# Patient Record
Sex: MALE | Race: WHITE | NOT HISPANIC OR LATINO | Employment: OTHER | URBAN - METROPOLITAN AREA
[De-identification: names, ages, dates, MRNs, and addresses within clinical notes are randomized per-mention and may not be internally consistent; named-entity substitution may affect disease eponyms.]

---

## 2019-04-05 ENCOUNTER — APPOINTMENT (OUTPATIENT)
Dept: RADIOLOGY | Facility: MEDICAL CENTER | Age: 54
DRG: 516 | End: 2019-04-05
Attending: EMERGENCY MEDICINE
Payer: MEDICAID

## 2019-04-05 ENCOUNTER — HOSPITAL ENCOUNTER (INPATIENT)
Facility: MEDICAL CENTER | Age: 54
LOS: 44 days | DRG: 516 | End: 2019-05-20
Attending: EMERGENCY MEDICINE | Admitting: INTERNAL MEDICINE
Payer: MEDICAID

## 2019-04-05 DIAGNOSIS — E87.6 HYPOKALEMIA: ICD-10-CM

## 2019-04-05 DIAGNOSIS — R53.1 WEAKNESS: ICD-10-CM

## 2019-04-05 DIAGNOSIS — S32.011D CLOSED STABLE BURST FRACTURE OF FIRST LUMBAR VERTEBRA WITH ROUTINE HEALING: ICD-10-CM

## 2019-04-05 DIAGNOSIS — R42 DIZZINESS: ICD-10-CM

## 2019-04-05 DIAGNOSIS — M25.562 ACUTE PAIN OF LEFT KNEE: ICD-10-CM

## 2019-04-05 LAB
ALBUMIN SERPL BCP-MCNC: 3.4 G/DL (ref 3.2–4.9)
ALBUMIN/GLOB SERPL: 1.1 G/DL
ALP SERPL-CCNC: 124 U/L (ref 30–99)
ALT SERPL-CCNC: 11 U/L (ref 2–50)
ANION GAP SERPL CALC-SCNC: 13 MMOL/L (ref 0–11.9)
AST SERPL-CCNC: 35 U/L (ref 12–45)
BASOPHILS # BLD AUTO: 0.3 % (ref 0–1.8)
BASOPHILS # BLD: 0.01 K/UL (ref 0–0.12)
BILIRUB SERPL-MCNC: 1.1 MG/DL (ref 0.1–1.5)
BNP SERPL-MCNC: 29 PG/ML (ref 0–100)
BUN SERPL-MCNC: 5 MG/DL (ref 8–22)
CALCIUM SERPL-MCNC: 8.2 MG/DL (ref 8.5–10.5)
CHLORIDE SERPL-SCNC: 106 MMOL/L (ref 96–112)
CO2 SERPL-SCNC: 24 MMOL/L (ref 20–33)
CREAT SERPL-MCNC: 0.82 MG/DL (ref 0.5–1.4)
EOSINOPHIL # BLD AUTO: 0.05 K/UL (ref 0–0.51)
EOSINOPHIL NFR BLD: 1.7 % (ref 0–6.9)
ERYTHROCYTE [DISTWIDTH] IN BLOOD BY AUTOMATED COUNT: 55.6 FL (ref 35.9–50)
GLOBULIN SER CALC-MCNC: 3.1 G/DL (ref 1.9–3.5)
GLUCOSE SERPL-MCNC: 82 MG/DL (ref 65–99)
HCT VFR BLD AUTO: 36.9 % (ref 42–52)
HGB BLD-MCNC: 12.5 G/DL (ref 14–18)
IMM GRANULOCYTES # BLD AUTO: 0.01 K/UL (ref 0–0.11)
IMM GRANULOCYTES NFR BLD AUTO: 0.3 % (ref 0–0.9)
LACTATE BLD-SCNC: 2.3 MMOL/L (ref 0.5–2)
LYMPHOCYTES # BLD AUTO: 1.09 K/UL (ref 1–4.8)
LYMPHOCYTES NFR BLD: 36.7 % (ref 22–41)
MAGNESIUM SERPL-MCNC: 1.8 MG/DL (ref 1.5–2.5)
MCH RBC QN AUTO: 28.9 PG (ref 27–33)
MCHC RBC AUTO-ENTMCNC: 33.9 G/DL (ref 33.7–35.3)
MCV RBC AUTO: 85.4 FL (ref 81.4–97.8)
MONOCYTES # BLD AUTO: 0.41 K/UL (ref 0–0.85)
MONOCYTES NFR BLD AUTO: 13.8 % (ref 0–13.4)
NEUTROPHILS # BLD AUTO: 1.4 K/UL (ref 1.82–7.42)
NEUTROPHILS NFR BLD: 47.2 % (ref 44–72)
NRBC # BLD AUTO: 0 K/UL
NRBC BLD-RTO: 0 /100 WBC
PLATELET # BLD AUTO: 91 K/UL (ref 164–446)
PMV BLD AUTO: 11.9 FL (ref 9–12.9)
POTASSIUM SERPL-SCNC: 2.6 MMOL/L (ref 3.6–5.5)
PROT SERPL-MCNC: 6.5 G/DL (ref 6–8.2)
RBC # BLD AUTO: 4.32 M/UL (ref 4.7–6.1)
SODIUM SERPL-SCNC: 143 MMOL/L (ref 135–145)
TROPONIN I SERPL-MCNC: <0.01 NG/ML (ref 0–0.04)
WBC # BLD AUTO: 3 K/UL (ref 4.8–10.8)

## 2019-04-05 PROCEDURE — 700117 HCHG RX CONTRAST REV CODE 255: Performed by: EMERGENCY MEDICINE

## 2019-04-05 PROCEDURE — 96365 THER/PROPH/DIAG IV INF INIT: CPT | Mod: XU

## 2019-04-05 PROCEDURE — 83735 ASSAY OF MAGNESIUM: CPT

## 2019-04-05 PROCEDURE — 87040 BLOOD CULTURE FOR BACTERIA: CPT | Mod: 91

## 2019-04-05 PROCEDURE — 80053 COMPREHEN METABOLIC PANEL: CPT

## 2019-04-05 PROCEDURE — 84484 ASSAY OF TROPONIN QUANT: CPT

## 2019-04-05 PROCEDURE — 99285 EMERGENCY DEPT VISIT HI MDM: CPT

## 2019-04-05 PROCEDURE — 96376 TX/PRO/DX INJ SAME DRUG ADON: CPT | Mod: XU

## 2019-04-05 PROCEDURE — 74177 CT ABD & PELVIS W/CONTRAST: CPT

## 2019-04-05 PROCEDURE — 700111 HCHG RX REV CODE 636 W/ 250 OVERRIDE (IP): Performed by: EMERGENCY MEDICINE

## 2019-04-05 PROCEDURE — 72072 X-RAY EXAM THORAC SPINE 3VWS: CPT

## 2019-04-05 PROCEDURE — 99220 PR INITIAL OBSERVATION CARE,LEVL III: CPT | Performed by: INTERNAL MEDICINE

## 2019-04-05 PROCEDURE — 71275 CT ANGIOGRAPHY CHEST: CPT

## 2019-04-05 PROCEDURE — 96367 TX/PROPH/DG ADDL SEQ IV INF: CPT

## 2019-04-05 PROCEDURE — 96375 TX/PRO/DX INJ NEW DRUG ADDON: CPT | Mod: XU

## 2019-04-05 PROCEDURE — 83605 ASSAY OF LACTIC ACID: CPT

## 2019-04-05 PROCEDURE — G0378 HOSPITAL OBSERVATION PER HR: HCPCS

## 2019-04-05 PROCEDURE — 71045 X-RAY EXAM CHEST 1 VIEW: CPT

## 2019-04-05 PROCEDURE — 85025 COMPLETE CBC W/AUTO DIFF WBC: CPT

## 2019-04-05 PROCEDURE — 700105 HCHG RX REV CODE 258: Performed by: EMERGENCY MEDICINE

## 2019-04-05 PROCEDURE — 70450 CT HEAD/BRAIN W/O DYE: CPT

## 2019-04-05 PROCEDURE — 83880 ASSAY OF NATRIURETIC PEPTIDE: CPT

## 2019-04-05 PROCEDURE — 72100 X-RAY EXAM L-S SPINE 2/3 VWS: CPT

## 2019-04-05 RX ORDER — METHYLPREDNISOLONE SODIUM SUCCINATE 125 MG/2ML
125 INJECTION, POWDER, LYOPHILIZED, FOR SOLUTION INTRAMUSCULAR; INTRAVENOUS ONCE
Status: COMPLETED | OUTPATIENT
Start: 2019-04-05 | End: 2019-04-05

## 2019-04-05 RX ORDER — TRAZODONE HYDROCHLORIDE 50 MG/1
50 TABLET ORAL NIGHTLY
Status: ON HOLD | COMMUNITY
End: 2019-05-19

## 2019-04-05 RX ORDER — ONDANSETRON 4 MG/1
4 TABLET, ORALLY DISINTEGRATING ORAL EVERY 4 HOURS PRN
Status: DISCONTINUED | OUTPATIENT
Start: 2019-04-05 | End: 2019-05-20 | Stop reason: HOSPADM

## 2019-04-05 RX ORDER — BISACODYL 10 MG
10 SUPPOSITORY, RECTAL RECTAL
Status: DISCONTINUED | OUTPATIENT
Start: 2019-04-05 | End: 2019-04-06

## 2019-04-05 RX ORDER — POLYETHYLENE GLYCOL 3350 17 G/17G
1 POWDER, FOR SOLUTION ORAL
Status: DISCONTINUED | OUTPATIENT
Start: 2019-04-05 | End: 2019-04-06

## 2019-04-05 RX ORDER — PANTOPRAZOLE SODIUM 40 MG/1
40 TABLET, DELAYED RELEASE ORAL 2 TIMES DAILY
COMMUNITY
End: 2021-11-09

## 2019-04-05 RX ORDER — ONDANSETRON 2 MG/ML
4 INJECTION INTRAMUSCULAR; INTRAVENOUS ONCE
Status: COMPLETED | OUTPATIENT
Start: 2019-04-05 | End: 2019-04-05

## 2019-04-05 RX ORDER — SODIUM CHLORIDE 9 MG/ML
1000 INJECTION, SOLUTION INTRAVENOUS ONCE
Status: COMPLETED | OUTPATIENT
Start: 2019-04-05 | End: 2019-04-05

## 2019-04-05 RX ORDER — AMITRIPTYLINE HYDROCHLORIDE 75 MG/1
75 TABLET ORAL NIGHTLY
Status: ON HOLD | COMMUNITY
End: 2019-05-19

## 2019-04-05 RX ORDER — POTASSIUM CHLORIDE 7.45 MG/ML
10 INJECTION INTRAVENOUS ONCE
Status: COMPLETED | OUTPATIENT
Start: 2019-04-05 | End: 2019-04-05

## 2019-04-05 RX ORDER — BUSPIRONE HYDROCHLORIDE 10 MG/1
15 TABLET ORAL 2 TIMES DAILY
Status: DISCONTINUED | OUTPATIENT
Start: 2019-04-05 | End: 2019-05-20 | Stop reason: HOSPADM

## 2019-04-05 RX ORDER — ACETAMINOPHEN 325 MG/1
650 TABLET ORAL EVERY 6 HOURS PRN
Status: DISCONTINUED | OUTPATIENT
Start: 2019-04-05 | End: 2019-04-11

## 2019-04-05 RX ORDER — ONDANSETRON 2 MG/ML
4 INJECTION INTRAMUSCULAR; INTRAVENOUS EVERY 4 HOURS PRN
Status: DISCONTINUED | OUTPATIENT
Start: 2019-04-05 | End: 2019-05-20 | Stop reason: HOSPADM

## 2019-04-05 RX ORDER — PANTOPRAZOLE SODIUM 40 MG/1
40 TABLET, DELAYED RELEASE ORAL 2 TIMES DAILY
Status: DISCONTINUED | OUTPATIENT
Start: 2019-04-05 | End: 2019-04-05

## 2019-04-05 RX ORDER — ZOLPIDEM TARTRATE 10 MG/1
10 TABLET ORAL
Status: ON HOLD | COMMUNITY
End: 2019-08-14

## 2019-04-05 RX ORDER — HYDROXYZINE 50 MG/1
50 TABLET, FILM COATED ORAL 2 TIMES DAILY
Status: ON HOLD | COMMUNITY
End: 2019-05-19

## 2019-04-05 RX ORDER — DIPHENHYDRAMINE HYDROCHLORIDE 50 MG/ML
50 INJECTION INTRAMUSCULAR; INTRAVENOUS ONCE
Status: COMPLETED | OUTPATIENT
Start: 2019-04-05 | End: 2019-04-05

## 2019-04-05 RX ORDER — MAGNESIUM SULFATE HEPTAHYDRATE 40 MG/ML
2 INJECTION, SOLUTION INTRAVENOUS ONCE
Status: COMPLETED | OUTPATIENT
Start: 2019-04-05 | End: 2019-04-06

## 2019-04-05 RX ORDER — AMOXICILLIN 250 MG
2 CAPSULE ORAL 2 TIMES DAILY
Status: DISCONTINUED | OUTPATIENT
Start: 2019-04-05 | End: 2019-04-06

## 2019-04-05 RX ORDER — MORPHINE SULFATE 4 MG/ML
4 INJECTION, SOLUTION INTRAMUSCULAR; INTRAVENOUS
Status: DISCONTINUED | OUTPATIENT
Start: 2019-04-05 | End: 2019-04-06

## 2019-04-05 RX ORDER — SODIUM CHLORIDE AND POTASSIUM CHLORIDE 300; 900 MG/100ML; MG/100ML
INJECTION, SOLUTION INTRAVENOUS CONTINUOUS
Status: DISCONTINUED | OUTPATIENT
Start: 2019-04-05 | End: 2019-04-06

## 2019-04-05 RX ORDER — BUSPIRONE HYDROCHLORIDE 15 MG/1
15 TABLET ORAL 2 TIMES DAILY
Status: ON HOLD | COMMUNITY
End: 2019-08-14

## 2019-04-05 RX ORDER — OMEPRAZOLE 20 MG/1
20 CAPSULE, DELAYED RELEASE ORAL EVERY 12 HOURS
Status: DISCONTINUED | OUTPATIENT
Start: 2019-04-05 | End: 2019-05-20 | Stop reason: HOSPADM

## 2019-04-05 RX ORDER — POTASSIUM CHLORIDE 20 MEQ/1
40 TABLET, EXTENDED RELEASE ORAL ONCE
Status: COMPLETED | OUTPATIENT
Start: 2019-04-05 | End: 2019-04-06

## 2019-04-05 RX ORDER — AMITRIPTYLINE HYDROCHLORIDE 75 MG/1
75 TABLET ORAL NIGHTLY
Status: DISCONTINUED | OUTPATIENT
Start: 2019-04-05 | End: 2019-05-03

## 2019-04-05 RX ADMIN — IOHEXOL 100 ML: 350 INJECTION, SOLUTION INTRAVENOUS at 21:25

## 2019-04-05 RX ADMIN — MORPHINE SULFATE 4 MG: 4 INJECTION INTRAVENOUS at 19:54

## 2019-04-05 RX ADMIN — DIPHENHYDRAMINE HYDROCHLORIDE 50 MG: 50 INJECTION INTRAMUSCULAR; INTRAVENOUS at 19:59

## 2019-04-05 RX ADMIN — ONDANSETRON 4 MG: 2 INJECTION INTRAMUSCULAR; INTRAVENOUS at 16:59

## 2019-04-05 RX ADMIN — SODIUM CHLORIDE 1000 ML: 9 INJECTION, SOLUTION INTRAVENOUS at 16:58

## 2019-04-05 RX ADMIN — DIPHENHYDRAMINE HYDROCHLORIDE 50 MG: 50 INJECTION INTRAMUSCULAR; INTRAVENOUS at 21:03

## 2019-04-05 RX ADMIN — CEFTRIAXONE SODIUM 1 G: 1 INJECTION, POWDER, FOR SOLUTION INTRAMUSCULAR; INTRAVENOUS at 18:34

## 2019-04-05 RX ADMIN — METHYLPREDNISOLONE SODIUM SUCCINATE 125 MG: 125 INJECTION, POWDER, FOR SOLUTION INTRAMUSCULAR; INTRAVENOUS at 19:59

## 2019-04-05 RX ADMIN — POTASSIUM CHLORIDE 10 MEQ: 7.46 INJECTION, SOLUTION INTRAVENOUS at 19:54

## 2019-04-05 ASSESSMENT — ENCOUNTER SYMPTOMS
NECK PAIN: 0
DIARRHEA: 0
LOSS OF CONSCIOUSNESS: 0
MYALGIAS: 0
NAUSEA: 1
SPUTUM PRODUCTION: 0
PALPITATIONS: 0
BLOOD IN STOOL: 0
VOMITING: 1
WEAKNESS: 1
COUGH: 0
FEVER: 0
SHORTNESS OF BREATH: 1
SORE THROAT: 0
BLURRED VISION: 0
ABDOMINAL PAIN: 1
SEIZURES: 0
BRUISES/BLEEDS EASILY: 0
FLANK PAIN: 0
HEADACHES: 0
CHILLS: 0
WHEEZING: 0
DIAPHORESIS: 0
FOCAL WEAKNESS: 0
DIZZINESS: 1
BACK PAIN: 0

## 2019-04-05 ASSESSMENT — PAIN DESCRIPTION - DESCRIPTORS: DESCRIPTORS: THROBBING

## 2019-04-05 ASSESSMENT — LIFESTYLE VARIABLES: DO YOU DRINK ALCOHOL: NO

## 2019-04-05 NOTE — ED TRIAGE NOTES
Pt arrived to room via EMS. EMS reports that the pt is a senior care dump. EMS reports back pain and heart rate in the 180s. Pt reports pain 10/10, cancer, and asthma. Pt reports blurry vision, back pain, weakness, and SOB. EMS gave nothing en route. Pt placed in gown. Pt attached to monitors. Primary assessment done.     [facility] is at the bedside.   Pt has a port on left upper chest.

## 2019-04-06 ENCOUNTER — APPOINTMENT (OUTPATIENT)
Dept: CARDIOLOGY | Facility: MEDICAL CENTER | Age: 54
DRG: 516 | End: 2019-04-06
Attending: INTERNAL MEDICINE
Payer: MEDICAID

## 2019-04-06 PROBLEM — R00.0 TACHYCARDIA: Status: ACTIVE | Noted: 2019-04-06

## 2019-04-06 PROBLEM — R42 DIZZINESS: Status: ACTIVE | Noted: 2019-04-06

## 2019-04-06 PROBLEM — E87.20 LACTIC ACIDOSIS: Status: ACTIVE | Noted: 2019-04-06

## 2019-04-06 PROBLEM — E87.6 HYPOKALEMIA: Status: ACTIVE | Noted: 2019-04-06

## 2019-04-06 PROBLEM — R40.0 SOMNOLENCE: Status: ACTIVE | Noted: 2019-04-06

## 2019-04-06 PROBLEM — D61.818 PANCYTOPENIA (HCC): Status: ACTIVE | Noted: 2019-04-06

## 2019-04-06 PROBLEM — E83.51 HYPOCALCEMIA: Status: ACTIVE | Noted: 2019-04-06

## 2019-04-06 PROBLEM — R65.10 SIRS (SYSTEMIC INFLAMMATORY RESPONSE SYNDROME) (HCC): Status: ACTIVE | Noted: 2019-04-06

## 2019-04-06 LAB
ANION GAP SERPL CALC-SCNC: 8 MMOL/L (ref 0–11.9)
ANISOCYTOSIS BLD QL SMEAR: ABNORMAL
APPEARANCE UR: CLEAR
BASOPHILS # BLD AUTO: 0 % (ref 0–1.8)
BASOPHILS # BLD: 0 K/UL (ref 0–0.12)
BILIRUB UR QL STRIP.AUTO: NEGATIVE
BUN SERPL-MCNC: 4 MG/DL (ref 8–22)
CALCIUM SERPL-MCNC: 7.8 MG/DL (ref 8.5–10.5)
CHLORIDE SERPL-SCNC: 106 MMOL/L (ref 96–112)
CO2 SERPL-SCNC: 23 MMOL/L (ref 20–33)
COLOR UR: YELLOW
CREAT SERPL-MCNC: 0.8 MG/DL (ref 0.5–1.4)
CRP SERPL HS-MCNC: 0.82 MG/DL (ref 0–0.75)
EKG IMPRESSION: NORMAL
EOSINOPHIL # BLD AUTO: 0 K/UL (ref 0–0.51)
EOSINOPHIL NFR BLD: 0 % (ref 0–6.9)
ERYTHROCYTE [DISTWIDTH] IN BLOOD BY AUTOMATED COUNT: 57.1 FL (ref 35.9–50)
FERRITIN SERPL-MCNC: 91.8 NG/ML (ref 22–322)
FOLATE SERPL-MCNC: 5.1 NG/ML
GLUCOSE SERPL-MCNC: 169 MG/DL (ref 65–99)
GLUCOSE UR STRIP.AUTO-MCNC: NEGATIVE MG/DL
HCT VFR BLD AUTO: 35.7 % (ref 42–52)
HGB BLD-MCNC: 11.8 G/DL (ref 14–18)
IRON SATN MFR SERPL: 28 % (ref 15–55)
IRON SERPL-MCNC: 62 UG/DL (ref 50–180)
KETONES UR STRIP.AUTO-MCNC: 15 MG/DL
LACTATE BLD-SCNC: 0.9 MMOL/L (ref 0.5–2)
LEUKOCYTE ESTERASE UR QL STRIP.AUTO: NEGATIVE
LV EJECT FRACT  99904: 60
LV EJECT FRACT MOD 4C 99902: 63.51
LYMPHOCYTES # BLD AUTO: 0.45 K/UL (ref 1–4.8)
LYMPHOCYTES NFR BLD: 50 % (ref 22–41)
MAGNESIUM SERPL-MCNC: 2.5 MG/DL (ref 1.5–2.5)
MANUAL DIFF BLD: NORMAL
MCH RBC QN AUTO: 28.7 PG (ref 27–33)
MCHC RBC AUTO-ENTMCNC: 33.1 G/DL (ref 33.7–35.3)
MCV RBC AUTO: 86.9 FL (ref 81.4–97.8)
MICRO URNS: ABNORMAL
MICROCYTES BLD QL SMEAR: ABNORMAL
MONOCYTES # BLD AUTO: 0.04 K/UL (ref 0–0.85)
MONOCYTES NFR BLD AUTO: 4.3 % (ref 0–13.4)
MORPHOLOGY BLD-IMP: NORMAL
NEUTROPHILS # BLD AUTO: 0.41 K/UL (ref 1.82–7.42)
NEUTROPHILS NFR BLD: 43.5 % (ref 44–72)
NEUTS BAND NFR BLD MANUAL: 2.2 % (ref 0–10)
NITRITE UR QL STRIP.AUTO: NEGATIVE
NRBC # BLD AUTO: 0 K/UL
NRBC BLD-RTO: 0 /100 WBC
OVALOCYTES BLD QL SMEAR: NORMAL
PH UR STRIP.AUTO: 6 [PH]
PLATELET # BLD AUTO: 89 K/UL (ref 164–446)
PLATELET BLD QL SMEAR: NORMAL
PMV BLD AUTO: 10.6 FL (ref 9–12.9)
POIKILOCYTOSIS BLD QL SMEAR: NORMAL
POTASSIUM SERPL-SCNC: 3.5 MMOL/L (ref 3.6–5.5)
PROCALCITONIN SERPL-MCNC: 0.08 NG/ML
PROCALCITONIN SERPL-MCNC: 0.09 NG/ML
PROT UR QL STRIP: NEGATIVE MG/DL
RBC # BLD AUTO: 4.11 M/UL (ref 4.7–6.1)
RBC BLD AUTO: PRESENT
RBC UR QL AUTO: NEGATIVE
SMUDGE CELLS BLD QL SMEAR: NORMAL
SODIUM SERPL-SCNC: 137 MMOL/L (ref 135–145)
SP GR UR STRIP.AUTO: >=1.045
TIBC SERPL-MCNC: 221 UG/DL (ref 250–450)
TSH SERPL DL<=0.005 MIU/L-ACNC: 0.55 UIU/ML (ref 0.38–5.33)
UROBILINOGEN UR STRIP.AUTO-MCNC: 1 MG/DL
VIT B12 SERPL-MCNC: 490 PG/ML (ref 211–911)
WBC # BLD AUTO: 0.9 K/UL (ref 4.8–10.8)

## 2019-04-06 PROCEDURE — 93005 ELECTROCARDIOGRAM TRACING: CPT | Performed by: INTERNAL MEDICINE

## 2019-04-06 PROCEDURE — 93010 ELECTROCARDIOGRAM REPORT: CPT | Performed by: INTERNAL MEDICINE

## 2019-04-06 PROCEDURE — 700102 HCHG RX REV CODE 250 W/ 637 OVERRIDE(OP): Performed by: INTERNAL MEDICINE

## 2019-04-06 PROCEDURE — 93306 TTE W/DOPPLER COMPLETE: CPT | Mod: 26 | Performed by: INTERNAL MEDICINE

## 2019-04-06 PROCEDURE — 82728 ASSAY OF FERRITIN: CPT

## 2019-04-06 PROCEDURE — 94760 N-INVAS EAR/PLS OXIMETRY 1: CPT

## 2019-04-06 PROCEDURE — 83605 ASSAY OF LACTIC ACID: CPT

## 2019-04-06 PROCEDURE — 81003 URINALYSIS AUTO W/O SCOPE: CPT

## 2019-04-06 PROCEDURE — 99233 SBSQ HOSP IP/OBS HIGH 50: CPT | Performed by: INTERNAL MEDICINE

## 2019-04-06 PROCEDURE — 96375 TX/PRO/DX INJ NEW DRUG ADDON: CPT

## 2019-04-06 PROCEDURE — A9270 NON-COVERED ITEM OR SERVICE: HCPCS | Performed by: INTERNAL MEDICINE

## 2019-04-06 PROCEDURE — 82607 VITAMIN B-12: CPT

## 2019-04-06 PROCEDURE — 700111 HCHG RX REV CODE 636 W/ 250 OVERRIDE (IP): Performed by: INTERNAL MEDICINE

## 2019-04-06 PROCEDURE — 83550 IRON BINDING TEST: CPT

## 2019-04-06 PROCEDURE — 96366 THER/PROPH/DIAG IV INF ADDON: CPT

## 2019-04-06 PROCEDURE — 85007 BL SMEAR W/DIFF WBC COUNT: CPT

## 2019-04-06 PROCEDURE — 96367 TX/PROPH/DG ADDL SEQ IV INF: CPT

## 2019-04-06 PROCEDURE — 700101 HCHG RX REV CODE 250: Performed by: INTERNAL MEDICINE

## 2019-04-06 PROCEDURE — 93306 TTE W/DOPPLER COMPLETE: CPT

## 2019-04-06 PROCEDURE — 84145 PROCALCITONIN (PCT): CPT

## 2019-04-06 PROCEDURE — 86140 C-REACTIVE PROTEIN: CPT

## 2019-04-06 PROCEDURE — 700105 HCHG RX REV CODE 258: Performed by: INTERNAL MEDICINE

## 2019-04-06 PROCEDURE — 83540 ASSAY OF IRON: CPT

## 2019-04-06 PROCEDURE — 80048 BASIC METABOLIC PNL TOTAL CA: CPT

## 2019-04-06 PROCEDURE — 84443 ASSAY THYROID STIM HORMONE: CPT

## 2019-04-06 PROCEDURE — 83735 ASSAY OF MAGNESIUM: CPT

## 2019-04-06 PROCEDURE — 770020 HCHG ROOM/CARE - TELE (206)

## 2019-04-06 PROCEDURE — 85027 COMPLETE CBC AUTOMATED: CPT

## 2019-04-06 PROCEDURE — 36415 COLL VENOUS BLD VENIPUNCTURE: CPT

## 2019-04-06 PROCEDURE — 82746 ASSAY OF FOLIC ACID SERUM: CPT

## 2019-04-06 RX ORDER — ALBUTEROL SULFATE 90 UG/1
2 AEROSOL, METERED RESPIRATORY (INHALATION) EVERY 4 HOURS PRN
Status: DISCONTINUED | OUTPATIENT
Start: 2019-04-06 | End: 2019-05-02

## 2019-04-06 RX ORDER — DIPHENHYDRAMINE HCL 25 MG
25 TABLET ORAL EVERY 6 HOURS PRN
Status: DISCONTINUED | OUTPATIENT
Start: 2019-04-06 | End: 2019-04-15

## 2019-04-06 RX ORDER — BENZOCAINE/MENTHOL 6 MG-10 MG
LOZENGE MUCOUS MEMBRANE 2 TIMES DAILY
Status: DISCONTINUED | OUTPATIENT
Start: 2019-04-06 | End: 2019-05-02

## 2019-04-06 RX ORDER — TRAZODONE HYDROCHLORIDE 50 MG/1
50 TABLET ORAL NIGHTLY
Status: DISCONTINUED | OUTPATIENT
Start: 2019-04-06 | End: 2019-04-09

## 2019-04-06 RX ORDER — ZOLPIDEM TARTRATE 5 MG/1
10 TABLET ORAL
Status: DISCONTINUED | OUTPATIENT
Start: 2019-04-06 | End: 2019-04-09

## 2019-04-06 RX ORDER — HYDROXYZINE 50 MG/1
50 TABLET, FILM COATED ORAL 2 TIMES DAILY
Status: DISCONTINUED | OUTPATIENT
Start: 2019-04-06 | End: 2019-04-22

## 2019-04-06 RX ORDER — MORPHINE SULFATE 4 MG/ML
4 INJECTION, SOLUTION INTRAMUSCULAR; INTRAVENOUS EVERY 4 HOURS PRN
Status: DISCONTINUED | OUTPATIENT
Start: 2019-04-06 | End: 2019-04-07

## 2019-04-06 RX ADMIN — MORPHINE SULFATE 4 MG: 4 INJECTION INTRAVENOUS at 10:37

## 2019-04-06 RX ADMIN — AMITRIPTYLINE HYDROCHLORIDE 75 MG: 50 TABLET, FILM COATED ORAL at 01:23

## 2019-04-06 RX ADMIN — OMEPRAZOLE 20 MG: 20 CAPSULE, DELAYED RELEASE ORAL at 00:29

## 2019-04-06 RX ADMIN — DIPHENHYDRAMINE HCL 25 MG: 25 TABLET ORAL at 21:12

## 2019-04-06 RX ADMIN — CEFEPIME 2 G: 2 INJECTION, POWDER, FOR SOLUTION INTRAVENOUS at 13:39

## 2019-04-06 RX ADMIN — MAGNESIUM SULFATE 2 G: 2 INJECTION INTRAVENOUS at 01:25

## 2019-04-06 RX ADMIN — OMEPRAZOLE 20 MG: 20 CAPSULE, DELAYED RELEASE ORAL at 17:36

## 2019-04-06 RX ADMIN — MORPHINE SULFATE 4 MG: 4 INJECTION INTRAVENOUS at 15:32

## 2019-04-06 RX ADMIN — POTASSIUM CHLORIDE AND SODIUM CHLORIDE: 900; 300 INJECTION, SOLUTION INTRAVENOUS at 01:04

## 2019-04-06 RX ADMIN — METOPROLOL TARTRATE 12.5 MG: 25 TABLET, FILM COATED ORAL at 10:31

## 2019-04-06 RX ADMIN — ZOLPIDEM TARTRATE 10 MG: 5 TABLET ORAL at 21:14

## 2019-04-06 RX ADMIN — TRAZODONE HYDROCHLORIDE 50 MG: 50 TABLET ORAL at 21:13

## 2019-04-06 RX ADMIN — MORPHINE SULFATE 4 MG: 4 INJECTION INTRAVENOUS at 01:39

## 2019-04-06 RX ADMIN — HYDROCORTISONE: 1 CREAM TOPICAL at 17:40

## 2019-04-06 RX ADMIN — CALCIUM GLUCONATE 1 G: 98 INJECTION, SOLUTION INTRAVENOUS at 06:37

## 2019-04-06 RX ADMIN — MORPHINE SULFATE 4 MG: 4 INJECTION INTRAVENOUS at 06:37

## 2019-04-06 RX ADMIN — POTASSIUM CHLORIDE 40 MEQ: 1500 TABLET, EXTENDED RELEASE ORAL at 00:28

## 2019-04-06 RX ADMIN — AMITRIPTYLINE HYDROCHLORIDE 75 MG: 50 TABLET, FILM COATED ORAL at 21:15

## 2019-04-06 RX ADMIN — BUSPIRONE HYDROCHLORIDE 15 MG: 30 TABLET ORAL at 17:38

## 2019-04-06 RX ADMIN — HYDROXYZINE HYDROCHLORIDE 50 MG: 50 TABLET ORAL at 21:13

## 2019-04-06 RX ADMIN — ALTEPLASE 2 MG: 2.2 INJECTION, POWDER, LYOPHILIZED, FOR SOLUTION INTRAVENOUS at 13:39

## 2019-04-06 RX ADMIN — CEFEPIME 2 G: 2 INJECTION, POWDER, FOR SOLUTION INTRAVENOUS at 17:36

## 2019-04-06 RX ADMIN — BUSPIRONE HYDROCHLORIDE 15 MG: 30 TABLET ORAL at 01:24

## 2019-04-06 RX ADMIN — MORPHINE SULFATE 4 MG: 4 INJECTION INTRAVENOUS at 21:16

## 2019-04-06 RX ADMIN — OMEPRAZOLE 20 MG: 20 CAPSULE, DELAYED RELEASE ORAL at 10:31

## 2019-04-06 RX ADMIN — DIPHENHYDRAMINE HCL 25 MG: 25 TABLET ORAL at 15:32

## 2019-04-06 RX ADMIN — ALTEPLASE 2 MG: 2.2 INJECTION, POWDER, LYOPHILIZED, FOR SOLUTION INTRAVENOUS at 17:32

## 2019-04-06 RX ADMIN — POTASSIUM CHLORIDE: 2 INJECTION, SOLUTION, CONCENTRATE INTRAVENOUS at 12:31

## 2019-04-06 ASSESSMENT — COPD QUESTIONNAIRES
HAVE YOU SMOKED AT LEAST 100 CIGARETTES IN YOUR ENTIRE LIFE: NO/DON'T KNOW
DO YOU EVER COUGH UP ANY MUCUS OR PHLEGM?: NO/ONLY WITH OCCASIONAL COLDS OR INFECTIONS
COPD SCREENING SCORE: 1
DURING THE PAST 4 WEEKS HOW MUCH DID YOU FEEL SHORT OF BREATH: NONE/LITTLE OF THE TIME

## 2019-04-06 ASSESSMENT — ENCOUNTER SYMPTOMS
TINGLING: 0
HEADACHES: 0
WEAKNESS: 1
VOMITING: 0
ABDOMINAL PAIN: 0
BRUISES/BLEEDS EASILY: 1
DIZZINESS: 0
FALLS: 0
SHORTNESS OF BREATH: 0
NAUSEA: 0
CONSTIPATION: 0
DIARRHEA: 0
BACK PAIN: 1
MEMORY LOSS: 0
BLOOD IN STOOL: 0
COUGH: 0
NERVOUS/ANXIOUS: 0
SPUTUM PRODUCTION: 0
BLURRED VISION: 0
DOUBLE VISION: 0
PALPITATIONS: 0

## 2019-04-06 ASSESSMENT — PATIENT HEALTH QUESTIONNAIRE - PHQ9
SUM OF ALL RESPONSES TO PHQ9 QUESTIONS 1 AND 2: 0
2. FEELING DOWN, DEPRESSED, IRRITABLE, OR HOPELESS: NOT AT ALL
1. LITTLE INTEREST OR PLEASURE IN DOING THINGS: NOT AT ALL

## 2019-04-06 ASSESSMENT — LIFESTYLE VARIABLES: EVER_SMOKED: NEVER

## 2019-04-06 NOTE — PROGRESS NOTES
NP found home medications with pt belongings, given to this RN. Sent to pharmacy with pharmacy tech per protocol. Tag in chart.

## 2019-04-06 NOTE — ASSESSMENT & PLAN NOTE
-Improving with IV fluid and p.o. Replacement  -Also replacing mag  -Follow labs  -Telemetry monitoring

## 2019-04-06 NOTE — ED NOTES
Assumed care of pt at this time. Pt resting in bed, requesting medication for pain. Will ask ERP. ABCs intact.

## 2019-04-06 NOTE — ED PROVIDER NOTES
"ED Provider Note    CHIEF COMPLAINT  Chief Complaint   Patient presents with   • Low Back Pain   • Dizziness   Headache, chest pain    HPI  Benito Persaud is a 53 y.o. male who presents complaining of headache, chest pain, low back pain with a complicated recent past medical history.  Patient was recently in the hospital and then in [facility] in [place].  He was then extradited to [facility].  When they brought him to the [facility] they found him to have a heart rate of 180.  Patient is complaining of headache chest pain and abdominal pain.  States he was recently in the hospital with sepsis.  He denies recent fever or chills.  He has had mild cough and dyspnea.    REVIEW OF SYSTEMS  See HPI for further details.  No vomiting or diarrhea.  Positive weakness.  Positive headache.  Positive chest pain.  Positive abdominal pain.  All other systems are negative.    PAST MEDICAL HISTORY  Past Medical History:   Diagnosis Date   • Asthma    • Cancer (HCC) 11/01/2016       FAMILY HISTORY  No family history on file.    SOCIAL HISTORY  Social History     Social History   • Marital status: N/A     Spouse name: N/A   • Number of children: N/A   • Years of education: N/A     Social History Main Topics   • Smoking status: Former Smoker   • Smokeless tobacco: Never Used   • Alcohol use Not on file   • Drug use: Unknown   • Sexual activity: Not on file     Other Topics Concern   • Not on file     Social History Narrative   • No narrative on file       SURGICAL HISTORY  No past surgical history on file.    CURRENT MEDICATIONS  Home Medications    **Home medications have not yet been reviewed for this encounter**         ALLERGIES  Allergies   Allergen Reactions   • Iodine Anaphylaxis   • Keflex Vomiting   • Reglan [Metoclopramide] Nausea   • Shellfish Allergy Anaphylaxis   • Toradol Hives       PHYSICAL EXAM  VITAL SIGNS: Pulse 100   Resp 18   Ht (P) 1.803 m (5' 11\")   Wt (P) 104.8 kg (231 lb)   SpO2 100%   BMI (P) 32.22 kg/m² "   Constitutional: Middle-aged male.  Ill-appearing  HENT: Normocephalic, Atraumatic, Bilateral external ears normal, Oropharynx dry  Eyes: SEDRICK, EOMI, Conjunctiva normal, No discharge.   Neck: Normal range of motion, No tenderness, Supple, No stridor. No nuchal rigidity  Lymphatic: No lymphadenopathy noted.   Cardiovascular: Regular rate and rhythm, tachycardic  Thorax & Lungs: Clear without wheezing  Abdomen: Bowel sounds normal, Soft, No tenderness, No masses, obese  Skin: Warm, Dry, No erythema, No rash.   Back: Tenderness along the thoracic lumbar junction  Extremities: Trace pitting edema.  Normal upper and lower extremities  Neurologic: Alert & oriented x 3, diffuse weak motor function, Normal sensory function, No focal deficits noted.     EKG  Results for orders placed or performed during the hospital encounter of 04/05/19   CBC WITH DIFFERENTIAL   Result Value Ref Range    WBC 3.0 (L) 4.8 - 10.8 K/uL    RBC 4.32 (L) 4.70 - 6.10 M/uL    Hemoglobin 12.5 (L) 14.0 - 18.0 g/dL    Hematocrit 36.9 (L) 42.0 - 52.0 %    MCV 85.4 81.4 - 97.8 fL    MCH 28.9 27.0 - 33.0 pg    MCHC 33.9 33.7 - 35.3 g/dL    RDW 55.6 (H) 35.9 - 50.0 fL    Platelet Count 91 (L) 164 - 446 K/uL    MPV 11.9 9.0 - 12.9 fL    Neutrophils-Polys 47.20 44.00 - 72.00 %    Lymphocytes 36.70 22.00 - 41.00 %    Monocytes 13.80 (H) 0.00 - 13.40 %    Eosinophils 1.70 0.00 - 6.90 %    Basophils 0.30 0.00 - 1.80 %    Immature Granulocytes 0.30 0.00 - 0.90 %    Nucleated RBC 0.00 /100 WBC    Neutrophils (Absolute) 1.40 (L) 1.82 - 7.42 K/uL    Lymphs (Absolute) 1.09 1.00 - 4.80 K/uL    Monos (Absolute) 0.41 0.00 - 0.85 K/uL    Eos (Absolute) 0.05 0.00 - 0.51 K/uL    Baso (Absolute) 0.01 0.00 - 0.12 K/uL    Immature Granulocytes (abs) 0.01 0.00 - 0.11 K/uL    NRBC (Absolute) 0.00 K/uL   COMP METABOLIC PANEL   Result Value Ref Range    Sodium 143 135 - 145 mmol/L    Potassium 2.6 (LL) 3.6 - 5.5 mmol/L    Chloride 106 96 - 112 mmol/L    Co2 24 20 - 33 mmol/L     Anion Gap 13.0 (H) 0.0 - 11.9    Glucose 82 65 - 99 mg/dL    Bun 5 (L) 8 - 22 mg/dL    Creatinine 0.82 0.50 - 1.40 mg/dL    Calcium 8.2 (L) 8.5 - 10.5 mg/dL    AST(SGOT) 35 12 - 45 U/L    ALT(SGPT) 11 2 - 50 U/L    Alkaline Phosphatase 124 (H) 30 - 99 U/L    Total Bilirubin 1.1 0.1 - 1.5 mg/dL    Albumin 3.4 3.2 - 4.9 g/dL    Total Protein 6.5 6.0 - 8.2 g/dL    Globulin 3.1 1.9 - 3.5 g/dL    A-G Ratio 1.1 g/dL   TROPONIN   Result Value Ref Range    Troponin I <0.01 0.00 - 0.04 ng/mL   BTYPE NATRIURETIC PEPTIDE   Result Value Ref Range    B Natriuretic Peptide 29 0 - 100 pg/mL   LACTIC ACID   Result Value Ref Range    Lactic Acid 2.3 (H) 0.5 - 2.0 mmol/L   ESTIMATED GFR   Result Value Ref Range    GFR If African American >60 >60 mL/min/1.73 m 2    GFR If Non African American >60 >60 mL/min/1.73 m 2   MAGNESIUM   Result Value Ref Range    Magnesium 1.8 1.5 - 2.5 mg/dL         RADIOLOGY/PROCEDURES  CT-ABDOMEN-PELVIS WITH   Final Result         1.  No acute abnormality.   2.  Hepatomegaly and diffuse hepatic steatosis   3.  Atherosclerosis and atherosclerotic coronary artery disease.   4.  Pulmonary nodules measuring up to 6.5 mm, see nodule follow-up recommendations below.      Low Risk: CT at 3-6 months, then consider CT at 18-24 months      High Risk: CT at 3-6 months, then at 18-24 months      Comments: Use most suspicious nodule as guide to management. Follow-up intervals may vary according to size and risk.      Low Risk - Minimal or absent history of smoking and of other known risk factors.      High Risk - History of smoking or of other known risk factors.      Note: These recommendations do not apply to lung cancer screening, patients with immunosuppression, or patients with known primary cancer.      Fleischner Society 2017 Guidelines for Management of Incidentally Detected Pulmonary Nodules in Adults         CT-CTA CHEST PULMONARY ARTERY W/ RECONS   Final Result         1.  No large central pulmonary embolus  is appreciated, evaluation of the subsegmental branches is essentially nondiagnostic due to motion artifacts. Additional imaging would be required for definitive exclusion of small distal pulmonary emboli.      DX-LUMBAR SPINE-2 OR 3 VIEWS   Final Result      Limited examination. Mild anterior wedging L2 vertebral body that appears to be old.   If symptoms are persistent, would recommend CT for further evaluation.      DX-THORACIC SPINE-WITH SWIMMERS VIEW   Final Result      Limited examination. The upper thoracic spine is not well-demonstrated.   Mild anterior wedging of some the lower thoracic vertebral bodies and appears old.   No definite acute compression. If symptoms are persistent, CT would be recommended for further evaluation.      DX-CHEST-PORTABLE (1 VIEW)   Final Result      Central catheter projects over the superior right atrium.      Prominent calcified granuloma in the left midlung.      Atherosclerotic plaque.      CT-HEAD W/O   Final Result      No acute intracranial abnormality is identified.            COURSE & MEDICAL DECISION MAKING  Pertinent Labs & Imaging studies reviewed. (See chart for details)  Patient is currently too weak to stand or walk.  I suspect this is secondary to his potassium.  Patient was given IV potassium as well as IV fluids.  His lactic acid was elevated at 2.3 he was given Rocephin.  CT scan was obtained of the chest and abdomen.  There are no obvious spinal fractures.  There is no intracranial injury from his fall.      FINAL IMPRESSION  1.   1. Hypokalemia      2.   3.      Please note that this dictation was created using voice recognition software. I have worked with consultants from the vendor as well as technical experts from [facility] to optimize the interface. I have made every reasonable attempt to correct obvious errors, but I expect that there are errors of grammar and possibly content that I did not discover before finalizing the note.      Electronically  signed by: Sonny Ward, 4/5/2019 7:21 PM

## 2019-04-06 NOTE — PROGRESS NOTES
Medicated pt for rash. Pt asking for IV diphenhydramine. Pt has no issues swallowing as he is on a regular diet.

## 2019-04-06 NOTE — ED NOTES
Med rec updated and complete per pt at bedside  Allergies have been verified and updated  No oral ABX within the last 30 days

## 2019-04-06 NOTE — PROGRESS NOTES
Assumed pt care at 0700. Received report from Willie CARCAMO. A&O x4. Pt c/o 9/10 back pain, not time for prn pain medication at this time, pt updated, will medicate once medication due, pt verbalizes understanding. Respirations even and unlabored on RA. On tele monitor, SR noted at this time.   Updated on POC, communication board updated. Bed locked and in lowest position. Call light and belongings within reach. Non-skid socks in place. Needs met, will continue to monitor.

## 2019-04-06 NOTE — ASSESSMENT & PLAN NOTE
-Multifactorial - leukemia and tachycardia, and component of dehydration- Will look for signs of sepsis-no signs of infection discontinue cefepime  -Lactate has now normalized  -Continue IV fluid hydration  -Monitor labs  -He is hemodynamically stable

## 2019-04-06 NOTE — CARE PLAN
"Problem: Pain Management  Goal: Pain level will decrease to patient's comfort goal  Outcome: PROGRESSING AS EXPECTED  Pt c/o chronic lower back pain. Pt has been treated with his pain medication. Will continue to monitor.     Problem: Mobility  Goal: Risk for activity intolerance will decrease  Outcome: PROGRESSING AS EXPECTED  Pt states he is unable to ambulate at baseline. Pt was slide himself over from gurney to bed. Pt states he uses wheelchair and walker at home. Of note, pt stated he could \"barely\" move his legs. However was seen freely moving legs up and down while in bed. PT/OT consulted.        "

## 2019-04-06 NOTE — ED NOTES
Benadryl and Solu-Medrol administered per orders at this time for pre medication for IV contrast.

## 2019-04-06 NOTE — PROGRESS NOTES
Pt c/o rash on back and b/l UE. A little on right calf. Pt would like steriod cream and benadryl. MD notified. New orders received

## 2019-04-06 NOTE — PROGRESS NOTES
Pt to T7 with Francine RN on zoll monitor. Bedside report given. All belongings with pt. Chart and medications given to RN. Pt medicated for 9/10 back pain per MAR prior to leaving. Pt reports not receiving home medications from noc RN, charted as refused, pt denies, given at this time. Pt refused ordered metoprolol, education provided on importance of medication, pt agreeable after education.

## 2019-04-06 NOTE — PROGRESS NOTES
Abebe from lab contacted this RN regarding blood sample sent to lab. Per Abebe, specimen is clotted. Tele RN updated.

## 2019-04-06 NOTE — PROGRESS NOTES
I have personally seen and examined / evaluated the patient, discussed the patient's evaluation & management plan with CHUCK Mayes and I have reviewed the note below.     I agree with the findings, history, examination and assessment / plan as listed below with changes/addendum as listed below by me in my addendum / attestation separetely.     Patient with complicated history.  Underlying history of testicular cancer status post orchiectomy in 2016, chemotherapy 2018.  History of myelodysplastic syndrome, reports previous bone marrow biopsy done.  Treated in [place], [place].  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  Previous history of GI bleeding, Bates's esophagus, previous GI workup, including upper and lower endoscopies.  History of infectious complications including port infections.  Psychiatric issues.  Tachycardia.  Presented to the hospital because of increased heart rate.    Is noted to have critical hypokalemia on presentation, SIRS given leukopenia which is profound and worsening and tachycardia which remains persistent, no identifiable source of infection but this can be potentially bacteremia, underlying central venous access/Port in place.    Admit the patient to the telemetry unit.  Repeat CBC.  Monitor his white count.  Discussed with Dr. Mccoy from oncology, obtain previous hematology/oncology records.  Continue monitoring at this time, no Granix at this time.    Escalated to IV cefepime, continue close clinical monitoring, check ESR, CRP, pro calcitonin await blood cultures.  If any de-escalation, probably initiate IV vancomycin.    Continue IV fluid hydration, containing K.  Monitor K/electrolytes.    Echo done results pending.     Admit to telemetry, anticipate hospitalization greater than 2 midnights requiring the use of intravenous antibiotics and very close clinical monitoring requiring ongoing IV fluid hydration, close monitoring of his electrolytes.  The care that he needs cannot be  provided in the outpatient setting/observation setting  at this time.    Delonte aSmaniego M.D.  04/06/19  10:09 AM

## 2019-04-06 NOTE — ASSESSMENT & PLAN NOTE
Most likely due to myelodysplastic syndrome and possibly chronic liver disease  His last chemotherapy was in November 2018  No acute symptoms.  Hematology/oncology was consulted and recommended outpatient follow-up.  Stable and improving.

## 2019-04-06 NOTE — ED NOTES
CARLOS ALBERTO Tapia at bedside for transport. Pt transferred to T216 with two nurses on monitor and oxygen in stable condition.

## 2019-04-06 NOTE — PROGRESS NOTES
Hospital Medicine Daily Progress Note    Date of Service  4/6/2019    Chief Complaint  53 y.o. male admitted 4/5/2019 with fast heart rate.  Attempted to get hematology/oncology records, however patient reports he received treatment for his testicular cancer in [place] and received his chemotherapy for his myelodysplastic syndrome at various hospitals throughout the [place].  He reports he has a record of all of his information on a disc at home and will reach out to 1 of his friends to go pick it up for him and bring it here.    Hospital Course   This is a 53-year-old male with PMH enterocolitis, GIB, testicular cancer status post left orchiectomy 2016, myelodysplastic syndrome, chemotherapy Nov. 2018, PE with IVC filter previously on Eliquis until September 2018 when it was stopped due to thrombocytopenia and Bates's esophagus here for generalized weakness.  Patient was in long term in [place] and was extradited to [place] when he was found to be tachycardic with heart rates in the 180s.  He was recently hospitalized in [facility] and diagnosed with myelodysplastic syndrome and has right-sided Port and left chest double lumen in place.  Potassium on admission was profoundly low at 2.6 and was replaced.  CT head, CTA chest and CT abdomen pelvis were all unremarkable.  Twelve-lead EKG showed sinus tachycardia rate 105 with no acute ST elevations or depressions.  Troponin was negative.      Interval Problem Update  -HR 's. BP stable.  Hypokalemia, hypokalemia and hypomagnesemia.  Replacing.  Transfer to telemetry.  -WBC 0.9 - repeat labs in process. Hematology consulted, Dr. Mccoy to see. Protective isolation.    Consultants/Specialty  Hematology    Code Status  FULL    Disposition  TBD    Review of Systems  Review of Systems   Constitutional: Positive for malaise/fatigue.   HENT: Negative for ear pain, hearing loss and tinnitus.    Eyes: Negative for blurred vision and double vision.   Respiratory: Negative for  cough, sputum production and shortness of breath.    Cardiovascular: Negative for chest pain, palpitations and leg swelling.   Gastrointestinal: Negative for abdominal pain, blood in stool, constipation, diarrhea, nausea and vomiting.   Genitourinary: Negative for dysuria and urgency.   Musculoskeletal: Positive for back pain. Negative for falls. Neck pain: chronic.   Skin: Negative for itching and rash.   Neurological: Positive for weakness. Negative for dizziness, tingling and headaches.   Endo/Heme/Allergies: Bruises/bleeds easily.   Psychiatric/Behavioral: Negative for memory loss. The patient is not nervous/anxious.    All other systems reviewed and are negative.       Physical Exam  Temp:  [36 °C (96.8 °F)-36.7 °C (98 °F)] 36.7 °C (98 °F)  Pulse:  [] 96  Resp:  [15-24] 18  BP: (134-149)/() 149/103  SpO2:  [92 %-100 %] 97 %    Physical Exam   Constitutional: He is oriented to person, place, and time. Vital signs are normal. He appears well-developed and well-nourished. He is cooperative.  Non-toxic appearance. He has a sickly appearance.   Pale  Sitting up in bed in no acute distress   HENT:   Head: Normocephalic and atraumatic.   Right Ear: Hearing normal.   Left Ear: Hearing normal.   Nose: Nose normal.   Mouth/Throat: Oropharynx is clear and moist and mucous membranes are normal.   Eyes: Conjunctivae and EOM are normal. No scleral icterus.   Neck: Phonation normal. No JVD present.   Cardiovascular: Intact distal pulses.  Tachycardia present.  Exam reveals distant heart sounds.    Right chest port -not currently accessed at this time.  Site without erythema or drainage  Left chest dual lumen port with 1 lumen unable to flush or draw blood.  Other lumen able to draw blood and flushed briskly.  Site without erythema or drainage    No edema   Pulmonary/Chest: Effort normal and breath sounds normal. He has no wheezes. He has no rhonchi.   Abdominal: Soft. Bowel sounds are normal. He exhibits  distension. There is no tenderness. There is no rigidity and no guarding.   Musculoskeletal:   Generalized weakness   Neurological: He is alert and oriented to person, place, and time.   SARGENT 5/5   Skin: No rash noted. There is pallor.   Psychiatric: He has a normal mood and affect. Judgment normal. Cognition and memory are normal.       Fluids    Intake/Output Summary (Last 24 hours) at 04/06/19 1105  Last data filed at 04/06/19 0700   Gross per 24 hour   Intake             1150 ml   Output              225 ml   Net              925 ml       Laboratory  Recent Labs      04/05/19   1654 04/06/19 0527   WBC  3.0*  0.9*   RBC  4.32*  4.11*   HEMOGLOBIN  12.5*  11.8*   HEMATOCRIT  36.9*  35.7*   MCV  85.4  86.9   MCH  28.9  28.7   MCHC  33.9  33.1*   RDW  55.6*  57.1*   PLATELETCT  91*  89*   MPV  11.9  10.6     Recent Labs      04/05/19 1654 04/06/19 0527   SODIUM  143  137   POTASSIUM  2.6*  3.5*   CHLORIDE  106  106   CO2  24  23   GLUCOSE  82  169*   BUN  5*  4*   CREATININE  0.82  0.80   CALCIUM  8.2*  7.8*         Recent Labs      04/05/19   1654   BNPBTYPENAT  29           Imaging  EC-ECHOCARDIOGRAM COMPLETE W/O CONT         CT-ABDOMEN-PELVIS WITH   Final Result         1.  No acute abnormality.   2.  Hepatomegaly and diffuse hepatic steatosis   3.  Atherosclerosis and atherosclerotic coronary artery disease.   4.  Pulmonary nodules measuring up to 6.5 mm, see nodule follow-up recommendations below.      Low Risk: CT at 3-6 months, then consider CT at 18-24 months      High Risk: CT at 3-6 months, then at 18-24 months      Comments: Use most suspicious nodule as guide to management. Follow-up intervals may vary according to size and risk.      Low Risk - Minimal or absent history of smoking and of other known risk factors.      High Risk - History of smoking or of other known risk factors.      Note: These recommendations do not apply to lung cancer screening, patients with immunosuppression, or patients  with known primary cancer.      Fleischner Society 2017 Guidelines for Management of Incidentally Detected Pulmonary Nodules in Adults         CT-CTA CHEST PULMONARY ARTERY W/ RECONS   Final Result         1.  No large central pulmonary embolus is appreciated, evaluation of the subsegmental branches is essentially nondiagnostic due to motion artifacts. Additional imaging would be required for definitive exclusion of small distal pulmonary emboli.      DX-LUMBAR SPINE-2 OR 3 VIEWS   Final Result      Limited examination. Mild anterior wedging L2 vertebral body that appears to be old.   If symptoms are persistent, would recommend CT for further evaluation.      DX-THORACIC SPINE-WITH SWIMMERS VIEW   Final Result      Limited examination. The upper thoracic spine is not well-demonstrated.   Mild anterior wedging of some the lower thoracic vertebral bodies and appears old.   No definite acute compression. If symptoms are persistent, CT would be recommended for further evaluation.      DX-CHEST-PORTABLE (1 VIEW)   Final Result      Central catheter projects over the superior right atrium.      Prominent calcified granuloma in the left midlung.      Atherosclerotic plaque.      CT-HEAD W/O   Final Result      No acute intracranial abnormality is identified.           Assessment/Plan  * Tachycardia   Assessment & Plan    -Heart rates 90-100s this morning  -Continue metoprolol -he initially refused his morning dose, but after education and counseling he was agreeable to take it  -I have increased the dose to 25 mg p.o. twice daily  -Continue telemetry monitoring     Hypokalemia- (present on admission)   Assessment & Plan    -Improving with IV fluid and p.o. Replacement  -Also replacing mag  -Follow labs  -Telemetry monitoring       Pancytopenia (HCC)- (present on admission)   Assessment & Plan    -likely due to myelodysplastic syndrome  -Last chemotherapy November 2018  -No acute signs symptoms of bleeding  -Iron studies,  folate and B12 all WNL  -Hgb stable  -Follow labs  -Hematology consulted -Dr. Mccoy to see       SIRS (systemic inflammatory response syndrome) (HCC)- (present on admission)   Assessment & Plan    -Multifactorial - leukemia and tachycardia, and component of dehydration  -Lactate has now normalized  -Continue IV fluid hydration  -Monitor labs  -He is hemodynamically stable       Hypocalcemia- (present on admission)   Assessment & Plan    -This has been replaced  -A.m. ionized calcium     Somnolence- (present on admission)   Assessment & Plan    -Currently resolved     Dizziness- (present on admission)   Assessment & Plan    -He has been bedrest  -No events noted on telemetry  -Negative orthostatics  -Echo pending            VTE prophylaxis: Pharmacological contraindicated -SCD    LOULOU Hawthorne.

## 2019-04-06 NOTE — ED NOTES
MD aware of pt request for pain medications, pending orders at this time.  Will continue to monitor.

## 2019-04-06 NOTE — ASSESSMENT & PLAN NOTE
Resolved  It was most likely from pain  metprolol 25 mg p.o. twice daily (refuses on and off)  Stable Repeated EKG today shows stable sinus tachycardia  Continue monitoring

## 2019-04-06 NOTE — PROGRESS NOTES
Sandee from Lab called with critical result of WBC count of 0.9 at 0850. Critical lab result read back to Sandee.   Dr. Samaniego notified of critical lab result at 0855.  Critical lab result read back by Dr. Samaniego. Redraw ordered by MD.

## 2019-04-06 NOTE — H&P
Hospital Medicine History & Physical Note    Date of Service  4/5/2019    Primary Care Physician  No primary care provider on file.    Consultants  None    Code Status  Full code    Chief Complaint  Generalized weakness    History of Presenting Illness  53 y.o. male with a past medical history of depression, hypertension, GERD who presented 4/5/2019 with nausea and vomiting for the past 2 days.  History is limited as the patient is a poor historian.  Apparently the patient was in [facility] in [place] and was extradited to [place] where he was found to be tachycardic with a rate in the 180s and hypertensive.  He was recently hospitalized in [place] and diagnosed with cancer and has a right chest central line in place.  The patient reports generalized weakness, fatigue and malaise.  He palpitations and dizziness but denies any syncopal episodes.  He denies any chest pain, shortness of breath, fever or cough.  He reports chronic back pain.  He has a history of PE in 2017 and has an IVC filter in place.  He is not on anticoagulation due to thrombocytopenia.  The patient states he also had a stroke in 2018.  At this time he denies any chest pain, fevers, chills or headache.  I have requested medical records from [facility].    Review of Systems  Review of Systems   Constitutional: Positive for malaise/fatigue. Negative for chills, diaphoresis and fever.   HENT: Negative for hearing loss and sore throat.    Eyes: Negative for blurred vision.   Respiratory: Positive for shortness of breath. Negative for cough, sputum production and wheezing.    Cardiovascular: Negative for chest pain, palpitations and leg swelling.   Gastrointestinal: Positive for abdominal pain, nausea and vomiting. Negative for blood in stool and diarrhea.   Genitourinary: Negative for dysuria, flank pain and urgency.   Musculoskeletal: Negative for back pain, joint pain, myalgias and neck pain.   Skin: Negative for rash.   Neurological: Positive  for dizziness and weakness. Negative for focal weakness, seizures, loss of consciousness and headaches.   Endo/Heme/Allergies: Does not bruise/bleed easily.   Psychiatric/Behavioral: Negative for suicidal ideas.   All other systems reviewed and are negative.      Past Medical History   has a past medical history of Asthma and Cancer (HCC) (11/01/2016).    Surgical History  EGD    Family History  No pertinent family history    Social History   reports that he has quit smoking. He has never used smokeless tobacco.    Allergies  Allergies   Allergen Reactions   • Iodine Anaphylaxis   • Keflex Diarrhea and Vomiting     Vomiting & Diarrhea     • Reglan [Metoclopramide] Nausea     Asthma     • Shellfish Allergy Anaphylaxis   • Toradol Hives       Medications  Prior to Admission Medications   Prescriptions Last Dose Informant Patient Reported? Taking?   amitriptyline (ELAVIL) 75 MG Tab 4/4/2019 at 1900 Rx Bottle (For Med Information) Yes Yes   Sig: Take 75 mg by mouth every evening.   busPIRone (BUSPAR) 15 MG tablet 4/4/2019 at 1900 Rx Bottle (For Med Information) Yes Yes   Sig: Take 15 mg by mouth 2 times a day.   hydrOXYzine HCl (ATARAX) 50 MG Tab 4/4/2019 at 1900 Rx Bottle (For Med Information) Yes Yes   Sig: Take 50 mg by mouth 2 Times a Day.   metoprolol (LOPRESSOR) 25 MG Tab 4/4/2019 at 1900 Rx Bottle (For Med Information) Yes Yes   Sig: Take 12.5 mg by mouth 2 times a day.   pantoprazole (PROTONIX) 40 MG Tablet Delayed Response 4/4/2019 at 1900 Rx Bottle (For Med Information) Yes Yes   Sig: Take 40 mg by mouth 2 Times a Day.   traZODone (DESYREL) 50 MG Tab 4/4/2019 at 1900 Rx Bottle (For Med Information) Yes Yes   Sig: Take 50 mg by mouth every evening.   zolpidem (AMBIEN) 10 MG Tab 4/4/2019 at 1900 Rx Bottle (For Med Information) Yes Yes   Sig: Take 10 mg by mouth every bedtime.      Facility-Administered Medications: None       Physical Exam  Pulse:  [] 98  Resp:  [18-24] 18  SpO2:  [92 %-100 %] 99  %    Physical Exam   Constitutional: He is oriented to person, place, and time. He appears well-developed and well-nourished. No distress.   HENT:   Head: Normocephalic and atraumatic.   Dry oral mucous membranes   Eyes: Pupils are equal, round, and reactive to light. Conjunctivae are normal. No scleral icterus.   Neck: Normal range of motion. Neck supple.   Cardiovascular: Regular rhythm and normal heart sounds.    tachycardia   Pulmonary/Chest: Effort normal and breath sounds normal. No respiratory distress. He has no wheezes. He has no rales.   Abdominal: Soft. Bowel sounds are normal. He exhibits no distension. There is no tenderness. There is no rebound.   Musculoskeletal: He exhibits edema (Trace pedal edema) and tenderness (Lumbar spinal tenderness).   Lymphadenopathy:     He has no cervical adenopathy.   Neurological: He is oriented to person, place, and time. No cranial nerve deficit. Coordination normal.   Somnolent  Strength 4/5 in all extremities   Skin: Skin is warm. No rash noted.   Psychiatric: He has a normal mood and affect. His speech is normal. He is slowed.   Nursing note and vitals reviewed.      Laboratory:  Recent Labs      04/05/19   1654   WBC  3.0*   RBC  4.32*   HEMOGLOBIN  12.5*   HEMATOCRIT  36.9*   MCV  85.4   MCH  28.9   MCHC  33.9   RDW  55.6*   PLATELETCT  91*   MPV  11.9     Recent Labs      04/05/19   1654   SODIUM  143   POTASSIUM  2.6*   CHLORIDE  106   CO2  24   GLUCOSE  82   BUN  5*   CREATININE  0.82   CALCIUM  8.2*     Recent Labs      04/05/19   1654   ALTSGPT  11   ASTSGOT  35   ALKPHOSPHAT  124*   TBILIRUBIN  1.1   GLUCOSE  82         Recent Labs      04/05/19   1654   BNPBTYPENAT  29         Recent Labs      04/05/19   1654   TROPONINI  <0.01       Urinalysis:    No results found     Imaging:  CT-ABDOMEN-PELVIS WITH   Final Result         1.  No acute abnormality.   2.  Hepatomegaly and diffuse hepatic steatosis   3.  Atherosclerosis and atherosclerotic coronary artery  disease.   4.  Pulmonary nodules measuring up to 6.5 mm, see nodule follow-up recommendations below.      Low Risk: CT at 3-6 months, then consider CT at 18-24 months      High Risk: CT at 3-6 months, then at 18-24 months      Comments: Use most suspicious nodule as guide to management. Follow-up intervals may vary according to size and risk.      Low Risk - Minimal or absent history of smoking and of other known risk factors.      High Risk - History of smoking or of other known risk factors.      Note: These recommendations do not apply to lung cancer screening, patients with immunosuppression, or patients with known primary cancer.      Fleischner Society 2017 Guidelines for Management of Incidentally Detected Pulmonary Nodules in Adults         CT-CTA CHEST PULMONARY ARTERY W/ RECONS   Final Result         1.  No large central pulmonary embolus is appreciated, evaluation of the subsegmental branches is essentially nondiagnostic due to motion artifacts. Additional imaging would be required for definitive exclusion of small distal pulmonary emboli.      DX-LUMBAR SPINE-2 OR 3 VIEWS   Final Result      Limited examination. Mild anterior wedging L2 vertebral body that appears to be old.   If symptoms are persistent, would recommend CT for further evaluation.      DX-THORACIC SPINE-WITH SWIMMERS VIEW   Final Result      Limited examination. The upper thoracic spine is not well-demonstrated.   Mild anterior wedging of some the lower thoracic vertebral bodies and appears old.   No definite acute compression. If symptoms are persistent, CT would be recommended for further evaluation.      DX-CHEST-PORTABLE (1 VIEW)   Final Result      Central catheter projects over the superior right atrium.      Prominent calcified granuloma in the left midlung.      Atherosclerotic plaque.      CT-HEAD W/O   Final Result      No acute intracranial abnormality is identified.      EC-ECHOCARDIOGRAM COMPLETE W/O CONT    (Results Pending)          Assessment/Plan:  I anticipate this patient is appropriate for observation status at this time.    Hypokalemia- (present on admission)   Assessment & Plan    Profoundly low at 2.6  Replace with IV KCl and K Dur  Replete magnesium with IV mag  Monitor BMP  Continuous cardiac monitoring     Pancytopenia (HCC)- (present on admission)   Assessment & Plan    Likely related to underlying cancer  Patient denies any bleeding or melena  Check iron studies, folate, B12 and TSH  Monitor CBC, transfuse for hemoglobin less than 7  Incomplete database, requested medical records from recent hospitalization in [place]     Lactic acidosis- (present on admission)   Assessment & Plan    Likely secondary to volume depletion and dehydration  IV fluid hydration with normal saline  Trend lactate     Hypocalcemia- (present on admission)   Assessment & Plan    Replace with IV calcium gluconate     Somnolence- (present on admission)   Assessment & Plan    Likely secondary to polypharmacy  Hold Ambien, hydroxyzine and trazodone     Dizziness- (present on admission)   Assessment & Plan    Rule out cardiogenic causes  Continuous cardiac monitoring on telemetry  Check 2D echo  Check orthostatics           VTE prophylaxis: SCD

## 2019-04-06 NOTE — ASSESSMENT & PLAN NOTE
-This has been replaced  Normal  PTH elevated likely secondary to vitamin D deficiency  Will replace vitamin D

## 2019-04-07 ENCOUNTER — APPOINTMENT (OUTPATIENT)
Dept: RADIOLOGY | Facility: MEDICAL CENTER | Age: 54
DRG: 516 | End: 2019-04-07
Attending: HOSPITALIST
Payer: MEDICAID

## 2019-04-07 PROBLEM — M25.562 LEFT KNEE PAIN: Status: ACTIVE | Noted: 2019-04-07

## 2019-04-07 PROBLEM — E83.39 HYPOPHOSPHATEMIA: Status: ACTIVE | Noted: 2019-04-07

## 2019-04-07 PROBLEM — M54.50 ACUTE MIDLINE LOW BACK PAIN WITHOUT SCIATICA: Status: ACTIVE | Noted: 2019-04-07

## 2019-04-07 LAB
ALBUMIN SERPL BCP-MCNC: 2.9 G/DL (ref 3.2–4.9)
ALBUMIN/GLOB SERPL: 0.9 G/DL
ALP SERPL-CCNC: 121 U/L (ref 30–99)
ALT SERPL-CCNC: 11 U/L (ref 2–50)
ANION GAP SERPL CALC-SCNC: 10 MMOL/L (ref 0–11.9)
APTT PPP: 20.6 SEC (ref 24.7–36)
AST SERPL-CCNC: 25 U/L (ref 12–45)
BILIRUB SERPL-MCNC: 0.6 MG/DL (ref 0.1–1.5)
BUN SERPL-MCNC: 9 MG/DL (ref 8–22)
CA-I SERPL-SCNC: 1.1 MMOL/L (ref 1.1–1.3)
CALCIUM SERPL-MCNC: 7.6 MG/DL (ref 8.5–10.5)
CHLORIDE SERPL-SCNC: 112 MMOL/L (ref 96–112)
CO2 SERPL-SCNC: 23 MMOL/L (ref 20–33)
CREAT SERPL-MCNC: 0.89 MG/DL (ref 0.5–1.4)
ERYTHROCYTE [SEDIMENTATION RATE] IN BLOOD BY WESTERGREN METHOD: 11 MM/HOUR (ref 0–20)
GLOBULIN SER CALC-MCNC: 3.2 G/DL (ref 1.9–3.5)
GLUCOSE SERPL-MCNC: 162 MG/DL (ref 65–99)
INR PPP: 1.17 (ref 0.87–1.13)
MAGNESIUM SERPL-MCNC: 2.2 MG/DL (ref 1.5–2.5)
PHOSPHATE SERPL-MCNC: 1.6 MG/DL (ref 2.5–4.5)
POTASSIUM SERPL-SCNC: 3.9 MMOL/L (ref 3.6–5.5)
PROT SERPL-MCNC: 6.1 G/DL (ref 6–8.2)
PROTHROMBIN TIME: 15 SEC (ref 12–14.6)
SODIUM SERPL-SCNC: 145 MMOL/L (ref 135–145)

## 2019-04-07 PROCEDURE — 36415 COLL VENOUS BLD VENIPUNCTURE: CPT

## 2019-04-07 PROCEDURE — 99233 SBSQ HOSP IP/OBS HIGH 50: CPT | Performed by: HOSPITALIST

## 2019-04-07 PROCEDURE — A9270 NON-COVERED ITEM OR SERVICE: HCPCS | Performed by: INTERNAL MEDICINE

## 2019-04-07 PROCEDURE — 700102 HCHG RX REV CODE 250 W/ 637 OVERRIDE(OP): Performed by: INTERNAL MEDICINE

## 2019-04-07 PROCEDURE — 700105 HCHG RX REV CODE 258: Performed by: INTERNAL MEDICINE

## 2019-04-07 PROCEDURE — 770020 HCHG ROOM/CARE - TELE (206)

## 2019-04-07 PROCEDURE — 85730 THROMBOPLASTIN TIME PARTIAL: CPT

## 2019-04-07 PROCEDURE — 302172 SOFT BED BELT: Performed by: INTERNAL MEDICINE

## 2019-04-07 PROCEDURE — 73560 X-RAY EXAM OF KNEE 1 OR 2: CPT | Mod: LT

## 2019-04-07 PROCEDURE — 80053 COMPREHEN METABOLIC PANEL: CPT

## 2019-04-07 PROCEDURE — 700111 HCHG RX REV CODE 636 W/ 250 OVERRIDE (IP): Performed by: INTERNAL MEDICINE

## 2019-04-07 PROCEDURE — 700101 HCHG RX REV CODE 250: Performed by: HOSPITALIST

## 2019-04-07 PROCEDURE — 83735 ASSAY OF MAGNESIUM: CPT

## 2019-04-07 PROCEDURE — 85610 PROTHROMBIN TIME: CPT

## 2019-04-07 PROCEDURE — 700105 HCHG RX REV CODE 258: Performed by: HOSPITALIST

## 2019-04-07 PROCEDURE — 82330 ASSAY OF CALCIUM: CPT

## 2019-04-07 PROCEDURE — 84100 ASSAY OF PHOSPHORUS: CPT

## 2019-04-07 PROCEDURE — 700111 HCHG RX REV CODE 636 W/ 250 OVERRIDE (IP): Performed by: HOSPITALIST

## 2019-04-07 RX ORDER — MORPHINE SULFATE 4 MG/ML
4 INJECTION, SOLUTION INTRAMUSCULAR; INTRAVENOUS
Status: DISCONTINUED | OUTPATIENT
Start: 2019-04-07 | End: 2019-04-10

## 2019-04-07 RX ORDER — LORAZEPAM 2 MG/ML
1 INJECTION INTRAMUSCULAR EVERY 6 HOURS PRN
Status: DISCONTINUED | OUTPATIENT
Start: 2019-04-07 | End: 2019-04-10

## 2019-04-07 RX ORDER — DIPHENHYDRAMINE HCL 25 MG
25 TABLET ORAL ONCE
Status: COMPLETED | OUTPATIENT
Start: 2019-04-07 | End: 2019-04-07

## 2019-04-07 RX ORDER — HEPARIN SODIUM 5000 [USP'U]/ML
5000 INJECTION, SOLUTION INTRAVENOUS; SUBCUTANEOUS EVERY 8 HOURS
Status: DISCONTINUED | OUTPATIENT
Start: 2019-04-07 | End: 2019-04-14

## 2019-04-07 RX ORDER — LORAZEPAM 0.5 MG/1
0.5 TABLET ORAL ONCE
Status: COMPLETED | OUTPATIENT
Start: 2019-04-07 | End: 2019-04-07

## 2019-04-07 RX ORDER — BENZOCAINE/MENTHOL 6 MG-10 MG
LOZENGE MUCOUS MEMBRANE ONCE
Status: COMPLETED | OUTPATIENT
Start: 2019-04-07 | End: 2019-04-07

## 2019-04-07 RX ADMIN — ONDANSETRON 4 MG: 2 SOLUTION INTRAMUSCULAR; INTRAVENOUS at 08:58

## 2019-04-07 RX ADMIN — MORPHINE SULFATE 4 MG: 4 INJECTION INTRAVENOUS at 12:11

## 2019-04-07 RX ADMIN — LORAZEPAM 0.5 MG: 0.5 TABLET ORAL at 02:51

## 2019-04-07 RX ADMIN — MORPHINE SULFATE 4 MG: 4 INJECTION INTRAVENOUS at 19:56

## 2019-04-07 RX ADMIN — DIPHENHYDRAMINE HCL 25 MG: 25 TABLET ORAL at 18:01

## 2019-04-07 RX ADMIN — TRAZODONE HYDROCHLORIDE 50 MG: 50 TABLET ORAL at 21:00

## 2019-04-07 RX ADMIN — SODIUM PHOSPHATE, MONOBASIC, MONOHYDRATE AND SODIUM PHOSPHATE, DIBASIC, ANHYDROUS 30 MMOL: 276; 142 INJECTION, SOLUTION INTRAVENOUS at 14:03

## 2019-04-07 RX ADMIN — HEPARIN SODIUM 5000 UNITS: 5000 INJECTION, SOLUTION INTRAVENOUS; SUBCUTANEOUS at 22:57

## 2019-04-07 RX ADMIN — CEFEPIME 2 G: 2 INJECTION, POWDER, FOR SOLUTION INTRAVENOUS at 05:24

## 2019-04-07 RX ADMIN — HYDROCORTISONE: 1 CREAM TOPICAL at 17:09

## 2019-04-07 RX ADMIN — ONDANSETRON 4 MG: 2 SOLUTION INTRAMUSCULAR; INTRAVENOUS at 12:12

## 2019-04-07 RX ADMIN — BUSPIRONE HYDROCHLORIDE 15 MG: 30 TABLET ORAL at 17:08

## 2019-04-07 RX ADMIN — MORPHINE SULFATE 4 MG: 4 INJECTION INTRAVENOUS at 15:23

## 2019-04-07 RX ADMIN — POTASSIUM CHLORIDE: 2 INJECTION, SOLUTION, CONCENTRATE INTRAVENOUS at 10:00

## 2019-04-07 RX ADMIN — MORPHINE SULFATE 4 MG: 4 INJECTION INTRAVENOUS at 22:57

## 2019-04-07 RX ADMIN — OMEPRAZOLE 20 MG: 20 CAPSULE, DELAYED RELEASE ORAL at 17:08

## 2019-04-07 RX ADMIN — AMITRIPTYLINE HYDROCHLORIDE 75 MG: 50 TABLET, FILM COATED ORAL at 19:56

## 2019-04-07 RX ADMIN — HYDROXYZINE HYDROCHLORIDE 50 MG: 50 TABLET ORAL at 17:08

## 2019-04-07 RX ADMIN — CEFEPIME 2 G: 2 INJECTION, POWDER, FOR SOLUTION INTRAVENOUS at 17:08

## 2019-04-07 RX ADMIN — ZOLPIDEM TARTRATE 10 MG: 5 TABLET ORAL at 19:58

## 2019-04-07 RX ADMIN — DIPHENHYDRAMINE HCL 25 MG: 25 TABLET ORAL at 02:51

## 2019-04-07 RX ADMIN — MORPHINE SULFATE 4 MG: 4 INJECTION INTRAVENOUS at 08:58

## 2019-04-07 RX ADMIN — OMEPRAZOLE 20 MG: 20 CAPSULE, DELAYED RELEASE ORAL at 05:22

## 2019-04-07 RX ADMIN — HYDROXYZINE HYDROCHLORIDE 50 MG: 50 TABLET ORAL at 05:23

## 2019-04-07 RX ADMIN — HYDROCORTISONE: 1 CREAM TOPICAL at 02:51

## 2019-04-07 RX ADMIN — BUSPIRONE HYDROCHLORIDE 15 MG: 30 TABLET ORAL at 05:23

## 2019-04-07 ASSESSMENT — ENCOUNTER SYMPTOMS
BACK PAIN: 1
SENSORY CHANGE: 0
VOMITING: 0
FLANK PAIN: 0
STRIDOR: 0
SHORTNESS OF BREATH: 0
SPUTUM PRODUCTION: 0
TINGLING: 1
BLURRED VISION: 0
POLYDIPSIA: 0
PHOTOPHOBIA: 0
WHEEZING: 0
ORTHOPNEA: 0
HEMOPTYSIS: 0
CLAUDICATION: 0
ABDOMINAL PAIN: 0
WEAKNESS: 1
DIARRHEA: 0
HEARTBURN: 0
FALLS: 1
DIAPHORESIS: 0
PALPITATIONS: 0
NECK PAIN: 1
TREMORS: 0
DIZZINESS: 1
FOCAL WEAKNESS: 1
FEVER: 0
DOUBLE VISION: 1
COUGH: 0
BLOOD IN STOOL: 0
PND: 0
EYE PAIN: 0
HEADACHES: 1
CHILLS: 0
SORE THROAT: 0
BRUISES/BLEEDS EASILY: 0
SINUS PAIN: 0
NAUSEA: 0
MYALGIAS: 0
CONSTIPATION: 0

## 2019-04-07 ASSESSMENT — COGNITIVE AND FUNCTIONAL STATUS - GENERAL
MOBILITY SCORE: 13
TOILETING: A LITTLE
HELP NEEDED FOR BATHING: A LOT
SUGGESTED CMS G CODE MODIFIER MOBILITY: CL
DRESSING REGULAR UPPER BODY CLOTHING: A LITTLE
MOVING TO AND FROM BED TO CHAIR: A LOT
MOVING FROM LYING ON BACK TO SITTING ON SIDE OF FLAT BED: A LOT
TURNING FROM BACK TO SIDE WHILE IN FLAT BAD: A LITTLE
DAILY ACTIVITIY SCORE: 18
STANDING UP FROM CHAIR USING ARMS: A LOT
SUGGESTED CMS G CODE MODIFIER DAILY ACTIVITY: CK
DRESSING REGULAR LOWER BODY CLOTHING: A LITTLE
CLIMB 3 TO 5 STEPS WITH RAILING: A LOT
WALKING IN HOSPITAL ROOM: A LOT
PERSONAL GROOMING: A LITTLE

## 2019-04-07 NOTE — PROGRESS NOTES
Lab called to state that the blood collected for the CBC and west. Sed rate was coagulated and new collection was needed. Patient refusing labs at this time. Will attempt to collect again later.

## 2019-04-07 NOTE — CARE PLAN
Problem: Infection  Goal: Will remain free from infection  Outcome: PROGRESSING AS EXPECTED  No new s/s of infection. Hand hygiene and neutropenic precautions implemented throughout shift.     Problem: Skin Integrity  Goal: Risk for impaired skin integrity will decrease  Outcome: PROGRESSING AS EXPECTED  Pt repositions self in bed. Pressure points remain clear of friction. No new s/s of skin breakdown.

## 2019-04-07 NOTE — CARE PLAN
Problem: Safety  Goal: Will remain free from injury  Outcome: PROGRESSING AS EXPECTED  Patient's risk for injury and falls assessed. Appropriate safety precautions in place. Patient educated to utilize call light for needs. Patient verbalizes understanding.    Problem: Communication  Goal: The ability to communicate needs accurately and effectively will improve  Outcome: PROGRESSING AS EXPECTED  Patient educated to utilize call light. Patient and family oriented to hospital room. Patient encouraged to ask questions about plan of care. Patient effectively uses call light and is involved in POC.

## 2019-04-08 ENCOUNTER — APPOINTMENT (OUTPATIENT)
Dept: RADIOLOGY | Facility: MEDICAL CENTER | Age: 54
DRG: 516 | End: 2019-04-08
Attending: HOSPITALIST
Payer: MEDICAID

## 2019-04-08 PROBLEM — S32.011D CLOSED STABLE BURST FRACTURE OF FIRST LUMBAR VERTEBRA WITH ROUTINE HEALING: Status: ACTIVE | Noted: 2019-04-07

## 2019-04-08 LAB
25(OH)D3 SERPL-MCNC: 18 NG/ML (ref 30–100)
ALBUMIN SERPL BCP-MCNC: 2.9 G/DL (ref 3.2–4.9)
ALBUMIN/GLOB SERPL: 1.1 G/DL
ALP SERPL-CCNC: 111 U/L (ref 30–99)
ALT SERPL-CCNC: 11 U/L (ref 2–50)
ANION GAP SERPL CALC-SCNC: 6 MMOL/L (ref 0–11.9)
AST SERPL-CCNC: 35 U/L (ref 12–45)
BASOPHILS # BLD AUTO: 0 % (ref 0–1.8)
BASOPHILS # BLD: 0 K/UL (ref 0–0.12)
BILIRUB SERPL-MCNC: 0.5 MG/DL (ref 0.1–1.5)
BUN SERPL-MCNC: 8 MG/DL (ref 8–22)
CA-I SERPL-SCNC: 1.1 MMOL/L (ref 1.1–1.3)
CALCIUM SERPL-MCNC: 7.5 MG/DL (ref 8.5–10.5)
CHLORIDE SERPL-SCNC: 111 MMOL/L (ref 96–112)
CO2 SERPL-SCNC: 26 MMOL/L (ref 20–33)
CREAT SERPL-MCNC: 0.82 MG/DL (ref 0.5–1.4)
EOSINOPHIL # BLD AUTO: 0 K/UL (ref 0–0.51)
EOSINOPHIL NFR BLD: 0 % (ref 0–6.9)
ERYTHROCYTE [DISTWIDTH] IN BLOOD BY AUTOMATED COUNT: 61.8 FL (ref 35.9–50)
ERYTHROCYTE [SEDIMENTATION RATE] IN BLOOD BY WESTERGREN METHOD: 2 MM/HOUR (ref 0–20)
GLOBULIN SER CALC-MCNC: 2.7 G/DL (ref 1.9–3.5)
GLUCOSE SERPL-MCNC: 120 MG/DL (ref 65–99)
HCT VFR BLD AUTO: 29.4 % (ref 42–52)
HGB BLD-MCNC: 9.5 G/DL (ref 14–18)
IMM GRANULOCYTES # BLD AUTO: 0.02 K/UL (ref 0–0.11)
IMM GRANULOCYTES NFR BLD AUTO: 0.9 % (ref 0–0.9)
LYMPHOCYTES # BLD AUTO: 0.68 K/UL (ref 1–4.8)
LYMPHOCYTES NFR BLD: 31.5 % (ref 22–41)
MAGNESIUM SERPL-MCNC: 1.8 MG/DL (ref 1.5–2.5)
MCH RBC QN AUTO: 29.1 PG (ref 27–33)
MCHC RBC AUTO-ENTMCNC: 32.3 G/DL (ref 33.7–35.3)
MCV RBC AUTO: 89.9 FL (ref 81.4–97.8)
MONOCYTES # BLD AUTO: 0.22 K/UL (ref 0–0.85)
MONOCYTES NFR BLD AUTO: 10.2 % (ref 0–13.4)
NEUTROPHILS # BLD AUTO: 1.24 K/UL (ref 1.82–7.42)
NEUTROPHILS NFR BLD: 57.4 % (ref 44–72)
NRBC # BLD AUTO: 0 K/UL
NRBC BLD-RTO: 0 /100 WBC
PHOSPHATE SERPL-MCNC: 2 MG/DL (ref 2.5–4.5)
PLATELET # BLD AUTO: 73 K/UL (ref 164–446)
PMV BLD AUTO: 10.4 FL (ref 9–12.9)
POTASSIUM SERPL-SCNC: 4.1 MMOL/L (ref 3.6–5.5)
PROT SERPL-MCNC: 5.6 G/DL (ref 6–8.2)
PTH-INTACT SERPL-MCNC: 168.2 PG/ML (ref 14–72)
RBC # BLD AUTO: 3.27 M/UL (ref 4.7–6.1)
SODIUM SERPL-SCNC: 143 MMOL/L (ref 135–145)
WBC # BLD AUTO: 2.2 K/UL (ref 4.8–10.8)

## 2019-04-08 PROCEDURE — 770020 HCHG ROOM/CARE - TELE (206)

## 2019-04-08 PROCEDURE — 700117 HCHG RX CONTRAST REV CODE 255: Performed by: HOSPITALIST

## 2019-04-08 PROCEDURE — 83970 ASSAY OF PARATHORMONE: CPT

## 2019-04-08 PROCEDURE — 85025 COMPLETE CBC W/AUTO DIFF WBC: CPT

## 2019-04-08 PROCEDURE — 700111 HCHG RX REV CODE 636 W/ 250 OVERRIDE (IP): Performed by: HOSPITALIST

## 2019-04-08 PROCEDURE — 84100 ASSAY OF PHOSPHORUS: CPT

## 2019-04-08 PROCEDURE — L0637 LSO SC R ANT/POS PNL PRE CST: HCPCS

## 2019-04-08 PROCEDURE — 72158 MRI LUMBAR SPINE W/O & W/DYE: CPT

## 2019-04-08 PROCEDURE — A9585 GADOBUTROL INJECTION: HCPCS | Performed by: HOSPITALIST

## 2019-04-08 PROCEDURE — 700105 HCHG RX REV CODE 258: Performed by: INTERNAL MEDICINE

## 2019-04-08 PROCEDURE — 80053 COMPREHEN METABOLIC PANEL: CPT

## 2019-04-08 PROCEDURE — 700111 HCHG RX REV CODE 636 W/ 250 OVERRIDE (IP): Performed by: INTERNAL MEDICINE

## 2019-04-08 PROCEDURE — A9270 NON-COVERED ITEM OR SERVICE: HCPCS | Performed by: INTERNAL MEDICINE

## 2019-04-08 PROCEDURE — 85652 RBC SED RATE AUTOMATED: CPT

## 2019-04-08 PROCEDURE — A9270 NON-COVERED ITEM OR SERVICE: HCPCS | Performed by: HOSPITALIST

## 2019-04-08 PROCEDURE — 83735 ASSAY OF MAGNESIUM: CPT

## 2019-04-08 PROCEDURE — 700102 HCHG RX REV CODE 250 W/ 637 OVERRIDE(OP): Performed by: INTERNAL MEDICINE

## 2019-04-08 PROCEDURE — 700101 HCHG RX REV CODE 250: Performed by: HOSPITALIST

## 2019-04-08 PROCEDURE — 700102 HCHG RX REV CODE 250 W/ 637 OVERRIDE(OP): Performed by: HOSPITALIST

## 2019-04-08 PROCEDURE — 99233 SBSQ HOSP IP/OBS HIGH 50: CPT | Performed by: HOSPITALIST

## 2019-04-08 PROCEDURE — 82306 VITAMIN D 25 HYDROXY: CPT

## 2019-04-08 PROCEDURE — 82330 ASSAY OF CALCIUM: CPT

## 2019-04-08 PROCEDURE — 700105 HCHG RX REV CODE 258: Performed by: HOSPITALIST

## 2019-04-08 PROCEDURE — 70551 MRI BRAIN STEM W/O DYE: CPT

## 2019-04-08 RX ORDER — GADOBUTROL 604.72 MG/ML
10 INJECTION INTRAVENOUS ONCE
Status: COMPLETED | OUTPATIENT
Start: 2019-04-08 | End: 2019-04-08

## 2019-04-08 RX ORDER — ERGOCALCIFEROL 1.25 MG/1
50000 CAPSULE ORAL
Status: DISCONTINUED | OUTPATIENT
Start: 2019-04-08 | End: 2019-05-05

## 2019-04-08 RX ADMIN — BUSPIRONE HYDROCHLORIDE 15 MG: 30 TABLET ORAL at 06:09

## 2019-04-08 RX ADMIN — METOPROLOL TARTRATE 25 MG: 25 TABLET, FILM COATED ORAL at 06:08

## 2019-04-08 RX ADMIN — OMEPRAZOLE 20 MG: 20 CAPSULE, DELAYED RELEASE ORAL at 06:08

## 2019-04-08 RX ADMIN — HEPARIN SODIUM 5000 UNITS: 5000 INJECTION, SOLUTION INTRAVENOUS; SUBCUTANEOUS at 20:29

## 2019-04-08 RX ADMIN — HYDROXYZINE HYDROCHLORIDE 50 MG: 50 TABLET ORAL at 17:10

## 2019-04-08 RX ADMIN — GADOBUTROL 10 ML: 604.72 INJECTION INTRAVENOUS at 10:50

## 2019-04-08 RX ADMIN — ACETAMINOPHEN 650 MG: 325 TABLET, FILM COATED ORAL at 15:45

## 2019-04-08 RX ADMIN — HYDROCORTISONE: 1 CREAM TOPICAL at 06:09

## 2019-04-08 RX ADMIN — HYDROCORTISONE: 1 CREAM TOPICAL at 17:10

## 2019-04-08 RX ADMIN — MORPHINE SULFATE 4 MG: 4 INJECTION INTRAVENOUS at 14:02

## 2019-04-08 RX ADMIN — HEPARIN SODIUM 5000 UNITS: 5000 INJECTION, SOLUTION INTRAVENOUS; SUBCUTANEOUS at 06:10

## 2019-04-08 RX ADMIN — MORPHINE SULFATE 4 MG: 4 INJECTION INTRAVENOUS at 20:29

## 2019-04-08 RX ADMIN — DIPHENHYDRAMINE HCL 25 MG: 25 TABLET ORAL at 08:50

## 2019-04-08 RX ADMIN — ZOLPIDEM TARTRATE 10 MG: 5 TABLET ORAL at 20:28

## 2019-04-08 RX ADMIN — CEFEPIME 2 G: 2 INJECTION, POWDER, FOR SOLUTION INTRAVENOUS at 17:12

## 2019-04-08 RX ADMIN — OMEPRAZOLE 20 MG: 20 CAPSULE, DELAYED RELEASE ORAL at 17:10

## 2019-04-08 RX ADMIN — HEPARIN SODIUM 5000 UNITS: 5000 INJECTION, SOLUTION INTRAVENOUS; SUBCUTANEOUS at 14:02

## 2019-04-08 RX ADMIN — MORPHINE SULFATE 4 MG: 4 INJECTION INTRAVENOUS at 07:07

## 2019-04-08 RX ADMIN — SODIUM PHOSPHATE, MONOBASIC, MONOHYDRATE AND SODIUM PHOSPHATE, DIBASIC, ANHYDROUS 30 MMOL: 276; 142 INJECTION, SOLUTION INTRAVENOUS at 14:07

## 2019-04-08 RX ADMIN — MORPHINE SULFATE 4 MG: 4 INJECTION INTRAVENOUS at 17:09

## 2019-04-08 RX ADMIN — HYDROXYZINE HYDROCHLORIDE 50 MG: 50 TABLET ORAL at 06:08

## 2019-04-08 RX ADMIN — LORAZEPAM 1 MG: 2 INJECTION INTRAMUSCULAR; INTRAVENOUS at 08:50

## 2019-04-08 RX ADMIN — TRAZODONE HYDROCHLORIDE 50 MG: 50 TABLET ORAL at 20:29

## 2019-04-08 RX ADMIN — AMITRIPTYLINE HYDROCHLORIDE 75 MG: 50 TABLET, FILM COATED ORAL at 20:28

## 2019-04-08 RX ADMIN — MORPHINE SULFATE 4 MG: 4 INJECTION INTRAVENOUS at 03:59

## 2019-04-08 RX ADMIN — BUSPIRONE HYDROCHLORIDE 15 MG: 30 TABLET ORAL at 17:12

## 2019-04-08 RX ADMIN — ONDANSETRON 4 MG: 2 SOLUTION INTRAMUSCULAR; INTRAVENOUS at 03:59

## 2019-04-08 RX ADMIN — CEFEPIME 2 G: 2 INJECTION, POWDER, FOR SOLUTION INTRAVENOUS at 06:10

## 2019-04-08 RX ADMIN — ONDANSETRON 4 MG: 2 SOLUTION INTRAMUSCULAR; INTRAVENOUS at 19:18

## 2019-04-08 ASSESSMENT — ENCOUNTER SYMPTOMS
COUGH: 0
STRIDOR: 0
NECK PAIN: 1
TINGLING: 1
MYALGIAS: 0
TREMORS: 0
HEARTBURN: 0
CHILLS: 0
HEMOPTYSIS: 0
HEADACHES: 1
FALLS: 1
BLOOD IN STOOL: 0
DIAPHORESIS: 0
CLAUDICATION: 0
WEAKNESS: 1
ABDOMINAL PAIN: 0
CONSTIPATION: 0
SENSORY CHANGE: 0
EYE PAIN: 0
FLANK PAIN: 0
DIARRHEA: 0
SORE THROAT: 0
FOCAL WEAKNESS: 1
NAUSEA: 0
SPUTUM PRODUCTION: 0
ORTHOPNEA: 0
VOMITING: 0
PHOTOPHOBIA: 0
SINUS PAIN: 0
WHEEZING: 0
PALPITATIONS: 0
DOUBLE VISION: 1
POLYDIPSIA: 0
FEVER: 0
BACK PAIN: 1
PND: 0
BLURRED VISION: 0
SHORTNESS OF BREATH: 0
BRUISES/BLEEDS EASILY: 0
DIZZINESS: 1

## 2019-04-08 NOTE — CARE PLAN
Problem: Infection  Goal: Will remain free from infection    Intervention: Implement standard precautions and perform hand washing before and after patient contact  Completing hand hygiene before and after patient contact.      Problem: Psychosocial Needs:  Goal: Level of anxiety will decrease    Intervention: Identify and develop with patient strategies to cope with anxiety triggers  Patient becomes anxious with pain and cold. Increased temperature in room and provided pain medication.

## 2019-04-08 NOTE — PROGRESS NOTES
Deroyal off the shelf LSO back support brace has been delivered to pt's bedside to wear as needed.

## 2019-04-08 NOTE — ASSESSMENT & PLAN NOTE
X ray showed arthritis with no acute bony abnormalities or fractures.  Continue to provide him pain management.  Stable.

## 2019-04-08 NOTE — PROGRESS NOTES
Received bedside report, assumed care. Pt currently sitting up in bed, reports 10/10 pain, previous RN gave a dose of Morphine per orders. Pt AOx4, with some confused rambling. On RA no signs of SOB or distress.  Placed fall band on patient. Pt continuing to refuse bed alarm. Educated on importance. Bed locked and in lowest position with call light in reach. Will continue to monitor during hourly rounding.

## 2019-04-08 NOTE — ASSESSMENT & PLAN NOTE
Lumbar MRI found L1 subacute fracture with no protrusion  Neurosurgery was consulted and they recommended brace placement and outpatient follow-up.  status post kyphoplasty on 5/10.   Continue pain control with po morphine. Needs to ambulate.   Repeated x-ray lumbar spine did not show any acute complications.  Stable.

## 2019-04-08 NOTE — PROGRESS NOTES
Assumed care of patient. Pt is A+O x4. No chest pain or SOB. No active bleeding noted. Informed of safety and call system. rhythm is ST.

## 2019-04-08 NOTE — PROGRESS NOTES
Hospital Medicine Daily Progress Note    Date of Service  4/7/2019    Chief Complaint  53 y.o. male admitted 4/5/2019 with PMH enterocolitis, GIB, testicular cancer status post left orchiectomy 2016, myelodysplastic syndrome, chemotherapy Nov. 2018, PE with IVC filter previously on Eliquis until September 2018 when it was stopped due to thrombocytopenia and Bates's esophagus here for generalized weakness.  Patient was in half-way in [place] and was extradited to [place] when he was found to be tachycardic with heart rates in the 180s.  He was recently hospitalized in [facility] and diagnosed with myelodysplastic syndrome and has right-sided Port and left chest double lumen in place    Hospital Course     Potassium on admission was profoundly low at 2.6 and was replaced.  CT head, CTA chest and CT abdomen pelvis were all unremarkable.  Twelve-lead EKG showed sinus tachycardia rate 105 with no acute ST elevations or depressions.  Troponin was negative.        Interval Problem Update  4/7: Patient was seen and examined by me today.  It was found that he had lower extremity weakness with lower back pain and tenderness.  I ordered MRI of the back to rule out epidural abscess versus metastatic disease.  He was found to have low phosphate levels which may be contributing to her this weakness.  This was replaced.  He also complained of the left knee pain and x-ray showed arthritis.    Consultants/Specialty  Heme    Code Status  Full    Disposition  [facility]    Review of Systems  Review of Systems   Constitutional: Positive for malaise/fatigue. Negative for chills, diaphoresis and fever.   HENT: Negative for congestion, ear discharge, ear pain, hearing loss, nosebleeds, sinus pain, sore throat and tinnitus.    Eyes: Positive for double vision. Negative for blurred vision, photophobia and pain.   Respiratory: Negative for cough, hemoptysis, sputum production, shortness of breath, wheezing and stridor.    Cardiovascular: Negative for  chest pain, palpitations, orthopnea, claudication, leg swelling and PND.   Gastrointestinal: Negative for abdominal pain, blood in stool, constipation, diarrhea, heartburn, melena, nausea and vomiting.   Genitourinary: Negative for dysuria, flank pain, frequency, hematuria and urgency.   Musculoskeletal: Positive for back pain, falls, joint pain and neck pain. Negative for myalgias.   Skin: Negative for itching and rash.   Neurological: Positive for dizziness, tingling, focal weakness, weakness and headaches. Negative for tremors and sensory change.   Endo/Heme/Allergies: Negative for environmental allergies and polydipsia. Does not bruise/bleed easily.        Physical Exam  Temp:  [35.9 °C (96.6 °F)-36.5 °C (97.7 °F)] 36.2 °C (97.2 °F)  Pulse:  [] 123  Resp:  [18-20] 20  BP: (125-154)/() 141/107  SpO2:  [93 %-100 %] 93 %    Physical Exam   Constitutional: He is oriented to person, place, and time. He appears well-developed and well-nourished.   HENT:   Head: Normocephalic and atraumatic.   Mouth/Throat: No oropharyngeal exudate.   Eyes: Conjunctivae are normal. No scleral icterus.   Neck: Normal range of motion. Neck supple. No JVD present. No tracheal deviation present. No thyromegaly present.   Cardiovascular: Regular rhythm and intact distal pulses.  Exam reveals no gallop and no friction rub.    No murmur heard.  tachycardic   Pulmonary/Chest: Effort normal and breath sounds normal. No stridor. No respiratory distress. He has no wheezes. He has no rales. He exhibits no tenderness.   Abdominal: Soft. Bowel sounds are normal. He exhibits no distension and no mass. There is no tenderness. There is no rebound and no guarding.   Musculoskeletal: Normal range of motion. He exhibits no edema.   4/5 strength LE   Lymphadenopathy:     He has no cervical adenopathy.   Neurological: He is alert and oriented to person, place, and time. He has normal reflexes.   Straight leg test +   Skin:   Port in place with  no erythema or drainage   Nursing note and vitals reviewed.      Fluids    Intake/Output Summary (Last 24 hours) at 04/07/19 1857  Last data filed at 04/07/19 1700   Gross per 24 hour   Intake          2916.61 ml   Output              250 ml   Net          2666.61 ml       Laboratory  Recent Labs      04/05/19 1654 04/06/19 0527   WBC  3.0*  0.9*   RBC  4.32*  4.11*   HEMOGLOBIN  12.5*  11.8*   HEMATOCRIT  36.9*  35.7*   MCV  85.4  86.9   MCH  28.9  28.7   MCHC  33.9  33.1*   RDW  55.6*  57.1*   PLATELETCT  91*  89*   MPV  11.9  10.6     Recent Labs      04/05/19 1654 04/06/19 0527 04/07/19   0257   SODIUM  143  137  145   POTASSIUM  2.6*  3.5*  3.9   CHLORIDE  106  106  112   CO2  24  23  23   GLUCOSE  82  169*  162*   BUN  5*  4*  9   CREATININE  0.82  0.80  0.89   CALCIUM  8.2*  7.8*  7.6*     Recent Labs      04/07/19 0257   APTT  20.6*   INR  1.17*     Recent Labs      04/05/19   1654   BNPBTYPENAT  29           Imaging  DX-KNEE 2- LEFT   Final Result      1.  No fracture or dislocation of LEFT knee.   2.  Minimal degenerative changes.   3.  Diffuse vascular calcifications.      EC-ECHOCARDIOGRAM COMPLETE W/O CONT   Final Result      CT-ABDOMEN-PELVIS WITH   Final Result         1.  No acute abnormality.   2.  Hepatomegaly and diffuse hepatic steatosis   3.  Atherosclerosis and atherosclerotic coronary artery disease.   4.  Pulmonary nodules measuring up to 6.5 mm, see nodule follow-up recommendations below.      Low Risk: CT at 3-6 months, then consider CT at 18-24 months      High Risk: CT at 3-6 months, then at 18-24 months      Comments: Use most suspicious nodule as guide to management. Follow-up intervals may vary according to size and risk.      Low Risk - Minimal or absent history of smoking and of other known risk factors.      High Risk - History of smoking or of other known risk factors.      Note: These recommendations do not apply to lung cancer screening, patients with  immunosuppression, or patients with known primary cancer.      Fleischner Society 2017 Guidelines for Management of Incidentally Detected Pulmonary Nodules in Adults         CT-CTA CHEST PULMONARY ARTERY W/ RECONS   Final Result         1.  No large central pulmonary embolus is appreciated, evaluation of the subsegmental branches is essentially nondiagnostic due to motion artifacts. Additional imaging would be required for definitive exclusion of small distal pulmonary emboli.      DX-LUMBAR SPINE-2 OR 3 VIEWS   Final Result      Limited examination. Mild anterior wedging L2 vertebral body that appears to be old.   If symptoms are persistent, would recommend CT for further evaluation.      DX-THORACIC SPINE-WITH SWIMMERS VIEW   Final Result      Limited examination. The upper thoracic spine is not well-demonstrated.   Mild anterior wedging of some the lower thoracic vertebral bodies and appears old.   No definite acute compression. If symptoms are persistent, CT would be recommended for further evaluation.      DX-CHEST-PORTABLE (1 VIEW)   Final Result      Central catheter projects over the superior right atrium.      Prominent calcified granuloma in the left midlung.      Atherosclerotic plaque.      CT-HEAD W/O   Final Result      No acute intracranial abnormality is identified.      MR-LUMBAR SPINE-WITH & W/O    (Results Pending)        Assessment/Plan  * Tachycardia   Assessment & Plan    - today  Ddx: PE ruled out, tamponde ruled out,   Likely from electrolyte abnormalities and pain  -I have increased the dose to 25 mg p.o. twice daily  -Continue telemetry monitoring     Acute midline low back pain without sciatica   Assessment & Plan    Associated with lower extremity weakness  Red flags: hx of cancer, weight loss, immunocompromised  We will order MRI of the back to rule out epidural abscess versus metastatic disease     Hypokalemia- (present on admission)   Assessment & Plan    -Improving with IV fluid  and p.o. Replacement  -Also replacing mag  -Follow labs  -Telemetry monitoring       Pancytopenia (HCC)- (present on admission)   Assessment & Plan    -likely due to myelodysplastic syndrome  -Last chemotherapy November 2018  -No acute signs symptoms of bleeding  -Iron studies, folate and B12 all WNL  -Hgb stable  -Follow labs  -Hematology consulted -Dr. Mccoy to see       Hypophosphatemia   Assessment & Plan    Severe 1.6  Will replace likely causing weakness     Left knee pain   Assessment & Plan    X ray found arthritis- cont pain mangement     SIRS (systemic inflammatory response syndrome) (HCC)- (present on admission)   Assessment & Plan    -Multifactorial - leukemia and tachycardia, and component of dehydration- Will look for signs of sepsis- rule out epidural abscess  -Lactate has now normalized  -Continue IV fluid hydration  -Monitor labs  -He is hemodynamically stable       Hypocalcemia- (present on admission)   Assessment & Plan    -This has been replaced  Normal  Check pth and vit d     Somnolence- (present on admission)   Assessment & Plan    -Currently resolved     Dizziness- (present on admission)   Assessment & Plan    -He has been bedrest  -No events noted on telemetry  -Negative orthostatics  -Echo found a normal EF and no cause of dizziness            VTE prophylaxis: heparin

## 2019-04-08 NOTE — PROGRESS NOTES
· 2 RN skin check complete with CARLOS ALBERTO Mattson.  · Devices in place N/A.  · Skin assessed under devices N/A.  · Confirmed pressure ulcers found on N/A.  · New potential pressure ulcers noted on N/A.   · The following interventions in place patient turns self from side to side, pillows in use for support and repositioning, moisturizer present.    Ears pink, intact, blanching.  Elbows are pink, intact, blanching.   Active port on left side, patient refusing dressing change.  Right port reported to be infected, inaccessible.  Trunk is otherwise intact.  Abdomen is pink, intact, blanching.  Rash present on back.  Sacrum is intact, blanching.  Bilateral heels are intact, blanching, dry, and flaky.

## 2019-04-08 NOTE — PROGRESS NOTES
Staff from Lab called with critical result of WBC 2.2 at 0435. Critical lab result read back to Lab staff.   Dr. Thomas notified of critical lab result at 0445.  Critical lab result read back by Dr. Thomas.

## 2019-04-08 NOTE — CONSULTS
Neurosurgery Consult Note    Patient: Benito Persaud    MRN: [id number]    Date of Consultation: 4/8/2019    Reason for Consultation: L1 compression fracture    Referring Physician: Dr. Waylon Guerrier    History of Present Illness:  Benito Persaud is a 53 y.o. male who presents complaining of headache, chest pain, low back pain with a complicated recent past medical history.  Patient was recently in the hospital and then in penitentiary in [place].  He was then extradited to [place].  When they brought him to the [facility] alf they found him to have a heart rate of 180.  Patient is complaining of headache, chest pain, and abdominal pain.  States he was recently in the hospital with sepsis.  He denies recent fever or chills.  He has had mild cough and dyspnea.  Neurosurgery was consulted today when MRI of the lumbar spine showed a compression fracture involving L1.    Review of Systems:  Constitutional: Denies fevers, chills, night sweats; positive weight loss  Eyes: Denies eye pain; positive diplopia  Ears/Nose/Throat/Mouth: Denies nasal congestion or sore throat   Cardiovascular: Denies chest pain; positive palpitations   Respiratory: Denies shortness of breath, or cough  Gastrointestinal/Hepatic: Denies nausea, vomiting, diarrhea, or constipation; positive abdominal pain  Genitourinary: Denies dysuria, frequency, or incontinence  Musculoskeletal/Rheum: Denies joint pain or swelling; positive back pain  Skin: Denies rash  Neurological: Denies confusion, memory loss, focal weakness, or parasthesias; positive headache  Psychiatric: Denies mood disorder   Endocrine: Denies hx of diabetes or thyroid dysfunction  Heme/Oncology/Lymph Nodes: Denies enlarged lymph nodes, bruising, or known bleeding disorder  Allergic/Immunologic: Denies hx of autoimmune disorder    Medications:  Current Facility-Administered Medications   Medication Dose Route Frequency Provider Last Rate Last Dose   • sodium phosphate 30 mmol in D5W 500 mL ivpb  30 mmol  Intravenous Once Waylon Guerrier M.D. 83.3 mL/hr at 04/08/19 1407 30 mmol at 04/08/19 1407   • morphine (pf) 4 mg/ml injection 4 mg  4 mg Intravenous Q3HRS PRN Waylon Guerrier M.D.   4 mg at 04/08/19 1402   • LORazepam (ATIVAN) injection 1 mg  1 mg Intravenous Q6HRS PRN Waylon Guerrier M.D.   1 mg at 04/08/19 0850   • metoprolol (LOPRESSOR) tablet 25 mg  25 mg Oral BID Waylon Guerrier M.D.   25 mg at 04/08/19 0608   • heparin injection 5,000 Units  5,000 Units Subcutaneous Q8HRS Waylon Guerrier M.D.   5,000 Units at 04/08/19 1402   • albuterol inhaler 2 Puff  2 Puff Inhalation Q4HRS PRN Harris Guerrier M.D.       • hydrOXYzine HCl (ATARAX) tablet 50 mg  50 mg Oral BID Delonte Samaniego M.D.   50 mg at 04/08/19 0608   • traZODone (DESYREL) tablet 50 mg  50 mg Oral Nightly Delonte Samaniego M.D.   50 mg at 04/07/19 2100   • zolpidem (AMBIEN) tablet 10 mg  10 mg Oral QHS Delonte Samaniego M.D.   10 mg at 04/07/19 1958   • potassium chloride 20 mEq in LR 1,000 mL infusion   Intravenous Continuous Delonte Samaniego M.D. 50 mL/hr at 04/07/19 1000     • cefepime (MAXIPIME) 2 g in  mL IVPB  2 g Intravenous Q12HRS Delonte Samaniego M.D.   Stopped at 04/08/19 0640   • diphenhydrAMINE (BENADRYL) tablet/capsule 25 mg  25 mg Oral Q6HRS PRN Delonte Samaniego M.D.   25 mg at 04/08/19 0850   • hydrocortisone 1 % cream   Topical BID Delonte Samaniego M.D.       • Respiratory Care per Protocol   Nebulization Continuous RT Harris Guerrier M.D.       • acetaminophen (TYLENOL) tablet 650 mg  650 mg Oral Q6HRS PRN Harris Guerrier M.D.   650 mg at 04/08/19 1545   • ondansetron (ZOFRAN) syringe/vial injection 4 mg  4 mg Intravenous Q4HRS PRN Harris Guerrier M.D.   4 mg at 04/08/19 0359   • ondansetron (ZOFRAN ODT) dispertab 4 mg  4 mg Oral Q4HRS PRN Harris Guerrier M.D.       • amitriptyline (ELAVIL) tablet 75 mg  75 mg Oral Nightly Harris Guerrier M.D.   75 mg at 04/07/19 1956   • busPIRone (BUSPAR) tablet 15 mg  15 mg Oral BID Harris Guerrier M.D.   15 mg at 04/08/19 0609   • omeprazole  (PRILOSEC) capsule 20 mg  20 mg Oral Q12HRS Harris Guerrier M.D.   20 mg at 04/08/19 0608       Allergies:  Allergies   Allergen Reactions   • Iodine Anaphylaxis   • Keflex Diarrhea and Vomiting     Vomiting & Diarrhea  Tolerates ceftriaxone   • Reglan [Metoclopramide] Nausea     Asthma     • Rice Anaphylaxis   • Shellfish Allergy Anaphylaxis   • Toradol Hives       Past Medical History:  Past Medical History:   Diagnosis Date   • Asthma    • Cancer (HCC) 11/01/2016       Past Surgical History:  No past surgical history on file.    Family History:  No family history on file.    Social History:  Social History     Social History   • Marital status: Single     Spouse name: N/A   • Number of children: N/A   • Years of education: N/A     Occupational History   • Not on file.     Social History Main Topics   • Smoking status: Former Smoker   • Smokeless tobacco: Never Used   • Alcohol use Not on file   • Drug use: Unknown   • Sexual activity: Not on file     Other Topics Concern   • Not on file     Social History Narrative   • No narrative on file       Physical Examination:  Alert and oriented  Appears unwell; obese  Moves all 4 extremities adequately; at least 4/5 strength without focal weakness  Sensation is intact in all 4 extremities  Straight leg raising is negative bilaterally    Labs:  Recent Labs      04/05/19   1654  04/06/19   0527  04/08/19   0300   WBC  3.0*  0.9*  2.2*   RBC  4.32*  4.11*  3.27*   HEMOGLOBIN  12.5*  11.8*  9.5*   HEMATOCRIT  36.9*  35.7*  29.4*   MCV  85.4  86.9  89.9   MCH  28.9  28.7  29.1   MCHC  33.9  33.1*  32.3*   RDW  55.6*  57.1*  61.8*   PLATELETCT  91*  89*  73*   MPV  11.9  10.6  10.4     Recent Labs      04/06/19   0527  04/07/19   0257  04/08/19   0830   SODIUM  137  145  143   POTASSIUM  3.5*  3.9  4.1   CHLORIDE  106  112  111   CO2  23  23  26   GLUCOSE  169*  162*  120*   BUN  4*  9  8   CREATININE  0.80  0.89  0.82   CALCIUM  7.8*  7.6*  7.5*     Recent Labs      04/07/19    0257   APTT  20.6*   INR  1.17*           Imaging:  MRI of the lumbar spine shows a subacute superior endplate fracture involving the vertebral body of L1.  There is no retropulsion into the spinal canal.  MRI of the brain is unremarkable.    Assessment and Plan:  Benito Persaud is a 53-year-old male with a complicated history.  He has a subacute fracture involving the superior endplate of L1.  There is no retropulsion into the spinal canal.  This is not a biomechanically unstable fracture, however I would recommend a lumbosacral orthosis to help provide external stabilization and help manage the pain.  He does not need to wear the brace in bed, but only needs to wear when he is up walking around.      Will Jay M.D.

## 2019-04-08 NOTE — PROGRESS NOTES
"Attempted to placed LSO on patient, patient refused. Stating, \"I want to wait till I am laying in bed\". Educated on importance of brace. Will attempt again.   "

## 2019-04-09 LAB
25(OH)D3 SERPL-MCNC: 18 NG/ML (ref 30–100)
ALBUMIN SERPL BCP-MCNC: 2.4 G/DL (ref 3.2–4.9)
ALBUMIN/GLOB SERPL: 1 G/DL
ALP SERPL-CCNC: 76 U/L (ref 30–99)
ALT SERPL-CCNC: 13 U/L (ref 2–50)
ANION GAP SERPL CALC-SCNC: 6 MMOL/L (ref 0–11.9)
AST SERPL-CCNC: 31 U/L (ref 12–45)
BASOPHILS # BLD AUTO: 0.4 % (ref 0–1.8)
BASOPHILS # BLD: 0.01 K/UL (ref 0–0.12)
BILIRUB SERPL-MCNC: 0.5 MG/DL (ref 0.1–1.5)
BUN SERPL-MCNC: 8 MG/DL (ref 8–22)
CALCIUM SERPL-MCNC: 7.3 MG/DL (ref 8.5–10.5)
CHLORIDE SERPL-SCNC: 109 MMOL/L (ref 96–112)
CO2 SERPL-SCNC: 28 MMOL/L (ref 20–33)
CREAT SERPL-MCNC: 0.79 MG/DL (ref 0.5–1.4)
EOSINOPHIL # BLD AUTO: 0.04 K/UL (ref 0–0.51)
EOSINOPHIL NFR BLD: 1.7 % (ref 0–6.9)
ERYTHROCYTE [DISTWIDTH] IN BLOOD BY AUTOMATED COUNT: 58.5 FL (ref 35.9–50)
GLOBULIN SER CALC-MCNC: 2.3 G/DL (ref 1.9–3.5)
GLUCOSE SERPL-MCNC: 81 MG/DL (ref 65–99)
HCT VFR BLD AUTO: 26.9 % (ref 42–52)
HGB BLD-MCNC: 8.7 G/DL (ref 14–18)
IMM GRANULOCYTES # BLD AUTO: 0.02 K/UL (ref 0–0.11)
IMM GRANULOCYTES NFR BLD AUTO: 0.8 % (ref 0–0.9)
LYMPHOCYTES # BLD AUTO: 0.86 K/UL (ref 1–4.8)
LYMPHOCYTES NFR BLD: 36 % (ref 22–41)
MAGNESIUM SERPL-MCNC: 1.6 MG/DL (ref 1.5–2.5)
MCH RBC QN AUTO: 29.1 PG (ref 27–33)
MCHC RBC AUTO-ENTMCNC: 32.3 G/DL (ref 33.7–35.3)
MCV RBC AUTO: 90 FL (ref 81.4–97.8)
MONOCYTES # BLD AUTO: 0.22 K/UL (ref 0–0.85)
MONOCYTES NFR BLD AUTO: 9.2 % (ref 0–13.4)
NEUTROPHILS # BLD AUTO: 1.24 K/UL (ref 1.82–7.42)
NEUTROPHILS NFR BLD: 51.9 % (ref 44–72)
NRBC # BLD AUTO: 0 K/UL
NRBC BLD-RTO: 0 /100 WBC
PHOSPHATE SERPL-MCNC: 3.1 MG/DL (ref 2.5–4.5)
PLATELET # BLD AUTO: 74 K/UL (ref 164–446)
PMV BLD AUTO: 11.1 FL (ref 9–12.9)
POTASSIUM SERPL-SCNC: 3.9 MMOL/L (ref 3.6–5.5)
PROT SERPL-MCNC: 4.7 G/DL (ref 6–8.2)
RBC # BLD AUTO: 2.99 M/UL (ref 4.7–6.1)
SODIUM SERPL-SCNC: 143 MMOL/L (ref 135–145)
WBC # BLD AUTO: 2.4 K/UL (ref 4.8–10.8)

## 2019-04-09 PROCEDURE — 85025 COMPLETE CBC W/AUTO DIFF WBC: CPT

## 2019-04-09 PROCEDURE — 700111 HCHG RX REV CODE 636 W/ 250 OVERRIDE (IP): Performed by: INTERNAL MEDICINE

## 2019-04-09 PROCEDURE — 700102 HCHG RX REV CODE 250 W/ 637 OVERRIDE(OP): Performed by: INTERNAL MEDICINE

## 2019-04-09 PROCEDURE — 97163 PT EVAL HIGH COMPLEX 45 MIN: CPT

## 2019-04-09 PROCEDURE — A9270 NON-COVERED ITEM OR SERVICE: HCPCS | Performed by: INTERNAL MEDICINE

## 2019-04-09 PROCEDURE — 770020 HCHG ROOM/CARE - TELE (206)

## 2019-04-09 PROCEDURE — 700111 HCHG RX REV CODE 636 W/ 250 OVERRIDE (IP): Performed by: HOSPITALIST

## 2019-04-09 PROCEDURE — A9270 NON-COVERED ITEM OR SERVICE: HCPCS | Performed by: HOSPITALIST

## 2019-04-09 PROCEDURE — 700105 HCHG RX REV CODE 258: Performed by: INTERNAL MEDICINE

## 2019-04-09 PROCEDURE — 80053 COMPREHEN METABOLIC PANEL: CPT

## 2019-04-09 PROCEDURE — 82306 VITAMIN D 25 HYDROXY: CPT

## 2019-04-09 PROCEDURE — 700102 HCHG RX REV CODE 250 W/ 637 OVERRIDE(OP): Performed by: HOSPITALIST

## 2019-04-09 PROCEDURE — 99232 SBSQ HOSP IP/OBS MODERATE 35: CPT | Performed by: INTERNAL MEDICINE

## 2019-04-09 PROCEDURE — 84100 ASSAY OF PHOSPHORUS: CPT

## 2019-04-09 PROCEDURE — 83735 ASSAY OF MAGNESIUM: CPT

## 2019-04-09 RX ORDER — TRAZODONE HYDROCHLORIDE 50 MG/1
50 TABLET ORAL NIGHTLY PRN
Status: DISCONTINUED | OUTPATIENT
Start: 2019-04-09 | End: 2019-05-02

## 2019-04-09 RX ADMIN — OMEPRAZOLE 20 MG: 20 CAPSULE, DELAYED RELEASE ORAL at 06:30

## 2019-04-09 RX ADMIN — HYDROCORTISONE: 1 CREAM TOPICAL at 06:32

## 2019-04-09 RX ADMIN — HYDROXYZINE HYDROCHLORIDE 50 MG: 50 TABLET ORAL at 06:30

## 2019-04-09 RX ADMIN — OMEPRAZOLE 20 MG: 20 CAPSULE, DELAYED RELEASE ORAL at 17:28

## 2019-04-09 RX ADMIN — POTASSIUM CHLORIDE: 2 INJECTION, SOLUTION, CONCENTRATE INTRAVENOUS at 20:51

## 2019-04-09 RX ADMIN — ERGOCALCIFEROL 50000 UNITS: 1.25 CAPSULE ORAL at 00:33

## 2019-04-09 RX ADMIN — HYDROCORTISONE: 1 CREAM TOPICAL at 17:28

## 2019-04-09 RX ADMIN — MORPHINE SULFATE 4 MG: 4 INJECTION INTRAVENOUS at 00:33

## 2019-04-09 RX ADMIN — MORPHINE SULFATE 4 MG: 4 INJECTION INTRAVENOUS at 03:36

## 2019-04-09 RX ADMIN — HEPARIN SODIUM 5000 UNITS: 5000 INJECTION, SOLUTION INTRAVENOUS; SUBCUTANEOUS at 06:32

## 2019-04-09 RX ADMIN — MORPHINE SULFATE 4 MG: 4 INJECTION INTRAVENOUS at 23:41

## 2019-04-09 RX ADMIN — DIPHENHYDRAMINE HCL 25 MG: 25 TABLET ORAL at 17:28

## 2019-04-09 RX ADMIN — ONDANSETRON 4 MG: 4 TABLET, ORALLY DISINTEGRATING ORAL at 10:59

## 2019-04-09 RX ADMIN — HEPARIN SODIUM 5000 UNITS: 5000 INJECTION, SOLUTION INTRAVENOUS; SUBCUTANEOUS at 20:49

## 2019-04-09 RX ADMIN — ONDANSETRON 4 MG: 4 TABLET, ORALLY DISINTEGRATING ORAL at 17:28

## 2019-04-09 RX ADMIN — MORPHINE SULFATE 4 MG: 4 INJECTION INTRAVENOUS at 06:32

## 2019-04-09 RX ADMIN — MORPHINE SULFATE 4 MG: 4 INJECTION INTRAVENOUS at 17:24

## 2019-04-09 RX ADMIN — METOPROLOL TARTRATE 25 MG: 25 TABLET, FILM COATED ORAL at 06:30

## 2019-04-09 RX ADMIN — MORPHINE SULFATE 4 MG: 4 INJECTION INTRAVENOUS at 20:49

## 2019-04-09 RX ADMIN — HEPARIN SODIUM 5000 UNITS: 5000 INJECTION, SOLUTION INTRAVENOUS; SUBCUTANEOUS at 14:19

## 2019-04-09 RX ADMIN — HYDROXYZINE HYDROCHLORIDE 50 MG: 50 TABLET ORAL at 17:30

## 2019-04-09 RX ADMIN — TRAZODONE HYDROCHLORIDE 50 MG: 50 TABLET ORAL at 20:49

## 2019-04-09 RX ADMIN — DIPHENHYDRAMINE HCL 25 MG: 25 TABLET ORAL at 23:41

## 2019-04-09 RX ADMIN — BUSPIRONE HYDROCHLORIDE 15 MG: 30 TABLET ORAL at 17:26

## 2019-04-09 RX ADMIN — AMITRIPTYLINE HYDROCHLORIDE 75 MG: 50 TABLET, FILM COATED ORAL at 20:49

## 2019-04-09 RX ADMIN — MORPHINE SULFATE 4 MG: 4 INJECTION INTRAVENOUS at 14:19

## 2019-04-09 RX ADMIN — MORPHINE SULFATE 4 MG: 4 INJECTION INTRAVENOUS at 10:56

## 2019-04-09 RX ADMIN — BUSPIRONE HYDROCHLORIDE 15 MG: 30 TABLET ORAL at 06:30

## 2019-04-09 ASSESSMENT — COGNITIVE AND FUNCTIONAL STATUS - GENERAL
TURNING FROM BACK TO SIDE WHILE IN FLAT BAD: A LOT
MOVING TO AND FROM BED TO CHAIR: UNABLE
WALKING IN HOSPITAL ROOM: TOTAL
MOVING FROM LYING ON BACK TO SITTING ON SIDE OF FLAT BED: UNABLE
CLIMB 3 TO 5 STEPS WITH RAILING: TOTAL
STANDING UP FROM CHAIR USING ARMS: A LOT
MOBILITY SCORE: 8
SUGGESTED CMS G CODE MODIFIER MOBILITY: CM

## 2019-04-09 ASSESSMENT — ENCOUNTER SYMPTOMS
HEADACHES: 1
NAUSEA: 1

## 2019-04-09 ASSESSMENT — GAIT ASSESSMENTS: GAIT LEVEL OF ASSIST: UNABLE TO PARTICIPATE

## 2019-04-09 NOTE — PROGRESS NOTES
Hospital Medicine Daily Progress Note    Date of Service  4/9/2019    Chief Complaint  53 y.o. male admitted 4/5/2019 with N/V.    Hospital Course    PMH PE with IVC filter, thrombocytopenia, h/o GIB, h/o cancer who presented from senior living with N/V, malaise and found with tachycardia and HTN, hypokalemia, pancytopenia.        Interval Problem Update  Patient is very sleepy, unable to give me any useful history.  He says he cannot remember anywhere he has been in [place].  He says his friend was unable to get his records because the door was locked.  I discussed with nursing and there has been some issues with confusion.  Speech therapy for cognitive evaluation ordered  Unsure if patient is on a legal hold for [facility] [place], discussed with social work  Tachycardia is improving    Consultants/Specialty  None    Code Status  Full    Disposition  Came from [facility] [place]    Review of Systems  Review of Systems   Unable to perform ROS: Mental acuity   Constitutional: Positive for malaise/fatigue.   Gastrointestinal: Positive for nausea.   Neurological: Positive for headaches.        Physical Exam  Temp:  [36.2 °C (97.1 °F)-37.1 °C (98.7 °F)] 37.1 °C (98.7 °F)  Pulse:  [91-99] 94  Resp:  [16-17] 17  BP: (112-144)/() 134/103  SpO2:  [92 %-97 %] 95 %    Physical Exam   Constitutional:   Appears fatigued   HENT:   Head: Normocephalic.   Dry mucous membranes   Eyes: Pupils are equal, round, and reactive to light. Conjunctivae are normal. Right eye exhibits no discharge. Left eye exhibits no discharge.   Neck: Neck supple.   Cardiovascular: Normal rate and regular rhythm.    Pulmonary/Chest: Effort normal. He has no wheezes. He has no rales.   Abdominal: Soft. There is no tenderness. There is no rebound and no guarding.   Musculoskeletal: He exhibits no edema.   Neurological:   Has difficulty staying awake  Oriented to self and place  Not cooperative with commands  Moving all extremities   Skin: Skin is warm and  dry.   Nursing note and vitals reviewed.      Fluids    Intake/Output Summary (Last 24 hours) at 04/09/19 1824  Last data filed at 04/09/19 1300   Gross per 24 hour   Intake                0 ml   Output             1100 ml   Net            -1100 ml       Laboratory  Recent Labs      04/08/19   0300  04/09/19   1236   WBC  2.2*  2.4*   RBC  3.27*  2.99*   HEMOGLOBIN  9.5*  8.7*   HEMATOCRIT  29.4*  26.9*   MCV  89.9  90.0   MCH  29.1  29.1   MCHC  32.3*  32.3*   RDW  61.8*  58.5*   PLATELETCT  73*  74*   MPV  10.4  11.1     Recent Labs      04/07/19   0257  04/08/19   0830  04/09/19   0953   SODIUM  145  143  143   POTASSIUM  3.9  4.1  3.9   CHLORIDE  112  111  109   CO2  23  26  28   GLUCOSE  162*  120*  81   BUN  9  8  8   CREATININE  0.89  0.82  0.79   CALCIUM  7.6*  7.5*  7.3*     Recent Labs      04/07/19   0257   APTT  20.6*   INR  1.17*               Imaging  MR-LUMBAR SPINE-WITH & W/O   Final Result      1.  There is subacute fracture along the superior endplate of L1 vertebral body. There is no posterior retropulsion.   2.  There is no focal bone lesion.   3.  There is no evidence of infection.   4.  Mild degenerative disease as described above.      MR-BRAIN-W/O   Final Result      1.  Mild cerebral atrophy.   2.  Mild chronic microvascular ischemic disease.   3.  No acute abnormality.      DX-KNEE 2- LEFT   Final Result      1.  No fracture or dislocation of LEFT knee.   2.  Minimal degenerative changes.   3.  Diffuse vascular calcifications.      EC-ECHOCARDIOGRAM COMPLETE W/O CONT   Final Result      CT-ABDOMEN-PELVIS WITH   Final Result         1.  No acute abnormality.   2.  Hepatomegaly and diffuse hepatic steatosis   3.  Atherosclerosis and atherosclerotic coronary artery disease.   4.  Pulmonary nodules measuring up to 6.5 mm, see nodule follow-up recommendations below.      Low Risk: CT at 3-6 months, then consider CT at 18-24 months      High Risk: CT at 3-6 months, then at 18-24 months       Comments: Use most suspicious nodule as guide to management. Follow-up intervals may vary according to size and risk.      Low Risk - Minimal or absent history of smoking and of other known risk factors.      High Risk - History of smoking or of other known risk factors.      Note: These recommendations do not apply to lung cancer screening, patients with immunosuppression, or patients with known primary cancer.      Fleischner Society 2017 Guidelines for Management of Incidentally Detected Pulmonary Nodules in Adults         CT-CTA CHEST PULMONARY ARTERY W/ RECONS   Final Result         1.  No large central pulmonary embolus is appreciated, evaluation of the subsegmental branches is essentially nondiagnostic due to motion artifacts. Additional imaging would be required for definitive exclusion of small distal pulmonary emboli.      DX-LUMBAR SPINE-2 OR 3 VIEWS   Final Result      Limited examination. Mild anterior wedging L2 vertebral body that appears to be old.   If symptoms are persistent, would recommend CT for further evaluation.      DX-THORACIC SPINE-WITH SWIMMERS VIEW   Final Result      Limited examination. The upper thoracic spine is not well-demonstrated.   Mild anterior wedging of some the lower thoracic vertebral bodies and appears old.   No definite acute compression. If symptoms are persistent, CT would be recommended for further evaluation.      DX-CHEST-PORTABLE (1 VIEW)   Final Result      Central catheter projects over the superior right atrium.      Prominent calcified granuloma in the left midlung.      Atherosclerotic plaque.      CT-HEAD W/O   Final Result      No acute intracranial abnormality is identified.           Assessment/Plan  * Tachycardia   Assessment & Plan    - today  Ddx: PE ruled out, tamponde ruled out, tsh wnl  Likely from electrolyte abnormalities and pain, possibly syndrome of inappropriate tachycardia  -I have increased metprolol 25 mg p.o. twice daily  -Continue  telemetry monitoring     Closed stable burst fracture of first lumbar vertebra with routine healing   Assessment & Plan    Lumbar MRI found L1 subacute fracture with no protrusion  Spoke with neurosurgery who suggested just placing a brace and follow-up in the clinic with them     Hypokalemia- (present on admission)   Assessment & Plan    -Improving with IV fluid and p.o. Replacement  -Also replacing mag  -Follow labs  -Telemetry monitoring       Pancytopenia (HCC)- (present on admission)   Assessment & Plan    -likely due to myelodysplastic syndrome  -Last chemotherapy November 2018  -No acute signs symptoms of bleeding  -Iron studies, folate and B12 all WNL  -Hgb stable  -Follow labs  -Hematology consulted -Dr. Mccoy: given his imaging is negative for metastatic disease and his pancytopenia has been improving.  He can follow-up outpatient       Hypophosphatemia   Assessment & Plan    Replaced today     Left knee pain   Assessment & Plan    X ray found arthritis- cont pain mangement     SIRS (systemic inflammatory response syndrome) (HCC)- (present on admission)   Assessment & Plan    -Multifactorial - leukemia and tachycardia, and component of dehydration- Will look for signs of sepsis-no signs of infection discontinue cefepime  -Lactate has now normalized  -Continue IV fluid hydration  -Monitor labs  -He is hemodynamically stable       Hypocalcemia- (present on admission)   Assessment & Plan    -This has been replaced  Normal  PTH elevated likely secondary to vitamin D deficiency  Will replace vitamin D     Somnolence- (present on admission)   Assessment & Plan    Waxing and waning  Stopped Ambien and trazodone  We will check ammonia level     Dizziness- (present on admission)   Assessment & Plan    -He has been bedrest  -No events noted on telemetry  -Negative orthostatics  -Echo found a normal EF and no cause of dizziness            VTE prophylaxis: heparin

## 2019-04-09 NOTE — CARE PLAN
Problem: Infection  Goal: Will remain free from infection    Intervention: Assess for removal of potential routes of infection, such as IV, central line, intra-arterial or urinary catheters  Patient is refusing CHG bath and dressing change at this time. Patient has been offered at 1900, 2000, and 2200. Patient states he does want to perform these tasks at this time. Patient voices understanding of risk for infection and that these tasks help to decrease risk. Will continue to offer interventions and educate.      Problem: Psychosocial Needs:  Goal: Level of anxiety will decrease    Intervention: Identify and develop with patient strategies to cope with anxiety triggers  Patient states that he is afraid to stand at this time. Will continue to provide emotional support and offer assistance to help decrease anxiety.

## 2019-04-09 NOTE — CARE PLAN
Problem: Safety  Goal: Will remain free from falls  Outcome: PROGRESSING SLOWER THAN EXPECTED  Pt has been free of falls during this hospital stay. Refuses bed alarm, but calls appropriately. PT/OT consult has been ordered.    Problem: Venous Thromboembolism (VTW)/Deep Vein Thrombosis (DVT) Prevention:  Goal: Patient will participate in Venous Thrombosis (VTE)/Deep Vein Thrombosis (DVT)Prevention Measures  Outcome: PROGRESSING AS EXPECTED   04/07/19 2000 04/08/19 0800 04/08/19 7272   Mechanical/VTE Prophylaxis   Mechanical Prophylaxis  --  SCDs, Sequential Compression Device --    YOSI Hose (Graduated Compression Stockings) --  --  Off   OTHER   Risk Assessment Score 2 --  --    VTE RISK Moderate --  --    Pharmacologic Prophylaxis Used --  --  Unfractionated Heparin

## 2019-04-09 NOTE — PROGRESS NOTES
Received bedside report from RN , pt care assumed,  Pt AAOx4, with some intermittent confusion/rambling. no c/o pain at this time. On RA. No signs of acute distress noted at this time. POC discussed with pt and verbalizes no questions. Pt refusing LSO brace. Pt denies any additional needs at this time. Bed locked and lowest position, pt educated on fall risk and verbalized understanding, call light within reach. Will continue to monitor during hourly rounding.

## 2019-04-09 NOTE — PROGRESS NOTES
Assumed care of patient. Pt is A+O x4. No chest pain or SOB. No active bleeding noted. Informed of safety and call system. rhythm is SR/ST. Reports nausea, administered zofran.

## 2019-04-09 NOTE — PROGRESS NOTES
Pt came back to MRI earlier this shift and Chart wasn't returned to nurse . Checked with MRI and chart not down there. Notified Transport Supervisor to track down Transporter to see if Transporter has chart, was told he would follow up. Awaiting call back.

## 2019-04-09 NOTE — PROGRESS NOTES
Hospital Medicine Daily Progress Note    Date of Service  4/8/2019    Chief Complaint  53 y.o. male admitted 4/5/2019 with PMH enterocolitis, GIB, testicular cancer status post left orchiectomy 2016, myelodysplastic syndrome, chemotherapy Nov. 2018, PE with IVC filter previously on Eliquis until September 2018 when it was stopped due to thrombocytopenia and Bates's esophagus here for generalized weakness.  Patient was in intermediate in [place] and was extradited to [place] when he was found to be tachycardic with heart rates in the 180s.  He was recently hospitalized in [facility] and diagnosed with myelodysplastic syndrome and has right-sided Port and left chest double lumen in place    Hospital Course     Potassium on admission was profoundly low at 2.6 and was replaced.  CT head, CTA chest and CT abdomen pelvis were all unremarkable.  Twelve-lead EKG showed sinus tachycardia rate 105 with no acute ST elevations or depressions.  Troponin was negative.        Interval Problem Update  4/7: Patient was seen and examined by me today.  It was found that he had lower extremity weakness with lower back pain and tenderness.  I ordered MRI of the back to rule out epidural abscess versus metastatic disease.  He was found to have low phosphate levels which may be contributing to her this weakness.  This was replaced.  He also complained of the left knee pain and x-ray showed arthritis.  4/8: Patient states that his lower extremity weakness is somewhat improving after phosphor placement.  MRI of the back found a L1 compression fracture and neurosurgery was consulted who determined that he should does have a brace placed for pain.  MRI of the brain did not reveal any cause of dizziness or symptoms of stroke.  PT evaluation pending.    Consultants/Specialty  Heme    Code Status  Full    Disposition  senior care  PT evaluation pending  Review of Systems  Review of Systems   Constitutional: Positive for malaise/fatigue. Negative for chills,  diaphoresis and fever.   HENT: Negative for congestion, ear discharge, ear pain, hearing loss, nosebleeds, sinus pain, sore throat and tinnitus.    Eyes: Positive for double vision. Negative for blurred vision, photophobia and pain.   Respiratory: Negative for cough, hemoptysis, sputum production, shortness of breath, wheezing and stridor.    Cardiovascular: Negative for chest pain, palpitations, orthopnea, claudication, leg swelling and PND.   Gastrointestinal: Negative for abdominal pain, blood in stool, constipation, diarrhea, heartburn, melena, nausea and vomiting.   Genitourinary: Negative for dysuria, flank pain, frequency, hematuria and urgency.   Musculoskeletal: Positive for back pain, falls, joint pain and neck pain. Negative for myalgias.   Skin: Negative for itching and rash.   Neurological: Positive for dizziness, tingling, focal weakness, weakness and headaches. Negative for tremors and sensory change.   Endo/Heme/Allergies: Negative for environmental allergies and polydipsia. Does not bruise/bleed easily.        Physical Exam  Temp:  [35.9 °C (96.7 °F)-36.8 °C (98.2 °F)] 36.4 °C (97.6 °F)  Pulse:  [] 110  Resp:  [16-18] 18  BP: (115-147)/() 138/104  SpO2:  [93 %-99 %] 98 %    Physical Exam   Constitutional: He is oriented to person, place, and time. He appears well-developed and well-nourished.   HENT:   Head: Normocephalic and atraumatic.   Mouth/Throat: No oropharyngeal exudate.   Eyes: Conjunctivae are normal. No scleral icterus.   Neck: Normal range of motion. Neck supple. No JVD present. No tracheal deviation present. No thyromegaly present.   Cardiovascular: Regular rhythm and intact distal pulses.  Exam reveals no gallop and no friction rub.    No murmur heard.  tachycardic   Pulmonary/Chest: Effort normal and breath sounds normal. No stridor. No respiratory distress. He has no wheezes. He has no rales. He exhibits no tenderness.   Abdominal: Soft. Bowel sounds are normal. He  exhibits no distension and no mass. There is no tenderness. There is no rebound and no guarding.   Musculoskeletal: Normal range of motion. He exhibits no edema.   4/5 strength LE   Lymphadenopathy:     He has no cervical adenopathy.   Neurological: He is alert and oriented to person, place, and time. He has normal reflexes.   Straight leg test +   Skin:   Port in place with no erythema or drainage   Nursing note and vitals reviewed.      Fluids    Intake/Output Summary (Last 24 hours) at 04/08/19 1721  Last data filed at 04/08/19 0755   Gross per 24 hour   Intake                0 ml   Output              400 ml   Net             -400 ml       Laboratory  Recent Labs      04/06/19   0527  04/08/19   0300   WBC  0.9*  2.2*   RBC  4.11*  3.27*   HEMOGLOBIN  11.8*  9.5*   HEMATOCRIT  35.7*  29.4*   MCV  86.9  89.9   MCH  28.7  29.1   MCHC  33.1*  32.3*   RDW  57.1*  61.8*   PLATELETCT  89*  73*   MPV  10.6  10.4     Recent Labs      04/06/19   0527  04/07/19   0257  04/08/19   0830   SODIUM  137  145  143   POTASSIUM  3.5*  3.9  4.1   CHLORIDE  106  112  111   CO2  23  23  26   GLUCOSE  169*  162*  120*   BUN  4*  9  8   CREATININE  0.80  0.89  0.82   CALCIUM  7.8*  7.6*  7.5*     Recent Labs      04/07/19   0257   APTT  20.6*   INR  1.17*               Imaging  MR-LUMBAR SPINE-WITH & W/O   Final Result      1.  There is subacute fracture along the superior endplate of L1 vertebral body. There is no posterior retropulsion.   2.  There is no focal bone lesion.   3.  There is no evidence of infection.   4.  Mild degenerative disease as described above.      MR-BRAIN-W/O   Final Result      1.  Mild cerebral atrophy.   2.  Mild chronic microvascular ischemic disease.   3.  No acute abnormality.      DX-KNEE 2- LEFT   Final Result      1.  No fracture or dislocation of LEFT knee.   2.  Minimal degenerative changes.   3.  Diffuse vascular calcifications.      EC-ECHOCARDIOGRAM COMPLETE W/O CONT   Final Result       CT-ABDOMEN-PELVIS WITH   Final Result         1.  No acute abnormality.   2.  Hepatomegaly and diffuse hepatic steatosis   3.  Atherosclerosis and atherosclerotic coronary artery disease.   4.  Pulmonary nodules measuring up to 6.5 mm, see nodule follow-up recommendations below.      Low Risk: CT at 3-6 months, then consider CT at 18-24 months      High Risk: CT at 3-6 months, then at 18-24 months      Comments: Use most suspicious nodule as guide to management. Follow-up intervals may vary according to size and risk.      Low Risk - Minimal or absent history of smoking and of other known risk factors.      High Risk - History of smoking or of other known risk factors.      Note: These recommendations do not apply to lung cancer screening, patients with immunosuppression, or patients with known primary cancer.      Fleischner Society 2017 Guidelines for Management of Incidentally Detected Pulmonary Nodules in Adults         CT-CTA CHEST PULMONARY ARTERY W/ RECONS   Final Result         1.  No large central pulmonary embolus is appreciated, evaluation of the subsegmental branches is essentially nondiagnostic due to motion artifacts. Additional imaging would be required for definitive exclusion of small distal pulmonary emboli.      DX-LUMBAR SPINE-2 OR 3 VIEWS   Final Result      Limited examination. Mild anterior wedging L2 vertebral body that appears to be old.   If symptoms are persistent, would recommend CT for further evaluation.      DX-THORACIC SPINE-WITH SWIMMERS VIEW   Final Result      Limited examination. The upper thoracic spine is not well-demonstrated.   Mild anterior wedging of some the lower thoracic vertebral bodies and appears old.   No definite acute compression. If symptoms are persistent, CT would be recommended for further evaluation.      DX-CHEST-PORTABLE (1 VIEW)   Final Result      Central catheter projects over the superior right atrium.      Prominent calcified granuloma in the left  midlung.      Atherosclerotic plaque.      CT-HEAD W/O   Final Result      No acute intracranial abnormality is identified.           Assessment/Plan  * Tachycardia   Assessment & Plan    - today  Ddx: PE ruled out, tamponde ruled out, tsh wnl  Likely from electrolyte abnormalities and pain, possibly syndrome of inappropriate tachycardia  -I have increased metprolol 25 mg p.o. twice daily  -Continue telemetry monitoring     Closed stable burst fracture of first lumbar vertebra with routine healing   Assessment & Plan    Lumbar MRI found L1 subacute fracture with no protrusion  Spoke with neurosurgery who suggested just placing a brace for 3 weeks and follow-up in the clinic with them     Hypokalemia- (present on admission)   Assessment & Plan    -Improving with IV fluid and p.o. Replacement  -Also replacing mag  -Follow labs  -Telemetry monitoring       Pancytopenia (HCC)- (present on admission)   Assessment & Plan    -likely due to myelodysplastic syndrome  -Last chemotherapy November 2018  -No acute signs symptoms of bleeding  -Iron studies, folate and B12 all WNL  -Hgb stable  -Follow labs  -Hematology consulted -Dr. Mccoy to see       Hypophosphatemia   Assessment & Plan    Replaced today     Left knee pain   Assessment & Plan    X ray found arthritis- cont pain mangement     SIRS (systemic inflammatory response syndrome) (HCC)- (present on admission)   Assessment & Plan    -Multifactorial - leukemia and tachycardia, and component of dehydration- Will look for signs of sepsis-no signs of infection discontinue cefepime  -Lactate has now normalized  -Continue IV fluid hydration  -Monitor labs  -He is hemodynamically stable       Hypocalcemia- (present on admission)   Assessment & Plan    -This has been replaced  Normal  PTH elevated likely secondary to vitamin D deficiency  Will replace vitamin D     Somnolence- (present on admission)   Assessment & Plan    -Currently resolved     Dizziness- (present on  admission)   Assessment & Plan    -He has been bedrest  -No events noted on telemetry  -Negative orthostatics  -Echo found a normal EF and no cause of dizziness            VTE prophylaxis: heparin

## 2019-04-09 NOTE — DISCHARGE PLANNING
Anticipated Discharge Disposition: detention    Action: CM reviewed chart. Pt was in intermediate in [place], and then was arrested here in [place], and police need to be called at d/c.   CM called [place] [employer] non emergency line([phone]) and spoke with Ainsley. She states that they have probable cause for an older warrant and they would like to take pt in to custody at d/c. Pt's case number is [id number]. Ainsley does not have further information.  Per PT, pt was a max assist and unable to ambulate. Per Dr. Mathias, she is concerned about pt being able to make his own medical decisions.   Pt may need placement at discharge. He does not have any contacts listed. CM will meet with pt to try to complete full assessment.     Barriers to Discharge:     Plan: Meet with pt to determine NOK, complete assessment.

## 2019-04-09 NOTE — DIETARY
"Nutrition services: Day 3 of admit.  Benito Persaud is a 53 y.o. male with admitting DX of Hypokalemia  Dehydration  Nausea and vomiting  Hx of recent cancer diagnosis (type unspecified), depression, hypertension, GERD, pt is poor historian.     Consult received for weight loss, PTA.  -pt is very lethargic today  -he denies weight loss and poor appetite, PTA  -RD noted uneaten lunch tray at bedside today.  Meals recorded yesterday total % PO intake, per ADL's      Assessment:  Height: 180.3 cm (5' 11\")  Weight: 105.2 kg (231 lb 14.8 oz)  Body mass index is 32.35 kg/m².   Diet/Intake: Regular    Evaluation:   1. Labs: adjusted calcium 8.5  2. Meds: Prilosec, vitamin D (ergocalciferol)  3. Fluids: potassium chloride in LR @ 50 ml/hr + PO fluids  4. Last BM 4/8  5. No pressure ulcers noted per chart review.  6. Skin: patient appears cachectic likely r/t hypermetabolic disease state, as evidenced by thin, pale skin, temporal muscle wasting.     Malnutrition Risk: no risk at this time.    Recommendations/Plan:   1. Encourage intake of meals and snacks.  2. Document intake of all meals and snacks  as % taken in ADL's to provide interdisciplinary communication across all shifts.   3. Monitor weight.  4. Nutrition rep will continue to see patient for ongoing meal and snack preferences.           "

## 2019-04-09 NOTE — THERAPY
"Physical Therapy Evaluation completed.   Bed Mobility:  Supine to Sit: Moderate Assist  Transfers: Sit to Stand: Unable to Participate  Gait: Level Of Assist: Unable to Participate      Plan of Care: Will benefit from Physical Therapy 3 times per week  Discharge Recommendations: Equipment: Will Continue to Assess for Equipment Needs. Post-acute therapy: see below.    See \"Rehab Therapy-Acute\" Patient Summary Report for complete documentation.    Patient is a 52 YO male that was admitted following complaints of chest pain, LBP, HA, and tachycardia. Patient with PMHx significant for enterocolitis, GIB, testicular CA, MDS, PE with IVC filter. Patient presented to PT with pain, apparent impaired cognition with poor insight and safety awareness, impaired balance, functional weakness, and decreased activity tolerance. Patient performed bed mobility with mod A with log roll and bed features and required max A for donning LSO EOB. Patient was perseverative on LSO being the wrong size and painful despite no outward signs of discomfort or complaints for >10 min once LSO donned. Attempted sit<>stand x4 with FWW and max A but patient demonstrated fear and self limiting behavior, was unable to perform forward weight shift and hip and knee extension for stand. Provided patient education regarding use of LSO, spinal precautions, and pain management techniques but patient with no demonstration of learning. Currently recommend post acute placement. Will continue to follow.  "

## 2019-04-10 ENCOUNTER — APPOINTMENT (OUTPATIENT)
Dept: RADIOLOGY | Facility: MEDICAL CENTER | Age: 54
DRG: 516 | End: 2019-04-10
Attending: INTERNAL MEDICINE
Payer: MEDICAID

## 2019-04-10 PROBLEM — E83.51 HYPOCALCEMIA: Status: RESOLVED | Noted: 2019-04-06 | Resolved: 2019-04-10

## 2019-04-10 PROBLEM — E83.39 HYPOPHOSPHATEMIA: Status: RESOLVED | Noted: 2019-04-07 | Resolved: 2019-04-10

## 2019-04-10 PROBLEM — R65.10 SIRS (SYSTEMIC INFLAMMATORY RESPONSE SYNDROME) (HCC): Status: RESOLVED | Noted: 2019-04-06 | Resolved: 2019-04-10

## 2019-04-10 PROBLEM — E87.6 HYPOKALEMIA: Status: RESOLVED | Noted: 2019-04-06 | Resolved: 2019-04-10

## 2019-04-10 LAB
ALBUMIN SERPL BCP-MCNC: 2.6 G/DL (ref 3.2–4.9)
ALBUMIN/GLOB SERPL: 1 G/DL
ALP SERPL-CCNC: 92 U/L (ref 30–99)
ALT SERPL-CCNC: 17 U/L (ref 2–50)
AMMONIA PLAS-SCNC: 39 UMOL/L (ref 11–45)
ANION GAP SERPL CALC-SCNC: 4 MMOL/L (ref 0–11.9)
AST SERPL-CCNC: 37 U/L (ref 12–45)
BACTERIA BLD CULT: NORMAL
BACTERIA BLD CULT: NORMAL
BASOPHILS # BLD AUTO: 0.9 % (ref 0–1.8)
BASOPHILS # BLD: 0.02 K/UL (ref 0–0.12)
BILIRUB SERPL-MCNC: 0.4 MG/DL (ref 0.1–1.5)
BUN SERPL-MCNC: 7 MG/DL (ref 8–22)
CALCIUM SERPL-MCNC: 8 MG/DL (ref 8.5–10.5)
CHLORIDE SERPL-SCNC: 108 MMOL/L (ref 96–112)
CO2 SERPL-SCNC: 31 MMOL/L (ref 20–33)
CREAT SERPL-MCNC: 0.76 MG/DL (ref 0.5–1.4)
EOSINOPHIL # BLD AUTO: 0.05 K/UL (ref 0–0.51)
EOSINOPHIL NFR BLD: 2.3 % (ref 0–6.9)
ERYTHROCYTE [DISTWIDTH] IN BLOOD BY AUTOMATED COUNT: 58.6 FL (ref 35.9–50)
GLOBULIN SER CALC-MCNC: 2.6 G/DL (ref 1.9–3.5)
GLUCOSE SERPL-MCNC: 84 MG/DL (ref 65–99)
HCT VFR BLD AUTO: 27.6 % (ref 42–52)
HGB BLD-MCNC: 9.1 G/DL (ref 14–18)
IMM GRANULOCYTES # BLD AUTO: 0.01 K/UL (ref 0–0.11)
IMM GRANULOCYTES NFR BLD AUTO: 0.5 % (ref 0–0.9)
LYMPHOCYTES # BLD AUTO: 1.01 K/UL (ref 1–4.8)
LYMPHOCYTES NFR BLD: 45.7 % (ref 22–41)
MCH RBC QN AUTO: 29.7 PG (ref 27–33)
MCHC RBC AUTO-ENTMCNC: 33 G/DL (ref 33.7–35.3)
MCV RBC AUTO: 90.2 FL (ref 81.4–97.8)
MONOCYTES # BLD AUTO: 0.18 K/UL (ref 0–0.85)
MONOCYTES NFR BLD AUTO: 8.1 % (ref 0–13.4)
NEUTROPHILS # BLD AUTO: 0.94 K/UL (ref 1.82–7.42)
NEUTROPHILS NFR BLD: 42.5 % (ref 44–72)
NRBC # BLD AUTO: 0 K/UL
NRBC BLD-RTO: 0 /100 WBC
PLATELET # BLD AUTO: 82 K/UL (ref 164–446)
PMV BLD AUTO: 9.9 FL (ref 9–12.9)
POTASSIUM SERPL-SCNC: 4.3 MMOL/L (ref 3.6–5.5)
PROT SERPL-MCNC: 5.2 G/DL (ref 6–8.2)
RBC # BLD AUTO: 3.06 M/UL (ref 4.7–6.1)
SIGNIFICANT IND 70042: NORMAL
SIGNIFICANT IND 70042: NORMAL
SITE SITE: NORMAL
SITE SITE: NORMAL
SODIUM SERPL-SCNC: 143 MMOL/L (ref 135–145)
SOURCE SOURCE: NORMAL
SOURCE SOURCE: NORMAL
WBC # BLD AUTO: 2.2 K/UL (ref 4.8–10.8)

## 2019-04-10 PROCEDURE — 85025 COMPLETE CBC W/AUTO DIFF WBC: CPT

## 2019-04-10 PROCEDURE — 700102 HCHG RX REV CODE 250 W/ 637 OVERRIDE(OP): Performed by: INTERNAL MEDICINE

## 2019-04-10 PROCEDURE — 700111 HCHG RX REV CODE 636 W/ 250 OVERRIDE (IP): Performed by: HOSPITALIST

## 2019-04-10 PROCEDURE — 74018 RADEX ABDOMEN 1 VIEW: CPT

## 2019-04-10 PROCEDURE — 700105 HCHG RX REV CODE 258

## 2019-04-10 PROCEDURE — 770020 HCHG ROOM/CARE - TELE (206)

## 2019-04-10 PROCEDURE — A9270 NON-COVERED ITEM OR SERVICE: HCPCS | Performed by: INTERNAL MEDICINE

## 2019-04-10 PROCEDURE — 99232 SBSQ HOSP IP/OBS MODERATE 35: CPT | Performed by: INTERNAL MEDICINE

## 2019-04-10 PROCEDURE — 700102 HCHG RX REV CODE 250 W/ 637 OVERRIDE(OP): Performed by: HOSPITALIST

## 2019-04-10 PROCEDURE — 700101 HCHG RX REV CODE 250: Performed by: INTERNAL MEDICINE

## 2019-04-10 PROCEDURE — 92523 SPEECH SOUND LANG COMPREHEN: CPT

## 2019-04-10 PROCEDURE — 80053 COMPREHEN METABOLIC PANEL: CPT

## 2019-04-10 PROCEDURE — 700111 HCHG RX REV CODE 636 W/ 250 OVERRIDE (IP): Performed by: INTERNAL MEDICINE

## 2019-04-10 PROCEDURE — 82140 ASSAY OF AMMONIA: CPT

## 2019-04-10 PROCEDURE — A9270 NON-COVERED ITEM OR SERVICE: HCPCS | Performed by: HOSPITALIST

## 2019-04-10 RX ORDER — TRAMADOL HYDROCHLORIDE 50 MG/1
50-100 TABLET ORAL EVERY 6 HOURS PRN
Status: DISCONTINUED | OUTPATIENT
Start: 2019-04-10 | End: 2019-04-10

## 2019-04-10 RX ORDER — OXYCODONE HYDROCHLORIDE 5 MG/1
5 TABLET ORAL EVERY 6 HOURS PRN
Status: DISCONTINUED | OUTPATIENT
Start: 2019-04-10 | End: 2019-04-11

## 2019-04-10 RX ORDER — LIDOCAINE 50 MG/G
1 PATCH TOPICAL EVERY 24 HOURS
Status: DISCONTINUED | OUTPATIENT
Start: 2019-04-10 | End: 2019-05-02

## 2019-04-10 RX ORDER — SODIUM CHLORIDE 9 MG/ML
INJECTION, SOLUTION INTRAVENOUS
Status: COMPLETED
Start: 2019-04-10 | End: 2019-04-10

## 2019-04-10 RX ORDER — TRAMADOL HYDROCHLORIDE 50 MG/1
50-100 TABLET ORAL EVERY 4 HOURS PRN
Status: DISCONTINUED | OUTPATIENT
Start: 2019-04-10 | End: 2019-04-11

## 2019-04-10 RX ADMIN — HYDROXYZINE HYDROCHLORIDE 50 MG: 50 TABLET ORAL at 05:46

## 2019-04-10 RX ADMIN — TRAMADOL HYDROCHLORIDE 100 MG: 50 TABLET, FILM COATED ORAL at 22:02

## 2019-04-10 RX ADMIN — LIDOCAINE 1 PATCH: 50 PATCH TOPICAL at 12:13

## 2019-04-10 RX ADMIN — TRAMADOL HYDROCHLORIDE 100 MG: 50 TABLET, FILM COATED ORAL at 13:46

## 2019-04-10 RX ADMIN — HEPARIN SODIUM 5000 UNITS: 5000 INJECTION, SOLUTION INTRAVENOUS; SUBCUTANEOUS at 20:34

## 2019-04-10 RX ADMIN — ONDANSETRON 4 MG: 2 SOLUTION INTRAMUSCULAR; INTRAVENOUS at 12:19

## 2019-04-10 RX ADMIN — HYDROCORTISONE: 1 CREAM TOPICAL at 18:00

## 2019-04-10 RX ADMIN — OMEPRAZOLE 20 MG: 20 CAPSULE, DELAYED RELEASE ORAL at 05:45

## 2019-04-10 RX ADMIN — BUSPIRONE HYDROCHLORIDE 15 MG: 30 TABLET ORAL at 05:46

## 2019-04-10 RX ADMIN — HEPARIN SODIUM 5000 UNITS: 5000 INJECTION, SOLUTION INTRAVENOUS; SUBCUTANEOUS at 05:47

## 2019-04-10 RX ADMIN — OMEPRAZOLE 20 MG: 20 CAPSULE, DELAYED RELEASE ORAL at 18:07

## 2019-04-10 RX ADMIN — BUSPIRONE HYDROCHLORIDE 15 MG: 30 TABLET ORAL at 18:06

## 2019-04-10 RX ADMIN — ONDANSETRON 4 MG: 2 SOLUTION INTRAMUSCULAR; INTRAVENOUS at 16:49

## 2019-04-10 RX ADMIN — METOPROLOL TARTRATE 25 MG: 25 TABLET, FILM COATED ORAL at 05:46

## 2019-04-10 RX ADMIN — MORPHINE SULFATE 4 MG: 4 INJECTION INTRAVENOUS at 02:48

## 2019-04-10 RX ADMIN — TRAMADOL HYDROCHLORIDE 100 MG: 50 TABLET, FILM COATED ORAL at 17:30

## 2019-04-10 RX ADMIN — ACETAMINOPHEN 650 MG: 325 TABLET, FILM COATED ORAL at 18:07

## 2019-04-10 RX ADMIN — TRAMADOL HYDROCHLORIDE 100 MG: 50 TABLET, FILM COATED ORAL at 08:23

## 2019-04-10 RX ADMIN — METOPROLOL TARTRATE 25 MG: 25 TABLET, FILM COATED ORAL at 18:06

## 2019-04-10 RX ADMIN — AMITRIPTYLINE HYDROCHLORIDE 75 MG: 50 TABLET, FILM COATED ORAL at 20:34

## 2019-04-10 RX ADMIN — MORPHINE SULFATE 4 MG: 4 INJECTION INTRAVENOUS at 05:47

## 2019-04-10 RX ADMIN — ACETAMINOPHEN 650 MG: 325 TABLET, FILM COATED ORAL at 12:12

## 2019-04-10 RX ADMIN — OXYCODONE HYDROCHLORIDE 5 MG: 5 TABLET ORAL at 12:12

## 2019-04-10 RX ADMIN — OXYCODONE HYDROCHLORIDE 5 MG: 5 TABLET ORAL at 18:07

## 2019-04-10 RX ADMIN — SODIUM CHLORIDE: 9 INJECTION, SOLUTION INTRAVENOUS at 20:30

## 2019-04-10 RX ADMIN — HYDROXYZINE HYDROCHLORIDE 50 MG: 50 TABLET ORAL at 18:07

## 2019-04-10 RX ADMIN — HEPARIN SODIUM 5000 UNITS: 5000 INJECTION, SOLUTION INTRAVENOUS; SUBCUTANEOUS at 13:46

## 2019-04-10 RX ADMIN — HYDROCORTISONE: 1 CREAM TOPICAL at 05:48

## 2019-04-10 ASSESSMENT — ENCOUNTER SYMPTOMS
FEVER: 0
SHORTNESS OF BREATH: 0
NAUSEA: 0
ABDOMINAL PAIN: 0
BACK PAIN: 1
VOMITING: 0
HEADACHES: 0

## 2019-04-10 NOTE — THERAPY
"Speech Language Therapy Evaluation completed to address cognition  Functional Status:  Per nurs, pt improved in cognition and communication today when compared to yesterday. Possible confabulations and confusion on admit. Pt completed the NCSE and parts of nonstandard cognitive linguistic eval accurately with mild deficits in short term recall. Pt able to recall 2/4 words, following 10 minutes, without cues. He recalled 1/2 of the remaining words with choice of 3 cues. Pt also required verbal cues to re-read 3 sentence level information for oral reading and reading compreh. Pt accurate choosing picture, field of 3, following orally reading, and re-reading x2 following cues, 3 sent level information. SLP unable to assess writing r/t pt with discomfort when HOB higher than 20 degrees. Pt with flat affect and mild irritability. Pt stating \"I am sorry I missed a few. I don't feel good.\" No active tx recommended at this time. Monitor only. Addendum-per PT notes on 4/9 pt demonstrating decreased insight and follow-through during physical activity. Pt's medical status and comfort levels may cause variability in his attention to task and initiation for self care-please collaborate with SLP if this does not improve.  Recommendations:  See above  Plan of Care: see above.  Post-Acute Therapy: not recommended at this time. Martín    See \"Rehab Therapy-Acute\" Patient Summary Report for complete documentation.   "

## 2019-04-10 NOTE — PROGRESS NOTES
Assumed care of patient. Pt is A+O x4. No chest pain or SOB. No active bleeding noted. Informed of safety and call system. rhythm is SR.

## 2019-04-10 NOTE — CARE PLAN
Problem: Safety  Goal: Will remain free from injury  Outcome: PROGRESSING AS EXPECTED  Pt has been free of falls during this hospital stay. Calls appropriately before ambulating.     Problem: Discharge Barriers/Planning  Goal: Patient's continuum of care needs will be met  Outcome: PROGRESSING AS EXPECTED  Pt being seen by SW for discharge planning.

## 2019-04-10 NOTE — CARE PLAN
Problem: Pain Management  Goal: Pain level will decrease to patient's comfort goal    Intervention: Follow pain managment plan developed in collaboration with patient and Interdisciplinary Team  Patient reports pain is controlled with morphine. Will continue to administer as ordered by MAR.      Problem: Bowel/Gastric:  Goal: Will not experience complications related to bowel motility    Intervention: Implement interventions to promote bowel evacuation if inadequate bowel movements in past 48 hours  Patient is active and reports need to have bowel movement but refuses to use sheets, bedpan, or bedside commode. Reports he will hold bowel movement. Will continue to educate and provide options to evacuate bowels.

## 2019-04-10 NOTE — PROGRESS NOTES
Assumed care of pt. Bedside report completed with night shift RN. Pt alert and oriented. Pt denies pain for this RN. Pt resting comfortably in bed. Call light within reach. Bed low and locked. Offered needs.

## 2019-04-10 NOTE — PROGRESS NOTES
[facility] Medicine Daily Progress Note    Date of Service  4/10/2019    Chief Complaint  53 y.o. male admitted 4/5/2019 with N/V.    Hospital Course    PMH PE with IVC filter, thrombocytopenia, h/o GIB, h/o cancer who presented from shelter with N/V, malaise and found with tachycardia and HTN, hypokalemia, pancytopenia.        Interval Problem Update  Patient is more alert and oriented today  Tachycardia is resolved  Complains of poorly controlled pain. Says he can't move his legs but seen to be able to move his legs as discussed with nursing. Multimodal pain control with tylenol, lidocaine patch, narcotics PRN    Consultants/Specialty  NSG    Code Status  Full    Disposition  To return to long term on discharge  PT is recommending SNF, reeval after pain control    Review of Systems  Review of Systems   Unable to perform ROS: Mental acuity   Constitutional: Negative for fever.   HENT: Negative for congestion.    Respiratory: Negative for shortness of breath.    Cardiovascular: Negative for chest pain.   Gastrointestinal: Negative for abdominal pain, nausea and vomiting.   Musculoskeletal: Positive for back pain.   Neurological: Negative for headaches.        Physical Exam  Temp:  [36.2 °C (97.1 °F)-37.1 °C (98.7 °F)] 36.8 °C (98.2 °F)  Pulse:  [] 88  Resp:  [18] 18  BP: (133-147)/() 138/97  SpO2:  [94 %-97 %] 97 %    Physical Exam   Constitutional: He is oriented to person, place, and time. He appears well-developed and well-nourished.   HENT:   Head: Normocephalic.   Mouth/Throat: Oropharynx is clear and moist.   Dry mucous membranes   Eyes: Pupils are equal, round, and reactive to light. Conjunctivae are normal. Right eye exhibits no discharge. Left eye exhibits no discharge.   Neck: Neck supple.   Cardiovascular: Normal rate and regular rhythm.    Pulmonary/Chest: Effort normal. He has no wheezes. He has no rales.   Abdominal: Soft. There is no tenderness. There is no rebound and no guarding.    Musculoskeletal: He exhibits no edema.   Neurological: He is alert and oriented to person, place, and time.   Skin: Skin is warm and dry. There is pallor.   Nursing note and vitals reviewed.      Fluids    Intake/Output Summary (Last 24 hours) at 04/10/19 1625  Last data filed at 04/10/19 1220   Gross per 24 hour   Intake             2650 ml   Output             1700 ml   Net              950 ml       Laboratory  Recent Labs      04/08/19   0300  04/09/19   1236  04/10/19   0250   WBC  2.2*  2.4*  2.2*   RBC  3.27*  2.99*  3.06*   HEMOGLOBIN  9.5*  8.7*  9.1*   HEMATOCRIT  29.4*  26.9*  27.6*   MCV  89.9  90.0  90.2   MCH  29.1  29.1  29.7   MCHC  32.3*  32.3*  33.0*   RDW  61.8*  58.5*  58.6*   PLATELETCT  73*  74*  82*   MPV  10.4  11.1  9.9     Recent Labs      04/08/19   0830  04/09/19   0953  04/10/19   0250   SODIUM  143  143  143   POTASSIUM  4.1  3.9  4.3   CHLORIDE  111  109  108   CO2  26  28  31   GLUCOSE  120*  81  84   BUN  8  8  7*   CREATININE  0.82  0.79  0.76   CALCIUM  7.5*  7.3*  8.0*                   Imaging  [id number] VIEW   Final Result      1.  There is a nonobstructive nonspecific bowel gas pattern.  There is no evidence of free intraperitoneal air.      MR-LUMBAR SPINE-WITH & W/O   Final Result      1.  There is subacute fracture along the superior endplate of L1 vertebral body. There is no posterior retropulsion.   2.  There is no focal bone lesion.   3.  There is no evidence of infection.   4.  Mild degenerative disease as described above.      MR-BRAIN-W/O   Final Result      1.  Mild cerebral atrophy.   2.  Mild chronic microvascular ischemic disease.   3.  No acute abnormality.      DX-KNEE 2- LEFT   Final Result      1.  No fracture or dislocation of LEFT knee.   2.  Minimal degenerative changes.   3.  Diffuse vascular calcifications.      EC-ECHOCARDIOGRAM COMPLETE W/O CONT   Final Result      CT-ABDOMEN-PELVIS WITH   Final Result         1.  No acute abnormality.   2.   Hepatomegaly and diffuse hepatic steatosis   3.  Atherosclerosis and atherosclerotic coronary artery disease.   4.  Pulmonary nodules measuring up to 6.5 mm, see nodule follow-up recommendations below.      Low Risk: CT at 3-6 months, then consider CT at 18-24 months      High Risk: CT at 3-6 months, then at 18-24 months      Comments: Use most suspicious nodule as guide to management. Follow-up intervals may vary according to size and risk.      Low Risk - Minimal or absent history of smoking and of other known risk factors.      High Risk - History of smoking or of other known risk factors.      Note: These recommendations do not apply to lung cancer screening, patients with immunosuppression, or patients with known primary cancer.      Fleischner Society 2017 Guidelines for Management of Incidentally Detected Pulmonary Nodules in Adults         CT-CTA CHEST PULMONARY ARTERY W/ RECONS   Final Result         1.  No large central pulmonary embolus is appreciated, evaluation of the subsegmental branches is essentially nondiagnostic due to motion artifacts. Additional imaging would be required for definitive exclusion of small distal pulmonary emboli.      DX-LUMBAR SPINE-2 OR 3 VIEWS   Final Result      Limited examination. Mild anterior wedging L2 vertebral body that appears to be old.   If symptoms are persistent, would recommend CT for further evaluation.      DX-THORACIC SPINE-WITH SWIMMERS VIEW   Final Result      Limited examination. The upper thoracic spine is not well-demonstrated.   Mild anterior wedging of some the lower thoracic vertebral bodies and appears old.   No definite acute compression. If symptoms are persistent, CT would be recommended for further evaluation.      DX-CHEST-PORTABLE (1 VIEW)   Final Result      Central catheter projects over the superior right atrium.      Prominent calcified granuloma in the left midlung.      Atherosclerotic plaque.      CT-HEAD W/O   Final Result      No acute  intracranial abnormality is identified.           Assessment/Plan  * Tachycardia   Assessment & Plan    - today  Ddx: PE ruled out, tamponde ruled out, tsh wnl  Likely from electrolyte abnormalities and pain, possibly syndrome of inappropriate tachycardia  -I have increased metprolol 25 mg p.o. twice daily  Tachycardia resolved     Closed stable burst fracture of first lumbar vertebra with routine healing   Assessment & Plan    Lumbar MRI found L1 subacute fracture with no protrusion  Spoke with neurosurgery who suggested just placing a brace and follow-up in the clinic with them  PTOT     Pancytopenia (HCC)- (present on admission)   Assessment & Plan    -likely due to myelodysplastic syndrome  -Last chemotherapy November 2018  -No acute signs symptoms of bleeding  -Iron studies, folate and B12 all WNL  -Hgb stable  -Follow labs  -Hematology consulted -Dr. Mccoy   I spoke with Dr. Walton, giving his imaging is negative for metastatic disease and his pancytopenia has been improving.  He can follow-up outpatient       Left knee pain   Assessment & Plan    X ray found arthritis- cont pain mangement     Somnolence- (present on admission)   Assessment & Plan    Waxing and waning  Stopped Ambien and trazodone  Improved     Dizziness- (present on admission)   Assessment & Plan    -He has been bedrest  -No events noted on telemetry  -Negative orthostatics  -Echo found a normal EF and no cause of dizziness  Has no further dizziness            VTE prophylaxis: heparin

## 2019-04-10 NOTE — PROGRESS NOTES
Neurosurgery Progress Note    Subjective:  No acute events  Minimal ambulation- poor tolerance    Exam:  A&O, speech fluent & appropriate, appears uncomfortable  SARGENT, sensation grossly intact  Brace at bedside in bag      BP  Min: 112/78  Max: 147/109  Pulse  Av.8  Min: 89  Max: 102  Resp  Av.5  Min: 16  Max: 18  Temp  Av.6 °C (97.9 °F)  Min: 36.2 °C (97.1 °F)  Max: 37.1 °C (98.7 °F)  SpO2  Av.3 %  Min: 92 %  Max: 95 %    No Data Recorded    Recent Labs      19   0300  19   1236  04/10/19   0250   WBC  2.2*  2.4*  2.2*   RBC  3.27*  2.99*  3.06*   HEMOGLOBIN  9.5*  8.7*  9.1*   HEMATOCRIT  29.4*  26.9*  27.6*   MCV  89.9  90.0  90.2   MCH  29.1  29.1  29.7   MCHC  32.3*  32.3*  33.0*   RDW  61.8*  58.5*  58.6*   PLATELETCT  73*  74*  82*   MPV  10.4  11.1  9.9     Recent Labs      19   0830  19   0953  04/10/19   0250   SODIUM  143  143  143   POTASSIUM  4.1  3.9  4.3   CHLORIDE  111  109  108   CO2  26  28  31   GLUCOSE  120*  81  84   BUN  8  8  7*   CREATININE  0.82  0.79  0.76   CALCIUM  7.5*  7.3*  8.0*               Intake/Output       19 - 04/10/19 0659 04/10/19 0700 - 19 0659       Total  Total       Intake    I.V.  --  265 2650  --  -- --    Volume (mL) (potassium chloride 20 mEq in LR 1,000 mL infusion) -- 265 2650 -- -- --    Total Intake -- 265 2650 -- -- --       Output    Urine  700  800 1500  --  -- --    Number of Times Voided -- 1 x 1 x -- -- --    Urine Void (mL)  -- -- --    Total Output  -- -- --       Net I/O     -700 1850 1150 -- -- --            Intake/Output Summary (Last 24 hours) at 04/10/19 0904  Last data filed at 19   Gross per 24 hour   Intake             2650 ml   Output             1500 ml   Net             1150 ml            • tramadol   mg Q6HRS PRN   • traZODone  50 mg HS PRN   • vitamin D (Ergocalciferol)  50,000 Units Q7 DAYS   • LORazepam   1 mg Q6HRS PRN   • metoprolol  25 mg BID   • heparin  5,000 Units Q8HRS   • albuterol  2 Puff Q4HRS PRN   • hydrOXYzine HCl  50 mg BID   • lactated ringers with KCL infusion   Continuous   • diphenhydrAMINE  25 mg Q6HRS PRN   • hydrocortisone   BID   • Respiratory Care per Protocol   Continuous RT   • acetaminophen  650 mg Q6HRS PRN   • ondansetron  4 mg Q4HRS PRN   • ondansetron  4 mg Q4HRS PRN   • amitriptyline  75 mg Nightly   • busPIRone  15 mg BID   • omeprazole  20 mg Q12HRS       Assessment and Plan:  Hospital day #5  POD #NA  Prophylactic anticoagulation: yes         Start date/time: tbd  Neuro stable but poor mobilization due to pain  Recommend continuing pain control & mobilization in brace as tolerated  Nonsurgical, we will be available as needed for questions or concerns

## 2019-04-10 NOTE — PROGRESS NOTES
Staff from Lab called with critical result of WBC 2.2 at 0350. Critical lab result read back to Lab staff.   Dr. Michel notified of critical lab result at 0420.  Critical lab result read back by Dr. Michel.

## 2019-04-10 NOTE — DISCHARGE PLANNING
"CM met with pt at bedside. Pt states that he went to [place] from [place] in late February, early March, to \"clear up a case from 1989.\" Pt states that he was involved with a few people \"who feel they didn't get their fair share or something,\" and there was a warrant out for his arrest in both [place] and [place], so he wanted to come \"get it taken care of.\"   Pt states that he was in [facility] for 16 days and then came to [place] to take care of the warrant here and that is when he was hospitalized. Pt states that he doesn't know yet if he has to go to [facility] here but wants to take care of it he does.   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 Pt states that he does not have any family or friends. CM discussed the importance of having someone to make medical decisions if he is not able to but pt again states that he does not have anyone. Pt was IPTA, with no DME, and no needs. Pt denies that he had any insurance in [place].  Pt states that if he didn't have to go to [facility] once he is discharged he planned on taking a flight home and has the money to do this right now.  Undetermined at this time what pt's d/c plan will be. He is pending Medicaid, cannot ambulate, and placement will be difficult. Pt also has a warrant out for his arrest and police would like to be called once pt is ready for d/c.  Care Transition Team Assessment    Information Source  Orientation : Oriented x 4  Information Given By: Patient  Informant's Name: Benito  Who is responsible for making decisions for patient? : Patient    Readmission Evaluation  Is this a readmission?: No    Elopement Risk  Legal Hold: No  Ambulatory or Self Mobile in Wheelchair: Yes  Disoriented: No  Psychiatric Symptoms: None  History of Wandering: No  Elopement this Admit: No  Vocalizing Wanting to Leave: No  Displays Behaviors, Body Language Wanting to Leave: No-Not at Risk " for Elopement  Elopement Risk: Not at Risk for Elopement    Interdisciplinary Discharge Planning  Primary Care Physician: Pt states his recently retired  Lives with - Patient's Self Care Capacity: Alone and Able to Care For Self  Patient or legal guardian wants to designate a caregiver (see row info): No  Support Systems: None  Housing / Facility: [address]  Do You Take your Prescribed Medications Regularly: Yes  Able to Return to Previous ADL's: Future Time w/Therapy  Mobility Issues: Yes  Prior Services: None  Patient Expects to be Discharged to:: Home  Assistance Needed: Unknown at this Time  Durable Medical Equipment: Not Applicable    Discharge Preparedness  What is your plan after discharge?: Uncertain - pending medical team collaboration  Prior Functional Level: Ambulatory, Drives Self, Independent with Activities of Daily Living, Independent with Medication Management  Difficulity with ADLs: None  Difficulity with IADLs: None    Functional Assesment  Prior Functional Level: Ambulatory, Drives Self, Independent with Activities of Daily Living, Independent with Medication Management    Finances  Financial Barriers to Discharge: Yes  Average Monthly Income: 0 $  Source of Income: None  Prescription Coverage: No  Prescription Coverage Comments: Pending [place] Medicaid    Vision / Hearing Impairment  Vision Impairment : No  Hearing Impairment : No    Values / Beliefs / Concerns  Values / Beliefs Concerns : No    Advance Directive  Advance Directive?: None  Advance Directive offered?: AD Booklet refused    Domestic Abuse  Have you ever been the victim of abuse or violence?: No  Physical Abuse or Sexual Abuse: No  Verbal Abuse or Emotional Abuse: No    Psychological Assessment  Non-compliant with Treatment: No  Newly Diagnosed Illness: Yes    Discharge Risks or Barriers  Discharge risks or barriers?: Uninsured / underinsured, Medicaid pending  Patient risk factors: Medicaid pending    Anticipated Discharge  Information  Anticipated discharge disposition: Other (comment) (skilled nursing)

## 2019-04-11 LAB
ANION GAP SERPL CALC-SCNC: 6 MMOL/L (ref 0–11.9)
BASOPHILS # BLD AUTO: 0.5 % (ref 0–1.8)
BASOPHILS # BLD: 0.01 K/UL (ref 0–0.12)
BUN SERPL-MCNC: 8 MG/DL (ref 8–22)
CALCIUM SERPL-MCNC: 7.9 MG/DL (ref 8.5–10.5)
CHLORIDE SERPL-SCNC: 108 MMOL/L (ref 96–112)
CO2 SERPL-SCNC: 28 MMOL/L (ref 20–33)
CREAT SERPL-MCNC: 0.68 MG/DL (ref 0.5–1.4)
EOSINOPHIL # BLD AUTO: 0.09 K/UL (ref 0–0.51)
EOSINOPHIL NFR BLD: 4.4 % (ref 0–6.9)
ERYTHROCYTE [DISTWIDTH] IN BLOOD BY AUTOMATED COUNT: 57.3 FL (ref 35.9–50)
GLUCOSE SERPL-MCNC: 102 MG/DL (ref 65–99)
HCT VFR BLD AUTO: 28.6 % (ref 42–52)
HGB BLD-MCNC: 9.2 G/DL (ref 14–18)
IMM GRANULOCYTES # BLD AUTO: 0.02 K/UL (ref 0–0.11)
IMM GRANULOCYTES NFR BLD AUTO: 1 % (ref 0–0.9)
LYMPHOCYTES # BLD AUTO: 0.83 K/UL (ref 1–4.8)
LYMPHOCYTES NFR BLD: 40.3 % (ref 22–41)
MAGNESIUM SERPL-MCNC: 1.7 MG/DL (ref 1.5–2.5)
MCH RBC QN AUTO: 28.9 PG (ref 27–33)
MCHC RBC AUTO-ENTMCNC: 32.2 G/DL (ref 33.7–35.3)
MCV RBC AUTO: 89.9 FL (ref 81.4–97.8)
MONOCYTES # BLD AUTO: 0.22 K/UL (ref 0–0.85)
MONOCYTES NFR BLD AUTO: 10.7 % (ref 0–13.4)
NEUTROPHILS # BLD AUTO: 0.89 K/UL (ref 1.82–7.42)
NEUTROPHILS NFR BLD: 43.1 % (ref 44–72)
NRBC # BLD AUTO: 0 K/UL
NRBC BLD-RTO: 0 /100 WBC
PHOSPHATE SERPL-MCNC: 3.4 MG/DL (ref 2.5–4.5)
PLATELET # BLD AUTO: 68 K/UL (ref 164–446)
PMV BLD AUTO: 9.4 FL (ref 9–12.9)
POTASSIUM SERPL-SCNC: 4 MMOL/L (ref 3.6–5.5)
RBC # BLD AUTO: 3.18 M/UL (ref 4.7–6.1)
SODIUM SERPL-SCNC: 142 MMOL/L (ref 135–145)
WBC # BLD AUTO: 2.1 K/UL (ref 4.8–10.8)

## 2019-04-11 PROCEDURE — 99232 SBSQ HOSP IP/OBS MODERATE 35: CPT | Performed by: INTERNAL MEDICINE

## 2019-04-11 PROCEDURE — 700102 HCHG RX REV CODE 250 W/ 637 OVERRIDE(OP): Performed by: HOSPITALIST

## 2019-04-11 PROCEDURE — 83735 ASSAY OF MAGNESIUM: CPT

## 2019-04-11 PROCEDURE — 700101 HCHG RX REV CODE 250: Performed by: INTERNAL MEDICINE

## 2019-04-11 PROCEDURE — 97530 THERAPEUTIC ACTIVITIES: CPT

## 2019-04-11 PROCEDURE — 80048 BASIC METABOLIC PNL TOTAL CA: CPT

## 2019-04-11 PROCEDURE — A9270 NON-COVERED ITEM OR SERVICE: HCPCS | Performed by: INTERNAL MEDICINE

## 2019-04-11 PROCEDURE — 700102 HCHG RX REV CODE 250 W/ 637 OVERRIDE(OP): Performed by: INTERNAL MEDICINE

## 2019-04-11 PROCEDURE — 84100 ASSAY OF PHOSPHORUS: CPT

## 2019-04-11 PROCEDURE — 85025 COMPLETE CBC W/AUTO DIFF WBC: CPT

## 2019-04-11 PROCEDURE — 770001 HCHG ROOM/CARE - MED/SURG/GYN PRIV*

## 2019-04-11 PROCEDURE — A9270 NON-COVERED ITEM OR SERVICE: HCPCS | Performed by: HOSPITALIST

## 2019-04-11 PROCEDURE — 700111 HCHG RX REV CODE 636 W/ 250 OVERRIDE (IP): Performed by: HOSPITALIST

## 2019-04-11 RX ORDER — OXYCODONE HYDROCHLORIDE AND ACETAMINOPHEN 5; 325 MG/1; MG/1
1-2 TABLET ORAL EVERY 4 HOURS PRN
Status: DISCONTINUED | OUTPATIENT
Start: 2019-04-11 | End: 2019-04-12

## 2019-04-11 RX ORDER — GABAPENTIN 100 MG/1
100 CAPSULE ORAL EVERY EVENING
Status: DISCONTINUED | OUTPATIENT
Start: 2019-04-11 | End: 2019-04-12

## 2019-04-11 RX ORDER — FUROSEMIDE 10 MG/ML
20 INJECTION INTRAMUSCULAR; INTRAVENOUS ONCE
Status: DISCONTINUED | OUTPATIENT
Start: 2019-04-11 | End: 2019-04-11

## 2019-04-11 RX ADMIN — ACETAMINOPHEN 650 MG: 325 TABLET, FILM COATED ORAL at 06:27

## 2019-04-11 RX ADMIN — TRAZODONE HYDROCHLORIDE 50 MG: 50 TABLET ORAL at 00:07

## 2019-04-11 RX ADMIN — TRAMADOL HYDROCHLORIDE 100 MG: 50 TABLET, FILM COATED ORAL at 03:29

## 2019-04-11 RX ADMIN — HEPARIN SODIUM 5000 UNITS: 5000 INJECTION, SOLUTION INTRAVENOUS; SUBCUTANEOUS at 14:54

## 2019-04-11 RX ADMIN — HEPARIN SODIUM 5000 UNITS: 5000 INJECTION, SOLUTION INTRAVENOUS; SUBCUTANEOUS at 06:28

## 2019-04-11 RX ADMIN — TRAZODONE HYDROCHLORIDE 50 MG: 50 TABLET ORAL at 21:22

## 2019-04-11 RX ADMIN — HYDROCORTISONE: 1 CREAM TOPICAL at 17:34

## 2019-04-11 RX ADMIN — AMITRIPTYLINE HYDROCHLORIDE 75 MG: 50 TABLET, FILM COATED ORAL at 21:14

## 2019-04-11 RX ADMIN — OXYCODONE HYDROCHLORIDE 5 MG: 5 TABLET ORAL at 00:07

## 2019-04-11 RX ADMIN — LIDOCAINE 1 PATCH: 50 PATCH TOPICAL at 10:52

## 2019-04-11 RX ADMIN — OMEPRAZOLE 20 MG: 20 CAPSULE, DELAYED RELEASE ORAL at 17:34

## 2019-04-11 RX ADMIN — HEPARIN SODIUM 5000 UNITS: 5000 INJECTION, SOLUTION INTRAVENOUS; SUBCUTANEOUS at 21:16

## 2019-04-11 RX ADMIN — ACETAMINOPHEN 650 MG: 325 TABLET, FILM COATED ORAL at 00:07

## 2019-04-11 RX ADMIN — GABAPENTIN 100 MG: 100 CAPSULE ORAL at 17:33

## 2019-04-11 RX ADMIN — OMEPRAZOLE 20 MG: 20 CAPSULE, DELAYED RELEASE ORAL at 06:27

## 2019-04-11 RX ADMIN — OXYCODONE AND ACETAMINOPHEN 2 TABLET: 5; 325 TABLET ORAL at 10:53

## 2019-04-11 RX ADMIN — BUSPIRONE HYDROCHLORIDE 15 MG: 30 TABLET ORAL at 06:27

## 2019-04-11 RX ADMIN — BUSPIRONE HYDROCHLORIDE 15 MG: 30 TABLET ORAL at 17:33

## 2019-04-11 RX ADMIN — TRAMADOL HYDROCHLORIDE 100 MG: 50 TABLET, FILM COATED ORAL at 08:49

## 2019-04-11 RX ADMIN — HYDROXYZINE HYDROCHLORIDE 50 MG: 50 TABLET ORAL at 17:34

## 2019-04-11 RX ADMIN — METOPROLOL TARTRATE 25 MG: 25 TABLET, FILM COATED ORAL at 17:33

## 2019-04-11 RX ADMIN — OXYCODONE AND ACETAMINOPHEN 2 TABLET: 5; 325 TABLET ORAL at 14:54

## 2019-04-11 RX ADMIN — HYDROXYZINE HYDROCHLORIDE 50 MG: 50 TABLET ORAL at 06:28

## 2019-04-11 RX ADMIN — OXYCODONE AND ACETAMINOPHEN 2 TABLET: 5; 325 TABLET ORAL at 20:07

## 2019-04-11 RX ADMIN — OXYCODONE HYDROCHLORIDE 5 MG: 5 TABLET ORAL at 06:27

## 2019-04-11 RX ADMIN — METOPROLOL TARTRATE 25 MG: 25 TABLET, FILM COATED ORAL at 06:27

## 2019-04-11 ASSESSMENT — COGNITIVE AND FUNCTIONAL STATUS - GENERAL
STANDING UP FROM CHAIR USING ARMS: TOTAL
CLIMB 3 TO 5 STEPS WITH RAILING: TOTAL
TURNING FROM BACK TO SIDE WHILE IN FLAT BAD: UNABLE
MOBILITY SCORE: 6
MOVING FROM LYING ON BACK TO SITTING ON SIDE OF FLAT BED: UNABLE
WALKING IN HOSPITAL ROOM: TOTAL
SUGGESTED CMS G CODE MODIFIER MOBILITY: CN
MOVING TO AND FROM BED TO CHAIR: UNABLE

## 2019-04-11 ASSESSMENT — ENCOUNTER SYMPTOMS
HEADACHES: 0
BACK PAIN: 1
SHORTNESS OF BREATH: 0
NAUSEA: 0
VOMITING: 0
WEAKNESS: 1
ABDOMINAL PAIN: 0
FEVER: 0

## 2019-04-11 ASSESSMENT — GAIT ASSESSMENTS: GAIT LEVEL OF ASSIST: UNABLE TO PARTICIPATE

## 2019-04-11 NOTE — PROGRESS NOTES
Benito from Lab called with critical result of WBC 2.1 at 0411. Critical lab result read back to Benito.   Per nursing communication, not need to notify provider if WBC if greater than 2.

## 2019-04-11 NOTE — CARE PLAN
Problem: Nutritional:  Goal: Achieve adequate nutritional intake  Patient will consume 50-75% of meals   Outcome: MET Date Met: 04/11/19  Patient is receiving a regular diet and is consuming > 50-75% of most meals.   High protein milkshakes provided TID.     RD is available PRN.

## 2019-04-11 NOTE — PROGRESS NOTES
Hospital Medicine Daily Progress Note    Date of Service  4/11/2019    Chief Complaint  53 y.o. male admitted 4/5/2019 with N/V.    Hospital Course    PMH PE with IVC filter, thrombocytopenia, h/o GIB, h/o cancer who presented from intermediate with N/V, malaise and found with tachycardia and HTN, hypokalemia, pancytopenia. CTA was neg for PE, metoprolol was increased and given hydration with resolution of tachycardia and hypokalemia. He complained of back pain and MRI showed L1 fracture, neurosurgery recommended back brace. He has difficult to control pain. For his pancytopenia, Dr. Mccoy will f/u outpatient.         Interval Problem Update  HR remains controlled on telemetry, discontinue  He remains unmotivated to mobilize. Says he's in too much pain. I changed oxycodone to percocet, added gabapentin, continue lidocaine patch. No NSAIDs given thrombocytopenia.  Nurse hospitalist Pastora called PT to obtain exercise bands  Ordered for OT      Consultants/Specialty  NSG    Code Status  Full    Disposition  To return to residential on discharge, SW to call when ready  PT however is recommending SNF but [place], reeval after pain control  OT ordered    Review of Systems  Review of Systems   Unable to perform ROS: Mental acuity   Constitutional: Positive for malaise/fatigue. Negative for fever.   HENT: Negative for congestion.    Respiratory: Negative for shortness of breath.    Cardiovascular: Negative for chest pain.   Gastrointestinal: Negative for abdominal pain, nausea and vomiting.   Musculoskeletal: Positive for back pain.   Neurological: Positive for weakness. Negative for headaches.        Physical Exam  Temp:  [36.2 °C (97.1 °F)-36.6 °C (97.8 °F)] 36.6 °C (97.8 °F)  Pulse:  [75-85] 77  Resp:  [14-18] 18  BP: (138-150)/() 150/101  SpO2:  [92 %-98 %] 93 %    Physical Exam   Constitutional: He is oriented to person, place, and time. He appears well-developed and well-nourished.   HENT:   Head:  Normocephalic.   Mouth/Throat: Oropharynx is clear and moist.   Eyes: Pupils are equal, round, and reactive to light. Conjunctivae are normal. Right eye exhibits no discharge. Left eye exhibits no discharge.   Neck: Neck supple.   Cardiovascular: Normal rate and regular rhythm.    Pulmonary/Chest: Effort normal. He has no wheezes. He has no rales.   Abdominal: Soft. There is no tenderness. There is no rebound and no guarding.   Musculoskeletal: He exhibits no edema.   Neurological: He is alert and oriented to person, place, and time.   Equal strength in LE   Skin: Skin is warm and dry. There is pallor.   Nursing note and vitals reviewed.      Fluids    Intake/Output Summary (Last 24 hours) at 04/11/19 1412  Last data filed at 04/11/19 0400   Gross per 24 hour   Intake                0 ml   Output             1600 ml   Net            -1600 ml       Laboratory  Recent Labs      04/09/19   1236  04/10/19   0250  04/11/19   0338   WBC  2.4*  2.2*  2.1*   RBC  2.99*  3.06*  3.18*   HEMOGLOBIN  8.7*  9.1*  9.2*   HEMATOCRIT  26.9*  27.6*  28.6*   MCV  90.0  90.2  89.9   MCH  29.1  29.7  28.9   MCHC  32.3*  33.0*  32.2*   RDW  58.5*  58.6*  57.3*   PLATELETCT  74*  82*  68*   MPV  11.1  9.9  9.4     Recent Labs      04/09/19   0953  04/10/19   0250  04/11/19   0338   SODIUM  143  143  142   POTASSIUM  3.9  4.3  4.0   CHLORIDE  109  108  108   CO2  28  31  28   GLUCOSE  81  84  102*   BUN  8  7*  8   CREATININE  0.79  0.76  0.68   CALCIUM  7.3*  8.0*  7.9*                   Imaging  [id number] VIEW   Final Result      1.  There is a nonobstructive nonspecific bowel gas pattern.  There is no evidence of free intraperitoneal air.      MR-LUMBAR SPINE-WITH & W/O   Final Result      1.  There is subacute fracture along the superior endplate of L1 vertebral body. There is no posterior retropulsion.   2.  There is no focal bone lesion.   3.  There is no evidence of infection.   4.  Mild degenerative disease as described above.       MR-BRAIN-W/O   Final Result      1.  Mild cerebral atrophy.   2.  Mild chronic microvascular ischemic disease.   3.  No acute abnormality.      DX-KNEE 2- LEFT   Final Result      1.  No fracture or dislocation of LEFT knee.   2.  Minimal degenerative changes.   3.  Diffuse vascular calcifications.      EC-ECHOCARDIOGRAM COMPLETE W/O CONT   Final Result      CT-ABDOMEN-PELVIS WITH   Final Result         1.  No acute abnormality.   2.  Hepatomegaly and diffuse hepatic steatosis   3.  Atherosclerosis and atherosclerotic coronary artery disease.   4.  Pulmonary nodules measuring up to 6.5 mm, see nodule follow-up recommendations below.      Low Risk: CT at 3-6 months, then consider CT at 18-24 months      High Risk: CT at 3-6 months, then at 18-24 months      Comments: Use most suspicious nodule as guide to management. Follow-up intervals may vary according to size and risk.      Low Risk - Minimal or absent history of smoking and of other known risk factors.      High Risk - History of smoking or of other known risk factors.      Note: These recommendations do not apply to lung cancer screening, patients with immunosuppression, or patients with known primary cancer.      Fleischner Society 2017 Guidelines for Management of Incidentally Detected Pulmonary Nodules in Adults         CT-CTA CHEST PULMONARY ARTERY W/ RECONS   Final Result         1.  No large central pulmonary embolus is appreciated, evaluation of the subsegmental branches is essentially nondiagnostic due to motion artifacts. Additional imaging would be required for definitive exclusion of small distal pulmonary emboli.      DX-LUMBAR SPINE-2 OR 3 VIEWS   Final Result      Limited examination. Mild anterior wedging L2 vertebral body that appears to be old.   If symptoms are persistent, would recommend CT for further evaluation.      DX-THORACIC SPINE-WITH SWIMMERS VIEW   Final Result      Limited examination. The upper thoracic spine is not  well-demonstrated.   Mild anterior wedging of some the lower thoracic vertebral bodies and appears old.   No definite acute compression. If symptoms are persistent, CT would be recommended for further evaluation.      DX-CHEST-PORTABLE (1 VIEW)   Final Result      Central catheter projects over the superior right atrium.      Prominent calcified granuloma in the left midlung.      Atherosclerotic plaque.      CT-HEAD W/O   Final Result      No acute intracranial abnormality is identified.           Assessment/Plan  * Closed stable burst fracture of first lumbar vertebra with routine healing   Assessment & Plan    Lumbar MRI found L1 subacute fracture with no protrusion  Spoke with neurosurgery who suggested just placing a brace and follow-up in the clinic with them  PTOT  Multimodal pain control with lidocaine patch, gabapentin, percocet. No NSAIDs given thrombocytopenia.     Pancytopenia (HCC)- (present on admission)   Assessment & Plan    -likely due to myelodysplastic syndrome  -Last chemotherapy November 2018  -No acute signs symptoms of bleeding  -Iron studies, folate and B12 all WNL  -Hgb stable  -Follow labs  -Hematology consulted -Dr. Mccoy: given his imaging is negative for metastatic disease and his pancytopenia has been improving.  He can follow-up outpatient       Left knee pain   Assessment & Plan    X ray found arthritis- cont pain mangement     Tachycardia   Assessment & Plan    - today  Ddx: PE ruled out, tamponde ruled out, tsh wnl  Likely from electrolyte abnormalities and pain, possibly syndrome of inappropriate tachycardia  increased metprolol 25 mg p.o. twice daily  Tachycardia resolved for several days, stopped telemetry     Somnolence- (present on admission)   Assessment & Plan    Waxing and waning  Stopped Ambien and trazodone  Improved     Dizziness- (present on admission)   Assessment & Plan    -He has been bedrest  -No events noted on telemetry  -Negative orthostatics  -Echo found a  normal EF and no cause of dizziness  Has no further dizziness            VTE prophylaxis: heparin

## 2019-04-11 NOTE — CARE PLAN
Problem: Safety  Goal: Will remain free from injury    Intervention: Provide assistance with mobility   04/10/19 2000   OTHER   Assistance / Tolerance Standby Assist;Assistance of Two or More;General Weakness  (two or more to stand)         Problem: Bowel/Gastric:  Goal: Normal bowel function is maintained or improved    Intervention: Educate patient and significant other/support system about diet, fluid intake, medications and activity to promote bowel function  Patient states that he needs to have bowel movement. Refuses to attempt bowel movement in bedpan, bedside commode, and is unable to stand without dizziness. Will continue to offer options for bowel movement and maintain patient safety.

## 2019-04-11 NOTE — THERAPY
"Physical Therapy Treatment completed.   Bed Mobility:  Supine to Sit: Moderate Assist  Transfers: Sit to Stand: Unable to Participate; max A for scooting at EOB; given current strength and pain would be total A to come to stand if pt able to tolerate; see below  Gait: Level Of Assist: Unable to Participate   Plan of Care: Will benefit from Physical Therapy 3 times per week  Discharge Recommendations: Equipment: Will Continue to Assess for Equipment Needs. See below    Pt was able to demonstrate bed mobility with mod A and consistent cueing for neutral spine/mechanics to assist wtih acute healing/pain management. He does attempt to stand though was unable 2' to current pain as well as LE weakness. Unclear if weakness 2' to neuro changes v pain v deconditioning v all aforementioned. He is cooperative this visit with attempt at activity though pain does appear to be largest limiting factor at this time. He appears more receptive to neutral spine mehcanics this visit though will need to verify carrryover. Will continue to visit and pt would require continued skilled PT/placement once medically cleared from hospital givne current functional status.     See \"Rehab Therapy-Acute\" Patient Summary Report for complete documentation.       " 13.38

## 2019-04-11 NOTE — PROGRESS NOTES
Assumed care of patient. Pt is A+O x4. No chest pain or SOB. No active bleeding noted. Informed of safety and call system. rhythm is SR. Patient is experiencing pain unresolved by current medication regimen. Spoke with Dr. Yancey, no new orders received.

## 2019-04-11 NOTE — DISCHARGE PLANNING
"NOTE that pt has warrant for arrest and unsure if [employer] or [place] to be notified.  SW will research which.  Following previous note that \"police want to be notified\" when pt ready for DC.  "

## 2019-04-12 LAB
ALBUMIN SERPL BCP-MCNC: 2.7 G/DL (ref 3.2–4.9)
ALBUMIN/GLOB SERPL: 1.1 G/DL
ALP SERPL-CCNC: 83 U/L (ref 30–99)
ALT SERPL-CCNC: 20 U/L (ref 2–50)
ANION GAP SERPL CALC-SCNC: 7 MMOL/L (ref 0–11.9)
AST SERPL-CCNC: 40 U/L (ref 12–45)
BASOPHILS # BLD AUTO: 0.8 % (ref 0–1.8)
BASOPHILS # BLD: 0.02 K/UL (ref 0–0.12)
BILIRUB SERPL-MCNC: 0.5 MG/DL (ref 0.1–1.5)
BUN SERPL-MCNC: 8 MG/DL (ref 8–22)
CALCIUM SERPL-MCNC: 7.9 MG/DL (ref 8.5–10.5)
CHLORIDE SERPL-SCNC: 107 MMOL/L (ref 96–112)
CO2 SERPL-SCNC: 24 MMOL/L (ref 20–33)
CREAT SERPL-MCNC: 0.67 MG/DL (ref 0.5–1.4)
EOSINOPHIL # BLD AUTO: 0.11 K/UL (ref 0–0.51)
EOSINOPHIL NFR BLD: 4.2 % (ref 0–6.9)
ERYTHROCYTE [DISTWIDTH] IN BLOOD BY AUTOMATED COUNT: 56.8 FL (ref 35.9–50)
GLOBULIN SER CALC-MCNC: 2.4 G/DL (ref 1.9–3.5)
GLUCOSE SERPL-MCNC: 84 MG/DL (ref 65–99)
HCT VFR BLD AUTO: 31.5 % (ref 42–52)
HGB BLD-MCNC: 10.3 G/DL (ref 14–18)
IMM GRANULOCYTES # BLD AUTO: 0.04 K/UL (ref 0–0.11)
IMM GRANULOCYTES NFR BLD AUTO: 1.5 % (ref 0–0.9)
LYMPHOCYTES # BLD AUTO: 0.9 K/UL (ref 1–4.8)
LYMPHOCYTES NFR BLD: 34.1 % (ref 22–41)
MCH RBC QN AUTO: 29.2 PG (ref 27–33)
MCHC RBC AUTO-ENTMCNC: 32.7 G/DL (ref 33.7–35.3)
MCV RBC AUTO: 89.2 FL (ref 81.4–97.8)
MONOCYTES # BLD AUTO: 0.23 K/UL (ref 0–0.85)
MONOCYTES NFR BLD AUTO: 8.7 % (ref 0–13.4)
NEUTROPHILS # BLD AUTO: 1.34 K/UL (ref 1.82–7.42)
NEUTROPHILS NFR BLD: 50.7 % (ref 44–72)
NRBC # BLD AUTO: 0.02 K/UL
NRBC BLD-RTO: 0.8 /100 WBC
PLATELET # BLD AUTO: 79 K/UL (ref 164–446)
PMV BLD AUTO: 9.8 FL (ref 9–12.9)
POTASSIUM SERPL-SCNC: 3.6 MMOL/L (ref 3.6–5.5)
PROT SERPL-MCNC: 5.1 G/DL (ref 6–8.2)
RBC # BLD AUTO: 3.53 M/UL (ref 4.7–6.1)
SODIUM SERPL-SCNC: 138 MMOL/L (ref 135–145)
WBC # BLD AUTO: 2.6 K/UL (ref 4.8–10.8)

## 2019-04-12 PROCEDURE — A9270 NON-COVERED ITEM OR SERVICE: HCPCS | Performed by: INTERNAL MEDICINE

## 2019-04-12 PROCEDURE — 700111 HCHG RX REV CODE 636 W/ 250 OVERRIDE (IP): Performed by: INTERNAL MEDICINE

## 2019-04-12 PROCEDURE — 99232 SBSQ HOSP IP/OBS MODERATE 35: CPT | Performed by: INTERNAL MEDICINE

## 2019-04-12 PROCEDURE — 770004 HCHG ROOM/CARE - ONCOLOGY PRIVATE *

## 2019-04-12 PROCEDURE — 700111 HCHG RX REV CODE 636 W/ 250 OVERRIDE (IP): Performed by: HOSPITALIST

## 2019-04-12 PROCEDURE — 80053 COMPREHEN METABOLIC PANEL: CPT

## 2019-04-12 PROCEDURE — 700102 HCHG RX REV CODE 250 W/ 637 OVERRIDE(OP): Performed by: INTERNAL MEDICINE

## 2019-04-12 PROCEDURE — 700102 HCHG RX REV CODE 250 W/ 637 OVERRIDE(OP): Performed by: HOSPITALIST

## 2019-04-12 PROCEDURE — 97166 OT EVAL MOD COMPLEX 45 MIN: CPT

## 2019-04-12 PROCEDURE — 85025 COMPLETE CBC W/AUTO DIFF WBC: CPT

## 2019-04-12 PROCEDURE — A9270 NON-COVERED ITEM OR SERVICE: HCPCS | Performed by: HOSPITALIST

## 2019-04-12 RX ORDER — GABAPENTIN 100 MG/1
100 CAPSULE ORAL 3 TIMES DAILY
Status: DISCONTINUED | OUTPATIENT
Start: 2019-04-12 | End: 2019-04-13

## 2019-04-12 RX ORDER — METHOCARBAMOL 500 MG/1
500 TABLET, FILM COATED ORAL 3 TIMES DAILY PRN
Status: DISCONTINUED | OUTPATIENT
Start: 2019-04-12 | End: 2019-04-13

## 2019-04-12 RX ORDER — METHOCARBAMOL 500 MG/1
500 TABLET, FILM COATED ORAL 3 TIMES DAILY
Status: DISCONTINUED | OUTPATIENT
Start: 2019-04-12 | End: 2019-04-12

## 2019-04-12 RX ORDER — OXYCODONE HYDROCHLORIDE AND ACETAMINOPHEN 5; 325 MG/1; MG/1
1-2 TABLET ORAL EVERY 4 HOURS PRN
Status: DISCONTINUED | OUTPATIENT
Start: 2019-04-12 | End: 2019-04-14

## 2019-04-12 RX ADMIN — HEPARIN SODIUM 5000 UNITS: 5000 INJECTION, SOLUTION INTRAVENOUS; SUBCUTANEOUS at 05:16

## 2019-04-12 RX ADMIN — BUSPIRONE HYDROCHLORIDE 15 MG: 30 TABLET ORAL at 17:22

## 2019-04-12 RX ADMIN — METOPROLOL TARTRATE 25 MG: 25 TABLET, FILM COATED ORAL at 05:14

## 2019-04-12 RX ADMIN — OMEPRAZOLE 20 MG: 20 CAPSULE, DELAYED RELEASE ORAL at 17:22

## 2019-04-12 RX ADMIN — HYDROCORTISONE: 1 CREAM TOPICAL at 17:22

## 2019-04-12 RX ADMIN — METHOCARBAMOL TABLETS 500 MG: 500 TABLET, COATED ORAL at 11:31

## 2019-04-12 RX ADMIN — HEPARIN SODIUM 5000 UNITS: 5000 INJECTION, SOLUTION INTRAVENOUS; SUBCUTANEOUS at 13:36

## 2019-04-12 RX ADMIN — METHOCARBAMOL TABLETS 500 MG: 500 TABLET, COATED ORAL at 17:55

## 2019-04-12 RX ADMIN — OXYCODONE AND ACETAMINOPHEN 2 TABLET: 5; 325 TABLET ORAL at 01:24

## 2019-04-12 RX ADMIN — OXYCODONE HYDROCHLORIDE AND ACETAMINOPHEN 2 TABLET: 5; 325 TABLET ORAL at 13:36

## 2019-04-12 RX ADMIN — HEPARIN SODIUM 5000 UNITS: 5000 INJECTION, SOLUTION INTRAVENOUS; SUBCUTANEOUS at 21:19

## 2019-04-12 RX ADMIN — BUSPIRONE HYDROCHLORIDE 15 MG: 30 TABLET ORAL at 05:14

## 2019-04-12 RX ADMIN — HYDROCORTISONE: 1 CREAM TOPICAL at 05:19

## 2019-04-12 RX ADMIN — HYDROXYZINE HYDROCHLORIDE 50 MG: 50 TABLET ORAL at 17:22

## 2019-04-12 RX ADMIN — OMEPRAZOLE 20 MG: 20 CAPSULE, DELAYED RELEASE ORAL at 05:14

## 2019-04-12 RX ADMIN — GABAPENTIN 100 MG: 100 CAPSULE ORAL at 11:31

## 2019-04-12 RX ADMIN — TRAZODONE HYDROCHLORIDE 50 MG: 50 TABLET ORAL at 21:19

## 2019-04-12 RX ADMIN — OXYCODONE AND ACETAMINOPHEN 2 TABLET: 5; 325 TABLET ORAL at 08:40

## 2019-04-12 RX ADMIN — AMITRIPTYLINE HYDROCHLORIDE 75 MG: 50 TABLET, FILM COATED ORAL at 21:18

## 2019-04-12 RX ADMIN — METOPROLOL TARTRATE 25 MG: 25 TABLET, FILM COATED ORAL at 17:22

## 2019-04-12 RX ADMIN — HYDROXYZINE HYDROCHLORIDE 50 MG: 50 TABLET ORAL at 05:14

## 2019-04-12 RX ADMIN — GABAPENTIN 100 MG: 100 CAPSULE ORAL at 17:22

## 2019-04-12 RX ADMIN — OXYCODONE HYDROCHLORIDE AND ACETAMINOPHEN 2 TABLET: 5; 325 TABLET ORAL at 17:55

## 2019-04-12 RX ADMIN — ONDANSETRON 4 MG: 4 TABLET, ORALLY DISINTEGRATING ORAL at 11:31

## 2019-04-12 ASSESSMENT — ENCOUNTER SYMPTOMS
VOMITING: 0
BACK PAIN: 1
SHORTNESS OF BREATH: 0
HEADACHES: 0
ABDOMINAL PAIN: 0
NAUSEA: 0
WEAKNESS: 1
TINGLING: 1

## 2019-04-12 ASSESSMENT — COGNITIVE AND FUNCTIONAL STATUS - GENERAL
HELP NEEDED FOR BATHING: A LITTLE
TOILETING: A LITTLE
DRESSING REGULAR LOWER BODY CLOTHING: A LOT
DAILY ACTIVITIY SCORE: 19
DRESSING REGULAR UPPER BODY CLOTHING: A LITTLE
SUGGESTED CMS G CODE MODIFIER DAILY ACTIVITY: CK

## 2019-04-12 ASSESSMENT — ACTIVITIES OF DAILY LIVING (ADL): TOILETING: INDEPENDENT

## 2019-04-12 NOTE — THERAPY
"Occupational Therapy Evaluation completed.   Functional Status:    Pleasant 54 y/o male admitted from longterm with low back pain and dizziness. His PMHx is significant for multiple DVTs, thrombocytopenia, and cancer. Now with HTN and tachycardia. Pt completed bed mobility using log roll with supv and increased time/effort. Once at EOB, required Max A for LB cares. He completed seated grooming with setup assist. Only able to tolerate ~10 mins of sitting at EOB. Pt reports that he is unable to stand. Currently limited by pain, nausea, and dizziness, though put forth good effort. Will continue working with pt in this setting, anticipate that mobility will improve as pain is better controlled. However at this time, would recommend post-acute placement prior to D/C home.      Plan of Care: Will benefit from Occupational Therapy 3 times per week  Discharge Recommendations:  Equipment: Will Continue to Assess for Equipment Needs. Post-acute therapy Recommend inpatient transitional care services for continued occupational therapy services.       See \"Rehab Therapy-Acute\" Patient Summary Report for complete documentation.    "

## 2019-04-12 NOTE — PROGRESS NOTES
Hospital Medicine Daily Progress Note    Date of Service  4/12/2019    Chief Complaint  53 y.o. male admitted 4/5/2019 with N/V.    Hospital Course    PMH PE with IVC filter, thrombocytopenia, h/o GIB, h/o cancer who presented from [facility] with N/V, malaise and found with tachycardia and HTN, hypokalemia, pancytopenia. CTA was neg for PE, metoprolol was increased and given hydration with resolution of tachycardia and hypokalemia. He complained of back pain and MRI showed L1 fracture, neurosurgery recommended back brace. He has difficult to control pain. For his pancytopenia, Dr. Mccoy will f/u outpatient.         Interval Problem Update  He complains to me that he continues to have back pain and weakness and was not able to sleep at all last night.  I spoke with nursing and patient slept 8 hours last night and has been tolerating less narcotic.  I will increase gabapentin and add a muscle relaxant.  I encouraged him to continue with mobility training  He denies urinary or stool incontinence.  His back pain is unchanged    Consultants/Specialty  NSG    Code Status  Full    Disposition  To return to senior living on discharge, SW to call when ready  PT however is recommending SNF [place], reeval after pain control  OT ordered    Review of Systems  Review of Systems   Unable to perform ROS: Mental acuity   Constitutional: Positive for malaise/fatigue.   HENT: Negative for congestion.    Respiratory: Negative for shortness of breath.    Cardiovascular: Negative for chest pain.   Gastrointestinal: Negative for abdominal pain, nausea and vomiting.   Musculoskeletal: Positive for back pain.   Neurological: Positive for tingling and weakness. Negative for headaches.        Physical Exam  Temp:  [36.1 °C (97 °F)-36.7 °C (98 °F)] 36.6 °C (97.9 °F)  Pulse:  [74-76] 76  Resp:  [18-20] 18  BP: (146-165)/() 146/66  SpO2:  [90 %-97 %] 90 %    Physical Exam   Constitutional: He is oriented to person, place,  and time. He appears well-developed and well-nourished.   HENT:   Head: Normocephalic.   Mouth/Throat: Oropharynx is clear and moist.   Eyes: Pupils are equal, round, and reactive to light. Conjunctivae are normal. Right eye exhibits no discharge. Left eye exhibits no discharge.   Neck: Neck supple.   Cardiovascular: Normal rate and regular rhythm.    Pulmonary/Chest: Effort normal. He has no wheezes. He has no rales.   Abdominal: Soft. There is no tenderness. There is no rebound and no guarding.   Musculoskeletal: He exhibits no edema.   Neurological: He is alert and oriented to person, place, and time.   Equal strength in LE, poor strength effort on exam, complains of numbness throughout both his legs with no specific area   Skin: Skin is warm and dry. There is pallor.   Nursing note and vitals reviewed.      Fluids    Intake/Output Summary (Last 24 hours) at 04/12/19 1251  Last data filed at 04/11/19 2000   Gross per 24 hour   Intake              120 ml   Output              200 ml   Net              -80 ml       Laboratory  Recent Labs      04/10/19   0250  04/11/19   0338  04/12/19   0404   WBC  2.2*  2.1*  2.6*   RBC  3.06*  3.18*  3.53*   HEMOGLOBIN  9.1*  9.2*  10.3*   HEMATOCRIT  27.6*  28.6*  31.5*   MCV  90.2  89.9  89.2   MCH  29.7  28.9  29.2   MCHC  33.0*  32.2*  32.7*   RDW  58.6*  57.3*  56.8*   PLATELETCT  82*  68*  79*   MPV  9.9  9.4  9.8     Recent Labs      04/10/19   0250  04/11/19   0338  04/12/19   0320   SODIUM  143  142  138   POTASSIUM  4.3  4.0  3.6   CHLORIDE  108  108  107   CO2  31  28  24   GLUCOSE  84  102*  84   BUN  7*  8  8   CREATININE  0.76  0.68  0.67   CALCIUM  8.0*  7.9*  7.9*                   Imaging  [id number] VIEW   Final Result      1.  There is a nonobstructive nonspecific bowel gas pattern.  There is no evidence of free intraperitoneal air.      MR-LUMBAR SPINE-WITH & W/O   Final Result      1.  There is subacute fracture along the superior endplate of L1 vertebral  body. There is no posterior retropulsion.   2.  There is no focal bone lesion.   3.  There is no evidence of infection.   4.  Mild degenerative disease as described above.      MR-BRAIN-W/O   Final Result      1.  Mild cerebral atrophy.   2.  Mild chronic microvascular ischemic disease.   3.  No acute abnormality.      DX-KNEE 2- LEFT   Final Result      1.  No fracture or dislocation of LEFT knee.   2.  Minimal degenerative changes.   3.  Diffuse vascular calcifications.      EC-ECHOCARDIOGRAM COMPLETE W/O CONT   Final Result      CT-ABDOMEN-PELVIS WITH   Final Result         1.  No acute abnormality.   2.  Hepatomegaly and diffuse hepatic steatosis   3.  Atherosclerosis and atherosclerotic coronary artery disease.   4.  Pulmonary nodules measuring up to 6.5 mm, see nodule follow-up recommendations below.      Low Risk: CT at 3-6 months, then consider CT at 18-24 months      High Risk: CT at 3-6 months, then at 18-24 months      Comments: Use most suspicious nodule as guide to management. Follow-up intervals may vary according to size and risk.      Low Risk - Minimal or absent history of smoking and of other known risk factors.      High Risk - History of smoking or of other known risk factors.      Note: These recommendations do not apply to lung cancer screening, patients with immunosuppression, or patients with known primary cancer.      Fleischner Society 2017 Guidelines for Management of Incidentally Detected Pulmonary Nodules in Adults         CT-CTA CHEST PULMONARY ARTERY W/ RECONS   Final Result         1.  No large central pulmonary embolus is appreciated, evaluation of the subsegmental branches is essentially nondiagnostic due to motion artifacts. Additional imaging would be required for definitive exclusion of small distal pulmonary emboli.      DX-LUMBAR SPINE-2 OR 3 VIEWS   Final Result      Limited examination. Mild anterior wedging L2 vertebral body that appears to be old.   If symptoms are  persistent, would recommend CT for further evaluation.      DX-THORACIC SPINE-WITH SWIMMERS VIEW   Final Result      Limited examination. The upper thoracic spine is not well-demonstrated.   Mild anterior wedging of some the lower thoracic vertebral bodies and appears old.   No definite acute compression. If symptoms are persistent, CT would be recommended for further evaluation.      DX-CHEST-PORTABLE (1 VIEW)   Final Result      Central catheter projects over the superior right atrium.      Prominent calcified granuloma in the left midlung.      Atherosclerotic plaque.      CT-HEAD W/O   Final Result      No acute intracranial abnormality is identified.           Assessment/Plan  * Closed stable burst fracture of first lumbar vertebra with routine healing   Assessment & Plan    Lumbar MRI found L1 subacute fracture with no protrusion  Spoke with neurosurgery who suggested just placing a brace and follow-up in the clinic with them  PTOT  Multimodal pain control with lidocaine patch, gabapentin, percocet, robaxin. No NSAIDs given thrombocytopenia.     Pancytopenia (HCC)- (present on admission)   Assessment & Plan    -likely due to myelodysplastic syndrome  -Last chemotherapy November 2018  -No acute signs symptoms of bleeding  -Iron studies, folate and B12 all WNL  -Hgb stable  -Follow labs  -Hematology consulted -Dr. Mccoy: given his imaging is negative for metastatic disease and his pancytopenia has been improving.  He can follow-up outpatient       Left knee pain   Assessment & Plan    X ray found arthritis- cont pain mangement     Tachycardia   Assessment & Plan    - today  Ddx: PE ruled out, tamponde ruled out, tsh wnl  Likely from electrolyte abnormalities and pain, possibly syndrome of inappropriate tachycardia  increased metprolol 25 mg p.o. twice daily  Tachycardia resolved for several days, stopped telemetry     Somnolence- (present on admission)   Assessment & Plan    Waxing and waning  Stopped  Ambien and trazodone  Improved     Dizziness- (present on admission)   Assessment & Plan    -He has been bedrest  -No events noted on telemetry  -Negative orthostatics  -Echo found a normal EF and no cause of dizziness  Has no further dizziness            VTE prophylaxis: heparin

## 2019-04-12 NOTE — CARE PLAN
Problem: Infection  Goal: Will remain free from infection  Outcome: PROGRESSING AS EXPECTED  Standard precautions in place. Hand hygiene completed before entering room and exiting room.     Problem: Bowel/Gastric:  Goal: Normal bowel function is maintained or improved  Outcome: PROGRESSING SLOWER THAN EXPECTED   04/11/19 2148   OTHER   Last BM 04/08/19       Pt is refusing stool softeners. Pt has been given education. Will try and reevaluate in the morning

## 2019-04-12 NOTE — PROGRESS NOTES
0708: Report received from Ctaa CARCAMO.  Patient resting in bed. Fall precautions in place, safety maintained, bed alarm on and call light within reach.  Vitals stable, will continue to monitor.

## 2019-04-12 NOTE — PROGRESS NOTES
Bedside report received from day shift RN, assumed pt care. Pt assessment complete. Pt alert and oriented, pt is now medical. Reviewed plan of care with pt.  Chart and labs reviewed.  Bed in lowest position, call light within reach. Hourly rounding in place. Educated about calling for assistance. Placed bed alarm on pt

## 2019-04-13 LAB
ALBUMIN SERPL BCP-MCNC: 2.9 G/DL (ref 3.2–4.9)
ALBUMIN/GLOB SERPL: 1.2 G/DL
ALP SERPL-CCNC: 92 U/L (ref 30–99)
ALT SERPL-CCNC: 18 U/L (ref 2–50)
ANION GAP SERPL CALC-SCNC: 8 MMOL/L (ref 0–11.9)
AST SERPL-CCNC: 31 U/L (ref 12–45)
BILIRUB SERPL-MCNC: 0.5 MG/DL (ref 0.1–1.5)
BUN SERPL-MCNC: 10 MG/DL (ref 8–22)
CALCIUM SERPL-MCNC: 8 MG/DL (ref 8.5–10.5)
CHLORIDE SERPL-SCNC: 106 MMOL/L (ref 96–112)
CO2 SERPL-SCNC: 24 MMOL/L (ref 20–33)
CREAT SERPL-MCNC: 0.79 MG/DL (ref 0.5–1.4)
GLOBULIN SER CALC-MCNC: 2.4 G/DL (ref 1.9–3.5)
GLUCOSE SERPL-MCNC: 114 MG/DL (ref 65–99)
POTASSIUM SERPL-SCNC: 3.6 MMOL/L (ref 3.6–5.5)
PROT SERPL-MCNC: 5.3 G/DL (ref 6–8.2)
SODIUM SERPL-SCNC: 138 MMOL/L (ref 135–145)

## 2019-04-13 PROCEDURE — 80053 COMPREHEN METABOLIC PANEL: CPT

## 2019-04-13 PROCEDURE — 770004 HCHG ROOM/CARE - ONCOLOGY PRIVATE *

## 2019-04-13 PROCEDURE — 700102 HCHG RX REV CODE 250 W/ 637 OVERRIDE(OP): Performed by: INTERNAL MEDICINE

## 2019-04-13 PROCEDURE — A9270 NON-COVERED ITEM OR SERVICE: HCPCS | Performed by: HOSPITALIST

## 2019-04-13 PROCEDURE — 700111 HCHG RX REV CODE 636 W/ 250 OVERRIDE (IP): Performed by: INTERNAL MEDICINE

## 2019-04-13 PROCEDURE — A9270 NON-COVERED ITEM OR SERVICE: HCPCS | Performed by: INTERNAL MEDICINE

## 2019-04-13 PROCEDURE — 99233 SBSQ HOSP IP/OBS HIGH 50: CPT | Performed by: INTERNAL MEDICINE

## 2019-04-13 PROCEDURE — 700102 HCHG RX REV CODE 250 W/ 637 OVERRIDE(OP): Performed by: HOSPITALIST

## 2019-04-13 PROCEDURE — 700111 HCHG RX REV CODE 636 W/ 250 OVERRIDE (IP): Performed by: HOSPITALIST

## 2019-04-13 RX ORDER — GABAPENTIN 300 MG/1
300 CAPSULE ORAL 3 TIMES DAILY
Status: DISCONTINUED | OUTPATIENT
Start: 2019-04-13 | End: 2019-04-16

## 2019-04-13 RX ORDER — OXYCODONE AND ACETAMINOPHEN 10; 325 MG/1; MG/1
1-2 TABLET ORAL EVERY 4 HOURS PRN
Status: DISCONTINUED | OUTPATIENT
Start: 2019-04-13 | End: 2019-04-15

## 2019-04-13 RX ORDER — METHOCARBAMOL 750 MG/1
750 TABLET, FILM COATED ORAL 3 TIMES DAILY
Status: DISCONTINUED | OUTPATIENT
Start: 2019-04-13 | End: 2019-04-22

## 2019-04-13 RX ORDER — MORPHINE SULFATE 4 MG/ML
4 INJECTION, SOLUTION INTRAMUSCULAR; INTRAVENOUS EVERY 4 HOURS PRN
Status: COMPLETED | OUTPATIENT
Start: 2019-04-13 | End: 2019-04-14

## 2019-04-13 RX ADMIN — GABAPENTIN 300 MG: 300 CAPSULE ORAL at 12:20

## 2019-04-13 RX ADMIN — HYDROXYZINE HYDROCHLORIDE 50 MG: 50 TABLET ORAL at 17:23

## 2019-04-13 RX ADMIN — HYDROXYZINE HYDROCHLORIDE 50 MG: 50 TABLET ORAL at 06:04

## 2019-04-13 RX ADMIN — OXYCODONE HYDROCHLORIDE AND ACETAMINOPHEN 2 TABLET: 5; 325 TABLET ORAL at 06:03

## 2019-04-13 RX ADMIN — OXYCODONE AND ACETAMINOPHEN 2 TABLET: 10; 325 TABLET ORAL at 12:20

## 2019-04-13 RX ADMIN — METOPROLOL TARTRATE 25 MG: 25 TABLET, FILM COATED ORAL at 17:24

## 2019-04-13 RX ADMIN — ONDANSETRON 4 MG: 4 TABLET, ORALLY DISINTEGRATING ORAL at 01:50

## 2019-04-13 RX ADMIN — METHOCARBAMOL TABLETS 750 MG: 750 TABLET, COATED ORAL at 00:00

## 2019-04-13 RX ADMIN — OXYCODONE AND ACETAMINOPHEN 2 TABLET: 10; 325 TABLET ORAL at 17:28

## 2019-04-13 RX ADMIN — BUSPIRONE HYDROCHLORIDE 15 MG: 30 TABLET ORAL at 17:23

## 2019-04-13 RX ADMIN — HYDROCORTISONE: 1 CREAM TOPICAL at 17:25

## 2019-04-13 RX ADMIN — GABAPENTIN 100 MG: 100 CAPSULE ORAL at 06:03

## 2019-04-13 RX ADMIN — TRAZODONE HYDROCHLORIDE 50 MG: 50 TABLET ORAL at 21:00

## 2019-04-13 RX ADMIN — HEPARIN SODIUM 5000 UNITS: 5000 INJECTION, SOLUTION INTRAVENOUS; SUBCUTANEOUS at 21:00

## 2019-04-13 RX ADMIN — OMEPRAZOLE 20 MG: 20 CAPSULE, DELAYED RELEASE ORAL at 17:24

## 2019-04-13 RX ADMIN — MORPHINE SULFATE 4 MG: 4 INJECTION INTRAVENOUS at 21:00

## 2019-04-13 RX ADMIN — OXYCODONE HYDROCHLORIDE AND ACETAMINOPHEN 2 TABLET: 5; 325 TABLET ORAL at 01:50

## 2019-04-13 RX ADMIN — HEPARIN SODIUM 5000 UNITS: 5000 INJECTION, SOLUTION INTRAVENOUS; SUBCUTANEOUS at 06:04

## 2019-04-13 RX ADMIN — AMITRIPTYLINE HYDROCHLORIDE 75 MG: 50 TABLET, FILM COATED ORAL at 20:59

## 2019-04-13 RX ADMIN — METHOCARBAMOL TABLETS 750 MG: 750 TABLET, COATED ORAL at 17:23

## 2019-04-13 RX ADMIN — OMEPRAZOLE 20 MG: 20 CAPSULE, DELAYED RELEASE ORAL at 06:03

## 2019-04-13 RX ADMIN — GABAPENTIN 300 MG: 300 CAPSULE ORAL at 17:24

## 2019-04-13 RX ADMIN — METOPROLOL TARTRATE 25 MG: 25 TABLET, FILM COATED ORAL at 06:04

## 2019-04-13 RX ADMIN — BUSPIRONE HYDROCHLORIDE 15 MG: 30 TABLET ORAL at 06:03

## 2019-04-13 ASSESSMENT — ENCOUNTER SYMPTOMS
FEVER: 0
CLAUDICATION: 0
HEADACHES: 0
DIZZINESS: 0
SENSORY CHANGE: 0
COUGH: 0
HEARTBURN: 0
DIARRHEA: 0
SORE THROAT: 0
ABDOMINAL PAIN: 0
DEPRESSION: 0
VOMITING: 0
SHORTNESS OF BREATH: 0
WEAKNESS: 0
CONSTIPATION: 0
PHOTOPHOBIA: 0
BACK PAIN: 1
NERVOUS/ANXIOUS: 0
INSOMNIA: 0
CHILLS: 0
SPEECH CHANGE: 0
MYALGIAS: 1
BLURRED VISION: 0

## 2019-04-13 NOTE — PROGRESS NOTES
Assumed care of pt around 2100. A&Ox4. Agitated, irritable. VSS on RA. Pain to back; prn percocet. Mild nausea; declined meds. L CVC patent; blood return noted. 2 assist. Fall precautions in place. Bed alarm on, bed low and locked, call light within reach, hourly rounding. No new needs at this time. Will continue to monitor throughout shift.

## 2019-04-13 NOTE — PROGRESS NOTES
Transport arrived to take pt to [facility].    All belongings with pt. Pt is alert and oriented,no signs of distress. Pt med sent in chart.

## 2019-04-13 NOTE — PROGRESS NOTES
2 RN skin check performed with CARLOS ALBERTO Knutson    No devices in place.  No new pressure ulcers noted.  Bilateral elbows intact and blanchable  Bilateral heels calloused, red, blanchable.  Sacrum intact, red, blanchable.

## 2019-04-13 NOTE — PROGRESS NOTES
Hospital Medicine Daily Progress Note    Date of Service  4/13/2019    Chief Complaint  53 y.o. male admitted 4/5/2019 with back pain, found to have L1 burst fracture.    Hospital Course    Patient admitted to telemetry with L1 burst fracture, conservative bracing recommended by neurosurgeon.  Patient with difficult to control pain and trouble with therapies.      Interval Problem Update  Patient moved from telemetry to medical floor, states pain uncontrolled and requesting IV pain medication, 1 dose IV morphine ordered as needed for severe pain but I've adjusted his percocet from 5mg to 10 mg and increased robaxin and made it scheduled and increased his gabapentin for better pain control.    Consultants/Specialty  RAFAEL - Pierre - s/o    Code Status  full    Disposition  SNF vs prison/[facility]    Review of Systems  Review of Systems   Constitutional: Negative for chills and fever.   HENT: Negative for congestion and sore throat.    Eyes: Negative for blurred vision and photophobia.   Respiratory: Negative for cough and shortness of breath.    Cardiovascular: Negative for chest pain, claudication and leg swelling.   Gastrointestinal: Negative for abdominal pain, constipation, diarrhea, heartburn and vomiting.   Genitourinary: Negative for dysuria and hematuria.   Musculoskeletal: Positive for back pain and myalgias. Negative for joint pain.   Skin: Negative for itching and rash.   Neurological: Negative for dizziness, sensory change, speech change, weakness and headaches.   Psychiatric/Behavioral: Negative for depression. The patient is not nervous/anxious and does not have insomnia.         Physical Exam  Temp:  [36.2 °C (97.1 °F)-37.2 °C (99 °F)] 36.5 °C (97.7 °F)  Pulse:  [74-85] 76  Resp:  [16-18] 18  BP: (122-129)/(76-91) 122/84  SpO2:  [94 %-96 %] 94 %    Physical Exam   Constitutional: He is oriented to person, place, and time. He appears well-developed and well-nourished. No distress.   HENT:   Head: Normocephalic  and atraumatic.   Eyes: Conjunctivae are normal. No scleral icterus.   Neck: Neck supple. No JVD present.   Cardiovascular: Normal rate, regular rhythm and normal heart sounds.  Exam reveals no gallop and no friction rub.    No murmur heard.  Pulmonary/Chest: Effort normal and breath sounds normal. No respiratory distress. He has no wheezes. He exhibits no tenderness.   Abdominal: Soft. Bowel sounds are normal. He exhibits no distension and no mass. There is no tenderness.   Musculoskeletal: He exhibits no edema or tenderness.   Lymphadenopathy:     He has no cervical adenopathy.   Neurological: He is alert and oriented to person, place, and time. No cranial nerve deficit.   Skin: Skin is warm and dry. He is not diaphoretic. No erythema. No pallor.   Psychiatric: He has a normal mood and affect. His behavior is normal.   Nursing note and vitals reviewed.      Fluids    Intake/Output Summary (Last 24 hours) at 04/13/19 1258  Last data filed at 04/12/19 1918   Gross per 24 hour   Intake              120 ml   Output                0 ml   Net              120 ml       Laboratory  Recent Labs      04/11/19   0338  04/12/19   0404   WBC  2.1*  2.6*   RBC  3.18*  3.53*   HEMOGLOBIN  9.2*  10.3*   HEMATOCRIT  28.6*  31.5*   MCV  89.9  89.2   MCH  28.9  29.2   MCHC  32.2*  32.7*   RDW  57.3*  56.8*   PLATELETCT  68*  79*   MPV  9.4  9.8     Recent Labs      04/11/19   0338  04/12/19   0320  04/13/19   0013   SODIUM  142  138  138   POTASSIUM  4.0  3.6  3.6   CHLORIDE  108  107  106   CO2  28  24  24   GLUCOSE  102*  84  114*   BUN  8  8  10   CREATININE  0.68  0.67  0.79   CALCIUM  7.9*  7.9*  8.0*                   Imaging  [id number] VIEW   Final Result      1.  There is a nonobstructive nonspecific bowel gas pattern.  There is no evidence of free intraperitoneal air.      MR-LUMBAR SPINE-WITH & W/O   Final Result      1.  There is subacute fracture along the superior endplate of L1 vertebral body. There is no posterior  retropulsion.   2.  There is no focal bone lesion.   3.  There is no evidence of infection.   4.  Mild degenerative disease as described above.      MR-BRAIN-W/O   Final Result      1.  Mild cerebral atrophy.   2.  Mild chronic microvascular ischemic disease.   3.  No acute abnormality.      DX-KNEE 2- LEFT   Final Result      1.  No fracture or dislocation of LEFT knee.   2.  Minimal degenerative changes.   3.  Diffuse vascular calcifications.      EC-ECHOCARDIOGRAM COMPLETE W/O CONT   Final Result      CT-ABDOMEN-PELVIS WITH   Final Result         1.  No acute abnormality.   2.  Hepatomegaly and diffuse hepatic steatosis   3.  Atherosclerosis and atherosclerotic coronary artery disease.   4.  Pulmonary nodules measuring up to 6.5 mm, see nodule follow-up recommendations below.      Low Risk: CT at 3-6 months, then consider CT at 18-24 months      High Risk: CT at 3-6 months, then at 18-24 months      Comments: Use most suspicious nodule as guide to management. Follow-up intervals may vary according to size and risk.      Low Risk - Minimal or absent history of smoking and of other known risk factors.      High Risk - History of smoking or of other known risk factors.      Note: These recommendations do not apply to lung cancer screening, patients with immunosuppression, or patients with known primary cancer.      Fleischner Society 2017 Guidelines for Management of Incidentally Detected Pulmonary Nodules in Adults         CT-CTA CHEST PULMONARY ARTERY W/ RECONS   Final Result         1.  No large central pulmonary embolus is appreciated, evaluation of the subsegmental branches is essentially nondiagnostic due to motion artifacts. Additional imaging would be required for definitive exclusion of small distal pulmonary emboli.      DX-LUMBAR SPINE-2 OR 3 VIEWS   Final Result      Limited examination. Mild anterior wedging L2 vertebral body that appears to be old.   If symptoms are persistent, would recommend CT for  further evaluation.      DX-THORACIC SPINE-WITH SWIMMERS VIEW   Final Result      Limited examination. The upper thoracic spine is not well-demonstrated.   Mild anterior wedging of some the lower thoracic vertebral bodies and appears old.   No definite acute compression. If symptoms are persistent, CT would be recommended for further evaluation.      DX-CHEST-PORTABLE (1 VIEW)   Final Result      Central catheter projects over the superior right atrium.      Prominent calcified granuloma in the left midlung.      Atherosclerotic plaque.      CT-HEAD W/O   Final Result      No acute intracranial abnormality is identified.           Assessment/Plan  * Closed stable burst fracture of first lumbar vertebra with routine healing   Assessment & Plan    Lumbar MRI found L1 subacute fracture with no protrusion  Spoke with neurosurgery who suggested just placing a brace and follow-up in the clinic with them  PTOT  Multimodal pain control with lidocaine patch, gabapentin, percocet, robaxin. No NSAIDs given thrombocytopenia.     Pancytopenia (HCC)- (present on admission)   Assessment & Plan    -likely due to myelodysplastic syndrome  -Last chemotherapy November 2018  -No acute signs symptoms of bleeding  -Iron studies, folate and B12 all WNL  -Hgb stable  -Follow labs  -Hematology consulted -Dr. Mccoy: given his imaging is negative for metastatic disease and his pancytopenia has been improving.  He can follow-up outpatient       Left knee pain   Assessment & Plan    X ray found arthritis- cont pain mangement     Tachycardia   Assessment & Plan    Likely from electrolyte abnormalities and pain, possibly syndrome of inappropriate tachycardia  increased metprolol 25 mg p.o. twice daily  Tachycardia resolved for several days, stopped telemetry     Somnolence- (present on admission)   Assessment & Plan    Waxing and waning  Stopped Ambien and trazodone  Improved     Dizziness- (present on admission)   Assessment & Plan    -He has  been bedrest  -No events noted on telemetry  -Negative orthostatics  -Echo found a normal EF and no cause of dizziness  Has no further dizziness            VTE prophylaxis: heparin

## 2019-04-13 NOTE — PROGRESS NOTES
Bedside report received from day shift RN, assumed pt care. Pt assessment complete. Pt alert and oriented, no signs of distress. Reviewed plan of care with pt.  Chart and labs reviewed.  Bed in lowest position, call light within reach. Hourly rounding in place. Educated about calling for assistance.

## 2019-04-14 ENCOUNTER — APPOINTMENT (OUTPATIENT)
Dept: RADIOLOGY | Facility: MEDICAL CENTER | Age: 54
DRG: 516 | End: 2019-04-14
Attending: INTERNAL MEDICINE
Payer: MEDICAID

## 2019-04-14 PROCEDURE — 700102 HCHG RX REV CODE 250 W/ 637 OVERRIDE(OP): Performed by: INTERNAL MEDICINE

## 2019-04-14 PROCEDURE — A9270 NON-COVERED ITEM OR SERVICE: HCPCS | Performed by: INTERNAL MEDICINE

## 2019-04-14 PROCEDURE — 700111 HCHG RX REV CODE 636 W/ 250 OVERRIDE (IP): Performed by: HOSPITALIST

## 2019-04-14 PROCEDURE — A9270 NON-COVERED ITEM OR SERVICE: HCPCS | Performed by: HOSPITALIST

## 2019-04-14 PROCEDURE — 700111 HCHG RX REV CODE 636 W/ 250 OVERRIDE (IP): Performed by: INTERNAL MEDICINE

## 2019-04-14 PROCEDURE — 99232 SBSQ HOSP IP/OBS MODERATE 35: CPT | Performed by: INTERNAL MEDICINE

## 2019-04-14 PROCEDURE — 71045 X-RAY EXAM CHEST 1 VIEW: CPT

## 2019-04-14 PROCEDURE — 700102 HCHG RX REV CODE 250 W/ 637 OVERRIDE(OP): Performed by: HOSPITALIST

## 2019-04-14 PROCEDURE — 770004 HCHG ROOM/CARE - ONCOLOGY PRIVATE *

## 2019-04-14 RX ORDER — OXYCODONE HCL 20 MG/1
20 TABLET, FILM COATED, EXTENDED RELEASE ORAL EVERY 12 HOURS
Status: DISCONTINUED | OUTPATIENT
Start: 2019-04-14 | End: 2019-04-15

## 2019-04-14 RX ADMIN — OMEPRAZOLE 20 MG: 20 CAPSULE, DELAYED RELEASE ORAL at 05:16

## 2019-04-14 RX ADMIN — METHOCARBAMOL TABLETS 750 MG: 750 TABLET, COATED ORAL at 05:16

## 2019-04-14 RX ADMIN — METOPROLOL TARTRATE 25 MG: 25 TABLET, FILM COATED ORAL at 18:33

## 2019-04-14 RX ADMIN — GABAPENTIN 300 MG: 300 CAPSULE ORAL at 11:35

## 2019-04-14 RX ADMIN — OMEPRAZOLE 20 MG: 20 CAPSULE, DELAYED RELEASE ORAL at 18:33

## 2019-04-14 RX ADMIN — OXYCODONE AND ACETAMINOPHEN 2 TABLET: 10; 325 TABLET ORAL at 23:05

## 2019-04-14 RX ADMIN — BUSPIRONE HYDROCHLORIDE 15 MG: 30 TABLET ORAL at 18:33

## 2019-04-14 RX ADMIN — HYDROXYZINE HYDROCHLORIDE 50 MG: 50 TABLET ORAL at 05:17

## 2019-04-14 RX ADMIN — OXYCODONE HYDROCHLORIDE 20 MG: 20 TABLET, FILM COATED, EXTENDED RELEASE ORAL at 11:35

## 2019-04-14 RX ADMIN — HYDROCORTISONE: 1 CREAM TOPICAL at 05:20

## 2019-04-14 RX ADMIN — HEPARIN SODIUM 5000 UNITS: 5000 INJECTION, SOLUTION INTRAVENOUS; SUBCUTANEOUS at 05:20

## 2019-04-14 RX ADMIN — METHOCARBAMOL TABLETS 750 MG: 750 TABLET, COATED ORAL at 11:38

## 2019-04-14 RX ADMIN — DIPHENHYDRAMINE HCL 25 MG: 25 TABLET ORAL at 05:15

## 2019-04-14 RX ADMIN — OXYCODONE AND ACETAMINOPHEN 2 TABLET: 10; 325 TABLET ORAL at 09:09

## 2019-04-14 RX ADMIN — OXYCODONE HYDROCHLORIDE 20 MG: 20 TABLET, FILM COATED, EXTENDED RELEASE ORAL at 18:29

## 2019-04-14 RX ADMIN — GABAPENTIN 300 MG: 300 CAPSULE ORAL at 05:17

## 2019-04-14 RX ADMIN — TRAZODONE HYDROCHLORIDE 50 MG: 50 TABLET ORAL at 23:07

## 2019-04-14 RX ADMIN — OXYCODONE AND ACETAMINOPHEN 2 TABLET: 10; 325 TABLET ORAL at 05:16

## 2019-04-14 RX ADMIN — METOPROLOL TARTRATE 25 MG: 25 TABLET, FILM COATED ORAL at 05:17

## 2019-04-14 RX ADMIN — HYDROXYZINE HYDROCHLORIDE 50 MG: 50 TABLET ORAL at 18:29

## 2019-04-14 RX ADMIN — MORPHINE SULFATE 4 MG: 4 INJECTION INTRAVENOUS at 12:39

## 2019-04-14 RX ADMIN — HYDROCORTISONE: 1 CREAM TOPICAL at 20:28

## 2019-04-14 RX ADMIN — OXYCODONE AND ACETAMINOPHEN 2 TABLET: 10; 325 TABLET ORAL at 01:05

## 2019-04-14 RX ADMIN — GABAPENTIN 300 MG: 300 CAPSULE ORAL at 18:33

## 2019-04-14 RX ADMIN — METHOCARBAMOL TABLETS 750 MG: 750 TABLET, COATED ORAL at 18:34

## 2019-04-14 RX ADMIN — OXYCODONE AND ACETAMINOPHEN 2 TABLET: 10; 325 TABLET ORAL at 18:29

## 2019-04-14 RX ADMIN — AMITRIPTYLINE HYDROCHLORIDE 75 MG: 50 TABLET, FILM COATED ORAL at 20:26

## 2019-04-14 RX ADMIN — BUSPIRONE HYDROCHLORIDE 15 MG: 30 TABLET ORAL at 05:16

## 2019-04-14 ASSESSMENT — ENCOUNTER SYMPTOMS
HEARTBURN: 0
VOMITING: 0
SHORTNESS OF BREATH: 0
SENSORY CHANGE: 0
HEADACHES: 0
NERVOUS/ANXIOUS: 0
INSOMNIA: 0
DIARRHEA: 0
FEVER: 0
SPEECH CHANGE: 0
MYALGIAS: 1
ABDOMINAL PAIN: 0
SORE THROAT: 0
BLURRED VISION: 0
CHILLS: 0
CLAUDICATION: 0
COUGH: 0
CONSTIPATION: 0
DIZZINESS: 0
DEPRESSION: 0
WEAKNESS: 0
PHOTOPHOBIA: 0
BACK PAIN: 1

## 2019-04-14 NOTE — DISCHARGE PLANNING
Aware of PMR referral from Dr. Cook. Admitted from [facility] 4/5/2019 for generalized weakness. L1 compression fracture. Hx myelodysplastic syndrome. RPG to consult per protocol to contribute to plan of care.  Would appreciate updated PT/OT notes to assist with Physiatry recommendations. Thank you for the referral.     Please note - Chart notes indicate pending [place] Medicaid. There is currently no insurance provider for post acute care.

## 2019-04-14 NOTE — CARE PLAN
Problem: Safety  Goal: Will remain free from falls    Intervention: Assess risk factors for falls  Bed alarm on for patient safety      Problem: Pain Management  Goal: Pain level will decrease to patient's comfort goal    Intervention: Follow pain managment plan developed in collaboration with patient and Interdisciplinary Team  Pain medication given for pain control. Will continue to monitor

## 2019-04-14 NOTE — PROGRESS NOTES
A&Ox4. Irritable at times. VSS on RA. Pain to back; prn percocet; one time dose morphine given. Denies nausea and SOA.. L CVC patent; blood return noted. 2 assist. Fall precautions in place. Bed alarm on, bed low and locked, call light within reach, hourly rounding. No new needs at this time. Will continue to monitor throughout shift.

## 2019-04-14 NOTE — CARE PLAN
Problem: Pain Management  Goal: Pain level will decrease to patient's comfort goal  Outcome: PROGRESSING AS EXPECTED  Pain to back. One time dose morphine given. Repositioned. Prn percocet.     Problem: Venous Thromboembolism (VTW)/Deep Vein Thrombosis (DVT) Prevention:  Goal: Patient will participate in Venous Thrombosis (VTE)/Deep Vein Thrombosis (DVT)Prevention Measures  Outcome: PROGRESSING AS EXPECTED  Pt on heparin q8. Refuses SCDS. Unable to ambulate r/t pain and weakness

## 2019-04-14 NOTE — PROGRESS NOTES
Hospital Medicine Daily Progress Note    Date of Service  4/14/2019    Chief Complaint  53 y.o. male admitted 4/5/2019 with back pain, found to have L1 burst fracture.    Hospital Course    Patient admitted to telemetry with L1 burst fracture, conservative bracing recommended by neurosurgeon.  Patient with difficult to control pain and trouble with therapies.      Interval Problem Update  4/13 Patient moved from telemetry to medical floor, states pain uncontrolled and requesting IV pain medication, 1 dose IV morphine ordered as needed for severe pain but I've adjusted his percocet from 5mg to 10 mg and increased robaxin and made it scheduled and increased his gabapentin for better pain control.  4/14 Patient states he had rest following morphine dose overnight but pain uncontrolled still with previous changes, will add oxycontin 20 bid as long acting pain medication to help control pain and allow patient to participate in therapies.  Rehab consult placed.    Consultants/Specialty  RAFAEL Jay - s/o    Code Status  full    Disposition  SNF vs rehab    Review of Systems  Review of Systems   Constitutional: Negative for chills and fever.   HENT: Negative for congestion and sore throat.    Eyes: Negative for blurred vision and photophobia.   Respiratory: Negative for cough and shortness of breath.    Cardiovascular: Negative for chest pain, claudication and leg swelling.   Gastrointestinal: Negative for abdominal pain, constipation, diarrhea, heartburn and vomiting.   Genitourinary: Negative for dysuria and hematuria.   Musculoskeletal: Positive for back pain and myalgias. Negative for joint pain.   Skin: Negative for itching and rash.   Neurological: Negative for dizziness, sensory change, speech change, weakness and headaches.   Psychiatric/Behavioral: Negative for depression. The patient is not nervous/anxious and does not have insomnia.         Physical Exam  Temp:  [36.2 °C (97.1 °F)-36.7 °C (98 °F)] 36.2 °C (97.1  °F)  Pulse:  [82-89] 82  Resp:  [16-17] 16  BP: (109-144)/(58-98) 118/86  SpO2:  [92 %-98 %] 98 %    Physical Exam   Constitutional: He is oriented to person, place, and time. He appears well-developed and well-nourished. No distress.   HENT:   Head: Normocephalic and atraumatic.   Eyes: Conjunctivae are normal. No scleral icterus.   Neck: Neck supple. No JVD present.   Cardiovascular: Normal rate, regular rhythm and normal heart sounds.  Exam reveals no gallop and no friction rub.    No murmur heard.  Pulmonary/Chest: Effort normal and breath sounds normal. No respiratory distress. He has no wheezes. He exhibits no tenderness.   Abdominal: Soft. Bowel sounds are normal. He exhibits no distension and no mass. There is no tenderness.   Musculoskeletal: He exhibits no edema or tenderness.   Lymphadenopathy:     He has no cervical adenopathy.   Neurological: He is alert and oriented to person, place, and time. No cranial nerve deficit.   Skin: Skin is warm and dry. He is not diaphoretic. No erythema. No pallor.   Psychiatric: He has a normal mood and affect. His behavior is normal.   Nursing note and vitals reviewed.      Fluids  No intake or output data in the 24 hours ending 04/14/19 1051    Laboratory  Recent Labs      04/12/19   0404   WBC  2.6*   RBC  3.53*   HEMOGLOBIN  10.3*   HEMATOCRIT  31.5*   MCV  89.2   MCH  29.2   MCHC  32.7*   RDW  56.8*   PLATELETCT  79*   MPV  9.8     Recent Labs      04/12/19   0320  04/13/19   0013   SODIUM  138  138   POTASSIUM  3.6  3.6   CHLORIDE  107  106   CO2  24  24   GLUCOSE  84  114*   BUN  8  10   CREATININE  0.67  0.79   CALCIUM  7.9*  8.0*                   Imaging  [id number] VIEW   Final Result      1.  There is a nonobstructive nonspecific bowel gas pattern.  There is no evidence of free intraperitoneal air.      MR-LUMBAR SPINE-WITH & W/O   Final Result      1.  There is subacute fracture along the superior endplate of L1 vertebral body. There is no posterior  retropulsion.   2.  There is no focal bone lesion.   3.  There is no evidence of infection.   4.  Mild degenerative disease as described above.      MR-BRAIN-W/O   Final Result      1.  Mild cerebral atrophy.   2.  Mild chronic microvascular ischemic disease.   3.  No acute abnormality.      DX-KNEE 2- LEFT   Final Result      1.  No fracture or dislocation of LEFT knee.   2.  Minimal degenerative changes.   3.  Diffuse vascular calcifications.      EC-ECHOCARDIOGRAM COMPLETE W/O CONT   Final Result      CT-ABDOMEN-PELVIS WITH   Final Result         1.  No acute abnormality.   2.  Hepatomegaly and diffuse hepatic steatosis   3.  Atherosclerosis and atherosclerotic coronary artery disease.   4.  Pulmonary nodules measuring up to 6.5 mm, see nodule follow-up recommendations below.      Low Risk: CT at 3-6 months, then consider CT at 18-24 months      High Risk: CT at 3-6 months, then at 18-24 months      Comments: Use most suspicious nodule as guide to management. Follow-up intervals may vary according to size and risk.      Low Risk - Minimal or absent history of smoking and of other known risk factors.      High Risk - History of smoking or of other known risk factors.      Note: These recommendations do not apply to lung cancer screening, patients with immunosuppression, or patients with known primary cancer.      Fleischner Society 2017 Guidelines for Management of Incidentally Detected Pulmonary Nodules in Adults         CT-CTA CHEST PULMONARY ARTERY W/ RECONS   Final Result         1.  No large central pulmonary embolus is appreciated, evaluation of the subsegmental branches is essentially nondiagnostic due to motion artifacts. Additional imaging would be required for definitive exclusion of small distal pulmonary emboli.      DX-LUMBAR SPINE-2 OR 3 VIEWS   Final Result      Limited examination. Mild anterior wedging L2 vertebral body that appears to be old.   If symptoms are persistent, would recommend CT for  further evaluation.      DX-THORACIC SPINE-WITH SWIMMERS VIEW   Final Result      Limited examination. The upper thoracic spine is not well-demonstrated.   Mild anterior wedging of some the lower thoracic vertebral bodies and appears old.   No definite acute compression. If symptoms are persistent, CT would be recommended for further evaluation.      DX-CHEST-PORTABLE (1 VIEW)   Final Result      Central catheter projects over the superior right atrium.      Prominent calcified granuloma in the left midlung.      Atherosclerotic plaque.      CT-HEAD W/O   Final Result      No acute intracranial abnormality is identified.      DX-CHEST-PORTABLE (1 VIEW)    (Results Pending)        Assessment/Plan  * Closed stable burst fracture of first lumbar vertebra with routine healing   Assessment & Plan    Lumbar MRI found L1 subacute fracture with no protrusion  Spoke with neurosurgery who suggested just placing a brace and follow-up in the clinic with them  PTOT  Multimodal pain control with lidocaine patch, gabapentin, percocet, robaxin. No NSAIDs given thrombocytopenia.  Added oxycontin for pain control  Rehab consult pending     Pancytopenia (HCC)- (present on admission)   Assessment & Plan    -likely due to myelodysplastic syndrome  -Last chemotherapy November 2018  -No acute signs symptoms of bleeding  -Iron studies, folate and B12 all WNL  -Hgb stable  -Follow labs  -Hematology consulted -Dr. Mccoy: given his imaging is negative for metastatic disease and his pancytopenia has been improving.  He can follow-up outpatient       Left knee pain   Assessment & Plan    X ray found arthritis- cont pain mangement     Tachycardia   Assessment & Plan    Resolved  Likely from pain  metprolol 25 mg p.o. twice daily  Tachycardia resolved for several days, stopped telemetry     Somnolence- (present on admission)   Assessment & Plan    Waxing and waning  Stopped Ambien and trazodone  Improved     Dizziness- (present on admission)    Assessment & Plan    -He has been bedrest  -No events noted on telemetry  -Negative orthostatics  -Echo found a normal EF and no cause of dizziness  Has no further dizziness            VTE prophylaxis: heparin

## 2019-04-15 ENCOUNTER — HOSPITAL ENCOUNTER (INPATIENT)
Facility: REHABILITATION | Age: 54
End: 2019-04-15
Attending: PHYSICAL MEDICINE & REHABILITATION | Admitting: PHYSICAL MEDICINE & REHABILITATION
Payer: MEDICAID

## 2019-04-15 PROCEDURE — 700102 HCHG RX REV CODE 250 W/ 637 OVERRIDE(OP): Performed by: INTERNAL MEDICINE

## 2019-04-15 PROCEDURE — 700111 HCHG RX REV CODE 636 W/ 250 OVERRIDE (IP): Performed by: INTERNAL MEDICINE

## 2019-04-15 PROCEDURE — 700102 HCHG RX REV CODE 250 W/ 637 OVERRIDE(OP): Performed by: HOSPITALIST

## 2019-04-15 PROCEDURE — A9270 NON-COVERED ITEM OR SERVICE: HCPCS | Performed by: INTERNAL MEDICINE

## 2019-04-15 PROCEDURE — 770004 HCHG ROOM/CARE - ONCOLOGY PRIVATE *

## 2019-04-15 PROCEDURE — A9270 NON-COVERED ITEM OR SERVICE: HCPCS | Performed by: HOSPITALIST

## 2019-04-15 PROCEDURE — 97535 SELF CARE MNGMENT TRAINING: CPT

## 2019-04-15 PROCEDURE — 99232 SBSQ HOSP IP/OBS MODERATE 35: CPT | Performed by: INTERNAL MEDICINE

## 2019-04-15 PROCEDURE — 97530 THERAPEUTIC ACTIVITIES: CPT

## 2019-04-15 RX ORDER — MORPHINE SULFATE 4 MG/ML
2-4 INJECTION, SOLUTION INTRAMUSCULAR; INTRAVENOUS EVERY 4 HOURS PRN
Status: DISCONTINUED | OUTPATIENT
Start: 2019-04-15 | End: 2019-04-16

## 2019-04-15 RX ORDER — DIPHENHYDRAMINE HCL 25 MG
50 TABLET ORAL EVERY 6 HOURS PRN
Status: DISCONTINUED | OUTPATIENT
Start: 2019-04-15 | End: 2019-05-02

## 2019-04-15 RX ORDER — MORPHINE SULFATE 15 MG/1
15-30 TABLET ORAL EVERY 4 HOURS PRN
Status: DISCONTINUED | OUTPATIENT
Start: 2019-04-15 | End: 2019-04-30

## 2019-04-15 RX ORDER — MORPHINE SULFATE 30 MG/1
30 TABLET, FILM COATED, EXTENDED RELEASE ORAL EVERY 12 HOURS
Status: DISCONTINUED | OUTPATIENT
Start: 2019-04-15 | End: 2019-04-29

## 2019-04-15 RX ADMIN — OXYCODONE AND ACETAMINOPHEN 2 TABLET: 10; 325 TABLET ORAL at 05:01

## 2019-04-15 RX ADMIN — DIPHENHYDRAMINE HCL 25 MG: 25 TABLET ORAL at 11:57

## 2019-04-15 RX ADMIN — GABAPENTIN 300 MG: 300 CAPSULE ORAL at 05:01

## 2019-04-15 RX ADMIN — GABAPENTIN 300 MG: 300 CAPSULE ORAL at 18:34

## 2019-04-15 RX ADMIN — BUSPIRONE HYDROCHLORIDE 15 MG: 30 TABLET ORAL at 05:00

## 2019-04-15 RX ADMIN — MORPHINE SULFATE 30 MG: 15 TABLET ORAL at 20:34

## 2019-04-15 RX ADMIN — GABAPENTIN 300 MG: 300 CAPSULE ORAL at 10:48

## 2019-04-15 RX ADMIN — MORPHINE SULFATE 30 MG: 30 TABLET, FILM COATED, EXTENDED RELEASE ORAL at 18:23

## 2019-04-15 RX ADMIN — HYDROCORTISONE: 1 CREAM TOPICAL at 05:04

## 2019-04-15 RX ADMIN — AMITRIPTYLINE HYDROCHLORIDE 75 MG: 50 TABLET, FILM COATED ORAL at 20:34

## 2019-04-15 RX ADMIN — OXYCODONE HYDROCHLORIDE 20 MG: 20 TABLET, FILM COATED, EXTENDED RELEASE ORAL at 04:59

## 2019-04-15 RX ADMIN — ERGOCALCIFEROL 50000 UNITS: 1.25 CAPSULE ORAL at 20:34

## 2019-04-15 RX ADMIN — METHOCARBAMOL TABLETS 750 MG: 750 TABLET, COATED ORAL at 20:39

## 2019-04-15 RX ADMIN — ONDANSETRON 4 MG: 4 TABLET, ORALLY DISINTEGRATING ORAL at 04:59

## 2019-04-15 RX ADMIN — DIPHENHYDRAMINE HCL 50 MG: 25 TABLET ORAL at 23:17

## 2019-04-15 RX ADMIN — MORPHINE SULFATE 4 MG: 4 INJECTION INTRAVENOUS at 23:17

## 2019-04-15 RX ADMIN — METOPROLOL TARTRATE 25 MG: 25 TABLET, FILM COATED ORAL at 05:01

## 2019-04-15 RX ADMIN — HYDROCORTISONE: 1 CREAM TOPICAL at 21:00

## 2019-04-15 RX ADMIN — TRAZODONE HYDROCHLORIDE 50 MG: 50 TABLET ORAL at 23:17

## 2019-04-15 RX ADMIN — METHOCARBAMOL TABLETS 750 MG: 750 TABLET, COATED ORAL at 05:02

## 2019-04-15 RX ADMIN — OMEPRAZOLE 20 MG: 20 CAPSULE, DELAYED RELEASE ORAL at 05:01

## 2019-04-15 RX ADMIN — METHOCARBAMOL TABLETS 750 MG: 750 TABLET, COATED ORAL at 11:58

## 2019-04-15 RX ADMIN — OXYCODONE AND ACETAMINOPHEN 2 TABLET: 10; 325 TABLET ORAL at 10:48

## 2019-04-15 RX ADMIN — OMEPRAZOLE 20 MG: 20 CAPSULE, DELAYED RELEASE ORAL at 18:22

## 2019-04-15 RX ADMIN — HYDROXYZINE HYDROCHLORIDE 50 MG: 50 TABLET ORAL at 05:00

## 2019-04-15 RX ADMIN — HYDROXYZINE HYDROCHLORIDE 50 MG: 50 TABLET ORAL at 18:22

## 2019-04-15 RX ADMIN — MORPHINE SULFATE 15 MG: 15 TABLET ORAL at 14:15

## 2019-04-15 RX ADMIN — BUSPIRONE HYDROCHLORIDE 15 MG: 30 TABLET ORAL at 18:24

## 2019-04-15 ASSESSMENT — ENCOUNTER SYMPTOMS
VOMITING: 0
DIARRHEA: 0
PHOTOPHOBIA: 0
WEAKNESS: 0
COUGH: 0
NERVOUS/ANXIOUS: 0
CHILLS: 0
SPEECH CHANGE: 0
SORE THROAT: 0
HEADACHES: 0
CLAUDICATION: 0
DEPRESSION: 0
SENSORY CHANGE: 0
FEVER: 0
BLURRED VISION: 0
DIZZINESS: 0
CONSTIPATION: 0
ABDOMINAL PAIN: 0
SHORTNESS OF BREATH: 0
MYALGIAS: 1
HEARTBURN: 0
INSOMNIA: 0
BACK PAIN: 1

## 2019-04-15 ASSESSMENT — COGNITIVE AND FUNCTIONAL STATUS - GENERAL
HELP NEEDED FOR BATHING: A LOT
SUGGESTED CMS G CODE MODIFIER MOBILITY: CL
TOILETING: A LOT
SUGGESTED CMS G CODE MODIFIER DAILY ACTIVITY: CK
TURNING FROM BACK TO SIDE WHILE IN FLAT BAD: A LITTLE
MOVING FROM LYING ON BACK TO SITTING ON SIDE OF FLAT BED: A LITTLE
PERSONAL GROOMING: A LITTLE
STANDING UP FROM CHAIR USING ARMS: A LITTLE
CLIMB 3 TO 5 STEPS WITH RAILING: TOTAL
DRESSING REGULAR LOWER BODY CLOTHING: A LOT
MOVING TO AND FROM BED TO CHAIR: A LOT
DAILY ACTIVITIY SCORE: 15
WALKING IN HOSPITAL ROOM: A LOT
DRESSING REGULAR UPPER BODY CLOTHING: A LOT
MOBILITY SCORE: 14

## 2019-04-15 ASSESSMENT — GAIT ASSESSMENTS
ASSISTIVE DEVICE: FRONT WHEEL WALKER
DISTANCE (FEET): 3
GAIT LEVEL OF ASSIST: MODERATE ASSIST
DEVIATION: ANTALGIC;STEP TO;DECREASED BASE OF SUPPORT;DECREASED HEEL STRIKE;DECREASED TOE OFF

## 2019-04-15 NOTE — CARE PLAN
Problem: Pain Management  Goal: Pain level will decrease to patient's comfort goal  Outcome: PROGRESSING AS EXPECTED  Patient medicated for pain per orders. Patient sleeping comfortably.

## 2019-04-15 NOTE — PROGRESS NOTES
Extenders for TLSO brace were delivered to the PT.  If any further assistance needed, please call extension [phone] or place order for Ortho Technician assistance as a communication order in [employer]clip.

## 2019-04-15 NOTE — CONSULTS
Medical chart review completed.     Patient is a 53 y.o. year-old male with a past medical history significant for Asthma, Depression, GERD, hx of PE, unknown cancer and HTN admitted to [facility] on 4/5/2019  2:58 PM with nausea and vomiting. Patient found to be hypokalemic most likely from fluid loss as well as with pancytopenia.  Patient on work-up also found to have subacute L1 burst fracture without cord compromise. NSG was consulted for burst fracture and recommended TLSO when out of bed for comfort. Per report patient has difficult to control pain, currently on Morphine ER with PRN Morphine IR.    Per report patient previously living in [place], was recently in skilled nursing in [place] and now has warrant for his arrest in [place].  Per report he was admitted from [facility].  Per CM, [place] [employer] would like to be notified at time of discharge.  Patient was last evaluated by PT on 4/15/19 and was modA for gait.  Patient last evaluated by OT on 4/15/19 and was mod-maxA for ADLs.        PMH:  Past Medical History:   Diagnosis Date   • Asthma    • Cancer (HCC) 11/01/2016       PSH:  No past surgical history on file.    FAMILY HISTORY:  No family history on file.    MEDICATIONS:  Current Facility-Administered Medications   Medication Dose   • morphine ER (MS CONTIN) tablet 30 mg  30 mg   • morphine (MS IR) tablet 15-30 mg  15-30 mg   • morphine (pf) 4 mg/ml injection 2-4 mg  2-4 mg   • diphenhydrAMINE (BENADRYL) tablet/capsule 50 mg  50 mg   • methocarbamol (ROBAXIN) tablet 750 mg  750 mg   • gabapentin (NEURONTIN) capsule 300 mg  300 mg   • heparin pf injection 300-500 Units  300-500 Units   • lidocaine (LIDODERM) 5 % 1 Patch  1 Patch   • traZODone (DESYREL) tablet 50 mg  50 mg   • vitamin D (Ergocalciferol) (DRISDOL) capsule 50,000 Units  50,000 Units   • metoprolol (LOPRESSOR) tablet 25 mg  25 mg   • albuterol inhaler 2 Puff  2 Puff   • hydrOXYzine HCl (ATARAX) tablet 50 mg  50 mg   • hydrocortisone 1 % cream     •  Respiratory Care per Protocol     • ondansetron (ZOFRAN) syringe/vial injection 4 mg  4 mg   • ondansetron (ZOFRAN ODT) dispertab 4 mg  4 mg   • amitriptyline (ELAVIL) tablet 75 mg  75 mg   • busPIRone (BUSPAR) tablet 15 mg  15 mg   • omeprazole (PRILOSEC) capsule 20 mg  20 mg       ALLERGIES:  Iodine; Keflex; Reglan [metoclopramide]; Rice; Shellfish allergy; and Toradol    PSYCHOSOCIAL HISTORY:  Living Site:  Unknown  Living With:  self  Caregiver's availability:  Not Applicable  Number of stairs:  Unknown  Substance use history:  Unknown      The patient presents functional deficits in mobility and self-care, and Minimal  de-conditioning. Pre-morbidly, this patient lived in a Unknown level home with Unknown steps to enter,alone and able to care for self  The patient was evaluated by acute care Physical Therapy and Occupational Therapy; currently requiring moderate assistance for mobility and minimal assistance for ADLs. The patient's current diet is Regular with Thin liquids.     Patient admitted for hypokalemia, tachycardia and found to have L1 burst fracture without cord compromise. Patient currently limited by pain.  Currently on Gabapentin 300 mg TID, Lidocaine patch, and Morphine ER/IR.  Patient currently does not meet criteria for inpatient rehabilitation as without acute rehab diagnosis and has no dispo plan.  Would recommend for pain control to increase Gabapentin to 600 mg TID (max daily dose of 3600 mg).  At this time patient would benefit from skilled nursing level of rehabilitation.     Thank you for allowing us to participate in this patient's care. Please call with any questions regarding this recommendation.    Batsheva Lao M.D.

## 2019-04-15 NOTE — PROGRESS NOTES
Neurosurgery Progress Note    Subjective:  No acute events  Per CNA- minimal ambulation, gets up to EOB but intermittently refuses brace due to back pain    Exam:  A&O, sleeping but arousable, speech fluent & appropriate but quiet  SARGENT, sensation grossly intact  Brace at bedside in bag      BP  Min: 99/63  Max: 133/84  Pulse  Av.2  Min: 80  Max: 93  Resp  Av.6  Min: 16  Max: 18  Temp  Av.4 °C (97.5 °F)  Min: 36.2 °C (97.1 °F)  Max: 36.7 °C (98 °F)  SpO2  Av.3 %  Min: 97 %  Max: 100 %    No Data Recorded        Recent Labs      19   0013   SODIUM  138   POTASSIUM  3.6   CHLORIDE  106   CO2  24   GLUCOSE  114*   BUN  10   CREATININE  0.79   CALCIUM  8.0*               Intake/Output       19 0700 - 04/15/19 0659 04/15/19 0700 - 19 0659      [id number] Total 3794-9199 3359-2220 Total       Intake    Total Intake -- -- -- -- -- --       Output    Urine  --  450 450  --  -- --    Urine Void (mL) -- 450 450 -- -- --    Total Output -- 450 450 -- -- --       Net I/O     -- -450 -450 -- -- --            Intake/Output Summary (Last 24 hours) at 04/15/19 0749  Last data filed at 04/15/19 0441   Gross per 24 hour   Intake                0 ml   Output              450 ml   Net             -450 ml            • oxyCODONE CR  20 mg Q12HRS   • methocarbamol  750 mg TID   • gabapentin  300 mg TID   • oxyCODONE-acetaminophen  1-2 Tab Q4HRS PRN   • heparin pf  300-500 Units PRN   • lidocaine  1 Patch Q24HR   • traZODone  50 mg HS PRN   • vitamin D (Ergocalciferol)  50,000 Units Q7 DAYS   • metoprolol  25 mg BID   • albuterol  2 Puff Q4HRS PRN   • hydrOXYzine HCl  50 mg BID   • diphenhydrAMINE  25 mg Q6HRS PRN   • hydrocortisone   BID   • Respiratory Care per Protocol   Continuous RT   • ondansetron  4 mg Q4HRS PRN   • ondansetron  4 mg Q4HRS PRN   • amitriptyline  75 mg Nightly   • busPIRone  15 mg BID   • omeprazole  20 mg Q12HRS       Assessment and Plan:  Hospital day #6  POD  #NA  Prophylactic anticoagulation: yes           Neuro stable but poor mobilization due to pain  Recommend continuing pain control & mobilization in brace as tolerated  PT/OT  Nonsurgical, we will be available as needed for questions or concerns

## 2019-04-15 NOTE — THERAPY
"Occupational Therapy Treatment completed with focus on ADLs, ADL transfers, patient education, cognition and upper extremity function.  Functional Status:  Min A supine>sit EOB, mod A LB dressing, max A UB dressing to don TLSO, brace not fitting well, provided w/extenders for better fit pt continues to report discomfort in brace. Min A w/sit>stand reports dizziness and fear of falling VSS. Completed sit>stand x2 then txf to chair remained up in chair w/alarm on and call light w/in reach Rn aware set up w/groomig seated   Plan of Care: Will benefit from Occupational Therapy 3 times per week  Discharge Recommendations:  Equipment Will Continue to Assess for Equipment Needs. Post-acute therapy Recommend inpatient transitional care services for continued occupational therapy services.       See \"Rehab Therapy-Acute\" Patient Summary Report for complete documentation.   "

## 2019-04-15 NOTE — PROGRESS NOTES
Hospital Medicine Daily Progress Note    Date of Service  4/15/2019    Chief Complaint  53 y.o. male admitted 4/5/2019 with back pain, found to have L1 burst fracture.    Hospital Course    Patient admitted to telemetry with L1 burst fracture, conservative bracing recommended by neurosurgeon.  Patient with difficult to control pain and trouble with therapies.      Interval Problem Update  4/13 Patient moved from telemetry to medical floor, states pain uncontrolled and requesting IV pain medication, 1 dose IV morphine ordered as needed for severe pain but I've adjusted his percocet from 5mg to 10 mg and increased robaxin and made it scheduled and increased his gabapentin for better pain control.  4/14 Patient states he had rest following morphine dose overnight but pain uncontrolled still with previous changes, will add oxycontin 20 bid as long acting pain medication to help control pain and allow patient to participate in therapies.  Rehab consult placed.  4/15 Patient up to side of bed in TLSO after working with therapy today, complains of uncontrolled pain with the changes made.  I doubt he is having response to oxycodone/oxycontin so will discontinue this and rotate opiates to morphine - MS contin and MSIR.  Physiatry consult pending for recommendations to help get patient out of the hospital.    Consultants/Specialty  RAFAEL - Pierre - s/o    Code Status  full    Disposition  SNF vs rehab    Review of Systems  Review of Systems   Constitutional: Negative for chills and fever.   HENT: Negative for congestion and sore throat.    Eyes: Negative for blurred vision and photophobia.   Respiratory: Negative for cough and shortness of breath.    Cardiovascular: Negative for chest pain, claudication and leg swelling.   Gastrointestinal: Negative for abdominal pain, constipation, diarrhea, heartburn and vomiting.   Genitourinary: Negative for dysuria and hematuria.   Musculoskeletal: Positive for back pain and myalgias.  Negative for joint pain.   Skin: Negative for itching and rash.   Neurological: Negative for dizziness, sensory change, speech change, weakness and headaches.   Psychiatric/Behavioral: Negative for depression. The patient is not nervous/anxious and does not have insomnia.         Physical Exam  Temp:  [36.2 °C (97.2 °F)-36.7 °C (98 °F)] 36.6 °C (97.8 °F)  Pulse:  [84-98] 98  Resp:  [15-18] 16  BP: ()/(63-93) 140/81  SpO2:  [92 %-100 %] 97 %    Physical Exam   Constitutional: He is oriented to person, place, and time. He appears well-developed and well-nourished. No distress.   HENT:   Head: Normocephalic and atraumatic.   Eyes: Conjunctivae are normal. No scleral icterus.   Neck: Neck supple. No JVD present.   Cardiovascular: Normal rate, regular rhythm and normal heart sounds.  Exam reveals no gallop and no friction rub.    No murmur heard.  Pulmonary/Chest: Effort normal and breath sounds normal. No respiratory distress. He has no wheezes. He exhibits no tenderness.   Abdominal: Soft. Bowel sounds are normal. He exhibits no distension and no mass. There is no tenderness.   Musculoskeletal: He exhibits no edema or tenderness.   Lymphadenopathy:     He has no cervical adenopathy.   Neurological: He is alert and oriented to person, place, and time. No cranial nerve deficit.   Skin: Skin is warm and dry. He is not diaphoretic. No erythema. No pallor.   Psychiatric: He has a normal mood and affect. His behavior is normal.   Nursing note and vitals reviewed.      Fluids    Intake/Output Summary (Last 24 hours) at 04/15/19 1446  Last data filed at 04/15/19 0441   Gross per 24 hour   Intake                0 ml   Output              450 ml   Net             -450 ml       Laboratory      Recent Labs      04/13/19   0013   SODIUM  138   POTASSIUM  3.6   CHLORIDE  106   CO2  24   GLUCOSE  114*   BUN  10   CREATININE  0.79   CALCIUM  8.0*                   Imaging  DX-CHEST-PORTABLE (1 VIEW)   Final Result         No  acute cardiac or pulmonary abnormality is identified.      [id number] VIEW   Final Result      1.  There is a nonobstructive nonspecific bowel gas pattern.  There is no evidence of free intraperitoneal air.      MR-LUMBAR SPINE-WITH & W/O   Final Result      1.  There is subacute fracture along the superior endplate of L1 vertebral body. There is no posterior retropulsion.   2.  There is no focal bone lesion.   3.  There is no evidence of infection.   4.  Mild degenerative disease as described above.      MR-BRAIN-W/O   Final Result      1.  Mild cerebral atrophy.   2.  Mild chronic microvascular ischemic disease.   3.  No acute abnormality.      DX-KNEE 2- LEFT   Final Result      1.  No fracture or dislocation of LEFT knee.   2.  Minimal degenerative changes.   3.  Diffuse vascular calcifications.      EC-ECHOCARDIOGRAM COMPLETE W/O CONT   Final Result      CT-ABDOMEN-PELVIS WITH   Final Result         1.  No acute abnormality.   2.  Hepatomegaly and diffuse hepatic steatosis   3.  Atherosclerosis and atherosclerotic coronary artery disease.   4.  Pulmonary nodules measuring up to 6.5 mm, see nodule follow-up recommendations below.      Low Risk: CT at 3-6 months, then consider CT at 18-24 months      High Risk: CT at 3-6 months, then at 18-24 months      Comments: Use most suspicious nodule as guide to management. Follow-up intervals may vary according to size and risk.      Low Risk - Minimal or absent history of smoking and of other known risk factors.      High Risk - History of smoking or of other known risk factors.      Note: These recommendations do not apply to lung cancer screening, patients with immunosuppression, or patients with known primary cancer.      Fleischner Society 2017 Guidelines for Management of Incidentally Detected Pulmonary Nodules in Adults         CT-CTA CHEST PULMONARY ARTERY W/ RECONS   Final Result         1.  No large central pulmonary embolus is appreciated, evaluation of the  subsegmental branches is essentially nondiagnostic due to motion artifacts. Additional imaging would be required for definitive exclusion of small distal pulmonary emboli.      DX-LUMBAR SPINE-2 OR 3 VIEWS   Final Result      Limited examination. Mild anterior wedging L2 vertebral body that appears to be old.   If symptoms are persistent, would recommend CT for further evaluation.      DX-THORACIC SPINE-WITH SWIMMERS VIEW   Final Result      Limited examination. The upper thoracic spine is not well-demonstrated.   Mild anterior wedging of some the lower thoracic vertebral bodies and appears old.   No definite acute compression. If symptoms are persistent, CT would be recommended for further evaluation.      DX-CHEST-PORTABLE (1 VIEW)   Final Result      Central catheter projects over the superior right atrium.      Prominent calcified granuloma in the left midlung.      Atherosclerotic plaque.      CT-HEAD W/O   Final Result      No acute intracranial abnormality is identified.           Assessment/Plan  * Closed stable burst fracture of first lumbar vertebra with routine healing   Assessment & Plan    Lumbar MRI found L1 subacute fracture with no protrusion  Spoke with neurosurgery who suggested just placing a brace and follow-up in the clinic with them  PTOT  Multimodal pain control with lidocaine patch, gabapentin, MS IR, robaxin. No NSAIDs given thrombocytopenia.  Poor control of pain with oxycodone, dc.  MS Contin/MSIR for pain medication.  Rehab consult pending     Pancytopenia (HCC)- (present on admission)   Assessment & Plan    -likely due to myelodysplastic syndrome  -Last chemotherapy November 2018  -No acute signs symptoms of bleeding  -Iron studies, folate and B12 all WNL  -Hgb stable  -Follow labs  -Hematology consulted -Dr. Mccoy: given his imaging is negative for metastatic disease and his pancytopenia has been improving.  He can follow-up outpatient       Left knee pain   Assessment & Plan    X ray  found arthritis- cont pain mangement     Tachycardia   Assessment & Plan    Resolved  Likely from pain  metprolol 25 mg p.o. twice daily  Tachycardia resolved for several days, stopped telemetry     Somnolence- (present on admission)   Assessment & Plan    Waxing and waning  Stopped Ambien and trazodone  Improved     Dizziness- (present on admission)   Assessment & Plan    -He has been bedrest  -No events noted on telemetry  -Negative orthostatics  -Echo found a normal EF and no cause of dizziness  Has no further dizziness            VTE prophylaxis: heparin

## 2019-04-15 NOTE — DISCHARGE PLANNING
Physiatry Dr. Lao recommending skilled nursing for post acute care. Please see Dr. Lao' full consult note for specifics.

## 2019-04-15 NOTE — THERAPY
"Physical Therapy Treatment completed.   Bed Mobility:  Supine to Sit: Minimal Assist  Transfers: Sit to Stand: Minimal Assist  Gait: Level Of Assist: Moderate Assist with Front-Wheel Walker       Plan of Care: Will benefit from Physical Therapy 3 times per week  Discharge Recommendations: Equipment: Will Continue to Assess for Equipment Needs. Recommend inpatient transitional care services for continued physical therapy services.      See \"Rehab Therapy-Acute\" Patient Summary Report for complete documentation.     Pt is demonstrating with improved functional mobility from prior session. Pt was able to demonstrate improved activity tolerance, strength, and balance. Pt was able to participate in standing, and take a few steps forward and back with Min A to Mod A with FWW use. Pt was also limited due to fear of falling. Pt was able to transfer to chair with Min A with stand piviot and able to present appropriate foot clearence for transfers at this time. Pt is unable to tolerate long distance ambulation primarily due to pain. Pt agreeable to sit up in chair, RN aware of position and poor tolerance to sitting. Pt assited in adjusting TLSO throughout session and does not tolerate very well. Pt will continue to benefit from skilled PT while in house, with recommendation for post acute rehab prior to d/c home given current objective findings.   "

## 2019-04-16 LAB
BASOPHILS # BLD AUTO: 0.5 % (ref 0–1.8)
BASOPHILS # BLD: 0.02 K/UL (ref 0–0.12)
EOSINOPHIL # BLD AUTO: 0.16 K/UL (ref 0–0.51)
EOSINOPHIL NFR BLD: 4 % (ref 0–6.9)
ERYTHROCYTE [DISTWIDTH] IN BLOOD BY AUTOMATED COUNT: 57.9 FL (ref 35.9–50)
HCT VFR BLD AUTO: 31.4 % (ref 42–52)
HGB BLD-MCNC: 10.1 G/DL (ref 14–18)
IMM GRANULOCYTES # BLD AUTO: 0.02 K/UL (ref 0–0.11)
IMM GRANULOCYTES NFR BLD AUTO: 0.5 % (ref 0–0.9)
LYMPHOCYTES # BLD AUTO: 1.21 K/UL (ref 1–4.8)
LYMPHOCYTES NFR BLD: 30.3 % (ref 22–41)
MCH RBC QN AUTO: 29.5 PG (ref 27–33)
MCHC RBC AUTO-ENTMCNC: 32.2 G/DL (ref 33.7–35.3)
MCV RBC AUTO: 91.8 FL (ref 81.4–97.8)
MONOCYTES # BLD AUTO: 0.48 K/UL (ref 0–0.85)
MONOCYTES NFR BLD AUTO: 12 % (ref 0–13.4)
NEUTROPHILS # BLD AUTO: 2.1 K/UL (ref 1.82–7.42)
NEUTROPHILS NFR BLD: 52.7 % (ref 44–72)
NRBC # BLD AUTO: 0 K/UL
NRBC BLD-RTO: 0 /100 WBC
PLATELET # BLD AUTO: 78 K/UL (ref 164–446)
PMV BLD AUTO: 10.5 FL (ref 9–12.9)
RBC # BLD AUTO: 3.42 M/UL (ref 4.7–6.1)
WBC # BLD AUTO: 4 K/UL (ref 4.8–10.8)

## 2019-04-16 PROCEDURE — A9270 NON-COVERED ITEM OR SERVICE: HCPCS | Performed by: INTERNAL MEDICINE

## 2019-04-16 PROCEDURE — 770004 HCHG ROOM/CARE - ONCOLOGY PRIVATE *

## 2019-04-16 PROCEDURE — 97535 SELF CARE MNGMENT TRAINING: CPT

## 2019-04-16 PROCEDURE — 700102 HCHG RX REV CODE 250 W/ 637 OVERRIDE(OP): Performed by: INTERNAL MEDICINE

## 2019-04-16 PROCEDURE — 85025 COMPLETE CBC W/AUTO DIFF WBC: CPT

## 2019-04-16 PROCEDURE — 700101 HCHG RX REV CODE 250: Performed by: INTERNAL MEDICINE

## 2019-04-16 PROCEDURE — 700111 HCHG RX REV CODE 636 W/ 250 OVERRIDE (IP): Performed by: INTERNAL MEDICINE

## 2019-04-16 PROCEDURE — 700102 HCHG RX REV CODE 250 W/ 637 OVERRIDE(OP): Performed by: HOSPITALIST

## 2019-04-16 PROCEDURE — 99232 SBSQ HOSP IP/OBS MODERATE 35: CPT | Performed by: INTERNAL MEDICINE

## 2019-04-16 PROCEDURE — 97530 THERAPEUTIC ACTIVITIES: CPT

## 2019-04-16 PROCEDURE — A9270 NON-COVERED ITEM OR SERVICE: HCPCS | Performed by: HOSPITALIST

## 2019-04-16 RX ORDER — GABAPENTIN 400 MG/1
400 CAPSULE ORAL 3 TIMES DAILY
Status: DISCONTINUED | OUTPATIENT
Start: 2019-04-16 | End: 2019-04-21

## 2019-04-16 RX ORDER — MORPHINE SULFATE 4 MG/ML
2 INJECTION, SOLUTION INTRAMUSCULAR; INTRAVENOUS EVERY 4 HOURS PRN
Status: DISCONTINUED | OUTPATIENT
Start: 2019-04-16 | End: 2019-04-16

## 2019-04-16 RX ADMIN — GABAPENTIN 300 MG: 300 CAPSULE ORAL at 12:41

## 2019-04-16 RX ADMIN — HYDROXYZINE HYDROCHLORIDE 50 MG: 50 TABLET ORAL at 20:42

## 2019-04-16 RX ADMIN — HYDROCORTISONE: 1 CREAM TOPICAL at 06:00

## 2019-04-16 RX ADMIN — HYDROXYZINE HYDROCHLORIDE 50 MG: 50 TABLET ORAL at 04:18

## 2019-04-16 RX ADMIN — ONDANSETRON 4 MG: 2 SOLUTION INTRAMUSCULAR; INTRAVENOUS at 08:34

## 2019-04-16 RX ADMIN — GABAPENTIN 300 MG: 300 CAPSULE ORAL at 04:09

## 2019-04-16 RX ADMIN — OMEPRAZOLE 20 MG: 20 CAPSULE, DELAYED RELEASE ORAL at 04:09

## 2019-04-16 RX ADMIN — MORPHINE SULFATE 4 MG: 4 INJECTION INTRAVENOUS at 04:08

## 2019-04-16 RX ADMIN — METHOCARBAMOL TABLETS 750 MG: 750 TABLET, COATED ORAL at 04:18

## 2019-04-16 RX ADMIN — OMEPRAZOLE 20 MG: 20 CAPSULE, DELAYED RELEASE ORAL at 17:02

## 2019-04-16 RX ADMIN — ONDANSETRON 4 MG: 2 SOLUTION INTRAMUSCULAR; INTRAVENOUS at 17:06

## 2019-04-16 RX ADMIN — GABAPENTIN 400 MG: 400 CAPSULE ORAL at 17:01

## 2019-04-16 RX ADMIN — MORPHINE SULFATE 30 MG: 30 TABLET, FILM COATED, EXTENDED RELEASE ORAL at 17:01

## 2019-04-16 RX ADMIN — MORPHINE SULFATE 30 MG: 30 TABLET, FILM COATED, EXTENDED RELEASE ORAL at 04:09

## 2019-04-16 RX ADMIN — AMITRIPTYLINE HYDROCHLORIDE 75 MG: 50 TABLET, FILM COATED ORAL at 20:42

## 2019-04-16 RX ADMIN — BUSPIRONE HYDROCHLORIDE 15 MG: 30 TABLET ORAL at 04:08

## 2019-04-16 RX ADMIN — DIPHENHYDRAMINE HCL 50 MG: 25 TABLET ORAL at 20:42

## 2019-04-16 RX ADMIN — METHOCARBAMOL TABLETS 750 MG: 750 TABLET, COATED ORAL at 12:41

## 2019-04-16 RX ADMIN — HYDROCORTISONE: 1 CREAM TOPICAL at 17:16

## 2019-04-16 RX ADMIN — MORPHINE SULFATE 30 MG: 15 TABLET ORAL at 08:34

## 2019-04-16 RX ADMIN — MORPHINE SULFATE 30 MG: 15 TABLET ORAL at 19:47

## 2019-04-16 RX ADMIN — METOPROLOL TARTRATE 25 MG: 25 TABLET, FILM COATED ORAL at 17:06

## 2019-04-16 RX ADMIN — BUSPIRONE HYDROCHLORIDE 15 MG: 30 TABLET ORAL at 17:02

## 2019-04-16 ASSESSMENT — COGNITIVE AND FUNCTIONAL STATUS - GENERAL
DAILY ACTIVITIY SCORE: 15
TOILETING: A LOT
DRESSING REGULAR UPPER BODY CLOTHING: A LOT
DRESSING REGULAR LOWER BODY CLOTHING: A LOT
PERSONAL GROOMING: A LITTLE
SUGGESTED CMS G CODE MODIFIER DAILY ACTIVITY: CK
HELP NEEDED FOR BATHING: A LOT

## 2019-04-16 ASSESSMENT — ENCOUNTER SYMPTOMS
HEARTBURN: 0
DOUBLE VISION: 0
FEVER: 0
FOCAL WEAKNESS: 1
DIZZINESS: 0
HEADACHES: 0
WEAKNESS: 0
TINGLING: 0
ABDOMINAL PAIN: 0
NECK PAIN: 0
TREMORS: 0
PALPITATIONS: 0
BACK PAIN: 1
NAUSEA: 0
SPEECH CHANGE: 0
COUGH: 0
SENSORY CHANGE: 0
VOMITING: 0
CHILLS: 0
BLURRED VISION: 0
DEPRESSION: 0
MYALGIAS: 1
SHORTNESS OF BREATH: 0
PHOTOPHOBIA: 0
ORTHOPNEA: 0
WEIGHT LOSS: 0

## 2019-04-16 ASSESSMENT — LIFESTYLE VARIABLES: SUBSTANCE_ABUSE: 0

## 2019-04-16 NOTE — PROGRESS NOTES
Bedside report received. Pt in bed resting comfortably with no s/sx of pain or discomfort.  Pt A&O X 4. Up with two person assist. Protective precautions in place. CVC in place to left chest with blood return. Patient turns self from side to side. Morphine IR in use PRN for pain. Pt calls appropriately for medication or assistance as needed. Call light within reach, all appropriate fall precautions in place and personal belongings within reach; hourly rounding in place. No needs at this time. See flowsheets for further assessment details.

## 2019-04-16 NOTE — THERAPY
"Occupational Therapy Treatment completed with focus on ADLs, ADL transfers, patient education and cognition.  Functional Status:  Pt was seen for Occupational Therapy treatment today, see Therapy Kardex for details.Treatment included education in breath control with activity and at rest, self pacing techs and energy conservation for pain management. Educated pt in safety awareness techs as well. Psychosocial intervention addressed. Pt easily agitated and frustrated with OT suggestions of maintaining NSP with activity. Pt demo Min A for log roll with supine to sit getting angry with attempting  to lower HOB slightly to maintain appropriate log rolling for back protection. Pt used bed rail to assist. Pt demonstrated Min A for UB dressing for changing gown and Mod A for donning TLSO seated EOB.Mod A for LB dressing using AE with extended time to process sequencing and MP. Pt demo confusion at times needing re-direction back to tasks. Pt has an odd affect.Pt frustrated with using sock aid stating, \"It doesn't work the way you showed me\"; \"You're an idiot\". Informed/educated pt in a slight change in sequence, the sock went on smoothly.   Pt stood with Min A using FWW and demo Min/Mod A for BSC transfer. Pt did not want to stay seated and stood without warning demo impulsivity and poor safety awareness. Mod A for TLSO doffing seated EOB. Poor problem solving ability. Pt required Min A for BTB and Max A for bed mobility with fatigue.  Pt was left up in bed with CNA in room , call light in reach, bed alarm on  and nursing is aware. RN updated on OT treatment finding and recommendations.  Continue Occupational Therapy services as per plan.    Plan of Care: Will benefit from Occupational Therapy 3 times per week  Discharge Recommendations:  Equipment Will Continue to Assess for Equipment Needs. Post-acute therapy Discharge to a transitional care facility for continued skilled therapy services.    See \"Rehab Therapy-Acute\" " Patient Summary Report for complete documentation.

## 2019-04-16 NOTE — PROGRESS NOTES
Patient is alert and oriented. Patient rated his pain at a 10 consistently. The pain is in his back and his head. Pain medication given. Will continue to monitor pain control. Irritable vs pleasant.  Bed alarm in place, hourly rounding in practice

## 2019-04-16 NOTE — DISCHARGE PLANNING
PT declined for Acute Rehab. SNF placement unlikely due to criminal history and the fact that their is a warrant for his arrest currently. Contacted [facility] non emergency line ([phone]) to provide update and inquire if PT is still under a warrant. Spoke to dispatcher she stated that he is still under a warrant and they still wish to be contacted at discharge.

## 2019-04-16 NOTE — PROGRESS NOTES
During pain reassessment's patient reports pain is 10 out of 10. Patient is falling asleep during conversation.

## 2019-04-16 NOTE — CARE PLAN
Problem: Pain Management  Goal: Pain level will decrease to patient's comfort goal  Outcome: PROGRESSING AS EXPECTED  Patient appropriately verbalizes 0 to 10 pain scale. Calls for medication as needed. IR morphine in use PRN.     Problem: Communication  Goal: The ability to communicate needs accurately and effectively will improve  Outcome: PROGRESSING AS EXPECTED  Pt appropriately communicates needs POC discussed.

## 2019-04-16 NOTE — PROGRESS NOTES
"Assumed pt care  Pt is aaox4-irritable at times.  Pt sedated this afternoon and more awake at change of shift.  Pt reports 8-10 pain in back - scheduled pain meds and prn.  Dr. Cook aware of pain and made changes to pain mediations.   Called pt/ot to work with pt today - they were able to get him up to chair for 30 min.  Pt able to take two steps with nursing when being put back into bed - pt's legs wanting to give out.  Very high fall risk - bed and chair alarms placed and on.  Waffle bed and chair placed.  TLSO on and adjusted today.  Weight taken and adj'ed off tlso weight.  Good po intake - no bm since 4/8 - pt refusing prns.  Bottom red/blanching.  cvc patent/blood return noted - refused iv fluid/tko despite education.  Pt able to turn self in bed.  Sits up at edge of bed and using urinal.  Voids without difficulty.  Pt requesting to have diet changed to place no allergy to rice despite he is anaphylactic and does now have a rash on body from rice \"being thrown on him in [facility]\" per pt.  Per dr. Cook - no change in diet - pt upset and angry.  States that we just want to kill him and let him be in pain.  Emotional support given.  Call light within reach - will continue to round.  "

## 2019-04-16 NOTE — PROGRESS NOTES
[facility] Medicine Daily Progress Note    Date of Service  4/16/2019    Chief Complaint  53 y.o. male admitted 4/5/2019 with back pain, found to have L1 burst fracture.    Hospital Course   53-year-old male with past medical history of hypertension, myelodysplastic syndrome, depression and GERD presents to the hospital on April 5, 2019 with complaint of nausea and vomiting for past 2 days.  He found to have subacute fracture but no protrusion on MRI and neurosurgery was consulted and they recommended brace and outpatient follow-up in clinic.  Patient admitted to telemetry with L1 burst fracture, conservative bracing recommended by neurosurgeon.  Patient with difficult to control pain and trouble with therapies.  Physiatry was consulted and he is not a candidate for inpatient rehab as per physiatry evaluation.  For his pancytopenia and myelodysplastic syndrome Case discussed with oncology and recommended outpatient follow-up.      Interval Problem Update  I evaluated and examined this patient at bedside.  He reported that he continued to have severe back pain.  I discussed with him regarding pain management at length and after discussion I increase his dose of gabapentin.  Physiatry evaluated him and recommended that he is not a candidate for inpatient rehab.  Discussed with  regarding his discharge planning and I ordered LTAC referral.  Due to his leukopenia I ordered CBC and his white blood cell count has improved and his hemoglobin remained stable.  He has stable ongoing thrombocytopenia.    Consultants/Specialty  Neurosurgery  Physiatry    Code Status  full    Disposition  SNF vs rehab    Review of Systems  Review of Systems   Constitutional: Negative for chills, fever and weight loss.   HENT: Negative for congestion, hearing loss and tinnitus.    Eyes: Negative for blurred vision, double vision and photophobia.   Respiratory: Negative for cough and shortness of breath.    Cardiovascular: Negative for  chest pain, palpitations and orthopnea.   Gastrointestinal: Negative for abdominal pain, heartburn, nausea and vomiting.   Genitourinary: Negative for dysuria, frequency, hematuria and urgency.   Musculoskeletal: Positive for back pain and myalgias. Negative for joint pain and neck pain.   Skin: Negative for rash.   Neurological: Positive for focal weakness (Bilateral lower extremities due to pain). Negative for dizziness, tingling, tremors, sensory change, speech change, weakness and headaches.   Psychiatric/Behavioral: Negative for depression and substance abuse.   All other systems reviewed and are negative.       Physical Exam  Temp:  [36.2 °C (97.1 °F)-36.6 °C (97.8 °F)] 36.6 °C (97.8 °F)  Pulse:  [83-98] 92  Resp:  [15-18] 16  BP: (104-140)/(65-95) 121/77  SpO2:  [92 %-100 %] 96 %    Physical Exam   Constitutional: He is oriented to person, place, and time. No distress.   HENT:   Head: Normocephalic and atraumatic.   Eyes: Pupils are equal, round, and reactive to light. Right eye exhibits no discharge. Left eye exhibits no discharge.   Neck: Normal range of motion. Neck supple.   Cardiovascular: Normal rate and regular rhythm.  Exam reveals no friction rub.    No murmur heard.  Pulmonary/Chest: Effort normal and breath sounds normal. No respiratory distress. He has no wheezes.   Abdominal: Soft. Bowel sounds are normal. He exhibits no distension. There is no tenderness.   Musculoskeletal: He exhibits tenderness (Lower back). He exhibits no edema or deformity.   Neurological: He is alert and oriented to person, place, and time. No cranial nerve deficit.   Bilateral lower extremities weakness   Skin: Skin is warm and dry. He is not diaphoretic. No erythema.   Psychiatric: He has a normal mood and affect. His behavior is normal.       Fluids    Intake/Output Summary (Last 24 hours) at 04/16/19 0810  Last data filed at 04/15/19 1900   Gross per 24 hour   Intake              800 ml   Output                0 ml    Net              800 ml       Laboratory                        Imaging  DX-CHEST-PORTABLE (1 VIEW)   Final Result         No acute cardiac or pulmonary abnormality is identified.      TR-UTJPTRF-2 VIEW   Final Result      1.  There is a nonobstructive nonspecific bowel gas pattern.  There is no evidence of free intraperitoneal air.      MR-LUMBAR SPINE-WITH & W/O   Final Result      1.  There is subacute fracture along the superior endplate of L1 vertebral body. There is no posterior retropulsion.   2.  There is no focal bone lesion.   3.  There is no evidence of infection.   4.  Mild degenerative disease as described above.      MR-BRAIN-W/O   Final Result      1.  Mild cerebral atrophy.   2.  Mild chronic microvascular ischemic disease.   3.  No acute abnormality.      DX-KNEE 2- LEFT   Final Result      1.  No fracture or dislocation of LEFT knee.   2.  Minimal degenerative changes.   3.  Diffuse vascular calcifications.      EC-ECHOCARDIOGRAM COMPLETE W/O CONT   Final Result      CT-ABDOMEN-PELVIS WITH   Final Result         1.  No acute abnormality.   2.  Hepatomegaly and diffuse hepatic steatosis   3.  Atherosclerosis and atherosclerotic coronary artery disease.   4.  Pulmonary nodules measuring up to 6.5 mm, see nodule follow-up recommendations below.      Low Risk: CT at 3-6 months, then consider CT at 18-24 months      High Risk: CT at 3-6 months, then at 18-24 months      Comments: Use most suspicious nodule as guide to management. Follow-up intervals may vary according to size and risk.      Low Risk - Minimal or absent history of smoking and of other known risk factors.      High Risk - History of smoking or of other known risk factors.      Note: These recommendations do not apply to lung cancer screening, patients with immunosuppression, or patients with known primary cancer.      Fleischner Society 2017 Guidelines for Management of Incidentally Detected Pulmonary Nodules in Adults         CT-CTA  CHEST PULMONARY ARTERY W/ RECONS   Final Result         1.  No large central pulmonary embolus is appreciated, evaluation of the subsegmental branches is essentially nondiagnostic due to motion artifacts. Additional imaging would be required for definitive exclusion of small distal pulmonary emboli.      DX-LUMBAR SPINE-2 OR 3 VIEWS   Final Result      Limited examination. Mild anterior wedging L2 vertebral body that appears to be old.   If symptoms are persistent, would recommend CT for further evaluation.      DX-THORACIC SPINE-WITH SWIMMERS VIEW   Final Result      Limited examination. The upper thoracic spine is not well-demonstrated.   Mild anterior wedging of some the lower thoracic vertebral bodies and appears old.   No definite acute compression. If symptoms are persistent, CT would be recommended for further evaluation.      DX-CHEST-PORTABLE (1 VIEW)   Final Result      Central catheter projects over the superior right atrium.      Prominent calcified granuloma in the left midlung.      Atherosclerotic plaque.      CT-HEAD W/O   Final Result      No acute intracranial abnormality is identified.           Assessment/Plan  * Closed stable burst fracture of first lumbar vertebra with routine healing   Assessment & Plan    Lumbar MRI found L1 subacute fracture with no protrusion  Neurosurgery was consulted and they recommended brace placement and outpatient follow-up.  I increased his dose of gabapentin.  Continue to provide him pain control and monitor him for adverse effect of pain medications.  Physiatry evaluated him and recommended that is not a candidate for inpatient rehab.       Pancytopenia (HCC)- (present on admission)   Assessment & Plan    Most likely due to myelodysplastic syndrome  His last chemotherapy was in November 2018  No acute symptoms.  Hematology/oncology was consulted and recommended outpatient follow-up.  I ordered CBC and his white blood cell count has improved.         Left knee pain    Assessment & Plan    X ray found arthritis  Continue to provide him pain management.     Tachycardia   Assessment & Plan    Resolved  It was most likely from pain  metprolol 25 mg p.o. twice daily       Somnolence- (present on admission)   Assessment & Plan    Waxing and waning  Stopped Ambien and trazodone  He has been improving     Dizziness- (present on admission)   Assessment & Plan    Vitals are stable  Echo did not show any acute normalities  PT/OT  Negative orthostatics vitals            I discussed plan of care during multidisciplinary rounds.    VTE prophylaxis: heparin

## 2019-04-17 PROCEDURE — A9270 NON-COVERED ITEM OR SERVICE: HCPCS | Performed by: HOSPITALIST

## 2019-04-17 PROCEDURE — 700102 HCHG RX REV CODE 250 W/ 637 OVERRIDE(OP): Performed by: INTERNAL MEDICINE

## 2019-04-17 PROCEDURE — A9270 NON-COVERED ITEM OR SERVICE: HCPCS | Performed by: INTERNAL MEDICINE

## 2019-04-17 PROCEDURE — 700111 HCHG RX REV CODE 636 W/ 250 OVERRIDE (IP): Performed by: INTERNAL MEDICINE

## 2019-04-17 PROCEDURE — 99232 SBSQ HOSP IP/OBS MODERATE 35: CPT | Performed by: INTERNAL MEDICINE

## 2019-04-17 PROCEDURE — 700102 HCHG RX REV CODE 250 W/ 637 OVERRIDE(OP): Performed by: HOSPITALIST

## 2019-04-17 PROCEDURE — 770004 HCHG ROOM/CARE - ONCOLOGY PRIVATE *

## 2019-04-17 RX ADMIN — GABAPENTIN 400 MG: 400 CAPSULE ORAL at 05:18

## 2019-04-17 RX ADMIN — ONDANSETRON 4 MG: 4 TABLET, ORALLY DISINTEGRATING ORAL at 03:44

## 2019-04-17 RX ADMIN — HYDROXYZINE HYDROCHLORIDE 50 MG: 50 TABLET ORAL at 17:41

## 2019-04-17 RX ADMIN — MORPHINE SULFATE 30 MG: 30 TABLET, FILM COATED, EXTENDED RELEASE ORAL at 17:41

## 2019-04-17 RX ADMIN — ONDANSETRON 4 MG: 4 TABLET, ORALLY DISINTEGRATING ORAL at 08:00

## 2019-04-17 RX ADMIN — MORPHINE SULFATE 30 MG: 30 TABLET, FILM COATED, EXTENDED RELEASE ORAL at 05:19

## 2019-04-17 RX ADMIN — GABAPENTIN 400 MG: 400 CAPSULE ORAL at 11:40

## 2019-04-17 RX ADMIN — METHOCARBAMOL TABLETS 750 MG: 750 TABLET, COATED ORAL at 05:17

## 2019-04-17 RX ADMIN — BUSPIRONE HYDROCHLORIDE 15 MG: 30 TABLET ORAL at 17:41

## 2019-04-17 RX ADMIN — METHOCARBAMOL TABLETS 750 MG: 750 TABLET, COATED ORAL at 17:41

## 2019-04-17 RX ADMIN — METOPROLOL TARTRATE 25 MG: 25 TABLET, FILM COATED ORAL at 05:19

## 2019-04-17 RX ADMIN — HYDROCORTISONE: 1 CREAM TOPICAL at 17:44

## 2019-04-17 RX ADMIN — OMEPRAZOLE 20 MG: 20 CAPSULE, DELAYED RELEASE ORAL at 17:41

## 2019-04-17 RX ADMIN — MORPHINE SULFATE 15 MG: 15 TABLET ORAL at 17:41

## 2019-04-17 RX ADMIN — MORPHINE SULFATE 30 MG: 15 TABLET ORAL at 03:16

## 2019-04-17 RX ADMIN — METOPROLOL TARTRATE 25 MG: 25 TABLET, FILM COATED ORAL at 17:41

## 2019-04-17 RX ADMIN — HYDROCORTISONE: 1 CREAM TOPICAL at 05:21

## 2019-04-17 RX ADMIN — MORPHINE SULFATE 30 MG: 15 TABLET ORAL at 21:38

## 2019-04-17 RX ADMIN — GABAPENTIN 400 MG: 400 CAPSULE ORAL at 17:41

## 2019-04-17 RX ADMIN — BUSPIRONE HYDROCHLORIDE 15 MG: 30 TABLET ORAL at 05:17

## 2019-04-17 RX ADMIN — MORPHINE SULFATE 30 MG: 15 TABLET ORAL at 08:00

## 2019-04-17 RX ADMIN — HYDROXYZINE HYDROCHLORIDE 50 MG: 50 TABLET ORAL at 05:19

## 2019-04-17 RX ADMIN — AMITRIPTYLINE HYDROCHLORIDE 75 MG: 50 TABLET, FILM COATED ORAL at 21:38

## 2019-04-17 RX ADMIN — METHOCARBAMOL TABLETS 750 MG: 750 TABLET, COATED ORAL at 11:41

## 2019-04-17 ASSESSMENT — ENCOUNTER SYMPTOMS
PHOTOPHOBIA: 0
NAUSEA: 0
BACK PAIN: 1
ORTHOPNEA: 0
VOMITING: 0
DIZZINESS: 0
HEARTBURN: 0
ABDOMINAL PAIN: 0
HEADACHES: 0
SPEECH CHANGE: 0
WEAKNESS: 0
COUGH: 0
FOCAL WEAKNESS: 1
NECK PAIN: 0
SHORTNESS OF BREATH: 0
PALPITATIONS: 0
WEIGHT LOSS: 0
BLURRED VISION: 0
SENSORY CHANGE: 0
CHILLS: 0
FEVER: 0
DOUBLE VISION: 0
MYALGIAS: 1
TREMORS: 0
TINGLING: 0
DEPRESSION: 0

## 2019-04-17 ASSESSMENT — LIFESTYLE VARIABLES: SUBSTANCE_ABUSE: 0

## 2019-04-17 NOTE — CARE PLAN
Problem: Safety  Goal: Will remain free from injury  Outcome: PROGRESSING AS EXPECTED  Pt. Instructed on fall plan rational and instructed to use call light appropriately when getting OOB or in need of assistance.     Problem: Knowledge Deficit  Goal: Knowledge of disease process/condition, treatment plan, diagnostic tests, and medications will improve  Outcome: PROGRESSING AS EXPECTED  Pt. Informed of plan for the day and updated on any changes.

## 2019-04-17 NOTE — CARE PLAN
Problem: Safety  Goal: Will remain free from injury  Outcome: PROGRESSING AS EXPECTED  Instructed patient on the use of call light. Instructed patient to call RN to help when ambulating.      Problem: Knowledge Deficit  Goal: Knowledge of disease process/condition, treatment plan, diagnostic tests, and medications will improve  Outcome: PROGRESSING AS EXPECTED  Updated patient on the current plan for care. Patient has no questions at this time. Will continue to monitor.

## 2019-04-17 NOTE — DISCHARGE PLANNING
Received Choice form at [id number]  Agency[facility]Facility [facility]  Referral sent per Choice form @ 0810

## 2019-04-17 NOTE — PROGRESS NOTES
Hospital Medicine Daily Progress Note    Date of Service  4/17/2019    Chief Complaint  53 y.o. male admitted 4/5/2019 with back pain, found to have L1 burst fracture.    Hospital Course   53-year-old male with past medical history of hypertension, myelodysplastic syndrome, depression and GERD presents to the hospital on April 5, 2019 with complaint of nausea and vomiting for past 2 days.  He found to have subacute fracture but no protrusion on MRI and neurosurgery was consulted and they recommended brace and outpatient follow-up in clinic.  Patient admitted to telemetry with L1 burst fracture, conservative bracing recommended by neurosurgeon.  Patient with difficult to control pain and trouble with therapies.  Physiatry was consulted and he is not a candidate for inpatient rehab as per physiatry evaluation.  For his pancytopenia and myelodysplastic syndrome Case discussed with oncology and recommended outpatient follow-up.      Interval Problem Update    I evaluated this patient at bedside this morning.  I discussed with pain management with him.  I discontinued IV pain medication and encouraged him to use oral pain medication for pain control.  I discussed with  regarding disposition and I ordered a skilled nursing facility referral.      Consultants/Specialty  Neurosurgery  Physiatry    Code Status  full    Disposition  SNF vs rehab    Review of Systems  Review of Systems   Constitutional: Negative for chills, fever and weight loss.   HENT: Negative for congestion, hearing loss and tinnitus.    Eyes: Negative for blurred vision, double vision and photophobia.   Respiratory: Negative for cough and shortness of breath.    Cardiovascular: Negative for chest pain, palpitations and orthopnea.   Gastrointestinal: Negative for abdominal pain, heartburn, nausea and vomiting.   Genitourinary: Negative for dysuria, frequency, hematuria and urgency.   Musculoskeletal: Positive for back pain and myalgias.  Negative for joint pain and neck pain.   Skin: Negative for rash.   Neurological: Positive for focal weakness (Bilateral lower extremities due to pain). Negative for dizziness, tingling, tremors, sensory change, speech change, weakness and headaches.   Psychiatric/Behavioral: Negative for depression and substance abuse.   All other systems reviewed and are negative.       Physical Exam  Temp:  [36 °C (96.8 °F)-36.7 °C (98.1 °F)] 36.3 °C (97.4 °F)  Pulse:  [] 107  Resp:  [17-19] 18  BP: (104-118)/(63-85) 108/71  SpO2:  [93 %-100 %] 93 %    Physical Exam   Constitutional: He is oriented to person, place, and time.   He is laying in bed without any acute distress.   HENT:   Head: Normocephalic and atraumatic.   Eyes: Pupils are equal, round, and reactive to light. Right eye exhibits no discharge. Left eye exhibits no discharge.   Neck: Normal range of motion. Neck supple.   Cardiovascular: Normal rate and regular rhythm.  Exam reveals no friction rub.    No murmur heard.  Pulmonary/Chest: Effort normal and breath sounds normal. No respiratory distress.   Abdominal: Soft. Bowel sounds are normal. He exhibits no distension.   Musculoskeletal: He exhibits tenderness (Lower back). He exhibits no edema or deformity.   Neurological: He is alert and oriented to person, place, and time. No cranial nerve deficit.   Bilateral lower extremities weakness   Skin: Skin is warm and dry. He is not diaphoretic. No erythema.   Psychiatric: He has a normal mood and affect. His behavior is normal.   No significant change in physical exam findings today on April 17, 2019 from the exam finding from yesterday.    Fluids    Intake/Output Summary (Last 24 hours) at 04/17/19 0732  Last data filed at 04/17/19 0313   Gross per 24 hour   Intake                0 ml   Output             1200 ml   Net            -1200 ml       Laboratory  Recent Labs      04/16/19   1300   WBC  4.0*   RBC  3.42*   HEMOGLOBIN  10.1*   HEMATOCRIT  31.4*   MCV   91.8   MCH  29.5   MCHC  32.2*   RDW  57.9*   PLATELETCT  78*   MPV  10.5                       Imaging  DX-CHEST-PORTABLE (1 VIEW)   Final Result         No acute cardiac or pulmonary abnormality is identified.      TC-QODHDCD-3 VIEW   Final Result      1.  There is a nonobstructive nonspecific bowel gas pattern.  There is no evidence of free intraperitoneal air.      MR-LUMBAR SPINE-WITH & W/O   Final Result      1.  There is subacute fracture along the superior endplate of L1 vertebral body. There is no posterior retropulsion.   2.  There is no focal bone lesion.   3.  There is no evidence of infection.   4.  Mild degenerative disease as described above.      MR-BRAIN-W/O   Final Result      1.  Mild cerebral atrophy.   2.  Mild chronic microvascular ischemic disease.   3.  No acute abnormality.      DX-KNEE 2- LEFT   Final Result      1.  No fracture or dislocation of LEFT knee.   2.  Minimal degenerative changes.   3.  Diffuse vascular calcifications.      EC-ECHOCARDIOGRAM COMPLETE W/O CONT   Final Result      CT-ABDOMEN-PELVIS WITH   Final Result         1.  No acute abnormality.   2.  Hepatomegaly and diffuse hepatic steatosis   3.  Atherosclerosis and atherosclerotic coronary artery disease.   4.  Pulmonary nodules measuring up to 6.5 mm, see nodule follow-up recommendations below.      Low Risk: CT at 3-6 months, then consider CT at 18-24 months      High Risk: CT at 3-6 months, then at 18-24 months      Comments: Use most suspicious nodule as guide to management. Follow-up intervals may vary according to size and risk.      Low Risk - Minimal or absent history of smoking and of other known risk factors.      High Risk - History of smoking or of other known risk factors.      Note: These recommendations do not apply to lung cancer screening, patients with immunosuppression, or patients with known primary cancer.      Fleischner Society 2017 Guidelines for Management of Incidentally Detected Pulmonary  Nodules in Adults         CT-CTA CHEST PULMONARY ARTERY W/ RECONS   Final Result         1.  No large central pulmonary embolus is appreciated, evaluation of the subsegmental branches is essentially nondiagnostic due to motion artifacts. Additional imaging would be required for definitive exclusion of small distal pulmonary emboli.      DX-LUMBAR SPINE-2 OR 3 VIEWS   Final Result      Limited examination. Mild anterior wedging L2 vertebral body that appears to be old.   If symptoms are persistent, would recommend CT for further evaluation.      DX-THORACIC SPINE-WITH SWIMMERS VIEW   Final Result      Limited examination. The upper thoracic spine is not well-demonstrated.   Mild anterior wedging of some the lower thoracic vertebral bodies and appears old.   No definite acute compression. If symptoms are persistent, CT would be recommended for further evaluation.      DX-CHEST-PORTABLE (1 VIEW)   Final Result      Central catheter projects over the superior right atrium.      Prominent calcified granuloma in the left midlung.      Atherosclerotic plaque.      CT-HEAD W/O   Final Result      No acute intracranial abnormality is identified.           Assessment/Plan  * Closed stable burst fracture of first lumbar vertebra with routine healing   Assessment & Plan    Lumbar MRI found L1 subacute fracture with no protrusion  Neurosurgery was consulted and they recommended brace placement and outpatient follow-up.  I increased his dose of gabapentin.  I discontinued IV morphine.  I encouraged him to use oral pain medication for pain control.  Physiatry evaluated him and recommended that is not a candidate for inpatient rehab.  Difficult discharge.       Pancytopenia (HCC)- (present on admission)   Assessment & Plan    Most likely due to myelodysplastic syndrome  His last chemotherapy was in November 2018  No acute symptoms.  Hematology/oncology was consulted and recommended outpatient follow-up.  I ordered CBC and his white  blood cell count has improved.         Left knee pain   Assessment & Plan    X ray found arthritis  Continue to provide him pain management.     Tachycardia   Assessment & Plan    Resolved  It was most likely from pain  metprolol 25 mg p.o. twice daily       Somnolence- (present on admission)   Assessment & Plan    Waxing and waning  Stopped Ambien and trazodone  Ordered BMP and ammonia for tomorrow     Dizziness- (present on admission)   Assessment & Plan    Vitals are stable  Echo did not show any acute normalities  PT/OT  Negative orthostatics vitals            I discussed plan of care during multidisciplinary rounds.    VTE prophylaxis: heparin

## 2019-04-18 LAB
AMMONIA PLAS-SCNC: 35 UMOL/L (ref 11–45)
ANION GAP SERPL CALC-SCNC: 8 MMOL/L (ref 0–11.9)
BUN SERPL-MCNC: 15 MG/DL (ref 8–22)
CALCIUM SERPL-MCNC: 9 MG/DL (ref 8.5–10.5)
CHLORIDE SERPL-SCNC: 104 MMOL/L (ref 96–112)
CO2 SERPL-SCNC: 28 MMOL/L (ref 20–33)
CREAT SERPL-MCNC: 1.08 MG/DL (ref 0.5–1.4)
GLUCOSE SERPL-MCNC: 117 MG/DL (ref 65–99)
POTASSIUM SERPL-SCNC: 4.9 MMOL/L (ref 3.6–5.5)
SODIUM SERPL-SCNC: 140 MMOL/L (ref 135–145)

## 2019-04-18 PROCEDURE — A9270 NON-COVERED ITEM OR SERVICE: HCPCS | Performed by: INTERNAL MEDICINE

## 2019-04-18 PROCEDURE — 80048 BASIC METABOLIC PNL TOTAL CA: CPT

## 2019-04-18 PROCEDURE — 82140 ASSAY OF AMMONIA: CPT

## 2019-04-18 PROCEDURE — 770004 HCHG ROOM/CARE - ONCOLOGY PRIVATE *

## 2019-04-18 PROCEDURE — 700102 HCHG RX REV CODE 250 W/ 637 OVERRIDE(OP): Performed by: INTERNAL MEDICINE

## 2019-04-18 PROCEDURE — 700111 HCHG RX REV CODE 636 W/ 250 OVERRIDE (IP): Performed by: INTERNAL MEDICINE

## 2019-04-18 PROCEDURE — A9270 NON-COVERED ITEM OR SERVICE: HCPCS | Performed by: HOSPITALIST

## 2019-04-18 PROCEDURE — 97530 THERAPEUTIC ACTIVITIES: CPT

## 2019-04-18 PROCEDURE — 99232 SBSQ HOSP IP/OBS MODERATE 35: CPT | Performed by: INTERNAL MEDICINE

## 2019-04-18 PROCEDURE — 700102 HCHG RX REV CODE 250 W/ 637 OVERRIDE(OP): Performed by: HOSPITALIST

## 2019-04-18 RX ORDER — HYDROXYZINE 50 MG/1
50 TABLET, FILM COATED ORAL ONCE
Status: ACTIVE | OUTPATIENT
Start: 2019-04-18 | End: 2019-04-19

## 2019-04-18 RX ORDER — HEPARIN SODIUM 5000 [USP'U]/ML
5000 INJECTION, SOLUTION INTRAVENOUS; SUBCUTANEOUS EVERY 8 HOURS
Status: DISCONTINUED | OUTPATIENT
Start: 2019-04-18 | End: 2019-05-20 | Stop reason: HOSPADM

## 2019-04-18 RX ORDER — GABAPENTIN 400 MG/1
400 CAPSULE ORAL ONCE
Status: COMPLETED | OUTPATIENT
Start: 2019-04-18 | End: 2019-04-18

## 2019-04-18 RX ORDER — BUSPIRONE HYDROCHLORIDE 10 MG/1
15 TABLET ORAL ONCE
Status: COMPLETED | OUTPATIENT
Start: 2019-04-18 | End: 2019-04-18

## 2019-04-18 RX ORDER — OMEPRAZOLE 20 MG/1
20 CAPSULE, DELAYED RELEASE ORAL ONCE
Status: COMPLETED | OUTPATIENT
Start: 2019-04-18 | End: 2019-04-18

## 2019-04-18 RX ORDER — MORPHINE SULFATE 30 MG/1
30 TABLET, FILM COATED, EXTENDED RELEASE ORAL ONCE
Status: COMPLETED | OUTPATIENT
Start: 2019-04-18 | End: 2019-04-18

## 2019-04-18 RX ADMIN — METOPROLOL TARTRATE 25 MG: 25 TABLET, FILM COATED ORAL at 17:26

## 2019-04-18 RX ADMIN — HYDROCORTISONE 1 EACH: 1 CREAM TOPICAL at 06:06

## 2019-04-18 RX ADMIN — ONDANSETRON 4 MG: 4 TABLET, ORALLY DISINTEGRATING ORAL at 23:59

## 2019-04-18 RX ADMIN — OMEPRAZOLE 20 MG: 20 CAPSULE, DELAYED RELEASE ORAL at 09:49

## 2019-04-18 RX ADMIN — BUSPIRONE HYDROCHLORIDE 15 MG: 30 TABLET ORAL at 17:26

## 2019-04-18 RX ADMIN — BUSPIRONE HYDROCHLORIDE 15 MG: 10 TABLET ORAL at 09:47

## 2019-04-18 RX ADMIN — MORPHINE SULFATE 30 MG: 30 TABLET, FILM COATED, EXTENDED RELEASE ORAL at 17:26

## 2019-04-18 RX ADMIN — AMITRIPTYLINE HYDROCHLORIDE 75 MG: 50 TABLET, FILM COATED ORAL at 22:33

## 2019-04-18 RX ADMIN — MORPHINE SULFATE 15 MG: 15 TABLET ORAL at 23:59

## 2019-04-18 RX ADMIN — METHOCARBAMOL TABLETS 750 MG: 750 TABLET, COATED ORAL at 17:26

## 2019-04-18 RX ADMIN — MORPHINE SULFATE 30 MG: 30 TABLET, FILM COATED, EXTENDED RELEASE ORAL at 09:48

## 2019-04-18 RX ADMIN — HYDROCORTISONE: 1 CREAM TOPICAL at 17:25

## 2019-04-18 RX ADMIN — HEPARIN SODIUM 5000 UNITS: 5000 INJECTION, SOLUTION INTRAVENOUS; SUBCUTANEOUS at 22:33

## 2019-04-18 RX ADMIN — METOPROLOL TARTRATE 25 MG: 25 TABLET ORAL at 09:48

## 2019-04-18 RX ADMIN — HYDROXYZINE HYDROCHLORIDE 50 MG: 50 TABLET ORAL at 17:26

## 2019-04-18 RX ADMIN — GABAPENTIN 400 MG: 400 CAPSULE ORAL at 09:48

## 2019-04-18 RX ADMIN — GABAPENTIN 400 MG: 400 CAPSULE ORAL at 17:26

## 2019-04-18 RX ADMIN — ONDANSETRON 4 MG: 2 SOLUTION INTRAMUSCULAR; INTRAVENOUS at 03:13

## 2019-04-18 RX ADMIN — HEPARIN SODIUM 5000 UNITS: 5000 INJECTION, SOLUTION INTRAVENOUS; SUBCUTANEOUS at 17:26

## 2019-04-18 RX ADMIN — MORPHINE SULFATE 30 MG: 15 TABLET ORAL at 03:13

## 2019-04-18 RX ADMIN — OMEPRAZOLE 20 MG: 20 CAPSULE, DELAYED RELEASE ORAL at 17:26

## 2019-04-18 ASSESSMENT — LIFESTYLE VARIABLES: SUBSTANCE_ABUSE: 0

## 2019-04-18 ASSESSMENT — ENCOUNTER SYMPTOMS
FEVER: 0
PHOTOPHOBIA: 0
PALPITATIONS: 0
HEADACHES: 0
WEIGHT LOSS: 0
DIZZINESS: 0
NECK PAIN: 0
TINGLING: 0
SENSORY CHANGE: 0
DOUBLE VISION: 0
MYALGIAS: 1
SPEECH CHANGE: 0
VOMITING: 0
DEPRESSION: 0
NAUSEA: 0
BACK PAIN: 1
WEAKNESS: 0
CHILLS: 0
SHORTNESS OF BREATH: 0
BLURRED VISION: 0
ORTHOPNEA: 0
ABDOMINAL PAIN: 0
TREMORS: 0
HEARTBURN: 0
COUGH: 0
FOCAL WEAKNESS: 1

## 2019-04-18 ASSESSMENT — GAIT ASSESSMENTS
DEVIATION: ANTALGIC;STEP TO;DECREASED BASE OF SUPPORT;DECREASED HEEL STRIKE;DECREASED TOE OFF
ASSISTIVE DEVICE: FRONT WHEEL WALKER
GAIT LEVEL OF ASSIST: MODERATE ASSIST
DISTANCE (FEET): 3

## 2019-04-18 ASSESSMENT — COGNITIVE AND FUNCTIONAL STATUS - GENERAL
MOVING FROM LYING ON BACK TO SITTING ON SIDE OF FLAT BED: A LITTLE
TURNING FROM BACK TO SIDE WHILE IN FLAT BAD: A LITTLE
MOBILITY SCORE: 14
CLIMB 3 TO 5 STEPS WITH RAILING: TOTAL
WALKING IN HOSPITAL ROOM: A LOT
STANDING UP FROM CHAIR USING ARMS: A LOT
MOVING TO AND FROM BED TO CHAIR: A LITTLE
SUGGESTED CMS G CODE MODIFIER MOBILITY: CL

## 2019-04-18 NOTE — PROGRESS NOTES
Pt. A&Ox4, PERRLA, VSS. Pt. Lethargic, sleeping most of the day. Pain medications held until pt. More alert in afternoon. Pt. Pain controlled with PRN medications. Voiding in urinal, tolerating diet well. Will continue to monitor.

## 2019-04-18 NOTE — THERAPY
"Physical Therapy Treatment completed.   Bed Mobility:  Supine to Sit: Minimal Assist  Transfers: Sit to Stand: Minimal Assist  Gait: Level Of Assist: Moderate Assist with Front-Wheel Walker       Plan of Care: Will benefit from Physical Therapy 3 times per week  Discharge Recommendations: Equipment: Will Continue to Assess for Equipment Needs. Recommend inpatient transitional care services for continued physical therapy services.      See \"Rehab Therapy-Acute\" Patient Summary Report for complete documentation.     Pt is progressing slower than expected this session, as the pt demonstrates with dec cogntion, following, and poor participation with therapy. Pt was able to demonstrate Min A for sit<>stand, Mod A for steps forward, Mod A for transfer to chair w/FWW. Pt demonstrated with multiple LOB occurances during standing activites and required Min A for correction. Pt continued to report he is going to fall and was unable to follow PT commands to sit down once he was close enought to his bed. Pt was able to sit and take a rest break, and was agreeable to attempt transfer to chair with 2 person assist. However, pt refused to wear TLSO and took it off for transfer. Pt edcuated on current precautions, however, pt continued to refuse despite education. Pt will continue to benefit from skilled PT while in house, with recommendation for post acute therapy prior to d/c home given current objective findings.   "

## 2019-04-18 NOTE — CARE PLAN
Problem: Pain Management  Goal: Pain level will decrease to patient's comfort goal  Outcome: PROGRESSING AS EXPECTED  Rest, reposition, distraction, and medicated per MAR    Problem: Communication  Goal: The ability to communicate needs accurately and effectively will improve  Outcome: PROGRESSING AS EXPECTED  Pt involved in POC. Addressed questions/concerns

## 2019-04-18 NOTE — DISCHARGE PLANNING
Agency[facility]Facility Name[facility]  Outcome: no answer. Left message to call [facility] back with update on referral.     Agency/Facility Name: [facility]  Spoke To: Adrián  Outcome: Pt declined. Criminal Record.     Cate HOUSTON notified.

## 2019-04-18 NOTE — PROGRESS NOTES
Pt alert and oriented x 4. Up with two person assist and generalized weakness and pain. Complaints of pain of an 8/10 in his back and R leg. Medicated as need with morphine IR and MSContin with minimal stated relief. Pt also with complaints of nausea while eating six pieces of sausage. L chest CVC CDI and flushing well with positive blood return. Fluids infusing at TKO. Pt sitting up at edge of the bed. Resting comfortably at this time. Call light within reach. Hourly rounding in place.

## 2019-04-18 NOTE — PROGRESS NOTES
Hospital Medicine Daily Progress Note    Date of Service  4/18/2019    Chief Complaint  53 y.o. male admitted 4/5/2019 with back pain, found to have L1 burst fracture.    Hospital Course   53-year-old male with past medical history of hypertension, myelodysplastic syndrome, depression and GERD presents to the hospital on April 5, 2019 with complaint of nausea and vomiting for past 2 days.  He found to have subacute fracture but no protrusion on MRI and neurosurgery was consulted and they recommended brace and outpatient follow-up in clinic.  Patient admitted to telemetry with L1 burst fracture, conservative bracing recommended by neurosurgeon.  Patient with difficult to control pain and trouble with therapies.  Physiatry was consulted and he is not a candidate for inpatient rehab as per physiatry evaluation.  For his pancytopenia and myelodysplastic syndrome Case discussed with oncology and recommended outpatient follow-up.      Interval Problem Update    I evaluated this patient at the bedside.  Continues to have back pain.  He has slightly increased motor strength in bilateral lower extremities.  I ordered BMP and ammonia and came back within normal limits without any acute changes.  Currently pending discharge      Consultants/Specialty  Neurosurgery  Physiatry    Code Status  full    Disposition  SNF vs rehab    Review of Systems  Review of Systems   Constitutional: Negative for chills, fever and weight loss.   HENT: Negative for congestion, hearing loss and tinnitus.    Eyes: Negative for blurred vision, double vision and photophobia.   Respiratory: Negative for cough and shortness of breath.    Cardiovascular: Negative for chest pain, palpitations and orthopnea.   Gastrointestinal: Negative for abdominal pain, heartburn, nausea and vomiting.   Genitourinary: Negative for dysuria, frequency, hematuria and urgency.   Musculoskeletal: Positive for back pain and myalgias. Negative for joint pain and neck pain.    Skin: Negative for rash.   Neurological: Positive for focal weakness (Bilateral lower extremities due to pain (slightly improved)). Negative for dizziness, tingling, tremors, sensory change, speech change, weakness and headaches.   Psychiatric/Behavioral: Negative for depression and substance abuse.   All other systems reviewed and are negative.       Physical Exam  Temp:  [36.2 °C (97.2 °F)-36.7 °C (98.1 °F)] 36.3 °C (97.3 °F)  Pulse:  [] 87  Resp:  [18-20] 20  BP: (101-119)/(59-76) 109/59  SpO2:  [91 %-97 %] 97 %    Physical Exam   Constitutional: He is oriented to person, place, and time.   He is sitting at the edge of the bed.   HENT:   Head: Normocephalic and atraumatic.   Eyes: Pupils are equal, round, and reactive to light.   Neck: Normal range of motion. Neck supple.   Cardiovascular: Normal rate and regular rhythm.  Exam reveals no friction rub.    No murmur heard.  Pulmonary/Chest: Effort normal and breath sounds normal. No respiratory distress.   Abdominal: Soft. Bowel sounds are normal. He exhibits no distension.   Musculoskeletal: He exhibits tenderness (Lower back). He exhibits no edema or deformity.   Neurological: He is alert and oriented to person, place, and time. No cranial nerve deficit.   Bilateral lower extremities weakness (improved)   Skin: Skin is warm and dry. He is not diaphoretic.   Psychiatric: He has a normal mood and affect. His behavior is normal.     Fluids  No intake or output data in the 24 hours ending 04/18/19 0759    Laboratory  Recent Labs      04/16/19   1300   WBC  4.0*   RBC  3.42*   HEMOGLOBIN  10.1*   HEMATOCRIT  31.4*   MCV  91.8   MCH  29.5   MCHC  32.2*   RDW  57.9*   PLATELETCT  78*   MPV  10.5     Recent Labs      04/18/19   0315   SODIUM  140   POTASSIUM  4.9   CHLORIDE  104   CO2  28   GLUCOSE  117*   BUN  15   CREATININE  1.08   CALCIUM  9.0                   Imaging  DX-CHEST-PORTABLE (1 VIEW)   Final Result         No acute cardiac or pulmonary  abnormality is identified.      VQ-BICPTNA-2 VIEW   Final Result      1.  There is a nonobstructive nonspecific bowel gas pattern.  There is no evidence of free intraperitoneal air.      MR-LUMBAR SPINE-WITH & W/O   Final Result      1.  There is subacute fracture along the superior endplate of L1 vertebral body. There is no posterior retropulsion.   2.  There is no focal bone lesion.   3.  There is no evidence of infection.   4.  Mild degenerative disease as described above.      MR-BRAIN-W/O   Final Result      1.  Mild cerebral atrophy.   2.  Mild chronic microvascular ischemic disease.   3.  No acute abnormality.      DX-KNEE 2- LEFT   Final Result      1.  No fracture or dislocation of LEFT knee.   2.  Minimal degenerative changes.   3.  Diffuse vascular calcifications.      EC-ECHOCARDIOGRAM COMPLETE W/O CONT   Final Result      CT-ABDOMEN-PELVIS WITH   Final Result         1.  No acute abnormality.   2.  Hepatomegaly and diffuse hepatic steatosis   3.  Atherosclerosis and atherosclerotic coronary artery disease.   4.  Pulmonary nodules measuring up to 6.5 mm, see nodule follow-up recommendations below.      Low Risk: CT at 3-6 months, then consider CT at 18-24 months      High Risk: CT at 3-6 months, then at 18-24 months      Comments: Use most suspicious nodule as guide to management. Follow-up intervals may vary according to size and risk.      Low Risk - Minimal or absent history of smoking and of other known risk factors.      High Risk - History of smoking or of other known risk factors.      Note: These recommendations do not apply to lung cancer screening, patients with immunosuppression, or patients with known primary cancer.      Fleischner Society 2017 Guidelines for Management of Incidentally Detected Pulmonary Nodules in Adults         CT-CTA CHEST PULMONARY ARTERY W/ RECONS   Final Result         1.  No large central pulmonary embolus is appreciated, evaluation of the subsegmental branches is  essentially nondiagnostic due to motion artifacts. Additional imaging would be required for definitive exclusion of small distal pulmonary emboli.      DX-LUMBAR SPINE-2 OR 3 VIEWS   Final Result      Limited examination. Mild anterior wedging L2 vertebral body that appears to be old.   If symptoms are persistent, would recommend CT for further evaluation.      DX-THORACIC SPINE-WITH SWIMMERS VIEW   Final Result      Limited examination. The upper thoracic spine is not well-demonstrated.   Mild anterior wedging of some the lower thoracic vertebral bodies and appears old.   No definite acute compression. If symptoms are persistent, CT would be recommended for further evaluation.      DX-CHEST-PORTABLE (1 VIEW)   Final Result      Central catheter projects over the superior right atrium.      Prominent calcified granuloma in the left midlung.      Atherosclerotic plaque.      CT-HEAD W/O   Final Result      No acute intracranial abnormality is identified.           Assessment/Plan  * Closed stable burst fracture of first lumbar vertebra with routine healing   Assessment & Plan    Lumbar MRI found L1 subacute fracture with no protrusion  Neurosurgery was consulted and they recommended brace placement and outpatient follow-up.  I increased his dose of gabapentin.  I discontinued IV morphine.  I encouraged him to use oral pain medication for pain control.  Physiatry evaluated him and recommended that is not a candidate for inpatient rehab.  Difficult discharge.  I discussed with .       Pancytopenia (HCC)- (present on admission)   Assessment & Plan    Most likely due to myelodysplastic syndrome  His last chemotherapy was in November 2018  No acute symptoms.  Hematology/oncology was consulted and recommended outpatient follow-up.  I ordered CBC and his white blood cell count has improved.         Left knee pain   Assessment & Plan    X ray found arthritis  Continue to provide him pain management.      Tachycardia   Assessment & Plan    Resolved  It was most likely from pain  metprolol 25 mg p.o. twice daily       Somnolence- (present on admission)   Assessment & Plan    Waxing and waning  Stopped Ambien and trazodone  Ordered BMP and ammonia and it did not show any acute normalities.     Dizziness- (present on admission)   Assessment & Plan    Vitals are stable  Echo did not show any acute normalities  PT/OT  Negative orthostatics vitals            I discussed plan of care during multidisciplinary rounds.    VTE prophylaxis: heparin

## 2019-04-18 NOTE — PROGRESS NOTES
Received shift report from day shift RN. Pt is AO4, irritable, easily agitated, forgetful, and with fragmented thoughts. Denies N/V. Reports pain in his back, rest, repositioned, distraction, and medicated per MAR. Discussed POC. Assessed and administered evening medications. Bed alarm on, bed in lowest position, call light and personal belongings within reach, educated to call if in need of assistance, non skid socks, all needs met at this time.

## 2019-04-19 PROCEDURE — 700102 HCHG RX REV CODE 250 W/ 637 OVERRIDE(OP): Performed by: INTERNAL MEDICINE

## 2019-04-19 PROCEDURE — A9270 NON-COVERED ITEM OR SERVICE: HCPCS | Performed by: INTERNAL MEDICINE

## 2019-04-19 PROCEDURE — 700111 HCHG RX REV CODE 636 W/ 250 OVERRIDE (IP): Performed by: INTERNAL MEDICINE

## 2019-04-19 PROCEDURE — A9270 NON-COVERED ITEM OR SERVICE: HCPCS | Performed by: HOSPITALIST

## 2019-04-19 PROCEDURE — 770004 HCHG ROOM/CARE - ONCOLOGY PRIVATE *

## 2019-04-19 PROCEDURE — 99231 SBSQ HOSP IP/OBS SF/LOW 25: CPT | Performed by: INTERNAL MEDICINE

## 2019-04-19 PROCEDURE — 700102 HCHG RX REV CODE 250 W/ 637 OVERRIDE(OP): Performed by: HOSPITALIST

## 2019-04-19 RX ADMIN — AMITRIPTYLINE HYDROCHLORIDE 75 MG: 50 TABLET, FILM COATED ORAL at 20:43

## 2019-04-19 RX ADMIN — GABAPENTIN 400 MG: 400 CAPSULE ORAL at 05:43

## 2019-04-19 RX ADMIN — HYDROCORTISONE: 1 CREAM TOPICAL at 20:42

## 2019-04-19 RX ADMIN — OMEPRAZOLE 20 MG: 20 CAPSULE, DELAYED RELEASE ORAL at 17:30

## 2019-04-19 RX ADMIN — MORPHINE SULFATE 30 MG: 30 TABLET, FILM COATED, EXTENDED RELEASE ORAL at 17:30

## 2019-04-19 RX ADMIN — HEPARIN SODIUM 5000 UNITS: 5000 INJECTION, SOLUTION INTRAVENOUS; SUBCUTANEOUS at 05:44

## 2019-04-19 RX ADMIN — HYDROCORTISONE: 1 CREAM TOPICAL at 05:44

## 2019-04-19 RX ADMIN — HEPARIN SODIUM 5000 UNITS: 5000 INJECTION, SOLUTION INTRAVENOUS; SUBCUTANEOUS at 20:42

## 2019-04-19 RX ADMIN — METHOCARBAMOL TABLETS 750 MG: 750 TABLET, COATED ORAL at 17:30

## 2019-04-19 RX ADMIN — MORPHINE SULFATE 30 MG: 30 TABLET, FILM COATED, EXTENDED RELEASE ORAL at 05:44

## 2019-04-19 RX ADMIN — BUSPIRONE HYDROCHLORIDE 15 MG: 30 TABLET ORAL at 17:30

## 2019-04-19 RX ADMIN — HYDROXYZINE HYDROCHLORIDE 50 MG: 50 TABLET ORAL at 17:31

## 2019-04-19 RX ADMIN — BUSPIRONE HYDROCHLORIDE 15 MG: 30 TABLET ORAL at 05:41

## 2019-04-19 RX ADMIN — METOPROLOL TARTRATE 25 MG: 25 TABLET, FILM COATED ORAL at 05:43

## 2019-04-19 RX ADMIN — METOPROLOL TARTRATE 25 MG: 25 TABLET, FILM COATED ORAL at 17:30

## 2019-04-19 RX ADMIN — METHOCARBAMOL TABLETS 750 MG: 750 TABLET, COATED ORAL at 05:41

## 2019-04-19 RX ADMIN — HYDROXYZINE HYDROCHLORIDE 50 MG: 50 TABLET ORAL at 05:44

## 2019-04-19 RX ADMIN — OMEPRAZOLE 20 MG: 20 CAPSULE, DELAYED RELEASE ORAL at 05:42

## 2019-04-19 RX ADMIN — GABAPENTIN 400 MG: 400 CAPSULE ORAL at 17:31

## 2019-04-19 ASSESSMENT — ENCOUNTER SYMPTOMS
BACK PAIN: 1
DEPRESSION: 0
DIZZINESS: 0
FOCAL WEAKNESS: 1
SPEECH CHANGE: 0
DOUBLE VISION: 0
FEVER: 0
TREMORS: 0
ORTHOPNEA: 0
CHILLS: 0
HEARTBURN: 0
MYALGIAS: 1
NECK PAIN: 0
HEADACHES: 0
COUGH: 0
ABDOMINAL PAIN: 0
TINGLING: 0
BLURRED VISION: 0
VOMITING: 0
PHOTOPHOBIA: 0
NAUSEA: 1
WEAKNESS: 0
PALPITATIONS: 0
SENSORY CHANGE: 0
WEIGHT LOSS: 0
SHORTNESS OF BREATH: 0

## 2019-04-19 ASSESSMENT — LIFESTYLE VARIABLES: SUBSTANCE_ABUSE: 0

## 2019-04-19 NOTE — CARE PLAN
Problem: Safety  Goal: Will remain free from falls  Outcome: PROGRESSING AS EXPECTED  Call light within reach, bed alarm on, bed locked and in lowest position, non-skid socks in place, hourly rounding.    Problem: Pain Management  Goal: Pain level will decrease to patient's comfort goal  Outcome: PROGRESSING AS EXPECTED  PRN pain medication available to patient, educated on 0-10 pain rating scale.

## 2019-04-19 NOTE — DISCHARGE PLANNING
Agency/Facility Name: [facility]  Spoke To: Hansel  Outcome: Pt declined. Non-contacted Insurance Provider.    Agency/Facility Name: [facility]   Spoke To: Tayler  Outcome: PT declined. Criminal Record.    Cate HOUSTON notified.

## 2019-04-19 NOTE — PROGRESS NOTES
Received report & assumed care of patient at 1900. Assessment completed. Patient is A&Ox4, resting comfortably in bed. L CVC patent, positive blood return, running NS at TKO. Patient denies any pain, n/v at this time. POC discussed & questions answered. Bed locked and in lowest position, bed alarm is on, call light within reach, non-skid socks in place, hourly rounding. Patient reports no further needs at this time.

## 2019-04-19 NOTE — PROGRESS NOTES
Hospital Medicine Daily Progress Note    Date of Service  4/19/2019    Chief Complaint  53 y.o. male admitted 4/5/2019 with back pain, found to have L1 burst fracture.    Hospital Course   53-year-old male with past medical history of hypertension, myelodysplastic syndrome, depression and GERD presents to the hospital on April 5, 2019 with complaint of nausea and vomiting for past 2 days.  He found to have subacute fracture but no protrusion on MRI and neurosurgery was consulted and they recommended brace and outpatient follow-up in clinic.  Patient admitted to telemetry with L1 burst fracture, conservative bracing recommended by neurosurgeon.  Patient with difficult to control pain and trouble with therapies.  Physiatry was consulted and he is not a candidate for inpatient rehab as per physiatry evaluation.  For his pancytopenia and myelodysplastic syndrome Case discussed with oncology and recommended outpatient follow-up.      Interval Problem Update    I evaluated this patient at the bedside.  He reported that he has been having nausea.  Physical therapy evaluated him and recommended inpatient transitional care services  He continued to have back pain.    I discussed with  regarding his discharge planning.      Consultants/Specialty  Neurosurgery  Physiatry    Code Status  full    Disposition  SNF vs rehab    Review of Systems  Review of Systems   Constitutional: Negative for chills, fever and weight loss.   HENT: Negative for congestion, hearing loss and tinnitus.    Eyes: Negative for blurred vision, double vision and photophobia.   Respiratory: Negative for cough and shortness of breath.    Cardiovascular: Negative for chest pain, palpitations and orthopnea.   Gastrointestinal: Positive for nausea. Negative for abdominal pain, heartburn and vomiting.   Genitourinary: Negative for dysuria, frequency, hematuria and urgency.   Musculoskeletal: Positive for back pain and myalgias. Negative for joint  pain and neck pain.   Skin: Negative for rash.   Neurological: Positive for focal weakness (Bilateral lower extremities due to pain (slightly improved)). Negative for dizziness, tingling, tremors, sensory change, speech change, weakness and headaches.   Psychiatric/Behavioral: Negative for depression and substance abuse.   All other systems reviewed and are negative.       Physical Exam  Temp:  [36.5 °C (97.7 °F)-36.7 °C (98.1 °F)] 36.5 °C (97.7 °F)  Pulse:  [69-95] 95  Resp:  [18] 18  BP: (112-122)/(74-86) 112/74  SpO2:  [92 %-97 %] 92 %    Physical Exam   Constitutional: He is oriented to person, place, and time.   He is sitting at the edge of the bed.   HENT:   Head: Normocephalic and atraumatic.   Eyes: Pupils are equal, round, and reactive to light.   Neck: Normal range of motion. Neck supple.   Cardiovascular: Normal rate and regular rhythm.  Exam reveals no friction rub.    No murmur heard.  Pulmonary/Chest: Effort normal and breath sounds normal. No respiratory distress.   Abdominal: Soft. Bowel sounds are normal. He exhibits no distension.   Musculoskeletal: He exhibits tenderness (Lower back). He exhibits no edema or deformity.   Neurological: He is alert and oriented to person, place, and time. No cranial nerve deficit.   Bilateral lower extremities weakness (improved)   Skin: Skin is warm and dry. He is not diaphoretic.   Psychiatric: He has a normal mood and affect. His behavior is normal.   Physical exam remain stable without any acute changes today on April 19, 2019.    Fluids    Intake/Output Summary (Last 24 hours) at 04/19/19 0736  Last data filed at 04/19/19 0500   Gross per 24 hour   Intake             2669 ml   Output              600 ml   Net             2069 ml       Laboratory  Recent Labs      04/16/19   1300   WBC  4.0*   RBC  3.42*   HEMOGLOBIN  10.1*   HEMATOCRIT  31.4*   MCV  91.8   MCH  29.5   MCHC  32.2*   RDW  57.9*   PLATELETCT  78*   MPV  10.5     Recent Labs      04/18/19   9261    SODIUM  140   POTASSIUM  4.9   CHLORIDE  104   CO2  28   GLUCOSE  117*   BUN  15   CREATININE  1.08   CALCIUM  9.0                   Imaging  DX-CHEST-PORTABLE (1 VIEW)   Final Result         No acute cardiac or pulmonary abnormality is identified.      RK-INDGFET-0 VIEW   Final Result      1.  There is a nonobstructive nonspecific bowel gas pattern.  There is no evidence of free intraperitoneal air.      MR-LUMBAR SPINE-WITH & W/O   Final Result      1.  There is subacute fracture along the superior endplate of L1 vertebral body. There is no posterior retropulsion.   2.  There is no focal bone lesion.   3.  There is no evidence of infection.   4.  Mild degenerative disease as described above.      MR-BRAIN-W/O   Final Result      1.  Mild cerebral atrophy.   2.  Mild chronic microvascular ischemic disease.   3.  No acute abnormality.      DX-KNEE 2- LEFT   Final Result      1.  No fracture or dislocation of LEFT knee.   2.  Minimal degenerative changes.   3.  Diffuse vascular calcifications.      EC-ECHOCARDIOGRAM COMPLETE W/O CONT   Final Result      CT-ABDOMEN-PELVIS WITH   Final Result         1.  No acute abnormality.   2.  Hepatomegaly and diffuse hepatic steatosis   3.  Atherosclerosis and atherosclerotic coronary artery disease.   4.  Pulmonary nodules measuring up to 6.5 mm, see nodule follow-up recommendations below.      Low Risk: CT at 3-6 months, then consider CT at 18-24 months      High Risk: CT at 3-6 months, then at 18-24 months      Comments: Use most suspicious nodule as guide to management. Follow-up intervals may vary according to size and risk.      Low Risk - Minimal or absent history of smoking and of other known risk factors.      High Risk - History of smoking or of other known risk factors.      Note: These recommendations do not apply to lung cancer screening, patients with immunosuppression, or patients with known primary cancer.      Fleischner Society 2017 Guidelines for Management  of Incidentally Detected Pulmonary Nodules in Adults         CT-CTA CHEST PULMONARY ARTERY W/ RECONS   Final Result         1.  No large central pulmonary embolus is appreciated, evaluation of the subsegmental branches is essentially nondiagnostic due to motion artifacts. Additional imaging would be required for definitive exclusion of small distal pulmonary emboli.      DX-LUMBAR SPINE-2 OR 3 VIEWS   Final Result      Limited examination. Mild anterior wedging L2 vertebral body that appears to be old.   If symptoms are persistent, would recommend CT for further evaluation.      DX-THORACIC SPINE-WITH SWIMMERS VIEW   Final Result      Limited examination. The upper thoracic spine is not well-demonstrated.   Mild anterior wedging of some the lower thoracic vertebral bodies and appears old.   No definite acute compression. If symptoms are persistent, CT would be recommended for further evaluation.      DX-CHEST-PORTABLE (1 VIEW)   Final Result      Central catheter projects over the superior right atrium.      Prominent calcified granuloma in the left midlung.      Atherosclerotic plaque.      CT-HEAD W/O   Final Result      No acute intracranial abnormality is identified.           Assessment/Plan  * Closed stable burst fracture of first lumbar vertebra with routine healing   Assessment & Plan    Lumbar MRI found L1 subacute fracture with no protrusion  Neurosurgery was consulted and they recommended brace placement and outpatient follow-up.  I increased his dose of gabapentin.  I discontinued IV morphine.  I encouraged him to use oral pain medication for pain control.  Physiatry evaluated him and recommended that is not a candidate for inpatient rehab.  Physical therapy evaluated and recommended transitional care facilities.  I discussed regarding his discharge planning with .       Pancytopenia (HCC)- (present on admission)   Assessment & Plan    Most likely due to myelodysplastic syndrome  His last  chemotherapy was in November 2018  No acute symptoms.  Hematology/oncology was consulted and recommended outpatient follow-up.  I ordered CBC and his white blood cell count has improved.         Left knee pain   Assessment & Plan    X ray found arthritis  Continue to provide him pain management.     Tachycardia   Assessment & Plan    Resolved  It was most likely from pain  metprolol 25 mg p.o. twice daily       Somnolence- (present on admission)   Assessment & Plan    Waxing and waning  Stopped Ambien and trazodone  Ordered BMP and ammonia and it did not show any acute normalities.     Dizziness- (present on admission)   Assessment & Plan    Vitals are stable  Echo did not show any acute normalities  PT/OT  Negative orthostatics vitals            I discussed plan of care during multidisciplinary rounds.    VTE prophylaxis: heparin

## 2019-04-19 NOTE — DISCHARGE PLANNING
"This RNCM met with PT at bedside along with Tayler from [facility] group. Discussed with patient discharge plan and that the [employer] police department will be picking him up on discharge. He stated he was not aware of this. He stated his sister was coming tonight at 7pm from [facility]. Inquired as to the nature of his warrents he stated \"they are all related to theft\" from 1989. He stated \"If I have to go back to [facility] that is okay I will do whatever is needed\".  Due to current situation with active warrant and needing to return to [facility] SNFs are declining accepting patient.  Will discuss discharge plan with hospitalist at rounds.   "

## 2019-04-20 LAB
ERYTHROCYTE [DISTWIDTH] IN BLOOD BY AUTOMATED COUNT: 55 FL (ref 35.9–50)
HCT VFR BLD AUTO: 29.8 % (ref 42–52)
HGB BLD-MCNC: 9.3 G/DL (ref 14–18)
MCH RBC QN AUTO: 28.7 PG (ref 27–33)
MCHC RBC AUTO-ENTMCNC: 31.2 G/DL (ref 33.7–35.3)
MCV RBC AUTO: 92 FL (ref 81.4–97.8)
PLATELET # BLD AUTO: 59 K/UL (ref 164–446)
PMV BLD AUTO: 9.5 FL (ref 9–12.9)
RBC # BLD AUTO: 3.24 M/UL (ref 4.7–6.1)
WBC # BLD AUTO: 5 K/UL (ref 4.8–10.8)

## 2019-04-20 PROCEDURE — A9270 NON-COVERED ITEM OR SERVICE: HCPCS | Performed by: INTERNAL MEDICINE

## 2019-04-20 PROCEDURE — 700102 HCHG RX REV CODE 250 W/ 637 OVERRIDE(OP): Performed by: INTERNAL MEDICINE

## 2019-04-20 PROCEDURE — 85027 COMPLETE CBC AUTOMATED: CPT

## 2019-04-20 PROCEDURE — 700111 HCHG RX REV CODE 636 W/ 250 OVERRIDE (IP): Performed by: INTERNAL MEDICINE

## 2019-04-20 PROCEDURE — 700102 HCHG RX REV CODE 250 W/ 637 OVERRIDE(OP): Performed by: HOSPITALIST

## 2019-04-20 PROCEDURE — 770004 HCHG ROOM/CARE - ONCOLOGY PRIVATE *

## 2019-04-20 PROCEDURE — 700111 HCHG RX REV CODE 636 W/ 250 OVERRIDE (IP): Performed by: FAMILY MEDICINE

## 2019-04-20 PROCEDURE — 99232 SBSQ HOSP IP/OBS MODERATE 35: CPT | Performed by: INTERNAL MEDICINE

## 2019-04-20 PROCEDURE — A9270 NON-COVERED ITEM OR SERVICE: HCPCS | Performed by: HOSPITALIST

## 2019-04-20 RX ADMIN — HYDROXYZINE HYDROCHLORIDE 50 MG: 50 TABLET ORAL at 05:08

## 2019-04-20 RX ADMIN — METOPROLOL TARTRATE 25 MG: 25 TABLET, FILM COATED ORAL at 05:08

## 2019-04-20 RX ADMIN — BUSPIRONE HYDROCHLORIDE 15 MG: 30 TABLET ORAL at 05:08

## 2019-04-20 RX ADMIN — BUSPIRONE HYDROCHLORIDE 15 MG: 30 TABLET ORAL at 18:33

## 2019-04-20 RX ADMIN — TRAZODONE HYDROCHLORIDE 50 MG: 50 TABLET ORAL at 20:03

## 2019-04-20 RX ADMIN — GABAPENTIN 400 MG: 400 CAPSULE ORAL at 18:39

## 2019-04-20 RX ADMIN — HYDROXYZINE HYDROCHLORIDE 50 MG: 50 TABLET ORAL at 18:39

## 2019-04-20 RX ADMIN — MORPHINE SULFATE 30 MG: 30 TABLET, FILM COATED, EXTENDED RELEASE ORAL at 05:08

## 2019-04-20 RX ADMIN — MORPHINE SULFATE 15 MG: 15 TABLET ORAL at 20:03

## 2019-04-20 RX ADMIN — HEPARIN SODIUM 5000 UNITS: 5000 INJECTION, SOLUTION INTRAVENOUS; SUBCUTANEOUS at 15:24

## 2019-04-20 RX ADMIN — MORPHINE SULFATE 15 MG: 15 TABLET ORAL at 11:13

## 2019-04-20 RX ADMIN — MORPHINE SULFATE 30 MG: 30 TABLET, FILM COATED, EXTENDED RELEASE ORAL at 18:34

## 2019-04-20 RX ADMIN — HYDROCORTISONE: 1 CREAM TOPICAL at 18:39

## 2019-04-20 RX ADMIN — OMEPRAZOLE 20 MG: 20 CAPSULE, DELAYED RELEASE ORAL at 05:08

## 2019-04-20 RX ADMIN — METHOCARBAMOL TABLETS 750 MG: 750 TABLET, COATED ORAL at 11:15

## 2019-04-20 RX ADMIN — GABAPENTIN 400 MG: 400 CAPSULE ORAL at 05:08

## 2019-04-20 RX ADMIN — METHOCARBAMOL TABLETS 750 MG: 750 TABLET, COATED ORAL at 05:14

## 2019-04-20 RX ADMIN — ALTEPLASE 2 MG: 2.2 INJECTION, POWDER, LYOPHILIZED, FOR SOLUTION INTRAVENOUS at 23:36

## 2019-04-20 RX ADMIN — HEPARIN SODIUM 5000 UNITS: 5000 INJECTION, SOLUTION INTRAVENOUS; SUBCUTANEOUS at 20:03

## 2019-04-20 RX ADMIN — HEPARIN SODIUM 5000 UNITS: 5000 INJECTION, SOLUTION INTRAVENOUS; SUBCUTANEOUS at 05:08

## 2019-04-20 RX ADMIN — ALTEPLASE 2 MG: 2.2 INJECTION, POWDER, LYOPHILIZED, FOR SOLUTION INTRAVENOUS at 19:44

## 2019-04-20 RX ADMIN — GABAPENTIN 400 MG: 400 CAPSULE ORAL at 11:13

## 2019-04-20 RX ADMIN — AMITRIPTYLINE HYDROCHLORIDE 75 MG: 50 TABLET, FILM COATED ORAL at 20:03

## 2019-04-20 RX ADMIN — OMEPRAZOLE 20 MG: 20 CAPSULE, DELAYED RELEASE ORAL at 18:34

## 2019-04-20 RX ADMIN — METOPROLOL TARTRATE 25 MG: 25 TABLET, FILM COATED ORAL at 18:34

## 2019-04-20 ASSESSMENT — ENCOUNTER SYMPTOMS
COUGH: 0
BLURRED VISION: 0
NAUSEA: 0
WEAKNESS: 0
DEPRESSION: 0
SENSORY CHANGE: 0
SPEECH CHANGE: 0
HEADACHES: 0
SHORTNESS OF BREATH: 0
ORTHOPNEA: 0
MYALGIAS: 1
DOUBLE VISION: 0
CHILLS: 0
NECK PAIN: 0
FEVER: 0
FOCAL WEAKNESS: 1
PALPITATIONS: 0
DIZZINESS: 0
PHOTOPHOBIA: 0
HEARTBURN: 0
WEIGHT LOSS: 0
VOMITING: 0
TINGLING: 0
ABDOMINAL PAIN: 0
BACK PAIN: 1
TREMORS: 0

## 2019-04-20 ASSESSMENT — LIFESTYLE VARIABLES: SUBSTANCE_ABUSE: 0

## 2019-04-20 NOTE — PROGRESS NOTES
[facility] Medicine Daily Progress Note    Date of Service  4/20/2019    Chief Complaint  53 y.o. male admitted 4/5/2019 with back pain, found to have L1 burst fracture.    Hospital Course   53-year-old male with past medical history of hypertension, myelodysplastic syndrome, depression and GERD presents to the hospital on April 5, 2019 with complaint of nausea and vomiting for past 2 days.  He found to have subacute fracture but no protrusion on MRI and neurosurgery was consulted and they recommended brace and outpatient follow-up in clinic.  Patient admitted to telemetry with L1 burst fracture, conservative bracing recommended by neurosurgeon.  Patient with difficult to control pain and trouble with therapies.  Physiatry was consulted and he is not a candidate for inpatient rehab as per physiatry evaluation.  For his pancytopenia and myelodysplastic syndrome Case discussed with oncology and recommended outpatient follow-up.      Interval Problem Update    I evaluated this patient at the bedside.  He continued report bilateral lower extremity weakness.  I requested him to stand up and he stood up with my help.  RN reported that he has been having episodes of confusion and to evaluate further I ordered neurocognitive evaluation.  Currently a side effect of discharge as per my understanding patient needs to go back to senior care along with that his lower extremity weakness and recommendation for physical therapy that he needs to go to skilled nursing facility it is very difficult discharge and challenging.  I discussed with case management at length also I discussed today with charge RN regarding his plan of care.      Consultants/Specialty  Neurosurgery  Physiatry    Code Status  full    Disposition  SNF vs rehab    Review of Systems  Review of Systems   Constitutional: Negative for chills, fever and weight loss.   HENT: Negative for congestion, hearing loss and tinnitus.    Eyes: Negative for blurred vision, double  vision and photophobia.   Respiratory: Negative for cough and shortness of breath.    Cardiovascular: Negative for chest pain, palpitations and orthopnea.   Gastrointestinal: Negative for abdominal pain, heartburn, nausea and vomiting.   Genitourinary: Negative for dysuria, frequency, hematuria and urgency.   Musculoskeletal: Positive for back pain and myalgias. Negative for joint pain and neck pain.   Skin: Negative for rash.   Neurological: Positive for focal weakness (Bilateral lower extremities due to pain (slightly improved)). Negative for dizziness, tingling, tremors, sensory change, speech change, weakness and headaches.   Psychiatric/Behavioral: Negative for depression and substance abuse.   All other systems reviewed and are negative.       Physical Exam  Temp:  [36.8 °C (98.2 °F)-37.3 °C (99.2 °F)] 36.9 °C (98.4 °F)  Pulse:  [] 85  Resp:  [18] 18  BP: (100-122)/(63-87) 122/74  SpO2:  [90 %-99 %] 99 %    Physical Exam   Constitutional: He is oriented to person, place, and time.   He is sitting at the edge of the bed.  He stands up while I am holding his hand and support him.   HENT:   Head: Normocephalic and atraumatic.   Eyes: Pupils are equal, round, and reactive to light.   Neck: Normal range of motion. Neck supple.   Cardiovascular: Normal rate and regular rhythm.  Exam reveals no friction rub.    No murmur heard.  Pulmonary/Chest: Effort normal and breath sounds normal. No respiratory distress.   Abdominal: Soft. Bowel sounds are normal. He exhibits no distension.   Musculoskeletal: He exhibits tenderness (Lower back). He exhibits no edema or deformity.   Neurological: He is alert and oriented to person, place, and time. No cranial nerve deficit.   Bilateral lower extremities weakness (improved)  Unable to walk and stand by himself.   Skin: Skin is warm and dry. He is not diaphoretic.   Psychiatric: He has a normal mood and affect. His behavior is normal.       Fluids    Intake/Output Summary  (Last 24 hours) at 04/20/19 1226  Last data filed at 04/20/19 0500   Gross per 24 hour   Intake                0 ml   Output             1300 ml   Net            -1300 ml       Laboratory  Recent Labs      04/20/19   0238   WBC  5.0   RBC  3.24*   HEMOGLOBIN  9.3*   HEMATOCRIT  29.8*   MCV  92.0   MCH  28.7   MCHC  31.2*   RDW  55.0*   PLATELETCT  59*   MPV  9.5     Recent Labs      04/18/19   0315   SODIUM  140   POTASSIUM  4.9   CHLORIDE  104   CO2  28   GLUCOSE  117*   BUN  15   CREATININE  1.08   CALCIUM  9.0                   Imaging  DX-CHEST-PORTABLE (1 VIEW)   Final Result         No acute cardiac or pulmonary abnormality is identified.      [id number] VIEW   Final Result      1.  There is a nonobstructive nonspecific bowel gas pattern.  There is no evidence of free intraperitoneal air.      MR-LUMBAR SPINE-WITH & W/O   Final Result      1.  There is subacute fracture along the superior endplate of L1 vertebral body. There is no posterior retropulsion.   2.  There is no focal bone lesion.   3.  There is no evidence of infection.   4.  Mild degenerative disease as described above.      MR-BRAIN-W/O   Final Result      1.  Mild cerebral atrophy.   2.  Mild chronic microvascular ischemic disease.   3.  No acute abnormality.      DX-KNEE 2- LEFT   Final Result      1.  No fracture or dislocation of LEFT knee.   2.  Minimal degenerative changes.   3.  Diffuse vascular calcifications.      EC-ECHOCARDIOGRAM COMPLETE W/O CONT   Final Result      CT-ABDOMEN-PELVIS WITH   Final Result         1.  No acute abnormality.   2.  Hepatomegaly and diffuse hepatic steatosis   3.  Atherosclerosis and atherosclerotic coronary artery disease.   4.  Pulmonary nodules measuring up to 6.5 mm, see nodule follow-up recommendations below.      Low Risk: CT at 3-6 months, then consider CT at 18-24 months      High Risk: CT at 3-6 months, then at 18-24 months      Comments: Use most suspicious nodule as guide to management. Follow-up  intervals may vary according to size and risk.      Low Risk - Minimal or absent history of smoking and of other known risk factors.      High Risk - History of smoking or of other known risk factors.      Note: These recommendations do not apply to lung cancer screening, patients with immunosuppression, or patients with known primary cancer.      Fleischner Society 2017 Guidelines for Management of Incidentally Detected Pulmonary Nodules in Adults         CT-CTA CHEST PULMONARY ARTERY W/ RECONS   Final Result         1.  No large central pulmonary embolus is appreciated, evaluation of the subsegmental branches is essentially nondiagnostic due to motion artifacts. Additional imaging would be required for definitive exclusion of small distal pulmonary emboli.      DX-LUMBAR SPINE-2 OR 3 VIEWS   Final Result      Limited examination. Mild anterior wedging L2 vertebral body that appears to be old.   If symptoms are persistent, would recommend CT for further evaluation.      DX-THORACIC SPINE-WITH SWIMMERS VIEW   Final Result      Limited examination. The upper thoracic spine is not well-demonstrated.   Mild anterior wedging of some the lower thoracic vertebral bodies and appears old.   No definite acute compression. If symptoms are persistent, CT would be recommended for further evaluation.      DX-CHEST-PORTABLE (1 VIEW)   Final Result      Central catheter projects over the superior right atrium.      Prominent calcified granuloma in the left midlung.      Atherosclerotic plaque.      CT-HEAD W/O   Final Result      No acute intracranial abnormality is identified.           Assessment/Plan  * Closed stable burst fracture of first lumbar vertebra with routine healing   Assessment & Plan    Lumbar MRI found L1 subacute fracture with no protrusion  Neurosurgery was consulted and they recommended brace placement and outpatient follow-up.  I increased his dose of gabapentin.  I discontinued IV morphine.  I encouraged him  to use oral pain medication for pain control.  Physiatry evaluated him and recommended that is not a candidate for inpatient rehab.  Physical therapy evaluated and recommended transitional care facilities.  Currently is very difficult discharge as per physical therapy recommendation patient needs to go to skilled nursing facility but most of the skilled nursing facility has been declining him as he needs to go back to long term as per my understanding from .  Order neurocognitive evaluation.  Discussed with RN and charge RN regarding his discharge planning.       Pancytopenia (HCC)- (present on admission)   Assessment & Plan    Most likely due to myelodysplastic syndrome  His last chemotherapy was in November 2018  No acute symptoms.  Hematology/oncology was consulted and recommended outpatient follow-up.  I ordered CBC and his white blood cell count has improved.         Left knee pain   Assessment & Plan    X ray found arthritis  Continue to provide him pain management.     Tachycardia   Assessment & Plan    Resolved  It was most likely from pain  metprolol 25 mg p.o. twice daily       Somnolence- (present on admission)   Assessment & Plan    Waxing and waning  Stopped Ambien and trazodone  Ordered BMP and ammonia and it did not show any acute normalities.  RN also reported that patient has been having off and on episodes of confusion and to evaluate it further I ordered neurocognitive evaluation.     Dizziness- (present on admission)   Assessment & Plan    Vitals are stable  Echo did not show any acute normalities  PT/OT  Negative orthostatics vitals            I discussed plan of care with RN at the bedside and also with charge RN regarding his discharge planning.    VTE prophylaxis: heparin

## 2019-04-20 NOTE — PROGRESS NOTES
"Received report from [facility], assumed care of pt 0700.  His orientation is hard to gauge, he seems to know he's in the hospital, but then said he \"wanted to get dressed to go to the other casino\" Dressing to his central line not intact, he initially refused dressing change, then asked why I wasn't doing it right then.   Pt up to bathroom with 2 assist and FWW, he is very unsteady and states he feels dizzy, tremors noted to hands. He was irritable about getting up to bathroom, didn't want anybody with him. Explained his fall risk and we need to keep him safe.   Treaded socks on. Waffle mattress on bed. His skin is intact, small circular rash noted to upper back, blanches. Pt c/o pain to back, left leg and left arm.   Central line patent with + blood return from both lumens. Will attempt dressing change later in the day.   "

## 2019-04-21 LAB
ALBUMIN SERPL BCP-MCNC: 3 G/DL (ref 3.2–4.9)
ALBUMIN/GLOB SERPL: 1.2 G/DL
ALP SERPL-CCNC: 135 U/L (ref 30–99)
ALT SERPL-CCNC: 19 U/L (ref 2–50)
ANION GAP SERPL CALC-SCNC: 6 MMOL/L (ref 0–11.9)
AST SERPL-CCNC: 28 U/L (ref 12–45)
BILIRUB SERPL-MCNC: 0.7 MG/DL (ref 0.1–1.5)
BUN SERPL-MCNC: 9 MG/DL (ref 8–22)
CALCIUM SERPL-MCNC: 8.6 MG/DL (ref 8.5–10.5)
CHLORIDE SERPL-SCNC: 104 MMOL/L (ref 96–112)
CO2 SERPL-SCNC: 27 MMOL/L (ref 20–33)
CREAT SERPL-MCNC: 0.8 MG/DL (ref 0.5–1.4)
GLOBULIN SER CALC-MCNC: 2.5 G/DL (ref 1.9–3.5)
GLUCOSE SERPL-MCNC: 93 MG/DL (ref 65–99)
POTASSIUM SERPL-SCNC: 3.7 MMOL/L (ref 3.6–5.5)
PROT SERPL-MCNC: 5.5 G/DL (ref 6–8.2)
SODIUM SERPL-SCNC: 137 MMOL/L (ref 135–145)

## 2019-04-21 PROCEDURE — A9270 NON-COVERED ITEM OR SERVICE: HCPCS | Performed by: INTERNAL MEDICINE

## 2019-04-21 PROCEDURE — 700102 HCHG RX REV CODE 250 W/ 637 OVERRIDE(OP): Performed by: INTERNAL MEDICINE

## 2019-04-21 PROCEDURE — A9270 NON-COVERED ITEM OR SERVICE: HCPCS | Performed by: HOSPITALIST

## 2019-04-21 PROCEDURE — 770004 HCHG ROOM/CARE - ONCOLOGY PRIVATE *

## 2019-04-21 PROCEDURE — 700102 HCHG RX REV CODE 250 W/ 637 OVERRIDE(OP): Performed by: HOSPITALIST

## 2019-04-21 PROCEDURE — 700111 HCHG RX REV CODE 636 W/ 250 OVERRIDE (IP): Performed by: INTERNAL MEDICINE

## 2019-04-21 PROCEDURE — 99232 SBSQ HOSP IP/OBS MODERATE 35: CPT | Performed by: INTERNAL MEDICINE

## 2019-04-21 PROCEDURE — 80053 COMPREHEN METABOLIC PANEL: CPT

## 2019-04-21 PROCEDURE — 700101 HCHG RX REV CODE 250: Performed by: INTERNAL MEDICINE

## 2019-04-21 RX ORDER — GABAPENTIN 100 MG/1
200 CAPSULE ORAL 3 TIMES DAILY
Status: DISCONTINUED | OUTPATIENT
Start: 2019-04-21 | End: 2019-04-23

## 2019-04-21 RX ADMIN — GABAPENTIN 200 MG: 100 CAPSULE ORAL at 17:33

## 2019-04-21 RX ADMIN — OMEPRAZOLE 20 MG: 20 CAPSULE, DELAYED RELEASE ORAL at 04:30

## 2019-04-21 RX ADMIN — AMITRIPTYLINE HYDROCHLORIDE 75 MG: 50 TABLET, FILM COATED ORAL at 21:06

## 2019-04-21 RX ADMIN — OMEPRAZOLE 20 MG: 20 CAPSULE, DELAYED RELEASE ORAL at 17:33

## 2019-04-21 RX ADMIN — GABAPENTIN 400 MG: 400 CAPSULE ORAL at 04:29

## 2019-04-21 RX ADMIN — METOPROLOL TARTRATE 25 MG: 25 TABLET, FILM COATED ORAL at 17:33

## 2019-04-21 RX ADMIN — MORPHINE SULFATE 30 MG: 30 TABLET, FILM COATED, EXTENDED RELEASE ORAL at 17:33

## 2019-04-21 RX ADMIN — MORPHINE SULFATE 15 MG: 15 TABLET ORAL at 17:34

## 2019-04-21 RX ADMIN — HEPARIN SODIUM 5000 UNITS: 5000 INJECTION, SOLUTION INTRAVENOUS; SUBCUTANEOUS at 13:57

## 2019-04-21 RX ADMIN — BUSPIRONE HYDROCHLORIDE 15 MG: 30 TABLET ORAL at 17:33

## 2019-04-21 RX ADMIN — HEPARIN SODIUM 5000 UNITS: 5000 INJECTION, SOLUTION INTRAVENOUS; SUBCUTANEOUS at 04:30

## 2019-04-21 RX ADMIN — METHOCARBAMOL TABLETS 750 MG: 750 TABLET, COATED ORAL at 17:34

## 2019-04-21 RX ADMIN — MORPHINE SULFATE 15 MG: 15 TABLET ORAL at 10:30

## 2019-04-21 RX ADMIN — HYDROCORTISONE: 1 CREAM TOPICAL at 04:31

## 2019-04-21 RX ADMIN — BUSPIRONE HYDROCHLORIDE 15 MG: 30 TABLET ORAL at 04:30

## 2019-04-21 RX ADMIN — HEPARIN SODIUM 5000 UNITS: 5000 INJECTION, SOLUTION INTRAVENOUS; SUBCUTANEOUS at 21:06

## 2019-04-21 RX ADMIN — MORPHINE SULFATE 15 MG: 15 TABLET ORAL at 21:05

## 2019-04-21 RX ADMIN — METOPROLOL TARTRATE 25 MG: 25 TABLET, FILM COATED ORAL at 04:30

## 2019-04-21 RX ADMIN — HYDROXYZINE HYDROCHLORIDE 50 MG: 50 TABLET ORAL at 04:29

## 2019-04-21 RX ADMIN — GABAPENTIN 200 MG: 100 CAPSULE ORAL at 12:03

## 2019-04-21 RX ADMIN — MORPHINE SULFATE 30 MG: 30 TABLET, FILM COATED, EXTENDED RELEASE ORAL at 04:29

## 2019-04-21 RX ADMIN — HYDROCORTISONE: 1 CREAM TOPICAL at 17:34

## 2019-04-21 RX ADMIN — HYDROXYZINE HYDROCHLORIDE 50 MG: 50 TABLET ORAL at 17:33

## 2019-04-21 RX ADMIN — METHOCARBAMOL TABLETS 750 MG: 750 TABLET, COATED ORAL at 12:03

## 2019-04-21 RX ADMIN — MORPHINE SULFATE 30 MG: 15 TABLET ORAL at 00:54

## 2019-04-21 RX ADMIN — METHOCARBAMOL TABLETS 750 MG: 750 TABLET, COATED ORAL at 04:31

## 2019-04-21 RX ADMIN — TRAZODONE HYDROCHLORIDE 50 MG: 50 TABLET ORAL at 21:06

## 2019-04-21 ASSESSMENT — ENCOUNTER SYMPTOMS
FEVER: 0
ORTHOPNEA: 0
BACK PAIN: 1
TINGLING: 0
FOCAL WEAKNESS: 1
WEAKNESS: 0
TREMORS: 0
SPEECH CHANGE: 0
DEPRESSION: 0
COUGH: 0
BLURRED VISION: 0
WEIGHT LOSS: 0
PHOTOPHOBIA: 0
VOMITING: 0
HEARTBURN: 0
DIZZINESS: 0
NAUSEA: 1
HEADACHES: 0
MYALGIAS: 1
CHILLS: 0
NECK PAIN: 0
ABDOMINAL PAIN: 0
DOUBLE VISION: 0
PALPITATIONS: 0
SENSORY CHANGE: 0
SHORTNESS OF BREATH: 0

## 2019-04-21 ASSESSMENT — LIFESTYLE VARIABLES: SUBSTANCE_ABUSE: 0

## 2019-04-21 NOTE — CARE PLAN
Problem: Safety  Goal: Will remain free from falls    Intervention: Implement fall precautions  Fall precautions in place including bed alarm. Pt is unsteady on feet and is often found sitting at edge of bed without calling. Education given regarding falls. Pt remained safe & free from injury this shift.       Problem: Infection  Goal: Will remain free from infection  Outcome: PROGRESSING AS EXPECTED  Pt afebrile. Central line dressing changed with sterile field. No s/s of infection.     Problem: Discharge Barriers/Planning  Goal: Patient's continuum of care needs will be met    Intervention: Assess potential discharge barriers on admission and throughout hospital stay  Pt with confusion and mobility concerns. Discussed concerns about safe discharge with physician. Physician has placed a cognitive evaluation. All needs met throughout shift.

## 2019-04-21 NOTE — PROGRESS NOTES
Pt's orientation is difficult to assess. He knows that he is in the hospital still but will say things like it is his birthday today and some sentences he says do not make any logical sense. Pt was saying that he saw ants crawling all over the floor and linen hamper. Pt used urinal when voiding. Bed alarm is on, bed is locked and in lowest position, non-slip socks are on. Central line now has + blood return in both lumens after 2 doses of alteplase. Will continue to monitor.

## 2019-04-21 NOTE — PROGRESS NOTES
"Pt A&Ox1, oriented to self. VS: /77   Pulse 75   Temp 36.2 °C (97.1 °F) (Temporal)   Resp 17   Ht 1.803 m (5' 11\")   Wt 90.8 kg (200 lb 2.8 oz)   SpO2 99%   BMI 27.92 kg/m² . Pt falling asleep during conversation and unable to assess for pain, n/v, numbness, tingling. Central catheter patent with positive blood return. Pt needs met at this time, call light within reach, hourly rounding in effect, and will continue to monitor.       "

## 2019-04-21 NOTE — PROGRESS NOTES
Hospital Medicine Daily Progress Note    Date of Service  4/21/2019    Chief Complaint  53 y.o. male admitted 4/5/2019 with back pain, found to have L1 burst fracture.    Hospital Course   53-year-old male with past medical history of hypertension, myelodysplastic syndrome, depression and GERD presents to the hospital on April 5, 2019 with complaint of nausea and vomiting for past 2 days.  He found to have subacute fracture but no protrusion on MRI and neurosurgery was consulted and they recommended brace and outpatient follow-up in clinic.  Patient admitted to telemetry with L1 burst fracture, conservative bracing recommended by neurosurgeon.  Patient with difficult to control pain and trouble with therapies.  Physiatry was consulted and he is not a candidate for inpatient rehab as per physiatry evaluation.  For his pancytopenia and myelodysplastic syndrome Case discussed with oncology and recommended outpatient follow-up.      Interval Problem Update    I evaluated and examined him at the bedside.  He knows that he is in the hospital and I am physician evaluating him but he does not have insight and he has been having off-and-on confusion.   He has been having off and on confusion.  I requested neurocognitive evaluation.  He had MRI brain without contrast on April 8, 2019 which is within normal limits.  TSH, ammonia and vitamin B12 are within normal limits.  To evaluate for electrolyte imbalance I ordered complete metabolic panel.    Consultants/Specialty  Neurosurgery  Physiatry    Code Status  full    Disposition  SNF vs rehab    Review of Systems  Review of Systems   Constitutional: Negative for chills, fever and weight loss.   HENT: Negative for congestion, hearing loss and tinnitus.    Eyes: Negative for blurred vision, double vision and photophobia.   Respiratory: Negative for cough and shortness of breath.    Cardiovascular: Negative for chest pain, palpitations and orthopnea.   Gastrointestinal: Positive  for nausea. Negative for abdominal pain, heartburn and vomiting.   Genitourinary: Negative for dysuria, frequency, hematuria and urgency.   Musculoskeletal: Positive for back pain and myalgias. Negative for joint pain and neck pain.   Skin: Negative for rash.   Neurological: Positive for focal weakness (Bilateral lower extremities due to pain (slightly improved)). Negative for dizziness, tingling, tremors, sensory change, speech change, weakness and headaches.   Psychiatric/Behavioral: Negative for depression and substance abuse.   All other systems reviewed and are negative.       Physical Exam  Temp:  [36.2 °C (97.2 °F)-36.9 °C (98.5 °F)] 36.2 °C (97.2 °F)  Pulse:  [85-99] 99  Resp:  [18-20] 20  BP: (110-127)/(73-89) 127/89  SpO2:  [90 %-99 %] 93 %    Physical Exam   Constitutional: He is oriented to person, place, and time.   Is laying in bed without any acute distress.   HENT:   Head: Normocephalic and atraumatic.   Eyes: Pupils are equal, round, and reactive to light.   Neck: Normal range of motion. Neck supple.   Cardiovascular: Normal rate and regular rhythm.  Exam reveals no friction rub.    No murmur heard.  Pulmonary/Chest: Effort normal and breath sounds normal. No respiratory distress.   Abdominal: Soft. Bowel sounds are normal. He exhibits no distension.   Musculoskeletal: He exhibits tenderness (Lower back). He exhibits no edema or deformity.   Neurological: He is alert and oriented to person, place, and time. No cranial nerve deficit.   Bilateral lower extremities weakness (improved)  Unable to walk and stand by himself.   Skin: Skin is warm and dry. He is not diaphoretic.   Psychiatric: He has a normal mood and affect. His behavior is normal.       Fluids    Intake/Output Summary (Last 24 hours) at 04/21/19 0908  Last data filed at 04/20/19 2024   Gross per 24 hour   Intake                0 ml   Output             1450 ml   Net            -1450 ml       Laboratory  Recent Labs      04/20/19   3567    WBC  5.0   RBC  3.24*   HEMOGLOBIN  9.3*   HEMATOCRIT  29.8*   MCV  92.0   MCH  28.7   MCHC  31.2*   RDW  55.0*   PLATELETCT  59*   MPV  9.5                       Imaging  DX-CHEST-PORTABLE (1 VIEW)   Final Result         No acute cardiac or pulmonary abnormality is identified.      [id number] VIEW   Final Result      1.  There is a nonobstructive nonspecific bowel gas pattern.  There is no evidence of free intraperitoneal air.      MR-LUMBAR SPINE-WITH & W/O   Final Result      1.  There is subacute fracture along the superior endplate of L1 vertebral body. There is no posterior retropulsion.   2.  There is no focal bone lesion.   3.  There is no evidence of infection.   4.  Mild degenerative disease as described above.      MR-BRAIN-W/O   Final Result      1.  Mild cerebral atrophy.   2.  Mild chronic microvascular ischemic disease.   3.  No acute abnormality.      DX-KNEE 2- LEFT   Final Result      1.  No fracture or dislocation of LEFT knee.   2.  Minimal degenerative changes.   3.  Diffuse vascular calcifications.      EC-ECHOCARDIOGRAM COMPLETE W/O CONT   Final Result      CT-ABDOMEN-PELVIS WITH   Final Result         1.  No acute abnormality.   2.  Hepatomegaly and diffuse hepatic steatosis   3.  Atherosclerosis and atherosclerotic coronary artery disease.   4.  Pulmonary nodules measuring up to 6.5 mm, see nodule follow-up recommendations below.      Low Risk: CT at 3-6 months, then consider CT at 18-24 months      High Risk: CT at 3-6 months, then at 18-24 months      Comments: Use most suspicious nodule as guide to management. Follow-up intervals may vary according to size and risk.      Low Risk - Minimal or absent history of smoking and of other known risk factors.      High Risk - History of smoking or of other known risk factors.      Note: These recommendations do not apply to lung cancer screening, patients with immunosuppression, or patients with known primary cancer.      Fleischner Society  2017 Guidelines for Management of Incidentally Detected Pulmonary Nodules in Adults         CT-CTA CHEST PULMONARY ARTERY W/ RECONS   Final Result         1.  No large central pulmonary embolus is appreciated, evaluation of the subsegmental branches is essentially nondiagnostic due to motion artifacts. Additional imaging would be required for definitive exclusion of small distal pulmonary emboli.      DX-LUMBAR SPINE-2 OR 3 VIEWS   Final Result      Limited examination. Mild anterior wedging L2 vertebral body that appears to be old.   If symptoms are persistent, would recommend CT for further evaluation.      DX-THORACIC SPINE-WITH SWIMMERS VIEW   Final Result      Limited examination. The upper thoracic spine is not well-demonstrated.   Mild anterior wedging of some the lower thoracic vertebral bodies and appears old.   No definite acute compression. If symptoms are persistent, CT would be recommended for further evaluation.      DX-CHEST-PORTABLE (1 VIEW)   Final Result      Central catheter projects over the superior right atrium.      Prominent calcified granuloma in the left midlung.      Atherosclerotic plaque.      CT-HEAD W/O   Final Result      No acute intracranial abnormality is identified.           Assessment/Plan  * Closed stable burst fracture of first lumbar vertebra with routine healing   Assessment & Plan    Lumbar MRI found L1 subacute fracture with no protrusion  Neurosurgery was consulted and they recommended brace placement and outpatient follow-up.  I increased his dose of gabapentin.  I discontinued IV morphine.  I encouraged him to use oral pain medication for pain control.  Physiatry evaluated him and recommended that is not a candidate for inpatient rehab.  Physical therapy evaluated and recommended transitional care facilities.  Currently is very difficult discharge as per physical therapy recommendation patient needs to go to skilled nursing facility but most of the skilled nursing  facility has been declining him as [facility] needs [facility] per my understanding from .  Pending neurocognitive evaluation.   Discussed plan of care with RN at the bedside.       Pancytopenia (HCC)- (present on admission)   Assessment & Plan    Most likely due to myelodysplastic syndrome  His last chemotherapy was in November 2018  No acute symptoms.  Hematology/oncology was consulted and recommended outpatient follow-up.  I ordered CBC and his white blood cell count has improved.         Left knee pain   Assessment & Plan    X ray found arthritis  Continue to provide him pain management.     Tachycardia   Assessment & Plan    Resolved  It was most likely from pain  metprolol 25 mg p.o. twice daily       Somnolence- (present on admission)   Assessment & Plan    Waxing and waning  Stopped Ambien and trazodone  Ordered BMP and ammonia and it did not show any acute normalities.  His MRI brain without contrast did not show any acute abnormalities on April 8, 2019  He has been having off-and-on confusion and I ordered CMP to evaluate for any electrolyte abnormalities.,  TSH and vitamin B12 are within normal limits.     Dizziness- (present on admission)   Assessment & Plan    Vitals are stable  Echo did not show any acute normalities  PT/OT  Negative orthostatics vitals            Discussed plan of care with RN at the bedside.    VTE prophylaxis: heparin

## 2019-04-22 LAB
APPEARANCE UR: CLEAR
BACTERIA #/AREA URNS HPF: NEGATIVE /HPF
BILIRUB UR QL STRIP.AUTO: NEGATIVE
COLOR UR: YELLOW
EPI CELLS #/AREA URNS HPF: NEGATIVE /HPF
GLUCOSE UR STRIP.AUTO-MCNC: NEGATIVE MG/DL
HYALINE CASTS #/AREA URNS LPF: ABNORMAL /LPF
KETONES UR STRIP.AUTO-MCNC: NEGATIVE MG/DL
LEUKOCYTE ESTERASE UR QL STRIP.AUTO: ABNORMAL
MICRO URNS: ABNORMAL
NITRITE UR QL STRIP.AUTO: NEGATIVE
PH UR STRIP.AUTO: 6 [PH]
PROT UR QL STRIP: NEGATIVE MG/DL
RBC # URNS HPF: ABNORMAL /HPF
RBC UR QL AUTO: NEGATIVE
SP GR UR STRIP.AUTO: 1.02
UROBILINOGEN UR STRIP.AUTO-MCNC: 1 MG/DL
WBC #/AREA URNS HPF: ABNORMAL /HPF

## 2019-04-22 PROCEDURE — 97116 GAIT TRAINING THERAPY: CPT

## 2019-04-22 PROCEDURE — 81001 URINALYSIS AUTO W/SCOPE: CPT

## 2019-04-22 PROCEDURE — 700102 HCHG RX REV CODE 250 W/ 637 OVERRIDE(OP): Performed by: HOSPITALIST

## 2019-04-22 PROCEDURE — 700102 HCHG RX REV CODE 250 W/ 637 OVERRIDE(OP): Performed by: INTERNAL MEDICINE

## 2019-04-22 PROCEDURE — A9270 NON-COVERED ITEM OR SERVICE: HCPCS | Performed by: HOSPITALIST

## 2019-04-22 PROCEDURE — 97530 THERAPEUTIC ACTIVITIES: CPT

## 2019-04-22 PROCEDURE — A9270 NON-COVERED ITEM OR SERVICE: HCPCS | Performed by: INTERNAL MEDICINE

## 2019-04-22 PROCEDURE — 770004 HCHG ROOM/CARE - ONCOLOGY PRIVATE *

## 2019-04-22 PROCEDURE — 700111 HCHG RX REV CODE 636 W/ 250 OVERRIDE (IP): Performed by: INTERNAL MEDICINE

## 2019-04-22 PROCEDURE — 99232 SBSQ HOSP IP/OBS MODERATE 35: CPT | Performed by: INTERNAL MEDICINE

## 2019-04-22 PROCEDURE — 92507 TX SP LANG VOICE COMM INDIV: CPT

## 2019-04-22 PROCEDURE — 97535 SELF CARE MNGMENT TRAINING: CPT

## 2019-04-22 RX ORDER — HYDROXYZINE 50 MG/1
25 TABLET, FILM COATED ORAL 2 TIMES DAILY
Status: DISCONTINUED | OUTPATIENT
Start: 2019-04-22 | End: 2019-04-25

## 2019-04-22 RX ORDER — TRAMADOL HYDROCHLORIDE 50 MG/1
50 TABLET ORAL EVERY 12 HOURS
Status: DISCONTINUED | OUTPATIENT
Start: 2019-04-22 | End: 2019-05-03

## 2019-04-22 RX ORDER — METHOCARBAMOL 750 MG/1
750 TABLET, FILM COATED ORAL TWICE DAILY
Status: DISCONTINUED | OUTPATIENT
Start: 2019-04-22 | End: 2019-04-25

## 2019-04-22 RX ADMIN — HYDROCORTISONE: 1 CREAM TOPICAL at 05:49

## 2019-04-22 RX ADMIN — OMEPRAZOLE 20 MG: 20 CAPSULE, DELAYED RELEASE ORAL at 05:50

## 2019-04-22 RX ADMIN — METHOCARBAMOL TABLETS 750 MG: 750 TABLET, COATED ORAL at 18:25

## 2019-04-22 RX ADMIN — BUSPIRONE HYDROCHLORIDE 15 MG: 30 TABLET ORAL at 05:49

## 2019-04-22 RX ADMIN — HYDROXYZINE HYDROCHLORIDE 50 MG: 50 TABLET ORAL at 06:00

## 2019-04-22 RX ADMIN — AMITRIPTYLINE HYDROCHLORIDE 75 MG: 50 TABLET, FILM COATED ORAL at 21:33

## 2019-04-22 RX ADMIN — METOPROLOL TARTRATE 25 MG: 25 TABLET, FILM COATED ORAL at 05:50

## 2019-04-22 RX ADMIN — MORPHINE SULFATE 30 MG: 15 TABLET ORAL at 00:59

## 2019-04-22 RX ADMIN — MORPHINE SULFATE 15 MG: 15 TABLET ORAL at 12:15

## 2019-04-22 RX ADMIN — OMEPRAZOLE 20 MG: 20 CAPSULE, DELAYED RELEASE ORAL at 18:24

## 2019-04-22 RX ADMIN — ERGOCALCIFEROL 50000 UNITS: 1.25 CAPSULE ORAL at 18:24

## 2019-04-22 RX ADMIN — HEPARIN SODIUM 5000 UNITS: 5000 INJECTION, SOLUTION INTRAVENOUS; SUBCUTANEOUS at 14:04

## 2019-04-22 RX ADMIN — GABAPENTIN 200 MG: 100 CAPSULE ORAL at 05:50

## 2019-04-22 RX ADMIN — HEPARIN SODIUM 5000 UNITS: 5000 INJECTION, SOLUTION INTRAVENOUS; SUBCUTANEOUS at 05:49

## 2019-04-22 RX ADMIN — MORPHINE SULFATE 15 MG: 15 TABLET ORAL at 21:33

## 2019-04-22 RX ADMIN — TRAMADOL HYDROCHLORIDE 50 MG: 50 TABLET, FILM COATED ORAL at 18:24

## 2019-04-22 RX ADMIN — BUSPIRONE HYDROCHLORIDE 15 MG: 30 TABLET ORAL at 18:24

## 2019-04-22 RX ADMIN — MORPHINE SULFATE 30 MG: 30 TABLET, FILM COATED, EXTENDED RELEASE ORAL at 05:50

## 2019-04-22 RX ADMIN — HYDROCORTISONE: 1 CREAM TOPICAL at 21:34

## 2019-04-22 RX ADMIN — HEPARIN SODIUM 5000 UNITS: 5000 INJECTION, SOLUTION INTRAVENOUS; SUBCUTANEOUS at 21:34

## 2019-04-22 RX ADMIN — GABAPENTIN 200 MG: 100 CAPSULE ORAL at 18:24

## 2019-04-22 RX ADMIN — METOPROLOL TARTRATE 25 MG: 25 TABLET, FILM COATED ORAL at 18:24

## 2019-04-22 RX ADMIN — GABAPENTIN 200 MG: 100 CAPSULE ORAL at 12:15

## 2019-04-22 RX ADMIN — METHOCARBAMOL TABLETS 750 MG: 750 TABLET, COATED ORAL at 07:45

## 2019-04-22 RX ADMIN — MORPHINE SULFATE 15 MG: 15 TABLET ORAL at 07:45

## 2019-04-22 RX ADMIN — HYDROXYZINE HYDROCHLORIDE 25 MG: 50 TABLET, FILM COATED ORAL at 18:25

## 2019-04-22 RX ADMIN — MORPHINE SULFATE 30 MG: 30 TABLET, FILM COATED, EXTENDED RELEASE ORAL at 18:24

## 2019-04-22 ASSESSMENT — COGNITIVE AND FUNCTIONAL STATUS - GENERAL
DAILY ACTIVITIY SCORE: 15
PERSONAL GROOMING: A LITTLE
MOVING FROM LYING ON BACK TO SITTING ON SIDE OF FLAT BED: A LITTLE
WALKING IN HOSPITAL ROOM: A LITTLE
TURNING FROM BACK TO SIDE WHILE IN FLAT BAD: A LITTLE
SUGGESTED CMS G CODE MODIFIER DAILY ACTIVITY: CK
HELP NEEDED FOR BATHING: A LOT
TOILETING: A LOT
MOBILITY SCORE: 16
CLIMB 3 TO 5 STEPS WITH RAILING: TOTAL
DRESSING REGULAR UPPER BODY CLOTHING: A LOT
STANDING UP FROM CHAIR USING ARMS: A LITTLE
MOVING TO AND FROM BED TO CHAIR: A LITTLE
SUGGESTED CMS G CODE MODIFIER MOBILITY: CK
DRESSING REGULAR LOWER BODY CLOTHING: A LOT

## 2019-04-22 ASSESSMENT — ENCOUNTER SYMPTOMS
SPEECH CHANGE: 0
SENSORY CHANGE: 0
DOUBLE VISION: 0
DIZZINESS: 0
TINGLING: 0
HEARTBURN: 0
PALPITATIONS: 0
TREMORS: 0
VOMITING: 0
WEAKNESS: 0
FEVER: 0
WEIGHT LOSS: 0
BACK PAIN: 0
ORTHOPNEA: 0
SHORTNESS OF BREATH: 0
COUGH: 0
MYALGIAS: 0
PHOTOPHOBIA: 0
NAUSEA: 1
CHILLS: 0
HEADACHES: 0
FOCAL WEAKNESS: 1
BLURRED VISION: 0
NECK PAIN: 0
ABDOMINAL PAIN: 0
DEPRESSION: 0

## 2019-04-22 ASSESSMENT — LIFESTYLE VARIABLES: SUBSTANCE_ABUSE: 0

## 2019-04-22 ASSESSMENT — GAIT ASSESSMENTS
DEVIATION: STEP TO;DECREASED BASE OF SUPPORT;DECREASED HEEL STRIKE;DECREASED TOE OFF
ASSISTIVE DEVICE: FRONT WHEEL WALKER
GAIT LEVEL OF ASSIST: MINIMAL ASSIST
DISTANCE (FEET): 10

## 2019-04-22 NOTE — THERAPY
"Speech Language Therapy cognitive-linguistic treatment completed.     Functional Status:  The patient was seen for a repeat swallow evaluation this date, as RN reports pt has had AMS and hallucinations.  The Cognistat was administered in full, as well as portions of other standardized cognitive assessments.  The Cognistat evaluates the following cognitive domains: orientation, attention, language (comprehension, repetition and naming), visual construction, memory, calculations and reasoning (similarities and judgement).  Pt presented with severe deficits with orientation, attention, repetition of language, visual construction, memory and reasoning (both similarities and judgement).  Moderate deficits were noted with language comprehension and calculations.  Pt was within normal limits for naming.  In other testing, pt was noted to have moderate deficits regarding insight into his deficits and with medication management and functional problem solving.  Throughout the evaluation, pt had a difficult time maintaining attention to task, was very easily distracted and had periods of confabulatory, tangential speech.  This is a decline in functioning as compared to cognitive evaluation completed on 4/10.  He is not safe for independent living at this time and may benefit from post acute SLP services to address cognitive deficits.  SLP is following in the acute care setting.  Thank you for the consult.    Recommendations: 1) 24 hour supervision upon discharge, 2) Pt may benefit from post acute SLP services to address cognitive deficits, 3) SLP is following in the acute care setting.      Plan of Care: Will benefit from Speech Therapy 3 times per week    Post-Acute Therapy: Recommend inpatient transitional care services for continued speech therapy services.      See \"Rehab Therapy-Acute\" Patient Summary Report for complete documentation.     "

## 2019-04-22 NOTE — CARE PLAN
Problem: Safety  Goal: Will remain free from injury  Hourly rounding in effect, pt instructed to call for assistance, bed locked and in lowest position. Bed alarm on. Non-skid socks in use to prevent falling and slipping.       Problem: Knowledge Deficit  Goal: Knowledge of disease process/condition, treatment plan, diagnostic tests, and medications will improve  Pt updated and educated on nursing interventions, medications and POC

## 2019-04-22 NOTE — PROGRESS NOTES
"Pt A&Ox1, only oriented to self and a little more irritable today compared to yesterday. VS: /68   Pulse 90   Temp 36.4 °C (97.5 °F) (Temporal)   Resp 16   Ht 1.803 m (5' 11\")   Wt 90.8 kg (200 lb 2.8 oz)   SpO2 93%   BMI 27.92 kg/m² . Pt denies  n/v, SOB and chest pain. Pt states pain 10/10, prn pain medication administered per MAR. Pt stating that the medication does not work. Will reassess in about an hour. Pt stating numbness and tingling on the left side. Central catheter patent with positive blood return. Pt needs met at this time, call light within reach, hourly rounding in effect, and will continue to monitor.       "

## 2019-04-22 NOTE — THERAPY
"Occupational Therapy Treatment completed with focus on ADLs, ADL transfers, patient education and cognition.  Functional Status: Pt seen today for OT treatment. He was cooperative however easily irritable re: therapy and needed encouragement to figure out what his goals are and to participate. He was found seated EOB. He needed maxA to don/doff TLSO but he was agreeable to wearing it with encouragement and education. He needed Joe sit<>stand with FWW, participated in marching in place in prep for ADL txfs declining to participate in any further activity past EOB. MaxA LB dressing. Pt left EOB to finish his soda. He is limited by weakness, fatigue, impaired balance, impaired cognition, and pain which impacts independence in self care.  Plan of Care: Will benefit from Occupational Therapy 3 times per week  Discharge Recommendations:  Equipment Will Continue to Assess for Equipment Needs. Recommend inpatient transitional care services for continued occupational therapy services.       See \"Rehab Therapy-Acute\" Patient Summary Report for complete documentation.   "

## 2019-04-22 NOTE — PROGRESS NOTES
[facility] Hospital Medicine Daily Progress Note    Date of Service  4/22/2019    Chief Complaint  53 y.o. male admitted 4/5/2019 with back pain, found to have L1 burst fracture.    Hospital Course   53-year-old male with past medical history of hypertension, myelodysplastic syndrome, depression and GERD presents to the hospital on April 5, 2019 with complaint of nausea and vomiting for past 2 days.  He found to have subacute fracture but no protrusion on MRI and neurosurgery was consulted and they recommended brace and outpatient follow-up in clinic.  Patient admitted to telemetry with L1 burst fracture, conservative bracing recommended by neurosurgeon.  Patient with difficult to control pain and trouble with therapies.  Physiatry was consulted and he is not a candidate for inpatient rehab as per physiatry evaluation.  For his pancytopenia and myelodysplastic syndrome Case discussed with oncology and recommended outpatient follow-up.      Interval Problem Update    I evaluated and examined him at the bedside.  He is alert and able to follow commands and he knows that I am a doctor but he has been having ongoing confusion.  He continued to have off-and-on confusion and he gets some hallucination as well.  Neurocognitive evaluation pending.  I discussed with  regarding his discharge planning.   He is difficult discharge as per my understanding he has to go back to custodial along with that he has bilateral lower extremity weakness also he has off and on confusion.  I ordered CMP and he did not show any significant abnormalities except slightly elevated alkaline phosphatase.  I decreased his dose of gabapentin as that could be the potential cause of his off-and-on confusion.  Difficult situation at this moment as physical therapy evaluated him today and again recommended transfer to transitional care facility.  Most of the skilled nursing facility has been dissected him as he has to go back to the custodial.  I discussed  plan of care with  during multidisciplinary rounds.  He is complaining of mild right flank pain and I ordered UA.  I further decrease his dose of methocarbamol.  Also, I decreased his dose of hydroxyzine from 50 mg 25 mg twice daily.  Due to ongoing confusion and increased somnolence.    Consultants/Specialty  Neurosurgery  Physiatry    Code Status  full    Disposition  SNF vs rehab    Review of Systems  Review of Systems   Constitutional: Negative for chills, fever and weight loss.   HENT: Negative for congestion, hearing loss and tinnitus.    Eyes: Negative for blurred vision, double vision and photophobia.   Respiratory: Negative for cough and shortness of breath.    Cardiovascular: Negative for chest pain, palpitations and orthopnea.   Gastrointestinal: Positive for nausea. Negative for abdominal pain, heartburn and vomiting.   Genitourinary: Negative for dysuria, frequency, hematuria and urgency.   Musculoskeletal: Negative for back pain, joint pain, myalgias and neck pain.   Skin: Negative for rash.   Neurological: Positive for focal weakness (Bilateral lower extremities due to pain (slightly improved)). Negative for dizziness, tingling, tremors, sensory change, speech change, weakness and headaches.   Psychiatric/Behavioral: Negative for depression and substance abuse.   All other systems reviewed and are negative.       Physical Exam  Temp:  [36.2 °C (97.1 °F)-37.1 °C (98.7 °F)] 36.4 °C (97.5 °F)  Pulse:  [75-90] 90  Resp:  [16-18] 16  BP: (107-124)/(68-87) 110/68  SpO2:  [91 %-99 %] 93 %    Physical Exam   Constitutional: He is oriented to person, place, and time.   He is sitting at the edge of the bed without any acute distress.   HENT:   Head: Normocephalic and atraumatic.   Eyes: Pupils are equal, round, and reactive to light.   Neck: Normal range of motion. Neck supple.   Cardiovascular: Normal rate and regular rhythm.  Exam reveals no friction rub.    No murmur heard.  Pulmonary/Chest:  Effort normal and breath sounds normal. No respiratory distress.   Abdominal: Soft. Bowel sounds are normal. He exhibits no distension.   Mild left flank tenderness   Musculoskeletal: He exhibits tenderness (Lower back). He exhibits no edema or deformity.   Neurological: He is alert and oriented to person, place, and time. No cranial nerve deficit.   Bilateral lower extremities weakness (improved)  Unable to walk and stand by himself.   Skin: Skin is warm and dry. He is not diaphoretic.   Psychiatric: He has a normal mood and affect. His behavior is normal.       Fluids    Intake/Output Summary (Last 24 hours) at 04/22/19 0756  Last data filed at 04/21/19 2338   Gross per 24 hour   Intake             1059 ml   Output              725 ml   Net              334 ml       Laboratory  Recent Labs      04/20/19   0238   WBC  5.0   RBC  3.24*   HEMOGLOBIN  9.3*   HEMATOCRIT  29.8*   MCV  92.0   MCH  28.7   MCHC  31.2*   RDW  55.0*   PLATELETCT  59*   MPV  9.5     Recent Labs      04/21/19   0835   SODIUM  137   POTASSIUM  3.7   CHLORIDE  104   CO2  27   GLUCOSE  93   BUN  9   CREATININE  0.80   CALCIUM  8.6                   Imaging  DX-CHEST-PORTABLE (1 VIEW)   Final Result         No acute cardiac or pulmonary abnormality is identified.      [id number] VIEW   Final Result      1.  There is a nonobstructive nonspecific bowel gas pattern.  There is no evidence of free intraperitoneal air.      MR-LUMBAR SPINE-WITH & W/O   Final Result      1.  There is subacute fracture along the superior endplate of L1 vertebral body. There is no posterior retropulsion.   2.  There is no focal bone lesion.   3.  There is no evidence of infection.   4.  Mild degenerative disease as described above.      MR-BRAIN-W/O   Final Result      1.  Mild cerebral atrophy.   2.  Mild chronic microvascular ischemic disease.   3.  No acute abnormality.      DX-KNEE 2- LEFT   Final Result      1.  No fracture or dislocation of LEFT knee.   2.  Minimal  degenerative changes.   3.  Diffuse vascular calcifications.      EC-ECHOCARDIOGRAM COMPLETE W/O CONT   Final Result      CT-ABDOMEN-PELVIS WITH   Final Result         1.  No acute abnormality.   2.  Hepatomegaly and diffuse hepatic steatosis   3.  Atherosclerosis and atherosclerotic coronary artery disease.   4.  Pulmonary nodules measuring up to 6.5 mm, see nodule follow-up recommendations below.      Low Risk: CT at 3-6 months, then consider CT at 18-24 months      High Risk: CT at 3-6 months, then at 18-24 months      Comments: Use most suspicious nodule as guide to management. Follow-up intervals may vary according to size and risk.      Low Risk - Minimal or absent history of smoking and of other known risk factors.      High Risk - History of smoking or of other known risk factors.      Note: These recommendations do not apply to lung cancer screening, patients with immunosuppression, or patients with known primary cancer.      Fleischner Society 2017 Guidelines for Management of Incidentally Detected Pulmonary Nodules in Adults         CT-CTA CHEST PULMONARY ARTERY W/ RECONS   Final Result         1.  No large central pulmonary embolus is appreciated, evaluation of the subsegmental branches is essentially nondiagnostic due to motion artifacts. Additional imaging would be required for definitive exclusion of small distal pulmonary emboli.      DX-LUMBAR SPINE-2 OR 3 VIEWS   Final Result      Limited examination. Mild anterior wedging L2 vertebral body that appears to be old.   If symptoms are persistent, would recommend CT for further evaluation.      DX-THORACIC SPINE-WITH SWIMMERS VIEW   Final Result      Limited examination. The upper thoracic spine is not well-demonstrated.   Mild anterior wedging of some the lower thoracic vertebral bodies and appears old.   No definite acute compression. If symptoms are persistent, CT would be recommended for further evaluation.      DX-CHEST-PORTABLE (1 VIEW)   Final  Result      Central catheter projects over the superior right atrium.      Prominent calcified granuloma in the left midlung.      Atherosclerotic plaque.      CT-HEAD W/O   Final Result      No acute intracranial abnormality is identified.           Assessment/Plan  * Closed stable burst fracture of first lumbar vertebra with routine healing   Assessment & Plan    Lumbar MRI found L1 subacute fracture with no protrusion  Neurosurgery was consulted and they recommended brace placement and outpatient follow-up.  I increased his dose of gabapentin.  I discontinued IV morphine.  I encouraged him to use oral pain medication for pain control.  Physiatry evaluated him and recommended that is not a candidate for inpatient rehab.  Physical therapy evaluated and recommended transitional care facilities.  Currently is very difficult discharge as per physical therapy recommendation patient needs to go to skilled nursing facility but most of the skilled nursing facility has been declining him as he needs to go back to [facility] as per my understanding from .  Pending neurocognitive evaluation.   Difficult discharge due to current social situation and off and on confused       Pancytopenia (HCC)- (present on admission)   Assessment & Plan    Most likely due to myelodysplastic syndrome  His last chemotherapy was in November 2018  No acute symptoms.  Hematology/oncology was consulted and recommended outpatient follow-up.  I ordered CBC and his white blood cell count has improved.         Left knee pain   Assessment & Plan    X ray found arthritis  Continue to provide him pain management.     Tachycardia   Assessment & Plan    Resolved  It was most likely from pain  metprolol 25 mg p.o. twice daily       Somnolence- (present on admission)   Assessment & Plan    Waxing and waning  Stopped Ambien and trazodone  Ordered BMP and ammonia and it did not show any acute normalities.  His MRI brain without contrast did not show  any acute abnormalities on April 8, 2019  He has been having off-and-on confusion and I ordered CMP to evaluate for any electrolyte abnormalities.,  TSH and vitamin B12 are within normal limits.  I decreased the dose of gabapentin at that could be the reason of his increased somnolence and off-and-on confusion.  I have decreased his dose of hydroxyzine, methocarbamol and recommended RN not to use narcotic pain medications due to ongoing confusion.  I started him on tramadol as an possible alternative for pain.  I encouraged RN to use Tylenol.     Dizziness- (present on admission)   Assessment & Plan    Vitals are stable  Echo did not show any acute normalities  PT/OT  Negative orthostatics vitals           I discussed plan of care during multidisciplinary rounds.  I discussed with  regarding his discharge planning.    VTE prophylaxis: heparin

## 2019-04-22 NOTE — PROGRESS NOTES
"Patient seems alert to self and at times, location.  He also seems to hallucinate; asking me about the black birds and why one was stuffed in a box, further telling me that someone took one of the birds out of the room.  \"This is the third one.\"  He then goes off on a tangent and it is very hard to follow his train of thought; it is nonsense at times.  He then asks me to \"pinky jamarcus,\" because he can \"trust me,\" and then asks me to search for a piece of paper, spells \"halloween,\" incorrectly, then talks about a house that you can't see on the front of the paper.    Bed alarm remains on with frequent checks.  Charge notified of continued confusion and hallucinations.   "

## 2019-04-22 NOTE — CARE PLAN
Problem: Safety  Goal: Will remain free from injury  Outcome: PROGRESSING AS EXPECTED  Reinforced use of call light and to call for assistance.  Call light and belongings within reach.  Bed alarm on.    Problem: Communication  Goal: The ability to communicate needs accurately and effectively will improve  Outcome: PROGRESSING AS EXPECTED  Discussed plan of care with patient, and answered all questions at this time.

## 2019-04-22 NOTE — THERAPY
"Physical Therapy Treatment completed.   Bed Mobility:  Supine to Sit: Minimal Assist  Transfers: Sit to Stand: Minimal Assist  Gait: Level Of Assist: Minimal Assist with Front-Wheel Walker       Plan of Care: Will benefit from Physical Therapy 3 times per week  Discharge Recommendations: Equipment: Will Continue to Assess for Equipment Needs. Recommend inpatient transitional care services for continued physical therapy services.      See \"Rehab Therapy-Acute\" Patient Summary Report for complete documentation.     Pt is progressing gradually with functional mobility and was able to demonstrate with increased activity tolerance this session. Pt continues to refuse wearing TLSO during mobilization, however, pt was agreeable to wear once sitting up in chair. Pt was confused throughout session and very tangential. Pt requires constant cues and redirection during session. Pt preservates on TLSO and reports he wants to walk, however, when attempting to ambulation pt tends to sit back down reporting the IV needs to be adjusted. With edcuation and redirection pt was able to ambulate to the sink about 8 to 10 ft, about 3x with multiple rest breaks. Pt able to transfer to chair with Min A and cues for keeping FWW close to body. Once sitting up in chair, pt was able to follow instructions for perfroming seated ther-ex, however, pt does not appear to have good carryover and will require reminder with supine/seated ther-ex to improve LE strength/endurance. Will continue to follow and recommend post acute therapy prior to d/c home given current objective findings.   "

## 2019-04-23 PROCEDURE — 700111 HCHG RX REV CODE 636 W/ 250 OVERRIDE (IP): Performed by: INTERNAL MEDICINE

## 2019-04-23 PROCEDURE — A9270 NON-COVERED ITEM OR SERVICE: HCPCS | Performed by: HOSPITALIST

## 2019-04-23 PROCEDURE — 700102 HCHG RX REV CODE 250 W/ 637 OVERRIDE(OP): Performed by: HOSPITALIST

## 2019-04-23 PROCEDURE — 700102 HCHG RX REV CODE 250 W/ 637 OVERRIDE(OP): Performed by: INTERNAL MEDICINE

## 2019-04-23 PROCEDURE — 700101 HCHG RX REV CODE 250: Performed by: INTERNAL MEDICINE

## 2019-04-23 PROCEDURE — 99232 SBSQ HOSP IP/OBS MODERATE 35: CPT | Performed by: INTERNAL MEDICINE

## 2019-04-23 PROCEDURE — A9270 NON-COVERED ITEM OR SERVICE: HCPCS | Performed by: INTERNAL MEDICINE

## 2019-04-23 PROCEDURE — 770004 HCHG ROOM/CARE - ONCOLOGY PRIVATE *

## 2019-04-23 RX ORDER — AMOXICILLIN 250 MG
2 CAPSULE ORAL 2 TIMES DAILY
Status: DISCONTINUED | OUTPATIENT
Start: 2019-04-23 | End: 2019-05-05

## 2019-04-23 RX ORDER — POLYETHYLENE GLYCOL 3350 17 G/17G
1 POWDER, FOR SOLUTION ORAL
Status: DISCONTINUED | OUTPATIENT
Start: 2019-04-23 | End: 2019-05-05

## 2019-04-23 RX ORDER — BISACODYL 10 MG
10 SUPPOSITORY, RECTAL RECTAL
Status: DISCONTINUED | OUTPATIENT
Start: 2019-04-23 | End: 2019-05-05

## 2019-04-23 RX ADMIN — SENNOSIDES,DOCUSATE SODIUM 2 TABLET: 8.6; 5 TABLET, FILM COATED ORAL at 17:45

## 2019-04-23 RX ADMIN — ONDANSETRON 4 MG: 4 TABLET, ORALLY DISINTEGRATING ORAL at 06:08

## 2019-04-23 RX ADMIN — MORPHINE SULFATE 15 MG: 15 TABLET ORAL at 09:01

## 2019-04-23 RX ADMIN — METOPROLOL TARTRATE 25 MG: 25 TABLET, FILM COATED ORAL at 17:45

## 2019-04-23 RX ADMIN — TRAZODONE HYDROCHLORIDE 50 MG: 50 TABLET ORAL at 00:09

## 2019-04-23 RX ADMIN — HYDROCORTISONE: 1 CREAM TOPICAL at 06:14

## 2019-04-23 RX ADMIN — BUSPIRONE HYDROCHLORIDE 15 MG: 30 TABLET ORAL at 17:44

## 2019-04-23 RX ADMIN — GABAPENTIN 200 MG: 100 CAPSULE ORAL at 12:28

## 2019-04-23 RX ADMIN — HEPARIN SODIUM 5000 UNITS: 5000 INJECTION, SOLUTION INTRAVENOUS; SUBCUTANEOUS at 14:05

## 2019-04-23 RX ADMIN — METOPROLOL TARTRATE 25 MG: 25 TABLET, FILM COATED ORAL at 06:11

## 2019-04-23 RX ADMIN — HEPARIN SODIUM 5000 UNITS: 5000 INJECTION, SOLUTION INTRAVENOUS; SUBCUTANEOUS at 21:59

## 2019-04-23 RX ADMIN — MORPHINE SULFATE 15 MG: 15 TABLET ORAL at 14:05

## 2019-04-23 RX ADMIN — BUSPIRONE HYDROCHLORIDE 15 MG: 30 TABLET ORAL at 06:09

## 2019-04-23 RX ADMIN — TRAMADOL HYDROCHLORIDE 50 MG: 50 TABLET, FILM COATED ORAL at 06:11

## 2019-04-23 RX ADMIN — AMITRIPTYLINE HYDROCHLORIDE 75 MG: 50 TABLET, FILM COATED ORAL at 21:58

## 2019-04-23 RX ADMIN — TRAMADOL HYDROCHLORIDE 50 MG: 50 TABLET, FILM COATED ORAL at 17:45

## 2019-04-23 RX ADMIN — METHOCARBAMOL TABLETS 750 MG: 750 TABLET, COATED ORAL at 06:15

## 2019-04-23 RX ADMIN — OMEPRAZOLE 20 MG: 20 CAPSULE, DELAYED RELEASE ORAL at 06:11

## 2019-04-23 RX ADMIN — METHOCARBAMOL TABLETS 750 MG: 750 TABLET, COATED ORAL at 17:45

## 2019-04-23 RX ADMIN — HYDROXYZINE HYDROCHLORIDE 25 MG: 50 TABLET, FILM COATED ORAL at 17:45

## 2019-04-23 RX ADMIN — GABAPENTIN 200 MG: 100 CAPSULE ORAL at 06:10

## 2019-04-23 RX ADMIN — HEPARIN SODIUM 5000 UNITS: 5000 INJECTION, SOLUTION INTRAVENOUS; SUBCUTANEOUS at 06:13

## 2019-04-23 RX ADMIN — MORPHINE SULFATE 30 MG: 30 TABLET, FILM COATED, EXTENDED RELEASE ORAL at 06:11

## 2019-04-23 RX ADMIN — MORPHINE SULFATE 30 MG: 30 TABLET, FILM COATED, EXTENDED RELEASE ORAL at 17:45

## 2019-04-23 RX ADMIN — HYDROXYZINE HYDROCHLORIDE 25 MG: 50 TABLET, FILM COATED ORAL at 06:12

## 2019-04-23 RX ADMIN — OMEPRAZOLE 20 MG: 20 CAPSULE, DELAYED RELEASE ORAL at 17:46

## 2019-04-23 RX ADMIN — LIDOCAINE 1 PATCH: 50 PATCH TOPICAL at 12:29

## 2019-04-23 ASSESSMENT — ENCOUNTER SYMPTOMS
VOMITING: 0
INSOMNIA: 0
DIZZINESS: 1
FEVER: 0
BACK PAIN: 1
WEAKNESS: 0
NERVOUS/ANXIOUS: 0
BLURRED VISION: 0
HEADACHES: 0
SENSORY CHANGE: 0
CONSTIPATION: 0
DEPRESSION: 0
SORE THROAT: 0
CLAUDICATION: 0
COUGH: 0
MYALGIAS: 1
SPEECH CHANGE: 0
ABDOMINAL PAIN: 0
PHOTOPHOBIA: 0
DIARRHEA: 0
HEARTBURN: 0
CHILLS: 0
SHORTNESS OF BREATH: 0

## 2019-04-23 NOTE — CARE PLAN
Problem: Nutritional:  Goal: Achieve adequate nutritional intake  Patient will consume 50-75% of meals   Outcome: NOT MET

## 2019-04-23 NOTE — PROGRESS NOTES
Hospital Medicine Daily Progress Note    Date of Service  4/23/2019    Chief Complaint  53 y.o. male admitted 4/5/2019 with back pain, found to have L1 burst fracture.    Hospital Course    Patient admitted with L1 burst fracture, conservative bracing recommended by neurosurgeon.  Patient with difficult to control pain and trouble with therapies and difficult discharge due to social situation.      Interval Problem Update  4/13 Patient moved from telemetry to medical floor, states pain uncontrolled and requesting IV pain medication, 1 dose IV morphine ordered as needed for severe pain but I've adjusted his percocet from 5mg to 10 mg and increased robaxin and made it scheduled and increased his gabapentin for better pain control.  4/14 Patient states he had rest following morphine dose overnight but pain uncontrolled still with previous changes, will add oxycontin 20 bid as long acting pain medication to help control pain and allow patient to participate in therapies.  Rehab consult placed.  4/15 Patient up to side of bed in TLSO after working with therapy today, complains of uncontrolled pain with the changes made.  I doubt he is having response to oxycodone/oxycontin so will discontinue this and rotate opiates to morphine - MS contin and MSIR.  Physiatry consult pending for recommendations to help get patient out of the hospital.  4/23 Patient s/p cognitive evaluation and showed decline since previous evaluation.  His mentation seemed to change with the initiation of gabapentin that I started increasing on 4/13.  I will discontinue the gabapentin as it does not seem to be helping with his pain control but he is having cog deficits and dizziness - especially with dose increases.  Patient is agreeable to this.  He does not need IV morphine and pain can be controlled with PO only.    Consultants/Specialty  RAFAEL Jay - s/o    Code Status  full    Disposition  SNF vs home  Rehab - not a candidate    Review of  Systems  Review of Systems   Constitutional: Negative for chills and fever.   HENT: Negative for congestion and sore throat.    Eyes: Negative for blurred vision and photophobia.   Respiratory: Negative for cough and shortness of breath.    Cardiovascular: Negative for chest pain, claudication and leg swelling.   Gastrointestinal: Negative for abdominal pain, constipation, diarrhea, heartburn and vomiting.   Genitourinary: Negative for dysuria and hematuria.   Musculoskeletal: Positive for back pain and myalgias. Negative for joint pain.   Skin: Negative for itching and rash.   Neurological: Positive for dizziness. Negative for sensory change, speech change, weakness and headaches.   Psychiatric/Behavioral: Negative for depression. The patient is not nervous/anxious and does not have insomnia.         Physical Exam  Temp:  [36.4 °C (97.5 °F)-36.7 °C (98 °F)] 36.6 °C (97.8 °F)  Pulse:  [] 87  Resp:  [16-18] 18  BP: (100-139)/(65-79) 100/65  SpO2:  [91 %-95 %] 91 %    Physical Exam   Constitutional: He is oriented to person, place, and time. He appears well-developed and well-nourished. No distress.   HENT:   Head: Normocephalic and atraumatic.   Eyes: Conjunctivae are normal. No scleral icterus.   Neck: Neck supple. No JVD present.   Cardiovascular: Normal rate, regular rhythm and normal heart sounds.  Exam reveals no gallop and no friction rub.    No murmur heard.  Pulmonary/Chest: Effort normal and breath sounds normal. No respiratory distress. He has no wheezes. He exhibits no tenderness.   Abdominal: Soft. Bowel sounds are normal. He exhibits no distension and no mass. There is no tenderness.   Musculoskeletal: He exhibits no edema or tenderness.   Lymphadenopathy:     He has no cervical adenopathy.   Neurological: He is alert and oriented to person, place, and time. No cranial nerve deficit.   Skin: Skin is warm and dry. He is not diaphoretic. No erythema. No pallor.   Psychiatric: He has a normal mood and  affect. His behavior is normal.   Nursing note and vitals reviewed.      Fluids    Intake/Output Summary (Last 24 hours) at 04/23/19 1449  Last data filed at 04/23/19 0700   Gross per 24 hour   Intake              662 ml   Output                0 ml   Net              662 ml       Laboratory      Recent Labs      04/21/19   0835   SODIUM  137   POTASSIUM  3.7   CHLORIDE  104   CO2  27   GLUCOSE  93   BUN  9   CREATININE  0.80   CALCIUM  8.6                   Imaging  DX-CHEST-PORTABLE (1 VIEW)   Final Result         No acute cardiac or pulmonary abnormality is identified.      UT-UFZGGUT-0 VIEW   Final Result      1.  There is a nonobstructive nonspecific bowel gas pattern.  There is no evidence of free intraperitoneal air.      MR-LUMBAR SPINE-WITH & W/O   Final Result      1.  There is subacute fracture along the superior endplate of L1 vertebral body. There is no posterior retropulsion.   2.  There is no focal bone lesion.   3.  There is no evidence of infection.   4.  Mild degenerative disease as described above.      MR-BRAIN-W/O   Final Result      1.  Mild cerebral atrophy.   2.  Mild chronic microvascular ischemic disease.   3.  No acute abnormality.      DX-KNEE 2- LEFT   Final Result      1.  No fracture or dislocation of LEFT knee.   2.  Minimal degenerative changes.   3.  Diffuse vascular calcifications.      EC-ECHOCARDIOGRAM COMPLETE W/O CONT   Final Result      CT-ABDOMEN-PELVIS WITH   Final Result         1.  No acute abnormality.   2.  Hepatomegaly and diffuse hepatic steatosis   3.  Atherosclerosis and atherosclerotic coronary artery disease.   4.  Pulmonary nodules measuring up to 6.5 mm, see nodule follow-up recommendations below.      Low Risk: CT at 3-6 months, then consider CT at 18-24 months      High Risk: CT at 3-6 months, then at 18-24 months      Comments: Use most suspicious nodule as guide to management. Follow-up intervals may vary according to size and risk.      Low Risk - Minimal or  absent history of smoking and of other known risk factors.      High Risk - History of smoking or of other known risk factors.      Note: These recommendations do not apply to lung cancer screening, patients with immunosuppression, or patients with known primary cancer.      Fleischner Society 2017 Guidelines for Management of Incidentally Detected Pulmonary Nodules in Adults         CT-CTA CHEST PULMONARY ARTERY W/ RECONS   Final Result         1.  No large central pulmonary embolus is appreciated, evaluation of the subsegmental branches is essentially nondiagnostic due to motion artifacts. Additional imaging would be required for definitive exclusion of small distal pulmonary emboli.      DX-LUMBAR SPINE-2 OR 3 VIEWS   Final Result      Limited examination. Mild anterior wedging L2 vertebral body that appears to be old.   If symptoms are persistent, would recommend CT for further evaluation.      DX-THORACIC SPINE-WITH SWIMMERS VIEW   Final Result      Limited examination. The upper thoracic spine is not well-demonstrated.   Mild anterior wedging of some the lower thoracic vertebral bodies and appears old.   No definite acute compression. If symptoms are persistent, CT would be recommended for further evaluation.      DX-CHEST-PORTABLE (1 VIEW)   Final Result      Central catheter projects over the superior right atrium.      Prominent calcified granuloma in the left midlung.      Atherosclerotic plaque.      CT-HEAD W/O   Final Result      No acute intracranial abnormality is identified.           Assessment/Plan  * Closed stable burst fracture of first lumbar vertebra with routine healing   Assessment & Plan    Lumbar MRI found L1 subacute fracture with no protrusion  Neurosurgery was consulted and they recommended brace placement and outpatient follow-up.  I discontinued IV morphine.  I encouraged him to use oral pain medication for pain control.  Physiatry evaluated him and recommended that is not a candidate  for inpatient rehab.  Physical therapy evaluated and recommended transitional care facilities.  Currently is very difficult discharge as per physical therapy recommendation patient needs to go to skilled nursing facility but most of the skilled nursing facility has been declining him for criminal record  Cognitive difficulties more prevalent since initiation of gabapentin - dose reduced and I will discontinue it (likely the cause of his dizziness also)           Pancytopenia (HCC)- (present on admission)   Assessment & Plan    Most likely due to myelodysplastic syndrome  His last chemotherapy was in November 2018  No acute symptoms.  Hematology/oncology was consulted and recommended outpatient follow-up.  CBC on 4/20 showed significant improvement           Left knee pain   Assessment & Plan    X ray found arthritis  Continue to provide him pain management.     Tachycardia   Assessment & Plan    Resolved  It was most likely from pain  metprolol 25 mg p.o. twice daily       Somnolence- (present on admission)   Assessment & Plan    Waxing and waning  Stopped Ambien and trazodone  Ordered BMP and ammonia and it did not show any acute normalities.  His MRI brain without contrast did not show any acute abnormalities on April 8, 2019  He has been having off-and-on confusion and I ordered CMP to evaluate for any electrolyte abnormalities.,  TSH and vitamin B12 are within normal limits.  I decreased the dose of gabapentin at that could be the reason of his increased somnolence and off-and-on confusion.  I have decreased his dose of hydroxyzine, methocarbamol and recommended RN not to use narcotic pain medications due to ongoing confusion.  I started him on tramadol as an possible alternative for pain.  I encouraged RN to use Tylenol.     Dizziness- (present on admission)   Assessment & Plan    Likely s/e of gabapentin - discontinued  Vitals are stable  Echo did not show any acute normalities  PT/OT  Negative orthostatics  vitals              VTE prophylaxis: heparin

## 2019-04-23 NOTE — CARE PLAN
Problem: Safety  Goal: Will remain free from injury  Outcome: PROGRESSING AS EXPECTED  Call light within reach, non-skid socks in place, bed locked and in lowest position, bed alarm on, hourly rounding.    Problem: Pain Management  Goal: Pain level will decrease to patient's comfort goal  Outcome: PROGRESSING SLOWER THAN EXPECTED  Patient educated on 0-10 pain rating scale, states pain is 10/10, morphine 15mg given per MAR.

## 2019-04-23 NOTE — PROGRESS NOTES
Received report & assumed care of patient at 1900. Assessment completed. Patient is A&Ox2, disoriented to time and situation. L central line patent, positive blood return, infusing NS at TKO. Patient reports pain of 10/10, pain medication provided per MAR. POC discussed & questions answered. Bed locked and in lowest position, bed alarm is on, call light within reach, non-skid socks in place, hourly rounding. Patient reports no further needs at this time.

## 2019-04-23 NOTE — DIETARY
"Nutrition services: Day 17 of admit.  Benito Persaud is a 53 y.o. male with admitting DX of hypokalemia, dehydration, nausea and vomiting   Consult received for weekly rescreening. Pt has poor po per ADL documentation.     Visited pt at bedside. Pt reports that his appetite is terrible and patient expresses dislike towards food served. Pt also notes that he feels he has been getting the wrong items for meals and that he is not happy with the foods he is receiving. Pt unable to provide food items that are appealing to him. Pt is not interested in receiving Boost supplement at this time, but is amenable to Magic Cups BID.     Spoke with Nutrition Representative that regularly sees patient for meal preferences. Nutrition Rep reports that pt has a rice allergy, limiting the foods that he is able to order. Nutrition representative continues to work with patient daily to find foods that are appealing to him.     Assessment:  Height: 180.3 cm (5' 11\")  Weight: 96.1 kg (211 lb 13.8 oz)  Body mass index is 29.55 kg/m². Overweight   Diet/Intake: Regular/ <25% of last two meals consumed, no other documentation since 4/18 per ADLs     Evaluation:   1. SLP evaluation 4/22-\"decline in functioning as compared to cognitive evaluation completed 4/10\"  2. Meds: Omeprazole, morphine ER, metoprolol, methocarbamol, hydroxyzine HCl, Buspar, Elavil  3. Labs: Alkaline phosphatase 135, Albumin 3.0   4. Wt has remained stable   5. Last BM 4/22- per patient     Malnutrition Risk: if patient continues to consume <50% of estimated needs, he will become more at risk for malnutrition     Recommendations/Plan:  1. Recommend adding Magic Cup supplements BID to increase protein/calorie intake.   2. Encourage intake of meals.  3. Document intake of all meals  as % taken in ADL's to provide interdisciplinary communication across all shifts.   4. Monitor weight.  5. Nutrition rep will continue to see patient for ongoing meal and snack preferences.     RD " following

## 2019-04-23 NOTE — CARE PLAN
Problem: Safety  Goal: Will remain free from injury  Outcome: PROGRESSING AS EXPECTED  Fall precautions maintained. Call light, urinal, and bedside table within reach. Pt up to bathroom with one person assist, FWW, and TLSO brace on.    Problem: Pain Management  Goal: Pain level will decrease to patient's comfort goal  Outcome: PROGRESSING AS EXPECTED  Pt c/o back pain- Morphine 30 mg ER BID and Morphine 15-30 mg IR PRN Q4 given and pain moderately relieved.

## 2019-04-24 LAB
ANION GAP SERPL CALC-SCNC: 9 MMOL/L (ref 0–11.9)
BASOPHILS # BLD AUTO: 0.5 % (ref 0–1.8)
BASOPHILS # BLD: 0.02 K/UL (ref 0–0.12)
BUN SERPL-MCNC: 9 MG/DL (ref 8–22)
CALCIUM SERPL-MCNC: 9 MG/DL (ref 8.5–10.5)
CHLORIDE SERPL-SCNC: 105 MMOL/L (ref 96–112)
CO2 SERPL-SCNC: 25 MMOL/L (ref 20–33)
CREAT SERPL-MCNC: 1.14 MG/DL (ref 0.5–1.4)
EOSINOPHIL # BLD AUTO: 0.23 K/UL (ref 0–0.51)
EOSINOPHIL NFR BLD: 6.2 % (ref 0–6.9)
ERYTHROCYTE [DISTWIDTH] IN BLOOD BY AUTOMATED COUNT: 52.7 FL (ref 35.9–50)
GLUCOSE SERPL-MCNC: 78 MG/DL (ref 65–99)
HCT VFR BLD AUTO: 30.9 % (ref 42–52)
HGB BLD-MCNC: 9.9 G/DL (ref 14–18)
IMM GRANULOCYTES # BLD AUTO: 0.01 K/UL (ref 0–0.11)
IMM GRANULOCYTES NFR BLD AUTO: 0.3 % (ref 0–0.9)
LYMPHOCYTES # BLD AUTO: 1.19 K/UL (ref 1–4.8)
LYMPHOCYTES NFR BLD: 32.1 % (ref 22–41)
MCH RBC QN AUTO: 29.3 PG (ref 27–33)
MCHC RBC AUTO-ENTMCNC: 32 G/DL (ref 33.7–35.3)
MCV RBC AUTO: 91.4 FL (ref 81.4–97.8)
MONOCYTES # BLD AUTO: 0.4 K/UL (ref 0–0.85)
MONOCYTES NFR BLD AUTO: 10.8 % (ref 0–13.4)
NEUTROPHILS # BLD AUTO: 1.86 K/UL (ref 1.82–7.42)
NEUTROPHILS NFR BLD: 50.1 % (ref 44–72)
NRBC # BLD AUTO: 0 K/UL
NRBC BLD-RTO: 0 /100 WBC
PLATELET # BLD AUTO: 79 K/UL (ref 164–446)
PMV BLD AUTO: 9.5 FL (ref 9–12.9)
POTASSIUM SERPL-SCNC: 3.9 MMOL/L (ref 3.6–5.5)
RBC # BLD AUTO: 3.38 M/UL (ref 4.7–6.1)
SODIUM SERPL-SCNC: 139 MMOL/L (ref 135–145)
WBC # BLD AUTO: 3.7 K/UL (ref 4.8–10.8)

## 2019-04-24 PROCEDURE — A9270 NON-COVERED ITEM OR SERVICE: HCPCS | Performed by: INTERNAL MEDICINE

## 2019-04-24 PROCEDURE — 700102 HCHG RX REV CODE 250 W/ 637 OVERRIDE(OP): Performed by: INTERNAL MEDICINE

## 2019-04-24 PROCEDURE — A9270 NON-COVERED ITEM OR SERVICE: HCPCS | Performed by: HOSPITALIST

## 2019-04-24 PROCEDURE — 770004 HCHG ROOM/CARE - ONCOLOGY PRIVATE *

## 2019-04-24 PROCEDURE — 99232 SBSQ HOSP IP/OBS MODERATE 35: CPT | Performed by: INTERNAL MEDICINE

## 2019-04-24 PROCEDURE — 700102 HCHG RX REV CODE 250 W/ 637 OVERRIDE(OP): Performed by: HOSPITALIST

## 2019-04-24 PROCEDURE — 700111 HCHG RX REV CODE 636 W/ 250 OVERRIDE (IP): Performed by: INTERNAL MEDICINE

## 2019-04-24 PROCEDURE — 85025 COMPLETE CBC W/AUTO DIFF WBC: CPT

## 2019-04-24 PROCEDURE — 80048 BASIC METABOLIC PNL TOTAL CA: CPT

## 2019-04-24 RX ORDER — LORAZEPAM 1 MG/1
0.5 TABLET ORAL EVERY 4 HOURS PRN
Status: DISCONTINUED | OUTPATIENT
Start: 2019-04-24 | End: 2019-04-25

## 2019-04-24 RX ADMIN — MORPHINE SULFATE 30 MG: 30 TABLET, FILM COATED, EXTENDED RELEASE ORAL at 05:21

## 2019-04-24 RX ADMIN — AMITRIPTYLINE HYDROCHLORIDE 75 MG: 50 TABLET, FILM COATED ORAL at 20:12

## 2019-04-24 RX ADMIN — OMEPRAZOLE 20 MG: 20 CAPSULE, DELAYED RELEASE ORAL at 05:20

## 2019-04-24 RX ADMIN — HYDROXYZINE HYDROCHLORIDE 25 MG: 50 TABLET, FILM COATED ORAL at 05:20

## 2019-04-24 RX ADMIN — OMEPRAZOLE 20 MG: 20 CAPSULE, DELAYED RELEASE ORAL at 17:45

## 2019-04-24 RX ADMIN — METOPROLOL TARTRATE 25 MG: 25 TABLET, FILM COATED ORAL at 17:45

## 2019-04-24 RX ADMIN — LORAZEPAM 0.5 MG: 1 TABLET ORAL at 17:46

## 2019-04-24 RX ADMIN — DIPHENHYDRAMINE HCL 50 MG: 25 TABLET ORAL at 05:19

## 2019-04-24 RX ADMIN — TRAMADOL HYDROCHLORIDE 50 MG: 50 TABLET, FILM COATED ORAL at 17:44

## 2019-04-24 RX ADMIN — TRAMADOL HYDROCHLORIDE 50 MG: 50 TABLET, FILM COATED ORAL at 05:20

## 2019-04-24 RX ADMIN — BUSPIRONE HYDROCHLORIDE 15 MG: 30 TABLET ORAL at 17:43

## 2019-04-24 RX ADMIN — HYDROXYZINE HYDROCHLORIDE 25 MG: 50 TABLET, FILM COATED ORAL at 17:44

## 2019-04-24 RX ADMIN — MORPHINE SULFATE 15 MG: 15 TABLET ORAL at 14:39

## 2019-04-24 RX ADMIN — METHOCARBAMOL TABLETS 750 MG: 750 TABLET, COATED ORAL at 17:47

## 2019-04-24 RX ADMIN — HEPARIN SODIUM 5000 UNITS: 5000 INJECTION, SOLUTION INTRAVENOUS; SUBCUTANEOUS at 20:13

## 2019-04-24 RX ADMIN — METOPROLOL TARTRATE 25 MG: 25 TABLET, FILM COATED ORAL at 05:20

## 2019-04-24 RX ADMIN — MORPHINE SULFATE 15 MG: 15 TABLET ORAL at 08:44

## 2019-04-24 RX ADMIN — METHOCARBAMOL TABLETS 750 MG: 750 TABLET, COATED ORAL at 05:22

## 2019-04-24 RX ADMIN — DIPHENHYDRAMINE HCL 50 MG: 25 TABLET ORAL at 13:40

## 2019-04-24 RX ADMIN — MORPHINE SULFATE 15 MG: 15 TABLET ORAL at 00:09

## 2019-04-24 RX ADMIN — HEPARIN SODIUM 5000 UNITS: 5000 INJECTION, SOLUTION INTRAVENOUS; SUBCUTANEOUS at 05:21

## 2019-04-24 RX ADMIN — HEPARIN SODIUM 5000 UNITS: 5000 INJECTION, SOLUTION INTRAVENOUS; SUBCUTANEOUS at 14:41

## 2019-04-24 RX ADMIN — HYDROCORTISONE: 1 CREAM TOPICAL at 17:47

## 2019-04-24 RX ADMIN — SENNOSIDES,DOCUSATE SODIUM 2 TABLET: 8.6; 5 TABLET, FILM COATED ORAL at 05:20

## 2019-04-24 RX ADMIN — MORPHINE SULFATE 15 MG: 15 TABLET ORAL at 20:17

## 2019-04-24 RX ADMIN — MORPHINE SULFATE 30 MG: 30 TABLET, FILM COATED, EXTENDED RELEASE ORAL at 17:43

## 2019-04-24 RX ADMIN — TRAZODONE HYDROCHLORIDE 50 MG: 50 TABLET ORAL at 20:12

## 2019-04-24 RX ADMIN — HYDROCORTISONE: 1 CREAM TOPICAL at 05:21

## 2019-04-24 RX ADMIN — BUSPIRONE HYDROCHLORIDE 15 MG: 30 TABLET ORAL at 05:19

## 2019-04-24 ASSESSMENT — ENCOUNTER SYMPTOMS
SHORTNESS OF BREATH: 0
BACK PAIN: 1
FEVER: 0
WEAKNESS: 0
VOMITING: 0
NERVOUS/ANXIOUS: 0
CLAUDICATION: 0
SPEECH CHANGE: 0
CONSTIPATION: 0
ABDOMINAL PAIN: 0
DIARRHEA: 0
SORE THROAT: 0
DEPRESSION: 0
COUGH: 0
BLURRED VISION: 0
HEADACHES: 0
DIZZINESS: 1
PHOTOPHOBIA: 0
SENSORY CHANGE: 0
HEARTBURN: 0
INSOMNIA: 0
CHILLS: 0
MYALGIAS: 1

## 2019-04-24 NOTE — CARE PLAN
Problem: Safety  Goal: Will remain free from falls  Outcome: PROGRESSING AS EXPECTED  Call light within reach, non-skid socks in place, bed locked and in lowest position, bed alarm on, hourly rounding.    Problem: Pain Management  Goal: Pain level will decrease to patient's comfort goal  Outcome: PROGRESSING AS EXPECTED  PRN pain medication available to patient.

## 2019-04-24 NOTE — PROGRESS NOTES
Hospital Medicine Daily Progress Note    Date of Service  4/24/2019    Chief Complaint  53 y.o. male admitted 4/5/2019 with back pain, found to have L1 burst fracture.    Hospital Course    Patient admitted with L1 burst fracture, conservative bracing recommended by neurosurgeon.  Patient with difficult to control pain and trouble with therapies and difficult discharge due to social situation.      Interval Problem Update  4/13 Patient moved from telemetry to medical floor, states pain uncontrolled and requesting IV pain medication, 1 dose IV morphine ordered as needed for severe pain but I've adjusted his percocet from 5mg to 10 mg and increased robaxin and made it scheduled and increased his gabapentin for better pain control.  4/14 Patient states he had rest following morphine dose overnight but pain uncontrolled still with previous changes, will add oxycontin 20 bid as long acting pain medication to help control pain and allow patient to participate in therapies.  Rehab consult placed.  4/15 Patient up to side of bed in [facility] after working with therapy today, complains of uncontrolled pain with the changes made.  I doubt he is having response to oxycodone/oxycontin so will discontinue this and rotate opiates to morphine - MS contin and MSIR.  Physiatry consult pending for recommendations to help get patient out of the hospital.  4/23 Patient s/p cognitive evaluation and showed decline since previous evaluation.  His mentation seemed to change with the initiation of gabapentin that I started increasing on 4/13.  I will discontinue the gabapentin as it does not seem to be helping with his pain control but he is having cog deficits and dizziness - especially with dose increases.  Patient is agreeable to this.  He does not need IV morphine and pain can be controlled with PO only.  4/24 Patient seems more awake and alert today with dc of gabapentin.  He states the dizziness is still present.  Cognitive status seems to  have improved since yesterday also.  He needs continued improvement before he is safe to leave the hospital to an independent setting, no availability for placement given his criminal record and active warrants.    Consultants/Specialty  RAFAEL Jay - s/o    Code Status  full    Disposition  No placement available, needs improved cognitive function until safe to discharge to his own independence.    Review of Systems  Review of Systems   Constitutional: Negative for chills and fever.   HENT: Negative for congestion and sore throat.    Eyes: Negative for blurred vision and photophobia.   Respiratory: Negative for cough and shortness of breath.    Cardiovascular: Negative for chest pain, claudication and leg swelling.   Gastrointestinal: Negative for abdominal pain, constipation, diarrhea, heartburn and vomiting.   Genitourinary: Negative for dysuria and hematuria.   Musculoskeletal: Positive for back pain and myalgias. Negative for joint pain.   Skin: Negative for itching and rash.   Neurological: Positive for dizziness. Negative for sensory change, speech change, weakness and headaches.   Psychiatric/Behavioral: Negative for depression. The patient is not nervous/anxious and does not have insomnia.         Physical Exam  Temp:  [36.1 °C (97 °F)-36.6 °C (97.9 °F)] 36.3 °C (97.4 °F)  Pulse:  [80-97] 83  Resp:  [16-18] 18  BP: (103-112)/(61-76) 110/71  SpO2:  [90 %-96 %] 91 %    Physical Exam   Constitutional: He is oriented to person, place, and time. He appears well-developed and well-nourished. No distress.   HENT:   Head: Normocephalic and atraumatic.   Eyes: Conjunctivae are normal. No scleral icterus.   Neck: Neck supple. No JVD present.   Cardiovascular: Normal rate, regular rhythm and normal heart sounds.  Exam reveals no gallop and no friction rub.    No murmur heard.  Pulmonary/Chest: Effort normal and breath sounds normal. No respiratory distress. He has no wheezes. He exhibits no tenderness.   Abdominal:  Soft. Bowel sounds are normal. He exhibits no distension and no mass. There is no tenderness.   Musculoskeletal: He exhibits no edema or tenderness.   Lymphadenopathy:     He has no cervical adenopathy.   Neurological: He is alert and oriented to person, place, and time. No cranial nerve deficit.   Skin: Skin is warm and dry. He is not diaphoretic. No erythema. No pallor.   Psychiatric: He has a normal mood and affect. His behavior is normal.   Nursing note and vitals reviewed.      Fluids    Intake/Output Summary (Last 24 hours) at 04/24/19 1309  Last data filed at 04/24/19 1000   Gross per 24 hour   Intake              516 ml   Output              600 ml   Net              -84 ml       Laboratory  Recent Labs      04/24/19   0950   WBC  3.7*   RBC  3.38*   HEMOGLOBIN  9.9*   HEMATOCRIT  30.9*   MCV  91.4   MCH  29.3   MCHC  32.0*   RDW  52.7*   PLATELETCT  79*   MPV  9.5     Recent Labs      04/24/19   0950   SODIUM  139   POTASSIUM  3.9   CHLORIDE  105   CO2  25   GLUCOSE  78   BUN  9   CREATININE  1.14   CALCIUM  9.0                   Imaging  DX-CHEST-PORTABLE (1 VIEW)   Final Result         No acute cardiac or pulmonary abnormality is identified.      SY-VHCFLFG-6 VIEW   Final Result      1.  There is a nonobstructive nonspecific bowel gas pattern.  There is no evidence of free intraperitoneal air.      MR-LUMBAR SPINE-WITH & W/O   Final Result      1.  There is subacute fracture along the superior endplate of L1 vertebral body. There is no posterior retropulsion.   2.  There is no focal bone lesion.   3.  There is no evidence of infection.   4.  Mild degenerative disease as described above.      MR-BRAIN-W/O   Final Result      1.  Mild cerebral atrophy.   2.  Mild chronic microvascular ischemic disease.   3.  No acute abnormality.      DX-KNEE 2- LEFT   Final Result      1.  No fracture or dislocation of LEFT knee.   2.  Minimal degenerative changes.   3.  Diffuse vascular calcifications.       EC-ECHOCARDIOGRAM COMPLETE W/O CONT   Final Result      CT-ABDOMEN-PELVIS WITH   Final Result         1.  No acute abnormality.   2.  Hepatomegaly and diffuse hepatic steatosis   3.  Atherosclerosis and atherosclerotic coronary artery disease.   4.  Pulmonary nodules measuring up to 6.5 mm, see nodule follow-up recommendations below.      Low Risk: CT at 3-6 months, then consider CT at 18-24 months      High Risk: CT at 3-6 months, then at 18-24 months      Comments: Use most suspicious nodule as guide to management. Follow-up intervals may vary according to size and risk.      Low Risk - Minimal or absent history of smoking and of other known risk factors.      High Risk - History of smoking or of other known risk factors.      Note: These recommendations do not apply to lung cancer screening, patients with immunosuppression, or patients with known primary cancer.      Fleischner Society 2017 Guidelines for Management of Incidentally Detected Pulmonary Nodules in Adults         CT-CTA CHEST PULMONARY ARTERY W/ RECONS   Final Result         1.  No large central pulmonary embolus is appreciated, evaluation of the subsegmental branches is essentially nondiagnostic due to motion artifacts. Additional imaging would be required for definitive exclusion of small distal pulmonary emboli.      DX-LUMBAR SPINE-2 OR 3 VIEWS   Final Result      Limited examination. Mild anterior wedging L2 vertebral body that appears to be old.   If symptoms are persistent, would recommend CT for further evaluation.      DX-THORACIC SPINE-WITH SWIMMERS VIEW   Final Result      Limited examination. The upper thoracic spine is not well-demonstrated.   Mild anterior wedging of some the lower thoracic vertebral bodies and appears old.   No definite acute compression. If symptoms are persistent, CT would be recommended for further evaluation.      DX-CHEST-PORTABLE (1 VIEW)   Final Result      Central catheter projects over the superior right  atrium.      Prominent calcified granuloma in the left midlung.      Atherosclerotic plaque.      CT-HEAD W/O   Final Result      No acute intracranial abnormality is identified.           Assessment/Plan  * Closed stable burst fracture of first lumbar vertebra with routine healing   Assessment & Plan    Lumbar MRI found L1 subacute fracture with no protrusion  Neurosurgery was consulted and they recommended brace placement and outpatient follow-up.  I discontinued IV morphine.  I encouraged him to use oral pain medication for pain control.  Physiatry evaluated him and recommended that is not a candidate for inpatient rehab.  Physical therapy evaluated and recommended transitional care facilities.  Currently is very difficult discharge as per physical therapy recommendation patient needs to go to skilled nursing facility but most of the skilled nursing facility has been declining him for criminal record  Cognitive difficulties more prevalent since initiation of gabapentin - dose reduced and I will discontinue it (likely the cause of his dizziness also)           Pancytopenia (HCC)- (present on admission)   Assessment & Plan    Most likely due to myelodysplastic syndrome  His last chemotherapy was in November 2018  No acute symptoms.  Hematology/oncology was consulted and recommended outpatient follow-up.  CBC on 4/20 showed significant improvement           Left knee pain   Assessment & Plan    X ray found arthritis  Continue to provide him pain management.     Tachycardia   Assessment & Plan    Resolved  It was most likely from pain  metprolol 25 mg p.o. twice daily       Somnolence- (present on admission)   Assessment & Plan    Waxing and waning  Stopped Ambien and trazodone  Resolved, gabapentin likely contributing - discontinued       Dizziness- (present on admission)   Assessment & Plan    Likely s/e of gabapentin - discontinued  Vitals are stable  Echo did not show any acute normalities  PT/OT  Negative  orthostatics vitals              VTE prophylaxis: heparin

## 2019-04-24 NOTE — PROGRESS NOTES
Received report & assumed care of patient at 1900. Assessment completed. Patient is A&Ox2, disoriented to time and situation. L CVC patent, positive blood return, infusing NS at TKO. POC discussed & questions answered. Bed locked and in lowest position, bed alarm is on, call light within reach, non-skid socks in place, hourly rounding. Patient reports no further needs at this time.

## 2019-04-24 NOTE — PROGRESS NOTES
Report received from NOC RN and assumed care at 0715. Patient is A&O x2, disoriented to time and event. Poor historian.   Bed alarm in placed. Patient educated to call for assistance prior to sitting up at edge of bed.   Patient does not call for assistance.     Patient reports back pain, medicated with prn morphine per MAR.   Patient denies nausea.     PICC line flushes with + blood return.     POC discussed. All needs met at this time. Call light within reach. Bed locked, in lowest position. Hourly rounding in place.

## 2019-04-24 NOTE — CARE PLAN
Problem: Safety  Goal: Will remain free from falls  Bed locked in lowest position. Call light within reach.  Bed alarm on. belongings within reach    Hourly rounding in place.       Problem: Pain Management  Goal: Pain level will decrease to patient's comfort goal  Prn po morphine for pain.

## 2019-04-24 NOTE — PROGRESS NOTES
Dr. Cook notified that patient has only voided one time around 11am today. Patient does not feel like he needs to void now. Pt has minimal oral intake during shift and pt is receiving NS 15 ml/hr TKO for port. No new orders and Dr. Cook does not want bladder scan at this time. This Rn will encourage patient to hydrate with water.

## 2019-04-25 PROBLEM — R41.0 DELIRIUM: Status: ACTIVE | Noted: 2019-04-25

## 2019-04-25 PROCEDURE — 97530 THERAPEUTIC ACTIVITIES: CPT

## 2019-04-25 PROCEDURE — 770004 HCHG ROOM/CARE - ONCOLOGY PRIVATE *

## 2019-04-25 PROCEDURE — A9270 NON-COVERED ITEM OR SERVICE: HCPCS | Performed by: HOSPITALIST

## 2019-04-25 PROCEDURE — 99232 SBSQ HOSP IP/OBS MODERATE 35: CPT | Performed by: INTERNAL MEDICINE

## 2019-04-25 PROCEDURE — 99254 IP/OBS CNSLTJ NEW/EST MOD 60: CPT | Performed by: PSYCHIATRY & NEUROLOGY

## 2019-04-25 PROCEDURE — 700102 HCHG RX REV CODE 250 W/ 637 OVERRIDE(OP): Performed by: INTERNAL MEDICINE

## 2019-04-25 PROCEDURE — A9270 NON-COVERED ITEM OR SERVICE: HCPCS | Performed by: INTERNAL MEDICINE

## 2019-04-25 PROCEDURE — 97116 GAIT TRAINING THERAPY: CPT

## 2019-04-25 PROCEDURE — 700101 HCHG RX REV CODE 250: Performed by: INTERNAL MEDICINE

## 2019-04-25 PROCEDURE — 700102 HCHG RX REV CODE 250 W/ 637 OVERRIDE(OP): Performed by: HOSPITALIST

## 2019-04-25 PROCEDURE — 700111 HCHG RX REV CODE 636 W/ 250 OVERRIDE (IP): Performed by: INTERNAL MEDICINE

## 2019-04-25 RX ORDER — HALOPERIDOL 5 MG/ML
2 INJECTION INTRAMUSCULAR EVERY 4 HOURS PRN
Status: DISCONTINUED | OUTPATIENT
Start: 2019-04-25 | End: 2019-05-05

## 2019-04-25 RX ORDER — HYDROXYZINE PAMOATE 25 MG/1
25 CAPSULE ORAL EVERY 6 HOURS PRN
Status: DISCONTINUED | OUTPATIENT
Start: 2019-04-25 | End: 2019-04-25

## 2019-04-25 RX ORDER — HYDROXYZINE 50 MG/1
25 TABLET, FILM COATED ORAL EVERY 12 HOURS PRN
Status: DISCONTINUED | OUTPATIENT
Start: 2019-04-25 | End: 2019-04-25

## 2019-04-25 RX ORDER — METHOCARBAMOL 500 MG/1
500 TABLET, FILM COATED ORAL TWICE DAILY
Status: DISCONTINUED | OUTPATIENT
Start: 2019-04-25 | End: 2019-04-30

## 2019-04-25 RX ORDER — HYDROXYZINE 50 MG/1
25 TABLET, FILM COATED ORAL EVERY 8 HOURS PRN
Status: DISCONTINUED | OUTPATIENT
Start: 2019-04-25 | End: 2019-04-25

## 2019-04-25 RX ADMIN — OMEPRAZOLE 20 MG: 20 CAPSULE, DELAYED RELEASE ORAL at 17:37

## 2019-04-25 RX ADMIN — LIDOCAINE 1 PATCH: 50 PATCH TOPICAL at 11:41

## 2019-04-25 RX ADMIN — HYDROCORTISONE: 1 CREAM TOPICAL at 05:01

## 2019-04-25 RX ADMIN — BUSPIRONE HYDROCHLORIDE 15 MG: 30 TABLET ORAL at 04:56

## 2019-04-25 RX ADMIN — TRAZODONE HYDROCHLORIDE 50 MG: 50 TABLET ORAL at 21:27

## 2019-04-25 RX ADMIN — HYDROXYZINE HYDROCHLORIDE 25 MG: 50 TABLET, FILM COATED ORAL at 04:57

## 2019-04-25 RX ADMIN — LORAZEPAM 0.5 MG: 1 TABLET ORAL at 00:18

## 2019-04-25 RX ADMIN — TRAMADOL HYDROCHLORIDE 50 MG: 50 TABLET, FILM COATED ORAL at 17:36

## 2019-04-25 RX ADMIN — AMITRIPTYLINE HYDROCHLORIDE 75 MG: 50 TABLET, FILM COATED ORAL at 21:27

## 2019-04-25 RX ADMIN — METHOCARBAMOL TABLETS 750 MG: 750 TABLET, COATED ORAL at 04:57

## 2019-04-25 RX ADMIN — OMEPRAZOLE 20 MG: 20 CAPSULE, DELAYED RELEASE ORAL at 04:57

## 2019-04-25 RX ADMIN — MORPHINE SULFATE 15 MG: 15 TABLET ORAL at 09:25

## 2019-04-25 RX ADMIN — BUSPIRONE HYDROCHLORIDE 15 MG: 30 TABLET ORAL at 17:36

## 2019-04-25 RX ADMIN — METHOCARBAMOL TABLETS 500 MG: 500 TABLET, COATED ORAL at 18:02

## 2019-04-25 RX ADMIN — TRAMADOL HYDROCHLORIDE 50 MG: 50 TABLET, FILM COATED ORAL at 04:56

## 2019-04-25 RX ADMIN — MORPHINE SULFATE 30 MG: 30 TABLET, FILM COATED, EXTENDED RELEASE ORAL at 04:56

## 2019-04-25 RX ADMIN — HEPARIN SODIUM 5000 UNITS: 5000 INJECTION, SOLUTION INTRAVENOUS; SUBCUTANEOUS at 21:27

## 2019-04-25 RX ADMIN — HYDROCORTISONE: 1 CREAM TOPICAL at 17:39

## 2019-04-25 RX ADMIN — MORPHINE SULFATE 30 MG: 30 TABLET, FILM COATED, EXTENDED RELEASE ORAL at 17:36

## 2019-04-25 RX ADMIN — HEPARIN SODIUM 5000 UNITS: 5000 INJECTION, SOLUTION INTRAVENOUS; SUBCUTANEOUS at 14:51

## 2019-04-25 RX ADMIN — METOPROLOL TARTRATE 25 MG: 25 TABLET, FILM COATED ORAL at 17:37

## 2019-04-25 RX ADMIN — SENNOSIDES,DOCUSATE SODIUM 2 TABLET: 8.6; 5 TABLET, FILM COATED ORAL at 17:37

## 2019-04-25 RX ADMIN — METOPROLOL TARTRATE 25 MG: 25 TABLET, FILM COATED ORAL at 04:57

## 2019-04-25 RX ADMIN — MORPHINE SULFATE 15 MG: 15 TABLET ORAL at 00:18

## 2019-04-25 ASSESSMENT — COGNITIVE AND FUNCTIONAL STATUS - GENERAL
TOILETING: A LOT
WALKING IN HOSPITAL ROOM: A LITTLE
CLIMB 3 TO 5 STEPS WITH RAILING: A LOT
DAILY ACTIVITIY SCORE: 17
MOBILITY SCORE: 18
SUGGESTED CMS G CODE MODIFIER DAILY ACTIVITY: CK
SUGGESTED CMS G CODE MODIFIER MOBILITY: CK
DRESSING REGULAR LOWER BODY CLOTHING: A LOT
TURNING FROM BACK TO SIDE WHILE IN FLAT BAD: A LITTLE
HELP NEEDED FOR BATHING: A LOT
DRESSING REGULAR UPPER BODY CLOTHING: A LITTLE
MOVING FROM LYING ON BACK TO SITTING ON SIDE OF FLAT BED: A LITTLE
STANDING UP FROM CHAIR USING ARMS: A LITTLE

## 2019-04-25 ASSESSMENT — ENCOUNTER SYMPTOMS
SORE THROAT: 0
MYALGIAS: 1
DEPRESSION: 0
HEARTBURN: 0
DIZZINESS: 1
HEADACHES: 0
BLURRED VISION: 0
COUGH: 0
NERVOUS/ANXIOUS: 0
SPEECH CHANGE: 0
CHILLS: 0
CLAUDICATION: 0
FEVER: 0
BACK PAIN: 1
CONSTIPATION: 0
SHORTNESS OF BREATH: 0
INSOMNIA: 0
SENSORY CHANGE: 0
VOMITING: 0
PHOTOPHOBIA: 0
ABDOMINAL PAIN: 0
WEAKNESS: 0
DIARRHEA: 0

## 2019-04-25 ASSESSMENT — GAIT ASSESSMENTS
DEVIATION: DECREASED BASE OF SUPPORT;BRADYKINETIC;SHUFFLED GAIT;OTHER (COMMENT)
ASSISTIVE DEVICE: FRONT WHEEL WALKER
GAIT LEVEL OF ASSIST: MINIMAL ASSIST
DISTANCE (FEET): 20

## 2019-04-25 NOTE — ASSESSMENT & PLAN NOTE
Frequent confusing statements made by patient, unrelated to doses of pain medications  Psychiatry consultation appreciated  Discontinue Robaxin  Gabapentin discontinued  Resolved.

## 2019-04-25 NOTE — THERAPY
"Occupational Therapy Treatment completed with focus on ADLs and ADL transfers.  Functional Status:  Max/min A with ADLs and txfs  Plan of Care: Will benefit from Occupational Therapy 3 times per week  Discharge Recommendations:  Equipment Will Continue to Assess for Equipment Needs. Post-acute therapy Discharge to a transitional care facility for continued therapy services.    See \"Rehab Therapy-Acute\" Patient Summary Report for complete documentation.   "

## 2019-04-25 NOTE — CONSULTS
"PSYCHIATRIC CONSULTATION:  *Reason for admission:     *Reason for consult:  *Requesting Physician:          Legal status:  not applicable    *Chief Complaint: \"I came because my belly was hurting\"    This is a very poor historian. Difficult to obtain information due to significant disorganized thought process.     HPI: Pt reported he came to the hospital because of abdominal and back pain. Then stated that he had a fall prior coming to the hospital. Pt was not able to talk about why he needed paperwork for a hovercraft, and did not seem to be interested on talking about it. He denies any AVH, or paranoia feelings, but at times appear to be having very brief misperception as seeing this provider moving her foot when she was not, or seeing some objects on the chair, where there were none. Pt denies feeling depressed and stated he was feeling great. He stated he sleeps about 6-7 hours per night and could not give a clear answer about his appetite, however noted that he lost 116lbs since December. He was not able to tell what he had just had for lunch while his lunch trail was still in front of him. Pt reported having some fatigue, but denies any anhedonia. Unclear what activities patient enjoys. Unable to get a clear answer about maniac or anxiety symptoms, but stated he is feeling calm at this time and denied having any superpowers or any grandiose delusions.  Denied any racing thoughts.       Psychiatric Review of Systems:current symptoms as reported by pt.  Depression:  denies  Marie:denied having any superpowers or any grandiose delusions.  Denied any racing thoughts.   Anxiety/Panic Attacks denies  PTSD symptom: denies  Psychosis:denies       Medical Review of Systems: currently reports having sme RLQ abdomnial pain and back pain, with some mild N/V. Dniees any HA, CP/SOB, has some break out rash on his arms, and some mild tremors on his right hand.   Neurological: patient denied    TBIs:none per chart   SZs:none " "per chart   Strokes:none per chart   Other:  Other medical symptoms:   Thyroid:denied   Diabetes:denied   Cardiovascular disease:denied    Psychiatric Examination: observed phenomenon:  Vitals:  Vitals:    04/25/19 0829   BP: 107/86   Pulse: 80   Resp: 18   Temp: 36.3 °C (97.4 °F)   SpO2: 92%       Appearance: man, sitting on his bed with lunch tray in fron of him, fair hygiene and grooming  Calm, cooperative and polite  Good eye contact  Muscle Strength/Tone:not psychomotor retardation or agitation  Gait/Station:sitting up on his bed  Speech:normal volume, tone and rhythm   Thought Process: tangential  Associations:lose associations  Abnormal/Psychotic Thoughts (ex):denies any AVH, does not appear internally preoccupied neither to be responding to internal stimuli, no delusions or paranoia elicited  Insight/Judgement:poor/poor  Orientation:to person and place only  Memory:poor  Attention/Concentration:intact  Language:fluent in English  Fund of Knowledge:poor  Mood:\"great\"           Affect:    Full range, appropriate        SI/HI:   Denies any SI/HI  Neurological Testing:( ie clock, cube drawing, MMSE, MOCA,etc.) not done as pt has a cognitive testing by speech recently.      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     Drug/Alcohol/Tobacco Hx:   Drugs:denies   Alcohol:denies   Tobacco:denies    Medical Hx: labs, MARS, medications, etc were reviewed. Only those findings of potential interest to psychiatry are noted below:  Medical Conditions:     Past Medical History:   Diagnosis Date   • Asthma    • Cancer (HCC) 11/01/2016       Allergies: Iodine; Keflex; Reglan [metoclopramide]; Rice; Shellfish allergy; and Toradol  Medications (currently prescribed at [facility]):  Labs:  Recent Results (from the past 72 hour(s))   URINALYSIS    Collection " Time: 04/22/19 11:30 PM   Result Value Ref Range    Color Yellow     Character Clear     Specific Gravity 1.018 <1.035    Ph 6.0 5.0 - 8.0    Glucose Negative Negative mg/dL    Ketones Negative Negative mg/dL    Protein Negative Negative mg/dL    Bilirubin Negative Negative    Urobilinogen, Urine 1.0 Negative    Nitrite Negative Negative    Leukocyte Esterase Trace (A) Negative    Occult Blood Negative Negative    Micro Urine Req Microscopic    URINE MICROSCOPIC (W/UA)    Collection Time: 04/22/19 11:30 PM   Result Value Ref Range    WBC 0-2 (A) /hpf    RBC 0-2 (A) /hpf    Bacteria Negative None /hpf    Epithelial Cells Negative /hpf    Hyaline Cast 0-2 /lpf   CBC WITH DIFFERENTIAL    Collection Time: 04/24/19  9:50 AM   Result Value Ref Range    WBC 3.7 (L) 4.8 - 10.8 K/uL    RBC 3.38 (L) 4.70 - 6.10 M/uL    Hemoglobin 9.9 (L) 14.0 - 18.0 g/dL    Hematocrit 30.9 (L) 42.0 - 52.0 %    MCV 91.4 81.4 - 97.8 fL    MCH 29.3 27.0 - 33.0 pg    MCHC 32.0 (L) 33.7 - 35.3 g/dL    RDW 52.7 (H) 35.9 - 50.0 fL    Platelet Count 79 (L) 164 - 446 K/uL    MPV 9.5 9.0 - 12.9 fL    Neutrophils-Polys 50.10 44.00 - 72.00 %    Lymphocytes 32.10 22.00 - 41.00 %    Monocytes 10.80 0.00 - 13.40 %    Eosinophils 6.20 0.00 - 6.90 %    Basophils 0.50 0.00 - 1.80 %    Immature Granulocytes 0.30 0.00 - 0.90 %    Nucleated RBC 0.00 /100 WBC    Neutrophils (Absolute) 1.86 1.82 - 7.42 K/uL    Lymphs (Absolute) 1.19 1.00 - 4.80 K/uL    Monos (Absolute) 0.40 0.00 - 0.85 K/uL    Eos (Absolute) 0.23 0.00 - 0.51 K/uL    Baso (Absolute) 0.02 0.00 - 0.12 K/uL    Immature Granulocytes (abs) 0.01 0.00 - 0.11 K/uL    NRBC (Absolute) 0.00 K/uL   Basic Metabolic Panel    Collection Time: 04/24/19  9:50 AM   Result Value Ref Range    Sodium 139 135 - 145 mmol/L    Potassium 3.9 3.6 - 5.5 mmol/L    Chloride 105 96 - 112 mmol/L    Co2 25 20 - 33 mmol/L    Glucose 78 65 - 99 mg/dL    Bun 9 8 - 22 mg/dL    Creatinine 1.14 0.50 - 1.40 mg/dL    Calcium 9.0 8.5 -  10.5 mg/dL    Anion Gap 9.0 0.0 - 11.9   ESTIMATED GFR    Collection Time: 04/24/19  9:50 AM   Result Value Ref Range    GFR If African American >60 >60 mL/min/1.73 m 2    GFR If Non African American >60 >60 mL/min/1.73 m 2       ECG: QTc 482    Cranial Imaging: reviewed Brain MRI 1.  Mild cerebral atrophy.  2.  Mild chronic microvascular ischemic disease.  3.  No acute abnormality.  Head CT: No acute intracranial abnormality is identified.    EEG not done.      ASSESSMENT: Pt has a significant disorganized thought process, however no acute symptoms of psychosis, depression, afia or anxiety were observed neither reported. Due to decline in cognition, will recommend avoid using anticholinergics and benzos to avoid worsening of his mentation.       neurocognitive disorder      PLAN:  Legal status: discontinued  Recommend to avoid any anticholinergics or benzos  For agitation: consider using Haldol 2mg PO/IM Q4-6h Prn. Do not exceed 20mg daily. Please hold or discontinue if QTC>500  For anxiety, continue Buspar 15mg PO BID and consider to increase dose to TID if he continues to feel more anxious.   This provider had discussed with Dr Cook to consider Vistaril, but due to anticholinergic effects, medication was discontinued.  Signing off. Please re-consult if needed.   Thank you for the consult.

## 2019-04-25 NOTE — PROGRESS NOTES
Hospital Medicine Daily Progress Note    Date of Service  4/25/2019    Chief Complaint  53 y.o. male admitted 4/5/2019 with back pain, found to have L1 burst fracture.    Hospital Course    Patient admitted with L1 burst fracture, conservative bracing recommended by neurosurgeon.  Patient with difficult to control pain and trouble with therapies and difficult discharge due to social situation.      Interval Problem Update  4/13 Patient moved from telemetry to medical floor, states pain uncontrolled and requesting IV pain medication, 1 dose IV morphine ordered as needed for severe pain but I've adjusted his percocet from 5mg to 10 mg and increased robaxin and made it scheduled and increased his gabapentin for better pain control.  4/14 Patient states he had rest following morphine dose overnight but pain uncontrolled still with previous changes, will add oxycontin 20 bid as long acting pain medication to help control pain and allow patient to participate in therapies.  Rehab consult placed.  4/15 Patient up to side of bed in TLSO after working with therapy today, complains of uncontrolled pain with the changes made.  I doubt he is having response to oxycodone/oxycontin so will discontinue this and rotate opiates to morphine - MS contin and MSIR.  Physiatry consult pending for recommendations to help get patient out of the hospital.  4/23 Patient s/p cognitive evaluation and showed decline since previous evaluation.  His mentation seemed to change with the initiation of gabapentin that I started increasing on 4/13.  I will discontinue the gabapentin as it does not seem to be helping with his pain control but he is having cog deficits and dizziness - especially with dose increases.  Patient is agreeable to this.  He does not need IV morphine and pain can be controlled with PO only.  4/24 Patient seems more awake and alert today with dc of gabapentin.  He states the dizziness is still present.  Cognitive status seems to  have improved since yesterday also.  He needs continued improvement before he is safe to leave the hospital to an independent setting, no availability for placement given his criminal record and active warrants.  4/25 Patient awake but still making confusing statements, yesterday he wanted paperwork to fill out for a hovercraft authorization.  He is making these statements more frequently and I've requested psychiatry to weigh in on their thoughts of his confusion.      Consultants/Specialty  RAFAEL - Pierre - s/o    Code Status  full    Disposition  No placement available, needs improved cognitive function until safe to discharge to his own independence.    Review of Systems  Review of Systems   Constitutional: Negative for chills and fever.   HENT: Negative for congestion and sore throat.    Eyes: Negative for blurred vision and photophobia.   Respiratory: Negative for cough and shortness of breath.    Cardiovascular: Negative for chest pain, claudication and leg swelling.   Gastrointestinal: Negative for abdominal pain, constipation, diarrhea, heartburn and vomiting.   Genitourinary: Negative for dysuria and hematuria.   Musculoskeletal: Positive for back pain and myalgias. Negative for joint pain.   Skin: Negative for itching and rash.   Neurological: Positive for dizziness. Negative for sensory change, speech change, weakness and headaches.   Psychiatric/Behavioral: Negative for depression. The patient is not nervous/anxious and does not have insomnia.         Physical Exam  Temp:  [36.3 °C (97.4 °F)-36.7 °C (98 °F)] 36.3 °C (97.4 °F)  Pulse:  [80-84] 80  Resp:  [17-18] 18  BP: (106-124)/(77-86) 107/86  SpO2:  [92 %-96 %] 92 %    Physical Exam   Constitutional: He is oriented to person, place, and time. He appears well-developed and well-nourished. No distress.   HENT:   Head: Normocephalic and atraumatic.   Eyes: Conjunctivae are normal. No scleral icterus.   Neck: Neck supple. No JVD present.   Cardiovascular:  Normal rate, regular rhythm and normal heart sounds.  Exam reveals no gallop and no friction rub.    No murmur heard.  Pulmonary/Chest: Effort normal and breath sounds normal. No respiratory distress. He has no wheezes. He exhibits no tenderness.   Abdominal: Soft. Bowel sounds are normal. He exhibits no distension and no mass. There is no tenderness.   Musculoskeletal: He exhibits no edema or tenderness.   Lymphadenopathy:     He has no cervical adenopathy.   Neurological: He is alert and oriented to person, place, and time. No cranial nerve deficit.   Confusing statements frequently made     Skin: Skin is warm and dry. He is not diaphoretic. No erythema. No pallor.   Psychiatric: He has a normal mood and affect. His behavior is normal.   Nursing note and vitals reviewed.      Fluids  No intake or output data in the 24 hours ending 04/25/19 1132    Laboratory  Recent Labs      04/24/19   0950   WBC  3.7*   RBC  3.38*   HEMOGLOBIN  9.9*   HEMATOCRIT  30.9*   MCV  91.4   MCH  29.3   MCHC  32.0*   RDW  52.7*   PLATELETCT  79*   MPV  9.5     Recent Labs      04/24/19   0950   SODIUM  139   POTASSIUM  3.9   CHLORIDE  105   CO2  25   GLUCOSE  78   BUN  9   CREATININE  1.14   CALCIUM  9.0                   Imaging  DX-CHEST-PORTABLE (1 VIEW)   Final Result         No acute cardiac or pulmonary abnormality is identified.      [id number]-1 VIEW   Final Result      1.  There is a nonobstructive nonspecific bowel gas pattern.  There is no evidence of free intraperitoneal air.      MR-LUMBAR SPINE-WITH & W/O   Final Result      1.  There is subacute fracture along the superior endplate of L1 vertebral body. There is no posterior retropulsion.   2.  There is no focal bone lesion.   3.  There is no evidence of infection.   4.  Mild degenerative disease as described above.      MR-BRAIN-W/O   Final Result      1.  Mild cerebral atrophy.   2.  Mild chronic microvascular ischemic disease.   3.  No acute abnormality.      DX-KNEE 2-  LEFT   Final Result      1.  No fracture or dislocation of LEFT knee.   2.  Minimal degenerative changes.   3.  Diffuse vascular calcifications.      EC-ECHOCARDIOGRAM COMPLETE W/O CONT   Final Result      CT-ABDOMEN-PELVIS WITH   Final Result         1.  No acute abnormality.   2.  Hepatomegaly and diffuse hepatic steatosis   3.  Atherosclerosis and atherosclerotic coronary artery disease.   4.  Pulmonary nodules measuring up to 6.5 mm, see nodule follow-up recommendations below.      Low Risk: CT at 3-6 months, then consider CT at 18-24 months      High Risk: CT at 3-6 months, then at 18-24 months      Comments: Use most suspicious nodule as guide to management. Follow-up intervals may vary according to size and risk.      Low Risk - Minimal or absent history of smoking and of other known risk factors.      High Risk - History of smoking or of other known risk factors.      Note: These recommendations do not apply to lung cancer screening, patients with immunosuppression, or patients with known primary cancer.      Fleischner Society 2017 Guidelines for Management of Incidentally Detected Pulmonary Nodules in Adults         CT-CTA CHEST PULMONARY ARTERY W/ RECONS   Final Result         1.  No large central pulmonary embolus is appreciated, evaluation of the subsegmental branches is essentially nondiagnostic due to motion artifacts. Additional imaging would be required for definitive exclusion of small distal pulmonary emboli.      DX-LUMBAR SPINE-2 OR 3 VIEWS   Final Result      Limited examination. Mild anterior wedging L2 vertebral body that appears to be old.   If symptoms are persistent, would recommend CT for further evaluation.      DX-THORACIC SPINE-WITH SWIMMERS VIEW   Final Result      Limited examination. The upper thoracic spine is not well-demonstrated.   Mild anterior wedging of some the lower thoracic vertebral bodies and appears old.   No definite acute compression. If symptoms are persistent, CT  would be recommended for further evaluation.      DX-CHEST-PORTABLE (1 VIEW)   Final Result      Central catheter projects over the superior right atrium.      Prominent calcified granuloma in the left midlung.      Atherosclerotic plaque.      CT-HEAD W/O   Final Result      No acute intracranial abnormality is identified.           Assessment/Plan  * Closed stable burst fracture of first lumbar vertebra with routine healing   Assessment & Plan    Lumbar MRI found L1 subacute fracture with no protrusion  Neurosurgery was consulted and they recommended brace placement and outpatient follow-up.  I discontinued IV morphine.  I encouraged him to use oral pain medication for pain control.  Physiatry evaluated him and recommended that is not a candidate for inpatient rehab.  Physical therapy evaluated and recommended transitional care facilities.  Currently is very difficult discharge as per physical therapy recommendation patient needs to go to skilled nursing facility but most of the skilled nursing facility has been declining him for criminal record  Cognitive difficulties more prevalent since initiation of gabapentin - dose reduced and I will discontinue it (likely the cause of his dizziness also)           Pancytopenia (HCC)- (present on admission)   Assessment & Plan    Most likely due to myelodysplastic syndrome  His last chemotherapy was in November 2018  No acute symptoms.  Hematology/oncology was consulted and recommended outpatient follow-up.  CBC on 4/20 showed significant improvement           Delirium   Assessment & Plan    Frequent confusing statements made by patient, unrelated to doses of pain medications  Psychiatry consultation for recommendations.  Gabapentin discontinued         Left knee pain   Assessment & Plan    X ray found arthritis  Continue to provide him pain management.     Tachycardia   Assessment & Plan    Resolved  It was most likely from pain  metprolol 25 mg p.o. twice daily        Somnolence- (present on admission)   Assessment & Plan    Waxing and waning  Stopped Ambien and trazodone  Resolved, gabapentin likely contributing - discontinued       Dizziness- (present on admission)   Assessment & Plan    Likely s/e of gabapentin - discontinued  Vitals are stable  Echo did not show any acute normalities  PT/OT  Negative orthostatics vitals              VTE prophylaxis: heparin

## 2019-04-25 NOTE — PROGRESS NOTES
Assumed care of patient at 1900. Pt is A&Ox2, disoriented to time and event. Up x1-2 assist with fww, bed alarm on. Pt educated to call for assistance. L CVC with positive blood return, TKO. Pt reports 9/10 back pain, medicated per MAR. Denies nausea. No further needs at this time. Call light within reach, will continue to round hourly.

## 2019-04-25 NOTE — THERAPY
"Physical Therapy Treatment completed.   Bed Mobility:  Supine to Sit: Minimal Assist  Transfers: Sit to Stand: Minimal Assist  Gait: Level Of Assist: Minimal Assist with Front-Wheel Walker       Plan of Care: Will benefit from Physical Therapy 3 times per week  Discharge Recommendations: Equipment: Will Continue to Assess for Equipment Needs. Recommend inpatient transitional care services for continued physical therapy services.      See \"Rehab Therapy-Acute\" Patient Summary Report for complete documentation.     Pt is progressing gradually with therapy and was able to demonstrate improved activity tolerance this session with improved ambulation distance. Pt agreeable to wear TLSO during ambulation and was followed by w/c. Pt demonstrated with multiple LOB during ambulation and requires hip stabilization during ambulation and Min A for correction at times. Pt can be impulisve at times as he reports of dizziness and attempts to sit down very quickly and reports he is falling. Pt requires lots of cues and demonstration for safte sitting and cues to reach back for armrest. Pt edcuated to sit down slowly to prevent any back inurjies. Pt apperas to be waxing and wanning with confusion, however, was able to follow commands during session. Pt will continue to benefit from therapy with recommendation for post acute therapy prior to d/c home given current objective findings.   "

## 2019-04-25 NOTE — CARE PLAN
Problem: Safety  Goal: Will remain free from injury    Intervention: Provide assistance with mobility  Pt is A&Ox2, up x1-2 assist with fww. Bed alarm on. Pt educated to call for assistance.       Problem: Pain Management  Goal: Pain level will decrease to patient's comfort goal    Intervention: Follow pain managment plan developed in collaboration with patient and Interdisciplinary Team  Pt reports pain using 0-10 scale, medicated per MAR.

## 2019-04-26 PROCEDURE — 700111 HCHG RX REV CODE 636 W/ 250 OVERRIDE (IP): Performed by: INTERNAL MEDICINE

## 2019-04-26 PROCEDURE — A9270 NON-COVERED ITEM OR SERVICE: HCPCS | Performed by: INTERNAL MEDICINE

## 2019-04-26 PROCEDURE — A9270 NON-COVERED ITEM OR SERVICE: HCPCS | Performed by: HOSPITALIST

## 2019-04-26 PROCEDURE — 97530 THERAPEUTIC ACTIVITIES: CPT

## 2019-04-26 PROCEDURE — 700102 HCHG RX REV CODE 250 W/ 637 OVERRIDE(OP): Performed by: INTERNAL MEDICINE

## 2019-04-26 PROCEDURE — 99232 SBSQ HOSP IP/OBS MODERATE 35: CPT | Performed by: INTERNAL MEDICINE

## 2019-04-26 PROCEDURE — 700102 HCHG RX REV CODE 250 W/ 637 OVERRIDE(OP): Performed by: HOSPITALIST

## 2019-04-26 PROCEDURE — 770004 HCHG ROOM/CARE - ONCOLOGY PRIVATE *

## 2019-04-26 PROCEDURE — 97535 SELF CARE MNGMENT TRAINING: CPT

## 2019-04-26 RX ADMIN — BUSPIRONE HYDROCHLORIDE 15 MG: 30 TABLET ORAL at 05:18

## 2019-04-26 RX ADMIN — METHOCARBAMOL TABLETS 500 MG: 500 TABLET, COATED ORAL at 05:18

## 2019-04-26 RX ADMIN — MORPHINE SULFATE 30 MG: 30 TABLET, FILM COATED, EXTENDED RELEASE ORAL at 05:17

## 2019-04-26 RX ADMIN — ONDANSETRON 4 MG: 2 SOLUTION INTRAMUSCULAR; INTRAVENOUS at 05:26

## 2019-04-26 RX ADMIN — SENNOSIDES,DOCUSATE SODIUM 2 TABLET: 8.6; 5 TABLET, FILM COATED ORAL at 17:07

## 2019-04-26 RX ADMIN — MORPHINE SULFATE 30 MG: 30 TABLET, FILM COATED, EXTENDED RELEASE ORAL at 17:07

## 2019-04-26 RX ADMIN — METOPROLOL TARTRATE 25 MG: 25 TABLET, FILM COATED ORAL at 17:08

## 2019-04-26 RX ADMIN — SENNOSIDES,DOCUSATE SODIUM 2 TABLET: 8.6; 5 TABLET, FILM COATED ORAL at 05:18

## 2019-04-26 RX ADMIN — HEPARIN SODIUM 5000 UNITS: 5000 INJECTION, SOLUTION INTRAVENOUS; SUBCUTANEOUS at 13:52

## 2019-04-26 RX ADMIN — TRAMADOL HYDROCHLORIDE 50 MG: 50 TABLET, FILM COATED ORAL at 05:17

## 2019-04-26 RX ADMIN — OMEPRAZOLE 20 MG: 20 CAPSULE, DELAYED RELEASE ORAL at 05:18

## 2019-04-26 RX ADMIN — HEPARIN SODIUM 5000 UNITS: 5000 INJECTION, SOLUTION INTRAVENOUS; SUBCUTANEOUS at 05:18

## 2019-04-26 RX ADMIN — AMITRIPTYLINE HYDROCHLORIDE 75 MG: 50 TABLET, FILM COATED ORAL at 21:22

## 2019-04-26 RX ADMIN — HEPARIN SODIUM 5000 UNITS: 5000 INJECTION, SOLUTION INTRAVENOUS; SUBCUTANEOUS at 21:22

## 2019-04-26 RX ADMIN — OMEPRAZOLE 20 MG: 20 CAPSULE, DELAYED RELEASE ORAL at 17:07

## 2019-04-26 RX ADMIN — METHOCARBAMOL TABLETS 500 MG: 500 TABLET, COATED ORAL at 17:08

## 2019-04-26 RX ADMIN — BUSPIRONE HYDROCHLORIDE 15 MG: 30 TABLET ORAL at 17:08

## 2019-04-26 RX ADMIN — MORPHINE SULFATE 15 MG: 15 TABLET ORAL at 13:52

## 2019-04-26 RX ADMIN — TRAMADOL HYDROCHLORIDE 50 MG: 50 TABLET, FILM COATED ORAL at 17:08

## 2019-04-26 RX ADMIN — METOPROLOL TARTRATE 25 MG: 25 TABLET, FILM COATED ORAL at 05:18

## 2019-04-26 ASSESSMENT — COGNITIVE AND FUNCTIONAL STATUS - GENERAL
DRESSING REGULAR LOWER BODY CLOTHING: A LOT
HELP NEEDED FOR BATHING: A LOT
SUGGESTED CMS G CODE MODIFIER DAILY ACTIVITY: CK
DRESSING REGULAR UPPER BODY CLOTHING: A LITTLE
DAILY ACTIVITIY SCORE: 16
PERSONAL GROOMING: A LITTLE
TOILETING: A LOT

## 2019-04-26 ASSESSMENT — ENCOUNTER SYMPTOMS
PHOTOPHOBIA: 0
DIZZINESS: 1
DEPRESSION: 0
SPEECH CHANGE: 0
COUGH: 0
VOMITING: 0
SORE THROAT: 0
DIARRHEA: 0
NERVOUS/ANXIOUS: 0
BACK PAIN: 1
SENSORY CHANGE: 0
SHORTNESS OF BREATH: 0
CONSTIPATION: 0
HEARTBURN: 0
CHILLS: 0
INSOMNIA: 0
FEVER: 0
WEAKNESS: 0
MYALGIAS: 1
CLAUDICATION: 0
HEADACHES: 0
BLURRED VISION: 0
ABDOMINAL PAIN: 0

## 2019-04-26 NOTE — PROGRESS NOTES
Hospital Medicine Daily Progress Note    Date of Service  4/26/2019    Chief Complaint  53 y.o. male admitted 4/5/2019 with back pain, found to have L1 burst fracture.    Hospital Course    Patient admitted with L1 burst fracture, conservative bracing recommended by neurosurgeon.  Patient with difficult to control pain and trouble with therapies and difficult discharge due to social situation.      Interval Problem Update  4/13 Patient moved from telemetry to medical floor, states pain uncontrolled and requesting IV pain medication, 1 dose IV morphine ordered as needed for severe pain but I've adjusted his percocet from 5mg to 10 mg and increased robaxin and made it scheduled and increased his gabapentin for better pain control.  4/14 Patient states he had rest following morphine dose overnight but pain uncontrolled still with previous changes, will add oxycontin 20 bid as long acting pain medication to help control pain and allow patient to participate in therapies.  Rehab consult placed.  4/15 Patient up to side of bed in TLSO after working with therapy today, complains of uncontrolled pain with the changes made.  I doubt he is having response to oxycodone/oxycontin so will discontinue this and rotate opiates to morphine - MS contin and MSIR.  Physiatry consult pending for recommendations to help get patient out of the hospital.  4/23 Patient s/p cognitive evaluation and showed decline since previous evaluation.  His mentation seemed to change with the initiation of gabapentin that I started increasing on 4/13.  I will discontinue the gabapentin as it does not seem to be helping with his pain control but he is having cog deficits and dizziness - especially with dose increases.  Patient is agreeable to this.  He does not need IV morphine and pain can be controlled with PO only.  4/24 Patient seems more awake and alert today with dc of gabapentin.  He states the dizziness is still present.  Cognitive status seems to  have improved since yesterday also.  He needs continued improvement before he is safe to leave the hospital to an independent setting, no availability for placement given his criminal record and active warrants.  4/25 Patient awake but still making confusing statements, yesterday he wanted paperwork to fill out for a hovercraft authorization.  He is making these statements more frequently and I've requested psychiatry to weigh in on their thoughts of his confusion.    4/26 Patient without complaints today, back pain same.  Makes confusing statements but less often.      Consultants/Specialty  NSG - Pierre - s/o  Psycho - s/o  Code Status  full    Disposition  No placement available, needs improved cognitive function until safe to discharge to his own independence.    Review of Systems  Review of Systems   Constitutional: Negative for chills and fever.   HENT: Negative for congestion and sore throat.    Eyes: Negative for blurred vision and photophobia.   Respiratory: Negative for cough and shortness of breath.    Cardiovascular: Negative for chest pain, claudication and leg swelling.   Gastrointestinal: Negative for abdominal pain, constipation, diarrhea, heartburn and vomiting.   Genitourinary: Negative for dysuria and hematuria.   Musculoskeletal: Positive for back pain and myalgias. Negative for joint pain.   Skin: Negative for itching and rash.   Neurological: Positive for dizziness. Negative for sensory change, speech change, weakness and headaches.   Psychiatric/Behavioral: Negative for depression. The patient is not nervous/anxious and does not have insomnia.         Physical Exam  Temp:  [36.4 °C (97.5 °F)-37.3 °C (99.2 °F)] 37.3 °C (99.2 °F)  Pulse:  [80-91] 90  Resp:  [16-18] 18  BP: ()/(66-78) 96/66  SpO2:  [98 %-99 %] 98 %    Physical Exam   Constitutional: He is oriented to person, place, and time. He appears well-developed and well-nourished. No distress.   HENT:   Head: Normocephalic and  atraumatic.   Eyes: Conjunctivae are normal. No scleral icterus.   Neck: Neck supple. No JVD present.   Cardiovascular: Normal rate, regular rhythm and normal heart sounds.  Exam reveals no gallop and no friction rub.    No murmur heard.  Pulmonary/Chest: Effort normal and breath sounds normal. No respiratory distress. He has no wheezes. He exhibits no tenderness.   Abdominal: Soft. Bowel sounds are normal. He exhibits no distension and no mass. There is no tenderness.   Musculoskeletal: He exhibits no edema or tenderness.   Lymphadenopathy:     He has no cervical adenopathy.   Neurological: He is alert and oriented to person, place, and time. No cranial nerve deficit.   Confusing statements frequently made     Skin: Skin is warm and dry. He is not diaphoretic. No erythema. No pallor.   Psychiatric: He has a normal mood and affect. His behavior is normal.   Nursing note and vitals reviewed.      Fluids    Intake/Output Summary (Last 24 hours) at 04/26/19 1332  Last data filed at 04/25/19 2200   Gross per 24 hour   Intake                0 ml   Output              650 ml   Net             -650 ml       Laboratory  Recent Labs      04/24/19   0950   WBC  3.7*   RBC  3.38*   HEMOGLOBIN  9.9*   HEMATOCRIT  30.9*   MCV  91.4   MCH  29.3   MCHC  32.0*   RDW  52.7*   PLATELETCT  79*   MPV  9.5     Recent Labs      04/24/19   0950   SODIUM  139   POTASSIUM  3.9   CHLORIDE  105   CO2  25   GLUCOSE  78   BUN  9   CREATININE  1.14   CALCIUM  9.0                   Imaging  DX-CHEST-PORTABLE (1 VIEW)   Final Result         No acute cardiac or pulmonary abnormality is identified.      [id number] VIEW   Final Result      1.  There is a nonobstructive nonspecific bowel gas pattern.  There is no evidence of free intraperitoneal air.      MR-LUMBAR SPINE-WITH & W/O   Final Result      1.  There is subacute fracture along the superior endplate of L1 vertebral body. There is no posterior retropulsion.   2.  There is no focal bone  lesion.   3.  There is no evidence of infection.   4.  Mild degenerative disease as described above.      MR-BRAIN-W/O   Final Result      1.  Mild cerebral atrophy.   2.  Mild chronic microvascular ischemic disease.   3.  No acute abnormality.      DX-KNEE 2- LEFT   Final Result      1.  No fracture or dislocation of LEFT knee.   2.  Minimal degenerative changes.   3.  Diffuse vascular calcifications.      EC-ECHOCARDIOGRAM COMPLETE W/O CONT   Final Result      CT-ABDOMEN-PELVIS WITH   Final Result         1.  No acute abnormality.   2.  Hepatomegaly and diffuse hepatic steatosis   3.  Atherosclerosis and atherosclerotic coronary artery disease.   4.  Pulmonary nodules measuring up to 6.5 mm, see nodule follow-up recommendations below.      Low Risk: CT at 3-6 months, then consider CT at 18-24 months      High Risk: CT at 3-6 months, then at 18-24 months      Comments: Use most suspicious nodule as guide to management. Follow-up intervals may vary according to size and risk.      Low Risk - Minimal or absent history of smoking and of other known risk factors.      High Risk - History of smoking or of other known risk factors.      Note: These recommendations do not apply to lung cancer screening, patients with immunosuppression, or patients with known primary cancer.      Fleischner Society 2017 Guidelines for Management of Incidentally Detected Pulmonary Nodules in Adults         CT-CTA CHEST PULMONARY ARTERY W/ RECONS   Final Result         1.  No large central pulmonary embolus is appreciated, evaluation of the subsegmental branches is essentially nondiagnostic due to motion artifacts. Additional imaging would be required for definitive exclusion of small distal pulmonary emboli.      DX-LUMBAR SPINE-2 OR 3 VIEWS   Final Result      Limited examination. Mild anterior wedging L2 vertebral body that appears to be old.   If symptoms are persistent, would recommend CT for further evaluation.      DX-THORACIC  SPINE-WITH SWIMMERS VIEW   Final Result      Limited examination. The upper thoracic spine is not well-demonstrated.   Mild anterior wedging of some the lower thoracic vertebral bodies and appears old.   No definite acute compression. If symptoms are persistent, CT would be recommended for further evaluation.      DX-CHEST-PORTABLE (1 VIEW)   Final Result      Central catheter projects over the superior right atrium.      Prominent calcified granuloma in the left midlung.      Atherosclerotic plaque.      CT-HEAD W/O   Final Result      No acute intracranial abnormality is identified.           Assessment/Plan  * Closed stable burst fracture of first lumbar vertebra with routine healing   Assessment & Plan    Lumbar MRI found L1 subacute fracture with no protrusion  Neurosurgery was consulted and they recommended brace placement and outpatient follow-up.  I discontinued IV morphine.  I encouraged him to use oral pain medication for pain control.  Physiatry evaluated him and recommended that is not a candidate for inpatient rehab.  Physical therapy evaluated and recommended transitional care facilities.  Currently is very difficult discharge as per physical therapy recommendation patient needs to go to skilled nursing facility but most of the skilled nursing facility has been declining him for criminal record  Cognitive difficulties more prevalent since initiation of gabapentin - dose reduced and I will discontinue it (likely the cause of his dizziness also)           Pancytopenia (HCC)- (present on admission)   Assessment & Plan    Most likely due to myelodysplastic syndrome  His last chemotherapy was in November 2018  No acute symptoms.  Hematology/oncology was consulted and recommended outpatient follow-up.  CBC on 4/20 showed significant improvement           Delirium   Assessment & Plan    Frequent confusing statements made by patient, unrelated to doses of pain medications  Psychiatry consultation  appreciated  Robaxin dose decreased  Gabapentin discontinued         Left knee pain   Assessment & Plan    X ray found arthritis  Continue to provide him pain management.     Tachycardia   Assessment & Plan    Resolved  It was most likely from pain  metprolol 25 mg p.o. twice daily       Somnolence- (present on admission)   Assessment & Plan    Waxing and waning  Stopped Ambien and trazodone  Resolved, gabapentin likely contributing - discontinued       Dizziness- (present on admission)   Assessment & Plan    Likely s/e of gabapentin - discontinued  Vitals are stable  Echo did not show any acute normalities  PT/OT  Negative orthostatics vitals              VTE prophylaxis: heparin

## 2019-04-26 NOTE — CARE PLAN
Problem: Safety  Goal: Will remain free from injury  Hourly rounding in effect, pt instructed to call for assistance, bed locked and in lowest position. Bed alarm on. Non-skid socks in use and pt calls for assistance.       Problem: Knowledge Deficit  Goal: Knowledge of disease process/condition, treatment plan, diagnostic tests, and medications will improve  Outcome: PROGRESSING SLOWER THAN EXPECTED  Pt updated and educated on nursing interventions, medications and POC

## 2019-04-26 NOTE — CARE PLAN
Problem: Nutritional:  Goal: Achieve adequate nutritional intake  Patient will consume 50-75% of meals    Outcome: PROGRESSING SLOWER THAN EXPECTED  PO < 25% for many meals but pt did eat 50-75% of his breakfast two days ago.  Magic cups BID and HP milkshakes BID.  Visited with patient and encouraged PO intake as well as discussed supplements.  Patient with very low appetite.    RD will continue to monitor.

## 2019-04-26 NOTE — PROGRESS NOTES
"Pt lethargic this morning &Ox2-3, disoriented to time and situation. VS: BP (!) 96/66   Pulse 90   Temp 37.3 °C (99.2 °F) (Temporal)   Resp 18   Ht 1.803 m (5' 11\")   Wt 96.1 kg (211 lb 13.8 oz)   SpO2 98%   BMI 29.55 kg/m² . Pt denies pain, n/v, tingling, SOB and chest pain. Pt states legs are feeling numb. Chest PICC patent with positive blood return. Pt needs met at this time, call light within reach, hourly rounding in effect, and will continue to monitor.       "

## 2019-04-26 NOTE — PROGRESS NOTES
Assumed care of patient at 1900. Pt is A&Ox2, disoriented to time and event. Up x1-2 assist with fww. Bed alarm on. L CVC patent, TKO. No further needs at this time. Call light within reach, will continue to round hourly.

## 2019-04-27 PROBLEM — R44.3 HALLUCINATIONS: Status: ACTIVE | Noted: 2019-04-27

## 2019-04-27 LAB
ERYTHROCYTE [DISTWIDTH] IN BLOOD BY AUTOMATED COUNT: 49.7 FL (ref 35.9–50)
HCT VFR BLD AUTO: 30 % (ref 42–52)
HGB BLD-MCNC: 9.9 G/DL (ref 14–18)
MCH RBC QN AUTO: 29.3 PG (ref 27–33)
MCHC RBC AUTO-ENTMCNC: 33 G/DL (ref 33.7–35.3)
MCV RBC AUTO: 88.8 FL (ref 81.4–97.8)
PLATELET # BLD AUTO: 83 K/UL (ref 164–446)
PMV BLD AUTO: 9.5 FL (ref 9–12.9)
RBC # BLD AUTO: 3.38 M/UL (ref 4.7–6.1)
WBC # BLD AUTO: 3.7 K/UL (ref 4.8–10.8)

## 2019-04-27 PROCEDURE — A9270 NON-COVERED ITEM OR SERVICE: HCPCS | Performed by: INTERNAL MEDICINE

## 2019-04-27 PROCEDURE — 700102 HCHG RX REV CODE 250 W/ 637 OVERRIDE(OP): Performed by: INTERNAL MEDICINE

## 2019-04-27 PROCEDURE — 85027 COMPLETE CBC AUTOMATED: CPT

## 2019-04-27 PROCEDURE — 770004 HCHG ROOM/CARE - ONCOLOGY PRIVATE *

## 2019-04-27 PROCEDURE — A9270 NON-COVERED ITEM OR SERVICE: HCPCS | Performed by: HOSPITALIST

## 2019-04-27 PROCEDURE — 700111 HCHG RX REV CODE 636 W/ 250 OVERRIDE (IP): Performed by: INTERNAL MEDICINE

## 2019-04-27 PROCEDURE — 700102 HCHG RX REV CODE 250 W/ 637 OVERRIDE(OP): Performed by: HOSPITALIST

## 2019-04-27 PROCEDURE — 99232 SBSQ HOSP IP/OBS MODERATE 35: CPT | Performed by: INTERNAL MEDICINE

## 2019-04-27 RX ADMIN — TRAZODONE HYDROCHLORIDE 50 MG: 50 TABLET ORAL at 21:38

## 2019-04-27 RX ADMIN — METHOCARBAMOL TABLETS 500 MG: 500 TABLET, COATED ORAL at 06:04

## 2019-04-27 RX ADMIN — MORPHINE SULFATE 15 MG: 15 TABLET ORAL at 11:18

## 2019-04-27 RX ADMIN — AMITRIPTYLINE HYDROCHLORIDE 75 MG: 50 TABLET, FILM COATED ORAL at 21:38

## 2019-04-27 RX ADMIN — HEPARIN SODIUM 5000 UNITS: 5000 INJECTION, SOLUTION INTRAVENOUS; SUBCUTANEOUS at 06:04

## 2019-04-27 RX ADMIN — OMEPRAZOLE 20 MG: 20 CAPSULE, DELAYED RELEASE ORAL at 06:03

## 2019-04-27 RX ADMIN — BUSPIRONE HYDROCHLORIDE 15 MG: 30 TABLET ORAL at 06:04

## 2019-04-27 RX ADMIN — MORPHINE SULFATE 30 MG: 30 TABLET, FILM COATED, EXTENDED RELEASE ORAL at 17:52

## 2019-04-27 RX ADMIN — TRAMADOL HYDROCHLORIDE 50 MG: 50 TABLET, FILM COATED ORAL at 06:03

## 2019-04-27 RX ADMIN — METOPROLOL TARTRATE 25 MG: 25 TABLET, FILM COATED ORAL at 17:53

## 2019-04-27 RX ADMIN — MORPHINE SULFATE 30 MG: 30 TABLET, FILM COATED, EXTENDED RELEASE ORAL at 06:03

## 2019-04-27 RX ADMIN — METHOCARBAMOL TABLETS 500 MG: 500 TABLET, COATED ORAL at 17:56

## 2019-04-27 RX ADMIN — METOPROLOL TARTRATE 25 MG: 25 TABLET, FILM COATED ORAL at 06:03

## 2019-04-27 RX ADMIN — OMEPRAZOLE 20 MG: 20 CAPSULE, DELAYED RELEASE ORAL at 17:50

## 2019-04-27 RX ADMIN — SENNOSIDES,DOCUSATE SODIUM 2 TABLET: 8.6; 5 TABLET, FILM COATED ORAL at 06:03

## 2019-04-27 RX ADMIN — BUSPIRONE HYDROCHLORIDE 15 MG: 30 TABLET ORAL at 17:51

## 2019-04-27 RX ADMIN — HEPARIN SODIUM 5000 UNITS: 5000 INJECTION, SOLUTION INTRAVENOUS; SUBCUTANEOUS at 14:25

## 2019-04-27 RX ADMIN — TRAMADOL HYDROCHLORIDE 50 MG: 50 TABLET, FILM COATED ORAL at 17:50

## 2019-04-27 ASSESSMENT — ENCOUNTER SYMPTOMS
COUGH: 0
SORE THROAT: 0
VOMITING: 0
HEARTBURN: 0
HEADACHES: 0
WEAKNESS: 0
BLURRED VISION: 0
NERVOUS/ANXIOUS: 1
SHORTNESS OF BREATH: 0
PHOTOPHOBIA: 0
MYALGIAS: 1
DIARRHEA: 0
SENSORY CHANGE: 0
INSOMNIA: 0
SPEECH CHANGE: 0
DIZZINESS: 1
CONSTIPATION: 0
BACK PAIN: 1
CLAUDICATION: 0
DEPRESSION: 0
FEVER: 0
ABDOMINAL PAIN: 0
CHILLS: 0
HALLUCINATIONS: 1

## 2019-04-27 NOTE — PROGRESS NOTES
"Received report from [facility], assumed care of pt 0700.  Pt irritable. Pt c/o pain 10/10 back pain; medicated per MAR.  Left central line patent with + blood return, dressing CDI.  Pt reported BM yesterday.   Bed alarm on, per NOC pt has been calling appropriately for assistance. Has not called yet this shift for anything. Offered to toilet pt while in room, he declined.  Did didn't eat any breakfast citing just doesn't didn't want to eat. He is able to answer the questions, appears A&Ox4, but makes non-sensical statements at times and doesn't always seem \"with it\".   "

## 2019-04-27 NOTE — PROGRESS NOTES
Assumed care of patient at 1900. Pt is A&Ox2, disoriented to time and situation. Pt continues to make confusing statements. Up x1-2 assist with fww, bed alarm on. Denies nausea or pain, repositioned. L CVC with positive blood return, TKO. TLSO brace at bedside. No further needs at this time. Call light within reach, will continue to round hourly.

## 2019-04-27 NOTE — THERAPY
"Occupational Therapy Treatment completed with focus on ADLs, ADL transfers, patient education and cognition.  Functional Status:  Pt was seen for Occupational Therapy treatment today, see Therapy Kardex for details.Treatment included education in breath control with activity and at rest, self pacing techs and energy conservation for pain management. Educated pt in safety awareness techs as well. Psychosocial intervention addressed.  Pt needed increased encouragement to attempt self care tasks. Pt demo SBA for log roll , not always compliant with NSP . Pt demo Min A with extended time for donning TLSO seated EOB.   Pt demonstrated Min A for UB dressing change of gown,  Mod A for LB dressing seated EOB using AE.  Pt c/o pants not fitting well. Pt stood with Min A using FWW for clothing management up over hips but did not want to keep pants on pushing them down without remembering NSP. Pt easily frustrated when safety issues are pointed out.  Pt c/o nausea,  fatigued and stated, \"I'm going BTB now\".  Pt removed TLSO with SBA.  Pt did not use NSP or log roll to get back in bed. Pt then apologized or his behavior. Pt refused to attempt Ambulating ADL's with FWW or BSC transfers this OT session. Pt was left up in bed, call light in reach, bed alarm on, and nursing is aware. RN/CNA updated on OT treatment treatment findings and recommendations.   Continue Occupational Therapy services as per plan.    Plan of Care: Will benefit from Occupational Therapy 3 times per week  Discharge Recommendations:  Equipment Will Continue to Assess for Equipment Needs. Post-acute therapy Discharge to a transitional care facility for continued skilled therapy services.    See \"Rehab Therapy-Acute\" Patient Summary Report for complete documentation.   "

## 2019-04-27 NOTE — PROGRESS NOTES
"Pt more confused and agitated this morning. Pt stating there are butterflies in his bed and bats on the ceiling. Pt asked if this RN could see them, I stated \"no\" and patient stated \"you are freaking me out that you cant see them\". Tried to change the lines and claves on pt's central line, pt became agitated and stated \"you are constantly trying to find something wrong, why do you have to mess with me\". Left the room to let patient rest.   "

## 2019-04-27 NOTE — ASSESSMENT & PLAN NOTE
Patient seeing butterflies and bats in his room  Psychiatry did not recommend any antipsychotic medications at this point.  Resolved.

## 2019-04-27 NOTE — PROGRESS NOTES
Hospital Medicine Daily Progress Note    Date of Service  4/27/2019    Chief Complaint  53 y.o. male admitted 4/5/2019 with back pain, found to have L1 burst fracture.    Hospital Course    Patient admitted with L1 burst fracture, conservative bracing recommended by neurosurgeon.  Patient with difficult to control pain and trouble with therapies and difficult discharge due to social situation.      Interval Problem Update  4/13 Patient moved from telemetry to medical floor, states pain uncontrolled and requesting IV pain medication, 1 dose IV morphine ordered as needed for severe pain but I've adjusted his percocet from 5mg to 10 mg and increased robaxin and made it scheduled and increased his gabapentin for better pain control.  4/14 Patient states he had rest following morphine dose overnight but pain uncontrolled still with previous changes, will add oxycontin 20 bid as long acting pain medication to help control pain and allow patient to participate in therapies.  Rehab consult placed.  4/15 Patient up to side of bed in TLSO after working with therapy today, complains of uncontrolled pain with the changes made.  I doubt he is having response to oxycodone/oxycontin so will discontinue this and rotate opiates to morphine - MS contin and MSIR.  Physiatry consult pending for recommendations to help get patient out of the hospital.  4/23 Patient s/p cognitive evaluation and showed decline since previous evaluation.  His mentation seemed to change with the initiation of gabapentin that I started increasing on 4/13.  I will discontinue the gabapentin as it does not seem to be helping with his pain control but he is having cog deficits and dizziness - especially with dose increases.  Patient is agreeable to this.  He does not need IV morphine and pain can be controlled with PO only.  4/24 Patient seems more awake and alert today with dc of gabapentin.  He states the dizziness is still present.  Cognitive status seems to  have improved since yesterday also.  He needs continued improvement before he is safe to leave the hospital to an independent setting, no availability for placement given his criminal record and active warrants.  4/25 Patient awake but still making confusing statements, yesterday he wanted paperwork to fill out for a [employer] authorization.  He is making these statements more frequently and I've requested psychiatry to weigh in on their thoughts of his confusion.    4/26 Patient without complaints today, back pain same.  Makes confusing statements but less often.    4/27 Patient with residual pain, no increase in pain medication due to his hallucinations.  Will have psychiatry re-evaluate patient for medication recommendations, likely an antipsychotic medication to help lessen his hallucinations.    Consultants/Specialty  [facility] - [place] - s/o  Psych - s/o, re-consult    Code Status  full    Disposition  No placement available, needs improved cognitive function until safe to discharge to his own independence.    Review of Systems  Review of Systems   Constitutional: Negative for chills and fever.   HENT: Negative for congestion and sore throat.    Eyes: Negative for blurred vision and photophobia.   Respiratory: Negative for cough and shortness of breath.    Cardiovascular: Negative for chest pain, claudication and leg swelling.   Gastrointestinal: Negative for abdominal pain, constipation, diarrhea, heartburn and vomiting.   Genitourinary: Negative for dysuria and hematuria.   Musculoskeletal: Positive for back pain and myalgias. Negative for joint pain.   Skin: Negative for itching and rash.   Neurological: Positive for dizziness. Negative for sensory change, speech change, weakness and headaches.   Psychiatric/Behavioral: Positive for hallucinations. Negative for depression. The patient is nervous/anxious. The patient does not have insomnia.         Physical Exam  Temp:  [36.3 °C (97.4 °F)-37.2 °C (98.9 °F)] 36.3  °C (97.4 °F)  Pulse:  [82-86] 82  Resp:  [16-18] 16  BP: (103-121)/(70-77) 114/76  SpO2:  [94 %-98 %] 98 %    Physical Exam   Constitutional: He is oriented to person, place, and time. He appears well-developed and well-nourished. No distress.   HENT:   Head: Normocephalic and atraumatic.   Eyes: Conjunctivae are normal. No scleral icterus.   Neck: Neck supple. No JVD present.   Cardiovascular: Normal rate, regular rhythm and normal heart sounds.  Exam reveals no gallop and no friction rub.    No murmur heard.  Pulmonary/Chest: Effort normal and breath sounds normal. No respiratory distress. He has no wheezes. He exhibits no tenderness.   Abdominal: Soft. Bowel sounds are normal. He exhibits no distension and no mass. There is no tenderness.   Musculoskeletal: He exhibits no edema or tenderness.   Lymphadenopathy:     He has no cervical adenopathy.   Neurological: He is alert and oriented to person, place, and time. No cranial nerve deficit.   Confusing statements frequently made     Skin: Skin is warm and dry. He is not diaphoretic. No erythema. No pallor.   Psychiatric: He has a normal mood and affect. His behavior is normal.   Patient seeing bats and butterflies in room, slightly paranoid.     Nursing note and vitals reviewed.      Fluids    Intake/Output Summary (Last 24 hours) at 04/27/19 1306  Last data filed at 04/27/19 0200   Gross per 24 hour   Intake              882 ml   Output              500 ml   Net              382 ml       Laboratory  Recent Labs      04/27/19   0015   WBC  3.7*   RBC  3.38*   HEMOGLOBIN  9.9*   HEMATOCRIT  30.0*   MCV  88.8   MCH  29.3   MCHC  33.0*   RDW  49.7   PLATELETCT  83*   MPV  9.5                       Imaging  DX-CHEST-PORTABLE (1 VIEW)   Final Result         No acute cardiac or pulmonary abnormality is identified.      YS-NCAXUMI-1 VIEW   Final Result      1.  There is a nonobstructive nonspecific bowel gas pattern.  There is no evidence of free intraperitoneal air.       MR-LUMBAR SPINE-WITH & W/O   Final Result      1.  There is subacute fracture along the superior endplate of L1 vertebral body. There is no posterior retropulsion.   2.  There is no focal bone lesion.   3.  There is no evidence of infection.   4.  Mild degenerative disease as described above.      MR-BRAIN-W/O   Final Result      1.  Mild cerebral atrophy.   2.  Mild chronic microvascular ischemic disease.   3.  No acute abnormality.      DX-KNEE 2- LEFT   Final Result      1.  No fracture or dislocation of LEFT knee.   2.  Minimal degenerative changes.   3.  Diffuse vascular calcifications.      EC-ECHOCARDIOGRAM COMPLETE W/O CONT   Final Result      CT-ABDOMEN-PELVIS WITH   Final Result         1.  No acute abnormality.   2.  Hepatomegaly and diffuse hepatic steatosis   3.  Atherosclerosis and atherosclerotic coronary artery disease.   4.  Pulmonary nodules measuring up to 6.5 mm, see nodule follow-up recommendations below.      Low Risk: CT at 3-6 months, then consider CT at 18-24 months      High Risk: CT at 3-6 months, then at 18-24 months      Comments: Use most suspicious nodule as guide to management. Follow-up intervals may vary according to size and risk.      Low Risk - Minimal or absent history of smoking and of other known risk factors.      High Risk - History of smoking or of other known risk factors.      Note: These recommendations do not apply to lung cancer screening, patients with immunosuppression, or patients with known primary cancer.      Fleischner Society 2017 Guidelines for Management of Incidentally Detected Pulmonary Nodules in Adults         CT-CTA CHEST PULMONARY ARTERY W/ RECONS   Final Result         1.  No large central pulmonary embolus is appreciated, evaluation of the subsegmental branches is essentially nondiagnostic due to motion artifacts. Additional imaging would be required for definitive exclusion of small distal pulmonary emboli.      DX-LUMBAR SPINE-2 OR 3 VIEWS   Final  Result      Limited examination. Mild anterior wedging L2 vertebral body that appears to be old.   If symptoms are persistent, would recommend CT for further evaluation.      DX-THORACIC SPINE-WITH SWIMMERS VIEW   Final Result      Limited examination. The upper thoracic spine is not well-demonstrated.   Mild anterior wedging of some the lower thoracic vertebral bodies and appears old.   No definite acute compression. If symptoms are persistent, CT would be recommended for further evaluation.      DX-CHEST-PORTABLE (1 VIEW)   Final Result      Central catheter projects over the superior right atrium.      Prominent calcified granuloma in the left midlung.      Atherosclerotic plaque.      CT-HEAD W/O   Final Result      No acute intracranial abnormality is identified.           Assessment/Plan  * Closed stable burst fracture of first lumbar vertebra with routine healing   Assessment & Plan    Lumbar MRI found L1 subacute fracture with no protrusion  Neurosurgery was consulted and they recommended brace placement and outpatient follow-up.  I discontinued IV morphine.  I encouraged him to use oral pain medication for pain control.  Physiatry evaluated him and recommended that is not a candidate for inpatient rehab.  Physical therapy evaluated and recommended transitional care facilities.  Currently is very difficult discharge as per physical therapy recommendation patient needs to go to skilled nursing facility but most of the skilled nursing facility has been declining him for criminal record  Cognitive difficulties more prevalent since initiation of gabapentin - dose reduced and I will discontinue it (likely the cause of his dizziness also)           Pancytopenia (HCC)- (present on admission)   Assessment & Plan    Most likely due to myelodysplastic syndrome  His last chemotherapy was in November 2018  No acute symptoms.  Hematology/oncology was consulted and recommended outpatient follow-up.  CBC on 4/20 showed  significant improvement           Delirium   Assessment & Plan    Frequent confusing statements made by patient, unrelated to doses of pain medications  Psychiatry consultation appreciated  Robaxin dose decreased  Gabapentin discontinued         Left knee pain   Assessment & Plan    X ray found arthritis  Continue to provide him pain management.     Tachycardia   Assessment & Plan    Resolved  It was most likely from pain  metprolol 25 mg p.o. twice daily       Somnolence- (present on admission)   Assessment & Plan    Waxing and waning  Stopped Ambien and trazodone  Resolved, gabapentin likely contributing - discontinued       Dizziness- (present on admission)   Assessment & Plan    Likely s/e of gabapentin - discontinued  Vitals are stable  Echo did not show any acute normalities  PT/OT  Negative orthostatics vitals              VTE prophylaxis: heparin

## 2019-04-28 PROCEDURE — A9270 NON-COVERED ITEM OR SERVICE: HCPCS | Performed by: INTERNAL MEDICINE

## 2019-04-28 PROCEDURE — 99232 SBSQ HOSP IP/OBS MODERATE 35: CPT | Performed by: INTERNAL MEDICINE

## 2019-04-28 PROCEDURE — 700102 HCHG RX REV CODE 250 W/ 637 OVERRIDE(OP): Performed by: INTERNAL MEDICINE

## 2019-04-28 PROCEDURE — 700102 HCHG RX REV CODE 250 W/ 637 OVERRIDE(OP): Performed by: HOSPITALIST

## 2019-04-28 PROCEDURE — 700105 HCHG RX REV CODE 258: Performed by: INTERNAL MEDICINE

## 2019-04-28 PROCEDURE — 700111 HCHG RX REV CODE 636 W/ 250 OVERRIDE (IP): Performed by: INTERNAL MEDICINE

## 2019-04-28 PROCEDURE — A9270 NON-COVERED ITEM OR SERVICE: HCPCS | Performed by: HOSPITALIST

## 2019-04-28 PROCEDURE — 770004 HCHG ROOM/CARE - ONCOLOGY PRIVATE *

## 2019-04-28 RX ORDER — SODIUM CHLORIDE 9 MG/ML
INJECTION, SOLUTION INTRAVENOUS CONTINUOUS
Status: DISCONTINUED | OUTPATIENT
Start: 2019-04-28 | End: 2019-05-05

## 2019-04-28 RX ADMIN — HEPARIN SODIUM 5000 UNITS: 5000 INJECTION, SOLUTION INTRAVENOUS; SUBCUTANEOUS at 13:43

## 2019-04-28 RX ADMIN — HEPARIN SODIUM 5000 UNITS: 5000 INJECTION, SOLUTION INTRAVENOUS; SUBCUTANEOUS at 22:05

## 2019-04-28 RX ADMIN — BUSPIRONE HYDROCHLORIDE 15 MG: 30 TABLET ORAL at 17:38

## 2019-04-28 RX ADMIN — MORPHINE SULFATE 15 MG: 15 TABLET ORAL at 15:15

## 2019-04-28 RX ADMIN — SENNOSIDES,DOCUSATE SODIUM 2 TABLET: 8.6; 5 TABLET, FILM COATED ORAL at 17:38

## 2019-04-28 RX ADMIN — METOPROLOL TARTRATE 25 MG: 25 TABLET, FILM COATED ORAL at 06:08

## 2019-04-28 RX ADMIN — HYDROCORTISONE: 1 CREAM TOPICAL at 06:09

## 2019-04-28 RX ADMIN — OMEPRAZOLE 20 MG: 20 CAPSULE, DELAYED RELEASE ORAL at 06:08

## 2019-04-28 RX ADMIN — ONDANSETRON 4 MG: 2 SOLUTION INTRAMUSCULAR; INTRAVENOUS at 08:08

## 2019-04-28 RX ADMIN — HEPARIN SODIUM 5000 UNITS: 5000 INJECTION, SOLUTION INTRAVENOUS; SUBCUTANEOUS at 06:09

## 2019-04-28 RX ADMIN — MORPHINE SULFATE 15 MG: 15 TABLET ORAL at 22:05

## 2019-04-28 RX ADMIN — BUSPIRONE HYDROCHLORIDE 15 MG: 30 TABLET ORAL at 06:08

## 2019-04-28 RX ADMIN — TRAMADOL HYDROCHLORIDE 50 MG: 50 TABLET, FILM COATED ORAL at 06:09

## 2019-04-28 RX ADMIN — HYDROCORTISONE: 1 CREAM TOPICAL at 17:39

## 2019-04-28 RX ADMIN — MORPHINE SULFATE 30 MG: 30 TABLET, FILM COATED, EXTENDED RELEASE ORAL at 06:08

## 2019-04-28 RX ADMIN — METOPROLOL TARTRATE 25 MG: 25 TABLET, FILM COATED ORAL at 17:38

## 2019-04-28 RX ADMIN — AMITRIPTYLINE HYDROCHLORIDE 75 MG: 50 TABLET, FILM COATED ORAL at 22:05

## 2019-04-28 RX ADMIN — ONDANSETRON 4 MG: 2 SOLUTION INTRAMUSCULAR; INTRAVENOUS at 22:05

## 2019-04-28 RX ADMIN — OMEPRAZOLE 20 MG: 20 CAPSULE, DELAYED RELEASE ORAL at 17:38

## 2019-04-28 RX ADMIN — ONDANSETRON 4 MG: 2 SOLUTION INTRAMUSCULAR; INTRAVENOUS at 15:09

## 2019-04-28 RX ADMIN — SODIUM CHLORIDE: 9 INJECTION, SOLUTION INTRAVENOUS at 17:33

## 2019-04-28 RX ADMIN — METHOCARBAMOL TABLETS 500 MG: 500 TABLET, COATED ORAL at 06:08

## 2019-04-28 RX ADMIN — TRAZODONE HYDROCHLORIDE 50 MG: 50 TABLET ORAL at 22:07

## 2019-04-28 ASSESSMENT — ENCOUNTER SYMPTOMS
SENSORY CHANGE: 0
HEADACHES: 0
PHOTOPHOBIA: 0
HEARTBURN: 0
ABDOMINAL PAIN: 0
BACK PAIN: 1
CLAUDICATION: 0
SPEECH CHANGE: 0
SHORTNESS OF BREATH: 0
FEVER: 0
DIARRHEA: 0
SORE THROAT: 0
CONSTIPATION: 0
DEPRESSION: 0
WEAKNESS: 0
COUGH: 0
DIZZINESS: 0
NERVOUS/ANXIOUS: 1
HALLUCINATIONS: 1
VOMITING: 0
CHILLS: 0
INSOMNIA: 0
MYALGIAS: 1
BLURRED VISION: 0

## 2019-04-28 NOTE — PROGRESS NOTES
Assumed care of patient at 1915, received report from day RN. Communication board updated and reviewed POC of care with pt. Pt denies pain at this time. Bed alarm on and in good working order. Assessment complete. Fluids running TKO. No other needs at this time. Call light within reach.

## 2019-04-28 NOTE — CARE PLAN
Problem: Safety  Goal: Will remain free from injury  Outcome: PROGRESSING AS EXPECTED  Educated pt to call for help when getting up     Problem: Communication  Goal: The ability to communicate needs accurately and effectively will improve  Outcome: PROGRESSING SLOWER THAN EXPECTED  Pt needs a lot of reinforcement to perform ADL's and is preoccupied but A and Ox4 today

## 2019-04-28 NOTE — PROGRESS NOTES
Hospital Medicine Daily Progress Note    Date of Service  4/28/2019    Chief Complaint  53 y.o. male admitted 4/5/2019 with back pain, found to have L1 burst fracture.    Hospital Course    Patient admitted with L1 burst fracture, conservative bracing recommended by neurosurgeon.  Patient with difficult to control pain and trouble with therapies and difficult discharge due to social situation.      Interval Problem Update  4/13 Patient moved from telemetry to medical floor, states pain uncontrolled and requesting IV pain medication, 1 dose IV morphine ordered as needed for severe pain but I've adjusted his percocet from 5mg to 10 mg and increased robaxin and made it scheduled and increased his gabapentin for better pain control.  4/14 Patient states he had rest following morphine dose overnight but pain uncontrolled still with previous changes, will add oxycontin 20 bid as long acting pain medication to help control pain and allow patient to participate in therapies.  Rehab consult placed.  4/15 Patient up to side of bed in [facility] after working with therapy today, complains of uncontrolled pain with the changes made.  I doubt he is having response to oxycodone/oxycontin so will discontinue this and rotate opiates to morphine - MS contin and MSIR.  Physiatry consult pending for recommendations to help get patient out of the hospital.  4/23 Patient s/p cognitive evaluation and showed decline since previous evaluation.  His mentation seemed to change with the initiation of gabapentin that I started increasing on 4/13.  I will discontinue the gabapentin as it does not seem to be helping with his pain control but he is having cog deficits and dizziness - especially with dose increases.  Patient is agreeable to this.  He does not need IV morphine and pain can be controlled with PO only.  4/24 Patient seems more awake and alert today with dc of gabapentin.  He states the dizziness is still present.  Cognitive status seems to  have improved since yesterday also.  He needs continued improvement before he is safe to leave the hospital to an independent setting, no availability for placement given his criminal record and active warrants.  4/25 Patient awake but still making confusing statements, yesterday he wanted paperwork to fill out for a [employer] authorization.  He is making these statements more frequently and I've requested psychiatry to weigh in on their thoughts of his confusion.    4/26 Patient without complaints today, back pain same.  Makes confusing statements but less often.    4/27 Patient with residual pain, no increase in pain medication due to his hallucinations.  Will have psychiatry re-evaluate patient for medication recommendations, likely an antipsychotic medication to help lessen his hallucinations.  4/28 Patient continues to have disorganized thoughts and hallucinations.  I anticipate psychiatry to consult again on Monday.  No acute changes, therapies to resume on Monday.    Consultants/Specialty  [facility] - Pierre - s/o  Psych - s/o, re-consult    Code Status  full    Disposition  No placement available, needs improved cognitive function until safe to discharge to his own independence.    Review of Systems  Review of Systems   Constitutional: Negative for chills and fever.   HENT: Negative for congestion and sore throat.    Eyes: Negative for blurred vision and photophobia.   Respiratory: Negative for cough and shortness of breath.    Cardiovascular: Negative for chest pain, claudication and leg swelling.   Gastrointestinal: Negative for abdominal pain, constipation, diarrhea, heartburn and vomiting.   Genitourinary: Negative for dysuria and hematuria.   Musculoskeletal: Positive for back pain and myalgias. Negative for joint pain.   Skin: Negative for itching and rash.   Neurological: Negative for dizziness, sensory change, speech change, weakness and headaches.   Psychiatric/Behavioral: Positive for hallucinations.  Negative for depression. The patient is nervous/anxious. The patient does not have insomnia.         Physical Exam  Temp:  [36.5 °C (97.7 °F)-36.9 °C (98.5 °F)] 36.8 °C (98.3 °F)  Pulse:  [84-90] 90  Resp:  [17-19] 19  BP: (114-137)/(64-85) 129/64  SpO2:  [93 %-97 %] 94 %    Physical Exam   Constitutional: He is oriented to person, place, and time. He appears well-developed and well-nourished. No distress.   HENT:   Head: Normocephalic and atraumatic.   Eyes: Conjunctivae are normal. No scleral icterus.   Neck: Neck supple. No JVD present.   Cardiovascular: Normal rate, regular rhythm and normal heart sounds.  Exam reveals no gallop and no friction rub.    No murmur heard.  Pulmonary/Chest: Effort normal and breath sounds normal. No respiratory distress. He has no wheezes. He exhibits no tenderness.   Abdominal: Soft. Bowel sounds are normal. He exhibits no distension and no mass. There is no tenderness.   Musculoskeletal: He exhibits no edema or tenderness.   Lymphadenopathy:     He has no cervical adenopathy.   Neurological: He is alert and oriented to person, place, and time. No cranial nerve deficit.   Confusing statements frequently made     Skin: Skin is warm and dry. He is not diaphoretic. No erythema. No pallor.   Psychiatric: He has a normal mood and affect. His behavior is normal.   Patient seeing bats and butterflies in room, slightly paranoid.     Nursing note and vitals reviewed.      Fluids    Intake/Output Summary (Last 24 hours) at 04/28/19 0921  Last data filed at 04/27/19 1640   Gross per 24 hour   Intake                0 ml   Output              500 ml   Net             -500 ml       Laboratory  Recent Labs      04/27/19   0015   WBC  3.7*   RBC  3.38*   HEMOGLOBIN  9.9*   HEMATOCRIT  30.0*   MCV  88.8   MCH  29.3   MCHC  33.0*   RDW  49.7   PLATELETCT  83*   MPV  9.5                       Imaging  DX-CHEST-PORTABLE (1 VIEW)   Final Result         No acute cardiac or pulmonary abnormality is  identified.      EB-UPKTFFB-5 VIEW   Final Result      1.  There is a nonobstructive nonspecific bowel gas pattern.  There is no evidence of free intraperitoneal air.      MR-LUMBAR SPINE-WITH & W/O   Final Result      1.  There is subacute fracture along the superior endplate of L1 vertebral body. There is no posterior retropulsion.   2.  There is no focal bone lesion.   3.  There is no evidence of infection.   4.  Mild degenerative disease as described above.      MR-BRAIN-W/O   Final Result      1.  Mild cerebral atrophy.   2.  Mild chronic microvascular ischemic disease.   3.  No acute abnormality.      DX-KNEE 2- LEFT   Final Result      1.  No fracture or dislocation of LEFT knee.   2.  Minimal degenerative changes.   3.  Diffuse vascular calcifications.      EC-ECHOCARDIOGRAM COMPLETE W/O CONT   Final Result      CT-ABDOMEN-PELVIS WITH   Final Result         1.  No acute abnormality.   2.  Hepatomegaly and diffuse hepatic steatosis   3.  Atherosclerosis and atherosclerotic coronary artery disease.   4.  Pulmonary nodules measuring up to 6.5 mm, see nodule follow-up recommendations below.      Low Risk: CT at 3-6 months, then consider CT at 18-24 months      High Risk: CT at 3-6 months, then at 18-24 months      Comments: Use most suspicious nodule as guide to management. Follow-up intervals may vary according to size and risk.      Low Risk - Minimal or absent history of smoking and of other known risk factors.      High Risk - History of smoking or of other known risk factors.      Note: These recommendations do not apply to lung cancer screening, patients with immunosuppression, or patients with known primary cancer.      Fleischner Society 2017 Guidelines for Management of Incidentally Detected Pulmonary Nodules in Adults         CT-CTA CHEST PULMONARY ARTERY W/ RECONS   Final Result         1.  No large central pulmonary embolus is appreciated, evaluation of the subsegmental branches is essentially  nondiagnostic due to motion artifacts. Additional imaging would be required for definitive exclusion of small distal pulmonary emboli.      DX-LUMBAR SPINE-2 OR 3 VIEWS   Final Result      Limited examination. Mild anterior wedging L2 vertebral body that appears to be old.   If symptoms are persistent, would recommend CT for further evaluation.      DX-THORACIC SPINE-WITH SWIMMERS VIEW   Final Result      Limited examination. The upper thoracic spine is not well-demonstrated.   Mild anterior wedging of some the lower thoracic vertebral bodies and appears old.   No definite acute compression. If symptoms are persistent, CT would be recommended for further evaluation.      DX-CHEST-PORTABLE (1 VIEW)   Final Result      Central catheter projects over the superior right atrium.      Prominent calcified granuloma in the left midlung.      Atherosclerotic plaque.      CT-HEAD W/O   Final Result      No acute intracranial abnormality is identified.           Assessment/Plan  * Closed stable burst fracture of first lumbar vertebra with routine healing   Assessment & Plan    Lumbar MRI found L1 subacute fracture with no protrusion  Neurosurgery was consulted and they recommended brace placement and outpatient follow-up.  I discontinued IV morphine.  I encouraged him to use oral pain medication for pain control.  Physiatry evaluated him and recommended that is not a candidate for inpatient rehab.  Physical therapy evaluated and recommended transitional care facilities.  Currently is very difficult discharge as per physical therapy recommendation patient needs to go to skilled nursing facility but most of the skilled nursing facility has been declining him for criminal record  Cognitive difficulties more prevalent since initiation of gabapentin - dose reduced and I will discontinue it (likely the cause of his dizziness also)           Pancytopenia (HCC)- (present on admission)   Assessment & Plan    Most likely due to  myelodysplastic syndrome  His last chemotherapy was in November 2018  No acute symptoms.  Hematology/oncology was consulted and recommended outpatient follow-up.  CBC on 4/20 showed significant improvement           Hallucinations   Assessment & Plan    Patient seeing butterflies and bats in his room  Re-consult psych for possible initiation of anti-psychotic.       Delirium   Assessment & Plan    Frequent confusing statements made by patient, unrelated to doses of pain medications  Psychiatry consultation appreciated  Robaxin dose decreased  Gabapentin discontinued         Left knee pain   Assessment & Plan    X ray found arthritis  Continue to provide him pain management.     Tachycardia   Assessment & Plan    Resolved  It was most likely from pain  metprolol 25 mg p.o. twice daily       Somnolence- (present on admission)   Assessment & Plan    Waxing and waning  Stopped Ambien and trazodone  Resolved, gabapentin likely contributing - discontinued       Dizziness- (present on admission)   Assessment & Plan    Likely s/e of gabapentin - discontinued  Vitals are stable  Echo did not show any acute normalities  PT/OT  Negative orthostatics vitals              VTE prophylaxis: heparin

## 2019-04-28 NOTE — CARE PLAN
Problem: Safety  Goal: Will remain free from injury  Bed alarm on and in good working order. Non slip socks on. Bed in the lowest locked position. Pt does not call for help, educated pt multiple times to call prior to getting up at all. Frequent rounding on pt. Pt does not listen to direction when up with 2 Assist and a FWW    Problem: Discharge Barriers/Planning  Goal: Patient's continuum of care needs will be met  Pt is awaiting placement.

## 2019-04-28 NOTE — PSYCHIATRY
"PSYCHIATRIC FOLLOW UP:      Reason for Admission: back pain, found to have L1 burst fracture  Legal hold status:   No hold  Psychiatric Supervising Attending:  Dr. Diamond     Subjective / Interval update:    This is a 53-year-old male with back pain from an L1 burst fracture, has been acting strange, making delusional statements, and having visual hallucinations/illusions.    Initially evaluated by psych on 4/25/19.  At that time was found to have some disorganized thought process, however, did not have acute symptoms of psychosis, depression, afia, or anxiety.  It was recommended at that time to avoid anticholinergics and benzodiazepines.    Psychiatry was asked to come reevaluate the patient as he continues to make delusional statements and have some hallucinations.    On 4/27 - primary team reported that the patient was having hallucinations.  Notes he was alert and orriented to person place and time on physician assessment and nursing assement, confusing statements made frequently.  Pt seeing liza and butterflies in room and was paranoid.      4/28 -primary team notes that patient continues to have disorganized thoughts and hallucinations.    On interview today with me, patient reports that he has been seeing things like bats in his room, the other people do not seem aware of.  He was describing it to me, and said, \"I guess it could be a piece of plastic flopping back and forth, but it does look like a bat.\"  Patient denies any auditory hallucinations, does appear to have a linear thought process without tangentiality or loose associations during the interview.  Patient does report that he had a history of myelodysplastic leukemia that was diagnosed in November at [facility] in [place].  Patient is willing to have me talk to someone who knows him well and get some collateral, however, was not able to provide the number that he would get it later.  Patient does report he continues to be in a lot of pain, " "mostly around his back from the lower lumbar spine to his upper thoracic spine.  He was not able to describe the fracture, and continues to use to be a poor historian.    Patient reports that he drinks only occasionally, and only has a few drinks when he does decide to consume alcohol.  Denies other drug use, does endorse chew for tobacco.    Objective:  Blood pressure 114/69, pulse 89, temperature 36.8 °C (98.2 °F), temperature source Temporal, resp. rate 17, height 1.803 m (5' 11\"), weight 91.4 kg (201 lb 8 oz), SpO2 93 %.    MSE :   Appearance: appears stated age and skinny   Behavior: calm, cooperative, pleasant, engaged. good eye contact.  No tics. No mannerisms.   Speech : normal rate, normal volume, normal tone   Language:normal vocab   Mood: \"pretty good as far as I can tell.\"    Affect: euthymic and mood congruent   Thought Process: moslty linear and goal directed and reducing pain   Thought Content: no suicidal or homicidal ideation, + auditory hallucinations and illusions   Attention/Concentration: Intact, able to maintain conversation well. Does not get distracted   Memory: No gross deficits, 3/3 delayed recall   Orientation: Alert and oriented to person, place, situation, month, year (although at first thought it was January, then had to correct self.\"      Fund of Knowledge: Appropriate, knows Daniella is president   Insight/Judgment: fair / good        Medical systems reviewed: (pain, new complaint, etc)        Endorses back pain, mid back, left flank, lower back  Denies shortness of breath  + Nausea this morning  Other review of systems negative    Lab results/tests:   Recent Results (from the past 48 hour(s))   CBC WITHOUT DIFFERENTIAL    Collection Time: 04/27/19 12:15 AM   Result Value Ref Range    WBC 3.7 (L) 4.8 - 10.8 K/uL    RBC 3.38 (L) 4.70 - 6.10 M/uL    Hemoglobin 9.9 (L) 14.0 - 18.0 g/dL    Hematocrit 30.0 (L) 42.0 - 52.0 %    MCV 88.8 81.4 - 97.8 fL    MCH 29.3 27.0 - 33.0 pg    MCHC 33.0 " (L) 33.7 - 35.3 g/dL    RDW 49.7 35.9 - 50.0 fL    Platelet Count 83 (L) 164 - 446 K/uL    MPV 9.5 9.0 - 12.9 fL         EC2019 -            Assessment:(acuity level)  Patient continues to make some odd statements, is having illusions versus auditory hallucinations, but does not have any paranoid or other delusions at this time during the interview.  It does appear that there is at least some slight variation in his states of abnormal thinking and hallucinations, unclear if his irritability or somnolence continues to fluctuate, but given patient's mental condition and pain, I still feel like he is having hallucinations due to delirium.  He still appears to be a poor historian, and does not understand much about his medical condition.  Patient denies any auditory hallucinations, denies any history of psychosis, denies drug use or withdrawal.  At this time, in my clinical opinion the risks of antipsychotics outweigh benefits.  Patient is not bothered by these hallucinations, and antipsychotics are not to have a lot of evidence for being beneficial in delirium.      DDX:  Delerium      Plan:  - Continue to monitor patient's condition, recommend attempting to gain some collateral about patient's past medical history this patient does appear to be a poor historian  -Recommend no antipsychotics at this time, continue to limit anticholinergic medications, risks outweigh benefits  -Attempt to control pain which may be contributing to his delirium, while balancing this with the risk of opioid-induced delirium.  -Pt reports he has a history of testicular cancer that was treated, myelodysplastic leukemia, previously reported history of HCC and a previous stroke.  Would be good to obtain records from past hospitalization in [place] or elsewhere.    - For agitation: consider using Haldol 2mg PO/IM Q4-6h Prn. Do not exceed 20mg daily. Please hold or discontinue if QTC>500  - For anxiety, continue Buspar 15mg PO  BID  -Please continue to monitor in the chart his orientation status, any waxing or waning in somnolence or agitation, psychosis, or other conditions.  If possible, please record odd statements, delusional thinking, or disorganized through process in the patient's own words in the chart.       -Please again reconsult psychiatry if further input or evaluation is needed.                  Discussed assessment and plan with attending psychiatrist

## 2019-04-29 ENCOUNTER — APPOINTMENT (OUTPATIENT)
Dept: RADIOLOGY | Facility: MEDICAL CENTER | Age: 54
DRG: 516 | End: 2019-04-29
Attending: INTERNAL MEDICINE
Payer: MEDICAID

## 2019-04-29 PROBLEM — E63.9 POOR NUTRITION: Status: ACTIVE | Noted: 2019-04-29

## 2019-04-29 PROCEDURE — A9270 NON-COVERED ITEM OR SERVICE: HCPCS | Performed by: INTERNAL MEDICINE

## 2019-04-29 PROCEDURE — 700105 HCHG RX REV CODE 258: Performed by: INTERNAL MEDICINE

## 2019-04-29 PROCEDURE — 700111 HCHG RX REV CODE 636 W/ 250 OVERRIDE (IP): Performed by: INTERNAL MEDICINE

## 2019-04-29 PROCEDURE — 770004 HCHG ROOM/CARE - ONCOLOGY PRIVATE *

## 2019-04-29 PROCEDURE — 99232 SBSQ HOSP IP/OBS MODERATE 35: CPT | Performed by: INTERNAL MEDICINE

## 2019-04-29 PROCEDURE — 700102 HCHG RX REV CODE 250 W/ 637 OVERRIDE(OP): Performed by: INTERNAL MEDICINE

## 2019-04-29 PROCEDURE — 700102 HCHG RX REV CODE 250 W/ 637 OVERRIDE(OP): Performed by: HOSPITALIST

## 2019-04-29 PROCEDURE — 700101 HCHG RX REV CODE 250: Performed by: INTERNAL MEDICINE

## 2019-04-29 PROCEDURE — A9270 NON-COVERED ITEM OR SERVICE: HCPCS | Performed by: HOSPITALIST

## 2019-04-29 PROCEDURE — 71045 X-RAY EXAM CHEST 1 VIEW: CPT

## 2019-04-29 RX ORDER — MORPHINE SULFATE 15 MG/1
15 TABLET, FILM COATED, EXTENDED RELEASE ORAL EVERY 12 HOURS
Status: DISCONTINUED | OUTPATIENT
Start: 2019-04-29 | End: 2019-04-30

## 2019-04-29 RX ADMIN — TRAMADOL HYDROCHLORIDE 50 MG: 50 TABLET, FILM COATED ORAL at 17:22

## 2019-04-29 RX ADMIN — MORPHINE SULFATE 15 MG: 15 TABLET ORAL at 02:06

## 2019-04-29 RX ADMIN — MORPHINE SULFATE 15 MG: 15 TABLET, EXTENDED RELEASE ORAL at 17:22

## 2019-04-29 RX ADMIN — SENNOSIDES,DOCUSATE SODIUM 2 TABLET: 8.6; 5 TABLET, FILM COATED ORAL at 17:21

## 2019-04-29 RX ADMIN — HEPARIN SODIUM 5000 UNITS: 5000 INJECTION, SOLUTION INTRAVENOUS; SUBCUTANEOUS at 20:56

## 2019-04-29 RX ADMIN — HALOPERIDOL LACTATE 2 MG: 5 INJECTION, SOLUTION INTRAMUSCULAR at 11:11

## 2019-04-29 RX ADMIN — METHOCARBAMOL TABLETS 500 MG: 500 TABLET, COATED ORAL at 05:37

## 2019-04-29 RX ADMIN — MORPHINE SULFATE 30 MG: 15 TABLET ORAL at 20:56

## 2019-04-29 RX ADMIN — MORPHINE SULFATE 30 MG: 30 TABLET, FILM COATED, EXTENDED RELEASE ORAL at 05:37

## 2019-04-29 RX ADMIN — AMITRIPTYLINE HYDROCHLORIDE 75 MG: 50 TABLET, FILM COATED ORAL at 20:55

## 2019-04-29 RX ADMIN — TRAMADOL HYDROCHLORIDE 50 MG: 50 TABLET, FILM COATED ORAL at 05:37

## 2019-04-29 RX ADMIN — BUSPIRONE HYDROCHLORIDE 15 MG: 30 TABLET ORAL at 05:37

## 2019-04-29 RX ADMIN — HYDROCORTISONE: 1 CREAM TOPICAL at 05:43

## 2019-04-29 RX ADMIN — METOPROLOL TARTRATE 25 MG: 25 TABLET, FILM COATED ORAL at 17:21

## 2019-04-29 RX ADMIN — OMEPRAZOLE 20 MG: 20 CAPSULE, DELAYED RELEASE ORAL at 05:37

## 2019-04-29 RX ADMIN — Medication 1 LOZENGE: at 17:22

## 2019-04-29 RX ADMIN — OMEPRAZOLE 20 MG: 20 CAPSULE, DELAYED RELEASE ORAL at 17:22

## 2019-04-29 RX ADMIN — ONDANSETRON 4 MG: 2 SOLUTION INTRAMUSCULAR; INTRAVENOUS at 02:06

## 2019-04-29 RX ADMIN — METHOCARBAMOL TABLETS 500 MG: 500 TABLET, COATED ORAL at 17:21

## 2019-04-29 RX ADMIN — ERGOCALCIFEROL 50000 UNITS: 1.25 CAPSULE ORAL at 20:56

## 2019-04-29 RX ADMIN — BUSPIRONE HYDROCHLORIDE 15 MG: 30 TABLET ORAL at 17:22

## 2019-04-29 RX ADMIN — SODIUM CHLORIDE: 9 INJECTION, SOLUTION INTRAVENOUS at 05:43

## 2019-04-29 RX ADMIN — METOPROLOL TARTRATE 25 MG: 25 TABLET, FILM COATED ORAL at 05:37

## 2019-04-29 RX ADMIN — LIDOCAINE 1 PATCH: 50 PATCH TOPICAL at 10:50

## 2019-04-29 RX ADMIN — MORPHINE SULFATE 15 MG: 15 TABLET ORAL at 16:00

## 2019-04-29 ASSESSMENT — ENCOUNTER SYMPTOMS
BACK PAIN: 1
CHILLS: 0
HALLUCINATIONS: 1
SORE THROAT: 0
PHOTOPHOBIA: 0
VOMITING: 0
HEARTBURN: 0
DEPRESSION: 0
MYALGIAS: 1
DIARRHEA: 0
INSOMNIA: 0
CLAUDICATION: 0
WEAKNESS: 0
NERVOUS/ANXIOUS: 1
FEVER: 0
SENSORY CHANGE: 0
CONSTIPATION: 0
BLURRED VISION: 0
HEADACHES: 0
SPEECH CHANGE: 0
SHORTNESS OF BREATH: 0
DIZZINESS: 0
ABDOMINAL PAIN: 0
COUGH: 0

## 2019-04-29 NOTE — PROGRESS NOTES
"Received change of shift report from noc RN. Assumed pt. Care at 0700. Pt. Aox4, hallucinating, slightly agitated but responds well to emotional support. Pt. States \"do you see the person under the chair pulling the lever?\" RN did not visualize anything abnormal. Pt. Requesting that his RN assessment be performed after he is done \"waking up\". Bed in lowest position with wheels locked. Bed alarm and hourly rounding in place. Call light within reach.      "

## 2019-04-29 NOTE — PROGRESS NOTES
Pt refused all care today.  He did not ambulate, bathe, or eat anything and therefore hasnt had any urine output.  When pushed to carry out ADL's pt gets defensive and angry.  MD notified of no PO intake/low UOP fluids started.  Hallucinations are continuing as he asked this writer if there was something on the wall when there wasn't and kept asking for his bottles from the drawer when pointing at the recliner.  Pt remains A and Ox4.

## 2019-04-29 NOTE — PROGRESS NOTES
"Per psych request    Patient stated \"I need a flashlight so I can see whats going on inside the chair over there. If I press this button you'll see it too\" (patient presses buttons on call light) \"Do you not see that? Everyone says I'm losing my mind\"     This RN did not visualize anything out of the ordinary. Patient unable to verbalize exactly what he was seeing.   "

## 2019-04-29 NOTE — PROGRESS NOTES
"Pt. Agitated, disconnected his IV fluids, threw his phone at [facility]. Pt. States \"leave me alone\" \"get out\" \"don't bother me\" \"I only have 10 minutes\". When asked what he only has 10 minutes for, he doesn't provide an answer.   "

## 2019-04-29 NOTE — CARE PLAN
Problem: Fluid Volume:  Goal: Will maintain balanced intake and output  Outcome: PROGRESSING AS EXPECTED  Patient had not voided all day. Fluids started. Patient voiding.

## 2019-04-29 NOTE — PROGRESS NOTES
Hospital Medicine Daily Progress Note    Date of Service  4/29/2019    Chief Complaint  53 y.o. male admitted 4/5/2019 with back pain, found to have L1 burst fracture.    Hospital Course    Patient admitted with L1 burst fracture, conservative bracing recommended by neurosurgeon.  Patient with difficult to control pain and trouble with therapies and difficult discharge due to social situation.      Interval Problem Update  4/13 Patient moved from telemetry to medical floor, states pain uncontrolled and requesting IV pain medication, 1 dose IV morphine ordered as needed for severe pain but I've adjusted his percocet from 5mg to 10 mg and increased robaxin and made it scheduled and increased his gabapentin for better pain control.  4/14 Patient states he had rest following morphine dose overnight but pain uncontrolled still with previous changes, will add oxycontin 20 bid as long acting pain medication to help control pain and allow patient to participate in therapies.  Rehab consult placed.  4/15 Patient up to side of bed in [facility] after working with therapy today, complains of uncontrolled pain with the changes made.  I doubt he is having response to oxycodone/oxycontin so will discontinue this and rotate opiates to morphine - MS contin and MSIR.  Physiatry consult pending for recommendations to help get patient out of the hospital.  4/23 Patient s/p cognitive evaluation and showed decline since previous evaluation.  His mentation seemed to change with the initiation of gabapentin that I started increasing on 4/13.  I will discontinue the gabapentin as it does not seem to be helping with his pain control but he is having cog deficits and dizziness - especially with dose increases.  Patient is agreeable to this.  He does not need IV morphine and pain can be controlled with PO only.  4/24 Patient seems more awake and alert today with dc of gabapentin.  He states the dizziness is still present.  Cognitive status seems to  have improved since yesterday also.  He needs continued improvement before he is safe to leave the hospital to an independent setting, no availability for placement given his criminal record and active warrants.  4/25 Patient awake but still making confusing statements, yesterday he wanted paperwork to fill out for a [employer] authorization.  He is making these statements more frequently and I've requested psychiatry to weigh in on their thoughts of his confusion.    4/26 Patient without complaints today, back pain same.  Makes confusing statements but less often.    4/27 Patient with residual pain, no increase in pain medication due to his hallucinations.  Will have psychiatry re-evaluate patient for medication recommendations, likely an antipsychotic medication to help lessen his hallucinations.  4/28 Patient continues to have disorganized thoughts and hallucinations.  I anticipate psychiatry to consult again on Monday.  No acute changes, therapies to resume on Monday.  4/29 Patient with continued decline, he is not eating nor drinking meals and states he's not hungry.  I've started IV fluids and am awaiting psychiatry re-evaluation as he is actively hallucinating.  I will also decrease his MS contin as he appears comfortable and this could also be contributing to his mental suppression.    Consultants/Specialty  [facility] - [place] - s/o  Psych - s/o, re-consult    Code Status  full    Disposition  No placement available, needs improved cognitive function until safe to discharge to his own independence.    Review of Systems  Review of Systems   Constitutional: Negative for chills and fever.   HENT: Negative for congestion and sore throat.    Eyes: Negative for blurred vision and photophobia.   Respiratory: Negative for cough and shortness of breath.    Cardiovascular: Negative for chest pain, claudication and leg swelling.   Gastrointestinal: Negative for abdominal pain, constipation, diarrhea, heartburn and vomiting.    Genitourinary: Negative for dysuria and hematuria.   Musculoskeletal: Positive for back pain and myalgias. Negative for joint pain.   Skin: Negative for itching and rash.   Neurological: Negative for dizziness, sensory change, speech change, weakness and headaches.   Psychiatric/Behavioral: Positive for hallucinations. Negative for depression. The patient is nervous/anxious. The patient does not have insomnia.         Physical Exam  Temp:  [36.5 °C (97.7 °F)-36.8 °C (98.3 °F)] 36.8 °C (98.3 °F)  Pulse:  [80-94] 90  Resp:  [18-20] 19  BP: (111-130)/(76-85) 125/78  SpO2:  [92 %-96 %] 93 %    Physical Exam   Constitutional: He is oriented to person, place, and time. He appears well-developed and well-nourished. No distress.   HENT:   Head: Normocephalic and atraumatic.   Eyes: Conjunctivae are normal. No scleral icterus.   Neck: Neck supple. No JVD present.   Cardiovascular: Normal rate, regular rhythm and normal heart sounds.  Exam reveals no gallop and no friction rub.    No murmur heard.  Pulmonary/Chest: Effort normal and breath sounds normal. No respiratory distress. He has no wheezes. He exhibits no tenderness.   Abdominal: Soft. Bowel sounds are normal. He exhibits no distension and no mass. There is no tenderness.   Musculoskeletal: He exhibits no edema or tenderness.   Lymphadenopathy:     He has no cervical adenopathy.   Neurological: He is alert and oriented to person, place, and time. No cranial nerve deficit.   Confusing statements frequently made     Skin: Skin is warm and dry. He is not diaphoretic. No erythema. No pallor.   Psychiatric: He has a normal mood and affect. His behavior is normal.   Patient seeing bats and butterflies in room, slightly paranoid.     Nursing note and vitals reviewed.      Fluids    Intake/Output Summary (Last 24 hours) at 04/29/19 1158  Last data filed at 04/28/19 5158   Gross per 24 hour   Intake                0 ml   Output              450 ml   Net             -450 ml        Laboratory  Recent Labs      04/27/19   0015   WBC  3.7*   RBC  3.38*   HEMOGLOBIN  9.9*   HEMATOCRIT  30.0*   MCV  88.8   MCH  29.3   MCHC  33.0*   RDW  49.7   PLATELETCT  83*   MPV  9.5                       Imaging  DX-CHEST-PORTABLE (1 VIEW)   Final Result         No acute cardiac or pulmonary abnormality is identified.      FG-[id number] VIEW   Final Result      1.  There is a nonobstructive nonspecific bowel gas pattern.  There is no evidence of free intraperitoneal air.      MR-LUMBAR SPINE-WITH & W/O   Final Result      1.  There is subacute fracture along the superior endplate of L1 vertebral body. There is no posterior retropulsion.   2.  There is no focal bone lesion.   3.  There is no evidence of infection.   4.  Mild degenerative disease as described above.      MR-BRAIN-W/O   Final Result      1.  Mild cerebral atrophy.   2.  Mild chronic microvascular ischemic disease.   3.  No acute abnormality.      DX-KNEE 2- LEFT   Final Result      1.  No fracture or dislocation of LEFT knee.   2.  Minimal degenerative changes.   3.  Diffuse vascular calcifications.      EC-ECHOCARDIOGRAM COMPLETE W/O CONT   Final Result      CT-ABDOMEN-PELVIS WITH   Final Result         1.  No acute abnormality.   2.  Hepatomegaly and diffuse hepatic steatosis   3.  Atherosclerosis and atherosclerotic coronary artery disease.   4.  Pulmonary nodules measuring up to 6.5 mm, see nodule follow-up recommendations below.      Low Risk: CT at 3-6 months, then consider CT at 18-24 months      High Risk: CT at 3-6 months, then at 18-24 months      Comments: Use most suspicious nodule as guide to management. Follow-up intervals may vary according to size and risk.      Low Risk - Minimal or absent history of smoking and of other known risk factors.      High Risk - History of smoking or of other known risk factors.      Note: These recommendations do not apply to lung cancer screening, patients with immunosuppression, or patients  with known primary cancer.      Fleischner Society 2017 Guidelines for Management of Incidentally Detected Pulmonary Nodules in Adults         CT-CTA CHEST PULMONARY ARTERY W/ RECONS   Final Result         1.  No large central pulmonary embolus is appreciated, evaluation of the subsegmental branches is essentially nondiagnostic due to motion artifacts. Additional imaging would be required for definitive exclusion of small distal pulmonary emboli.      DX-LUMBAR SPINE-2 OR 3 VIEWS   Final Result      Limited examination. Mild anterior wedging L2 vertebral body that appears to be old.   If symptoms are persistent, would recommend CT for further evaluation.      DX-THORACIC SPINE-WITH SWIMMERS VIEW   Final Result      Limited examination. The upper thoracic spine is not well-demonstrated.   Mild anterior wedging of some the lower thoracic vertebral bodies and appears old.   No definite acute compression. If symptoms are persistent, CT would be recommended for further evaluation.      DX-CHEST-PORTABLE (1 VIEW)   Final Result      Central catheter projects over the superior right atrium.      Prominent calcified granuloma in the left midlung.      Atherosclerotic plaque.      CT-HEAD W/O   Final Result      No acute intracranial abnormality is identified.           Assessment/Plan  * Closed stable burst fracture of first lumbar vertebra with routine healing   Assessment & Plan    Lumbar MRI found L1 subacute fracture with no protrusion  Neurosurgery was consulted and they recommended brace placement and outpatient follow-up.  I discontinued IV morphine.  I encouraged him to use oral pain medication for pain control.  Physiatry evaluated him and recommended that is not a candidate for inpatient rehab.  Physical therapy evaluated and recommended transitional care facilities.  Currently is very difficult discharge as per physical therapy recommendation patient needs to go to skilled nursing facility but most of the skilled  nursing facility has been declining him for criminal record  Cognitive difficulties more prevalent since initiation of gabapentin - dose reduced and I will discontinue it (likely the cause of his dizziness also)  Wean MS contin as pain better controlled and may improve mental status.         Pancytopenia (HCC)- (present on admission)   Assessment & Plan    Most likely due to myelodysplastic syndrome  His last chemotherapy was in November 2018  No acute symptoms.  Hematology/oncology was consulted and recommended outpatient follow-up.  CBC on 4/20 showed significant improvement           Poor nutrition   Assessment & Plan    Poor oral intake past 48 hours, patient can eat, just isn't hungry  Poor urinary output - start NS at 83     Hallucinations   Assessment & Plan    Patient seeing butterflies and bats in his room  Re-consult psych for possible initiation of anti-psychotic.       Delirium   Assessment & Plan    Frequent confusing statements made by patient, unrelated to doses of pain medications  Psychiatry consultation appreciated  Robaxin dose decreased  Gabapentin discontinued         Left knee pain   Assessment & Plan    X ray found arthritis  Continue to provide him pain management.     Tachycardia   Assessment & Plan    Resolved  It was most likely from pain  metprolol 25 mg p.o. twice daily       Somnolence- (present on admission)   Assessment & Plan    Waxing and waning  Stopped Ambien and trazodone  Resolved, gabapentin likely contributing - discontinued       Dizziness- (present on admission)   Assessment & Plan    Likely s/e of gabapentin - discontinued  Vitals are stable  Echo did not show any acute normalities  PT/OT  Negative orthostatics vitals              VTE prophylaxis: heparin

## 2019-04-29 NOTE — CARE PLAN
Problem: Nutritional:  Goal: Achieve adequate nutritional intake  Patient will consume 50-75% of meals    Outcome: PROGRESSING SLOWER THAN EXPECTED  See dietary note.

## 2019-04-29 NOTE — DISCHARGE PLANNING
After speaking to three different persons at the [place] [employer] finally was informed that PT was not booked at the custodial here he was brought in before they booked him. Also they have no NOK listed for patient and NOK search performed by case management shows no NOK. Asked BS nurse to speak with patient when patient seems more lucid and inquire if he does have a sister and perhaps a location, name or number for her.

## 2019-04-29 NOTE — DIETARY
Nutrition Services Update: following for adequate nutritional intake    Day 23 of admit. Regular diet with supplement order in place, receiving Magic Cups BID. No ADL documentation recorded since 4/24, however per review of recent nursing and MD progress notes pt has been refusing to eat nor drinking meals.     Did not attempt visit with pt today as documentation indicates pt has been agitated and confused. Spoke with RN regarding nutritional intake, she is unsure of when pt initially began refusing meals. Review of past few days of notes indicates pt has been refusing to eat starting 4/27 at breakfast.     Refusal of PO intake x 3 days and < 50% meal intake ongoing since ~4/18 per ADL documentation. Overall inadequate nutritional intake x ~1.5 weeks. NG tube for nutrition likely inappropriate at this time given pt's mental status and ongoing refusal to participate in care - pt likely to refuse placement and/or try to remove NG tube.     Weight trend during admit:   · Suspected most accurate weight on admit (4/6/19) = 96.8 kg  · Weight has fluctuated up and down during admit  · Current weight per stand up scale 4/27/19 = 91.4 kg  · 5.4 kg (6%) wt loss x 3 weeks is severe     Poor nutrition in active problem list.  Malnutrition Risk: Pt with severe acute illness/injury related malnutrition r/t ongoing delirium/halluncinations with refusal to eat/drink, evidenced by >5% wt loss in < 1 month time frame (6% x 3 weeks - severe) and severely reduced intake, with < 50% of estimated energy requirements for > 5 days (consuming < 50%/refusing meals x 1.5 weeks).     Plan/Recommend:   1. Pt with poor nutritional status, however limited interventions available at this time d/t altered mental status - Cortrak placement inappropriate   2. Pt having hallucinations and likely to try and pull Cortrak if placed  3. Psychiatry is following - no antipsychotics recommended at this time (per note 4/28)  4. Continue to encourage PO intake  of meals and supplements as able   5. Will continue to send Magic Cups and high protein milkshakes with meals  6. Please record PO intake as % meals consumed in flowsheets - even if pt is refusing meals  7. RD to monitor PO intake, wt trends, and nutrition labs/meds    RD following

## 2019-04-30 ENCOUNTER — APPOINTMENT (OUTPATIENT)
Dept: RADIOLOGY | Facility: MEDICAL CENTER | Age: 54
DRG: 516 | End: 2019-04-30
Attending: HOSPITALIST
Payer: MEDICAID

## 2019-04-30 PROBLEM — R13.19 ESOPHAGEAL DYSPHAGIA: Status: ACTIVE | Noted: 2019-04-30

## 2019-04-30 PROCEDURE — 700105 HCHG RX REV CODE 258: Performed by: INTERNAL MEDICINE

## 2019-04-30 PROCEDURE — A9270 NON-COVERED ITEM OR SERVICE: HCPCS | Performed by: HOSPITALIST

## 2019-04-30 PROCEDURE — 700102 HCHG RX REV CODE 250 W/ 637 OVERRIDE(OP): Performed by: HOSPITALIST

## 2019-04-30 PROCEDURE — 700111 HCHG RX REV CODE 636 W/ 250 OVERRIDE (IP): Performed by: INTERNAL MEDICINE

## 2019-04-30 PROCEDURE — 97530 THERAPEUTIC ACTIVITIES: CPT

## 2019-04-30 PROCEDURE — 99232 SBSQ HOSP IP/OBS MODERATE 35: CPT | Performed by: HOSPITALIST

## 2019-04-30 PROCEDURE — 700102 HCHG RX REV CODE 250 W/ 637 OVERRIDE(OP): Performed by: INTERNAL MEDICINE

## 2019-04-30 PROCEDURE — 770004 HCHG ROOM/CARE - ONCOLOGY PRIVATE *

## 2019-04-30 PROCEDURE — A9270 NON-COVERED ITEM OR SERVICE: HCPCS | Performed by: INTERNAL MEDICINE

## 2019-04-30 PROCEDURE — 97535 SELF CARE MNGMENT TRAINING: CPT

## 2019-04-30 PROCEDURE — 97116 GAIT TRAINING THERAPY: CPT

## 2019-04-30 RX ORDER — ACETAMINOPHEN 325 MG/1
650 TABLET ORAL EVERY 4 HOURS PRN
Status: DISCONTINUED | OUTPATIENT
Start: 2019-04-30 | End: 2019-05-05

## 2019-04-30 RX ORDER — OXYCODONE HYDROCHLORIDE AND ACETAMINOPHEN 5; 325 MG/1; MG/1
1 TABLET ORAL EVERY 4 HOURS PRN
Status: DISCONTINUED | OUTPATIENT
Start: 2019-04-30 | End: 2019-05-03

## 2019-04-30 RX ADMIN — METOPROLOL TARTRATE 25 MG: 25 TABLET, FILM COATED ORAL at 05:54

## 2019-04-30 RX ADMIN — TRAMADOL HYDROCHLORIDE 50 MG: 50 TABLET, FILM COATED ORAL at 05:54

## 2019-04-30 RX ADMIN — OXYCODONE HYDROCHLORIDE AND ACETAMINOPHEN 1 TABLET: 5; 325 TABLET ORAL at 20:44

## 2019-04-30 RX ADMIN — OMEPRAZOLE 20 MG: 20 CAPSULE, DELAYED RELEASE ORAL at 18:39

## 2019-04-30 RX ADMIN — HEPARIN SODIUM 5000 UNITS: 5000 INJECTION, SOLUTION INTRAVENOUS; SUBCUTANEOUS at 15:09

## 2019-04-30 RX ADMIN — SENNOSIDES,DOCUSATE SODIUM 2 TABLET: 8.6; 5 TABLET, FILM COATED ORAL at 18:38

## 2019-04-30 RX ADMIN — MORPHINE SULFATE 30 MG: 15 TABLET ORAL at 08:13

## 2019-04-30 RX ADMIN — OMEPRAZOLE 20 MG: 20 CAPSULE, DELAYED RELEASE ORAL at 05:53

## 2019-04-30 RX ADMIN — BUSPIRONE HYDROCHLORIDE 15 MG: 30 TABLET ORAL at 05:53

## 2019-04-30 RX ADMIN — HEPARIN SODIUM 5000 UNITS: 5000 INJECTION, SOLUTION INTRAVENOUS; SUBCUTANEOUS at 20:44

## 2019-04-30 RX ADMIN — OXYCODONE HYDROCHLORIDE AND ACETAMINOPHEN 1 TABLET: 5; 325 TABLET ORAL at 15:09

## 2019-04-30 RX ADMIN — METHOCARBAMOL TABLETS 500 MG: 500 TABLET, COATED ORAL at 05:58

## 2019-04-30 RX ADMIN — TRAMADOL HYDROCHLORIDE 50 MG: 50 TABLET, FILM COATED ORAL at 18:38

## 2019-04-30 RX ADMIN — MORPHINE SULFATE 30 MG: 15 TABLET ORAL at 00:43

## 2019-04-30 RX ADMIN — BUSPIRONE HYDROCHLORIDE 15 MG: 30 TABLET ORAL at 18:45

## 2019-04-30 RX ADMIN — SODIUM CHLORIDE: 9 INJECTION, SOLUTION INTRAVENOUS at 05:59

## 2019-04-30 RX ADMIN — METOPROLOL TARTRATE 25 MG: 25 TABLET, FILM COATED ORAL at 18:38

## 2019-04-30 RX ADMIN — AMITRIPTYLINE HYDROCHLORIDE 75 MG: 50 TABLET, FILM COATED ORAL at 20:44

## 2019-04-30 RX ADMIN — MORPHINE SULFATE 15 MG: 15 TABLET, EXTENDED RELEASE ORAL at 05:54

## 2019-04-30 ASSESSMENT — ENCOUNTER SYMPTOMS
HEADACHES: 0
DIZZINESS: 0
CHILLS: 0
HEARTBURN: 0
BLURRED VISION: 0
SORE THROAT: 0
FEVER: 0
INSOMNIA: 0
HALLUCINATIONS: 1
ABDOMINAL PAIN: 0
SPEECH CHANGE: 0
NERVOUS/ANXIOUS: 1
VOMITING: 0
CONSTIPATION: 0
SENSORY CHANGE: 0
CLAUDICATION: 0
PHOTOPHOBIA: 0
WEAKNESS: 0
COUGH: 0
BACK PAIN: 1
DEPRESSION: 0
MYALGIAS: 1
SHORTNESS OF BREATH: 0
DIARRHEA: 0

## 2019-04-30 ASSESSMENT — COGNITIVE AND FUNCTIONAL STATUS - GENERAL
WALKING IN HOSPITAL ROOM: A LITTLE
CLIMB 3 TO 5 STEPS WITH RAILING: A LOT
SUGGESTED CMS G CODE MODIFIER DAILY ACTIVITY: CK
DRESSING REGULAR LOWER BODY CLOTHING: A LITTLE
STANDING UP FROM CHAIR USING ARMS: A LITTLE
PERSONAL GROOMING: A LITTLE
MOVING TO AND FROM BED TO CHAIR: A LITTLE
DAILY ACTIVITIY SCORE: 19
SUGGESTED CMS G CODE MODIFIER MOBILITY: CK
MOBILITY SCORE: 18
DRESSING REGULAR UPPER BODY CLOTHING: A LITTLE
TOILETING: A LITTLE
MOVING FROM LYING ON BACK TO SITTING ON SIDE OF FLAT BED: A LITTLE
HELP NEEDED FOR BATHING: A LITTLE

## 2019-04-30 ASSESSMENT — GAIT ASSESSMENTS
DISTANCE (FEET): 20
DEVIATION: DECREASED BASE OF SUPPORT;BRADYKINETIC;SHUFFLED GAIT;OTHER (COMMENT)
ASSISTIVE DEVICE: FRONT WHEEL WALKER
GAIT LEVEL OF ASSIST: MINIMAL ASSIST

## 2019-04-30 NOTE — DOCUMENTATION QUERY
Patient refused to get on Fluoroscopy table for Esophagram/Barium swallow. Had 3 people assisting him to the table, but he refused. CARLOS ALBERTO Mtz was informed.

## 2019-04-30 NOTE — ASSESSMENT & PLAN NOTE
Patient complains of dysphagia associated with nausea and vomiting.  Patient refused further evaluation including barium swallow study.  Regular diet.  Continue complaining of low appetite

## 2019-04-30 NOTE — PROGRESS NOTES
"Received report from [facility], assumed care of pt 0700.  Pt c/o back pain rating it 10/10. Medicated with prn oral morphine.  Central line dressing was off, changed with sterile field. Both lumens patent with + blood return. Fluids running.  Skin intact. Assessment complete. Pt irritable and is \"tired of all the questions\". Asked about what he'd like to do today, \"just sleep\"  Treaded socks on, bed in locked & lowest position, call light within reach, bed strip alarm on.    "

## 2019-04-30 NOTE — PROGRESS NOTES
Pt confused with hallucinations-doesn't make sense during conversation and sees people who aren't present in the room. Does not call appropriately for needs. Strip alarm armed for safety. Pt using urinal at edge of bed. Voiding appropriately. Poor appetite, encouraged PO intake of food, but refused. Central line with fluids. Medicated with morphine IR 30 mg, provided relief for patient. Refuses care at times. Hourly rounding in place. All needs met at this moment.

## 2019-04-30 NOTE — THERAPY
Speech Therapy Contact Note  Attempted to see pt for cognitive-linguistic therapy. Per RN, patient is downstairs for a barium swallow study. Will attempt at a later time as appropriate.

## 2019-04-30 NOTE — PROGRESS NOTES
Pt reported to this RN a history of testicular CA. He stated his left testicle was removed in Nov 2018 somewhere in [place]. Refused assessment of area and then got more irritable when pressed for more information. I also asked him about the nausea he reported to the physician this morning as he had denied nausea for this RN when he is asked about not eating. He mumbled something incoherent.

## 2019-04-30 NOTE — PROGRESS NOTES
Hospital Medicine Daily Progress Note    Date of Service  4/30/2019    Chief Complaint  53 y.o. male admitted 4/5/2019 with back pain, found to have L1 burst fracture.    Hospital Course    Patient admitted with L1 burst fracture, conservative bracing recommended by neurosurgeon.  Patient with difficult to control pain and trouble with therapies and difficult discharge due to social situation.      Interval Problem Update  4/13 Patient moved from telemetry to medical floor, states pain uncontrolled and requesting IV pain medication, 1 dose IV morphine ordered as needed for severe pain but I've adjusted his percocet from 5mg to 10 mg and increased robaxin and made it scheduled and increased his gabapentin for better pain control.  4/14 Patient states he had rest following morphine dose overnight but pain uncontrolled still with previous changes, will add oxycontin 20 bid as long acting pain medication to help control pain and allow patient to participate in therapies.  Rehab consult placed.  4/15 Patient up to side of bed in TLSO after working with therapy today, complains of uncontrolled pain with the changes made.  I doubt he is having response to oxycodone/oxycontin so will discontinue this and rotate opiates to morphine - MS contin and MSIR.  Physiatry consult pending for recommendations to help get patient out of the hospital.  4/23 Patient s/p cognitive evaluation and showed decline since previous evaluation.  His mentation seemed to change with the initiation of gabapentin that I started increasing on 4/13.  I will discontinue the gabapentin as it does not seem to be helping with his pain control but he is having cog deficits and dizziness - especially with dose increases.  Patient is agreeable to this.  He does not need IV morphine and pain can be controlled with PO only.  4/24 Patient seems more awake and alert today with dc of gabapentin.  He states the dizziness is still present.  Cognitive status seems to  have improved since yesterday also.  He needs continued improvement before he is safe to leave the hospital to an independent setting, no availability for placement given his criminal record and active warrants.  4/25 Patient awake but still making confusing statements, yesterday he wanted paperwork to fill out for a [employer] authorization.  He is making these statements more frequently and I've requested psychiatry to weigh in on their thoughts of his confusion.    4/26 Patient without complaints today, back pain same.  Makes confusing statements but less often.    4/27 Patient with residual pain, no increase in pain medication due to his hallucinations.  Will have psychiatry re-evaluate patient for medication recommendations, likely an antipsychotic medication to help lessen his hallucinations.  4/28 Patient continues to have disorganized thoughts and hallucinations.  I anticipate psychiatry to consult again on Monday.  No acute changes, therapies to resume on Monday.  4/29 Patient with continued decline, he is not eating nor drinking meals and states he's not hungry.  I've started IV fluids and am awaiting psychiatry re-evaluation as he is actively hallucinating.  I will also decrease his MS contin as he appears comfortable and this could also be contributing to his mental suppression.  4/30: Patient still having episodes of confusion.  I have discontinued his MS Contin and morphine since I think it is attributing to his nausea, vomiting and confusion.  I replaced it with Percocet and tramadol.    Consultants/Specialty  NSG - Pierre - s/o  Psych - s/o, re-consult    Code Status  full    Disposition  No placement available, needs improved cognitive function until safe to discharge to his own independence.    Review of Systems  Review of Systems   Constitutional: Negative for chills and fever.   HENT: Negative for congestion and sore throat.    Eyes: Negative for blurred vision and photophobia.   Respiratory:  Negative for cough and shortness of breath.    Cardiovascular: Negative for chest pain, claudication and leg swelling.   Gastrointestinal: Negative for abdominal pain, constipation, diarrhea, heartburn and vomiting.   Genitourinary: Negative for dysuria and hematuria.   Musculoskeletal: Positive for back pain and myalgias. Negative for joint pain.   Skin: Negative for itching and rash.   Neurological: Negative for dizziness, sensory change, speech change, weakness and headaches.   Psychiatric/Behavioral: Positive for hallucinations. Negative for depression. The patient is nervous/anxious. The patient does not have insomnia.         Physical Exam  Temp:  [36.2 °C (97.2 °F)-36.7 °C (98.1 °F)] 36.7 °C (98.1 °F)  Pulse:  [] 105  Resp:  [18-20] 18  BP: (119-134)/(84-91) 134/89  SpO2:  [93 %-95 %] 94 %    Physical Exam   Constitutional: He is oriented to person, place, and time. He appears well-developed and well-nourished. No distress.   HENT:   Head: Normocephalic and atraumatic.   Eyes: Conjunctivae are normal. No scleral icterus.   Neck: Neck supple. No JVD present.   Cardiovascular: Normal rate, regular rhythm and normal heart sounds.  Exam reveals no gallop and no friction rub.    No murmur heard.  Pulmonary/Chest: Effort normal and breath sounds normal. No respiratory distress. He has no wheezes. He exhibits no tenderness.   Abdominal: Soft. Bowel sounds are normal. He exhibits no distension and no mass. There is no tenderness.   Musculoskeletal: He exhibits no edema or tenderness.   Lymphadenopathy:     He has no cervical adenopathy.   Neurological: He is alert and oriented to person, place, and time. No cranial nerve deficit.   Confusing statements frequently made     Skin: Skin is warm and dry. He is not diaphoretic. No erythema. No pallor.   Psychiatric: He has a normal mood and affect. His behavior is normal.   Patient seeing bats and butterflies in room, slightly paranoid.     Nursing note and vitals  reviewed.      Fluids    Intake/Output Summary (Last 24 hours) at 04/30/19 1615  Last data filed at 04/30/19 0858   Gross per 24 hour   Intake              120 ml   Output              600 ml   Net             -480 ml       Laboratory                        Imaging  DX-CHEST-PORTABLE (1 VIEW)   Final Result      No acute cardiac or pulmonary abnormalities are identified.      DX-CHEST-PORTABLE (1 VIEW)   Final Result         No acute cardiac or pulmonary abnormality is identified.      FZ-MGOTIGL-4 VIEW   Final Result      1.  There is a nonobstructive nonspecific bowel gas pattern.  There is no evidence of free intraperitoneal air.      MR-LUMBAR SPINE-WITH & W/O   Final Result      1.  There is subacute fracture along the superior endplate of L1 vertebral body. There is no posterior retropulsion.   2.  There is no focal bone lesion.   3.  There is no evidence of infection.   4.  Mild degenerative disease as described above.      MR-BRAIN-W/O   Final Result      1.  Mild cerebral atrophy.   2.  Mild chronic microvascular ischemic disease.   3.  No acute abnormality.      DX-KNEE 2- LEFT   Final Result      1.  No fracture or dislocation of LEFT knee.   2.  Minimal degenerative changes.   3.  Diffuse vascular calcifications.      EC-ECHOCARDIOGRAM COMPLETE W/O CONT   Final Result      CT-ABDOMEN-PELVIS WITH   Final Result         1.  No acute abnormality.   2.  Hepatomegaly and diffuse hepatic steatosis   3.  Atherosclerosis and atherosclerotic coronary artery disease.   4.  Pulmonary nodules measuring up to 6.5 mm, see nodule follow-up recommendations below.      Low Risk: CT at 3-6 months, then consider CT at 18-24 months      High Risk: CT at 3-6 months, then at 18-24 months      Comments: Use most suspicious nodule as guide to management. Follow-up intervals may vary according to size and risk.      Low Risk - Minimal or absent history of smoking and of other known risk factors.      High Risk - History of  smoking or of other known risk factors.      Note: These recommendations do not apply to lung cancer screening, patients with immunosuppression, or patients with known primary cancer.      Fleischner Society 2017 Guidelines for Management of Incidentally Detected Pulmonary Nodules in Adults         CT-CTA CHEST PULMONARY ARTERY W/ RECONS   Final Result         1.  No large central pulmonary embolus is appreciated, evaluation of the subsegmental branches is essentially nondiagnostic due to motion artifacts. Additional imaging would be required for definitive exclusion of small distal pulmonary emboli.      DX-LUMBAR SPINE-2 OR 3 VIEWS   Final Result      Limited examination. Mild anterior wedging L2 vertebral body that appears to be old.   If symptoms are persistent, would recommend CT for further evaluation.      DX-THORACIC SPINE-WITH SWIMMERS VIEW   Final Result      Limited examination. The upper thoracic spine is not well-demonstrated.   Mild anterior wedging of some the lower thoracic vertebral bodies and appears old.   No definite acute compression. If symptoms are persistent, CT would be recommended for further evaluation.      DX-CHEST-PORTABLE (1 VIEW)   Final Result      Central catheter projects over the superior right atrium.      Prominent calcified granuloma in the left midlung.      Atherosclerotic plaque.      CT-HEAD W/O   Final Result      No acute intracranial abnormality is identified.      DX-ESOPHAGUS - BARIUM SWALLOW    (Results Pending)        Assessment/Plan  * Closed stable burst fracture of first lumbar vertebra with routine healing   Assessment & Plan    Lumbar MRI found L1 subacute fracture with no protrusion  Neurosurgery was consulted and they recommended brace placement and outpatient follow-up.  I discontinued IV morphine.  I encouraged him to use oral pain medication for pain control.  Physiatry evaluated him and recommended that is not a candidate for inpatient rehab.  Physical  therapy evaluated and recommended transitional care facilities.  Currently is very difficult discharge as per physical therapy recommendation patient needs to go to skilled nursing facility but most of the skilled nursing facility has been declining him for criminal record  Cognitive difficulties more prevalent since initiation of gabapentin - dose reduced and I will discontinue it (likely the cause of his dizziness also)  Discontinue MS Contin and transition him to Percocet and tramadol         Pancytopenia (HCC)- (present on admission)   Assessment & Plan    Most likely due to myelodysplastic syndrome  His last chemotherapy was in November 2018  No acute symptoms.  Hematology/oncology was consulted and recommended outpatient follow-up.  CBC on 4/20 showed significant improvement           Esophageal dysphagia   Assessment & Plan    Patient complains of dysphagia associated with nausea and vomiting  Try to get a barium swallow with the patient refused     Poor nutrition   Assessment & Plan    Poor oral intake past 48 hours, patient can eat, just isn't hungry  Poor urinary output - start NS at 83     Hallucinations   Assessment & Plan    Patient seeing butterflies and bats in his room  Re-consult psych for possible initiation of anti-psychotic.       Delirium   Assessment & Plan    Frequent confusing statements made by patient, unrelated to doses of pain medications  Psychiatry consultation appreciated  Discontinue Robaxin  Gabapentin discontinued  Discontinue morphine       Left knee pain   Assessment & Plan    X ray found arthritis  Continue to provide him pain management.     Tachycardia   Assessment & Plan    Resolved  It was most likely from pain  metprolol 25 mg p.o. twice daily       Somnolence- (present on admission)   Assessment & Plan    Waxing and waning  Stopped Ambien and trazodone  Resolved, gabapentin likely contributing - discontinued       Dizziness- (present on admission)   Assessment & Plan     Likely s/e of gabapentin - discontinued  Vitals are stable  Echo did not show any acute normalities  PT/OT  Negative orthostatics vitals              VTE prophylaxis: heparin

## 2019-04-30 NOTE — THERAPY
"Physical Therapy Treatment completed.   Bed Mobility:  Supine to Sit: Stand by Assist  Transfers: Sit to Stand: Minimal Assist  Gait: Level Of Assist: Minimal Assist with Front-Wheel Walker       Plan of Care: Will benefit from Physical Therapy 3 times per week  Discharge Recommendations: Equipment: Will Continue to Assess for Equipment Needs. Recommend inpatient transitional care services for continued physical therapy services.      See \"Rehab Therapy-Acute\" Patient Summary Report for complete documentation.     Pt is demonstrating slow progression with therapy and appears to be at same functional level as prior session. Pt continues to require Min A during ambulation and transfers with FWW use. Pt presents with consistent posterior lean and sway upon standing and requires Min A to maintain upright standing balance. Pt continues to have impaired cognition and requires constant cues and redirection during functional mobility. Pt continues to be a high fall risk and will benefit from post acute therapy prior to d/c home.   "

## 2019-04-30 NOTE — THERAPY
"Occupational Therapy Treatment completed with focus on ADLs, ADL transfers, patient education, cognition and upper extremity function.  Functional Status:  SBA supine>sit EOB, min A LB dressing, min A w/donning TLSO, min A sit>Stand w/inconsistent bx during standing and ambulation, refused grooming, required max vc/encouragment to participate in mobility. Txf to chair post session w/min A. Remained up w/alarm on and call light and tray table w/in reach RN aware.   Plan of Care: Will benefit from Occupational Therapy 3 times per week  Discharge Recommendations:  Equipment Will Continue to Assess for Equipment Needs. Post-acute therapy Recommend inpatient transitional care services for continued occupational therapy services.       See \"Rehab Therapy-Acute\" Patient Summary Report for complete documentation.     Pt demonstrating self limiting bx, able to bring himself to EOB and scoot w/o assist. Pt initially started to don TLSO w/spv, however stopped 1/2 way. When therapist asked why he didn't tighten the brace, he stated \"I thought it was done\" pt was often mumbling and soft spoken making him difficult to understand. Pt did not allow therapist to adjust brace for which it was not fitting well. Pt was fully dressed prior to session, however upon initial stand his shorts fell off. Pt then completely doffed his shorts turned them inside out and attempted to re-don. Therapist redirect pt, who was perseverating on is clothing being cut (which they are not). PTs overall bx is odd he does not make eye contact and is demonstrating inconsistency w/strength and balance. Pt requires maximal encouragement to participate in ADL tasks  "

## 2019-05-01 ENCOUNTER — APPOINTMENT (OUTPATIENT)
Dept: RADIOLOGY | Facility: MEDICAL CENTER | Age: 54
DRG: 516 | End: 2019-05-01
Attending: HOSPITALIST
Payer: MEDICAID

## 2019-05-01 PROCEDURE — 700102 HCHG RX REV CODE 250 W/ 637 OVERRIDE(OP): Performed by: HOSPITALIST

## 2019-05-01 PROCEDURE — 74220 X-RAY XM ESOPHAGUS 1CNTRST: CPT

## 2019-05-01 PROCEDURE — A9270 NON-COVERED ITEM OR SERVICE: HCPCS | Performed by: INTERNAL MEDICINE

## 2019-05-01 PROCEDURE — 700102 HCHG RX REV CODE 250 W/ 637 OVERRIDE(OP): Performed by: INTERNAL MEDICINE

## 2019-05-01 PROCEDURE — 700101 HCHG RX REV CODE 250: Performed by: INTERNAL MEDICINE

## 2019-05-01 PROCEDURE — 99232 SBSQ HOSP IP/OBS MODERATE 35: CPT | Performed by: HOSPITALIST

## 2019-05-01 PROCEDURE — 770006 HCHG ROOM/CARE - MED/SURG/GYN SEMI*

## 2019-05-01 PROCEDURE — 700111 HCHG RX REV CODE 636 W/ 250 OVERRIDE (IP): Performed by: INTERNAL MEDICINE

## 2019-05-01 PROCEDURE — A9270 NON-COVERED ITEM OR SERVICE: HCPCS | Performed by: HOSPITALIST

## 2019-05-01 PROCEDURE — BD11YZZ FLUOROSCOPY OF ESOPHAGUS USING OTHER CONTRAST: ICD-10-PCS | Performed by: RADIOLOGY

## 2019-05-01 PROCEDURE — 51798 US URINE CAPACITY MEASURE: CPT

## 2019-05-01 PROCEDURE — 700105 HCHG RX REV CODE 258: Performed by: INTERNAL MEDICINE

## 2019-05-01 RX ADMIN — SODIUM CHLORIDE: 9 INJECTION, SOLUTION INTRAVENOUS at 05:32

## 2019-05-01 RX ADMIN — LIDOCAINE 1 PATCH: 50 PATCH TOPICAL at 12:14

## 2019-05-01 RX ADMIN — HEPARIN SODIUM 5000 UNITS: 5000 INJECTION, SOLUTION INTRAVENOUS; SUBCUTANEOUS at 15:31

## 2019-05-01 RX ADMIN — OXYCODONE HYDROCHLORIDE AND ACETAMINOPHEN 1 TABLET: 5; 325 TABLET ORAL at 07:59

## 2019-05-01 RX ADMIN — OXYCODONE HYDROCHLORIDE AND ACETAMINOPHEN 1 TABLET: 5; 325 TABLET ORAL at 20:46

## 2019-05-01 RX ADMIN — METOPROLOL TARTRATE 25 MG: 25 TABLET, FILM COATED ORAL at 05:22

## 2019-05-01 RX ADMIN — METOPROLOL TARTRATE 25 MG: 25 TABLET, FILM COATED ORAL at 17:35

## 2019-05-01 RX ADMIN — ACETAMINOPHEN 650 MG: 325 TABLET, FILM COATED ORAL at 21:55

## 2019-05-01 RX ADMIN — TRAZODONE HYDROCHLORIDE 50 MG: 50 TABLET ORAL at 20:46

## 2019-05-01 RX ADMIN — HYDROCORTISONE: 1 CREAM TOPICAL at 20:48

## 2019-05-01 RX ADMIN — HEPARIN SODIUM 5000 UNITS: 5000 INJECTION, SOLUTION INTRAVENOUS; SUBCUTANEOUS at 05:21

## 2019-05-01 RX ADMIN — BUSPIRONE HYDROCHLORIDE 15 MG: 30 TABLET ORAL at 05:26

## 2019-05-01 RX ADMIN — TRAMADOL HYDROCHLORIDE 50 MG: 50 TABLET, FILM COATED ORAL at 05:21

## 2019-05-01 RX ADMIN — OMEPRAZOLE 20 MG: 20 CAPSULE, DELAYED RELEASE ORAL at 05:22

## 2019-05-01 RX ADMIN — AMITRIPTYLINE HYDROCHLORIDE 75 MG: 50 TABLET, FILM COATED ORAL at 20:47

## 2019-05-01 RX ADMIN — OXYCODONE HYDROCHLORIDE AND ACETAMINOPHEN 1 TABLET: 5; 325 TABLET ORAL at 12:15

## 2019-05-01 RX ADMIN — OMEPRAZOLE 20 MG: 20 CAPSULE, DELAYED RELEASE ORAL at 17:35

## 2019-05-01 RX ADMIN — TRAMADOL HYDROCHLORIDE 50 MG: 50 TABLET, FILM COATED ORAL at 17:35

## 2019-05-01 RX ADMIN — SENNOSIDES,DOCUSATE SODIUM 2 TABLET: 8.6; 5 TABLET, FILM COATED ORAL at 05:21

## 2019-05-01 RX ADMIN — BUSPIRONE HYDROCHLORIDE 15 MG: 30 TABLET ORAL at 17:35

## 2019-05-01 ASSESSMENT — ENCOUNTER SYMPTOMS
COUGH: 0
HEARTBURN: 0
BACK PAIN: 1
WEAKNESS: 0
SPEECH CHANGE: 0
INSOMNIA: 0
VOMITING: 0
HALLUCINATIONS: 0
SENSORY CHANGE: 0
BLURRED VISION: 0
SORE THROAT: 0
DEPRESSION: 0
HEADACHES: 0
PHOTOPHOBIA: 0
CHILLS: 0
NERVOUS/ANXIOUS: 1
DIZZINESS: 0
FEVER: 0
SHORTNESS OF BREATH: 0
ABDOMINAL PAIN: 0
MYALGIAS: 1
CONSTIPATION: 0
CLAUDICATION: 0
DIARRHEA: 0

## 2019-05-01 NOTE — CARE PLAN
Problem: Nutritional:  Goal: Achieve adequate nutritional intake  Patient will consume 50-75% of meals    Outcome: NOT MET  0% breakfast consumed d/t nausea without emesis, pt reports.  Noted change in pain medication for nausea improvement, per MD progress note.  Pt is s/p  Barium swallow on 4/30.

## 2019-05-01 NOTE — PROGRESS NOTES
"Pt had not voided all night. Had patient sit at the edge of the bed to try and void. He was unable to void. Bladder scan ordered. Bladder scan showed 680mL. Educated patient that the MD will need to be updated and he may need to be straight catheterized. Pt stated he would \"crawl out of here before you catheterize me\". Will pass on to day RN.  "

## 2019-05-01 NOTE — PROGRESS NOTES
"Received report from day RN. POC discussed with patient, pt verbalizes understanding and agreement.  A&Ox4. Pt is irritable at times and mumbles incoherent things. This RN attempted to help patient urinate with the urinal this evening and he stated \"That's not how this works, I don't need your help\". This RN educated the patient that he needs help standing up because he is unsteady. He eventually let this RN help him use the urinal. He initially refused his assessment but then agreed a few minutes later. Pt reports he is in pain but is declining any pain medications at this time. Bed alarm on, call light in reach, bed in low position, Q2 hr rounding.   "

## 2019-05-01 NOTE — CARE PLAN
Problem: Safety  Goal: Will remain free from falls  Outcome: PROGRESSING AS EXPECTED    Intervention: Assess risk factors for falls  Fall risk assessment completed.  Intervention: Implement fall precautions  Bed alarm on  Q2 hr turns  Treaded slipper socks on  Desk bed not available      Problem: Venous Thromboembolism (VTW)/Deep Vein Thrombosis (DVT) Prevention:  Goal: Patient will participate in Venous Thrombosis (VTE)/Deep Vein Thrombosis (DVT)Prevention Measures  Outcome: PROGRESSING AS EXPECTED  Pt is on SQ heparin  Intervention: Encourage ambulation/mobilization at level directed by Physical Therapy in collaboration with Interdisciplinary Team  Pt ambulates up to the chair

## 2019-05-01 NOTE — DISCHARGE PLANNING
Met with PT this am regarding discharge plan and to inform him that this RNCM was contacted by his insurance they are no longer covering his hospital stay due to not meeting inpatient criteria. He stated he would like to be able to walk better so he could possibly go home. He stated he has money available to pay for either a bus pass or plain ticket. He lives in [place]. PT is aware there is a warrant for his arrest and he could be arrested by [facility] before leaving [place]. He had a barium swallow today and will possibly have kyphoplasty procedure tomorrow on the vertebral fractures in his lower back.

## 2019-05-01 NOTE — PROGRESS NOTES
Hospital Medicine Daily Progress Note    Date of Service  5/1/2019    Chief Complaint  53 y.o. male admitted 4/5/2019 with back pain, found to have L1 burst fracture.    Hospital Course    Patient admitted with L1 burst fracture, conservative bracing recommended by neurosurgeon.  Patient with difficult to control pain and trouble with therapies and difficult discharge due to social situation.      Interval Problem Update  4/13 Patient moved from telemetry to medical floor, states pain uncontrolled and requesting IV pain medication, 1 dose IV morphine ordered as needed for severe pain but I've adjusted his percocet from 5mg to 10 mg and increased robaxin and made it scheduled and increased his gabapentin for better pain control.  4/14 Patient states he had rest following morphine dose overnight but pain uncontrolled still with previous changes, will add oxycontin 20 bid as long acting pain medication to help control pain and allow patient to participate in therapies.  Rehab consult placed.  4/15 Patient up to side of bed in [facility] after working with therapy today, complains of uncontrolled pain with the changes made.  I doubt he is having response to oxycodone/oxycontin so will discontinue this and rotate opiates to morphine - MS contin and MSIR.  Physiatry consult pending for recommendations to help get patient out of the hospital.  4/23 Patient s/p cognitive evaluation and showed decline since previous evaluation.  His mentation seemed to change with the initiation of gabapentin that I started increasing on 4/13.  I will discontinue the gabapentin as it does not seem to be helping with his pain control but he is having cog deficits and dizziness - especially with dose increases.  Patient is agreeable to this.  He does not need IV morphine and pain can be controlled with PO only.  4/24 Patient seems more awake and alert today with dc of gabapentin.  He states the dizziness is still present.  Cognitive status seems to  have improved since yesterday also.  He needs continued improvement before he is safe to leave the hospital to an independent setting, no availability for placement given his criminal record and active warrants.  4/25 Patient awake but still making confusing statements, yesterday he wanted paperwork to fill out for a [employer] authorization.  He is making these statements more frequently and I've requested psychiatry to weigh in on their thoughts of his confusion.    4/26 Patient without complaints today, back pain same.  Makes confusing statements but less often.    4/27 Patient with residual pain, no increase in pain medication due to his hallucinations.  Will have psychiatry re-evaluate patient for medication recommendations, likely an antipsychotic medication to help lessen his hallucinations.  4/28 Patient continues to have disorganized thoughts and hallucinations.  I anticipate psychiatry to consult again on Monday.  No acute changes, therapies to resume on Monday.  4/29 Patient with continued decline, he is not eating nor drinking meals and states he's not hungry.  I've started IV fluids and am awaiting psychiatry re-evaluation as he is actively hallucinating.  I will also decrease his MS contin as he appears comfortable and this could also be contributing to his mental suppression.  4/30: Patient still having episodes of confusion.  I have discontinued his MS Contin and morphine since I think it is attributing to his nausea, vomiting and confusion.  I replaced it with Percocet and tramadol.  4/31: After discussing his case with radiology who determined that he would be a good candidate for kyphoplasty.  His overall mental status has improved after discontinuing the medications.  He complained about no increase in pain.    Consultants/Specialty  NSG - Pierre - s/o  Psych - s/o, re-consult    Code Status  full    Disposition  No placement available, needs improved cognitive function until safe to discharge to  his own independence.    Review of Systems  Review of Systems   Constitutional: Negative for chills and fever.   HENT: Negative for congestion and sore throat.    Eyes: Negative for blurred vision and photophobia.   Respiratory: Negative for cough and shortness of breath.    Cardiovascular: Negative for chest pain, claudication and leg swelling.   Gastrointestinal: Negative for abdominal pain, constipation, diarrhea, heartburn and vomiting.   Genitourinary: Negative for dysuria and hematuria.   Musculoskeletal: Positive for back pain and myalgias. Negative for joint pain.   Skin: Negative for itching and rash.   Neurological: Negative for dizziness, sensory change, speech change, weakness and headaches.   Psychiatric/Behavioral: Negative for depression and hallucinations. The patient is nervous/anxious. The patient does not have insomnia.         Physical Exam  Temp:  [36.2 °C (97.2 °F)-36.6 °C (97.9 °F)] 36.2 °C (97.2 °F)  Pulse:  [] 64  Resp:  [18-20] 18  BP: (109-136)/(71-89) 114/78  SpO2:  [94 %-99 %] 99 %    Physical Exam   Constitutional: He is oriented to person, place, and time. He appears well-developed and well-nourished. No distress.   HENT:   Head: Normocephalic and atraumatic.   Eyes: Conjunctivae are normal. No scleral icterus.   Neck: Neck supple. No JVD present.   Cardiovascular: Normal rate, regular rhythm and normal heart sounds.  Exam reveals no gallop and no friction rub.    No murmur heard.  Pulmonary/Chest: Effort normal and breath sounds normal. No respiratory distress. He has no wheezes. He exhibits no tenderness.   Abdominal: Soft. Bowel sounds are normal. He exhibits no distension and no mass. There is no tenderness.   Musculoskeletal: He exhibits no edema or tenderness.   Lymphadenopathy:     He has no cervical adenopathy.   Neurological: He is alert and oriented to person, place, and time. No cranial nerve deficit.   Confusing statements frequently made     Skin: Skin is warm and  dry. He is not diaphoretic. No erythema. No pallor.   Psychiatric: He has a normal mood and affect. His behavior is normal.   Patient seeing bats and butterflies in room, slightly paranoid.     Nursing note and vitals reviewed.      Fluids    Intake/Output Summary (Last 24 hours) at 05/01/19 1549  Last data filed at 05/01/19 1000   Gross per 24 hour   Intake             3369 ml   Output              650 ml   Net             2719 ml       Laboratory                        Imaging  DX-ESOPHAGUS - BARIUM SWALLOW   Final Result      Small hiatal hernia      DX-CHEST-PORTABLE (1 VIEW)   Final Result      No acute cardiac or pulmonary abnormalities are identified.      DX-CHEST-PORTABLE (1 VIEW)   Final Result         No acute cardiac or pulmonary abnormality is identified.      SC-IJVJEMU-2 VIEW   Final Result      1.  There is a nonobstructive nonspecific bowel gas pattern.  There is no evidence of free intraperitoneal air.      MR-LUMBAR SPINE-WITH & W/O   Final Result      1.  There is subacute fracture along the superior endplate of L1 vertebral body. There is no posterior retropulsion.   2.  There is no focal bone lesion.   3.  There is no evidence of infection.   4.  Mild degenerative disease as described above.      MR-BRAIN-W/O   Final Result      1.  Mild cerebral atrophy.   2.  Mild chronic microvascular ischemic disease.   3.  No acute abnormality.      DX-KNEE 2- LEFT   Final Result      1.  No fracture or dislocation of LEFT knee.   2.  Minimal degenerative changes.   3.  Diffuse vascular calcifications.      EC-ECHOCARDIOGRAM COMPLETE W/O CONT   Final Result      CT-ABDOMEN-PELVIS WITH   Final Result         1.  No acute abnormality.   2.  Hepatomegaly and diffuse hepatic steatosis   3.  Atherosclerosis and atherosclerotic coronary artery disease.   4.  Pulmonary nodules measuring up to 6.5 mm, see nodule follow-up recommendations below.      Low Risk: CT at 3-6 months, then consider CT at 18-24 months       High Risk: CT at 3-6 months, then at 18-24 months      Comments: Use most suspicious nodule as guide to management. Follow-up intervals may vary according to size and risk.      Low Risk - Minimal or absent history of smoking and of other known risk factors.      High Risk - History of smoking or of other known risk factors.      Note: These recommendations do not apply to lung cancer screening, patients with immunosuppression, or patients with known primary cancer.      Fleischner Society 2017 Guidelines for Management of Incidentally Detected Pulmonary Nodules in Adults         CT-CTA CHEST PULMONARY ARTERY W/ RECONS   Final Result         1.  No large central pulmonary embolus is appreciated, evaluation of the subsegmental branches is essentially nondiagnostic due to motion artifacts. Additional imaging would be required for definitive exclusion of small distal pulmonary emboli.      DX-LUMBAR SPINE-2 OR 3 VIEWS   Final Result      Limited examination. Mild anterior wedging L2 vertebral body that appears to be old.   If symptoms are persistent, would recommend CT for further evaluation.      DX-THORACIC SPINE-WITH SWIMMERS VIEW   Final Result      Limited examination. The upper thoracic spine is not well-demonstrated.   Mild anterior wedging of some the lower thoracic vertebral bodies and appears old.   No definite acute compression. If symptoms are persistent, CT would be recommended for further evaluation.      DX-CHEST-PORTABLE (1 VIEW)   Final Result      Central catheter projects over the superior right atrium.      Prominent calcified granuloma in the left midlung.      Atherosclerotic plaque.      CT-HEAD W/O   Final Result      No acute intracranial abnormality is identified.      IR-VERTEBROPLASTY    (Results Pending)        Assessment/Plan  * Closed stable burst fracture of first lumbar vertebra with routine healing   Assessment & Plan    Lumbar MRI found L1 subacute fracture with no  protrusion  Neurosurgery was consulted and they recommended brace placement and outpatient follow-up.  I discontinued IV morphine.  I encouraged him to use oral pain medication for pain control.  Physiatry evaluated him and recommended that is not a candidate for inpatient rehab.  Physical therapy evaluated and recommended transitional care facilities.  Currently is very difficult discharge as per physical therapy recommendation patient needs to go to skilled nursing facility but most of the skilled nursing facility has been declining him for criminal record  Cognitive difficulties more prevalent since initiation of gabapentin - dose reduced and I will discontinue it (likely the cause of his dizziness also)  Discontinue MS Contin and transition him to Percocet and tramadol  After discussing this case with radiology was determined that he is a good candidate for kyphoplasty since he is having severe pain and side effects and the pain medication.         Pancytopenia (HCC)- (present on admission)   Assessment & Plan    Most likely due to myelodysplastic syndrome  His last chemotherapy was in November 2018  No acute symptoms.  Hematology/oncology was consulted and recommended outpatient follow-up.  CBC on 4/20 showed significant improvement           Esophageal dysphagia   Assessment & Plan    Patient complains of dysphagia associated with nausea and vomiting  Try to get a barium swallow with the patient refused     Poor nutrition   Assessment & Plan    Poor oral intake past 48 hours, patient can eat, just isn't hungry  Poor urinary output - start NS at 83     Hallucinations   Assessment & Plan    Patient seeing butterflies and bats in his room  Re-consult psych for possible initiation of anti-psychotic.       Delirium   Assessment & Plan    Frequent confusing statements made by patient, unrelated to doses of pain medications  Psychiatry consultation appreciated  Discontinue Robaxin  Gabapentin  discontinued  Discontinue morphine       Left knee pain   Assessment & Plan    X ray found arthritis  Continue to provide him pain management.     Tachycardia   Assessment & Plan    Resolved  It was most likely from pain  metprolol 25 mg p.o. twice daily       Somnolence- (present on admission)   Assessment & Plan    Waxing and waning  Stopped Ambien and trazodone  Resolved, gabapentin likely contributing - discontinued       Dizziness- (present on admission)   Assessment & Plan    Likely s/e of gabapentin - discontinued  Vitals are stable  Echo did not show any acute normalities  PT/OT  Negative orthostatics vitals              VTE prophylaxis: heparin

## 2019-05-01 NOTE — PROGRESS NOTES
Pt down and back already from barium swallow.   Received report from NOC, assumed care of pt 0700.  Pt A&Ox4 but still with incoherent statements.   Central line dual lumens, both patent with + blood return, dressing CDI.  Pt denies nausea.   Pt is unsteady on feet. Bed alarm on.  Pt was medicated for pain, he says it's the same pain in his back and front of his right leg.   Assessment complete, charting in epic. All needs met at this time.

## 2019-05-02 ENCOUNTER — APPOINTMENT (OUTPATIENT)
Dept: RADIOLOGY | Facility: MEDICAL CENTER | Age: 54
DRG: 516 | End: 2019-05-02
Attending: HOSPITALIST
Payer: MEDICAID

## 2019-05-02 LAB
INR PPP: 1.21 (ref 0.87–1.13)
MAGNESIUM SERPL-MCNC: 1.5 MG/DL (ref 1.5–2.5)
PHOSPHATE SERPL-MCNC: 3.1 MG/DL (ref 2.5–4.5)
PROTHROMBIN TIME: 15.4 SEC (ref 12–14.6)

## 2019-05-02 PROCEDURE — 700111 HCHG RX REV CODE 636 W/ 250 OVERRIDE (IP): Performed by: HOSPITALIST

## 2019-05-02 PROCEDURE — 700111 HCHG RX REV CODE 636 W/ 250 OVERRIDE (IP): Performed by: INTERNAL MEDICINE

## 2019-05-02 PROCEDURE — A9270 NON-COVERED ITEM OR SERVICE: HCPCS | Performed by: INTERNAL MEDICINE

## 2019-05-02 PROCEDURE — 700102 HCHG RX REV CODE 250 W/ 637 OVERRIDE(OP): Performed by: INTERNAL MEDICINE

## 2019-05-02 PROCEDURE — 84100 ASSAY OF PHOSPHORUS: CPT

## 2019-05-02 PROCEDURE — 83735 ASSAY OF MAGNESIUM: CPT

## 2019-05-02 PROCEDURE — 700102 HCHG RX REV CODE 250 W/ 637 OVERRIDE(OP): Performed by: HOSPITALIST

## 2019-05-02 PROCEDURE — 302131 K PAD MOTOR: Performed by: INTERNAL MEDICINE

## 2019-05-02 PROCEDURE — 85610 PROTHROMBIN TIME: CPT

## 2019-05-02 PROCEDURE — 770006 HCHG ROOM/CARE - MED/SURG/GYN SEMI*

## 2019-05-02 PROCEDURE — A9270 NON-COVERED ITEM OR SERVICE: HCPCS | Performed by: HOSPITALIST

## 2019-05-02 PROCEDURE — 302152 K-PAD 12X17: Performed by: INTERNAL MEDICINE

## 2019-05-02 PROCEDURE — 99232 SBSQ HOSP IP/OBS MODERATE 35: CPT | Performed by: HOSPITALIST

## 2019-05-02 RX ORDER — CLINDAMYCIN PHOSPHATE 150 MG/ML
INJECTION, SOLUTION INTRAVENOUS
Status: COMPLETED
Start: 2019-05-02 | End: 2019-05-02

## 2019-05-02 RX ORDER — SODIUM CHLORIDE 9 MG/ML
500 INJECTION, SOLUTION INTRAVENOUS
Status: DISCONTINUED | OUTPATIENT
Start: 2019-05-02 | End: 2019-05-02 | Stop reason: HOSPADM

## 2019-05-02 RX ORDER — MIDAZOLAM HYDROCHLORIDE 1 MG/ML
.5-2 INJECTION INTRAMUSCULAR; INTRAVENOUS PRN
Status: DISCONTINUED | OUTPATIENT
Start: 2019-05-02 | End: 2019-05-02 | Stop reason: HOSPADM

## 2019-05-02 RX ORDER — LIDOCAINE 50 MG/G
2 PATCH TOPICAL EVERY 24 HOURS
Status: DISCONTINUED | OUTPATIENT
Start: 2019-05-02 | End: 2019-05-05

## 2019-05-02 RX ORDER — OXYCODONE HCL 10 MG/1
10 TABLET, FILM COATED, EXTENDED RELEASE ORAL EVERY 12 HOURS
Status: DISCONTINUED | OUTPATIENT
Start: 2019-05-02 | End: 2019-05-03

## 2019-05-02 RX ORDER — CLINDAMYCIN PHOSPHATE 900 MG/50ML
900 INJECTION, SOLUTION INTRAVENOUS ONCE
Status: COMPLETED | OUTPATIENT
Start: 2019-05-02 | End: 2019-05-02

## 2019-05-02 RX ORDER — MIDAZOLAM HYDROCHLORIDE 1 MG/ML
INJECTION INTRAMUSCULAR; INTRAVENOUS
Status: COMPLETED
Start: 2019-05-02 | End: 2019-05-02

## 2019-05-02 RX ORDER — OXYCODONE HYDROCHLORIDE 5 MG/1
5 TABLET ORAL ONCE
Status: COMPLETED | OUTPATIENT
Start: 2019-05-02 | End: 2019-05-02

## 2019-05-02 RX ORDER — MORPHINE SULFATE 4 MG/ML
1 INJECTION, SOLUTION INTRAMUSCULAR; INTRAVENOUS ONCE
Status: COMPLETED | OUTPATIENT
Start: 2019-05-02 | End: 2019-05-02

## 2019-05-02 RX ORDER — ONDANSETRON 2 MG/ML
4 INJECTION INTRAMUSCULAR; INTRAVENOUS PRN
Status: DISCONTINUED | OUTPATIENT
Start: 2019-05-02 | End: 2019-05-02 | Stop reason: HOSPADM

## 2019-05-02 RX ORDER — ZOLPIDEM TARTRATE 5 MG/1
5 TABLET ORAL NIGHTLY PRN
Status: DISCONTINUED | OUTPATIENT
Start: 2019-05-02 | End: 2019-05-05

## 2019-05-02 RX ADMIN — ACETAMINOPHEN 650 MG: 325 TABLET, FILM COATED ORAL at 20:26

## 2019-05-02 RX ADMIN — OMEPRAZOLE 20 MG: 20 CAPSULE, DELAYED RELEASE ORAL at 17:57

## 2019-05-02 RX ADMIN — HEPARIN SODIUM 5000 UNITS: 5000 INJECTION, SOLUTION INTRAVENOUS; SUBCUTANEOUS at 20:27

## 2019-05-02 RX ADMIN — TRAMADOL HYDROCHLORIDE 50 MG: 50 TABLET, FILM COATED ORAL at 17:57

## 2019-05-02 RX ADMIN — OXYCODONE HYDROCHLORIDE AND ACETAMINOPHEN 1 TABLET: 5; 325 TABLET ORAL at 07:25

## 2019-05-02 RX ADMIN — ONDANSETRON 4 MG: 4 TABLET, ORALLY DISINTEGRATING ORAL at 20:26

## 2019-05-02 RX ADMIN — OXYCODONE HYDROCHLORIDE 10 MG: 10 TABLET, FILM COATED, EXTENDED RELEASE ORAL at 17:57

## 2019-05-02 RX ADMIN — OXYCODONE HYDROCHLORIDE 5 MG: 5 TABLET ORAL at 20:26

## 2019-05-02 RX ADMIN — TRAMADOL HYDROCHLORIDE 50 MG: 50 TABLET, FILM COATED ORAL at 05:26

## 2019-05-02 RX ADMIN — SENNOSIDES,DOCUSATE SODIUM 2 TABLET: 8.6; 5 TABLET, FILM COATED ORAL at 05:26

## 2019-05-02 RX ADMIN — ZOLPIDEM TARTRATE 5 MG: 5 TABLET ORAL at 20:27

## 2019-05-02 RX ADMIN — OXYCODONE HYDROCHLORIDE AND ACETAMINOPHEN 1 TABLET: 5; 325 TABLET ORAL at 20:26

## 2019-05-02 RX ADMIN — MORPHINE SULFATE 1 MG: 4 INJECTION INTRAVENOUS at 14:43

## 2019-05-02 RX ADMIN — AMITRIPTYLINE HYDROCHLORIDE 75 MG: 50 TABLET, FILM COATED ORAL at 21:13

## 2019-05-02 RX ADMIN — OMEPRAZOLE 20 MG: 20 CAPSULE, DELAYED RELEASE ORAL at 05:26

## 2019-05-02 RX ADMIN — METOPROLOL TARTRATE 25 MG: 25 TABLET, FILM COATED ORAL at 17:57

## 2019-05-02 RX ADMIN — BUSPIRONE HYDROCHLORIDE 15 MG: 30 TABLET ORAL at 05:26

## 2019-05-02 RX ADMIN — BUSPIRONE HYDROCHLORIDE 15 MG: 30 TABLET ORAL at 17:56

## 2019-05-02 RX ADMIN — OXYCODONE HYDROCHLORIDE AND ACETAMINOPHEN 1 TABLET: 5; 325 TABLET ORAL at 01:26

## 2019-05-02 RX ADMIN — METOPROLOL TARTRATE 25 MG: 25 TABLET, FILM COATED ORAL at 05:26

## 2019-05-02 ASSESSMENT — ENCOUNTER SYMPTOMS
DIZZINESS: 0
MYALGIAS: 1
ABDOMINAL PAIN: 0
CLAUDICATION: 0
SENSORY CHANGE: 0
BLURRED VISION: 0
DEPRESSION: 0
SPEECH CHANGE: 0
SHORTNESS OF BREATH: 0
HALLUCINATIONS: 0
CONSTIPATION: 0
VOMITING: 0
COUGH: 0
HEADACHES: 0
DIARRHEA: 0
PHOTOPHOBIA: 0
NERVOUS/ANXIOUS: 1
FEVER: 0
INSOMNIA: 0
BACK PAIN: 1
HEARTBURN: 0
WEAKNESS: 0
CHILLS: 0
SORE THROAT: 0

## 2019-05-02 NOTE — PROGRESS NOTES
"Assumed patient care at 1900. Received Bedside report. Patient lying in bed, alert and oriented x 4 though seems confused at times. Discussed POC. No s/s of distress. Patient denies any further questions or concerns. Call light in reach, fall precautions in place. Continue hourly rounding. /85   Pulse 94   Temp 36.6 °C (97.9 °F) (Temporal)   Resp 18   Ht 1.803 m (5' 11\")   Wt 94.8 kg (208 lb 15.9 oz)   SpO2 97%   BMI 29.15 kg/m²   "

## 2019-05-02 NOTE — CARE PLAN
Problem: Pain Management  Goal: Pain level will decrease to patient's comfort goal  Outcome: PROGRESSING SLOWER THAN EXPECTED  Patient continues to report pain after medication. Gave PRN percocet and tylenol. Educated on nonpharm pain relieving techniques. Offered hot/col packs and assisted in repositioning.     Problem: Knowledge Deficit  Goal: Knowledge of disease process/condition, treatment plan, diagnostic tests, and medications will improve  Outcome: PROGRESSING AS EXPECTED  Educate on NPO status at midnight for surgery tomorrow. Patient verbalized understanding.

## 2019-05-02 NOTE — CARE PLAN
Problem: Infection  Goal: Will remain free from infection  Outcome: PROGRESSING AS EXPECTED  Educated patient on proper handwashing techniques with soap and water. Explained the importance of washing hands after using the restroom, before and after eating. Hand  is available on the walls of the rooms for use when hands are not visibly soiled.

## 2019-05-02 NOTE — PROGRESS NOTES
"Assumed care of patient at 0700 . Received report from RN. Patient is AOX 4. Assessment complete. Labs reviewed.Patient and RN discussed plan of care. Patient questions answered. Patient needs are met at this time. Bed in lowest and locked position. Upper bed rails up.  Call light is within reach. Hourly rounding in place. /66   Pulse 77   Temp 36.7 °C (98 °F) (Temporal)   Resp 18   Ht 1.803 m (5' 11\")   Wt 94.6 kg (208 lb 8.9 oz)   SpO2 100%   BMI 29.09 kg/m²     "

## 2019-05-02 NOTE — PROGRESS NOTES
· 2 RN skin check complete.   · Devices in place CVC DOUBLE LUMEN  · Skin assessed under devices yes  · Confirmed pressure ulcers found on 0.  · The following interventions are in place bed in lowest and locked position, call light within reach.

## 2019-05-02 NOTE — PROGRESS NOTES
Pt refuses L1 vertebroplasty with moderate sedation at this time. Per Dr. Yoon, to be rescheduled with anesthesia sometime early next week. CARLOS ALBERTO Maradiaga updated.

## 2019-05-03 PROCEDURE — 700102 HCHG RX REV CODE 250 W/ 637 OVERRIDE(OP): Performed by: INTERNAL MEDICINE

## 2019-05-03 PROCEDURE — A9270 NON-COVERED ITEM OR SERVICE: HCPCS | Performed by: INTERNAL MEDICINE

## 2019-05-03 PROCEDURE — 700111 HCHG RX REV CODE 636 W/ 250 OVERRIDE (IP): Performed by: INTERNAL MEDICINE

## 2019-05-03 PROCEDURE — A9270 NON-COVERED ITEM OR SERVICE: HCPCS | Performed by: FAMILY MEDICINE

## 2019-05-03 PROCEDURE — 99232 SBSQ HOSP IP/OBS MODERATE 35: CPT | Performed by: FAMILY MEDICINE

## 2019-05-03 PROCEDURE — 700102 HCHG RX REV CODE 250 W/ 637 OVERRIDE(OP): Performed by: FAMILY MEDICINE

## 2019-05-03 PROCEDURE — A9270 NON-COVERED ITEM OR SERVICE: HCPCS | Performed by: HOSPITALIST

## 2019-05-03 PROCEDURE — 770006 HCHG ROOM/CARE - MED/SURG/GYN SEMI*

## 2019-05-03 PROCEDURE — 700102 HCHG RX REV CODE 250 W/ 637 OVERRIDE(OP): Performed by: HOSPITALIST

## 2019-05-03 PROCEDURE — 700105 HCHG RX REV CODE 258: Performed by: INTERNAL MEDICINE

## 2019-05-03 PROCEDURE — 700101 HCHG RX REV CODE 250: Performed by: INTERNAL MEDICINE

## 2019-05-03 RX ORDER — MORPHINE SULFATE 15 MG/1
15 TABLET, FILM COATED, EXTENDED RELEASE ORAL EVERY 12 HOURS
Status: DISCONTINUED | OUTPATIENT
Start: 2019-05-03 | End: 2019-05-04

## 2019-05-03 RX ORDER — AMITRIPTYLINE HYDROCHLORIDE 75 MG/1
75 TABLET ORAL ONCE
Status: COMPLETED | OUTPATIENT
Start: 2019-05-03 | End: 2019-05-03

## 2019-05-03 RX ORDER — GABAPENTIN 300 MG/1
300 CAPSULE ORAL 3 TIMES DAILY
Status: DISCONTINUED | OUTPATIENT
Start: 2019-05-03 | End: 2019-05-04

## 2019-05-03 RX ORDER — MORPHINE SULFATE 15 MG/1
15 TABLET ORAL EVERY 4 HOURS PRN
Status: DISCONTINUED | OUTPATIENT
Start: 2019-05-03 | End: 2019-05-05

## 2019-05-03 RX ADMIN — ZOLPIDEM TARTRATE 5 MG: 5 TABLET ORAL at 20:59

## 2019-05-03 RX ADMIN — HEPARIN SODIUM 5000 UNITS: 5000 INJECTION, SOLUTION INTRAVENOUS; SUBCUTANEOUS at 20:59

## 2019-05-03 RX ADMIN — OXYCODONE HYDROCHLORIDE AND ACETAMINOPHEN 1 TABLET: 5; 325 TABLET ORAL at 10:23

## 2019-05-03 RX ADMIN — LIDOCAINE 2 PATCH: 50 PATCH TOPICAL at 00:41

## 2019-05-03 RX ADMIN — TRAMADOL HYDROCHLORIDE 50 MG: 50 TABLET, FILM COATED ORAL at 05:44

## 2019-05-03 RX ADMIN — HEPARIN SODIUM 5000 UNITS: 5000 INJECTION, SOLUTION INTRAVENOUS; SUBCUTANEOUS at 05:45

## 2019-05-03 RX ADMIN — ONDANSETRON 4 MG: 2 SOLUTION INTRAMUSCULAR; INTRAVENOUS at 20:58

## 2019-05-03 RX ADMIN — OXYCODONE HYDROCHLORIDE 10 MG: 10 TABLET, FILM COATED, EXTENDED RELEASE ORAL at 05:45

## 2019-05-03 RX ADMIN — OXYCODONE HYDROCHLORIDE AND ACETAMINOPHEN 1 TABLET: 5; 325 TABLET ORAL at 00:41

## 2019-05-03 RX ADMIN — ACETAMINOPHEN 650 MG: 325 TABLET, FILM COATED ORAL at 12:24

## 2019-05-03 RX ADMIN — SODIUM CHLORIDE: 9 INJECTION, SOLUTION INTRAVENOUS at 05:45

## 2019-05-03 RX ADMIN — MORPHINE SULFATE 15 MG: 15 TABLET, EXTENDED RELEASE ORAL at 18:44

## 2019-05-03 RX ADMIN — METOPROLOL TARTRATE 25 MG: 25 TABLET, FILM COATED ORAL at 05:44

## 2019-05-03 RX ADMIN — GABAPENTIN 300 MG: 300 CAPSULE ORAL at 12:24

## 2019-05-03 RX ADMIN — MORPHINE SULFATE 15 MG: 15 TABLET ORAL at 12:25

## 2019-05-03 RX ADMIN — BUSPIRONE HYDROCHLORIDE 15 MG: 30 TABLET ORAL at 18:41

## 2019-05-03 RX ADMIN — GABAPENTIN 300 MG: 300 CAPSULE ORAL at 18:44

## 2019-05-03 RX ADMIN — HEPARIN SODIUM 5000 UNITS: 5000 INJECTION, SOLUTION INTRAVENOUS; SUBCUTANEOUS at 12:25

## 2019-05-03 RX ADMIN — ONDANSETRON 4 MG: 2 SOLUTION INTRAMUSCULAR; INTRAVENOUS at 10:23

## 2019-05-03 RX ADMIN — MORPHINE SULFATE 15 MG: 15 TABLET ORAL at 20:59

## 2019-05-03 RX ADMIN — OMEPRAZOLE 20 MG: 20 CAPSULE, DELAYED RELEASE ORAL at 18:44

## 2019-05-03 RX ADMIN — OMEPRAZOLE 20 MG: 20 CAPSULE, DELAYED RELEASE ORAL at 05:44

## 2019-05-03 RX ADMIN — BUSPIRONE HYDROCHLORIDE 15 MG: 30 TABLET ORAL at 05:44

## 2019-05-03 RX ADMIN — AMITRIPTYLINE HYDROCHLORIDE 75 MG: 75 TABLET, FILM COATED ORAL at 22:30

## 2019-05-03 ASSESSMENT — ENCOUNTER SYMPTOMS
NAUSEA: 0
SHORTNESS OF BREATH: 0
ABDOMINAL PAIN: 0
WEAKNESS: 0
DIZZINESS: 0
COUGH: 0
BACK PAIN: 1
HEMOPTYSIS: 0
MYALGIAS: 1
HEADACHES: 0
HEARTBURN: 0
DEPRESSION: 0
SENSORY CHANGE: 0
PALPITATIONS: 0
FEVER: 0
NERVOUS/ANXIOUS: 1
INSOMNIA: 0
HALLUCINATIONS: 0
CHILLS: 0
VOMITING: 0
TINGLING: 0
DOUBLE VISION: 0
BLURRED VISION: 0
PHOTOPHOBIA: 0

## 2019-05-03 NOTE — DIETARY
Nutrition services: day 27 of admit. 54 yo male admitted with hypokalemia, dehydration, nausea and vomiting.  Follow up for adequacy of nutritional intake.    Evaluation:  1. Pt continues on a regular diet with documented intake of most meals 25-50% and refusing other meals. Pt is also receiving Magic sup supplements BID and high protein milkshakes BID.  2. Weight has increased over past week from 91.4 kg to 95 kg. Question accuracy of weights as there are large increases and decreases in several 24 hour periods. Pt would benefit from stand up scale weights if possible.   3. RN reports pt is having issues with reflux, Prilosec on MAR.    Malnutrition risk:     Recommendations/Plan:  1. encourage intake of meals and supplements  2. document intake of all meals and supplements as % taken in ADL's to provide interdisciplinary communication across all shifts   3. monitor daily weights - st and up scale or sling scale weights to verify accuracy of bed scale.   4. Nutrition rep will continue to see patient for ongoing meal and snack preferences.

## 2019-05-03 NOTE — PROGRESS NOTES
Hospital Medicine Daily Progress Note    Date of Service  5/3/2019    Chief Complaint  53 y.o. male admitted 4/5/2019 with back pain, found to have L1 burst fracture.    Hospital Course    Patient admitted with L1 burst fracture, conservative bracing recommended by neurosurgeon.  Patient with difficult to control pain and trouble with therapies and difficult discharge due to social situation.      Interval Problem Update  4/13 Patient moved from telemetry to medical floor, states pain uncontrolled and requesting IV pain medication, 1 dose IV morphine ordered as needed for severe pain but I've adjusted his percocet from 5mg to 10 mg and increased robaxin and made it scheduled and increased his gabapentin for better pain control.  4/14 Patient states he had rest following morphine dose overnight but pain uncontrolled still with previous changes, will add oxycontin 20 bid as long acting pain medication to help control pain and allow patient to participate in therapies.  Rehab consult placed.  4/15 Patient up to side of bed in [facility] after working with therapy today, complains of uncontrolled pain with the changes made.  I doubt he is having response to oxycodone/oxycontin so will discontinue this and rotate opiates to morphine - MS contin and MSIR.  Physiatry consult pending for recommendations to help get patient out of the hospital.  4/23 Patient s/p cognitive evaluation and showed decline since previous evaluation.  His mentation seemed to change with the initiation of gabapentin that I started increasing on 4/13.  I will discontinue the gabapentin as it does not seem to be helping with his pain control but he is having cog deficits and dizziness - especially with dose increases.  Patient is agreeable to this.  He does not need IV morphine and pain can be controlled with PO only.  4/24 Patient seems more awake and alert today with dc of gabapentin.  He states the dizziness is still present.  Cognitive status seems to  have improved since yesterday also.  He needs continued improvement before he is safe to leave the hospital to an independent setting, no availability for placement given his criminal record and active warrants.  4/25 Patient awake but still making confusing statements, yesterday he wanted paperwork to fill out for a [employer] authorization.  He is making these statements more frequently and I've requested psychiatry to weigh in on their thoughts of his confusion.    4/26 Patient without complaints today, back pain same.  Makes confusing statements but less often.    4/27 Patient with residual pain, no increase in pain medication due to his hallucinations.  Will have psychiatry re-evaluate patient for medication recommendations, likely an antipsychotic medication to help lessen his hallucinations.  4/28 Patient continues to have disorganized thoughts and hallucinations.  I anticipate psychiatry to consult again on Monday.  No acute changes, therapies to resume on Monday.  4/29 Patient with continued decline, he is not eating nor drinking meals and states he's not hungry.  I've started IV fluids and am awaiting psychiatry re-evaluation as he is actively hallucinating.  I will also decrease his MS contin as he appears comfortable and this could also be contributing to his mental suppression.  4/30: Patient still having episodes of confusion.  I have discontinued his MS Contin and morphine since I think it is attributing to his nausea, vomiting and confusion.  I replaced it with Percocet and tramadol.  5/1: After discussing his case with radiology who determined that he would be a good candidate for kyphoplasty.  His overall mental status has improved after discontinuing the medications.  He complained about no increase in pain.  5/2: Patient seen and examined by me he was having increased amounts of pain.  I restarted long-acting oxycodone to see if his pain was resolved.  The kyphoplasty was unable to be completed  since the patient required general anesthesia.  Patient is a difficult discharge and will need to be walking before we can send him home.  5/3: Resting comfortably in bed. Asking for more pain medicine however did not look in pain or distress. No issues overnight per staff.     Consultants/Specialty  [facility] - s/o  Psych - s/o, re-consult    Code Status  full    Disposition  No placement available, needs improved cognitive function until safe to discharge to his own independence.    Review of Systems  Review of Systems   Constitutional: Negative for chills and fever.   HENT: Negative for hearing loss and tinnitus.    Eyes: Negative for blurred vision, double vision and photophobia.   Respiratory: Negative for cough, hemoptysis and shortness of breath.    Cardiovascular: Negative for chest pain and palpitations.   Gastrointestinal: Negative for abdominal pain, heartburn, nausea and vomiting.   Genitourinary: Negative for dysuria and urgency.   Musculoskeletal: Positive for back pain and myalgias. Negative for joint pain.   Skin: Negative for itching and rash.   Neurological: Negative for dizziness, tingling, sensory change, weakness and headaches.   Psychiatric/Behavioral: Negative for depression, hallucinations and suicidal ideas. The patient is nervous/anxious. The patient does not have insomnia.         Physical Exam  Temp:  [36.5 °C (97.7 °F)-36.9 °C (98.4 °F)] 36.6 °C (97.9 °F)  Pulse:  [71-91] 71  Resp:  [16-28] 18  BP: (115-140)/(72-80) 124/72  SpO2:  [97 %-100 %] 97 %    Physical Exam   Constitutional: He is oriented to person, place, and time. He appears well-developed and well-nourished. No distress.   HENT:   Head: Normocephalic and atraumatic.   Eyes: Conjunctivae are normal. No scleral icterus.   Neck: Neck supple. No tracheal deviation present. No thyromegaly present.   Cardiovascular: Normal rate, regular rhythm and normal heart sounds.  Exam reveals no gallop and no friction rub.     Pulmonary/Chest: Effort normal. No respiratory distress. He has no wheezes.   Abdominal: Soft. Bowel sounds are normal. He exhibits no distension. There is no tenderness.   Musculoskeletal: He exhibits no tenderness or deformity.   Mild to moderate thoracolumbar kyphoscoliosis.    Neurological: He is alert and oriented to person, place, and time. No cranial nerve deficit.   Confusing statements frequently made     Skin: Skin is warm and dry. He is not diaphoretic. No erythema.   Psychiatric: He has a normal mood and affect. His behavior is normal.   Patient seeing bats and butterflies in room, slightly paranoid.     Nursing note and vitals reviewed.      Fluids    Intake/Output Summary (Last 24 hours) at 05/03/19 1419  Last data filed at 05/03/19 1400   Gross per 24 hour   Intake              480 ml   Output              500 ml   Net              -20 ml       Laboratory          Recent Labs      05/02/19   1309   INR  1.21*               Imaging  DX-ESOPHAGUS - BARIUM SWALLOW   Final Result      Small hiatal hernia      DX-CHEST-PORTABLE (1 VIEW)   Final Result      No acute cardiac or pulmonary abnormalities are identified.      DX-CHEST-PORTABLE (1 VIEW)   Final Result         No acute cardiac or pulmonary abnormality is identified.      EM-PLWEQXY-0 VIEW   Final Result      1.  There is a nonobstructive nonspecific bowel gas pattern.  There is no evidence of free intraperitoneal air.      MR-LUMBAR SPINE-WITH & W/O   Final Result      1.  There is subacute fracture along the superior endplate of L1 vertebral body. There is no posterior retropulsion.   2.  There is no focal bone lesion.   3.  There is no evidence of infection.   4.  Mild degenerative disease as described above.      MR-BRAIN-W/O   Final Result      1.  Mild cerebral atrophy.   2.  Mild chronic microvascular ischemic disease.   3.  No acute abnormality.      DX-KNEE 2- LEFT   Final Result      1.  No fracture or dislocation of LEFT knee.   2.   Minimal degenerative changes.   3.  Diffuse vascular calcifications.      EC-ECHOCARDIOGRAM COMPLETE W/O CONT   Final Result      CT-ABDOMEN-PELVIS WITH   Final Result         1.  No acute abnormality.   2.  Hepatomegaly and diffuse hepatic steatosis   3.  Atherosclerosis and atherosclerotic coronary artery disease.   4.  Pulmonary nodules measuring up to 6.5 mm, see nodule follow-up recommendations below.      Low Risk: CT at 3-6 months, then consider CT at 18-24 months      High Risk: CT at 3-6 months, then at 18-24 months      Comments: Use most suspicious nodule as guide to management. Follow-up intervals may vary according to size and risk.      Low Risk - Minimal or absent history of smoking and of other known risk factors.      High Risk - History of smoking or of other known risk factors.      Note: These recommendations do not apply to lung cancer screening, patients with immunosuppression, or patients with known primary cancer.      Fleischner Society 2017 Guidelines for Management of Incidentally Detected Pulmonary Nodules in Adults         CT-CTA CHEST PULMONARY ARTERY W/ RECONS   Final Result         1.  No large central pulmonary embolus is appreciated, evaluation of the subsegmental branches is essentially nondiagnostic due to motion artifacts. Additional imaging would be required for definitive exclusion of small distal pulmonary emboli.      DX-LUMBAR SPINE-2 OR 3 VIEWS   Final Result      Limited examination. Mild anterior wedging L2 vertebral body that appears to be old.   If symptoms are persistent, would recommend CT for further evaluation.      DX-THORACIC SPINE-WITH SWIMMERS VIEW   Final Result      Limited examination. The upper thoracic spine is not well-demonstrated.   Mild anterior wedging of some the lower thoracic vertebral bodies and appears old.   No definite acute compression. If symptoms are persistent, CT would be recommended for further evaluation.      DX-CHEST-PORTABLE (1 VIEW)    Final Result      Central catheter projects over the superior right atrium.      Prominent calcified granuloma in the left midlung.      Atherosclerotic plaque.      CT-HEAD W/O   Final Result      No acute intracranial abnormality is identified.      IR-VERTEBROPLASTY    (Results Pending)        Assessment/Plan  * Closed stable burst fracture of first lumbar vertebra with routine healing   Assessment & Plan    Lumbar MRI found L1 subacute fracture with no protrusion  Neurosurgery was consulted and they recommended brace placement and outpatient follow-up.  I discontinued IV morphine.  I encouraged him to use oral pain medication for pain control.  Physiatry evaluated him and recommended that is not a candidate for inpatient rehab.  Physical therapy evaluated and recommended transitional care facilities.  Currently is very difficult discharge as per physical therapy recommendation patient needs to go to skilled nursing facility but most of the skilled nursing facility has been declining him for criminal record  Cognitive difficulties more prevalent since initiation of gabapentin - dose reduced and I will discontinue it (likely the cause of his dizziness also)  Discontinue MS Contin and transition him to Percocet and tramadol  Kyphoplasty unable to be completed today since patient required general anesthesia and would likely be completed on Monday  Started the patient on long-acting oxycodone  5/3: Scheduled for kyphoplasty under full anesthesia on Monday.       Pancytopenia (HCC)- (present on admission)   Assessment & Plan    Most likely due to myelodysplastic syndrome  His last chemotherapy was in November 2018  No acute symptoms.  Hematology/oncology was consulted and recommended outpatient follow-up.  CBC on 4/20 showed significant improvement           Esophageal dysphagia   Assessment & Plan    Patient complains of dysphagia associated with nausea and vomiting  Patient refused further evaluation including  barium swallow study.     Poor nutrition   Assessment & Plan    Poor oral intake past 48 hours, patient can eat, just isn't hungry  Poor urinary output - start NS at 83     Hallucinations   Assessment & Plan    Patient seeing butterflies and bats in his room  Re-consult psych for possible initiation of anti-psychotic.       Delirium   Assessment & Plan    Frequent confusing statements made by patient, unrelated to doses of pain medications  Psychiatry consultation appreciated  Discontinue Robaxin  Gabapentin discontinued  Discontinue morphine       Left knee pain   Assessment & Plan    X ray found arthritis  Continue to provide him pain management.     Tachycardia   Assessment & Plan    Resolved  It was most likely from pain  metprolol 25 mg p.o. twice daily       Somnolence- (present on admission)   Assessment & Plan    Waxing and waning  Stopped Ambien and trazodone  Resolved, gabapentin likely contributing - discontinued       Dizziness- (present on admission)   Assessment & Plan    Likely s/e of gabapentin - discontinued  Vitals are stable  Echo did not show any acute normalities  PT/OT  Negative orthostatics vitals         Plan of care discussed with multidisciplinary team during rounds.    VTE prophylaxis: heparin

## 2019-05-03 NOTE — PROGRESS NOTES
Hospital Medicine Daily Progress Note    Date of Service  5/2/2019    Chief Complaint  53 y.o. male admitted 4/5/2019 with back pain, found to have L1 burst fracture.    Hospital Course    Patient admitted with L1 burst fracture, conservative bracing recommended by neurosurgeon.  Patient with difficult to control pain and trouble with therapies and difficult discharge due to social situation.      Interval Problem Update  4/13 Patient moved from telemetry to medical floor, states pain uncontrolled and requesting IV pain medication, 1 dose IV morphine ordered as needed for severe pain but I've adjusted his percocet from 5mg to 10 mg and increased robaxin and made it scheduled and increased his gabapentin for better pain control.  4/14 Patient states he had rest following morphine dose overnight but pain uncontrolled still with previous changes, will add oxycontin 20 bid as long acting pain medication to help control pain and allow patient to participate in therapies.  Rehab consult placed.  4/15 Patient up to side of bed in [facility] after working with therapy today, complains of uncontrolled pain with the changes made.  I doubt he is having response to oxycodone/oxycontin so will discontinue this and rotate opiates to morphine - MS contin and MSIR.  Physiatry consult pending for recommendations to help get patient out of the hospital.  4/23 Patient s/p cognitive evaluation and showed decline since previous evaluation.  His mentation seemed to change with the initiation of gabapentin that I started increasing on 4/13.  I will discontinue the gabapentin as it does not seem to be helping with his pain control but he is having cog deficits and dizziness - especially with dose increases.  Patient is agreeable to this.  He does not need IV morphine and pain can be controlled with PO only.  4/24 Patient seems more awake and alert today with dc of gabapentin.  He states the dizziness is still present.  Cognitive status seems to  have improved since yesterday also.  He needs continued improvement before he is safe to leave the hospital to an independent setting, no availability for placement given his criminal record and active warrants.  4/25 Patient awake but still making confusing statements, yesterday he wanted paperwork to fill out for a [employer] authorization.  He is making these statements more frequently and I've requested psychiatry to weigh in on their thoughts of his confusion.    4/26 Patient without complaints today, back pain same.  Makes confusing statements but less often.    4/27 Patient with residual pain, no increase in pain medication due to his hallucinations.  Will have psychiatry re-evaluate patient for medication recommendations, likely an antipsychotic medication to help lessen his hallucinations.  4/28 Patient continues to have disorganized thoughts and hallucinations.  I anticipate psychiatry to consult again on Monday.  No acute changes, therapies to resume on Monday.  4/29 Patient with continued decline, he is not eating nor drinking meals and states he's not hungry.  I've started IV fluids and am awaiting psychiatry re-evaluation as he is actively hallucinating.  I will also decrease his MS contin as he appears comfortable and this could also be contributing to his mental suppression.  4/30: Patient still having episodes of confusion.  I have discontinued his MS Contin and morphine since I think it is attributing to his nausea, vomiting and confusion.  I replaced it with Percocet and tramadol.  5/1: After discussing his case with radiology who determined that he would be a good candidate for kyphoplasty.  His overall mental status has improved after discontinuing the medications.  He complained about no increase in pain.  5/2: Patient seen and examined by me he was having increased amounts of pain.  I restarted long-acting oxycodone to see if his pain was resolved.  The kyphoplasty was unable to be completed  since the patient required general anesthesia.  Patient is a difficult discharge and will need to be walking before we can send him home.      Consultants/Specialty  NSG - Pierre - s/o  Psych - s/o, re-consult    Code Status  full    Disposition  No placement available, needs improved cognitive function until safe to discharge to his own independence.    Review of Systems  Review of Systems   Constitutional: Negative for chills and fever.   HENT: Negative for congestion and sore throat.    Eyes: Negative for blurred vision and photophobia.   Respiratory: Negative for cough and shortness of breath.    Cardiovascular: Negative for chest pain, claudication and leg swelling.   Gastrointestinal: Negative for abdominal pain, constipation, diarrhea, heartburn and vomiting.   Genitourinary: Negative for dysuria and hematuria.   Musculoskeletal: Positive for back pain and myalgias. Negative for joint pain.   Skin: Negative for itching and rash.   Neurological: Negative for dizziness, sensory change, speech change, weakness and headaches.   Psychiatric/Behavioral: Negative for depression and hallucinations. The patient is nervous/anxious. The patient does not have insomnia.         Physical Exam  Temp:  [36.6 °C (97.9 °F)-37.2 °C (99 °F)] 36.7 °C (98 °F)  Pulse:  [77-94] 91  Resp:  [16-28] 21  BP: (121-149)/(66-85) 121/66  SpO2:  [97 %-100 %] 100 %    Physical Exam   Constitutional: He is oriented to person, place, and time. He appears well-developed and well-nourished. No distress.   HENT:   Head: Normocephalic and atraumatic.   Eyes: Conjunctivae are normal. No scleral icterus.   Neck: Neck supple. No JVD present.   Cardiovascular: Normal rate, regular rhythm and normal heart sounds.  Exam reveals no gallop and no friction rub.    No murmur heard.  Pulmonary/Chest: Effort normal and breath sounds normal. No respiratory distress. He has no wheezes. He exhibits no tenderness.   Abdominal: Soft. Bowel sounds are normal. He  exhibits no distension and no mass. There is no tenderness.   Musculoskeletal: He exhibits no edema or tenderness.   Lymphadenopathy:     He has no cervical adenopathy.   Neurological: He is alert and oriented to person, place, and time. No cranial nerve deficit.   Confusing statements frequently made     Skin: Skin is warm and dry. He is not diaphoretic. No erythema. No pallor.   Psychiatric: He has a normal mood and affect. His behavior is normal.   Patient seeing bats and butterflies in room, slightly paranoid.     Nursing note and vitals reviewed.      Fluids    Intake/Output Summary (Last 24 hours) at 05/02/19 1908  Last data filed at 05/02/19 1345   Gross per 24 hour   Intake                0 ml   Output              850 ml   Net             -850 ml       Laboratory          Recent Labs      05/02/19   1309   INR  1.21*               Imaging  DX-ESOPHAGUS - BARIUM SWALLOW   Final Result      Small hiatal hernia      DX-CHEST-PORTABLE (1 VIEW)   Final Result      No acute cardiac or pulmonary abnormalities are identified.      DX-CHEST-PORTABLE (1 VIEW)   Final Result         No acute cardiac or pulmonary abnormality is identified.      US-MOFXSZB-5 VIEW   Final Result      1.  There is a nonobstructive nonspecific bowel gas pattern.  There is no evidence of free intraperitoneal air.      MR-LUMBAR SPINE-WITH & W/O   Final Result      1.  There is subacute fracture along the superior endplate of L1 vertebral body. There is no posterior retropulsion.   2.  There is no focal bone lesion.   3.  There is no evidence of infection.   4.  Mild degenerative disease as described above.      MR-BRAIN-W/O   Final Result      1.  Mild cerebral atrophy.   2.  Mild chronic microvascular ischemic disease.   3.  No acute abnormality.      DX-KNEE 2- LEFT   Final Result      1.  No fracture or dislocation of LEFT knee.   2.  Minimal degenerative changes.   3.  Diffuse vascular calcifications.      EC-ECHOCARDIOGRAM COMPLETE W/O  CONT   Final Result      CT-ABDOMEN-PELVIS WITH   Final Result         1.  No acute abnormality.   2.  Hepatomegaly and diffuse hepatic steatosis   3.  Atherosclerosis and atherosclerotic coronary artery disease.   4.  Pulmonary nodules measuring up to 6.5 mm, see nodule follow-up recommendations below.      Low Risk: CT at 3-6 months, then consider CT at 18-24 months      High Risk: CT at 3-6 months, then at 18-24 months      Comments: Use most suspicious nodule as guide to management. Follow-up intervals may vary according to size and risk.      Low Risk - Minimal or absent history of smoking and of other known risk factors.      High Risk - History of smoking or of other known risk factors.      Note: These recommendations do not apply to lung cancer screening, patients with immunosuppression, or patients with known primary cancer.      Fleischner Society 2017 Guidelines for Management of Incidentally Detected Pulmonary Nodules in Adults         CT-CTA CHEST PULMONARY ARTERY W/ RECONS   Final Result         1.  No large central pulmonary embolus is appreciated, evaluation of the subsegmental branches is essentially nondiagnostic due to motion artifacts. Additional imaging would be required for definitive exclusion of small distal pulmonary emboli.      DX-LUMBAR SPINE-2 OR 3 VIEWS   Final Result      Limited examination. Mild anterior wedging L2 vertebral body that appears to be old.   If symptoms are persistent, would recommend CT for further evaluation.      DX-THORACIC SPINE-WITH SWIMMERS VIEW   Final Result      Limited examination. The upper thoracic spine is not well-demonstrated.   Mild anterior wedging of some the lower thoracic vertebral bodies and appears old.   No definite acute compression. If symptoms are persistent, CT would be recommended for further evaluation.      DX-CHEST-PORTABLE (1 VIEW)   Final Result      Central catheter projects over the superior right atrium.      Prominent calcified  granuloma in the left midlung.      Atherosclerotic plaque.      CT-HEAD W/O   Final Result      No acute intracranial abnormality is identified.      IR-VERTEBROPLASTY    (Results Pending)        Assessment/Plan  * Closed stable burst fracture of first lumbar vertebra with routine healing   Assessment & Plan    Lumbar MRI found L1 subacute fracture with no protrusion  Neurosurgery was consulted and they recommended brace placement and outpatient follow-up.  I discontinued IV morphine.  I encouraged him to use oral pain medication for pain control.  Physiatry evaluated him and recommended that is not a candidate for inpatient rehab.  Physical therapy evaluated and recommended transitional care facilities.  Currently is very difficult discharge as per physical therapy recommendation patient needs to go to skilled nursing facility but most of the skilled nursing facility has been declining him for criminal record  Cognitive difficulties more prevalent since initiation of gabapentin - dose reduced and I will discontinue it (likely the cause of his dizziness also)  Discontinue MS Contin and transition him to Percocet and tramadol  Kyphoplasty unable to be completed today since patient required general anesthesia and would likely be completed on Monday  Started the patient on long-acting oxycodone       Pancytopenia (HCC)- (present on admission)   Assessment & Plan    Most likely due to myelodysplastic syndrome  His last chemotherapy was in November 2018  No acute symptoms.  Hematology/oncology was consulted and recommended outpatient follow-up.  CBC on 4/20 showed significant improvement           Esophageal dysphagia   Assessment & Plan    Patient complains of dysphagia associated with nausea and vomiting  Try to get a barium swallow with the patient refused     Poor nutrition   Assessment & Plan    Poor oral intake past 48 hours, patient can eat, just isn't hungry  Poor urinary output - start NS at 83      Hallucinations   Assessment & Plan    Patient seeing butterflies and bats in his room  Re-consult psych for possible initiation of anti-psychotic.       Delirium   Assessment & Plan    Frequent confusing statements made by patient, unrelated to doses of pain medications  Psychiatry consultation appreciated  Discontinue Robaxin  Gabapentin discontinued  Discontinue morphine       Left knee pain   Assessment & Plan    X ray found arthritis  Continue to provide him pain management.     Tachycardia   Assessment & Plan    Resolved  It was most likely from pain  metprolol 25 mg p.o. twice daily       Somnolence- (present on admission)   Assessment & Plan    Waxing and waning  Stopped Ambien and trazodone  Resolved, gabapentin likely contributing - discontinued       Dizziness- (present on admission)   Assessment & Plan    Likely s/e of gabapentin - discontinued  Vitals are stable  Echo did not show any acute normalities  PT/OT  Negative orthostatics vitals              VTE prophylaxis: heparin

## 2019-05-03 NOTE — CARE PLAN
Problem: Nutritional:  Goal: Achieve adequate nutritional intake  Patient will consume 50-75% of meals    Outcome: PROGRESSING SLOWER THAN EXPECTED

## 2019-05-03 NOTE — PROGRESS NOTES
"Assumed patient care at 1900. Received Bedside report. Patient  alert and oriented x 4. Patient statements more clear today. Continues to complain of uncontrolled back pain and now knee pain. Notified on call physician. Ordered patient KPad. Discussed POC. No s/s of distress. Patient denies any further questions or concerns. Call light in reach, fall precautions in place. Continue hourly rounding. /80   Pulse 82   Temp 36.5 °C (97.7 °F) (Temporal)   Resp 17   Ht 1.803 m (5' 11\")   Wt 94.6 kg (208 lb 8.9 oz)   SpO2 99%   BMI 29.09 kg/m²   "

## 2019-05-03 NOTE — THERAPY
Occupational Therapy Contact Note    Discussed with patient, will see him for OT after his vertebroplasty on Monday 5/6 due to pain levels. Pt in agreement.    Gloria Mcwilliams, OTR/L

## 2019-05-04 LAB
ALBUMIN SERPL BCP-MCNC: 2.9 G/DL (ref 3.2–4.9)
ALBUMIN/GLOB SERPL: 1.3 G/DL
ALP SERPL-CCNC: 90 U/L (ref 30–99)
ALT SERPL-CCNC: 7 U/L (ref 2–50)
ANION GAP SERPL CALC-SCNC: 10 MMOL/L (ref 0–11.9)
AST SERPL-CCNC: 14 U/L (ref 12–45)
BASOPHILS # BLD AUTO: 0.6 % (ref 0–1.8)
BASOPHILS # BLD: 0.02 K/UL (ref 0–0.12)
BILIRUB SERPL-MCNC: 0.5 MG/DL (ref 0.1–1.5)
BUN SERPL-MCNC: 4 MG/DL (ref 8–22)
CALCIUM SERPL-MCNC: 7.6 MG/DL (ref 8.5–10.5)
CHLORIDE SERPL-SCNC: 112 MMOL/L (ref 96–112)
CO2 SERPL-SCNC: 20 MMOL/L (ref 20–33)
CREAT SERPL-MCNC: 0.91 MG/DL (ref 0.5–1.4)
EOSINOPHIL # BLD AUTO: 0.23 K/UL (ref 0–0.51)
EOSINOPHIL NFR BLD: 7.5 % (ref 0–6.9)
ERYTHROCYTE [DISTWIDTH] IN BLOOD BY AUTOMATED COUNT: 49.1 FL (ref 35.9–50)
GLOBULIN SER CALC-MCNC: 2.3 G/DL (ref 1.9–3.5)
GLUCOSE SERPL-MCNC: 132 MG/DL (ref 65–99)
HCT VFR BLD AUTO: 31.6 % (ref 42–52)
HGB BLD-MCNC: 10.2 G/DL (ref 14–18)
IMM GRANULOCYTES # BLD AUTO: 0.01 K/UL (ref 0–0.11)
IMM GRANULOCYTES NFR BLD AUTO: 0.3 % (ref 0–0.9)
LYMPHOCYTES # BLD AUTO: 0.89 K/UL (ref 1–4.8)
LYMPHOCYTES NFR BLD: 28.9 % (ref 22–41)
MAGNESIUM SERPL-MCNC: 1.4 MG/DL (ref 1.5–2.5)
MCH RBC QN AUTO: 28.5 PG (ref 27–33)
MCHC RBC AUTO-ENTMCNC: 32.3 G/DL (ref 33.7–35.3)
MCV RBC AUTO: 88.3 FL (ref 81.4–97.8)
MONOCYTES # BLD AUTO: 0.35 K/UL (ref 0–0.85)
MONOCYTES NFR BLD AUTO: 11.4 % (ref 0–13.4)
NEUTROPHILS # BLD AUTO: 1.58 K/UL (ref 1.82–7.42)
NEUTROPHILS NFR BLD: 51.3 % (ref 44–72)
NRBC # BLD AUTO: 0 K/UL
NRBC BLD-RTO: 0 /100 WBC
PLATELET # BLD AUTO: 89 K/UL (ref 164–446)
PMV BLD AUTO: 9.3 FL (ref 9–12.9)
POTASSIUM SERPL-SCNC: 2.8 MMOL/L (ref 3.6–5.5)
POTASSIUM SERPL-SCNC: 3.1 MMOL/L (ref 3.6–5.5)
PROT SERPL-MCNC: 5.2 G/DL (ref 6–8.2)
RBC # BLD AUTO: 3.58 M/UL (ref 4.7–6.1)
SODIUM SERPL-SCNC: 142 MMOL/L (ref 135–145)
WBC # BLD AUTO: 3.1 K/UL (ref 4.8–10.8)

## 2019-05-04 PROCEDURE — 99232 SBSQ HOSP IP/OBS MODERATE 35: CPT | Performed by: FAMILY MEDICINE

## 2019-05-04 PROCEDURE — A9270 NON-COVERED ITEM OR SERVICE: HCPCS | Performed by: FAMILY MEDICINE

## 2019-05-04 PROCEDURE — 700102 HCHG RX REV CODE 250 W/ 637 OVERRIDE(OP): Performed by: HOSPITALIST

## 2019-05-04 PROCEDURE — 83735 ASSAY OF MAGNESIUM: CPT

## 2019-05-04 PROCEDURE — A9270 NON-COVERED ITEM OR SERVICE: HCPCS | Performed by: HOSPITALIST

## 2019-05-04 PROCEDURE — A9270 NON-COVERED ITEM OR SERVICE: HCPCS | Performed by: INTERNAL MEDICINE

## 2019-05-04 PROCEDURE — 84132 ASSAY OF SERUM POTASSIUM: CPT

## 2019-05-04 PROCEDURE — 700102 HCHG RX REV CODE 250 W/ 637 OVERRIDE(OP): Performed by: FAMILY MEDICINE

## 2019-05-04 PROCEDURE — 700102 HCHG RX REV CODE 250 W/ 637 OVERRIDE(OP): Performed by: INTERNAL MEDICINE

## 2019-05-04 PROCEDURE — 80053 COMPREHEN METABOLIC PANEL: CPT

## 2019-05-04 PROCEDURE — 700101 HCHG RX REV CODE 250: Performed by: INTERNAL MEDICINE

## 2019-05-04 PROCEDURE — 700105 HCHG RX REV CODE 258: Performed by: INTERNAL MEDICINE

## 2019-05-04 PROCEDURE — 700111 HCHG RX REV CODE 636 W/ 250 OVERRIDE (IP): Performed by: INTERNAL MEDICINE

## 2019-05-04 PROCEDURE — 770006 HCHG ROOM/CARE - MED/SURG/GYN SEMI*

## 2019-05-04 PROCEDURE — 85025 COMPLETE CBC W/AUTO DIFF WBC: CPT

## 2019-05-04 RX ORDER — GABAPENTIN 100 MG/1
100 CAPSULE ORAL 3 TIMES DAILY
Status: DISCONTINUED | OUTPATIENT
Start: 2019-05-04 | End: 2019-05-06

## 2019-05-04 RX ORDER — POTASSIUM CHLORIDE 7.45 MG/ML
10 INJECTION INTRAVENOUS
Status: COMPLETED | OUTPATIENT
Start: 2019-05-04 | End: 2019-05-04

## 2019-05-04 RX ORDER — POTASSIUM CHLORIDE 20 MEQ/1
40 TABLET, EXTENDED RELEASE ORAL ONCE
Status: COMPLETED | OUTPATIENT
Start: 2019-05-04 | End: 2019-05-04

## 2019-05-04 RX ORDER — AMITRIPTYLINE HYDROCHLORIDE 75 MG/1
75 TABLET ORAL NIGHTLY
Status: DISCONTINUED | OUTPATIENT
Start: 2019-05-04 | End: 2019-05-05

## 2019-05-04 RX ORDER — POTASSIUM CHLORIDE 20 MEQ/1
40 TABLET, EXTENDED RELEASE ORAL 2 TIMES DAILY
Status: DISPENSED | OUTPATIENT
Start: 2019-05-04 | End: 2019-05-05

## 2019-05-04 RX ADMIN — OMEPRAZOLE 20 MG: 20 CAPSULE, DELAYED RELEASE ORAL at 18:32

## 2019-05-04 RX ADMIN — BUSPIRONE HYDROCHLORIDE 15 MG: 30 TABLET ORAL at 05:08

## 2019-05-04 RX ADMIN — POTASSIUM CHLORIDE 40 MEQ: 1500 TABLET, EXTENDED RELEASE ORAL at 19:56

## 2019-05-04 RX ADMIN — LIDOCAINE 2 PATCH: 50 PATCH TOPICAL at 22:43

## 2019-05-04 RX ADMIN — MORPHINE SULFATE 15 MG: 15 TABLET ORAL at 15:15

## 2019-05-04 RX ADMIN — BUSPIRONE HYDROCHLORIDE 15 MG: 30 TABLET ORAL at 19:56

## 2019-05-04 RX ADMIN — MORPHINE SULFATE 15 MG: 15 TABLET ORAL at 10:55

## 2019-05-04 RX ADMIN — GABAPENTIN 300 MG: 300 CAPSULE ORAL at 05:08

## 2019-05-04 RX ADMIN — MORPHINE SULFATE 15 MG: 15 TABLET, EXTENDED RELEASE ORAL at 05:09

## 2019-05-04 RX ADMIN — SODIUM CHLORIDE: 9 INJECTION, SOLUTION INTRAVENOUS at 09:03

## 2019-05-04 RX ADMIN — ACETAMINOPHEN 650 MG: 325 TABLET, FILM COATED ORAL at 18:32

## 2019-05-04 RX ADMIN — LIDOCAINE 2 PATCH: 50 PATCH TOPICAL at 01:02

## 2019-05-04 RX ADMIN — ACETAMINOPHEN 650 MG: 325 TABLET, FILM COATED ORAL at 10:55

## 2019-05-04 RX ADMIN — GABAPENTIN 300 MG: 300 CAPSULE ORAL at 10:55

## 2019-05-04 RX ADMIN — GABAPENTIN 100 MG: 100 CAPSULE ORAL at 18:32

## 2019-05-04 RX ADMIN — POTASSIUM CHLORIDE 40 MEQ: 1500 TABLET, EXTENDED RELEASE ORAL at 05:10

## 2019-05-04 RX ADMIN — METOPROLOL TARTRATE 25 MG: 25 TABLET, FILM COATED ORAL at 05:08

## 2019-05-04 RX ADMIN — HEPARIN SODIUM 5000 UNITS: 5000 INJECTION, SOLUTION INTRAVENOUS; SUBCUTANEOUS at 05:10

## 2019-05-04 RX ADMIN — ZOLPIDEM TARTRATE 5 MG: 5 TABLET ORAL at 22:17

## 2019-05-04 RX ADMIN — SODIUM CHLORIDE: 9 INJECTION, SOLUTION INTRAVENOUS at 20:00

## 2019-05-04 RX ADMIN — POTASSIUM CHLORIDE 10 MEQ: 7.46 INJECTION, SOLUTION INTRAVENOUS at 05:08

## 2019-05-04 RX ADMIN — OMEPRAZOLE 20 MG: 20 CAPSULE, DELAYED RELEASE ORAL at 05:08

## 2019-05-04 RX ADMIN — MORPHINE SULFATE 15 MG: 15 TABLET ORAL at 01:02

## 2019-05-04 RX ADMIN — MORPHINE SULFATE 15 MG: 15 TABLET ORAL at 19:56

## 2019-05-04 RX ADMIN — POTASSIUM CHLORIDE 10 MEQ: 7.46 INJECTION, SOLUTION INTRAVENOUS at 06:36

## 2019-05-04 RX ADMIN — ONDANSETRON 4 MG: 4 TABLET, ORALLY DISINTEGRATING ORAL at 18:32

## 2019-05-04 RX ADMIN — AMITRIPTYLINE HYDROCHLORIDE 75 MG: 75 TABLET, FILM COATED ORAL at 22:42

## 2019-05-04 ASSESSMENT — LIFESTYLE VARIABLES: SUBSTANCE_ABUSE: 0

## 2019-05-04 ASSESSMENT — ENCOUNTER SYMPTOMS
PHOTOPHOBIA: 0
MYALGIAS: 1
NERVOUS/ANXIOUS: 1
SPUTUM PRODUCTION: 0
SENSORY CHANGE: 0
INSOMNIA: 0
DIARRHEA: 0
FEVER: 0
DOUBLE VISION: 0
NAUSEA: 0
WEIGHT LOSS: 0
BLURRED VISION: 0
PALPITATIONS: 0
ABDOMINAL PAIN: 0
TINGLING: 0
TREMORS: 0
DIZZINESS: 0
EYE PAIN: 0
HEMOPTYSIS: 0
CHILLS: 0
HEADACHES: 0
HEARTBURN: 0
BACK PAIN: 1
ORTHOPNEA: 0
COUGH: 0
DEPRESSION: 0
VOMITING: 0

## 2019-05-04 NOTE — PROGRESS NOTES
"Assumed care at 1900. Received report from RN. Patient is A/O x4, vss on ra, up 2 max assist. PRN pain and nausea medications administered. Assessment complete. Labs reviewed. Patient and RN discussed plan of care. Patient questions answered. Patient needs are met at this time. Bed in lowest and locked position. Call light is within reach. Hourly rounding in place. /80   Pulse 74   Temp 37 °C (98.6 °F) (Temporal)   Resp 18   Ht 1.803 m (5' 11\")   Wt 95 kg (209 lb 7 oz)   SpO2 100%   BMI 29.21 kg/m²   "

## 2019-05-04 NOTE — PROGRESS NOTES
Hospital Medicine Daily Progress Note    Date of Service  5/4/2019    Chief Complaint  53 y.o. male admitted 4/5/2019 with back pain, found to have L1 burst fracture.    Hospital Course    Patient admitted with L1 burst fracture, conservative bracing recommended by neurosurgeon.  Patient with difficult to control pain and trouble with therapies and difficult discharge due to social situation.      Interval Problem Update  4/13 Patient moved from telemetry to medical floor, states pain uncontrolled and requesting IV pain medication, 1 dose IV morphine ordered as needed for severe pain but I've adjusted his percocet from 5mg to 10 mg and increased robaxin and made it scheduled and increased his gabapentin for better pain control.  4/14 Patient states he had rest following morphine dose overnight but pain uncontrolled still with previous changes, will add oxycontin 20 bid as long acting pain medication to help control pain and allow patient to participate in therapies.  Rehab consult placed.  4/15 Patient up to side of bed in [facility]O after working with therapy today, complains of uncontrolled pain with the changes made.  I doubt he is having response to oxycodone/oxycontin so will discontinue this and rotate opiates to morphine - MS contin and MSIR.  Physiatry consult pending for recommendations to help get patient out of the hospital.  4/23 Patient s/p cognitive evaluation and showed decline since previous evaluation.  His mentation seemed to change with the initiation of gabapentin that I started increasing on 4/13.  I will discontinue the gabapentin as it does not seem to be helping with his pain control but he is having cog deficits and dizziness - especially with dose increases.  Patient is agreeable to this.  He does not need IV morphine and pain can be controlled with PO only.  4/24 Patient seems more awake and alert today with dc of gabapentin.  He states the dizziness is still present.  Cognitive status seems to  have improved since yesterday also.  He needs continued improvement before he is safe to leave the hospital to an independent setting, no availability for placement given his criminal record and active warrants.  4/25 Patient awake but still making confusing statements, yesterday he wanted paperwork to fill out for a [employer] authorization.  He is making these statements more frequently and I've requested psychiatry to weigh in on their thoughts of his confusion.    4/26 Patient without complaints today, back pain same.  Makes confusing statements but less often.    4/27 Patient with residual pain, no increase in pain medication due to his hallucinations.  Will have psychiatry re-evaluate patient for medication recommendations, likely an antipsychotic medication to help lessen his hallucinations.  4/28 Patient continues to have disorganized thoughts and hallucinations.  I anticipate psychiatry to consult again on Monday.  No acute changes, therapies to resume on Monday.  4/29 Patient with continued decline, he is not eating nor drinking meals and states he's not hungry.  I've started IV fluids and am awaiting psychiatry re-evaluation as he is actively hallucinating.  I will also decrease his MS contin as he appears comfortable and this could also be contributing to his mental suppression.  4/30: Patient still having episodes of confusion.  I have discontinued his MS Contin and morphine since I think it is attributing to his nausea, vomiting and confusion.  I replaced it with Percocet and tramadol.  5/1: After discussing his case with radiology who determined that he would be a good candidate for kyphoplasty.  His overall mental status has improved after discontinuing the medications.  He complained about no increase in pain.  5/2: Patient seen and examined by me he was having increased amounts of pain.  I restarted long-acting oxycodone to see if his pain was resolved.  The kyphoplasty was unable to be completed  since the patient required general anesthesia.  Patient is a difficult discharge and will need to be walking before we can send him home.  5/3: Resting comfortably in bed. Asking for more pain medicine however did not look in pain or distress. No issues overnight per staff.   5/4: Requesting more pain medicine (IV).  Looks comfortable with no distress however.  No issues overnight per staff.  Awake and alert.  Interactive.  Mental status at baseline.  Consultants/Specialty  [facility] - [place] - s/o  Psych - s/o, re-consult    Code Status  full    Disposition  No placement available, needs improved cognitive function until safe to discharge to his own independence.    Review of Systems  Review of Systems   Constitutional: Negative for chills, fever and weight loss.   HENT: Negative for ear pain, hearing loss and tinnitus.    Eyes: Negative for blurred vision, double vision, photophobia and pain.   Respiratory: Negative for cough, hemoptysis and sputum production.    Cardiovascular: Negative for chest pain, palpitations and orthopnea.   Gastrointestinal: Negative for abdominal pain, diarrhea, heartburn, nausea and vomiting.   Genitourinary: Negative for dysuria and urgency.   Musculoskeletal: Positive for back pain (Stable ) and myalgias. Negative for joint pain.   Skin: Negative for itching and rash.   Neurological: Negative for dizziness, tingling, tremors, sensory change and headaches.   Psychiatric/Behavioral: Negative for depression, substance abuse and suicidal ideas. The patient is nervous/anxious. The patient does not have insomnia.         Physical Exam  Temp:  [36.2 °C (97.1 °F)-37 °C (98.6 °F)] 36.2 °C (97.1 °F)  Pulse:  [74-94] 94  Resp:  [16-18] 17  BP: (112-131)/(71-86) 120/80  SpO2:  [97 %-100 %] 97 %    Physical Exam   Constitutional: He is oriented to person, place, and time. He appears well-developed and well-nourished. No distress.   HENT:   Head: Normocephalic and atraumatic.   Eyes: Conjunctivae are  normal. Right eye exhibits no discharge. Left eye exhibits no discharge.   Neck: Neck supple. No tracheal deviation present. No thyromegaly present.   Cardiovascular: Normal rate, regular rhythm and normal heart sounds.  Exam reveals no gallop and no friction rub.    Pulmonary/Chest: Effort normal. No respiratory distress.   Abdominal: Soft. Bowel sounds are normal. He exhibits no distension. There is no tenderness.   Musculoskeletal: He exhibits no tenderness or deformity.   Mild to moderate thoracolumbar kyphoscoliosis.    Neurological: He is alert and oriented to person, place, and time. No cranial nerve deficit.   Confusing statements frequently made     Skin: Skin is warm and dry. He is not diaphoretic. No erythema.   Psychiatric: He has a normal mood and affect.   Patient seeing bats and butterflies in room, slightly paranoid.     Nursing note and vitals reviewed.      Fluids    Intake/Output Summary (Last 24 hours) at 05/04/19 1446  Last data filed at 05/04/19 1351   Gross per 24 hour   Intake             1050 ml   Output              750 ml   Net              300 ml       Laboratory  Recent Labs      05/04/19   0251   WBC  3.1*   RBC  3.58*   HEMOGLOBIN  10.2*   HEMATOCRIT  31.6*   MCV  88.3   MCH  28.5   MCHC  32.3*   RDW  49.1   PLATELETCT  89*   MPV  9.3     Recent Labs      05/04/19   0251   SODIUM  142   POTASSIUM  2.8*   CHLORIDE  112   CO2  20   GLUCOSE  132*   BUN  4*   CREATININE  0.91   CALCIUM  7.6*     Recent Labs      05/02/19   1309   INR  1.21*               Imaging  DX-ESOPHAGUS - BARIUM SWALLOW   Final Result      Small hiatal hernia      DX-CHEST-PORTABLE (1 VIEW)   Final Result      No acute cardiac or pulmonary abnormalities are identified.      DX-CHEST-PORTABLE (1 VIEW)   Final Result         No acute cardiac or pulmonary abnormality is identified.      [id number] VIEW   Final Result      1.  There is a nonobstructive nonspecific bowel gas pattern.  There is no evidence of free  intraperitoneal air.      MR-LUMBAR SPINE-WITH & W/O   Final Result      1.  There is subacute fracture along the superior endplate of L1 vertebral body. There is no posterior retropulsion.   2.  There is no focal bone lesion.   3.  There is no evidence of infection.   4.  Mild degenerative disease as described above.      MR-BRAIN-W/O   Final Result      1.  Mild cerebral atrophy.   2.  Mild chronic microvascular ischemic disease.   3.  No acute abnormality.      DX-KNEE 2- LEFT   Final Result      1.  No fracture or dislocation of LEFT knee.   2.  Minimal degenerative changes.   3.  Diffuse vascular calcifications.      EC-ECHOCARDIOGRAM COMPLETE W/O CONT   Final Result      CT-ABDOMEN-PELVIS WITH   Final Result         1.  No acute abnormality.   2.  Hepatomegaly and diffuse hepatic steatosis   3.  Atherosclerosis and atherosclerotic coronary artery disease.   4.  Pulmonary nodules measuring up to 6.5 mm, see nodule follow-up recommendations below.      Low Risk: CT at 3-6 months, then consider CT at 18-24 months      High Risk: CT at 3-6 months, then at 18-24 months      Comments: Use most suspicious nodule as guide to management. Follow-up intervals may vary according to size and risk.      Low Risk - Minimal or absent history of smoking and of other known risk factors.      High Risk - History of smoking or of other known risk factors.      Note: These recommendations do not apply to lung cancer screening, patients with immunosuppression, or patients with known primary cancer.      Fleischner Society 2017 Guidelines for Management of Incidentally Detected Pulmonary Nodules in Adults         CT-CTA CHEST PULMONARY ARTERY W/ RECONS   Final Result         1.  No large central pulmonary embolus is appreciated, evaluation of the subsegmental branches is essentially nondiagnostic due to motion artifacts. Additional imaging would be required for definitive exclusion of small distal pulmonary emboli.      DX-LUMBAR  SPINE-2 OR 3 VIEWS   Final Result      Limited examination. Mild anterior wedging L2 vertebral body that appears to be old.   If symptoms are persistent, would recommend CT for further evaluation.      DX-THORACIC SPINE-WITH SWIMMERS VIEW   Final Result      Limited examination. The upper thoracic spine is not well-demonstrated.   Mild anterior wedging of some the lower thoracic vertebral bodies and appears old.   No definite acute compression. If symptoms are persistent, CT would be recommended for further evaluation.      DX-CHEST-PORTABLE (1 VIEW)   Final Result      Central catheter projects over the superior right atrium.      Prominent calcified granuloma in the left midlung.      Atherosclerotic plaque.      CT-HEAD W/O   Final Result      No acute intracranial abnormality is identified.      IR-VERTEBROPLASTY    (Results Pending)        Assessment/Plan  * Closed stable burst fracture of first lumbar vertebra with routine healing   Assessment & Plan    Lumbar MRI found L1 subacute fracture with no protrusion  Neurosurgery was consulted and they recommended brace placement and outpatient follow-up.  I discontinued IV morphine.  I encouraged him to use oral pain medication for pain control.  Physiatry evaluated him and recommended that is not a candidate for inpatient rehab.  Physical therapy evaluated and recommended transitional care facilities.  Currently is very difficult discharge as per physical therapy recommendation patient needs to go to skilled nursing facility but most of the skilled nursing facility has been declining him for criminal record  Cognitive difficulties more prevalent since initiation of gabapentin - dose reduced and I will discontinue it (likely the cause of his dizziness also)  Discontinue MS Contin and transition him to Percocet and tramadol  Kyphoplasty unable to be completed today since patient required general anesthesia and would likely be completed on Monday  Started the patient  on long-acting oxycodone  5/3: Scheduled for kyphoplasty under full anesthesia on Monday.  5/4: Pain is well controlled.        Pancytopenia (HCC)- (present on admission)   Assessment & Plan    Most likely due to myelodysplastic syndrome  His last chemotherapy was in November 2018  No acute symptoms.  Hematology/oncology was consulted and recommended outpatient follow-up.  Monitor for now.            Esophageal dysphagia   Assessment & Plan    Patient complains of dysphagia associated with nausea and vomiting  Patient refused further evaluation including barium swallow study.     Poor nutrition   Assessment & Plan    Poor oral intake past 48 hours, patient can eat, just isn't hungry  Poor urinary output - start NS at 83     Hallucinations   Assessment & Plan    Patient seeing butterflies and bats in his room  Re-consult psych for possible initiation of anti-psychotic.       Delirium   Assessment & Plan    Frequent confusing statements made by patient, unrelated to doses of pain medications  Psychiatry consultation appreciated  Discontinue Robaxin  Gabapentin discontinued  Discontinue morphine  Resolved        Left knee pain   Assessment & Plan    X ray found arthritis  Continue to provide him pain management.     Tachycardia   Assessment & Plan    Resolved  It was most likely from pain  metprolol 25 mg p.o. twice daily       Somnolence- (present on admission)   Assessment & Plan    Waxing and waning  Stopped Ambien and trazodone  Resolved, gabapentin likely contributing - discontinued       Dizziness- (present on admission)   Assessment & Plan    Likely s/e of gabapentin - discontinued  Vitals are stable  Echo did not show any acute normalities  PT/OT  Negative orthostatics vitals         Plan of care discussed with multidisciplinary team during rounds.    VTE prophylaxis: heparin

## 2019-05-04 NOTE — CARE PLAN
Problem: Safety  Goal: Will remain free from injury  Pt encouraged to ambulate with assistance and use of TSLO.    Problem: Pain Management  Goal: Pain level will decrease to patient's comfort goal  Pt educated on importance of being proactive with use of analgesics

## 2019-05-05 LAB
ANION GAP SERPL CALC-SCNC: 6 MMOL/L (ref 0–11.9)
BUN SERPL-MCNC: 7 MG/DL (ref 8–22)
CALCIUM SERPL-MCNC: 7.5 MG/DL (ref 8.5–10.5)
CHLORIDE SERPL-SCNC: 116 MMOL/L (ref 96–112)
CO2 SERPL-SCNC: 21 MMOL/L (ref 20–33)
CREAT SERPL-MCNC: 0.7 MG/DL (ref 0.5–1.4)
GLUCOSE SERPL-MCNC: 87 MG/DL (ref 65–99)
POTASSIUM SERPL-SCNC: 3.4 MMOL/L (ref 3.6–5.5)
SODIUM SERPL-SCNC: 143 MMOL/L (ref 135–145)

## 2019-05-05 PROCEDURE — 770006 HCHG ROOM/CARE - MED/SURG/GYN SEMI*

## 2019-05-05 PROCEDURE — 700111 HCHG RX REV CODE 636 W/ 250 OVERRIDE (IP): Performed by: FAMILY MEDICINE

## 2019-05-05 PROCEDURE — 700111 HCHG RX REV CODE 636 W/ 250 OVERRIDE (IP): Performed by: INTERNAL MEDICINE

## 2019-05-05 PROCEDURE — 700102 HCHG RX REV CODE 250 W/ 637 OVERRIDE(OP): Performed by: INTERNAL MEDICINE

## 2019-05-05 PROCEDURE — 700105 HCHG RX REV CODE 258: Performed by: FAMILY MEDICINE

## 2019-05-05 PROCEDURE — 99232 SBSQ HOSP IP/OBS MODERATE 35: CPT | Performed by: FAMILY MEDICINE

## 2019-05-05 PROCEDURE — 700105 HCHG RX REV CODE 258: Performed by: INTERNAL MEDICINE

## 2019-05-05 PROCEDURE — A9270 NON-COVERED ITEM OR SERVICE: HCPCS | Performed by: FAMILY MEDICINE

## 2019-05-05 PROCEDURE — 700102 HCHG RX REV CODE 250 W/ 637 OVERRIDE(OP): Performed by: FAMILY MEDICINE

## 2019-05-05 PROCEDURE — A9270 NON-COVERED ITEM OR SERVICE: HCPCS | Performed by: INTERNAL MEDICINE

## 2019-05-05 PROCEDURE — 700101 HCHG RX REV CODE 250: Performed by: FAMILY MEDICINE

## 2019-05-05 PROCEDURE — 80048 BASIC METABOLIC PNL TOTAL CA: CPT

## 2019-05-05 RX ORDER — AMOXICILLIN 250 MG
2 CAPSULE ORAL 2 TIMES DAILY
Status: DISCONTINUED | OUTPATIENT
Start: 2019-05-05 | End: 2019-05-20 | Stop reason: HOSPADM

## 2019-05-05 RX ORDER — ERGOCALCIFEROL 1.25 MG/1
50000 CAPSULE ORAL
Status: DISCONTINUED | OUTPATIENT
Start: 2019-05-06 | End: 2019-05-20 | Stop reason: HOSPADM

## 2019-05-05 RX ORDER — ACETAMINOPHEN 500 MG
500 TABLET ORAL EVERY 6 HOURS PRN
Status: DISCONTINUED | OUTPATIENT
Start: 2019-05-05 | End: 2019-05-10

## 2019-05-05 RX ORDER — LIDOCAINE 50 MG/G
2 PATCH TOPICAL EVERY 24 HOURS
Status: DISCONTINUED | OUTPATIENT
Start: 2019-05-05 | End: 2019-05-20 | Stop reason: HOSPADM

## 2019-05-05 RX ORDER — MAGNESIUM SULFATE HEPTAHYDRATE 40 MG/ML
4 INJECTION, SOLUTION INTRAVENOUS ONCE
Status: COMPLETED | OUTPATIENT
Start: 2019-05-05 | End: 2019-05-05

## 2019-05-05 RX ORDER — AMITRIPTYLINE HYDROCHLORIDE 75 MG/1
75 TABLET ORAL NIGHTLY
Status: DISCONTINUED | OUTPATIENT
Start: 2019-05-05 | End: 2019-05-13

## 2019-05-05 RX ORDER — SODIUM CHLORIDE, SODIUM LACTATE, POTASSIUM CHLORIDE, CALCIUM CHLORIDE 600; 310; 30; 20 MG/100ML; MG/100ML; MG/100ML; MG/100ML
INJECTION, SOLUTION INTRAVENOUS CONTINUOUS
Status: DISCONTINUED | OUTPATIENT
Start: 2019-05-05 | End: 2019-05-08

## 2019-05-05 RX ORDER — ZOLPIDEM TARTRATE 5 MG/1
5 TABLET ORAL NIGHTLY PRN
Status: DISCONTINUED | OUTPATIENT
Start: 2019-05-05 | End: 2019-05-20 | Stop reason: HOSPADM

## 2019-05-05 RX ORDER — TIZANIDINE 4 MG/1
4 TABLET ORAL EVERY 6 HOURS PRN
Status: DISCONTINUED | OUTPATIENT
Start: 2019-05-05 | End: 2019-05-20 | Stop reason: HOSPADM

## 2019-05-05 RX ORDER — PROMETHAZINE HYDROCHLORIDE 25 MG/1
12.5 TABLET ORAL EVERY 6 HOURS PRN
Status: DISCONTINUED | OUTPATIENT
Start: 2019-05-05 | End: 2019-05-20 | Stop reason: HOSPADM

## 2019-05-05 RX ORDER — POTASSIUM CHLORIDE 20 MEQ/1
40 TABLET, EXTENDED RELEASE ORAL ONCE
Status: ACTIVE | OUTPATIENT
Start: 2019-05-05 | End: 2019-05-06

## 2019-05-05 RX ORDER — MORPHINE SULFATE 15 MG/1
15 TABLET ORAL EVERY 4 HOURS PRN
Status: DISCONTINUED | OUTPATIENT
Start: 2019-05-05 | End: 2019-05-08

## 2019-05-05 RX ORDER — ACETAMINOPHEN 500 MG
500 TABLET ORAL 4 TIMES DAILY
Status: DISPENSED | OUTPATIENT
Start: 2019-05-05 | End: 2019-05-10

## 2019-05-05 RX ORDER — HALOPERIDOL 5 MG/ML
2 INJECTION INTRAMUSCULAR EVERY 4 HOURS PRN
Status: DISCONTINUED | OUTPATIENT
Start: 2019-05-05 | End: 2019-05-20 | Stop reason: HOSPADM

## 2019-05-05 RX ADMIN — ZOLPIDEM TARTRATE 5 MG: 5 TABLET ORAL at 21:12

## 2019-05-05 RX ADMIN — SODIUM CHLORIDE, POTASSIUM CHLORIDE, SODIUM LACTATE AND CALCIUM CHLORIDE: 600; 310; 30; 20 INJECTION, SOLUTION INTRAVENOUS at 20:47

## 2019-05-05 RX ADMIN — ACETAMINOPHEN 500 MG: 500 TABLET ORAL at 09:55

## 2019-05-05 RX ADMIN — MORPHINE SULFATE 15 MG: 15 TABLET ORAL at 05:55

## 2019-05-05 RX ADMIN — GABAPENTIN 100 MG: 100 CAPSULE ORAL at 05:56

## 2019-05-05 RX ADMIN — MAGNESIUM SULFATE IN WATER 4 G: 40 INJECTION, SOLUTION INTRAVENOUS at 09:55

## 2019-05-05 RX ADMIN — ACETAMINOPHEN 500 MG: 500 TABLET ORAL at 18:04

## 2019-05-05 RX ADMIN — PROMETHAZINE HYDROCHLORIDE 12.5 MG: 25 TABLET ORAL at 18:04

## 2019-05-05 RX ADMIN — MORPHINE SULFATE 15 MG: 15 TABLET ORAL at 09:55

## 2019-05-05 RX ADMIN — POTASSIUM CHLORIDE 40 MEQ: 1500 TABLET, EXTENDED RELEASE ORAL at 05:56

## 2019-05-05 RX ADMIN — MORPHINE SULFATE 15 MG: 15 TABLET ORAL at 14:14

## 2019-05-05 RX ADMIN — MORPHINE SULFATE 15 MG: 15 TABLET ORAL at 18:04

## 2019-05-05 RX ADMIN — OMEPRAZOLE 20 MG: 20 CAPSULE, DELAYED RELEASE ORAL at 18:04

## 2019-05-05 RX ADMIN — ACETAMINOPHEN 500 MG: 500 TABLET ORAL at 20:50

## 2019-05-05 RX ADMIN — GABAPENTIN 100 MG: 100 CAPSULE ORAL at 13:17

## 2019-05-05 RX ADMIN — HEPARIN SODIUM 5000 UNITS: 5000 INJECTION, SOLUTION INTRAVENOUS; SUBCUTANEOUS at 13:18

## 2019-05-05 RX ADMIN — BUSPIRONE HYDROCHLORIDE 15 MG: 30 TABLET ORAL at 18:03

## 2019-05-05 RX ADMIN — SODIUM CHLORIDE, POTASSIUM CHLORIDE, SODIUM LACTATE AND CALCIUM CHLORIDE: 600; 310; 30; 20 INJECTION, SOLUTION INTRAVENOUS at 10:00

## 2019-05-05 RX ADMIN — ACETAMINOPHEN 500 MG: 500 TABLET ORAL at 13:17

## 2019-05-05 RX ADMIN — BUSPIRONE HYDROCHLORIDE 15 MG: 30 TABLET ORAL at 05:55

## 2019-05-05 RX ADMIN — THERA TABS 1 TABLET: TAB at 09:55

## 2019-05-05 RX ADMIN — LIDOCAINE 2 PATCH: 50 PATCH TOPICAL at 23:12

## 2019-05-05 RX ADMIN — SODIUM CHLORIDE: 9 INJECTION, SOLUTION INTRAVENOUS at 06:00

## 2019-05-05 RX ADMIN — MORPHINE SULFATE 15 MG: 15 TABLET ORAL at 23:07

## 2019-05-05 RX ADMIN — AMITRIPTYLINE HYDROCHLORIDE 75 MG: 75 TABLET, FILM COATED ORAL at 20:49

## 2019-05-05 RX ADMIN — TIZANIDINE 4 MG: 4 TABLET ORAL at 13:17

## 2019-05-05 RX ADMIN — GABAPENTIN 100 MG: 100 CAPSULE ORAL at 18:04

## 2019-05-05 RX ADMIN — OMEPRAZOLE 20 MG: 20 CAPSULE, DELAYED RELEASE ORAL at 05:56

## 2019-05-05 RX ADMIN — ONDANSETRON 4 MG: 4 TABLET, ORALLY DISINTEGRATING ORAL at 13:17

## 2019-05-05 ASSESSMENT — ENCOUNTER SYMPTOMS
NERVOUS/ANXIOUS: 1
BLURRED VISION: 0
DOUBLE VISION: 0
PHOTOPHOBIA: 0
HEADACHES: 0
TREMORS: 0
HEMOPTYSIS: 0
HEARTBURN: 0
EYE PAIN: 0
VOMITING: 0
SENSORY CHANGE: 0
CHILLS: 0
CONSTIPATION: 0
NAUSEA: 0
ORTHOPNEA: 0
SPUTUM PRODUCTION: 0
MYALGIAS: 1
COUGH: 0
TINGLING: 0
INSOMNIA: 0
PALPITATIONS: 0
DIARRHEA: 0
DIZZINESS: 0
FEVER: 0
BACK PAIN: 1
DEPRESSION: 0
CLAUDICATION: 0
WEIGHT LOSS: 0
ABDOMINAL PAIN: 0
SPEECH CHANGE: 0

## 2019-05-05 ASSESSMENT — LIFESTYLE VARIABLES: SUBSTANCE_ABUSE: 0

## 2019-05-05 NOTE — PROGRESS NOTES
"Pt upset throughout day about pain meds. Night shift RN reported pt hallucinating throughout night, saying obscure things.     Pt called for PRN pain med this evening and when approached did not respond to voice. Shook pt's arm, pulled lidocaine patch off his knee and called his name loudly. Pt eventually awakes to state he is in 10/10 pain and he needs his morphine. I discussed the safety issue with the patient who proceeded to yell at me, talk over me, and accuse me of depriving him of basic needs. Discussed w pt I am not here to let him suffer but am unable to medicate with narcotics if he is in this current state.     Pt requesting IV narcotics at this time and when approached about his request minutes later pt is asleep, mouth open, arm hanging out of bed.    Also of note, CNA went to change pt's sheets today and asked how we can get him up. Without assistance pt stood up and pivoted to the chair. Later in the evening told me \"not sure if its my knees or my back but I cant stand, walk, or do anything.\"            "

## 2019-05-05 NOTE — PROGRESS NOTES
"Received report from day shift RN. Assumed patient care at 1900. Patient currently rates pain a 10/10. Morphine provided for relief. Bed locked and in the lowest position. Call light within reach. RN and CNA numbers provided.      /70   Pulse 81   Temp 36.1 °C (97 °F) (Temporal)   Resp 18   Ht 1.803 m (5' 11\")   Wt 95.2 kg (209 lb 14.1 oz)   SpO2 99%   BMI 29.27 kg/m²     10:30 PM Patient requesting Elavil. States takes this qhs at night. Discussed with hospitalist, Dr. Fermin. Order received for Elavil 75 mg qhs.   "

## 2019-05-05 NOTE — PROGRESS NOTES
Hospital Medicine Daily Progress Note    Date of Service  5/5/2019    Chief Complaint  53 y.o. male admitted 4/5/2019 with back pain, found to have L1 burst fracture.    Hospital Course    Patient admitted with L1 burst fracture, conservative bracing recommended by neurosurgeon.  Patient with difficult to control pain and trouble with therapies and difficult discharge due to social situation.      Interval Problem Update  4/13 Patient moved from telemetry to medical floor, states pain uncontrolled and requesting IV pain medication, 1 dose IV morphine ordered as needed for severe pain but I've adjusted his percocet from 5mg to 10 mg and increased robaxin and made it scheduled and increased his gabapentin for better pain control.  4/14 Patient states he had rest following morphine dose overnight but pain uncontrolled still with previous changes, will add oxycontin 20 bid as long acting pain medication to help control pain and allow patient to participate in therapies.  Rehab consult placed.  4/15 Patient up to side of bed in TLSO after working with therapy today, complains of uncontrolled pain with the changes made.  I doubt he is having response to oxycodone/oxycontin so will discontinue this and rotate opiates to morphine - MS contin and MSIR.  Physiatry consult pending for recommendations to help get patient out of the hospital.  4/23 Patient s/p cognitive evaluation and showed decline since previous evaluation.  His mentation seemed to change with the initiation of gabapentin that I started increasing on 4/13.  I will discontinue the gabapentin as it does not seem to be helping with his pain control but he is having cog deficits and dizziness - especially with dose increases.  Patient is agreeable to this.  He does not need IV morphine and pain can be controlled with PO only.  4/24 Patient seems more awake and alert today with dc of gabapentin.  He states the dizziness is still present.  Cognitive status seems to  have improved since yesterday also.  He needs continued improvement before he is safe to leave the hospital to an independent setting, no availability for placement given his criminal record and active warrants.  4/25 Patient awake but still making confusing statements, yesterday he wanted paperwork to fill out for a [employer] authorization.  He is making these statements more frequently and I've requested psychiatry to weigh in on their thoughts of his confusion.    4/26 Patient without complaints today, back pain same.  Makes confusing statements but less often.    4/27 Patient with residual pain, no increase in pain medication due to his hallucinations.  Will have psychiatry re-evaluate patient for medication recommendations, likely an antipsychotic medication to help lessen his hallucinations.  4/28 Patient continues to have disorganized thoughts and hallucinations.  I anticipate psychiatry to consult again on Monday.  No acute changes, therapies to resume on Monday.  4/29 Patient with continued decline, he is not eating nor drinking meals and states he's not hungry.  I've started IV fluids and am awaiting psychiatry re-evaluation as he is actively hallucinating.  I will also decrease his MS contin as he appears comfortable and this could also be contributing to his mental suppression.  4/30: Patient still having episodes of confusion.  I have discontinued his MS Contin and morphine since I think it is attributing to his nausea, vomiting and confusion.  I replaced it with Percocet and tramadol.  5/1: After discussing his case with radiology who determined that he would be a good candidate for kyphoplasty.  His overall mental status has improved after discontinuing the medications.  He complained about no increase in pain.  5/2: Patient seen and examined by me he was having increased amounts of pain.  I restarted long-acting oxycodone to see if his pain was resolved.  The kyphoplasty was unable to be completed  since the patient required general anesthesia.  Patient is a difficult discharge and will need to be walking before we can send him home.  5/3: Resting comfortably in bed. Asking for more pain medicine however did not look in pain or distress. No issues overnight per staff.   5/4: Requesting more pain medicine (IV).  Looks comfortable with no distress however.  No issues overnight per staff.  Awake and alert.  Interactive.  Mental status at baseline.  5/5: Continues to ask for more pain medicine.  Stated that he is unable to stand without excruciating pain.  No distress noted however upon exam.  Patient looks comfortable with no apparent distress or suffering noted.  No issues overnight per staff.  Keep n.p.o. after midnight.  Consultants/Specialty  NSG - Pierre - s/o  Psych - s/o, re-consult    Code Status  full    Disposition  No placement available, needs improved cognitive function until safe to discharge to his own independence.    Review of Systems  Review of Systems   Constitutional: Negative for chills, fever, malaise/fatigue and weight loss.   HENT: Negative for ear pain, hearing loss and tinnitus.    Eyes: Negative for blurred vision, double vision, photophobia and pain.   Respiratory: Negative for cough, hemoptysis and sputum production.    Cardiovascular: Negative for chest pain, palpitations, orthopnea and claudication.   Gastrointestinal: Negative for abdominal pain, constipation, diarrhea, heartburn, nausea and vomiting.   Genitourinary: Negative for dysuria and urgency.   Musculoskeletal: Positive for back pain (Stable ) and myalgias. Negative for joint pain.   Skin: Negative for itching and rash.   Neurological: Negative for dizziness, tingling, tremors, sensory change, speech change and headaches.   Psychiatric/Behavioral: Negative for depression, substance abuse and suicidal ideas. The patient is nervous/anxious. The patient does not have insomnia.         Physical Exam  Temp:  [36 °C (96.8  °F)-36.8 °C (98.3 °F)] 36 °C (96.8 °F)  Pulse:  [] 94  Resp:  [16-18] 17  BP: ()/(68-89) 127/89  SpO2:  [94 %-100 %] 97 %    Physical Exam   Constitutional: He is oriented to person, place, and time. He appears well-developed and well-nourished. No distress.   HENT:   Head: Normocephalic and atraumatic.   Eyes: Conjunctivae are normal. No scleral icterus.   Neck: Neck supple. No thyromegaly present.   Cardiovascular: Normal rate, regular rhythm and normal heart sounds.  Exam reveals no gallop and no friction rub.    Pulmonary/Chest: Effort normal. No respiratory distress. He has no wheezes.   Abdominal: Soft. Bowel sounds are normal. He exhibits no distension. There is no tenderness. There is no rebound.   Musculoskeletal: He exhibits no tenderness or deformity.   Mild to moderate thoracolumbar kyphoscoliosis.    Neurological: He is alert and oriented to person, place, and time.   Confusing statements frequently made     Skin: Skin is warm and dry. He is not diaphoretic. No erythema.   Psychiatric: He has a normal mood and affect.   Patient seeing bats and butterflies in room, slightly paranoid.     Nursing note and vitals reviewed.      Fluids    Intake/Output Summary (Last 24 hours) at 05/05/19 1620  Last data filed at 05/05/19 1455   Gross per 24 hour   Intake              996 ml   Output             1450 ml   Net             -454 ml       Laboratory  Recent Labs      05/04/19   0251   WBC  3.1*   RBC  3.58*   HEMOGLOBIN  10.2*   HEMATOCRIT  31.6*   MCV  88.3   MCH  28.5   MCHC  32.3*   RDW  49.1   PLATELETCT  89*   MPV  9.3     Recent Labs      05/04/19   0251  05/04/19   1842  05/05/19   0326   SODIUM  142   --   143   POTASSIUM  2.8*  3.1*  3.4*   CHLORIDE  112   --   116*   CO2  20   --   21   GLUCOSE  132*   --   87   BUN  4*   --   7*   CREATININE  0.91   --   0.70   CALCIUM  7.6*   --   7.5*                   Imaging  DX-ESOPHAGUS - BARIUM SWALLOW   Final Result      Small hiatal hernia       DX-CHEST-PORTABLE (1 VIEW)   Final Result      No acute cardiac or pulmonary abnormalities are identified.      DX-CHEST-PORTABLE (1 VIEW)   Final Result         No acute cardiac or pulmonary abnormality is identified.      [id number] VIEW   Final Result      1.  There is a nonobstructive nonspecific bowel gas pattern.  There is no evidence of free intraperitoneal air.      MR-LUMBAR SPINE-WITH & W/O   Final Result      1.  There is subacute fracture along the superior endplate of L1 vertebral body. There is no posterior retropulsion.   2.  There is no focal bone lesion.   3.  There is no evidence of infection.   4.  Mild degenerative disease as described above.      MR-BRAIN-W/O   Final Result      1.  Mild cerebral atrophy.   2.  Mild chronic microvascular ischemic disease.   3.  No acute abnormality.      DX-KNEE 2- LEFT   Final Result      1.  No fracture or dislocation of LEFT knee.   2.  Minimal degenerative changes.   3.  Diffuse vascular calcifications.      EC-ECHOCARDIOGRAM COMPLETE W/O CONT   Final Result      CT-ABDOMEN-PELVIS WITH   Final Result         1.  No acute abnormality.   2.  Hepatomegaly and diffuse hepatic steatosis   3.  Atherosclerosis and atherosclerotic coronary artery disease.   4.  Pulmonary nodules measuring up to 6.5 mm, see nodule follow-up recommendations below.      Low Risk: CT at 3-6 months, then consider CT at 18-24 months      High Risk: CT at 3-6 months, then at 18-24 months      Comments: Use most suspicious nodule as guide to management. Follow-up intervals may vary according to size and risk.      Low Risk - Minimal or absent history of smoking and of other known risk factors.      High Risk - History of smoking or of other known risk factors.      Note: These recommendations do not apply to lung cancer screening, patients with immunosuppression, or patients with known primary cancer.      Fleischner Society 2017 Guidelines for Management of Incidentally Detected  Pulmonary Nodules in Adults         CT-CTA CHEST PULMONARY ARTERY W/ RECONS   Final Result         1.  No large central pulmonary embolus is appreciated, evaluation of the subsegmental branches is essentially nondiagnostic due to motion artifacts. Additional imaging would be required for definitive exclusion of small distal pulmonary emboli.      DX-LUMBAR SPINE-2 OR 3 VIEWS   Final Result      Limited examination. Mild anterior wedging L2 vertebral body that appears to be old.   If symptoms are persistent, would recommend CT for further evaluation.      DX-THORACIC SPINE-WITH SWIMMERS VIEW   Final Result      Limited examination. The upper thoracic spine is not well-demonstrated.   Mild anterior wedging of some the lower thoracic vertebral bodies and appears old.   No definite acute compression. If symptoms are persistent, CT would be recommended for further evaluation.      DX-CHEST-PORTABLE (1 VIEW)   Final Result      Central catheter projects over the superior right atrium.      Prominent calcified granuloma in the left midlung.      Atherosclerotic plaque.      CT-HEAD W/O   Final Result      No acute intracranial abnormality is identified.      IR-VERTEBROPLASTY    (Results Pending)        Assessment/Plan  * Closed stable burst fracture of first lumbar vertebra with routine healing   Assessment & Plan    Lumbar MRI found L1 subacute fracture with no protrusion  Neurosurgery was consulted and they recommended brace placement and outpatient follow-up.  I discontinued IV morphine.  I encouraged him to use oral pain medication for pain control.  Physiatry evaluated him and recommended that is not a candidate for inpatient rehab.  Physical therapy evaluated and recommended transitional care facilities.  Currently is very difficult discharge as per physical therapy recommendation patient needs to go to skilled nursing facility but most of the skilled nursing facility has been declining him for criminal  record  Cognitive difficulties more prevalent since initiation of gabapentin - dose reduced and I will discontinue it (likely the cause of his dizziness also)  Discontinue MS Contin and transition him to Percocet and tramadol  Kyphoplasty unable to be completed today since patient required general anesthesia and would likely be completed on Monday  Started the patient on long-acting oxycodone  5/3: Scheduled for kyphoplasty under full anesthesia on Monday.  5/4: Pain is well controlled.   5/5: Plan for L1 vertebral augmentation under anesthesia tomorrow.       Pancytopenia (HCC)- (present on admission)   Assessment & Plan    Most likely due to myelodysplastic syndrome  His last chemotherapy was in November 2018  No acute symptoms.  Hematology/oncology was consulted and recommended outpatient follow-up.  Monitor for now.            Esophageal dysphagia   Assessment & Plan    Patient complains of dysphagia associated with nausea and vomiting  Patient refused further evaluation including barium swallow study.     Poor nutrition   Assessment & Plan    Poor oral intake past 48 hours, patient can eat, just isn't hungry  Poor urinary output - start NS at 83     Hallucinations   Assessment & Plan    Patient seeing butterflies and bats in his room  Re-consult psych for possible initiation of anti-psychotic.       Delirium   Assessment & Plan    Frequent confusing statements made by patient, unrelated to doses of pain medications  Psychiatry consultation appreciated  Discontinue Robaxin  Gabapentin discontinued  Discontinue morphine  Resolved        Left knee pain   Assessment & Plan    X ray found arthritis  Continue to provide him pain management.     Tachycardia   Assessment & Plan    Resolved  It was most likely from pain  metprolol 25 mg p.o. twice daily       Somnolence- (present on admission)   Assessment & Plan    Waxing and waning  Stopped Ambien and trazodone  Resolved, gabapentin likely contributing -  discontinued       Dizziness- (present on admission)   Assessment & Plan    Likely s/e of gabapentin - discontinued  Vitals are stable  Echo did not show any acute normalities  PT/OT  Negative orthostatics vitals         Plan of care discussed with multidisciplinary team during rounds.    VTE prophylaxis: heparin

## 2019-05-05 NOTE — PROGRESS NOTES
Pharmacy Pharmacotherapy Consult   LOS >30 days  Admit Date: 4/5/2019    Medications were reviewed for appropriateness and ongoing need.   Current Facility-Administered Medications   Medication Dose Route Frequency Provider Last Rate Last Dose   • potassium chloride SA (Kdur) tablet 40 mEq  40 mEq Oral BID Miryam Jaime M.D.   40 mEq at 05/05/19 0556   • gabapentin (NEURONTIN) capsule 100 mg  100 mg Oral TID Miryam Jaime M.D.   100 mg at 05/05/19 0556   • amitriptyline (ELAVIL) tablet 75 mg  75 mg Oral Nightly Nick Fermin M.D.   75 mg at 05/04/19 2242   • morphine (MS IR) tablet 15 mg  15 mg Oral Q4HRS PRN Miryam Jaime M.D.   15 mg at 05/05/19 0555   • zolpidem (AMBIEN) tablet 5 mg  5 mg Oral HS PRN Waylon Guerrier M.D.   5 mg at 05/04/19 2217   • lidocaine (LIDODERM) 5 % 2 Patch  2 Patch Transdermal Q24HR Nick Fermin M.D.   2 Patch at 05/04/19 2243   • acetaminophen (TYLENOL) tablet 650 mg  650 mg Oral Q4HRS PRN Waylon Guerrier M.D.   650 mg at 05/04/19 1832   • menthol (HALLS) lozenge 1 Lozenge  1 Lozenge Oral Q2HRS PRN Ainsley Cook D.OMiranda   1 Lozenge at 04/29/19 1722   • NS infusion   Intravenous Continuous Ainsley Cook D.O. 83 mL/hr at 05/05/19 0600     • haloperidol lactate (HALDOL) injection 2 mg  2 mg Intravenous Q4HRS PRN Ainsley Cook D.O.   2 mg at 04/29/19 1111   • senna-docusate (PERICOLACE or SENOKOT S) 8.6-50 MG per tablet 2 Tab  2 Tab Oral BID Ainsley Cook D.O.   Stopped at 05/04/19 0600    And   • polyethylene glycol/lytes (MIRALAX) PACKET 1 Packet  1 Packet Oral QDAY PRN Ainsley Cook D.O.        And   • magnesium hydroxide (MILK OF MAGNESIA) suspension 30 mL  30 mL Oral QDAY PRN BRAULIO UriosteguiO.        And   • bisacodyl (DULCOLAX) suppository 10 mg  10 mg Rectal QDAY PRN ADENIKE Uriostegui.O.       • heparin injection 5,000 Units  5,000 Units Subcutaneous Q8HRS Nick Fermin M.D.   5,000 Units at 05/04/19 0510   • heparin pf injection 300-500 Units   300-500 Units Intracatheter PRN Angelita Mathias M.D.       • vitamin D (Ergocalciferol) (DRISDOL) capsule 50,000 Units  50,000 Units Oral Q7 DAYS Waylon Guerrier M.D.   50,000 Units at 04/29/19 2056   • metoprolol (LOPRESSOR) tablet 25 mg  25 mg Oral BID Waylon Guerrier M.D.   Stopped at 05/04/19 1800   • Respiratory Care per Protocol   Nebulization Continuous RT Harris Guerrier M.D.       • ondansetron (ZOFRAN) syringe/vial injection 4 mg  4 mg Intravenous Q4HRS PRN Harris Guerrier M.D.   4 mg at 05/03/19 2058   • ondansetron (ZOFRAN ODT) dispertab 4 mg  4 mg Oral Q4HRS PRN Harris Guerrier M.D.   4 mg at 05/04/19 1832   • busPIRone (BUSPAR) tablet 15 mg  15 mg Oral BID Harris Guerrier M.D.   15 mg at 05/05/19 0555   • omeprazole (PRILOSEC) capsule 20 mg  20 mg Oral Q12HRS Harris Guerrier M.D.   20 mg at 05/05/19 0556     Recommendations:  1. Would consider adjustment of APAP dose to 500 mg Q6H as needed for pain.  2. Would consider 5 days of scheduled APAP therapy to assist in pain control due to noted 10/10 pain per latest RN charting.  3. Would consider increase of amitriptyline dose to 100 mg QHS to assist in pain control.   4. Would consider discontinuation of as needed portions of standard bowel protocol due to lack of use.  5. Would consider transition from heparin SQ for DVT prophylaxis to enoxaparin SQ to minimize medication administrations.  6. MIVF added likely due to report of poor/minimal appetite per chart review. Would consider transition from 0.9% NS to LR due to current hyperchloremia.  7. Would consider re-timing of vitamin D supplement for standard 0600 administration times.  8. Would consider discontinuation of menthol lozenges due to lack of use.  9. Would consider addition of MVI due to charted malnutrition.  10. Would consider replacement of both Mg and K as patient allows given last Mg level 5/4/19 1.4 mg/dL and K level 5/5/19 3.4 mmol/L.    Wolfgang Lewis, PharmD     Pharmacy Addendum:  Case discussed with   Addison. MAR updated per discussion.    Wolfgang Lewis, PharmD

## 2019-05-06 LAB
ALBUMIN SERPL BCP-MCNC: 2.7 G/DL (ref 3.2–4.9)
ALBUMIN/GLOB SERPL: 1.6 G/DL
ALP SERPL-CCNC: 93 U/L (ref 30–99)
ALT SERPL-CCNC: 7 U/L (ref 2–50)
ANION GAP SERPL CALC-SCNC: 6 MMOL/L (ref 0–11.9)
AST SERPL-CCNC: 17 U/L (ref 12–45)
BASOPHILS # BLD AUTO: 0.3 % (ref 0–1.8)
BASOPHILS # BLD: 0.01 K/UL (ref 0–0.12)
BILIRUB SERPL-MCNC: 0.3 MG/DL (ref 0.1–1.5)
BUN SERPL-MCNC: 6 MG/DL (ref 8–22)
CALCIUM SERPL-MCNC: 7.6 MG/DL (ref 8.5–10.5)
CHLORIDE SERPL-SCNC: 114 MMOL/L (ref 96–112)
CO2 SERPL-SCNC: 20 MMOL/L (ref 20–33)
CREAT SERPL-MCNC: 0.62 MG/DL (ref 0.5–1.4)
EOSINOPHIL # BLD AUTO: 0.2 K/UL (ref 0–0.51)
EOSINOPHIL NFR BLD: 7 % (ref 0–6.9)
ERYTHROCYTE [DISTWIDTH] IN BLOOD BY AUTOMATED COUNT: 50.6 FL (ref 35.9–50)
GLOBULIN SER CALC-MCNC: 1.7 G/DL (ref 1.9–3.5)
GLUCOSE SERPL-MCNC: 92 MG/DL (ref 65–99)
HCT VFR BLD AUTO: 27.6 % (ref 42–52)
HGB BLD-MCNC: 8.8 G/DL (ref 14–18)
IMM GRANULOCYTES # BLD AUTO: 0 K/UL (ref 0–0.11)
IMM GRANULOCYTES NFR BLD AUTO: 0 % (ref 0–0.9)
LYMPHOCYTES # BLD AUTO: 0.78 K/UL (ref 1–4.8)
LYMPHOCYTES NFR BLD: 27.3 % (ref 22–41)
MAGNESIUM SERPL-MCNC: 1.9 MG/DL (ref 1.5–2.5)
MCH RBC QN AUTO: 29.1 PG (ref 27–33)
MCHC RBC AUTO-ENTMCNC: 31.9 G/DL (ref 33.7–35.3)
MCV RBC AUTO: 91.4 FL (ref 81.4–97.8)
MONOCYTES # BLD AUTO: 0.26 K/UL (ref 0–0.85)
MONOCYTES NFR BLD AUTO: 9.1 % (ref 0–13.4)
NEUTROPHILS # BLD AUTO: 1.61 K/UL (ref 1.82–7.42)
NEUTROPHILS NFR BLD: 56.3 % (ref 44–72)
NRBC # BLD AUTO: 0 K/UL
NRBC BLD-RTO: 0 /100 WBC
PLATELET # BLD AUTO: 64 K/UL (ref 164–446)
PMV BLD AUTO: 8.9 FL (ref 9–12.9)
POTASSIUM SERPL-SCNC: 3.7 MMOL/L (ref 3.6–5.5)
PROT SERPL-MCNC: 4.4 G/DL (ref 6–8.2)
RBC # BLD AUTO: 3.02 M/UL (ref 4.7–6.1)
SODIUM SERPL-SCNC: 140 MMOL/L (ref 135–145)
WBC # BLD AUTO: 2.9 K/UL (ref 4.8–10.8)

## 2019-05-06 PROCEDURE — 97535 SELF CARE MNGMENT TRAINING: CPT

## 2019-05-06 PROCEDURE — 85025 COMPLETE CBC W/AUTO DIFF WBC: CPT

## 2019-05-06 PROCEDURE — 700101 HCHG RX REV CODE 250: Performed by: FAMILY MEDICINE

## 2019-05-06 PROCEDURE — A9270 NON-COVERED ITEM OR SERVICE: HCPCS | Performed by: INTERNAL MEDICINE

## 2019-05-06 PROCEDURE — 80053 COMPREHEN METABOLIC PANEL: CPT

## 2019-05-06 PROCEDURE — 700105 HCHG RX REV CODE 258: Performed by: FAMILY MEDICINE

## 2019-05-06 PROCEDURE — 700102 HCHG RX REV CODE 250 W/ 637 OVERRIDE(OP): Performed by: FAMILY MEDICINE

## 2019-05-06 PROCEDURE — 700111 HCHG RX REV CODE 636 W/ 250 OVERRIDE (IP): Performed by: FAMILY MEDICINE

## 2019-05-06 PROCEDURE — 83735 ASSAY OF MAGNESIUM: CPT

## 2019-05-06 PROCEDURE — 99232 SBSQ HOSP IP/OBS MODERATE 35: CPT | Performed by: FAMILY MEDICINE

## 2019-05-06 PROCEDURE — 700111 HCHG RX REV CODE 636 W/ 250 OVERRIDE (IP): Performed by: INTERNAL MEDICINE

## 2019-05-06 PROCEDURE — A9270 NON-COVERED ITEM OR SERVICE: HCPCS | Performed by: FAMILY MEDICINE

## 2019-05-06 PROCEDURE — 700102 HCHG RX REV CODE 250 W/ 637 OVERRIDE(OP): Performed by: INTERNAL MEDICINE

## 2019-05-06 PROCEDURE — 97530 THERAPEUTIC ACTIVITIES: CPT

## 2019-05-06 PROCEDURE — 770006 HCHG ROOM/CARE - MED/SURG/GYN SEMI*

## 2019-05-06 RX ADMIN — MORPHINE SULFATE 15 MG: 15 TABLET ORAL at 05:12

## 2019-05-06 RX ADMIN — OMEPRAZOLE 20 MG: 20 CAPSULE, DELAYED RELEASE ORAL at 05:12

## 2019-05-06 RX ADMIN — GABAPENTIN 100 MG: 100 CAPSULE ORAL at 05:13

## 2019-05-06 RX ADMIN — MORPHINE SULFATE 15 MG: 15 TABLET ORAL at 22:29

## 2019-05-06 RX ADMIN — ACETAMINOPHEN 500 MG: 500 TABLET ORAL at 18:34

## 2019-05-06 RX ADMIN — ACETAMINOPHEN 500 MG: 500 TABLET ORAL at 09:07

## 2019-05-06 RX ADMIN — SODIUM CHLORIDE, POTASSIUM CHLORIDE, SODIUM LACTATE AND CALCIUM CHLORIDE: 600; 310; 30; 20 INJECTION, SOLUTION INTRAVENOUS at 22:28

## 2019-05-06 RX ADMIN — ERGOCALCIFEROL 50000 UNITS: 1.25 CAPSULE ORAL at 07:30

## 2019-05-06 RX ADMIN — AMITRIPTYLINE HYDROCHLORIDE 75 MG: 75 TABLET, FILM COATED ORAL at 21:39

## 2019-05-06 RX ADMIN — OMEPRAZOLE 20 MG: 20 CAPSULE, DELAYED RELEASE ORAL at 18:34

## 2019-05-06 RX ADMIN — BUSPIRONE HYDROCHLORIDE 15 MG: 30 TABLET ORAL at 18:35

## 2019-05-06 RX ADMIN — HEPARIN SODIUM 5000 UNITS: 5000 INJECTION, SOLUTION INTRAVENOUS; SUBCUTANEOUS at 21:39

## 2019-05-06 RX ADMIN — TIZANIDINE 4 MG: 4 TABLET ORAL at 12:03

## 2019-05-06 RX ADMIN — MORPHINE SULFATE 15 MG: 15 TABLET ORAL at 18:34

## 2019-05-06 RX ADMIN — ACETAMINOPHEN 500 MG: 500 TABLET ORAL at 12:03

## 2019-05-06 RX ADMIN — LIDOCAINE 2 PATCH: 50 PATCH TOPICAL at 22:29

## 2019-05-06 RX ADMIN — PROMETHAZINE HYDROCHLORIDE 12.5 MG: 25 TABLET ORAL at 09:06

## 2019-05-06 RX ADMIN — THERA TABS 1 TABLET: TAB at 05:13

## 2019-05-06 RX ADMIN — GABAPENTIN 100 MG: 100 CAPSULE ORAL at 12:04

## 2019-05-06 RX ADMIN — ONDANSETRON 4 MG: 2 SOLUTION INTRAMUSCULAR; INTRAVENOUS at 05:13

## 2019-05-06 RX ADMIN — MORPHINE SULFATE 15 MG: 15 TABLET ORAL at 09:07

## 2019-05-06 RX ADMIN — ZOLPIDEM TARTRATE 5 MG: 5 TABLET ORAL at 21:39

## 2019-05-06 RX ADMIN — METOPROLOL TARTRATE 25 MG: 25 TABLET ORAL at 05:13

## 2019-05-06 RX ADMIN — BUSPIRONE HYDROCHLORIDE 15 MG: 30 TABLET ORAL at 07:01

## 2019-05-06 RX ADMIN — SODIUM CHLORIDE, POTASSIUM CHLORIDE, SODIUM LACTATE AND CALCIUM CHLORIDE: 600; 310; 30; 20 INJECTION, SOLUTION INTRAVENOUS at 07:01

## 2019-05-06 ASSESSMENT — ENCOUNTER SYMPTOMS
INSOMNIA: 0
ABDOMINAL PAIN: 0
COUGH: 0
SENSORY CHANGE: 0
DIAPHORESIS: 0
CHILLS: 0
EYE PAIN: 0
NERVOUS/ANXIOUS: 1
TINGLING: 0
DIARRHEA: 0
BACK PAIN: 1
CONSTIPATION: 0
NAUSEA: 0
HEARTBURN: 0
DOUBLE VISION: 0
SPUTUM PRODUCTION: 0
PALPITATIONS: 0
VOMITING: 0
ORTHOPNEA: 0
HEADACHES: 0
BLURRED VISION: 0
DIZZINESS: 0
WEIGHT LOSS: 0
HALLUCINATIONS: 0
PHOTOPHOBIA: 0
MYALGIAS: 1
CLAUDICATION: 0
FOCAL WEAKNESS: 0
HEMOPTYSIS: 0
SPEECH CHANGE: 0
TREMORS: 0
DEPRESSION: 0
FEVER: 0

## 2019-05-06 ASSESSMENT — COGNITIVE AND FUNCTIONAL STATUS - GENERAL
WALKING IN HOSPITAL ROOM: A LOT
MOVING FROM LYING ON BACK TO SITTING ON SIDE OF FLAT BED: A LOT
CLIMB 3 TO 5 STEPS WITH RAILING: TOTAL
SUGGESTED CMS G CODE MODIFIER MOBILITY: CK
STANDING UP FROM CHAIR USING ARMS: A LOT
MOBILITY SCORE: 15

## 2019-05-06 ASSESSMENT — GAIT ASSESSMENTS
ASSISTIVE DEVICE: FRONT WHEEL WALKER
GAIT LEVEL OF ASSIST: MODERATE ASSIST
DISTANCE (FEET): 5

## 2019-05-06 ASSESSMENT — LIFESTYLE VARIABLES: SUBSTANCE_ABUSE: 0

## 2019-05-06 NOTE — THERAPY
"Physical Therapy Treatment completed.   Bed Mobility:  Supine to Sit: Modified Independent  Transfers: Sit to Stand: Moderate Assist  Gait: Level Of Assist: Moderate Assist with Front-Wheel Walker       Plan of Care: Will benefit from Physical Therapy 2 times per week  Discharge Recommendations: Equipment: Will Continue to Assess for Equipment Needs.Recommend inpatient transitional care services for continued physical therapy services.      See \"Rehab Therapy-Acute\" Patient Summary Report for complete documentation.     Pt required encouragement and reinforcement to participate with therapy this session. Pt continued to report he is not able stand or ambulat at this time. Per RN, pt was was able to stand and transfer to chair with the CNA. Pt declined that he was able to do so. Pt was eventually agreeable to attempt standing. Upon standing pt demonstrate with unsteady balance, anterior/posterior sway, with knee bent. Pt was able to stand staticly with Min A, however, when he attempts to ambulate pt demonstrates with multiple LOB and was able to sit down with control and no ashley LOB reporting he was falling. Pt was then able to stand again and attempt a few steps forward and back. Pt reports he is falling, however, pt was able to control is descent back down to the EOB. Pt presents with inconsistent demonstration with balance at this time. Pt encouarged to keep TLSO when OOB, however, pt refused to keep TLSO on. Pt encouraged to eat up in chair, however, pt refused and returned back to bed in supine position without any assist. Will continue to follow while in house, with previous recommendations in place.   "

## 2019-05-06 NOTE — PROGRESS NOTES
"Received report from day shift RN. Assumed patient care at 1900. Patient resting comfortably. Currently rates pain a 9/10. Pain medication last given at 1804. Patient scheduled for L1 vertebral augmentation tomorrow. Patient will be NPO at midnight. Bed locked and in the lowest position. Call light within reach. RN and CNA numbers provided.      /78   Pulse 87   Temp 36.1 °C (97 °F) (Temporal)   Resp 18   Ht 1.803 m (5' 11\")   Wt 95.2 kg (209 lb 14.1 oz)   SpO2 99%   BMI 29.27 kg/m²        4:58 AM Patient slept for majority of night. Pain controlled with PO Morphine. Pre-op checklist completed. CHG wash done.   "

## 2019-05-06 NOTE — PROGRESS NOTES
Hospital Medicine Daily Progress Note    Date of Service  5/6/2019    Chief Complaint  53 y.o. male admitted 4/5/2019 with back pain, found to have L1 burst fracture.    Hospital Course    Patient admitted with L1 burst fracture, conservative bracing recommended by neurosurgeon.  Patient with difficult to control pain and trouble with therapies and difficult discharge due to social situation.      Interval Problem Update  4/13 Patient moved from telemetry to medical floor, states pain uncontrolled and requesting IV pain medication, 1 dose IV morphine ordered as needed for severe pain but I've adjusted his percocet from 5mg to 10 mg and increased robaxin and made it scheduled and increased his gabapentin for better pain control.  4/14 Patient states he had rest following morphine dose overnight but pain uncontrolled still with previous changes, will add oxycontin 20 bid as long acting pain medication to help control pain and allow patient to participate in therapies.  Rehab consult placed.  4/15 Patient up to side of bed in [facility] after working with therapy today, complains of uncontrolled pain with the changes made.  I doubt he is having response to oxycodone/oxycontin so will discontinue this and rotate opiates to morphine - MS contin and MSIR.  Physiatry consult pending for recommendations to help get patient out of the hospital.  4/23 Patient s/p cognitive evaluation and showed decline since previous evaluation.  His mentation seemed to change with the initiation of gabapentin that I started increasing on 4/13.  I will discontinue the gabapentin as it does not seem to be helping with his pain control but he is having cog deficits and dizziness - especially with dose increases.  Patient is agreeable to this.  He does not need IV morphine and pain can be controlled with PO only.  4/24 Patient seems more awake and alert today with dc of gabapentin.  He states the dizziness is still present.  Cognitive status seems to  have improved since yesterday also.  He needs continued improvement before he is safe to leave the hospital to an independent setting, no availability for placement given his criminal record and active warrants.  4/25 Patient awake but still making confusing statements, yesterday he wanted paperwork to fill out for a [employer] authorization.  He is making these statements more frequently and I've requested psychiatry to weigh in on their thoughts of his confusion.    4/26 Patient without complaints today, back pain same.  Makes confusing statements but less often.    4/27 Patient with residual pain, no increase in pain medication due to his hallucinations.  Will have psychiatry re-evaluate patient for medication recommendations, likely an antipsychotic medication to help lessen his hallucinations.  4/28 Patient continues to have disorganized thoughts and hallucinations.  I anticipate psychiatry to consult again on Monday.  No acute changes, therapies to resume on Monday.  4/29 Patient with continued decline, he is not eating nor drinking meals and states he's not hungry.  I've started IV fluids and am awaiting psychiatry re-evaluation as he is actively hallucinating.  I will also decrease his MS contin as he appears comfortable and this could also be contributing to his mental suppression.  4/30: Patient still having episodes of confusion.  I have discontinued his MS Contin and morphine since I think it is attributing to his nausea, vomiting and confusion.  I replaced it with Percocet and tramadol.  5/1: After discussing his case with radiology who determined that he would be a good candidate for kyphoplasty.  His overall mental status has improved after discontinuing the medications.  He complained about no increase in pain.  5/2: Patient seen and examined by me he was having increased amounts of pain.  I restarted long-acting oxycodone to see if his pain was resolved.  The kyphoplasty was unable to be completed  since the patient required general anesthesia.  Patient is a difficult discharge and will need to be walking before we can send him home.  5/3: Resting comfortably in bed. Asking for more pain medicine however did not look in pain or distress. No issues overnight per staff.   5/4: Requesting more pain medicine (IV).  Looks comfortable with no distress however.  No issues overnight per staff.  Awake and alert.  Interactive.  Mental status at baseline.  5/5: Continues to ask for more pain medicine.  Stated that he is unable to stand without excruciating pain.  No distress noted however upon exam.  Patient looks comfortable with no apparent distress or suffering noted.  No issues overnight per staff.  Keep n.p.o. after midnight.  5/6: Resting comfortably in bed.  Pain is fairly controlled.  Kyphoplasty procedure postponed till tomorrow.  Patient has no new complaints to report.  Consultants/Specialty  [facility] - [place] - s/o  Psych - s/o, re-consult    Code Status  full    Disposition  No placement available, needs improved cognitive function until safe to discharge to his own independence.    Review of Systems  Review of Systems   Constitutional: Negative for chills, diaphoresis, fever, malaise/fatigue and weight loss.   HENT: Negative for ear discharge, ear pain, hearing loss, nosebleeds and tinnitus.    Eyes: Negative for blurred vision, double vision, photophobia and pain.   Respiratory: Negative for cough, hemoptysis and sputum production.    Cardiovascular: Negative for chest pain, palpitations, orthopnea and claudication.   Gastrointestinal: Negative for abdominal pain, constipation, diarrhea, heartburn, nausea and vomiting.   Genitourinary: Negative for dysuria and urgency.   Musculoskeletal: Positive for back pain (Stable ) and myalgias. Negative for joint pain.   Skin: Negative for itching and rash.   Neurological: Negative for dizziness, tingling, tremors, sensory change, speech change, focal weakness and  headaches.   Psychiatric/Behavioral: Negative for depression, hallucinations, substance abuse and suicidal ideas. The patient is nervous/anxious. The patient does not have insomnia.         Physical Exam  Temp:  [36.1 °C (97 °F)-36.7 °C (98 °F)] 36.7 °C (98 °F)  Pulse:  [] 83  Resp:  [17-18] 17  BP: (124-140)/(75-94) 124/75  SpO2:  [99 %-100 %] 100 %    Physical Exam   Constitutional: He is oriented to person, place, and time. He appears well-developed and well-nourished. No distress.   HENT:   Head: Normocephalic and atraumatic.   Eyes: Conjunctivae are normal. No scleral icterus.   Neck: Neck supple. No thyromegaly present.   Cardiovascular: Normal rate, regular rhythm and normal heart sounds.  Exam reveals no gallop and no friction rub.    Pulmonary/Chest: Effort normal. No respiratory distress. He has no wheezes.   Abdominal: Soft. Bowel sounds are normal. He exhibits no distension. There is no tenderness. There is no rebound.   Musculoskeletal: He exhibits no tenderness or deformity.   Mild to moderate thoracolumbar kyphoscoliosis.    Neurological: He is alert and oriented to person, place, and time.   Confusing statements frequently made     Skin: Skin is warm and dry. He is not diaphoretic. No erythema.   Psychiatric: He has a normal mood and affect.   Patient seeing bats and butterflies in room, slightly paranoid.     Nursing note and vitals reviewed.      Fluids    Intake/Output Summary (Last 24 hours) at 05/06/19 1522  Last data filed at 05/06/19 0701   Gross per 24 hour   Intake              996 ml   Output              600 ml   Net              396 ml       Laboratory  Recent Labs      05/04/19   0251  05/06/19   0915   WBC  3.1*  2.9*   RBC  3.58*  3.02*   HEMOGLOBIN  10.2*  8.8*   HEMATOCRIT  31.6*  27.6*   MCV  88.3  91.4   MCH  28.5  29.1   MCHC  32.3*  31.9*   RDW  49.1  50.6*   PLATELETCT  89*  64*   MPV  9.3  8.9*     Recent Labs      05/04/19   0251  05/04/19   1842  05/05/19   0326   05/06/19   0915   SODIUM  142   --   143  140   POTASSIUM  2.8*  3.1*  3.4*  3.7   CHLORIDE  112   --   116*  114*   CO2  20   --   21  20   GLUCOSE  132*   --   87  92   BUN  4*   --   7*  6*   CREATININE  0.91   --   0.70  0.62   CALCIUM  7.6*   --   7.5*  7.6*                   Imaging  DX-ESOPHAGUS - BARIUM SWALLOW   Final Result      Small hiatal hernia      DX-CHEST-PORTABLE (1 VIEW)   Final Result      No acute cardiac or pulmonary abnormalities are identified.      DX-CHEST-PORTABLE (1 VIEW)   Final Result         No acute cardiac or pulmonary abnormality is identified.      [id number] VIEW   Final Result      1.  There is a nonobstructive nonspecific bowel gas pattern.  There is no evidence of free intraperitoneal air.      MR-LUMBAR SPINE-WITH & W/O   Final Result      1.  There is subacute fracture along the superior endplate of L1 vertebral body. There is no posterior retropulsion.   2.  There is no focal bone lesion.   3.  There is no evidence of infection.   4.  Mild degenerative disease as described above.      MR-BRAIN-W/O   Final Result      1.  Mild cerebral atrophy.   2.  Mild chronic microvascular ischemic disease.   3.  No acute abnormality.      DX-KNEE 2- LEFT   Final Result      1.  No fracture or dislocation of LEFT knee.   2.  Minimal degenerative changes.   3.  Diffuse vascular calcifications.      EC-ECHOCARDIOGRAM COMPLETE W/O CONT   Final Result      CT-ABDOMEN-PELVIS WITH   Final Result         1.  No acute abnormality.   2.  Hepatomegaly and diffuse hepatic steatosis   3.  Atherosclerosis and atherosclerotic coronary artery disease.   4.  Pulmonary nodules measuring up to 6.5 mm, see nodule follow-up recommendations below.      Low Risk: CT at 3-6 months, then consider CT at 18-24 months      High Risk: CT at 3-6 months, then at 18-24 months      Comments: Use most suspicious nodule as guide to management. Follow-up intervals may vary according to size and risk.      Low Risk -  Minimal or absent history of smoking and of other known risk factors.      High Risk - History of smoking or of other known risk factors.      Note: These recommendations do not apply to lung cancer screening, patients with immunosuppression, or patients with known primary cancer.      Fleischner Society 2017 Guidelines for Management of Incidentally Detected Pulmonary Nodules in Adults         CT-CTA CHEST PULMONARY ARTERY W/ RECONS   Final Result         1.  No large central pulmonary embolus is appreciated, evaluation of the subsegmental branches is essentially nondiagnostic due to motion artifacts. Additional imaging would be required for definitive exclusion of small distal pulmonary emboli.      DX-LUMBAR SPINE-2 OR 3 VIEWS   Final Result      Limited examination. Mild anterior wedging L2 vertebral body that appears to be old.   If symptoms are persistent, would recommend CT for further evaluation.      DX-THORACIC SPINE-WITH SWIMMERS VIEW   Final Result      Limited examination. The upper thoracic spine is not well-demonstrated.   Mild anterior wedging of some the lower thoracic vertebral bodies and appears old.   No definite acute compression. If symptoms are persistent, CT would be recommended for further evaluation.      DX-CHEST-PORTABLE (1 VIEW)   Final Result      Central catheter projects over the superior right atrium.      Prominent calcified granuloma in the left midlung.      Atherosclerotic plaque.      CT-HEAD W/O   Final Result      No acute intracranial abnormality is identified.      IR-VERTEBROPLASTY    (Results Pending)        Assessment/Plan  * Closed stable burst fracture of first lumbar vertebra with routine healing   Assessment & Plan    Lumbar MRI found L1 subacute fracture with no protrusion  Neurosurgery was consulted and they recommended brace placement and outpatient follow-up.  I discontinued IV morphine.  I encouraged him to use oral pain medication for pain control.  Physiatry  evaluated him and recommended that is not a candidate for inpatient rehab.  Physical therapy evaluated and recommended transitional care facilities.  Currently is very difficult discharge as per physical therapy recommendation patient needs to go to skilled nursing facility but most of the skilled nursing facility has been declining him for criminal record  Cognitive difficulties more prevalent since initiation of gabapentin - dose reduced and I will discontinue it (likely the cause of his dizziness also)  Discontinue MS Contin and transition him to Percocet and tramadol  Kyphoplasty unable to be completed today since patient required general anesthesia and would likely be completed on Monday  Started the patient on long-acting oxycodone  5/3: Scheduled for kyphoplasty under full anesthesia on Monday.  5/4: Pain is well controlled.   5/5: Plan for L1 vertebral augmentation under anesthesia tomorrow.  5/6: Vertebral augmentation surgery was postponed till tomorrow.       Pancytopenia (HCC)- (present on admission)   Assessment & Plan    Most likely due to myelodysplastic syndrome  His last chemotherapy was in November 2018  No acute symptoms.  Hematology/oncology was consulted and recommended outpatient follow-up.  Monitor for now.            Esophageal dysphagia   Assessment & Plan    Patient complains of dysphagia associated with nausea and vomiting  Patient refused further evaluation including barium swallow study.     Poor nutrition   Assessment & Plan    Allow supplements between meals.  Dietitian following.     Hallucinations   Assessment & Plan    Patient seeing butterflies and bats in his room  Psychiatry did not recommend any antipsychotic medications at this point.       Delirium   Assessment & Plan    Frequent confusing statements made by patient, unrelated to doses of pain medications  Psychiatry consultation appreciated  Discontinue Robaxin  Gabapentin discontinued  Discontinue morphine  Resolved         Left knee pain   Assessment & Plan    X ray showed arthritis with no acute bony abnormalities or fractures.  Continue to provide him pain management.     Tachycardia   Assessment & Plan    Resolved  It was most likely from pain  metprolol 25 mg p.o. twice daily       Somnolence- (present on admission)   Assessment & Plan    Waxing and waning  Stopped Ambien and trazodone  Resolved, gabapentin likely contributing - discontinued       Dizziness- (present on admission)   Assessment & Plan    Likely s/e of gabapentin - discontinued  Vitals are stable  Echo did not show any acute normalities  PT/OT  Negative orthostatics vitals         Plan of care discussed with multidisciplinary team during rounds.    VTE prophylaxis: heparin

## 2019-05-06 NOTE — CARE PLAN
Problem: Pain Management  Goal: Pain level will decrease to patient's comfort goal  Outcome: PROGRESSING AS EXPECTED  Pain assessed frequently throughout shift. Pain medication provided prn with relief of pain.     Problem: Knowledge Deficit  Goal: Knowledge of disease process/condition, treatment plan, diagnostic tests, and medications will improve  Outcome: PROGRESSING AS EXPECTED  Patient updated on plan of care. L1 vertebral augmentation scheduled today. Patient aware. NPO at midnight.

## 2019-05-07 LAB
ALBUMIN SERPL BCP-MCNC: 2.5 G/DL (ref 3.2–4.9)
ALBUMIN/GLOB SERPL: 1.3 G/DL
ALP SERPL-CCNC: 85 U/L (ref 30–99)
ALT SERPL-CCNC: 8 U/L (ref 2–50)
AMMONIA PLAS-SCNC: 40 UMOL/L (ref 11–45)
ANION GAP SERPL CALC-SCNC: 4 MMOL/L (ref 0–11.9)
AST SERPL-CCNC: 19 U/L (ref 12–45)
BASOPHILS # BLD AUTO: 0.4 % (ref 0–1.8)
BASOPHILS # BLD: 0.01 K/UL (ref 0–0.12)
BILIRUB SERPL-MCNC: 0.3 MG/DL (ref 0.1–1.5)
BUN SERPL-MCNC: 8 MG/DL (ref 8–22)
CALCIUM SERPL-MCNC: 8 MG/DL (ref 8.5–10.5)
CHLORIDE SERPL-SCNC: 109 MMOL/L (ref 96–112)
CO2 SERPL-SCNC: 26 MMOL/L (ref 20–33)
CREAT SERPL-MCNC: 0.59 MG/DL (ref 0.5–1.4)
EOSINOPHIL # BLD AUTO: 0.16 K/UL (ref 0–0.51)
EOSINOPHIL NFR BLD: 6.5 % (ref 0–6.9)
ERYTHROCYTE [DISTWIDTH] IN BLOOD BY AUTOMATED COUNT: 48.6 FL (ref 35.9–50)
GLOBULIN SER CALC-MCNC: 2 G/DL (ref 1.9–3.5)
GLUCOSE SERPL-MCNC: 91 MG/DL (ref 65–99)
HCT VFR BLD AUTO: 26.3 % (ref 42–52)
HGB BLD-MCNC: 8.5 G/DL (ref 14–18)
IMM GRANULOCYTES # BLD AUTO: 0.01 K/UL (ref 0–0.11)
IMM GRANULOCYTES NFR BLD AUTO: 0.4 % (ref 0–0.9)
LYMPHOCYTES # BLD AUTO: 0.86 K/UL (ref 1–4.8)
LYMPHOCYTES NFR BLD: 35 % (ref 22–41)
MCH RBC QN AUTO: 29.2 PG (ref 27–33)
MCHC RBC AUTO-ENTMCNC: 32.3 G/DL (ref 33.7–35.3)
MCV RBC AUTO: 90.4 FL (ref 81.4–97.8)
MONOCYTES # BLD AUTO: 0.25 K/UL (ref 0–0.85)
MONOCYTES NFR BLD AUTO: 10.2 % (ref 0–13.4)
NEUTROPHILS # BLD AUTO: 1.17 K/UL (ref 1.82–7.42)
NEUTROPHILS NFR BLD: 47.5 % (ref 44–72)
NRBC # BLD AUTO: 0 K/UL
NRBC BLD-RTO: 0 /100 WBC
PHOSPHATE SERPL-MCNC: 2.2 MG/DL (ref 2.5–4.5)
PLATELET # BLD AUTO: 62 K/UL (ref 164–446)
PMV BLD AUTO: 10.2 FL (ref 9–12.9)
POTASSIUM SERPL-SCNC: 3.8 MMOL/L (ref 3.6–5.5)
PROT SERPL-MCNC: 4.5 G/DL (ref 6–8.2)
RBC # BLD AUTO: 2.91 M/UL (ref 4.7–6.1)
SODIUM SERPL-SCNC: 139 MMOL/L (ref 135–145)
WBC # BLD AUTO: 2.5 K/UL (ref 4.8–10.8)

## 2019-05-07 PROCEDURE — 85025 COMPLETE CBC W/AUTO DIFF WBC: CPT

## 2019-05-07 PROCEDURE — A9270 NON-COVERED ITEM OR SERVICE: HCPCS | Performed by: INTERNAL MEDICINE

## 2019-05-07 PROCEDURE — 84100 ASSAY OF PHOSPHORUS: CPT

## 2019-05-07 PROCEDURE — A9270 NON-COVERED ITEM OR SERVICE: HCPCS | Performed by: FAMILY MEDICINE

## 2019-05-07 PROCEDURE — 700105 HCHG RX REV CODE 258: Performed by: FAMILY MEDICINE

## 2019-05-07 PROCEDURE — 770006 HCHG ROOM/CARE - MED/SURG/GYN SEMI*

## 2019-05-07 PROCEDURE — 80053 COMPREHEN METABOLIC PANEL: CPT

## 2019-05-07 PROCEDURE — 99232 SBSQ HOSP IP/OBS MODERATE 35: CPT | Performed by: INTERNAL MEDICINE

## 2019-05-07 PROCEDURE — 700111 HCHG RX REV CODE 636 W/ 250 OVERRIDE (IP): Performed by: FAMILY MEDICINE

## 2019-05-07 PROCEDURE — 700102 HCHG RX REV CODE 250 W/ 637 OVERRIDE(OP): Performed by: INTERNAL MEDICINE

## 2019-05-07 PROCEDURE — 700111 HCHG RX REV CODE 636 W/ 250 OVERRIDE (IP): Performed by: INTERNAL MEDICINE

## 2019-05-07 PROCEDURE — 700101 HCHG RX REV CODE 250: Performed by: FAMILY MEDICINE

## 2019-05-07 PROCEDURE — 82140 ASSAY OF AMMONIA: CPT

## 2019-05-07 PROCEDURE — 700102 HCHG RX REV CODE 250 W/ 637 OVERRIDE(OP): Performed by: FAMILY MEDICINE

## 2019-05-07 RX ORDER — MORPHINE SULFATE 4 MG/ML
4-6 INJECTION, SOLUTION INTRAMUSCULAR; INTRAVENOUS EVERY 12 HOURS PRN
Status: DISCONTINUED | OUTPATIENT
Start: 2019-05-07 | End: 2019-05-10

## 2019-05-07 RX ORDER — MORPHINE SULFATE 4 MG/ML
2 INJECTION, SOLUTION INTRAMUSCULAR; INTRAVENOUS ONCE
Status: COMPLETED | OUTPATIENT
Start: 2019-05-07 | End: 2019-05-07

## 2019-05-07 RX ORDER — DIPHENHYDRAMINE HCL 25 MG
25 TABLET ORAL ONCE
Status: COMPLETED | OUTPATIENT
Start: 2019-05-07 | End: 2019-05-07

## 2019-05-07 RX ADMIN — ACETAMINOPHEN 500 MG: 500 TABLET ORAL at 17:56

## 2019-05-07 RX ADMIN — BUSPIRONE HYDROCHLORIDE 15 MG: 30 TABLET ORAL at 05:05

## 2019-05-07 RX ADMIN — MORPHINE SULFATE 15 MG: 15 TABLET ORAL at 22:06

## 2019-05-07 RX ADMIN — MORPHINE SULFATE 15 MG: 15 TABLET ORAL at 08:40

## 2019-05-07 RX ADMIN — ACETAMINOPHEN 500 MG: 500 TABLET ORAL at 23:55

## 2019-05-07 RX ADMIN — ACETAMINOPHEN 500 MG: 500 TABLET ORAL at 13:03

## 2019-05-07 RX ADMIN — SODIUM CHLORIDE, POTASSIUM CHLORIDE, SODIUM LACTATE AND CALCIUM CHLORIDE: 600; 310; 30; 20 INJECTION, SOLUTION INTRAVENOUS at 23:28

## 2019-05-07 RX ADMIN — SODIUM CHLORIDE, POTASSIUM CHLORIDE, SODIUM LACTATE AND CALCIUM CHLORIDE: 600; 310; 30; 20 INJECTION, SOLUTION INTRAVENOUS at 11:28

## 2019-05-07 RX ADMIN — HEPARIN SODIUM 5000 UNITS: 5000 INJECTION, SOLUTION INTRAVENOUS; SUBCUTANEOUS at 22:06

## 2019-05-07 RX ADMIN — AMITRIPTYLINE HYDROCHLORIDE 75 MG: 75 TABLET, FILM COATED ORAL at 22:06

## 2019-05-07 RX ADMIN — OMEPRAZOLE 20 MG: 20 CAPSULE, DELAYED RELEASE ORAL at 17:56

## 2019-05-07 RX ADMIN — ZOLPIDEM TARTRATE 5 MG: 5 TABLET ORAL at 22:05

## 2019-05-07 RX ADMIN — MORPHINE SULFATE 2 MG: 4 INJECTION INTRAVENOUS at 18:06

## 2019-05-07 RX ADMIN — BUSPIRONE HYDROCHLORIDE 15 MG: 30 TABLET ORAL at 17:56

## 2019-05-07 RX ADMIN — MORPHINE SULFATE 15 MG: 15 TABLET ORAL at 13:04

## 2019-05-07 RX ADMIN — DIPHENHYDRAMINE HCL 25 MG: 25 TABLET ORAL at 18:07

## 2019-05-07 RX ADMIN — TIZANIDINE 4 MG: 4 TABLET ORAL at 14:36

## 2019-05-07 RX ADMIN — THERA TABS 1 TABLET: TAB at 05:06

## 2019-05-07 RX ADMIN — METOPROLOL TARTRATE 25 MG: 25 TABLET ORAL at 17:57

## 2019-05-07 RX ADMIN — MORPHINE SULFATE 15 MG: 15 TABLET ORAL at 03:41

## 2019-05-07 RX ADMIN — ACETAMINOPHEN 500 MG: 500 TABLET ORAL at 08:40

## 2019-05-07 RX ADMIN — MORPHINE SULFATE 15 MG: 15 TABLET ORAL at 18:09

## 2019-05-07 RX ADMIN — HEPARIN SODIUM 5000 UNITS: 5000 INJECTION, SOLUTION INTRAVENOUS; SUBCUTANEOUS at 14:15

## 2019-05-07 RX ADMIN — ONDANSETRON 4 MG: 4 TABLET, ORALLY DISINTEGRATING ORAL at 20:51

## 2019-05-07 RX ADMIN — LIDOCAINE 2 PATCH: 50 PATCH TOPICAL at 23:56

## 2019-05-07 RX ADMIN — METOPROLOL TARTRATE 25 MG: 25 TABLET ORAL at 05:08

## 2019-05-07 ASSESSMENT — ENCOUNTER SYMPTOMS
ORTHOPNEA: 0
DIAPHORESIS: 0
INSOMNIA: 0
CHILLS: 0
DOUBLE VISION: 0
PALPITATIONS: 0
POLYDIPSIA: 0
CLAUDICATION: 0
BACK PAIN: 1
PHOTOPHOBIA: 0
WEIGHT LOSS: 0
VOMITING: 0
HALLUCINATIONS: 0
NAUSEA: 0
COUGH: 0
HEADACHES: 0
DIARRHEA: 0
SPUTUM PRODUCTION: 0
HEARTBURN: 0
TINGLING: 0
HEMOPTYSIS: 0
BLURRED VISION: 0
SENSORY CHANGE: 0
MYALGIAS: 1
DIZZINESS: 0
ABDOMINAL PAIN: 0
DEPRESSION: 0
NERVOUS/ANXIOUS: 1
SPEECH CHANGE: 0
TREMORS: 0
EYE PAIN: 0
FEVER: 0
CONSTIPATION: 0
BRUISES/BLEEDS EASILY: 0
FOCAL WEAKNESS: 0

## 2019-05-07 ASSESSMENT — LIFESTYLE VARIABLES: SUBSTANCE_ABUSE: 0

## 2019-05-07 NOTE — PROGRESS NOTES
Received report from day shift RN. Assumed patient care at 1900. Patient resting comfortably. Morphine provided for pain relief. L1 vertebral augmentation scheduled tomorrow. Patient to be NPO at midnight. Bed locked and in the lowest position. Call light within reach. RN and CNA numbers provided.

## 2019-05-07 NOTE — PROGRESS NOTES
Hospital Medicine Daily Progress Note    Date of Service  5/7/2019    Chief Complaint  53 y.o. male admitted 4/5/2019 with back pain, found to have L1 burst fracture.    Hospital Course    Patient admitted with L1 burst fracture, conservative bracing recommended by neurosurgeon.  Patient with difficult to control pain and trouble with therapies and difficult discharge due to social situation.      Interval Problem Update  4/13 Patient moved from telemetry to medical floor, states pain uncontrolled and requesting IV pain medication, 1 dose IV morphine ordered as needed for severe pain but I've adjusted his percocet from 5mg to 10 mg and increased robaxin and made it scheduled and increased his gabapentin for better pain control.  4/14 Patient states he had rest following morphine dose overnight but pain uncontrolled still with previous changes, will add oxycontin 20 bid as long acting pain medication to help control pain and allow patient to participate in therapies.  Rehab consult placed.  4/15 Patient up to side of bed in TLSO after working with therapy today, complains of uncontrolled pain with the changes made.  I doubt he is having response to oxycodone/oxycontin so will discontinue this and rotate opiates to morphine - MS contin and MSIR.  Physiatry consult pending for recommendations to help get patient out of the hospital.  4/23 Patient s/p cognitive evaluation and showed decline since previous evaluation.  His mentation seemed to change with the initiation of gabapentin that I started increasing on 4/13.  I will discontinue the gabapentin as it does not seem to be helping with his pain control but he is having cog deficits and dizziness - especially with dose increases.  Patient is agreeable to this.  He does not need IV morphine and pain can be controlled with PO only.  4/24 Patient seems more awake and alert today with dc of gabapentin.  He states the dizziness is still present.  Cognitive status seems to  have improved since yesterday also.  He needs continued improvement before he is safe to leave the hospital to an independent setting, no availability for placement given his criminal record and active warrants.  4/25 Patient awake but still making confusing statements, yesterday he wanted paperwork to fill out for a [employer] authorization.  He is making these statements more frequently and I've requested psychiatry to weigh in on their thoughts of his confusion.    4/26 Patient without complaints today, back pain same.  Makes confusing statements but less often.    4/27 Patient with residual pain, no increase in pain medication due to his hallucinations.  Will have psychiatry re-evaluate patient for medication recommendations, likely an antipsychotic medication to help lessen his hallucinations.  4/28 Patient continues to have disorganized thoughts and hallucinations.  I anticipate psychiatry to consult again on Monday.  No acute changes, therapies to resume on Monday.  4/29 Patient with continued decline, he is not eating nor drinking meals and states he's not hungry.  I've started IV fluids and am awaiting psychiatry re-evaluation as he is actively hallucinating.  I will also decrease his MS contin as he appears comfortable and this could also be contributing to his mental suppression.  4/30: Patient still having episodes of confusion.  I have discontinued his MS Contin and morphine since I think it is attributing to his nausea, vomiting and confusion.  I replaced it with Percocet and tramadol.  5/1: After discussing his case with radiology who determined that he would be a good candidate for kyphoplasty.  His overall mental status has improved after discontinuing the medications.  He complained about no increase in pain.  5/2: Patient seen and examined by me he was having increased amounts of pain.  I restarted long-acting oxycodone to see if his pain was resolved.  The kyphoplasty was unable to be completed  since the patient required general anesthesia.  Patient is a difficult discharge and will need to be walking before we can send him home.  5/3: Resting comfortably in bed. Asking for more pain medicine however did not look in pain or distress. No issues overnight per staff.   5/4: Requesting more pain medicine (IV).  Looks comfortable with no distress however.  No issues overnight per staff.  Awake and alert.  Interactive.  Mental status at baseline.  5/5: Continues to ask for more pain medicine.  Stated that he is unable to stand without excruciating pain.  No distress noted however upon exam.  Patient looks comfortable with no apparent distress or suffering noted.  No issues overnight per staff.  Keep n.p.o. after midnight.  5/6: Resting comfortably in bed.  Pain is fairly controlled.  Kyphoplasty procedure postponed till tomorrow.  Patient has no new complaints to report.  5/7 patient states that he continues having lower back pain and cannot find right position.  IV morphine as needed to times a day for only for physical therapy purposes also ordered.  Last bowel movements was day before yesterday.  Kyphoplasty rescheduled for Thursday 5/9 at 1 p.m.      Consultants/Specialty  NSG - [place] - s/o  Psych - s/o, re-consult    Code Status  full    Disposition  No placement available, needs improved cognitive function until safe to discharge to his own independence.    Will need PT OT evaluation after kyphoplasty, but will be difficult placement due to being out of senior care    Review of Systems  Review of Systems   Constitutional: Negative for chills, diaphoresis, fever, malaise/fatigue and weight loss.   HENT: Negative for ear discharge, ear pain, hearing loss, nosebleeds and tinnitus.    Eyes: Negative for blurred vision, double vision, photophobia and pain.   Respiratory: Negative for cough, hemoptysis and sputum production.    Cardiovascular: Negative for chest pain, palpitations, orthopnea and claudication.    Gastrointestinal: Negative for abdominal pain, constipation, diarrhea, heartburn, nausea and vomiting.   Genitourinary: Negative for dysuria and urgency.   Musculoskeletal: Positive for back pain (Stable ) and myalgias. Negative for joint pain.   Skin: Negative for itching and rash.   Neurological: Negative for dizziness, tingling, tremors, sensory change, speech change, focal weakness and headaches.   Endo/Heme/Allergies: Negative for environmental allergies and polydipsia. Does not bruise/bleed easily.   Psychiatric/Behavioral: Negative for depression, hallucinations, substance abuse and suicidal ideas. The patient is nervous/anxious. The patient does not have insomnia.         Physical Exam  Temp:  [36.2 °C (97.2 °F)-36.7 °C (98 °F)] 36.3 °C (97.3 °F)  Pulse:  [82-96] 82  Resp:  [17-19] 18  BP: (117-143)/(73-94) 143/90  SpO2:  [94 %-100 %] 97 %    Physical Exam   Constitutional: He is oriented to person, place, and time. He appears well-developed and well-nourished. No distress.   HENT:   Head: Normocephalic and atraumatic.   Eyes: Conjunctivae are normal. No scleral icterus.   Neck: Neck supple. No thyromegaly present.   Cardiovascular: Normal rate, regular rhythm and normal heart sounds.  Exam reveals no gallop and no friction rub.    Pulmonary/Chest: Effort normal. No respiratory distress. He has no wheezes.   Abdominal: Soft. Bowel sounds are normal. He exhibits no distension. There is no tenderness. There is no rebound.   Musculoskeletal: He exhibits no tenderness or deformity.   Mild to moderate thoracolumbar kyphoscoliosis.    Neurological: He is alert and oriented to person, place, and time.   No gross focal deficit   Skin: Skin is warm and dry. He is not diaphoretic. No erythema.   Psychiatric: His mood appears anxious. Cognition and memory are impaired.        Nursing note and vitals reviewed.      Fluids    Intake/Output Summary (Last 24 hours) at 05/07/19 1323  Last data filed at 05/07/19 7339    Gross per 24 hour   Intake             1316 ml   Output             2450 ml   Net            -1134 ml       Laboratory  Recent Labs      05/06/19   0915  05/07/19   0921   WBC  2.9*  2.5*   RBC  3.02*  2.91*   HEMOGLOBIN  8.8*  8.5*   HEMATOCRIT  27.6*  26.3*   MCV  91.4  90.4   MCH  29.1  29.2   MCHC  31.9*  32.3*   RDW  50.6*  48.6   PLATELETCT  64*  62*   MPV  8.9*  10.2     Recent Labs      05/05/19   0326  05/06/19   0915  05/07/19   0921   SODIUM  143  140  139   POTASSIUM  3.4*  3.7  3.8   CHLORIDE  116*  114*  109   CO2  21  20  26   GLUCOSE  87  92  91   BUN  7*  6*  8   CREATININE  0.70  0.62  0.59   CALCIUM  7.5*  7.6*  8.0*                   Imaging  DX-ESOPHAGUS - BARIUM SWALLOW   Final Result      Small hiatal hernia      DX-CHEST-PORTABLE (1 VIEW)   Final Result      No acute cardiac or pulmonary abnormalities are identified.      DX-CHEST-PORTABLE (1 VIEW)   Final Result         No acute cardiac or pulmonary abnormality is identified.      BK-YMNAWTN-5 VIEW   Final Result      1.  There is a nonobstructive nonspecific bowel gas pattern.  There is no evidence of free intraperitoneal air.      MR-LUMBAR SPINE-WITH & W/O   Final Result      1.  There is subacute fracture along the superior endplate of L1 vertebral body. There is no posterior retropulsion.   2.  There is no focal bone lesion.   3.  There is no evidence of infection.   4.  Mild degenerative disease as described above.      MR-BRAIN-W/O   Final Result      1.  Mild cerebral atrophy.   2.  Mild chronic microvascular ischemic disease.   3.  No acute abnormality.      DX-KNEE 2- LEFT   Final Result      1.  No fracture or dislocation of LEFT knee.   2.  Minimal degenerative changes.   3.  Diffuse vascular calcifications.      EC-ECHOCARDIOGRAM COMPLETE W/O CONT   Final Result      CT-ABDOMEN-PELVIS WITH   Final Result         1.  No acute abnormality.   2.  Hepatomegaly and diffuse hepatic steatosis   3.  Atherosclerosis and atherosclerotic  coronary artery disease.   4.  Pulmonary nodules measuring up to 6.5 mm, see nodule follow-up recommendations below.      Low Risk: CT at 3-6 months, then consider CT at 18-24 months      High Risk: CT at 3-6 months, then at 18-24 months      Comments: Use most suspicious nodule as guide to management. Follow-up intervals may vary according to size and risk.      Low Risk - Minimal or absent history of smoking and of other known risk factors.      High Risk - History of smoking or of other known risk factors.      Note: These recommendations do not apply to lung cancer screening, patients with immunosuppression, or patients with known primary cancer.      Fleischner Society 2017 Guidelines for Management of Incidentally Detected Pulmonary Nodules in Adults         CT-CTA CHEST PULMONARY ARTERY W/ RECONS   Final Result         1.  No large central pulmonary embolus is appreciated, evaluation of the subsegmental branches is essentially nondiagnostic due to motion artifacts. Additional imaging would be required for definitive exclusion of small distal pulmonary emboli.      DX-LUMBAR SPINE-2 OR 3 VIEWS   Final Result      Limited examination. Mild anterior wedging L2 vertebral body that appears to be old.   If symptoms are persistent, would recommend CT for further evaluation.      DX-THORACIC SPINE-WITH SWIMMERS VIEW   Final Result      Limited examination. The upper thoracic spine is not well-demonstrated.   Mild anterior wedging of some the lower thoracic vertebral bodies and appears old.   No definite acute compression. If symptoms are persistent, CT would be recommended for further evaluation.      DX-CHEST-PORTABLE (1 VIEW)   Final Result      Central catheter projects over the superior right atrium.      Prominent calcified granuloma in the left midlung.      Atherosclerotic plaque.      CT-HEAD W/O   Final Result      No acute intracranial abnormality is identified.      IR-VERTEBROPLASTY    (Results Pending)         Assessment/Plan  * Closed stable burst fracture of first lumbar vertebra with routine healing   Assessment & Plan    Lumbar MRI found L1 subacute fracture with no protrusion  Neurosurgery was consulted and they recommended brace placement and outpatient follow-up.  I discontinued IV morphine.  I encouraged him to use oral pain medication for pain control.  Physiatry evaluated him and recommended that is not a candidate for inpatient rehab.  Physical therapy evaluated and recommended transitional care facilities.  Currently is very difficult discharge as per physical therapy recommendation patient needs to go to skilled nursing facility but most of the skilled nursing facility has been declining him for criminal record  Cognitive difficulties more prevalent since initiation of gabapentin - dose reduced and I will discontinue it (likely the cause of his dizziness also)  Discontinue MS Contin and transition him to Percocet and tramadol  Kyphoplasty unable to be completed today since patient required general anesthesia and would likely be completed on Monday  Started the patient on long-acting oxycodone  5/3: Scheduled for kyphoplasty under full anesthesia on Monday.  5/4: Pain is well controlled.   5/5: Plan for L1 vertebral augmentation under anesthesia tomorrow.  5/6: Vertebral augmentation surgery was postponed till tomorrow.  5/7 kyphoplasty rescheduled for 5/9 at 1 PM       Pancytopenia (HCC)- (present on admission)   Assessment & Plan    Most likely due to myelodysplastic syndrome and possibly chronic liver disease  His last chemotherapy was in November 2018  No acute symptoms.  Hematology/oncology was consulted and recommended outpatient follow-up.  Monitor for now.            Esophageal dysphagia   Assessment & Plan    Patient complains of dysphagia associated with nausea and vomiting  Patient refused further evaluation including barium swallow study.     Poor nutrition   Assessment & Plan    Allow  supplements between meals.  Dietitian following     Hallucinations   Assessment & Plan    Patient seeing butterflies and bats in his room  Psychiatry did not recommend any antipsychotic medications at this point.  5/7 no hallucinations today     Delirium   Assessment & Plan    Frequent confusing statements made by patient, unrelated to doses of pain medications  Psychiatry consultation appreciated  Discontinue Robaxin  Gabapentin discontinued  Resolved   Ammonia is not elevated     Left knee pain   Assessment & Plan    X ray showed arthritis with no acute bony abnormalities or fractures.  Continue to provide him pain management.     Tachycardia   Assessment & Plan    Resolved  It was most likely from pain  metprolol 25 mg p.o. twice daily  Stable     Somnolence- (present on admission)   Assessment & Plan    Waxing and waning  Stopped Ambien and trazodone  Resolved, gabapentin likely contributing - discontinued       Dizziness- (present on admission)   Assessment & Plan    Likely s/e of gabapentin - discontinued  Vitals are stable  Echo did not show any acute normalities  PT/OT  Negative orthostatics vitals  Monitor         Plan of care discussed with multidisciplinary team during rounds.    VTE prophylaxis: heparin       Adequate: hears normal conversation without difficulty

## 2019-05-07 NOTE — PROGRESS NOTES
"Late entry: 0840 assessment    Received report, assumed pt care. Pt a&o x 4, VSS, Assessment completed. Resting comfortably in bed with call light, bedside table in reach. Pt complains of 10/10 \"excuriating pain\" of mid/lower back, and left knee. Morphine IR 15 mg and scheduled acetaminophen 500 mg administered as per MAR.  Side rails up x 2. Pt able to move bilateral arms with moderate resistance. Pt able to move BLE, but very weak.  Instructed to use call light when needing to get OOB verbalized understanding. Bed alarm on, bed in low position. Will continue to monitor.         "

## 2019-05-07 NOTE — CARE PLAN
Problem: Nutritional:  Goal: Achieve adequate nutritional intake  Patient will consume 50-75% of meals    Outcome: MET Date Met: 05/07/19

## 2019-05-07 NOTE — DISCHARGE PLANNING
Situation: Pending medical clearance to discharge to Shelter     Background: 53 y.o. male with a past medical history of depression, hypertension, GERD who presented 4/5/2019   History is limited as the patient is a poor historian.  Apparently the patient was in custodial in [place] and was extradited to [place] where he was found to be tachycardic with a rate in the 180s and hypertensive.  He was recently hospitalized in [place] and diagnosed with cancer and has a right chest central line in place.      [place] [employer] non emergency line([phone]) ,per [place] they have probable cause for an older warrant and they would like to take pt in to custody at d/c. Pt's case number is [id number].    SNF's declined due to criminal hx and warrant.     Pt stated he would like to be able to walk better so he could possibly go home. He stated he has money available to pay for either a bus pass or plain ticket. He lives in [place]. PT is aware there is a warrant for his arrest and he could be arrested by [place] PD before leaving [place].      Assessment: Pt will need to be at level he can discharge to shelter. Call [place] Police Dept at discharge for possible arrest [phone].    Recommendation/Requests:  Kyphoplasty procedure today. PT/OT continue to work with pt. When working with PT/OT, pt indicates that he can not walk. Reports from bedside RN's, pt has walked in room and transferred to chair from bed.

## 2019-05-07 NOTE — PROGRESS NOTES
As per IR, patient's kyphoplasty is moved to Thursday, 5/9 at 1300. Pt aware and notified. Diet ordered.

## 2019-05-07 NOTE — PROGRESS NOTES
Lab called with critical result of WBC 2.5 at 1020. Critical lab result read back to lab personnel.   Dr. Thomas notified of critical lab result at 1022.  Critical lab result read back by Dr. Thomas.  No new orders at this time.

## 2019-05-08 PROCEDURE — 700105 HCHG RX REV CODE 258: Performed by: FAMILY MEDICINE

## 2019-05-08 PROCEDURE — 700102 HCHG RX REV CODE 250 W/ 637 OVERRIDE(OP): Performed by: HOSPITALIST

## 2019-05-08 PROCEDURE — A9270 NON-COVERED ITEM OR SERVICE: HCPCS | Performed by: FAMILY MEDICINE

## 2019-05-08 PROCEDURE — 700102 HCHG RX REV CODE 250 W/ 637 OVERRIDE(OP): Performed by: FAMILY MEDICINE

## 2019-05-08 PROCEDURE — A9270 NON-COVERED ITEM OR SERVICE: HCPCS | Performed by: HOSPITALIST

## 2019-05-08 PROCEDURE — 700111 HCHG RX REV CODE 636 W/ 250 OVERRIDE (IP): Performed by: INTERNAL MEDICINE

## 2019-05-08 PROCEDURE — 97535 SELF CARE MNGMENT TRAINING: CPT

## 2019-05-08 PROCEDURE — 700102 HCHG RX REV CODE 250 W/ 637 OVERRIDE(OP): Performed by: INTERNAL MEDICINE

## 2019-05-08 PROCEDURE — A9270 NON-COVERED ITEM OR SERVICE: HCPCS | Performed by: INTERNAL MEDICINE

## 2019-05-08 PROCEDURE — 99231 SBSQ HOSP IP/OBS SF/LOW 25: CPT | Performed by: INTERNAL MEDICINE

## 2019-05-08 PROCEDURE — 700111 HCHG RX REV CODE 636 W/ 250 OVERRIDE (IP): Performed by: FAMILY MEDICINE

## 2019-05-08 PROCEDURE — 770006 HCHG ROOM/CARE - MED/SURG/GYN SEMI*

## 2019-05-08 RX ORDER — MORPHINE SULFATE 15 MG/1
30 TABLET ORAL EVERY 4 HOURS PRN
Status: DISCONTINUED | OUTPATIENT
Start: 2019-05-08 | End: 2019-05-20 | Stop reason: HOSPADM

## 2019-05-08 RX ORDER — DIPHENHYDRAMINE HCL 25 MG
25 TABLET ORAL EVERY 6 HOURS PRN
Status: DISCONTINUED | OUTPATIENT
Start: 2019-05-08 | End: 2019-05-20 | Stop reason: HOSPADM

## 2019-05-08 RX ORDER — MORPHINE SULFATE 4 MG/ML
2 INJECTION, SOLUTION INTRAMUSCULAR; INTRAVENOUS ONCE
Status: COMPLETED | OUTPATIENT
Start: 2019-05-08 | End: 2019-05-08

## 2019-05-08 RX ADMIN — THERA TABS 1 TABLET: TAB at 05:39

## 2019-05-08 RX ADMIN — ACETAMINOPHEN 500 MG: 500 TABLET ORAL at 21:49

## 2019-05-08 RX ADMIN — DIPHENHYDRAMINE HCL 25 MG: 25 TABLET ORAL at 00:43

## 2019-05-08 RX ADMIN — MORPHINE SULFATE 2 MG: 4 INJECTION INTRAVENOUS at 09:40

## 2019-05-08 RX ADMIN — MORPHINE SULFATE 30 MG: 15 TABLET ORAL at 22:41

## 2019-05-08 RX ADMIN — AMITRIPTYLINE HYDROCHLORIDE 75 MG: 75 TABLET, FILM COATED ORAL at 21:49

## 2019-05-08 RX ADMIN — SODIUM CHLORIDE, POTASSIUM CHLORIDE, SODIUM LACTATE AND CALCIUM CHLORIDE: 600; 310; 30; 20 INJECTION, SOLUTION INTRAVENOUS at 11:23

## 2019-05-08 RX ADMIN — OMEPRAZOLE 20 MG: 20 CAPSULE, DELAYED RELEASE ORAL at 18:14

## 2019-05-08 RX ADMIN — ACETAMINOPHEN 500 MG: 500 TABLET ORAL at 18:14

## 2019-05-08 RX ADMIN — BUSPIRONE HYDROCHLORIDE 15 MG: 30 TABLET ORAL at 05:39

## 2019-05-08 RX ADMIN — BUSPIRONE HYDROCHLORIDE 15 MG: 30 TABLET ORAL at 18:14

## 2019-05-08 RX ADMIN — ACETAMINOPHEN 500 MG: 500 TABLET ORAL at 08:30

## 2019-05-08 RX ADMIN — OMEPRAZOLE 20 MG: 20 CAPSULE, DELAYED RELEASE ORAL at 07:41

## 2019-05-08 RX ADMIN — MORPHINE SULFATE 15 MG: 15 TABLET ORAL at 08:30

## 2019-05-08 RX ADMIN — MORPHINE SULFATE 30 MG: 15 TABLET ORAL at 18:14

## 2019-05-08 RX ADMIN — HEPARIN SODIUM 5000 UNITS: 5000 INJECTION, SOLUTION INTRAVENOUS; SUBCUTANEOUS at 12:55

## 2019-05-08 RX ADMIN — ACETAMINOPHEN 500 MG: 500 TABLET ORAL at 12:54

## 2019-05-08 RX ADMIN — MORPHINE SULFATE 15 MG: 15 TABLET ORAL at 04:32

## 2019-05-08 RX ADMIN — ZOLPIDEM TARTRATE 5 MG: 5 TABLET ORAL at 21:50

## 2019-05-08 RX ADMIN — DIPHENHYDRAMINE HCL 25 MG: 25 TABLET ORAL at 18:46

## 2019-05-08 RX ADMIN — MORPHINE SULFATE 15 MG: 15 TABLET ORAL at 12:54

## 2019-05-08 RX ADMIN — HEPARIN SODIUM 5000 UNITS: 5000 INJECTION, SOLUTION INTRAVENOUS; SUBCUTANEOUS at 05:40

## 2019-05-08 ASSESSMENT — ENCOUNTER SYMPTOMS
HALLUCINATIONS: 0
HEARTBURN: 0
DOUBLE VISION: 0
NAUSEA: 0
BLURRED VISION: 0
CLAUDICATION: 0
BRUISES/BLEEDS EASILY: 0
ABDOMINAL PAIN: 0
BACK PAIN: 1
VOMITING: 0
DEPRESSION: 0
HEADACHES: 0
EYE PAIN: 0
PHOTOPHOBIA: 0
TREMORS: 0
DIARRHEA: 0
SPEECH CHANGE: 0
DIAPHORESIS: 0
MYALGIAS: 1
HEMOPTYSIS: 0
PALPITATIONS: 0
INSOMNIA: 0
FOCAL WEAKNESS: 0
CONSTIPATION: 0
DIZZINESS: 0
ORTHOPNEA: 0
WEIGHT LOSS: 0
TINGLING: 0
SENSORY CHANGE: 0
NERVOUS/ANXIOUS: 1
COUGH: 0
FEVER: 0
POLYDIPSIA: 0
CHILLS: 0
SPUTUM PRODUCTION: 0

## 2019-05-08 ASSESSMENT — COGNITIVE AND FUNCTIONAL STATUS - GENERAL
SUGGESTED CMS G CODE MODIFIER DAILY ACTIVITY: CK
HELP NEEDED FOR BATHING: A LITTLE
PERSONAL GROOMING: A LITTLE
DRESSING REGULAR LOWER BODY CLOTHING: A LITTLE
TOILETING: A LITTLE
DAILY ACTIVITIY SCORE: 19
DRESSING REGULAR UPPER BODY CLOTHING: A LITTLE

## 2019-05-08 ASSESSMENT — LIFESTYLE VARIABLES: SUBSTANCE_ABUSE: 0

## 2019-05-08 NOTE — PROGRESS NOTES
"Received report from day shift RN. Assumed patient care at 1900. Patient complains of 9/10 pain. PO morphine provided for relief. Bed locked and in the lowest position. Call light within reach. RN and CNA numbers provided.      0043 Patient complains of itching and \"welts\" on legs and chest. Minimal redness from scratching noted. Patient requesting Benadryl. Discussed with Dr. Nida Clark, order received for PO Benadryl 25 mg q 6 prn.    /75   Pulse 81   Temp 37.3 °C (99.1 °F) (Temporal)   Resp 19   Ht 1.803 m (5' 11\")   Wt 101.8 kg (224 lb 6.9 oz)   SpO2 95%   BMI 31.30 kg/m²     "

## 2019-05-08 NOTE — THERAPY
"Occupational Therapy Treatment completed with focus on ADLs, ADL transfers and patient education.  Functional Status: Pt seen today for OT treatment. Agreeable to EOB activity only, not standing as he wants to wait for kyphoplasty tomorrow. He was able to demo bed mobility with mod I. Supv scooting. Setup grooming. Able to don B socks with supervision. Refused to don TLSO, reporting it causes more pain that helps. Pt utilized urinal with mod I EOB.   Plan of Care: Will benefit from Occupational Therapy 3 times per week  Discharge Recommendations:  Equipment Will Continue to Assess for Equipment Needs.     See \"Rehab Therapy-Acute\" Patient Summary Report for complete documentation.   "

## 2019-05-08 NOTE — THERAPY
"SPEECH PATHOLOGY:  Came to see patient for cognitive therapy this date.  Patient awake and lying in bed.  Explained who I was and role of SLP.  Patient stated \"I am not doing this today.\"  He stated he was scheduled for a procedure today or tomorrow and was not going to do anything at all today as he wanted to rest.  SLP will continue to follow for treatment as patient is able and willing.   "

## 2019-05-08 NOTE — CARE PLAN
Problem: Safety  Goal: Will remain free from injury  Outcome: PROGRESSING AS EXPECTED  Safety precautions in place. Call light within reach. Bed is low and in locked position. Hourly rounding in place.     Problem: Pain Management  Goal: Pain level will decrease to patient's comfort goal  Outcome: PROGRESSING AS EXPECTED

## 2019-05-08 NOTE — THERAPY
per OT, pt reports will only be agreeble to EOB activity, which he performed with OT in AM; he reports will not perform OOB activity or don TLSO today as awaiting kypho tomorrow and only wants to rest today; will re-attempt post procedure as able/appropriate

## 2019-05-09 ENCOUNTER — ANESTHESIA (OUTPATIENT)
Dept: RADIOLOGY | Facility: MEDICAL CENTER | Age: 54
DRG: 516 | End: 2019-05-09
Payer: MEDICAID

## 2019-05-09 ENCOUNTER — APPOINTMENT (OUTPATIENT)
Dept: RADIOLOGY | Facility: MEDICAL CENTER | Age: 54
DRG: 516 | End: 2019-05-09
Attending: HOSPITALIST
Payer: MEDICAID

## 2019-05-09 ENCOUNTER — ANESTHESIA EVENT (OUTPATIENT)
Dept: RADIOLOGY | Facility: MEDICAL CENTER | Age: 54
DRG: 516 | End: 2019-05-09
Payer: MEDICAID

## 2019-05-09 LAB
ALBUMIN SERPL BCP-MCNC: 3 G/DL (ref 3.2–4.9)
ALBUMIN/GLOB SERPL: 1.3 G/DL
ALP SERPL-CCNC: 81 U/L (ref 30–99)
ALT SERPL-CCNC: 9 U/L (ref 2–50)
ANION GAP SERPL CALC-SCNC: 7 MMOL/L (ref 0–11.9)
AST SERPL-CCNC: 19 U/L (ref 12–45)
BASOPHILS # BLD AUTO: 0.4 % (ref 0–1.8)
BASOPHILS # BLD: 0.01 K/UL (ref 0–0.12)
BILIRUB SERPL-MCNC: 0.5 MG/DL (ref 0.1–1.5)
BUN SERPL-MCNC: 10 MG/DL (ref 8–22)
CALCIUM SERPL-MCNC: 8.7 MG/DL (ref 8.5–10.5)
CHLORIDE SERPL-SCNC: 102 MMOL/L (ref 96–112)
CO2 SERPL-SCNC: 29 MMOL/L (ref 20–33)
CREAT SERPL-MCNC: 0.83 MG/DL (ref 0.5–1.4)
EOSINOPHIL # BLD AUTO: 0.23 K/UL (ref 0–0.51)
EOSINOPHIL NFR BLD: 9 % (ref 0–6.9)
ERYTHROCYTE [DISTWIDTH] IN BLOOD BY AUTOMATED COUNT: 46.5 FL (ref 35.9–50)
GLOBULIN SER CALC-MCNC: 2.3 G/DL (ref 1.9–3.5)
GLUCOSE SERPL-MCNC: 86 MG/DL (ref 65–99)
HCT VFR BLD AUTO: 30.3 % (ref 42–52)
HGB BLD-MCNC: 9.9 G/DL (ref 14–18)
IMM GRANULOCYTES # BLD AUTO: 0.01 K/UL (ref 0–0.11)
IMM GRANULOCYTES NFR BLD AUTO: 0.4 % (ref 0–0.9)
LYMPHOCYTES # BLD AUTO: 0.86 K/UL (ref 1–4.8)
LYMPHOCYTES NFR BLD: 33.6 % (ref 22–41)
MCH RBC QN AUTO: 29 PG (ref 27–33)
MCHC RBC AUTO-ENTMCNC: 32.7 G/DL (ref 33.7–35.3)
MCV RBC AUTO: 88.9 FL (ref 81.4–97.8)
MONOCYTES # BLD AUTO: 0.34 K/UL (ref 0–0.85)
MONOCYTES NFR BLD AUTO: 13.3 % (ref 0–13.4)
NEUTROPHILS # BLD AUTO: 1.11 K/UL (ref 1.82–7.42)
NEUTROPHILS NFR BLD: 43.3 % (ref 44–72)
NRBC # BLD AUTO: 0 K/UL
NRBC BLD-RTO: 0 /100 WBC
PLATELET # BLD AUTO: 79 K/UL (ref 164–446)
PMV BLD AUTO: 10.8 FL (ref 9–12.9)
POTASSIUM SERPL-SCNC: 4.4 MMOL/L (ref 3.6–5.5)
PROT SERPL-MCNC: 5.3 G/DL (ref 6–8.2)
RBC # BLD AUTO: 3.41 M/UL (ref 4.7–6.1)
SODIUM SERPL-SCNC: 138 MMOL/L (ref 135–145)
WBC # BLD AUTO: 2.6 K/UL (ref 4.8–10.8)

## 2019-05-09 PROCEDURE — 0QU03JZ SUPPLEMENT LUMBAR VERTEBRA WITH SYNTHETIC SUBSTITUTE, PERCUTANEOUS APPROACH: ICD-10-PCS | Performed by: RADIOLOGY

## 2019-05-09 PROCEDURE — 700101 HCHG RX REV CODE 250: Performed by: ANESTHESIOLOGY

## 2019-05-09 PROCEDURE — 700102 HCHG RX REV CODE 250 W/ 637 OVERRIDE(OP): Performed by: FAMILY MEDICINE

## 2019-05-09 PROCEDURE — A9270 NON-COVERED ITEM OR SERVICE: HCPCS | Performed by: INTERNAL MEDICINE

## 2019-05-09 PROCEDURE — 0QS03ZZ REPOSITION LUMBAR VERTEBRA, PERCUTANEOUS APPROACH: ICD-10-PCS | Performed by: RADIOLOGY

## 2019-05-09 PROCEDURE — 700111 HCHG RX REV CODE 636 W/ 250 OVERRIDE (IP): Performed by: ANESTHESIOLOGY

## 2019-05-09 PROCEDURE — 700102 HCHG RX REV CODE 250 W/ 637 OVERRIDE(OP): Performed by: INTERNAL MEDICINE

## 2019-05-09 PROCEDURE — A9270 NON-COVERED ITEM OR SERVICE: HCPCS | Performed by: FAMILY MEDICINE

## 2019-05-09 PROCEDURE — 700101 HCHG RX REV CODE 250

## 2019-05-09 PROCEDURE — 700101 HCHG RX REV CODE 250: Performed by: FAMILY MEDICINE

## 2019-05-09 PROCEDURE — 80053 COMPREHEN METABOLIC PANEL: CPT

## 2019-05-09 PROCEDURE — 700111 HCHG RX REV CODE 636 W/ 250 OVERRIDE (IP): Performed by: FAMILY MEDICINE

## 2019-05-09 PROCEDURE — 4410200 IR-VERTEBROPLASTY

## 2019-05-09 PROCEDURE — 700105 HCHG RX REV CODE 258: Performed by: ANESTHESIOLOGY

## 2019-05-09 PROCEDURE — 700102 HCHG RX REV CODE 250 W/ 637 OVERRIDE(OP): Performed by: HOSPITALIST

## 2019-05-09 PROCEDURE — 99232 SBSQ HOSP IP/OBS MODERATE 35: CPT | Performed by: INTERNAL MEDICINE

## 2019-05-09 PROCEDURE — A9270 NON-COVERED ITEM OR SERVICE: HCPCS | Performed by: HOSPITALIST

## 2019-05-09 PROCEDURE — 85025 COMPLETE CBC W/AUTO DIFF WBC: CPT

## 2019-05-09 PROCEDURE — 160002 HCHG RECOVERY MINUTES (STAT)

## 2019-05-09 PROCEDURE — 770006 HCHG ROOM/CARE - MED/SURG/GYN SEMI*

## 2019-05-09 PROCEDURE — 700117 HCHG RX CONTRAST REV CODE 255: Performed by: RADIOLOGY

## 2019-05-09 RX ORDER — DIPHENHYDRAMINE HYDROCHLORIDE 50 MG/ML
INJECTION INTRAMUSCULAR; INTRAVENOUS
Status: COMPLETED
Start: 2019-05-09 | End: 2019-05-09

## 2019-05-09 RX ORDER — METOPROLOL TARTRATE 1 MG/ML
INJECTION, SOLUTION INTRAVENOUS
Status: COMPLETED
Start: 2019-05-09 | End: 2019-05-09

## 2019-05-09 RX ORDER — HALOPERIDOL 5 MG/ML
1 INJECTION INTRAMUSCULAR
Status: DISCONTINUED | OUTPATIENT
Start: 2019-05-09 | End: 2019-05-09 | Stop reason: HOSPADM

## 2019-05-09 RX ORDER — SODIUM CHLORIDE, SODIUM LACTATE, POTASSIUM CHLORIDE, CALCIUM CHLORIDE 600; 310; 30; 20 MG/100ML; MG/100ML; MG/100ML; MG/100ML
INJECTION, SOLUTION INTRAVENOUS
Status: DISCONTINUED | OUTPATIENT
Start: 2019-05-09 | End: 2019-05-09 | Stop reason: SURG

## 2019-05-09 RX ORDER — PHENYLEPHRINE HYDROCHLORIDE 10 MG/ML
INJECTION, SOLUTION INTRAMUSCULAR; INTRAVENOUS; SUBCUTANEOUS PRN
Status: DISCONTINUED | OUTPATIENT
Start: 2019-05-09 | End: 2019-05-09 | Stop reason: SURG

## 2019-05-09 RX ORDER — OXYCODONE HCL 5 MG/5 ML
5 SOLUTION, ORAL ORAL
Status: DISCONTINUED | OUTPATIENT
Start: 2019-05-09 | End: 2019-05-09 | Stop reason: HOSPADM

## 2019-05-09 RX ORDER — KETAMINE HYDROCHLORIDE 50 MG/ML
INJECTION, SOLUTION INTRAMUSCULAR; INTRAVENOUS PRN
Status: DISCONTINUED | OUTPATIENT
Start: 2019-05-09 | End: 2019-05-09 | Stop reason: SURG

## 2019-05-09 RX ORDER — DIPHENHYDRAMINE HYDROCHLORIDE 50 MG/ML
INJECTION INTRAMUSCULAR; INTRAVENOUS PRN
Status: DISCONTINUED | OUTPATIENT
Start: 2019-05-09 | End: 2019-05-09 | Stop reason: SURG

## 2019-05-09 RX ORDER — HYDROMORPHONE HYDROCHLORIDE 2 MG/ML
0.4 INJECTION, SOLUTION INTRAMUSCULAR; INTRAVENOUS; SUBCUTANEOUS
Status: DISCONTINUED | OUTPATIENT
Start: 2019-05-09 | End: 2019-05-09 | Stop reason: HOSPADM

## 2019-05-09 RX ORDER — LIDOCAINE HYDROCHLORIDE 40 MG/ML
SOLUTION TOPICAL
Status: COMPLETED
Start: 2019-05-09 | End: 2019-05-09

## 2019-05-09 RX ORDER — DEXAMETHASONE SODIUM PHOSPHATE 4 MG/ML
INJECTION, SOLUTION INTRA-ARTICULAR; INTRALESIONAL; INTRAMUSCULAR; INTRAVENOUS; SOFT TISSUE PRN
Status: DISCONTINUED | OUTPATIENT
Start: 2019-05-09 | End: 2019-05-09 | Stop reason: SURG

## 2019-05-09 RX ORDER — ONDANSETRON 2 MG/ML
INJECTION INTRAMUSCULAR; INTRAVENOUS PRN
Status: DISCONTINUED | OUTPATIENT
Start: 2019-05-09 | End: 2019-05-09 | Stop reason: SURG

## 2019-05-09 RX ORDER — HYDROMORPHONE HYDROCHLORIDE 2 MG/ML
0.1 INJECTION, SOLUTION INTRAMUSCULAR; INTRAVENOUS; SUBCUTANEOUS
Status: DISCONTINUED | OUTPATIENT
Start: 2019-05-09 | End: 2019-05-09 | Stop reason: HOSPADM

## 2019-05-09 RX ORDER — HYDROMORPHONE HYDROCHLORIDE 2 MG/ML
0.2 INJECTION, SOLUTION INTRAMUSCULAR; INTRAVENOUS; SUBCUTANEOUS
Status: DISCONTINUED | OUTPATIENT
Start: 2019-05-09 | End: 2019-05-09 | Stop reason: HOSPADM

## 2019-05-09 RX ORDER — SODIUM CHLORIDE, SODIUM LACTATE, POTASSIUM CHLORIDE, CALCIUM CHLORIDE 600; 310; 30; 20 MG/100ML; MG/100ML; MG/100ML; MG/100ML
INJECTION, SOLUTION INTRAVENOUS CONTINUOUS
Status: DISCONTINUED | OUTPATIENT
Start: 2019-05-09 | End: 2019-05-12

## 2019-05-09 RX ORDER — CLINDAMYCIN PHOSPHATE 150 MG/ML
INJECTION, SOLUTION INTRAVENOUS
Status: COMPLETED
Start: 2019-05-09 | End: 2019-05-09

## 2019-05-09 RX ORDER — DIPHENHYDRAMINE HYDROCHLORIDE 50 MG/ML
12.5 INJECTION INTRAMUSCULAR; INTRAVENOUS
Status: DISCONTINUED | OUTPATIENT
Start: 2019-05-09 | End: 2019-05-09 | Stop reason: HOSPADM

## 2019-05-09 RX ORDER — ONDANSETRON 2 MG/ML
4 INJECTION INTRAMUSCULAR; INTRAVENOUS
Status: DISCONTINUED | OUTPATIENT
Start: 2019-05-09 | End: 2019-05-09 | Stop reason: HOSPADM

## 2019-05-09 RX ORDER — METOPROLOL TARTRATE 1 MG/ML
1 INJECTION, SOLUTION INTRAVENOUS
Status: DISCONTINUED | OUTPATIENT
Start: 2019-05-09 | End: 2019-05-09 | Stop reason: HOSPADM

## 2019-05-09 RX ORDER — LABETALOL HYDROCHLORIDE 5 MG/ML
5 INJECTION, SOLUTION INTRAVENOUS
Status: DISCONTINUED | OUTPATIENT
Start: 2019-05-09 | End: 2019-05-09 | Stop reason: HOSPADM

## 2019-05-09 RX ORDER — OXYCODONE HCL 5 MG/5 ML
10 SOLUTION, ORAL ORAL
Status: DISCONTINUED | OUTPATIENT
Start: 2019-05-09 | End: 2019-05-09 | Stop reason: HOSPADM

## 2019-05-09 RX ADMIN — HEPARIN SODIUM 5000 UNITS: 5000 INJECTION, SOLUTION INTRAVENOUS; SUBCUTANEOUS at 21:37

## 2019-05-09 RX ADMIN — KETAMINE HYDROCHLORIDE 50 MG: 50 INJECTION, SOLUTION INTRAMUSCULAR; INTRAVENOUS at 14:54

## 2019-05-09 RX ADMIN — PHENYLEPHRINE HYDROCHLORIDE 100 MCG: 10 INJECTION INTRAVENOUS at 14:45

## 2019-05-09 RX ADMIN — PHENYLEPHRINE HYDROCHLORIDE 200 MCG: 10 INJECTION INTRAVENOUS at 14:34

## 2019-05-09 RX ADMIN — DEXAMETHASONE SODIUM PHOSPHATE 10 MG: 4 INJECTION, SOLUTION INTRA-ARTICULAR; INTRALESIONAL; INTRAMUSCULAR; INTRAVENOUS; SOFT TISSUE at 14:38

## 2019-05-09 RX ADMIN — MORPHINE SULFATE 30 MG: 15 TABLET ORAL at 18:29

## 2019-05-09 RX ADMIN — MORPHINE SULFATE 30 MG: 15 TABLET ORAL at 22:31

## 2019-05-09 RX ADMIN — LIDOCAINE 2 PATCH: 50 PATCH TOPICAL at 00:19

## 2019-05-09 RX ADMIN — LIDOCAINE HYDROCHLORIDE 100 MG: 20 INJECTION, SOLUTION INTRAVENOUS at 14:22

## 2019-05-09 RX ADMIN — AMITRIPTYLINE HYDROCHLORIDE 75 MG: 75 TABLET, FILM COATED ORAL at 21:37

## 2019-05-09 RX ADMIN — MORPHINE SULFATE 30 MG: 15 TABLET ORAL at 10:56

## 2019-05-09 RX ADMIN — PROPOFOL 200 MG: 10 INJECTION, EMULSION INTRAVENOUS at 14:22

## 2019-05-09 RX ADMIN — DIPHENHYDRAMINE HYDROCHLORIDE 50 MG: 50 INJECTION, SOLUTION INTRAMUSCULAR; INTRAVENOUS at 14:38

## 2019-05-09 RX ADMIN — LIDOCAINE 2 PATCH: 50 PATCH TOPICAL at 22:32

## 2019-05-09 RX ADMIN — ACETAMINOPHEN 500 MG: 500 TABLET ORAL at 10:56

## 2019-05-09 RX ADMIN — FENTANYL CITRATE 50 MCG: 50 INJECTION, SOLUTION INTRAMUSCULAR; INTRAVENOUS at 15:08

## 2019-05-09 RX ADMIN — ROCURONIUM BROMIDE 50 MG: 10 INJECTION, SOLUTION INTRAVENOUS at 14:22

## 2019-05-09 RX ADMIN — OMEPRAZOLE 20 MG: 20 CAPSULE, DELAYED RELEASE ORAL at 06:36

## 2019-05-09 RX ADMIN — BUSPIRONE HYDROCHLORIDE 15 MG: 30 TABLET ORAL at 06:36

## 2019-05-09 RX ADMIN — LIDOCAINE HYDROCHLORIDE: 40 SOLUTION TOPICAL at 14:30

## 2019-05-09 RX ADMIN — CLINDAMYCIN PHOSPHATE 900 MG: 150 INJECTION, SOLUTION INTRAMUSCULAR; INTRAVENOUS at 14:17

## 2019-05-09 RX ADMIN — DIPHENHYDRAMINE HCL 25 MG: 25 TABLET ORAL at 20:34

## 2019-05-09 RX ADMIN — PHENYLEPHRINE HYDROCHLORIDE 200 MCG: 10 INJECTION INTRAVENOUS at 15:19

## 2019-05-09 RX ADMIN — PHENYLEPHRINE HYDROCHLORIDE 200 MCG: 10 INJECTION INTRAVENOUS at 14:41

## 2019-05-09 RX ADMIN — IOHEXOL 20 ML: 300 INJECTION, SOLUTION INTRAVENOUS at 15:16

## 2019-05-09 RX ADMIN — FENTANYL CITRATE 100 MCG: 50 INJECTION, SOLUTION INTRAMUSCULAR; INTRAVENOUS at 14:22

## 2019-05-09 RX ADMIN — ACETAMINOPHEN 500 MG: 500 TABLET ORAL at 20:34

## 2019-05-09 RX ADMIN — METOPROLOL TARTRATE 1 MG: 1 INJECTION, SOLUTION INTRAVENOUS at 16:48

## 2019-05-09 RX ADMIN — BUSPIRONE HYDROCHLORIDE 15 MG: 30 TABLET ORAL at 18:30

## 2019-05-09 RX ADMIN — SODIUM CHLORIDE, POTASSIUM CHLORIDE, SODIUM LACTATE AND CALCIUM CHLORIDE: 600; 310; 30; 20 INJECTION, SOLUTION INTRAVENOUS at 14:17

## 2019-05-09 RX ADMIN — THERA TABS 1 TABLET: TAB at 06:35

## 2019-05-09 RX ADMIN — MORPHINE SULFATE 30 MG: 15 TABLET ORAL at 06:35

## 2019-05-09 RX ADMIN — PHENYLEPHRINE HYDROCHLORIDE 200 MCG: 10 INJECTION INTRAVENOUS at 14:50

## 2019-05-09 RX ADMIN — ZOLPIDEM TARTRATE 5 MG: 5 TABLET ORAL at 21:37

## 2019-05-09 RX ADMIN — PHENYLEPHRINE HYDROCHLORIDE 200 MCG: 10 INJECTION INTRAVENOUS at 15:07

## 2019-05-09 RX ADMIN — HEPARIN SODIUM 5000 UNITS: 5000 INJECTION, SOLUTION INTRAVENOUS; SUBCUTANEOUS at 06:39

## 2019-05-09 RX ADMIN — ONDANSETRON 4 MG: 2 INJECTION INTRAMUSCULAR; INTRAVENOUS at 15:19

## 2019-05-09 RX ADMIN — OMEPRAZOLE 20 MG: 20 CAPSULE, DELAYED RELEASE ORAL at 18:30

## 2019-05-09 ASSESSMENT — ENCOUNTER SYMPTOMS
SENSORY CHANGE: 0
FEVER: 0
WEIGHT LOSS: 0
MYALGIAS: 1
PALPITATIONS: 0
TREMORS: 0
EYE PAIN: 0
ORTHOPNEA: 0
HEARTBURN: 0
BLURRED VISION: 0
DIAPHORESIS: 0
CONSTIPATION: 0
CLAUDICATION: 0
COUGH: 0
HEMOPTYSIS: 0
SPEECH CHANGE: 0
VOMITING: 0
DIZZINESS: 0
PHOTOPHOBIA: 0
SPUTUM PRODUCTION: 0
POLYDIPSIA: 0
NERVOUS/ANXIOUS: 1
BRUISES/BLEEDS EASILY: 0
INSOMNIA: 0
ABDOMINAL PAIN: 0
DIARRHEA: 0
HEADACHES: 0
DOUBLE VISION: 0
NAUSEA: 0
TINGLING: 0
HALLUCINATIONS: 0
DEPRESSION: 0
BACK PAIN: 1
CHILLS: 0
FOCAL WEAKNESS: 0

## 2019-05-09 ASSESSMENT — LIFESTYLE VARIABLES: SUBSTANCE_ABUSE: 0

## 2019-05-09 NOTE — ANESTHESIA PROCEDURE NOTES
Airway  Date/Time: 5/9/2019 2:26 PM  Performed by: ALVARADO WALLACE  Authorized by: ALVARADO WALLACE     Location:  OR  Urgency:  Elective  Indications for Airway Management:  Anesthesia  Spontaneous Ventilation: absent    Sedation Level:  Deep  Preoxygenated: Yes    Patient Position:  Sniffing  Final Airway Type:  Endotracheal airway  Final Endotracheal Airway:  ETT  Cuffed: Yes    Technique Used for Successful ETT Placement:  Direct laryngoscopy  Insertion Site:  Oral  Blade Type:  Guerrero  Laryngoscope Blade/Videolaryngoscope Blade Size:  2  ETT Size (mm):  7.5  Measured from:  Lips  ETT to Lips (cm):  23  Placement Verified by: auscultation and capnometry    Cormack-Lehane Classification:  Grade I - full view of glottis  Number of Attempts at Approach:  1

## 2019-05-09 NOTE — ANESTHESIA QCDR
2019 [facility] Clinical Data Registry (for Quality Improvement)     Postoperative nausea/vomiting risk protocol (Adult = 18 yrs and Pediatric 3-17 yrs)- (430 and 463)  General inhalation anesthetic (NOT TIVA) with PONV risk factors: No  Provision of anti-emetic therapy with at least 2 different classes of agents: N/A  Patient DID NOT receive anti-emetic therapy and reason is documented in Medical Record: N/A    Multimodal Pain Management- (AQI59)  Patient undergoing Elective Surgery (i.e. Outpatient, or ASC, or Prescheduled Surgery prior to Hospital Admission): No  Use of Multimodal Pain Management, two or more drugs and/or interventions, NOT including systemic opioids: N/A  Exception: Documented allergy to multiple classes of analgesics: N/A    PACU assessment of acute postoperative pain prior to Anesthesia Care End- Applies to Patients Age = 18- (ABG7)  Initial PACU pain score is which of the following: < 7/10  Patient unable to report pain score: N/A    Post-anesthetic transfer of care checklist/protocol to PACU/ICU- (426 and 427)  Upon conclusion of case, patient transferred to which of the following locations: PACU/Non-ICU  Use of transfer checklist/protocol: Yes  Exclusion: Service Performed in Patient Hospital Room (and thus did not require transfer): N/A    PACU Reintubation- (AQI31)  General anesthesia requiring endotracheal intubation (ETT) along with subsequent extubation in OR or PACU: Yes  Required reintubation in the PACU: No   Extubation was a planned trial documented in the medical record prior to removal of the original airway device:  N/A    Unplanned admission to ICU related to anesthesia service up through end of PACU care- (MD51)  Unplanned admission to ICU (not initially anticipated at anesthesia start time): No

## 2019-05-09 NOTE — OR NURSING
1535 Patient arrived to unit, report from anesthesia and RN, oral airway in place.  Patient arouses to voice.  Dressing to lower back with scant amount of drainage, soft.  1555 Oral airway removed, patient arouses to voice.  1615 Patient arouses to voice, given water per request.  1630 Patient tolerating oral intake. C/o back pain, falls back asleep.  1635 Report given to Patt.  1720 No additional bleeding noted to dressings.  Patient transferred to [place] [id number] via bed with transport.

## 2019-05-09 NOTE — PROGRESS NOTES
IR Procedure Note:    IR Procedure Note:    Dr. Noble and Dr. Hagan consented patient prior to procedure; all questions answered. Pt has contrast allergy. Dr. Hagan premedicated pt with diphenhydramine and dexamethasone for allergy. - See anesthesia record.    Site Marked and Confirmed with imaging by MD, RT and RN pre procedure.     Dr. Noble performed an L1 kyphoplasty with general anesthesia by Dr. Hagan.  All vital signs monitoring and medication administration by anesthesia services. - See anesthesia record.     Banded gauze applied to lumbar back, CDI and soft; pressure held x 3 minutes.  Report given to CARLOS ALBERTO Weaver.  RN transported pt to PPU with Dr. Hagan. Sao2 monitor = 96% on oxygen via NC @ 4 lpm.

## 2019-05-09 NOTE — OR SURGEON
Immediate Post- Operative Note        PostOp Diagnosis: VERTEBRAL INSUFFICIENCY FRACTURE AT L1. PAIN WAS 10/10, NOW 8.5/10      Procedure(s): LUMBAR KYPHOPLASTY AT L1    BILATERAL TRANSPEDICULAR APPROACH      Estimated Blood Loss: <10 ML        Complications: NONE            5/9/2019             3:29 PM               Jacob Noble

## 2019-05-09 NOTE — PROGRESS NOTES
Hospital Medicine Daily Progress Note    Date of Service  5/9/2019    Chief Complaint  53 y.o. male admitted 4/5/2019 with back pain, found to have L1 burst fracture.    Hospital Course    Patient admitted with L1 burst fracture, conservative bracing recommended by neurosurgeon.  Patient with difficult to control pain and trouble with therapies and difficult discharge due to social situation.      Interval Problem Update  4/13 Patient moved from telemetry to medical floor, states pain uncontrolled and requesting IV pain medication, 1 dose IV morphine ordered as needed for severe pain but I've adjusted his percocet from 5mg to 10 mg and increased robaxin and made it scheduled and increased his gabapentin for better pain control.  4/14 Patient states he had rest following morphine dose overnight but pain uncontrolled still with previous changes, will add oxycontin 20 bid as long acting pain medication to help control pain and allow patient to participate in therapies.  Rehab consult placed.  4/15 Patient up to side of bed in [facility] after working with therapy today, complains of uncontrolled pain with the changes made.  I doubt he is having response to oxycodone/oxycontin so will discontinue this and rotate opiates to morphine - MS contin and MSIR.  Physiatry consult pending for recommendations to help get patient out of the hospital.  4/23 Patient s/p cognitive evaluation and showed decline since previous evaluation.  His mentation seemed to change with the initiation of gabapentin that I started increasing on 4/13.  I will discontinue the gabapentin as it does not seem to be helping with his pain control but he is having cog deficits and dizziness - especially with dose increases.  Patient is agreeable to this.  He does not need IV morphine and pain can be controlled with PO only.  4/24 Patient seems more awake and alert today with dc of gabapentin.  He states the dizziness is still present.  Cognitive status seems to  have improved since yesterday also.  He needs continued improvement before he is safe to leave the hospital to an independent setting, no availability for placement given his criminal record and active warrants.  4/25 Patient awake but still making confusing statements, yesterday he wanted paperwork to fill out for a [employer] authorization.  He is making these statements more frequently and I've requested psychiatry to weigh in on their thoughts of his confusion.    4/26 Patient without complaints today, back pain same.  Makes confusing statements but less often.    4/27 Patient with residual pain, no increase in pain medication due to his hallucinations.  Will have psychiatry re-evaluate patient for medication recommendations, likely an antipsychotic medication to help lessen his hallucinations.  4/28 Patient continues to have disorganized thoughts and hallucinations.  I anticipate psychiatry to consult again on Monday.  No acute changes, therapies to resume on Monday.  4/29 Patient with continued decline, he is not eating nor drinking meals and states he's not hungry.  I've started IV fluids and am awaiting psychiatry re-evaluation as he is actively hallucinating.  I will also decrease his MS contin as he appears comfortable and this could also be contributing to his mental suppression.  4/30: Patient still having episodes of confusion.  I have discontinued his MS Contin and morphine since I think it is attributing to his nausea, vomiting and confusion.  I replaced it with Percocet and tramadol.  5/1: After discussing his case with radiology who determined that he would be a good candidate for kyphoplasty.  His overall mental status has improved after discontinuing the medications.  He complained about no increase in pain.  5/2: Patient seen and examined by me he was having increased amounts of pain.  I restarted long-acting oxycodone to see if his pain was resolved.  The kyphoplasty was unable to be completed  since the patient required general anesthesia.  Patient is a difficult discharge and will need to be walking before we can send him home.  5/3: Resting comfortably in bed. Asking for more pain medicine however did not look in pain or distress. No issues overnight per staff.   5/4: Requesting more pain medicine (IV).  Looks comfortable with no distress however.  No issues overnight per staff.  Awake and alert.  Interactive.  Mental status at baseline.  5/5: Continues to ask for more pain medicine.  Stated that he is unable to stand without excruciating pain.  No distress noted however upon exam.  Patient looks comfortable with no apparent distress or suffering noted.  No issues overnight per staff.  Keep n.p.o. after midnight.  5/6: Resting comfortably in bed.  Pain is fairly controlled.  Kyphoplasty procedure postponed till tomorrow.  Patient has no new complaints to report.  5/7 patient states that he continues having lower back pain and cannot find right position.  IV morphine as needed to times a day for only for physical therapy purposes also ordered.  Last bowel movements was day before yesterday.  Kyphoplasty rescheduled for Thursday 5/9 at 1 p.m.  5/8 patient does not appear to be in acute distress, but continues complaining of lower back pain, requesting more IV morphine.  Had some diffuse skin itching and requesting Benadryl.  Complaining of left knee pain  5/9 resting comfortably in his bed.  States that right lower back pain slightly improved.  Awaiting kyphoplasty today    Consultants/Specialty  [facility] - [place] - s/o  Psych - s/o, re-consult    Code Status  full    Disposition  No placement available, needs improved cognitive function until safe to discharge to his own independence.    Will need PT OT evaluation after kyphoplasty, but will be difficult placement due to being out of [facility]    Review of Systems  Review of Systems   Constitutional: Negative for chills, diaphoresis, fever, malaise/fatigue and  weight loss.   HENT: Negative for ear discharge, ear pain, hearing loss, nosebleeds and tinnitus.    Eyes: Negative for blurred vision, double vision, photophobia and pain.   Respiratory: Negative for cough, hemoptysis and sputum production.    Cardiovascular: Negative for chest pain, palpitations, orthopnea and claudication.   Gastrointestinal: Negative for abdominal pain, constipation, diarrhea, heartburn, nausea and vomiting.   Genitourinary: Negative for dysuria and urgency.   Musculoskeletal: Positive for back pain (Stable ) and myalgias. Negative for joint pain.   Skin: Negative for itching and rash.   Neurological: Negative for dizziness, tingling, tremors, sensory change, speech change, focal weakness and headaches.   Endo/Heme/Allergies: Negative for environmental allergies and polydipsia. Does not bruise/bleed easily.   Psychiatric/Behavioral: Negative for depression, hallucinations, substance abuse and suicidal ideas. The patient is nervous/anxious. The patient does not have insomnia.         Physical Exam  Temp:  [36.2 °C (97.1 °F)-36.3 °C (97.3 °F)] 36.2 °C (97.2 °F)  Pulse:  [] 104  Resp:  [17-18] 17  BP: (112-149)/() 125/92  SpO2:  [91 %-100 %] 91 %    Physical Exam   Constitutional: He is oriented to person, place, and time. He appears well-developed and well-nourished. No distress.   HENT:   Head: Normocephalic and atraumatic.   Eyes: Conjunctivae are normal. No scleral icterus.   Neck: Neck supple. No thyromegaly present.   Cardiovascular: Normal rate, regular rhythm and normal heart sounds.  Exam reveals no gallop and no friction rub.    Pulmonary/Chest: Effort normal. No respiratory distress. He has no wheezes.   Abdominal: Soft. Bowel sounds are normal. He exhibits no distension. There is no tenderness. There is no rebound.   Musculoskeletal: He exhibits no tenderness or deformity.   Mild to moderate thoracolumbar kyphoscoliosis.   Right knee is mildly erythematous, tender to  palpation   Neurological: He is alert and oriented to person, place, and time.   No gross focal deficit   Skin: Skin is warm and dry. He is not diaphoretic. No erythema.   Psychiatric: His mood appears anxious. Cognition and memory are impaired.        Nursing note and vitals reviewed.  I examined the patient on 5/9.  There is no significant changes from 5/8 except as mentioned above    Fluids    Intake/Output Summary (Last 24 hours) at 05/09/19 1219  Last data filed at 05/09/19 0800   Gross per 24 hour   Intake              490 ml   Output             1925 ml   Net            -1435 ml       Laboratory  Recent Labs      05/07/19   0921 05/09/19 0318   WBC  2.5*  2.6*   RBC  2.91*  3.41*   HEMOGLOBIN  8.5*  9.9*   HEMATOCRIT  26.3*  30.3*   MCV  90.4  88.9   MCH  29.2  29.0   MCHC  32.3*  32.7*   RDW  48.6  46.5   PLATELETCT  62*  79*   MPV  10.2  10.8     Recent Labs      05/07/19   0921  05/09/19 0318   SODIUM  139  138   POTASSIUM  3.8  4.4   CHLORIDE  109  102   CO2  26  29   GLUCOSE  91  86   BUN  8  10   CREATININE  0.59  0.83   CALCIUM  8.0*  8.7                   Imaging  DX-ESOPHAGUS - BARIUM SWALLOW   Final Result      Small hiatal hernia      DX-CHEST-PORTABLE (1 VIEW)   Final Result      No acute cardiac or pulmonary abnormalities are identified.      DX-CHEST-PORTABLE (1 VIEW)   Final Result         No acute cardiac or pulmonary abnormality is identified.      [id number] VIEW   Final Result      1.  There is a nonobstructive nonspecific bowel gas pattern.  There is no evidence of free intraperitoneal air.      MR-LUMBAR SPINE-WITH & W/O   Final Result      1.  There is subacute fracture along the superior endplate of L1 vertebral body. There is no posterior retropulsion.   2.  There is no focal bone lesion.   3.  There is no evidence of infection.   4.  Mild degenerative disease as described above.      MR-BRAIN-W/O   Final Result      1.  Mild cerebral atrophy.   2.  Mild chronic microvascular  ischemic disease.   3.  No acute abnormality.      DX-KNEE 2- LEFT   Final Result      1.  No fracture or dislocation of LEFT knee.   2.  Minimal degenerative changes.   3.  Diffuse vascular calcifications.      EC-ECHOCARDIOGRAM COMPLETE W/O CONT   Final Result      CT-ABDOMEN-PELVIS WITH   Final Result         1.  No acute abnormality.   2.  Hepatomegaly and diffuse hepatic steatosis   3.  Atherosclerosis and atherosclerotic coronary artery disease.   4.  Pulmonary nodules measuring up to 6.5 mm, see nodule follow-up recommendations below.      Low Risk: CT at 3-6 months, then consider CT at 18-24 months      High Risk: CT at 3-6 months, then at 18-24 months      Comments: Use most suspicious nodule as guide to management. Follow-up intervals may vary according to size and risk.      Low Risk - Minimal or absent history of smoking and of other known risk factors.      High Risk - History of smoking or of other known risk factors.      Note: These recommendations do not apply to lung cancer screening, patients with immunosuppression, or patients with known primary cancer.      Fleischner Society 2017 Guidelines for Management of Incidentally Detected Pulmonary Nodules in Adults         CT-CTA CHEST PULMONARY ARTERY W/ RECONS   Final Result         1.  No large central pulmonary embolus is appreciated, evaluation of the subsegmental branches is essentially nondiagnostic due to motion artifacts. Additional imaging would be required for definitive exclusion of small distal pulmonary emboli.      DX-LUMBAR SPINE-2 OR 3 VIEWS   Final Result      Limited examination. Mild anterior wedging L2 vertebral body that appears to be old.   If symptoms are persistent, would recommend CT for further evaluation.      DX-THORACIC SPINE-WITH SWIMMERS VIEW   Final Result      Limited examination. The upper thoracic spine is not well-demonstrated.   Mild anterior wedging of some the lower thoracic vertebral bodies and appears old.   No  definite acute compression. If symptoms are persistent, CT would be recommended for further evaluation.      DX-CHEST-PORTABLE (1 VIEW)   Final Result      Central catheter projects over the superior right atrium.      Prominent calcified granuloma in the left midlung.      Atherosclerotic plaque.      CT-HEAD W/O   Final Result      No acute intracranial abnormality is identified.      IR-VERTEBROPLASTY    (Results Pending)        Assessment/Plan  * Closed stable burst fracture of first lumbar vertebra with routine healing   Assessment & Plan    Lumbar MRI found L1 subacute fracture with no protrusion  Neurosurgery was consulted and they recommended brace placement and outpatient follow-up.  I discontinued IV morphine.  I encouraged him to use oral pain medication for pain control.  Physiatry evaluated him and recommended that is not a candidate for inpatient rehab.  Physical therapy evaluated and recommended transitional care facilities.  Currently is very difficult discharge as per physical therapy recommendation patient needs to go to skilled nursing facility but most of the skilled nursing facility has been declining him for criminal record  Cognitive difficulties more prevalent since initiation of gabapentin - dose reduced and I will discontinue it (likely the cause of his dizziness also)  Discontinue MS Contin and transition him to Percocet and tramadol  Kyphoplasty unable to be completed today since patient required general anesthesia and would likely be completed on Monday  Started the patient on long-acting oxycodone  5/3: Scheduled for kyphoplasty under full anesthesia on Monday.  5/4: Pain is well controlled.   5/5: Plan for L1 vertebral augmentation under anesthesia tomorrow.  5/6: Vertebral augmentation surgery was postponed till tomorrow.  5/7 kyphoplasty rescheduled for 5/9 at 1 PM  5/8 increase dose of scheduled morphine p.o.  5/9 pain slightly improved.  Awaiting for kyphoplasty today.  Will need  PT OT reevaluation       Pancytopenia (HCC)- (present on admission)   Assessment & Plan    Most likely due to myelodysplastic syndrome and possibly chronic liver disease  His last chemotherapy was in November 2018  No acute symptoms.  Hematology/oncology was consulted and recommended outpatient follow-up.  Monitor for now.            Esophageal dysphagia   Assessment & Plan    Patient complains of dysphagia associated with nausea and vomiting  Patient refused further evaluation including barium swallow study.     Poor nutrition   Assessment & Plan    Allow supplements between meals.  Dietitian following     Hallucinations   Assessment & Plan    Patient seeing butterflies and bats in his room  Psychiatry did not recommend any antipsychotic medications at this point.  5/7 no hallucinations today     Delirium   Assessment & Plan    Frequent confusing statements made by patient, unrelated to doses of pain medications  Psychiatry consultation appreciated  Discontinue Robaxin  Gabapentin discontinued  Resolved   Ammonia is not elevated     Left knee pain   Assessment & Plan    X ray showed arthritis with no acute bony abnormalities or fractures.  Continue to provide him pain management.     Tachycardia   Assessment & Plan    Resolved  It was most likely from pain  metprolol 25 mg p.o. twice daily  Stable     Somnolence- (present on admission)   Assessment & Plan    Waxing and waning  Stopped Ambien and trazodone  Resolved, gabapentin likely contributing - discontinued       Dizziness- (present on admission)   Assessment & Plan    Likely s/e of gabapentin - discontinued  Vitals are stable  Echo did not show any acute normalities  PT/OT  Negative orthostatics vitals  Monitor         Plan of care discussed with multidisciplinary team during rounds.    VTE prophylaxis: heparin

## 2019-05-09 NOTE — PROGRESS NOTES
Hospital Medicine Daily Progress Note    Date of Service  5/8/2019    Chief Complaint  53 y.o. male admitted 4/5/2019 with back pain, found to have L1 burst fracture.    Hospital Course    Patient admitted with L1 burst fracture, conservative bracing recommended by neurosurgeon.  Patient with difficult to control pain and trouble with therapies and difficult discharge due to social situation.      Interval Problem Update  4/13 Patient moved from telemetry to medical floor, states pain uncontrolled and requesting IV pain medication, 1 dose IV morphine ordered as needed for severe pain but I've adjusted his percocet from 5mg to 10 mg and increased robaxin and made it scheduled and increased his gabapentin for better pain control.  4/14 Patient states he had rest following morphine dose overnight but pain uncontrolled still with previous changes, will add oxycontin 20 bid as long acting pain medication to help control pain and allow patient to participate in therapies.  Rehab consult placed.  4/15 Patient up to side of bed in TLSO after working with therapy today, complains of uncontrolled pain with the changes made.  I doubt he is having response to oxycodone/oxycontin so will discontinue this and rotate opiates to morphine - MS contin and MSIR.  Physiatry consult pending for recommendations to help get patient out of the hospital.  4/23 Patient s/p cognitive evaluation and showed decline since previous evaluation.  His mentation seemed to change with the initiation of gabapentin that I started increasing on 4/13.  I will discontinue the gabapentin as it does not seem to be helping with his pain control but he is having cog deficits and dizziness - especially with dose increases.  Patient is agreeable to this.  He does not need IV morphine and pain can be controlled with PO only.  4/24 Patient seems more awake and alert today with dc of gabapentin.  He states the dizziness is still present.  Cognitive status seems to  have improved since yesterday also.  He needs continued improvement before he is safe to leave the hospital to an independent setting, no availability for placement given his criminal record and active warrants.  4/25 Patient awake but still making confusing statements, yesterday he wanted paperwork to fill out for a [employer] authorization.  He is making these statements more frequently and I've requested psychiatry to weigh in on their thoughts of his confusion.    4/26 Patient without complaints today, back pain same.  Makes confusing statements but less often.    4/27 Patient with residual pain, no increase in pain medication due to his hallucinations.  Will have psychiatry re-evaluate patient for medication recommendations, likely an antipsychotic medication to help lessen his hallucinations.  4/28 Patient continues to have disorganized thoughts and hallucinations.  I anticipate psychiatry to consult again on Monday.  No acute changes, therapies to resume on Monday.  4/29 Patient with continued decline, he is not eating nor drinking meals and states he's not hungry.  I've started IV fluids and am awaiting psychiatry re-evaluation as he is actively hallucinating.  I will also decrease his MS contin as he appears comfortable and this could also be contributing to his mental suppression.  4/30: Patient still having episodes of confusion.  I have discontinued his MS Contin and morphine since I think it is attributing to his nausea, vomiting and confusion.  I replaced it with Percocet and tramadol.  5/1: After discussing his case with radiology who determined that he would be a good candidate for kyphoplasty.  His overall mental status has improved after discontinuing the medications.  He complained about no increase in pain.  5/2: Patient seen and examined by me he was having increased amounts of pain.  I restarted long-acting oxycodone to see if his pain was resolved.  The kyphoplasty was unable to be completed  since the patient required general anesthesia.  Patient is a difficult discharge and will need to be walking before we can send him home.  5/3: Resting comfortably in bed. Asking for more pain medicine however did not look in pain or distress. No issues overnight per staff.   5/4: Requesting more pain medicine (IV).  Looks comfortable with no distress however.  No issues overnight per staff.  Awake and alert.  Interactive.  Mental status at baseline.  5/5: Continues to ask for more pain medicine.  Stated that he is unable to stand without excruciating pain.  No distress noted however upon exam.  Patient looks comfortable with no apparent distress or suffering noted.  No issues overnight per staff.  Keep n.p.o. after midnight.  5/6: Resting comfortably in bed.  Pain is fairly controlled.  Kyphoplasty procedure postponed till tomorrow.  Patient has no new complaints to report.  5/7 patient states that he continues having lower back pain and cannot find right position.  IV morphine as needed to times a day for only for physical therapy purposes also ordered.  Last bowel movements was day before yesterday.  Kyphoplasty rescheduled for Thursday 5/9 at 1 p.m.  5/8 patient does not appear to be in acute distress, but continues complaining of lower back pain, requesting more IV morphine.  Had some diffuse skin itching and requesting Benadryl.  Complaining of left knee pain  Consultants/Specialty  [facility] - [place] - s/o  Psych - s/o, re-consult    Code Status  full    Disposition  No placement available, needs improved cognitive function until safe to discharge to his own independence.    Will need PT OT evaluation after kyphoplasty, but will be difficult placement due to being out of [facility]    Review of Systems  Review of Systems   Constitutional: Negative for chills, diaphoresis, fever, malaise/fatigue and weight loss.   HENT: Negative for ear discharge, ear pain, hearing loss, nosebleeds and tinnitus.    Eyes: Negative for  blurred vision, double vision, photophobia and pain.   Respiratory: Negative for cough, hemoptysis and sputum production.    Cardiovascular: Negative for chest pain, palpitations, orthopnea and claudication.   Gastrointestinal: Negative for abdominal pain, constipation, diarrhea, heartburn, nausea and vomiting.   Genitourinary: Negative for dysuria and urgency.   Musculoskeletal: Positive for back pain (Stable ) and myalgias. Negative for joint pain.   Skin: Negative for itching and rash.   Neurological: Negative for dizziness, tingling, tremors, sensory change, speech change, focal weakness and headaches.   Endo/Heme/Allergies: Negative for environmental allergies and polydipsia. Does not bruise/bleed easily.   Psychiatric/Behavioral: Negative for depression, hallucinations, substance abuse and suicidal ideas. The patient is nervous/anxious. The patient does not have insomnia.         Physical Exam  Temp:  [36.3 °C (97.3 °F)-37.3 °C (99.1 °F)] 36.3 °C (97.3 °F)  Pulse:  [80-85] 85  Resp:  [18-19] 18  BP: (116-149)/() 149/101  SpO2:  [91 %-100 %] 100 %    Physical Exam   Constitutional: He is oriented to person, place, and time. He appears well-developed and well-nourished. No distress.   HENT:   Head: Normocephalic and atraumatic.   Eyes: Conjunctivae are normal. No scleral icterus.   Neck: Neck supple. No thyromegaly present.   Cardiovascular: Normal rate, regular rhythm and normal heart sounds.  Exam reveals no gallop and no friction rub.    Pulmonary/Chest: Effort normal. No respiratory distress. He has no wheezes.   Abdominal: Soft. Bowel sounds are normal. He exhibits no distension. There is no tenderness. There is no rebound.   Musculoskeletal: He exhibits no tenderness or deformity.   Mild to moderate thoracolumbar kyphoscoliosis.   Right knee is mildly erythematous, tender to palpation   Neurological: He is alert and oriented to person, place, and time.   No gross focal deficit   Skin: Skin is warm  and dry. He is not diaphoretic. No erythema.   Psychiatric: His mood appears anxious. Cognition and memory are impaired.        Nursing note and vitals reviewed.      Fluids    Intake/Output Summary (Last 24 hours) at 05/08/19 1725  Last data filed at 05/08/19 1300   Gross per 24 hour   Intake             1546 ml   Output             3125 ml   Net            -1579 ml       Laboratory  Recent Labs      05/06/19   0915  05/07/19   0921   WBC  2.9*  2.5*   RBC  3.02*  2.91*   HEMOGLOBIN  8.8*  8.5*   HEMATOCRIT  27.6*  26.3*   MCV  91.4  90.4   MCH  29.1  29.2   MCHC  31.9*  32.3*   RDW  50.6*  48.6   PLATELETCT  64*  62*   MPV  8.9*  10.2     Recent Labs      05/06/19   0915  05/07/19   0921   SODIUM  140  139   POTASSIUM  3.7  3.8   CHLORIDE  114*  109   CO2  20  26   GLUCOSE  92  91   BUN  6*  8   CREATININE  0.62  0.59   CALCIUM  7.6*  8.0*                   Imaging  DX-ESOPHAGUS - BARIUM SWALLOW   Final Result      Small hiatal hernia      DX-CHEST-PORTABLE (1 VIEW)   Final Result      No acute cardiac or pulmonary abnormalities are identified.      DX-CHEST-PORTABLE (1 VIEW)   Final Result         No acute cardiac or pulmonary abnormality is identified.      [id number] VIEW   Final Result      1.  There is a nonobstructive nonspecific bowel gas pattern.  There is no evidence of free intraperitoneal air.      MR-LUMBAR SPINE-WITH & W/O   Final Result      1.  There is subacute fracture along the superior endplate of L1 vertebral body. There is no posterior retropulsion.   2.  There is no focal bone lesion.   3.  There is no evidence of infection.   4.  Mild degenerative disease as described above.      MR-BRAIN-W/O   Final Result      1.  Mild cerebral atrophy.   2.  Mild chronic microvascular ischemic disease.   3.  No acute abnormality.      DX-KNEE 2- LEFT   Final Result      1.  No fracture or dislocation of LEFT knee.   2.  Minimal degenerative changes.   3.  Diffuse vascular calcifications.       EC-ECHOCARDIOGRAM COMPLETE W/O CONT   Final Result      CT-ABDOMEN-PELVIS WITH   Final Result         1.  No acute abnormality.   2.  Hepatomegaly and diffuse hepatic steatosis   3.  Atherosclerosis and atherosclerotic coronary artery disease.   4.  Pulmonary nodules measuring up to 6.5 mm, see nodule follow-up recommendations below.      Low Risk: CT at 3-6 months, then consider CT at 18-24 months      High Risk: CT at 3-6 months, then at 18-24 months      Comments: Use most suspicious nodule as guide to management. Follow-up intervals may vary according to size and risk.      Low Risk - Minimal or absent history of smoking and of other known risk factors.      High Risk - History of smoking or of other known risk factors.      Note: These recommendations do not apply to lung cancer screening, patients with immunosuppression, or patients with known primary cancer.      Fleischner Society 2017 Guidelines for Management of Incidentally Detected Pulmonary Nodules in Adults         CT-CTA CHEST PULMONARY ARTERY W/ RECONS   Final Result         1.  No large central pulmonary embolus is appreciated, evaluation of the subsegmental branches is essentially nondiagnostic due to motion artifacts. Additional imaging would be required for definitive exclusion of small distal pulmonary emboli.      DX-LUMBAR SPINE-2 OR 3 VIEWS   Final Result      Limited examination. Mild anterior wedging L2 vertebral body that appears to be old.   If symptoms are persistent, would recommend CT for further evaluation.      DX-THORACIC SPINE-WITH SWIMMERS VIEW   Final Result      Limited examination. The upper thoracic spine is not well-demonstrated.   Mild anterior wedging of some the lower thoracic vertebral bodies and appears old.   No definite acute compression. If symptoms are persistent, CT would be recommended for further evaluation.      DX-CHEST-PORTABLE (1 VIEW)   Final Result      Central catheter projects over the superior right  atrium.      Prominent calcified granuloma in the left midlung.      Atherosclerotic plaque.      CT-HEAD W/O   Final Result      No acute intracranial abnormality is identified.      IR-VERTEBROPLASTY    (Results Pending)        Assessment/Plan  * Closed stable burst fracture of first lumbar vertebra with routine healing   Assessment & Plan    Lumbar MRI found L1 subacute fracture with no protrusion  Neurosurgery was consulted and they recommended brace placement and outpatient follow-up.  I discontinued IV morphine.  I encouraged him to use oral pain medication for pain control.  Physiatry evaluated him and recommended that is not a candidate for inpatient rehab.  Physical therapy evaluated and recommended transitional care facilities.  Currently is very difficult discharge as per physical therapy recommendation patient needs to go to skilled nursing facility but most of the skilled nursing facility has been declining him for criminal record  Cognitive difficulties more prevalent since initiation of gabapentin - dose reduced and I will discontinue it (likely the cause of his dizziness also)  Discontinue MS Contin and transition him to Percocet and tramadol  Kyphoplasty unable to be completed today since patient required general anesthesia and would likely be completed on Monday  Started the patient on long-acting oxycodone  5/3: Scheduled for kyphoplasty under full anesthesia on Monday.  5/4: Pain is well controlled.   5/5: Plan for L1 vertebral augmentation under anesthesia tomorrow.  5/6: Vertebral augmentation surgery was postponed till tomorrow.  5/7 kyphoplasty rescheduled for 5/9 at 1 PM       Pancytopenia (HCC)- (present on admission)   Assessment & Plan    Most likely due to myelodysplastic syndrome and possibly chronic liver disease  His last chemotherapy was in November 2018  No acute symptoms.  Hematology/oncology was consulted and recommended outpatient follow-up.  Monitor for now.             Esophageal dysphagia   Assessment & Plan    Patient complains of dysphagia associated with nausea and vomiting  Patient refused further evaluation including barium swallow study.     Poor nutrition   Assessment & Plan    Allow supplements between meals.  Dietitian following     Hallucinations   Assessment & Plan    Patient seeing butterflies and bats in his room  Psychiatry did not recommend any antipsychotic medications at this point.  5/7 no hallucinations today     Delirium   Assessment & Plan    Frequent confusing statements made by patient, unrelated to doses of pain medications  Psychiatry consultation appreciated  Discontinue Robaxin  Gabapentin discontinued  Resolved   Ammonia is not elevated     Left knee pain   Assessment & Plan    X ray showed arthritis with no acute bony abnormalities or fractures.  Continue to provide him pain management.     Tachycardia   Assessment & Plan    Resolved  It was most likely from pain  metprolol 25 mg p.o. twice daily  Stable     Somnolence- (present on admission)   Assessment & Plan    Waxing and waning  Stopped Ambien and trazodone  Resolved, gabapentin likely contributing - discontinued       Dizziness- (present on admission)   Assessment & Plan    Likely s/e of gabapentin - discontinued  Vitals are stable  Echo did not show any acute normalities  PT/OT  Negative orthostatics vitals  Monitor         Plan of care discussed with multidisciplinary team during rounds.    VTE prophylaxis: heparin

## 2019-05-09 NOTE — ANESTHESIA TIME REPORT
Anesthesia Start and Stop Event Times     Date Time Event    5/9/2019 1417 Anesthesia Start     1538 Anesthesia Stop        Responsible Staff  05/09/19    Name Role Begin End    Filiberto Hagan M.D. Anesth 1417 1538        Preop Diagnosis (Free Text):  Pre-op Diagnosis             Preop Diagnosis (Codes):    Post op Diagnosis  Closed stable burst fracture of lumbar vertebra, unspecified lumbar vertebral level, initial encounter ([facility])      Premium Reason  Non-Premium    Comments:

## 2019-05-09 NOTE — THERAPY
SPEECH PATHOLOGY:  Came to see patient this afternoon for cognitive therapy; however, he is was just getting ready to go to IR for procedure.  SLP will continue to follow for therapy as patient is able.

## 2019-05-09 NOTE — ANESTHESIA PREPROCEDURE EVALUATION
L1 burst fracture, difficult to control pain.     Relevant Problems   (+) Closed stable burst fracture of first lumbar vertebra with routine healing   (+) Delirium   (+) Esophageal dysphagia   (+) Pancytopenia (HCC)       Physical Exam    Airway   Mallampati: II  TM distance: >3 FB  Neck ROM: full       Cardiovascular - normal exam  Rhythm: regular  Rate: normal  (-) murmur     Dental - normal exam         Pulmonary - normal exam  Breath sounds clear to auscultation     Abdominal    Neurological - normal exam                 Anesthesia Plan    ASA 2       Plan - general       Airway plan will be ETT        Induction: intravenous    Postoperative Plan: Postoperative administration of opioids is intended.    Pertinent diagnostic labs and testing reviewed    Informed Consent:    Anesthetic plan and risks discussed with patient.    Use of blood products discussed with: patient whom consented to blood products.

## 2019-05-09 NOTE — PROGRESS NOTES
"Received report from day shift RN. Assumed patient care at 1900. Patient resting comfortably. Complains of 9/10 pain. PO Morphine provided for relief. Kyphoplasty scheduled today at 1300. Paitent NPO as of midnight. Bed locked and in the lowest position. Call light within reach. RN and CNA numbers provided.      /99   Pulse 91   Temp 36.2 °C (97.2 °F) (Temporal)   Resp 18   Ht 1.803 m (5' 11\")   Wt 101.2 kg (223 lb 1.7 oz)   SpO2 93%   BMI 31.12 kg/m²        3:36 AM Pre-op checklist completed. CHG wash done.   "

## 2019-05-10 PROCEDURE — A9270 NON-COVERED ITEM OR SERVICE: HCPCS | Performed by: INTERNAL MEDICINE

## 2019-05-10 PROCEDURE — A9270 NON-COVERED ITEM OR SERVICE: HCPCS | Performed by: HOSPITALIST

## 2019-05-10 PROCEDURE — A9270 NON-COVERED ITEM OR SERVICE: HCPCS | Performed by: FAMILY MEDICINE

## 2019-05-10 PROCEDURE — 700102 HCHG RX REV CODE 250 W/ 637 OVERRIDE(OP): Performed by: HOSPITALIST

## 2019-05-10 PROCEDURE — 99232 SBSQ HOSP IP/OBS MODERATE 35: CPT | Performed by: INTERNAL MEDICINE

## 2019-05-10 PROCEDURE — 700111 HCHG RX REV CODE 636 W/ 250 OVERRIDE (IP): Performed by: INTERNAL MEDICINE

## 2019-05-10 PROCEDURE — 97530 THERAPEUTIC ACTIVITIES: CPT

## 2019-05-10 PROCEDURE — 700102 HCHG RX REV CODE 250 W/ 637 OVERRIDE(OP): Performed by: INTERNAL MEDICINE

## 2019-05-10 PROCEDURE — 97535 SELF CARE MNGMENT TRAINING: CPT

## 2019-05-10 PROCEDURE — 700111 HCHG RX REV CODE 636 W/ 250 OVERRIDE (IP): Performed by: FAMILY MEDICINE

## 2019-05-10 PROCEDURE — 700101 HCHG RX REV CODE 250: Performed by: FAMILY MEDICINE

## 2019-05-10 PROCEDURE — 770006 HCHG ROOM/CARE - MED/SURG/GYN SEMI*

## 2019-05-10 PROCEDURE — 700102 HCHG RX REV CODE 250 W/ 637 OVERRIDE(OP): Performed by: FAMILY MEDICINE

## 2019-05-10 RX ORDER — MORPHINE SULFATE 4 MG/ML
4-6 INJECTION, SOLUTION INTRAMUSCULAR; INTRAVENOUS EVERY 4 HOURS PRN
Status: DISCONTINUED | OUTPATIENT
Start: 2019-05-10 | End: 2019-05-11

## 2019-05-10 RX ORDER — ACETAMINOPHEN 500 MG
1000 TABLET ORAL EVERY 8 HOURS
Status: DISCONTINUED | OUTPATIENT
Start: 2019-05-10 | End: 2019-05-10

## 2019-05-10 RX ORDER — ACETAMINOPHEN 500 MG
1000 TABLET ORAL 2 TIMES DAILY
Status: DISCONTINUED | OUTPATIENT
Start: 2019-05-10 | End: 2019-05-20 | Stop reason: HOSPADM

## 2019-05-10 RX ADMIN — MORPHINE SULFATE 30 MG: 15 TABLET ORAL at 02:30

## 2019-05-10 RX ADMIN — MORPHINE SULFATE 4 MG: 4 INJECTION INTRAVENOUS at 17:39

## 2019-05-10 RX ADMIN — MORPHINE SULFATE 6 MG: 4 INJECTION INTRAVENOUS at 08:54

## 2019-05-10 RX ADMIN — HEPARIN SODIUM 5000 UNITS: 5000 INJECTION, SOLUTION INTRAVENOUS; SUBCUTANEOUS at 22:42

## 2019-05-10 RX ADMIN — BUSPIRONE HYDROCHLORIDE 15 MG: 30 TABLET ORAL at 17:26

## 2019-05-10 RX ADMIN — OMEPRAZOLE 20 MG: 20 CAPSULE, DELAYED RELEASE ORAL at 17:26

## 2019-05-10 RX ADMIN — DIPHENHYDRAMINE HCL 25 MG: 25 TABLET ORAL at 02:30

## 2019-05-10 RX ADMIN — LIDOCAINE 2 PATCH: 50 PATCH TOPICAL at 22:43

## 2019-05-10 RX ADMIN — HEPARIN SODIUM 5000 UNITS: 5000 INJECTION, SOLUTION INTRAVENOUS; SUBCUTANEOUS at 05:45

## 2019-05-10 RX ADMIN — BUSPIRONE HYDROCHLORIDE 15 MG: 30 TABLET ORAL at 05:45

## 2019-05-10 RX ADMIN — METOPROLOL TARTRATE 25 MG: 25 TABLET ORAL at 17:26

## 2019-05-10 RX ADMIN — DIPHENHYDRAMINE HCL 25 MG: 25 TABLET ORAL at 23:22

## 2019-05-10 RX ADMIN — MORPHINE SULFATE 30 MG: 15 TABLET ORAL at 14:55

## 2019-05-10 RX ADMIN — DIPHENHYDRAMINE HCL 25 MG: 25 TABLET ORAL at 15:03

## 2019-05-10 RX ADMIN — ACETAMINOPHEN 1000 MG: 500 TABLET ORAL at 17:26

## 2019-05-10 RX ADMIN — METOPROLOL TARTRATE 25 MG: 25 TABLET ORAL at 05:45

## 2019-05-10 RX ADMIN — AMITRIPTYLINE HYDROCHLORIDE 75 MG: 75 TABLET, FILM COATED ORAL at 22:42

## 2019-05-10 RX ADMIN — MORPHINE SULFATE 4 MG: 4 INJECTION INTRAVENOUS at 22:42

## 2019-05-10 RX ADMIN — THERA TABS 1 TABLET: TAB at 05:45

## 2019-05-10 RX ADMIN — OMEPRAZOLE 20 MG: 20 CAPSULE, DELAYED RELEASE ORAL at 05:45

## 2019-05-10 RX ADMIN — ZOLPIDEM TARTRATE 5 MG: 5 TABLET ORAL at 22:57

## 2019-05-10 ASSESSMENT — ENCOUNTER SYMPTOMS
ORTHOPNEA: 0
TREMORS: 0
DOUBLE VISION: 0
PALPITATIONS: 0
CLAUDICATION: 0
COUGH: 0
NAUSEA: 0
VOMITING: 0
SPEECH CHANGE: 0
HEADACHES: 0
TINGLING: 0
MYALGIAS: 1
CHILLS: 0
SPUTUM PRODUCTION: 0
PHOTOPHOBIA: 0
CONSTIPATION: 0
DIAPHORESIS: 0
BRUISES/BLEEDS EASILY: 0
FOCAL WEAKNESS: 0
EYE PAIN: 0
DIARRHEA: 0
HEMOPTYSIS: 0
HEARTBURN: 0
SENSORY CHANGE: 0
HALLUCINATIONS: 0
BLURRED VISION: 0
INSOMNIA: 0
DIZZINESS: 0
FEVER: 0
ABDOMINAL PAIN: 0
NERVOUS/ANXIOUS: 1
DEPRESSION: 0
WEIGHT LOSS: 0
BACK PAIN: 1
POLYDIPSIA: 0

## 2019-05-10 ASSESSMENT — COGNITIVE AND FUNCTIONAL STATUS - GENERAL
MOBILITY SCORE: 17
CLIMB 3 TO 5 STEPS WITH RAILING: TOTAL
MOVING FROM LYING ON BACK TO SITTING ON SIDE OF FLAT BED: A LITTLE
DAILY ACTIVITIY SCORE: 21
HELP NEEDED FOR BATHING: A LITTLE
SUGGESTED CMS G CODE MODIFIER DAILY ACTIVITY: CJ
TOILETING: A LITTLE
WALKING IN HOSPITAL ROOM: A LOT
DRESSING REGULAR LOWER BODY CLOTHING: A LITTLE
SUGGESTED CMS G CODE MODIFIER MOBILITY: CK
STANDING UP FROM CHAIR USING ARMS: A LITTLE

## 2019-05-10 ASSESSMENT — LIFESTYLE VARIABLES: SUBSTANCE_ABUSE: 0

## 2019-05-10 ASSESSMENT — PAIN SCALES - GENERAL: PAIN_LEVEL: 5

## 2019-05-10 ASSESSMENT — GAIT ASSESSMENTS: GAIT LEVEL OF ASSIST: REFUSED

## 2019-05-10 NOTE — THERAPY
"Occupational Therapy Treatment completed with focus on ADLs, ADL transfers and patient education.  Functional Status: Pt seen today for OT treatment, now s/p L1 kyphoplasty. He was able to demo bed mobility with mod I. Supv sit<>stand. Pt able to thread BLE through shorts, but CGA needed while hiking shorts up over hips in standing. Pt then participated in squat pivot transfer to bedside chair with CGA more for patient's fear of falling than need for assistance with balance- he would not stand upright and pivot despite education. Pt frequently in flexed position, educated on neutral spine and pain mgmt. Pt irritable re: increasing activity, provided education on increasing OOB activity now that he has had his kyphoplasty. VSS once up in chair. RN in at end of session to administer pain meds.  Plan of Care: Will benefit from Occupational Therapy 3 times per week  Discharge Recommendations:  Equipment Will Continue to Assess for Equipment Needs.     See \"Rehab Therapy-Acute\" Patient Summary Report for complete documentation.   "

## 2019-05-10 NOTE — CARE PLAN
Problem: Pain Management  Goal: Pain level will decrease to patient's comfort goal  Outcome: PROGRESSING SLOWER THAN EXPECTED  Pt c/o uncontrolled pain, pt states prn meds are not helping manage pain    Problem: Infection  Goal: Will remain free from infection  Outcome: PROGRESSING AS EXPECTED  Pt educated on s/s of infection, WBC decreased

## 2019-05-10 NOTE — PROGRESS NOTES
"Assumed care at 1900. Received report from RN. Patient is A/O x4, vss on ra, up with 2 assist.  Pt c/o pain 10/10 prn MSIR available- pt states little relief with it, Hospitalist paged about c/o pain- no new orders received. Assessment complete. Labs reviewed. Patient and RN discussed plan of care. Patient questions answered. Patient needs are met at this time. Bed in lowest and locked position. Call light is within reach. Hourly rounding in place. /102   Pulse (!) 107   Temp 36.4 °C (97.5 °F) (Temporal)   Resp 16   Ht 1.803 m (5' 11\")   Wt 96.8 kg (213 lb 6.5 oz)   SpO2 92%   BMI 29.76 kg/m²   "

## 2019-05-10 NOTE — ANESTHESIA POSTPROCEDURE EVALUATION
Patient: Benito Persaud    Procedure Summary     Date:  05/09/19 Room / Location:  [facility] - INTERVENTIONAL - [facility]    Anesthesia Start:  1417 Anesthesia Stop:  1538    Procedure:  IR-VERTEBROPLASTY Diagnosis:  (Vertebrae Fracture)    Scheduled Providers:  Filiberto Hagan M.D. Responsible Provider:  Filiberto Hagan M.D.    Anesthesia Type:  general ASA Status:  2          Final Anesthesia Type: general  Last vitals  BP   Blood Pressure: 145/84, NIBP: 130/88    Temp   37.1 °C (98.7 °F)    Pulse   Pulse: (!) 102, Heart Rate (Monitored): (!) 103   Resp   17    SpO2   96 %      Anesthesia Post Evaluation    Patient location during evaluation: PACU  Patient participation: complete - patient participated  Level of consciousness: awake and alert  Pain score: 5    Airway patency: patent  Anesthetic complications: no  Cardiovascular status: hemodynamically stable  Respiratory status: acceptable  Hydration status: euvolemic    PONV: none           Nurse Pain Score: 10 (NPRS)

## 2019-05-10 NOTE — THERAPY
"Physical Therapy Treatment completed.   Bed Mobility:  Supine to Sit: Modified Independent  Transfers: Sit to Stand: Supervised  Gait: Level Of Assist: Refused with Front-Wheel Walker       Plan of Care: Will benefit from Physical Therapy 2 times per week  Discharge Recommendations: Equipment: Will Continue to Assess for Equipment Needs. Recommend inpatient transitional care services for continued physical therapy services.      See \"Rehab Therapy-Acute\" Patient Summary Report for complete documentation.     Pt is progressing slower than expected with functional mobility. Pt is primarily self limiting and appears to be functionally capable of transfers, ambulation, and stands. Pt continues to require constant encouragement and reinforcement to participate with therapy and eventually becomes agreeable. Continues to refuse TLSO during mobilization, despite education. Pt was able to demonstrate SBA for sit<>stand, and then transfer with CGA with FWW back to bed. Pt reports he does not wanted to be touched during transfer, however, pt edcuated for saftey this PT requires to be close to reduce risk of falling. Pt will continue to benefit from skilled PT while in house with recommenation for post acute therapy prior to d/c home.   "

## 2019-05-10 NOTE — PROGRESS NOTES
Hospital Medicine Daily Progress Note    Date of Service  5/10/2019    Chief Complaint  53 y.o. male admitted 4/5/2019 with back pain, found to have L1 burst fracture.    Hospital Course    Patient admitted with L1 burst fracture, conservative bracing recommended by neurosurgeon.  Patient with difficult to control pain and trouble with therapies and difficult discharge due to social situation.      Interval Problem Update  4/13 Patient moved from telemetry to medical floor, states pain uncontrolled and requesting IV pain medication, 1 dose IV morphine ordered as needed for severe pain but I've adjusted his percocet from 5mg to 10 mg and increased robaxin and made it scheduled and increased his gabapentin for better pain control.  4/14 Patient states he had rest following morphine dose overnight but pain uncontrolled still with previous changes, will add oxycontin 20 bid as long acting pain medication to help control pain and allow patient to participate in therapies.  Rehab consult placed.  4/15 Patient up to side of bed in [facility] after working with therapy today, complains of uncontrolled pain with the changes made.  I doubt he is having response to oxycodone/oxycontin so will discontinue this and rotate opiates to morphine - MS contin and MSIR.  Physiatry consult pending for recommendations to help get patient out of the hospital.  4/23 Patient s/p cognitive evaluation and showed decline since previous evaluation.  His mentation seemed to change with the initiation of gabapentin that I started increasing on 4/13.  I will discontinue the gabapentin as it does not seem to be helping with his pain control but he is having cog deficits and dizziness - especially with dose increases.  Patient is agreeable to this.  He does not need IV morphine and pain can be controlled with PO only.  4/24 Patient seems more awake and alert today with dc of gabapentin.  He states the dizziness is still present.  Cognitive status seems to  have improved since yesterday also.  He needs continued improvement before he is safe to leave the hospital to an independent setting, no availability for placement given his criminal record and active warrants.  4/25 Patient awake but still making confusing statements, yesterday he wanted paperwork to fill out for a [employer] authorization.  He is making these statements more frequently and I've requested psychiatry to weigh in on their thoughts of his confusion.    4/26 Patient without complaints today, back pain same.  Makes confusing statements but less often.    4/27 Patient with residual pain, no increase in pain medication due to his hallucinations.  Will have psychiatry re-evaluate patient for medication recommendations, likely an antipsychotic medication to help lessen his hallucinations.  4/28 Patient continues to have disorganized thoughts and hallucinations.  I anticipate psychiatry to consult again on Monday.  No acute changes, therapies to resume on Monday.  4/29 Patient with continued decline, he is not eating nor drinking meals and states he's not hungry.  I've started IV fluids and am awaiting psychiatry re-evaluation as he is actively hallucinating.  I will also decrease his MS contin as he appears comfortable and this could also be contributing to his mental suppression.  4/30: Patient still having episodes of confusion.  I have discontinued his MS Contin and morphine since I think it is attributing to his nausea, vomiting and confusion.  I replaced it with Percocet and tramadol.  5/1: After discussing his case with radiology who determined that he would be a good candidate for kyphoplasty.  His overall mental status has improved after discontinuing the medications.  He complained about no increase in pain.  5/2: Patient seen and examined by me he was having increased amounts of pain.  I restarted long-acting oxycodone to see if his pain was resolved.  The kyphoplasty was unable to be completed  since the patient required general anesthesia.  Patient is a difficult discharge and will need to be walking before we can send him home.  5/3: Resting comfortably in bed. Asking for more pain medicine however did not look in pain or distress. No issues overnight per staff.   5/4: Requesting more pain medicine (IV).  Looks comfortable with no distress however.  No issues overnight per staff.  Awake and alert.  Interactive.  Mental status at baseline.  5/5: Continues to ask for more pain medicine.  Stated that he is unable to stand without excruciating pain.  No distress noted however upon exam.  Patient looks comfortable with no apparent distress or suffering noted.  No issues overnight per staff.  Keep n.p.o. after midnight.  5/6: Resting comfortably in bed.  Pain is fairly controlled.  Kyphoplasty procedure postponed till tomorrow.  Patient has no new complaints to report.  5/7 patient states that he continues having lower back pain and cannot find right position.  IV morphine as needed to times a day for only for physical therapy purposes also ordered.  Last bowel movements was day before yesterday.  Kyphoplasty rescheduled for Thursday 5/9 at 1 p.m.  5/8 patient does not appear to be in acute distress, but continues complaining of lower back pain, requesting more IV morphine.  Had some diffuse skin itching and requesting Benadryl.  Complaining of left knee pain  5/9 resting comfortably in his bed.  States that right lower back pain slightly improved.  Awaiting kyphoplasty today  5/10 patient reports worsening of pain after the procedure yesterday.  She understands that he needs to get up out of bed and walk at least 3 times a day in order for the pain and mobility to improve.  No focal deficit  She is complaining of left knee pain, which is becoming chronic at this point.    Consultants/Specialty  [facility] [place] - s/o  Psych - s/o, re-consult    Code Status  full    Disposition  No placement options available,    Patient needs to improve mobility and to be discharged home after that.    Review of Systems  Review of Systems   Constitutional: Negative for chills, diaphoresis, fever, malaise/fatigue and weight loss.   HENT: Negative for ear discharge, ear pain, hearing loss, nosebleeds and tinnitus.    Eyes: Negative for blurred vision, double vision, photophobia and pain.   Respiratory: Negative for cough, hemoptysis and sputum production.    Cardiovascular: Negative for chest pain, palpitations, orthopnea and claudication.   Gastrointestinal: Negative for abdominal pain, constipation, diarrhea, heartburn, nausea and vomiting.   Genitourinary: Negative for dysuria and urgency.   Musculoskeletal: Positive for back pain (Stable ) and myalgias. Negative for joint pain.   Skin: Negative for itching and rash.   Neurological: Negative for dizziness, tingling, tremors, sensory change, speech change, focal weakness and headaches.   Endo/Heme/Allergies: Negative for environmental allergies and polydipsia. Does not bruise/bleed easily.   Psychiatric/Behavioral: Negative for depression, hallucinations, substance abuse and suicidal ideas. The patient is nervous/anxious. The patient does not have insomnia.         Physical Exam  Temp:  [36.2 °C (97.2 °F)-37.1 °C (98.7 °F)] 37.1 °C (98.7 °F)  Pulse:  [102-127] 102  Resp:  [16-18] 17  BP: (118-145)/() 145/84  SpO2:  [92 %-100 %] 96 %    Physical Exam   Constitutional: He is oriented to person, place, and time. He appears well-developed and well-nourished. No distress.   HENT:   Head: Normocephalic and atraumatic.   Eyes: Conjunctivae are normal. No scleral icterus.   Neck: Neck supple. No thyromegaly present.   Cardiovascular: Normal rate, regular rhythm and normal heart sounds.  Exam reveals no gallop and no friction rub.    Pulmonary/Chest: Effort normal. No respiratory distress. He has no wheezes.   Abdominal: Soft. Bowel sounds are normal. He exhibits no distension. There is no  tenderness. There is no rebound.   Musculoskeletal: He exhibits no tenderness or deformity.   Mild to moderate thoracolumbar kyphoscoliosis.   Right knee is mildly erythematous, tender to palpation   Neurological: He is alert and oriented to person, place, and time.   No gross focal deficit   Skin: Skin is warm and dry. He is not diaphoretic. No erythema.   Psychiatric: His mood appears anxious. Cognition and memory are impaired.        Nursing note and vitals reviewed.  I examined the patient on 5/10.  There is no significant changes from 5/9 except as mentioned above    Fluids    Intake/Output Summary (Last 24 hours) at 05/10/19 1519  Last data filed at 05/10/19 0709   Gross per 24 hour   Intake              720 ml   Output             1705 ml   Net             -985 ml       Laboratory  Recent Labs      05/09/19   0318   WBC  2.6*   RBC  3.41*   HEMOGLOBIN  9.9*   HEMATOCRIT  30.3*   MCV  88.9   MCH  29.0   MCHC  32.7*   RDW  46.5   PLATELETCT  79*   MPV  10.8     Recent Labs      05/09/19   0318   SODIUM  138   POTASSIUM  4.4   CHLORIDE  102   CO2  29   GLUCOSE  86   BUN  10   CREATININE  0.83   CALCIUM  8.7                   Imaging  IR-VERTEBROPLASTY   Final Result      1. LUMBAR KYPHOPLASTY AT L1 WITH BIPLANE FLUOROSCOPIC GUIDANCE FOR AN OSTEOPOROTIC INSUFFICIENCY COMPRESSION FRACTURE.      2. CLINICAL OR TELEPHONE FOLLOWUP IS SCHEDULED FOR ONE MONTH, 3 MONTHS, AND 6 MONTHS POST PROCEDURE.      DX-ESOPHAGUS - BARIUM SWALLOW   Final Result      Small hiatal hernia      DX-CHEST-PORTABLE (1 VIEW)   Final Result      No acute cardiac or pulmonary abnormalities are identified.      DX-CHEST-PORTABLE (1 VIEW)   Final Result         No acute cardiac or pulmonary abnormality is identified.      FE-DVAYMRH-4 VIEW   Final Result      1.  There is a nonobstructive nonspecific bowel gas pattern.  There is no evidence of free intraperitoneal air.      MR-LUMBAR SPINE-WITH & W/O   Final Result      1.  There is subacute  fracture along the superior endplate of L1 vertebral body. There is no posterior retropulsion.   2.  There is no focal bone lesion.   3.  There is no evidence of infection.   4.  Mild degenerative disease as described above.      MR-BRAIN-W/O   Final Result      1.  Mild cerebral atrophy.   2.  Mild chronic microvascular ischemic disease.   3.  No acute abnormality.      DX-KNEE 2- LEFT   Final Result      1.  No fracture or dislocation of LEFT knee.   2.  Minimal degenerative changes.   3.  Diffuse vascular calcifications.      EC-ECHOCARDIOGRAM COMPLETE W/O CONT   Final Result      CT-ABDOMEN-PELVIS WITH   Final Result         1.  No acute abnormality.   2.  Hepatomegaly and diffuse hepatic steatosis   3.  Atherosclerosis and atherosclerotic coronary artery disease.   4.  Pulmonary nodules measuring up to 6.5 mm, see nodule follow-up recommendations below.      Low Risk: CT at 3-6 months, then consider CT at 18-24 months      High Risk: CT at 3-6 months, then at 18-24 months      Comments: Use most suspicious nodule as guide to management. Follow-up intervals may vary according to size and risk.      Low Risk - Minimal or absent history of smoking and of other known risk factors.      High Risk - History of smoking or of other known risk factors.      Note: These recommendations do not apply to lung cancer screening, patients with immunosuppression, or patients with known primary cancer.      Fleischner Society 2017 Guidelines for Management of Incidentally Detected Pulmonary Nodules in Adults         CT-CTA CHEST PULMONARY ARTERY W/ RECONS   Final Result         1.  No large central pulmonary embolus is appreciated, evaluation of the subsegmental branches is essentially nondiagnostic due to motion artifacts. Additional imaging would be required for definitive exclusion of small distal pulmonary emboli.      DX-LUMBAR SPINE-2 OR 3 VIEWS   Final Result      Limited examination. Mild anterior wedging L2 vertebral  body that appears to be old.   If symptoms are persistent, would recommend CT for further evaluation.      DX-THORACIC SPINE-WITH SWIMMERS VIEW   Final Result      Limited examination. The upper thoracic spine is not well-demonstrated.   Mild anterior wedging of some the lower thoracic vertebral bodies and appears old.   No definite acute compression. If symptoms are persistent, CT would be recommended for further evaluation.      DX-CHEST-PORTABLE (1 VIEW)   Final Result      Central catheter projects over the superior right atrium.      Prominent calcified granuloma in the left midlung.      Atherosclerotic plaque.      CT-HEAD W/O   Final Result      No acute intracranial abnormality is identified.           Assessment/Plan  * Closed stable burst fracture of first lumbar vertebra with routine healing   Assessment & Plan    Lumbar MRI found L1 subacute fracture with no protrusion  Neurosurgery was consulted and they recommended brace placement and outpatient follow-up.  I discontinued IV morphine.  I encouraged him to use oral pain medication for pain control.  Physiatry evaluated him and recommended that is not a candidate for inpatient rehab.  Physical therapy evaluated and recommended transitional care facilities.  Currently is very difficult discharge as per physical therapy recommendation patient needs to go to skilled nursing facility but most of the skilled nursing facility has been declining him for criminal record  Cognitive difficulties more prevalent since initiation of gabapentin - dose reduced and I will discontinue it (likely the cause of his dizziness also)  Discontinue MS Contin and transition him to Percocet and tramadol  Kyphoplasty unable to be completed today since patient required general anesthesia and would likely be completed on Monday  Started the patient on long-acting oxycodone  5/3: Scheduled for kyphoplasty under full anesthesia on Monday.  5/4: Pain is well controlled.   5/5: Plan for  L1 vertebral augmentation under anesthesia tomorrow.  5/6: Vertebral augmentation surgery was postponed till tomorrow.  5/7 kyphoplasty rescheduled for 5/9 at 1 PM  5/8 increase dose of scheduled morphine p.o.  5/9 pain slightly improved.  Awaiting for kyphoplasty today.  Will need PT OT reevaluation       Pancytopenia (HCC)- (present on admission)   Assessment & Plan    Most likely due to myelodysplastic syndrome and possibly chronic liver disease  His last chemotherapy was in November 2018  No acute symptoms.  Hematology/oncology was consulted and recommended outpatient follow-up.  Monitor for now.            Esophageal dysphagia   Assessment & Plan    Patient complains of dysphagia associated with nausea and vomiting  Patient refused further evaluation including barium swallow study.     Poor nutrition   Assessment & Plan    Allow supplements between meals.  Dietitian following     Hallucinations   Assessment & Plan    Patient seeing butterflies and bats in his room  Psychiatry did not recommend any antipsychotic medications at this point.  5/7 no hallucinations today     Delirium   Assessment & Plan    Frequent confusing statements made by patient, unrelated to doses of pain medications  Psychiatry consultation appreciated  Discontinue Robaxin  Gabapentin discontinued  Resolved   Ammonia is not elevated     Left knee pain   Assessment & Plan    X ray showed arthritis with no acute bony abnormalities or fractures.  Continue to provide him pain management.     Tachycardia   Assessment & Plan    Resolved  It was most likely from pain  metprolol 25 mg p.o. twice daily  Stable     Somnolence- (present on admission)   Assessment & Plan    Waxing and waning  Stopped Ambien and trazodone  Resolved, gabapentin likely contributing - discontinued       Dizziness- (present on admission)   Assessment & Plan    Likely s/e of gabapentin - discontinued  Vitals are stable  Echo did not show any acute normalities  PT/OT  Negative  orthostatics vitals  Monitor         Plan of care discussed with multidisciplinary team during rounds.    VTE prophylaxis: heparin

## 2019-05-11 PROCEDURE — 770006 HCHG ROOM/CARE - MED/SURG/GYN SEMI*

## 2019-05-11 PROCEDURE — 700102 HCHG RX REV CODE 250 W/ 637 OVERRIDE(OP): Performed by: INTERNAL MEDICINE

## 2019-05-11 PROCEDURE — 700102 HCHG RX REV CODE 250 W/ 637 OVERRIDE(OP): Performed by: FAMILY MEDICINE

## 2019-05-11 PROCEDURE — 700111 HCHG RX REV CODE 636 W/ 250 OVERRIDE (IP): Performed by: INTERNAL MEDICINE

## 2019-05-11 PROCEDURE — 700101 HCHG RX REV CODE 250: Performed by: FAMILY MEDICINE

## 2019-05-11 PROCEDURE — A9270 NON-COVERED ITEM OR SERVICE: HCPCS | Performed by: FAMILY MEDICINE

## 2019-05-11 PROCEDURE — 700102 HCHG RX REV CODE 250 W/ 637 OVERRIDE(OP): Performed by: HOSPITALIST

## 2019-05-11 PROCEDURE — 700111 HCHG RX REV CODE 636 W/ 250 OVERRIDE (IP): Performed by: FAMILY MEDICINE

## 2019-05-11 PROCEDURE — A9270 NON-COVERED ITEM OR SERVICE: HCPCS | Performed by: INTERNAL MEDICINE

## 2019-05-11 PROCEDURE — 99232 SBSQ HOSP IP/OBS MODERATE 35: CPT | Performed by: INTERNAL MEDICINE

## 2019-05-11 PROCEDURE — A9270 NON-COVERED ITEM OR SERVICE: HCPCS | Performed by: HOSPITALIST

## 2019-05-11 RX ORDER — MORPHINE SULFATE 4 MG/ML
4-6 INJECTION, SOLUTION INTRAMUSCULAR; INTRAVENOUS EVERY 8 HOURS PRN
Status: DISCONTINUED | OUTPATIENT
Start: 2019-05-11 | End: 2019-05-13

## 2019-05-11 RX ADMIN — MORPHINE SULFATE 30 MG: 15 TABLET ORAL at 06:24

## 2019-05-11 RX ADMIN — ZOLPIDEM TARTRATE 5 MG: 5 TABLET ORAL at 23:44

## 2019-05-11 RX ADMIN — OMEPRAZOLE 20 MG: 20 CAPSULE, DELAYED RELEASE ORAL at 06:25

## 2019-05-11 RX ADMIN — BUSPIRONE HYDROCHLORIDE 15 MG: 30 TABLET ORAL at 17:17

## 2019-05-11 RX ADMIN — HEPARIN SODIUM 5000 UNITS: 5000 INJECTION, SOLUTION INTRAVENOUS; SUBCUTANEOUS at 14:56

## 2019-05-11 RX ADMIN — METOPROLOL TARTRATE 25 MG: 25 TABLET ORAL at 06:25

## 2019-05-11 RX ADMIN — ACETAMINOPHEN 1000 MG: 500 TABLET ORAL at 17:17

## 2019-05-11 RX ADMIN — THERA TABS 1 TABLET: TAB at 06:25

## 2019-05-11 RX ADMIN — MORPHINE SULFATE 30 MG: 15 TABLET ORAL at 14:56

## 2019-05-11 RX ADMIN — MORPHINE SULFATE 30 MG: 15 TABLET ORAL at 22:07

## 2019-05-11 RX ADMIN — MORPHINE SULFATE 4 MG: 4 INJECTION INTRAVENOUS at 03:12

## 2019-05-11 RX ADMIN — LIDOCAINE 2 PATCH: 50 PATCH TOPICAL at 23:44

## 2019-05-11 RX ADMIN — AMITRIPTYLINE HYDROCHLORIDE 75 MG: 75 TABLET, FILM COATED ORAL at 20:10

## 2019-05-11 RX ADMIN — MORPHINE SULFATE 30 MG: 15 TABLET ORAL at 10:24

## 2019-05-11 RX ADMIN — MORPHINE SULFATE 4 MG: 4 INJECTION INTRAVENOUS at 20:10

## 2019-05-11 RX ADMIN — ACETAMINOPHEN 1000 MG: 500 TABLET ORAL at 06:24

## 2019-05-11 RX ADMIN — HEPARIN SODIUM 5000 UNITS: 5000 INJECTION, SOLUTION INTRAVENOUS; SUBCUTANEOUS at 06:25

## 2019-05-11 RX ADMIN — BUSPIRONE HYDROCHLORIDE 15 MG: 30 TABLET ORAL at 06:24

## 2019-05-11 RX ADMIN — MORPHINE SULFATE 6 MG: 4 INJECTION INTRAVENOUS at 11:43

## 2019-05-11 RX ADMIN — HEPARIN SODIUM 5000 UNITS: 5000 INJECTION, SOLUTION INTRAVENOUS; SUBCUTANEOUS at 22:07

## 2019-05-11 RX ADMIN — DIPHENHYDRAMINE HCL 25 MG: 25 TABLET ORAL at 23:44

## 2019-05-11 ASSESSMENT — ENCOUNTER SYMPTOMS
EYE PAIN: 0
ORTHOPNEA: 0
COUGH: 0
DIAPHORESIS: 0
HEADACHES: 0
MYALGIAS: 1
CONSTIPATION: 0
PALPITATIONS: 0
VOMITING: 0
DIZZINESS: 0
HEARTBURN: 0
BACK PAIN: 1
NERVOUS/ANXIOUS: 1
WEIGHT LOSS: 0
DEPRESSION: 0
INSOMNIA: 0
HALLUCINATIONS: 0
POLYDIPSIA: 0
DIARRHEA: 0
ABDOMINAL PAIN: 0
SPUTUM PRODUCTION: 0
FOCAL WEAKNESS: 0
HEMOPTYSIS: 0
CHILLS: 0
BLURRED VISION: 0
DOUBLE VISION: 0
FEVER: 0
PHOTOPHOBIA: 0
BRUISES/BLEEDS EASILY: 0
TINGLING: 0
TREMORS: 0
CLAUDICATION: 0
SPEECH CHANGE: 0
NAUSEA: 0
SENSORY CHANGE: 0

## 2019-05-11 ASSESSMENT — LIFESTYLE VARIABLES
EVER_SMOKED: YES
SUBSTANCE_ABUSE: 0

## 2019-05-11 NOTE — CARE PLAN
Problem: Safety  Goal: Will remain free from injury  Outcome: PROGRESSING AS EXPECTED  Call light and personal belongings within reach. Bed in lowest and locked position. Hourly rounding in place.       Problem: Pain Management  Goal: Pain level will decrease to patient's comfort goal  Outcome: PROGRESSING AS EXPECTED  Pain managed adequately with PRN pain meds. Will continue to monitor and assess pain levels.

## 2019-05-11 NOTE — CARE PLAN
Problem: Pain Management  Goal: Pain level will decrease to patient's comfort goal  Outcome: PROGRESSING AS EXPECTED  Pt states pain is better managed with Morphine IVP added to prn, pt educated on importance of being proactive with pain management    Problem: Mobility  Goal: Risk for activity intolerance will decrease  Outcome: PROGRESSING SLOWER THAN EXPECTED  Pt needs a lot of encouragement to perform any activity and refuses often

## 2019-05-11 NOTE — PROGRESS NOTES
Hospital Medicine Daily Progress Note    Date of Service  5/11/2019    Chief Complaint  53 y.o. male admitted 4/5/2019 with back pain, found to have L1 burst fracture.    Hospital Course    Patient admitted with L1 burst fracture, conservative bracing recommended by neurosurgeon.  Patient with difficult to control pain and trouble with therapies and difficult discharge due to social situation.      Interval Problem Update  4/13 Patient moved from telemetry to medical floor, states pain uncontrolled and requesting IV pain medication, 1 dose IV morphine ordered as needed for severe pain but I've adjusted his percocet from 5mg to 10 mg and increased robaxin and made it scheduled and increased his gabapentin for better pain control.  4/14 Patient states he had rest following morphine dose overnight but pain uncontrolled still with previous changes, will add oxycontin 20 bid as long acting pain medication to help control pain and allow patient to participate in therapies.  Rehab consult placed.  4/15 Patient up to side of bed in [facility] after working with therapy today, complains of uncontrolled pain with the changes made.  I doubt he is having response to oxycodone/oxycontin so will discontinue this and rotate opiates to morphine - MS contin and MSIR.  Physiatry consult pending for recommendations to help get patient out of the hospital.  4/23 Patient s/p cognitive evaluation and showed decline since previous evaluation.  His mentation seemed to change with the initiation of gabapentin that I started increasing on 4/13.  I will discontinue the gabapentin as it does not seem to be helping with his pain control but he is having cog deficits and dizziness - especially with dose increases.  Patient is agreeable to this.  He does not need IV morphine and pain can be controlled with PO only.  4/24 Patient seems more awake and alert today with dc of gabapentin.  He states the dizziness is still present.  Cognitive status seems to  have improved since yesterday also.  He needs continued improvement before he is safe to leave the hospital to an independent setting, no availability for placement given his criminal record and active warrants.  4/25 Patient awake but still making confusing statements, yesterday he wanted paperwork to fill out for a [employer] authorization.  He is making these statements more frequently and I've requested psychiatry to weigh in on their thoughts of his confusion.    4/26 Patient without complaints today, back pain same.  Makes confusing statements but less often.    4/27 Patient with residual pain, no increase in pain medication due to his hallucinations.  Will have psychiatry re-evaluate patient for medication recommendations, likely an antipsychotic medication to help lessen his hallucinations.  4/28 Patient continues to have disorganized thoughts and hallucinations.  I anticipate psychiatry to consult again on Monday.  No acute changes, therapies to resume on Monday.  4/29 Patient with continued decline, he is not eating nor drinking meals and states he's not hungry.  I've started IV fluids and am awaiting psychiatry re-evaluation as he is actively hallucinating.  I will also decrease his MS contin as he appears comfortable and this could also be contributing to his mental suppression.  4/30: Patient still having episodes of confusion.  I have discontinued his MS Contin and morphine since I think it is attributing to his nausea, vomiting and confusion.  I replaced it with Percocet and tramadol.  5/1: After discussing his case with radiology who determined that he would be a good candidate for kyphoplasty.  His overall mental status has improved after discontinuing the medications.  He complained about no increase in pain.  5/2: Patient seen and examined by me he was having increased amounts of pain.  I restarted long-acting oxycodone to see if his pain was resolved.  The kyphoplasty was unable to be completed  since the patient required general anesthesia.  Patient is a difficult discharge and will need to be walking before we can send him home.  5/3: Resting comfortably in bed. Asking for more pain medicine however did not look in pain or distress. No issues overnight per staff.   5/4: Requesting more pain medicine (IV).  Looks comfortable with no distress however.  No issues overnight per staff.  Awake and alert.  Interactive.  Mental status at baseline.  5/5: Continues to ask for more pain medicine.  Stated that he is unable to stand without excruciating pain.  No distress noted however upon exam.  Patient looks comfortable with no apparent distress or suffering noted.  No issues overnight per staff.  Keep n.p.o. after midnight.  5/6: Resting comfortably in bed.  Pain is fairly controlled.  Kyphoplasty procedure postponed till tomorrow.  Patient has no new complaints to report.  5/7 patient states that he continues having lower back pain and cannot find right position.  IV morphine as needed to times a day for only for physical therapy purposes also ordered.  Last bowel movements was day before yesterday.  Kyphoplasty rescheduled for Thursday 5/9 at 1 p.m.  5/8 patient does not appear to be in acute distress, but continues complaining of lower back pain, requesting more IV morphine.  Had some diffuse skin itching and requesting Benadryl.  Complaining of left knee pain  5/9 resting comfortably in his bed.  States that right lower back pain slightly improved.  Awaiting kyphoplasty today  5/10 patient reports worsening of pain after the procedure yesterday.  She understands that he needs to get up out of bed and walk at least 3 times a day in order for the pain and mobility to improve.  No focal deficit  She is complaining of left knee pain, which is becoming chronic at this point.  5/11 patient continues attempts to ambulate.  Pain is somewhat better controlled.  He still is not felt to be ready for discharge back to  independent living.    Consultants/Specialty  NSG - Pierre - s/o  Psych - s/o, re-consult    Code Status  full    Disposition  No placement options available,   Patient needs to improve mobility and to be discharged home after that.    Review of Systems  Review of Systems   Constitutional: Negative for chills, diaphoresis, fever, malaise/fatigue and weight loss.   HENT: Negative for ear discharge, ear pain, hearing loss, nosebleeds and tinnitus.    Eyes: Negative for blurred vision, double vision, photophobia and pain.   Respiratory: Negative for cough, hemoptysis and sputum production.    Cardiovascular: Negative for chest pain, palpitations, orthopnea and claudication.   Gastrointestinal: Negative for abdominal pain, constipation, diarrhea, heartburn, nausea and vomiting.   Genitourinary: Negative for dysuria and urgency.   Musculoskeletal: Positive for back pain (Stable ) and myalgias. Negative for joint pain.   Skin: Negative for itching and rash.   Neurological: Negative for dizziness, tingling, tremors, sensory change, speech change, focal weakness and headaches.   Endo/Heme/Allergies: Negative for environmental allergies and polydipsia. Does not bruise/bleed easily.   Psychiatric/Behavioral: Negative for depression, hallucinations, substance abuse and suicidal ideas. The patient is nervous/anxious. The patient does not have insomnia.         Physical Exam  Temp:  [36.3 °C (97.4 °F)-36.8 °C (98.3 °F)] 36.6 °C (97.9 °F)  Pulse:  [87-98] 87  Resp:  [16-18] 18  BP: (103-136)/() 103/65  SpO2:  [93 %-97 %] 93 %    Physical Exam   Constitutional: He is oriented to person, place, and time. He appears well-developed and well-nourished. No distress.   HENT:   Head: Normocephalic and atraumatic.   Eyes: Conjunctivae are normal. No scleral icterus.   Neck: Neck supple. No thyromegaly present.   Cardiovascular: Normal rate, regular rhythm and normal heart sounds.  Exam reveals no gallop and no friction rub.     Pulmonary/Chest: Effort normal. No respiratory distress. He has no wheezes.   Abdominal: Soft. Bowel sounds are normal. He exhibits no distension. There is no tenderness. There is no rebound.   Musculoskeletal: He exhibits no tenderness or deformity.   Mild to moderate thoracolumbar kyphoscoliosis.   Right knee is mildly erythematous, tender to palpation   Neurological: He is alert and oriented to person, place, and time.   No gross focal deficit   Skin: Skin is warm and dry. He is not diaphoretic. No erythema.   Psychiatric: His mood appears anxious. Cognition and memory are impaired.        Nursing note and vitals reviewed.  I examined the patient on 5/11.  There is no significant changes from 5/10 except as mentioned above    Fluids    Intake/Output Summary (Last 24 hours) at 05/11/19 1111  Last data filed at 05/11/19 0309   Gross per 24 hour   Intake              360 ml   Output             1100 ml   Net             -740 ml       Laboratory  Recent Labs      05/09/19   0318   WBC  2.6*   RBC  3.41*   HEMOGLOBIN  9.9*   HEMATOCRIT  30.3*   MCV  88.9   MCH  29.0   MCHC  32.7*   RDW  46.5   PLATELETCT  79*   MPV  10.8     Recent Labs      05/09/19   0318   SODIUM  138   POTASSIUM  4.4   CHLORIDE  102   CO2  29   GLUCOSE  86   BUN  10   CREATININE  0.83   CALCIUM  8.7                   Imaging  IR-VERTEBROPLASTY   Final Result      1. LUMBAR KYPHOPLASTY AT L1 WITH BIPLANE FLUOROSCOPIC GUIDANCE FOR AN OSTEOPOROTIC INSUFFICIENCY COMPRESSION FRACTURE.      2. CLINICAL OR TELEPHONE FOLLOWUP IS SCHEDULED FOR ONE MONTH, 3 MONTHS, AND 6 MONTHS POST PROCEDURE.      DX-ESOPHAGUS - BARIUM SWALLOW   Final Result      Small hiatal hernia      DX-CHEST-PORTABLE (1 VIEW)   Final Result      No acute cardiac or pulmonary abnormalities are identified.      DX-CHEST-PORTABLE (1 VIEW)   Final Result         No acute cardiac or pulmonary abnormality is identified.      SC-[id number]-2 VIEW   Final Result      1.  There is a  nonobstructive nonspecific bowel gas pattern.  There is no evidence of free intraperitoneal air.      MR-LUMBAR SPINE-WITH & W/O   Final Result      1.  There is subacute fracture along the superior endplate of L1 vertebral body. There is no posterior retropulsion.   2.  There is no focal bone lesion.   3.  There is no evidence of infection.   4.  Mild degenerative disease as described above.      MR-BRAIN-W/O   Final Result      1.  Mild cerebral atrophy.   2.  Mild chronic microvascular ischemic disease.   3.  No acute abnormality.      DX-KNEE 2- LEFT   Final Result      1.  No fracture or dislocation of LEFT knee.   2.  Minimal degenerative changes.   3.  Diffuse vascular calcifications.      EC-ECHOCARDIOGRAM COMPLETE W/O CONT   Final Result      CT-ABDOMEN-PELVIS WITH   Final Result         1.  No acute abnormality.   2.  Hepatomegaly and diffuse hepatic steatosis   3.  Atherosclerosis and atherosclerotic coronary artery disease.   4.  Pulmonary nodules measuring up to 6.5 mm, see nodule follow-up recommendations below.      Low Risk: CT at 3-6 months, then consider CT at 18-24 months      High Risk: CT at 3-6 months, then at 18-24 months      Comments: Use most suspicious nodule as guide to management. Follow-up intervals may vary according to size and risk.      Low Risk - Minimal or absent history of smoking and of other known risk factors.      High Risk - History of smoking or of other known risk factors.      Note: These recommendations do not apply to lung cancer screening, patients with immunosuppression, or patients with known primary cancer.      Fleischner Society 2017 Guidelines for Management of Incidentally Detected Pulmonary Nodules in Adults         CT-CTA CHEST PULMONARY ARTERY W/ RECONS   Final Result         1.  No large central pulmonary embolus is appreciated, evaluation of the subsegmental branches is essentially nondiagnostic due to motion artifacts. Additional imaging would be required  for definitive exclusion of small distal pulmonary emboli.      DX-LUMBAR SPINE-2 OR 3 VIEWS   Final Result      Limited examination. Mild anterior wedging L2 vertebral body that appears to be old.   If symptoms are persistent, would recommend CT for further evaluation.      DX-THORACIC SPINE-WITH SWIMMERS VIEW   Final Result      Limited examination. The upper thoracic spine is not well-demonstrated.   Mild anterior wedging of some the lower thoracic vertebral bodies and appears old.   No definite acute compression. If symptoms are persistent, CT would be recommended for further evaluation.      DX-CHEST-PORTABLE (1 VIEW)   Final Result      Central catheter projects over the superior right atrium.      Prominent calcified granuloma in the left midlung.      Atherosclerotic plaque.      CT-HEAD W/O   Final Result      No acute intracranial abnormality is identified.           Assessment/Plan  * Closed stable burst fracture of first lumbar vertebra with routine healing   Assessment & Plan    Lumbar MRI found L1 subacute fracture with no protrusion  Neurosurgery was consulted and they recommended brace placement and outpatient follow-up.  I discontinued IV morphine.  I encouraged him to use oral pain medication for pain control.  Physiatry evaluated him and recommended that is not a candidate for inpatient rehab.  Physical therapy evaluated and recommended transitional care facilities.  Currently is very difficult discharge as per physical therapy recommendation patient needs to go to skilled nursing facility but most of the skilled nursing facility has been declining him for criminal record  Cognitive difficulties more prevalent since initiation of gabapentin - dose reduced and I will discontinue it (likely the cause of his dizziness also)  Discontinue MS Contin and transition him to Percocet and tramadol  Kyphoplasty unable to be completed today since patient required general anesthesia and would likely be  completed on Monday  Started the patient on long-acting oxycodone  5/3: Scheduled for kyphoplasty under full anesthesia on Monday.  5/4: Pain is well controlled.   5/5: Plan for L1 vertebral augmentation under anesthesia tomorrow.  5/6: Vertebral augmentation surgery was postponed till tomorrow.  5/7 kyphoplasty rescheduled for 5/9 at 1 PM  5/8 increase dose of scheduled morphine p.o.  5/9 pain slightly improved.  Awaiting for kyphoplasty today.  Will need PT OT reevaluation  5/11 status post kyphoplasty.  Encourage ambulation at least 3 times a day.  Pain control.  Still not at the level to go back home in terms of functional mobility       Pancytopenia (HCC)- (present on admission)   Assessment & Plan    Most likely due to myelodysplastic syndrome and possibly chronic liver disease  His last chemotherapy was in November 2018  No acute symptoms.  Hematology/oncology was consulted and recommended outpatient follow-up.  Monitor for now.            Esophageal dysphagia   Assessment & Plan    Patient complains of dysphagia associated with nausea and vomiting  Patient refused further evaluation including barium swallow study.     Poor nutrition   Assessment & Plan    Allow supplements between meals.  Dietitian following     Hallucinations   Assessment & Plan    Patient seeing butterflies and bats in his room  Psychiatry did not recommend any antipsychotic medications at this point.  Resolved     Delirium   Assessment & Plan    Frequent confusing statements made by patient, unrelated to doses of pain medications  Psychiatry consultation appreciated  Discontinue Robaxin  Gabapentin discontinued  Resolved   Ammonia is not elevated     Left knee pain   Assessment & Plan    X ray showed arthritis with no acute bony abnormalities or fractures.  Continue to provide him pain management.     Tachycardia   Assessment & Plan    Resolved  It was most likely from pain  metprolol 25 mg p.o. twice daily  Stable     Somnolence-  (present on admission)   Assessment & Plan    Waxing and waning  Stopped Ambien and trazodone  Resolved, gabapentin likely contributing - discontinued       Dizziness- (present on admission)   Assessment & Plan    Likely s/e of gabapentin - discontinued  Vitals are stable  Echo did not show any acute normalities  PT/OT  Negative orthostatics vitals  Monitor         Plan of care discussed with multidisciplinary team during rounds.    VTE prophylaxis: heparin

## 2019-05-11 NOTE — PROGRESS NOTES
"Assumed care at 1900. Received report from RN. Patient is a/o x4, vss on ra, up with 1 assist and fww.  Pt states pain is much better managed today with the addition of IVP morphine.  No new concerns expressed. Assessment complete. Labs reviewed. Patient and RN discussed plan of care. Patient questions answered. Patient needs are met at this time. Bed in lowest and locked position. Call light is within reach. Hourly rounding in place. /89   Pulse 96   Temp 36.8 °C (98.3 °F) (Temporal)   Resp 16   Ht 1.803 m (5' 11\")   Wt 97 kg (213 lb 13.5 oz)   SpO2 94%   BMI 29.83 kg/m²   "

## 2019-05-11 NOTE — CARE PLAN
Problem: Safety  Goal: Will remain free from injury  Outcome: PROGRESSING AS EXPECTED  Pt. Has not had a fall this shift, oriented to unit/surroundings, calls for assistance prior to ambulation, call bell/belongings with in reach. VSS. Hourly rounding and fall precautions in place.    Problem: Communication  Goal: The ability to communicate needs accurately and effectively will improve  Outcome: PROGRESSING AS EXPECTED  Pt. Aware of plan of care at this time. Able to communicate needs affectively as needed. Questions answered and understanding/learning verified by teach back method. Pt. Is now resting comfortably in bed and understands to use bell to voice concerns/needs as they arise.

## 2019-05-11 NOTE — PROGRESS NOTES
Assumed pt care at 0700. Pt resting comfortably. Denying any needs at this time. Call light and personal belongings within reach. Bed in lowest and locked position. Hourly rounding in place.

## 2019-05-12 PROCEDURE — 700102 HCHG RX REV CODE 250 W/ 637 OVERRIDE(OP): Performed by: INTERNAL MEDICINE

## 2019-05-12 PROCEDURE — A9270 NON-COVERED ITEM OR SERVICE: HCPCS | Performed by: INTERNAL MEDICINE

## 2019-05-12 PROCEDURE — 700111 HCHG RX REV CODE 636 W/ 250 OVERRIDE (IP): Performed by: FAMILY MEDICINE

## 2019-05-12 PROCEDURE — 700102 HCHG RX REV CODE 250 W/ 637 OVERRIDE(OP): Performed by: FAMILY MEDICINE

## 2019-05-12 PROCEDURE — A9270 NON-COVERED ITEM OR SERVICE: HCPCS | Performed by: HOSPITALIST

## 2019-05-12 PROCEDURE — 700111 HCHG RX REV CODE 636 W/ 250 OVERRIDE (IP): Performed by: INTERNAL MEDICINE

## 2019-05-12 PROCEDURE — A9270 NON-COVERED ITEM OR SERVICE: HCPCS | Performed by: FAMILY MEDICINE

## 2019-05-12 PROCEDURE — 99231 SBSQ HOSP IP/OBS SF/LOW 25: CPT | Performed by: INTERNAL MEDICINE

## 2019-05-12 PROCEDURE — 770006 HCHG ROOM/CARE - MED/SURG/GYN SEMI*

## 2019-05-12 PROCEDURE — 700102 HCHG RX REV CODE 250 W/ 637 OVERRIDE(OP): Performed by: HOSPITALIST

## 2019-05-12 RX ADMIN — METOPROLOL TARTRATE 25 MG: 25 TABLET ORAL at 06:26

## 2019-05-12 RX ADMIN — OMEPRAZOLE 20 MG: 20 CAPSULE, DELAYED RELEASE ORAL at 06:26

## 2019-05-12 RX ADMIN — MORPHINE SULFATE 6 MG: 4 INJECTION INTRAVENOUS at 15:45

## 2019-05-12 RX ADMIN — ZOLPIDEM TARTRATE 5 MG: 5 TABLET ORAL at 21:52

## 2019-05-12 RX ADMIN — MORPHINE SULFATE 4 MG: 4 INJECTION INTRAVENOUS at 06:26

## 2019-05-12 RX ADMIN — OMEPRAZOLE 20 MG: 20 CAPSULE, DELAYED RELEASE ORAL at 16:53

## 2019-05-12 RX ADMIN — HEPARIN SODIUM 5000 UNITS: 5000 INJECTION, SOLUTION INTRAVENOUS; SUBCUTANEOUS at 13:11

## 2019-05-12 RX ADMIN — ACETAMINOPHEN 1000 MG: 500 TABLET ORAL at 16:53

## 2019-05-12 RX ADMIN — HEPARIN SODIUM 5000 UNITS: 5000 INJECTION, SOLUTION INTRAVENOUS; SUBCUTANEOUS at 21:51

## 2019-05-12 RX ADMIN — MORPHINE SULFATE 4 MG: 4 INJECTION INTRAVENOUS at 23:59

## 2019-05-12 RX ADMIN — MORPHINE SULFATE 30 MG: 15 TABLET ORAL at 16:53

## 2019-05-12 RX ADMIN — HEPARIN SODIUM 5000 UNITS: 5000 INJECTION, SOLUTION INTRAVENOUS; SUBCUTANEOUS at 06:26

## 2019-05-12 RX ADMIN — BUSPIRONE HYDROCHLORIDE 15 MG: 30 TABLET ORAL at 06:26

## 2019-05-12 RX ADMIN — AMITRIPTYLINE HYDROCHLORIDE 75 MG: 75 TABLET, FILM COATED ORAL at 21:52

## 2019-05-12 RX ADMIN — ACETAMINOPHEN 1000 MG: 500 TABLET ORAL at 06:26

## 2019-05-12 RX ADMIN — DIPHENHYDRAMINE HCL 25 MG: 25 TABLET ORAL at 21:51

## 2019-05-12 RX ADMIN — MORPHINE SULFATE 30 MG: 15 TABLET ORAL at 12:43

## 2019-05-12 RX ADMIN — BUSPIRONE HYDROCHLORIDE 15 MG: 30 TABLET ORAL at 16:53

## 2019-05-12 RX ADMIN — MORPHINE SULFATE 30 MG: 15 TABLET ORAL at 20:57

## 2019-05-12 RX ADMIN — THERA TABS 1 TABLET: TAB at 06:26

## 2019-05-12 ASSESSMENT — ENCOUNTER SYMPTOMS
ORTHOPNEA: 0
VOMITING: 0
TREMORS: 0
BLURRED VISION: 0
CHILLS: 0
BRUISES/BLEEDS EASILY: 0
SPEECH CHANGE: 0
SPUTUM PRODUCTION: 0
CLAUDICATION: 0
INSOMNIA: 0
PALPITATIONS: 0
DOUBLE VISION: 0
COUGH: 0
HEMOPTYSIS: 0
HEARTBURN: 0
BACK PAIN: 1
DIARRHEA: 0
DEPRESSION: 0
SENSORY CHANGE: 0
PHOTOPHOBIA: 0
EYE PAIN: 0
POLYDIPSIA: 0
ABDOMINAL PAIN: 0
FOCAL WEAKNESS: 0
TINGLING: 0
HEADACHES: 0
MYALGIAS: 1
FEVER: 0
DIAPHORESIS: 0
DIZZINESS: 0
NERVOUS/ANXIOUS: 1
HALLUCINATIONS: 0
WEIGHT LOSS: 0
CONSTIPATION: 0
NAUSEA: 0

## 2019-05-12 ASSESSMENT — LIFESTYLE VARIABLES: SUBSTANCE_ABUSE: 0

## 2019-05-12 NOTE — CARE PLAN
Problem: Safety  Goal: Will remain free from injury  Outcome: PROGRESSING AS EXPECTED  Call light and personal belongings within reach. Bed in lowest and locked position. Hourly rounding in place.       Problem: Communication  Goal: The ability to communicate needs accurately and effectively will improve  Outcome: PROGRESSING AS EXPECTED  Educated pt on plan of care for the day. Pt verbalized understanding. Utilizing call light appropriately.

## 2019-05-12 NOTE — PROGRESS NOTES
Assumed pt care. Pt resting comfortably. Denying any needs at this time. Call light and personal belongings within reach. Bed in lowest and locked position. Hourly rounding in place.

## 2019-05-12 NOTE — PROGRESS NOTES
Hospital Medicine Daily Progress Note    Date of Service  5/12/2019    Chief Complaint  53 y.o. male admitted 4/5/2019 with back pain, found to have L1 burst fracture.    Hospital Course    Patient admitted with L1 burst fracture, conservative bracing recommended by neurosurgeon.  Patient with difficult to control pain and trouble with therapies and difficult discharge due to social situation.      Interval Problem Update  4/13 Patient moved from telemetry to medical floor, states pain uncontrolled and requesting IV pain medication, 1 dose IV morphine ordered as needed for severe pain but I've adjusted his percocet from 5mg to 10 mg and increased robaxin and made it scheduled and increased his gabapentin for better pain control.  4/14 Patient states he had rest following morphine dose overnight but pain uncontrolled still with previous changes, will add oxycontin 20 bid as long acting pain medication to help control pain and allow patient to participate in therapies.  Rehab consult placed.  4/15 Patient up to side of bed in [facility] after working with therapy today, complains of uncontrolled pain with the changes made.  I doubt he is having response to oxycodone/oxycontin so will discontinue this and rotate opiates to morphine - MS contin and MSIR.  Physiatry consult pending for recommendations to help get patient out of the hospital.  4/23 Patient s/p cognitive evaluation and showed decline since previous evaluation.  His mentation seemed to change with the initiation of gabapentin that I started increasing on 4/13.  I will discontinue the gabapentin as it does not seem to be helping with his pain control but he is having cog deficits and dizziness - especially with dose increases.  Patient is agreeable to this.  He does not need IV morphine and pain can be controlled with PO only.  4/24 Patient seems more awake and alert today with dc of gabapentin.  He states the dizziness is still present.  Cognitive status seems to  have improved since yesterday also.  He needs continued improvement before he is safe to leave the hospital to an independent setting, no availability for placement given his criminal record and active warrants.  4/25 Patient awake but still making confusing statements, yesterday he wanted paperwork to fill out for a [employer] authorization.  He is making these statements more frequently and I've requested psychiatry to weigh in on their thoughts of his confusion.    4/26 Patient without complaints today, back pain same.  Makes confusing statements but less often.    4/27 Patient with residual pain, no increase in pain medication due to his hallucinations.  Will have psychiatry re-evaluate patient for medication recommendations, likely an antipsychotic medication to help lessen his hallucinations.  4/28 Patient continues to have disorganized thoughts and hallucinations.  I anticipate psychiatry to consult again on Monday.  No acute changes, therapies to resume on Monday.  4/29 Patient with continued decline, he is not eating nor drinking meals and states he's not hungry.  I've started IV fluids and am awaiting psychiatry re-evaluation as he is actively hallucinating.  I will also decrease his MS contin as he appears comfortable and this could also be contributing to his mental suppression.  4/30: Patient still having episodes of confusion.  I have discontinued his MS Contin and morphine since I think it is attributing to his nausea, vomiting and confusion.  I replaced it with Percocet and tramadol.  5/1: After discussing his case with radiology who determined that he would be a good candidate for kyphoplasty.  His overall mental status has improved after discontinuing the medications.  He complained about no increase in pain.  5/2: Patient seen and examined by me he was having increased amounts of pain.  I restarted long-acting oxycodone to see if his pain was resolved.  The kyphoplasty was unable to be completed  since the patient required general anesthesia.  Patient is a difficult discharge and will need to be walking before we can send him home.  5/3: Resting comfortably in bed. Asking for more pain medicine however did not look in pain or distress. No issues overnight per staff.   5/4: Requesting more pain medicine (IV).  Looks comfortable with no distress however.  No issues overnight per staff.  Awake and alert.  Interactive.  Mental status at baseline.  5/5: Continues to ask for more pain medicine.  Stated that he is unable to stand without excruciating pain.  No distress noted however upon exam.  Patient looks comfortable with no apparent distress or suffering noted.  No issues overnight per staff.  Keep n.p.o. after midnight.  5/6: Resting comfortably in bed.  Pain is fairly controlled.  Kyphoplasty procedure postponed till tomorrow.  Patient has no new complaints to report.  5/7 patient states that he continues having lower back pain and cannot find right position.  IV morphine as needed to times a day for only for physical therapy purposes also ordered.  Last bowel movements was day before yesterday.  Kyphoplasty rescheduled for Thursday 5/9 at 1 p.m.  5/8 patient does not appear to be in acute distress, but continues complaining of lower back pain, requesting more IV morphine.  Had some diffuse skin itching and requesting Benadryl.  Complaining of left knee pain  5/9 resting comfortably in his bed.  States that right lower back pain slightly improved.  Awaiting kyphoplasty today  5/10 patient reports worsening of pain after the procedure yesterday.  She understands that he needs to get up out of bed and walk at least 3 times a day in order for the pain and mobility to improve.  No focal deficit  She is complaining of left knee pain, which is becoming chronic at this point.  5/11 patient continues attempts to ambulate.  Pain is somewhat better controlled.  He still is not felt to be ready for discharge back to  independent living.  5/12 patient is sleeping comfortably in his bed.  Keeps complaining of lower back pain.  Progressing slower than expected in terms of functional mobility.  Not ready for discharge.  Encouraged ambulation        Consultants/Specialty  NS[facility] - Pierre - s/o  Psych - s/o, re-consult    Code Status  full    Disposition  No placement options available,   Patient needs to improve mobility and to be discharged home after that.    Review of Systems  Review of Systems   Constitutional: Negative for chills, diaphoresis, fever, malaise/fatigue and weight loss.   HENT: Negative for ear discharge, ear pain, hearing loss, nosebleeds and tinnitus.    Eyes: Negative for blurred vision, double vision, photophobia and pain.   Respiratory: Negative for cough, hemoptysis and sputum production.    Cardiovascular: Negative for chest pain, palpitations, orthopnea and claudication.   Gastrointestinal: Negative for abdominal pain, constipation, diarrhea, heartburn, nausea and vomiting.   Genitourinary: Negative for dysuria and urgency.   Musculoskeletal: Positive for back pain (Stable ) and myalgias. Negative for joint pain.   Skin: Negative for itching and rash.   Neurological: Negative for dizziness, tingling, tremors, sensory change, speech change, focal weakness and headaches.   Endo/Heme/Allergies: Negative for environmental allergies and polydipsia. Does not bruise/bleed easily.   Psychiatric/Behavioral: Negative for depression, hallucinations, substance abuse and suicidal ideas. The patient is nervous/anxious. The patient does not have insomnia.         Physical Exam  Temp:  [36 °C (96.8 °F)-36.3 °C (97.3 °F)] 36.2 °C (97.2 °F)  Pulse:  [92-99] 92  Resp:  [16-18] 17  BP: (111-145)/() 145/92  SpO2:  [93 %-95 %] 94 %    Physical Exam   Constitutional: He is oriented to person, place, and time. He appears well-developed and well-nourished. No distress.   HENT:   Head: Normocephalic and atraumatic.   Eyes:  Conjunctivae are normal. No scleral icterus.   Neck: Neck supple. No thyromegaly present.   Cardiovascular: Normal rate, regular rhythm and normal heart sounds.  Exam reveals no gallop and no friction rub.    Pulmonary/Chest: Effort normal. No respiratory distress. He has no wheezes.   Abdominal: Soft. Bowel sounds are normal. He exhibits no distension. There is no tenderness. There is no rebound.   Musculoskeletal: He exhibits no tenderness or deformity.   Mild to moderate thoracolumbar kyphoscoliosis.   Right knee is mildly erythematous, tender to palpation   Neurological: He is alert and oriented to person, place, and time.   No gross focal deficit   Skin: Skin is warm and dry. He is not diaphoretic. No erythema.   Psychiatric: His mood appears anxious. Cognition and memory are impaired.        Nursing note and vitals reviewed.  I examined the patient on 5/12.  There is no significant changes from 5/11 except as mentioned above    Fluids    Intake/Output Summary (Last 24 hours) at 05/12/19 1245  Last data filed at 05/12/19 0414   Gross per 24 hour   Intake              360 ml   Output             1200 ml   Net             -840 ml       Laboratory                        Imaging  IR-VERTEBROPLASTY   Final Result      1. LUMBAR KYPHOPLASTY AT L1 WITH BIPLANE FLUOROSCOPIC GUIDANCE FOR AN OSTEOPOROTIC INSUFFICIENCY COMPRESSION FRACTURE.      2. CLINICAL OR TELEPHONE FOLLOWUP IS SCHEDULED FOR ONE MONTH, 3 MONTHS, AND 6 MONTHS POST PROCEDURE.      DX-ESOPHAGUS - BARIUM SWALLOW   Final Result      Small hiatal hernia      DX-CHEST-PORTABLE (1 VIEW)   Final Result      No acute cardiac or pulmonary abnormalities are identified.      DX-CHEST-PORTABLE (1 VIEW)   Final Result         No acute cardiac or pulmonary abnormality is identified.      [id number] VIEW   Final Result      1.  There is a nonobstructive nonspecific bowel gas pattern.  There is no evidence of free intraperitoneal air.      MR-LUMBAR SPINE-WITH & W/O    Final Result      1.  There is subacute fracture along the superior endplate of L1 vertebral body. There is no posterior retropulsion.   2.  There is no focal bone lesion.   3.  There is no evidence of infection.   4.  Mild degenerative disease as described above.      MR-BRAIN-W/O   Final Result      1.  Mild cerebral atrophy.   2.  Mild chronic microvascular ischemic disease.   3.  No acute abnormality.      DX-KNEE 2- LEFT   Final Result      1.  No fracture or dislocation of LEFT knee.   2.  Minimal degenerative changes.   3.  Diffuse vascular calcifications.      EC-ECHOCARDIOGRAM COMPLETE W/O CONT   Final Result      CT-ABDOMEN-PELVIS WITH   Final Result         1.  No acute abnormality.   2.  Hepatomegaly and diffuse hepatic steatosis   3.  Atherosclerosis and atherosclerotic coronary artery disease.   4.  Pulmonary nodules measuring up to 6.5 mm, see nodule follow-up recommendations below.      Low Risk: CT at 3-6 months, then consider CT at 18-24 months      High Risk: CT at 3-6 months, then at 18-24 months      Comments: Use most suspicious nodule as guide to management. Follow-up intervals may vary according to size and risk.      Low Risk - Minimal or absent history of smoking and of other known risk factors.      High Risk - History of smoking or of other known risk factors.      Note: These recommendations do not apply to lung cancer screening, patients with immunosuppression, or patients with known primary cancer.      Fleischner Society 2017 Guidelines for Management of Incidentally Detected Pulmonary Nodules in Adults         CT-CTA CHEST PULMONARY ARTERY W/ RECONS   Final Result         1.  No large central pulmonary embolus is appreciated, evaluation of the subsegmental branches is essentially nondiagnostic due to motion artifacts. Additional imaging would be required for definitive exclusion of small distal pulmonary emboli.      DX-LUMBAR SPINE-2 OR 3 VIEWS   Final Result      Limited  examination. Mild anterior wedging L2 vertebral body that appears to be old.   If symptoms are persistent, would recommend CT for further evaluation.      DX-THORACIC SPINE-WITH SWIMMERS VIEW   Final Result      Limited examination. The upper thoracic spine is not well-demonstrated.   Mild anterior wedging of some the lower thoracic vertebral bodies and appears old.   No definite acute compression. If symptoms are persistent, CT would be recommended for further evaluation.      DX-CHEST-PORTABLE (1 VIEW)   Final Result      Central catheter projects over the superior right atrium.      Prominent calcified granuloma in the left midlung.      Atherosclerotic plaque.      CT-HEAD W/O   Final Result      No acute intracranial abnormality is identified.           Assessment/Plan  * Closed stable burst fracture of first lumbar vertebra with routine healing   Assessment & Plan    Lumbar MRI found L1 subacute fracture with no protrusion  Neurosurgery was consulted and they recommended brace placement and outpatient follow-up.  I discontinued IV morphine.  I encouraged him to use oral pain medication for pain control.  Physiatry evaluated him and recommended that is not a candidate for inpatient rehab.  Physical therapy evaluated and recommended transitional care facilities.  Currently is very difficult discharge as per physical therapy recommendation patient needs to go to skilled nursing facility but most of the skilled nursing facility has been declining him for criminal record  Cognitive difficulties more prevalent since initiation of gabapentin - dose reduced and I will discontinue it (likely the cause of his dizziness also)  Discontinue MS Contin and transition him to Percocet and tramadol  Kyphoplasty unable to be completed today since patient required general anesthesia and would likely be completed on Monday  Started the patient on long-acting oxycodone  5/3: Scheduled for kyphoplasty under full anesthesia on  Monday.  5/4: Pain is well controlled.   5/5: Plan for L1 vertebral augmentation under anesthesia tomorrow.  5/6: Vertebral augmentation surgery was postponed till tomorrow.  5/7 kyphoplasty rescheduled for 5/9 at 1 PM  5/8 increase dose of scheduled morphine p.o.  5/9 pain slightly improved.  Awaiting for kyphoplasty today.  Will need PT OT reevaluation  5/11 status post kyphoplasty.  Encourage ambulation at least 3 times a day.  Pain control.  Still not at the level to go back home in terms of functional mobility       Pancytopenia (HCC)- (present on admission)   Assessment & Plan    Most likely due to myelodysplastic syndrome and possibly chronic liver disease  His last chemotherapy was in November 2018  No acute symptoms.  Hematology/oncology was consulted and recommended outpatient follow-up.  Monitor for now.            Esophageal dysphagia   Assessment & Plan    Patient complains of dysphagia associated with nausea and vomiting  Patient refused further evaluation including barium swallow study.     Poor nutrition   Assessment & Plan    Allow supplements between meals.  Dietitian following     Hallucinations   Assessment & Plan    Patient seeing butterflies and bats in his room  Psychiatry did not recommend any antipsychotic medications at this point.  Resolved     Delirium   Assessment & Plan    Frequent confusing statements made by patient, unrelated to doses of pain medications  Psychiatry consultation appreciated  Discontinue Robaxin  Gabapentin discontinued  Resolved   Ammonia is not elevated     Left knee pain   Assessment & Plan    X ray showed arthritis with no acute bony abnormalities or fractures.  Continue to provide him pain management.     Tachycardia   Assessment & Plan    Resolved  It was most likely from pain  metprolol 25 mg p.o. twice daily  Stable     Somnolence- (present on admission)   Assessment & Plan    Waxing and waning  Stopped Ambien and trazodone  Resolved, gabapentin likely  contributing - discontinued       Dizziness- (present on admission)   Assessment & Plan    Likely s/e of gabapentin - discontinued  Vitals are stable  Echo did not show any acute normalities  PT/OT  Negative orthostatics vitals  Monitor         Plan of care discussed with multidisciplinary team during rounds.    VTE prophylaxis: heparin

## 2019-05-12 NOTE — CARE PLAN
Problem: Pain Management  Goal: Pain level will decrease to patient's comfort goal  Outcome: PROGRESSING SLOWER THAN EXPECTED  Pt continues to c/o pain consistently at 10/10- prns being administered    Problem: Infection  Goal: Will remain free from infection  Outcome: PROGRESSING AS EXPECTED  No s/s of infection, will continue to monitor

## 2019-05-12 NOTE — PROGRESS NOTES
"Assumed care at 1900. Received report from RN. Patient is a/o x4, vss on ra, up with 1 assist.  Pt c/o 10/10 pain despite prn medications.  Pt in no apparent distress- lying in bed Not moaning nor is he restless, anxious or diaphoretic. Assessment complete. Labs reviewed. Patient and RN discussed plan of care. Patient questions answered. Patient needs are met at this time. Bed in lowest and locked position. Call light is within reach. Hourly rounding in place. /107   Pulse 99   Temp 36 °C (96.8 °F) (Temporal)   Resp 16   Ht 1.803 m (5' 11\")   Wt 95 kg (209 lb 7 oz)   SpO2 93%   BMI 29.21 kg/m²   "

## 2019-05-13 PROCEDURE — A9270 NON-COVERED ITEM OR SERVICE: HCPCS | Performed by: INTERNAL MEDICINE

## 2019-05-13 PROCEDURE — 99232 SBSQ HOSP IP/OBS MODERATE 35: CPT | Performed by: INTERNAL MEDICINE

## 2019-05-13 PROCEDURE — 770006 HCHG ROOM/CARE - MED/SURG/GYN SEMI*

## 2019-05-13 PROCEDURE — 700102 HCHG RX REV CODE 250 W/ 637 OVERRIDE(OP): Performed by: FAMILY MEDICINE

## 2019-05-13 PROCEDURE — A9270 NON-COVERED ITEM OR SERVICE: HCPCS | Performed by: FAMILY MEDICINE

## 2019-05-13 PROCEDURE — 700101 HCHG RX REV CODE 250: Performed by: FAMILY MEDICINE

## 2019-05-13 PROCEDURE — 700111 HCHG RX REV CODE 636 W/ 250 OVERRIDE (IP): Performed by: FAMILY MEDICINE

## 2019-05-13 PROCEDURE — 700102 HCHG RX REV CODE 250 W/ 637 OVERRIDE(OP): Performed by: INTERNAL MEDICINE

## 2019-05-13 PROCEDURE — 700102 HCHG RX REV CODE 250 W/ 637 OVERRIDE(OP): Performed by: HOSPITALIST

## 2019-05-13 PROCEDURE — 700111 HCHG RX REV CODE 636 W/ 250 OVERRIDE (IP): Performed by: INTERNAL MEDICINE

## 2019-05-13 PROCEDURE — 97530 THERAPEUTIC ACTIVITIES: CPT

## 2019-05-13 PROCEDURE — A9270 NON-COVERED ITEM OR SERVICE: HCPCS | Performed by: HOSPITALIST

## 2019-05-13 RX ORDER — MORPHINE SULFATE 4 MG/ML
4 INJECTION, SOLUTION INTRAMUSCULAR; INTRAVENOUS EVERY 12 HOURS PRN
Status: DISCONTINUED | OUTPATIENT
Start: 2019-05-13 | End: 2019-05-19

## 2019-05-13 RX ORDER — MECLIZINE HYDROCHLORIDE 25 MG/1
12.5 TABLET ORAL 3 TIMES DAILY
Status: DISCONTINUED | OUTPATIENT
Start: 2019-05-13 | End: 2019-05-20 | Stop reason: HOSPADM

## 2019-05-13 RX ORDER — AMITRIPTYLINE HYDROCHLORIDE 25 MG/1
25 TABLET, FILM COATED ORAL NIGHTLY
Status: DISCONTINUED | OUTPATIENT
Start: 2019-05-13 | End: 2019-05-20 | Stop reason: HOSPADM

## 2019-05-13 RX ADMIN — ZOLPIDEM TARTRATE 5 MG: 5 TABLET ORAL at 21:38

## 2019-05-13 RX ADMIN — DIPHENHYDRAMINE HCL 25 MG: 25 TABLET ORAL at 13:38

## 2019-05-13 RX ADMIN — MECLIZINE HYDROCHLORIDE 12.5 MG: 25 TABLET ORAL at 13:01

## 2019-05-13 RX ADMIN — ERGOCALCIFEROL 50000 UNITS: 1.25 CAPSULE ORAL at 06:07

## 2019-05-13 RX ADMIN — MORPHINE SULFATE 4 MG: 4 INJECTION INTRAVENOUS at 09:20

## 2019-05-13 RX ADMIN — ACETAMINOPHEN 1000 MG: 500 TABLET ORAL at 06:03

## 2019-05-13 RX ADMIN — OMEPRAZOLE 20 MG: 20 CAPSULE, DELAYED RELEASE ORAL at 06:03

## 2019-05-13 RX ADMIN — MORPHINE SULFATE 30 MG: 15 TABLET ORAL at 13:01

## 2019-05-13 RX ADMIN — HEPARIN SODIUM 5000 UNITS: 5000 INJECTION, SOLUTION INTRAVENOUS; SUBCUTANEOUS at 06:02

## 2019-05-13 RX ADMIN — AMITRIPTYLINE HYDROCHLORIDE 25 MG: 25 TABLET, FILM COATED ORAL at 21:38

## 2019-05-13 RX ADMIN — ACETAMINOPHEN 1000 MG: 500 TABLET ORAL at 17:27

## 2019-05-13 RX ADMIN — MORPHINE SULFATE 30 MG: 15 TABLET ORAL at 17:27

## 2019-05-13 RX ADMIN — BUSPIRONE HYDROCHLORIDE 15 MG: 30 TABLET ORAL at 06:03

## 2019-05-13 RX ADMIN — OMEPRAZOLE 20 MG: 20 CAPSULE, DELAYED RELEASE ORAL at 17:27

## 2019-05-13 RX ADMIN — THERA TABS 1 TABLET: TAB at 06:03

## 2019-05-13 RX ADMIN — ONDANSETRON 4 MG: 2 SOLUTION INTRAMUSCULAR; INTRAVENOUS at 09:19

## 2019-05-13 RX ADMIN — MECLIZINE HYDROCHLORIDE 12.5 MG: 25 TABLET ORAL at 17:27

## 2019-05-13 RX ADMIN — MORPHINE SULFATE 4 MG: 4 INJECTION INTRAVENOUS at 21:38

## 2019-05-13 RX ADMIN — BUSPIRONE HYDROCHLORIDE 15 MG: 30 TABLET ORAL at 17:27

## 2019-05-13 RX ADMIN — MORPHINE SULFATE 30 MG: 15 TABLET ORAL at 03:20

## 2019-05-13 RX ADMIN — LIDOCAINE 2 PATCH: 50 PATCH TOPICAL at 01:15

## 2019-05-13 ASSESSMENT — ENCOUNTER SYMPTOMS
DOUBLE VISION: 0
HEARTBURN: 0
CONSTIPATION: 0
FEVER: 0
POLYDIPSIA: 0
SENSORY CHANGE: 0
CHILLS: 0
FOCAL WEAKNESS: 0
TINGLING: 0
SPUTUM PRODUCTION: 0
DIAPHORESIS: 0
WEIGHT LOSS: 0
DIARRHEA: 0
MYALGIAS: 1
EYE PAIN: 0
PHOTOPHOBIA: 0
DEPRESSION: 0
NERVOUS/ANXIOUS: 1
INSOMNIA: 0
ORTHOPNEA: 0
SPEECH CHANGE: 0
TREMORS: 0
COUGH: 0
BLURRED VISION: 0
DIZZINESS: 0
ABDOMINAL PAIN: 0
NAUSEA: 0
HEMOPTYSIS: 0
CLAUDICATION: 0
BRUISES/BLEEDS EASILY: 0
HEADACHES: 0
VOMITING: 0
BACK PAIN: 1
HALLUCINATIONS: 0
PALPITATIONS: 0

## 2019-05-13 ASSESSMENT — GAIT ASSESSMENTS: GAIT LEVEL OF ASSIST: REFUSED

## 2019-05-13 ASSESSMENT — COGNITIVE AND FUNCTIONAL STATUS - GENERAL
STANDING UP FROM CHAIR USING ARMS: A LITTLE
SUGGESTED CMS G CODE MODIFIER MOBILITY: CK
CLIMB 3 TO 5 STEPS WITH RAILING: TOTAL
MOVING FROM LYING ON BACK TO SITTING ON SIDE OF FLAT BED: A LITTLE
MOBILITY SCORE: 17
WALKING IN HOSPITAL ROOM: A LOT

## 2019-05-13 ASSESSMENT — LIFESTYLE VARIABLES: SUBSTANCE_ABUSE: 0

## 2019-05-13 NOTE — PROGRESS NOTES
Hospital Medicine Daily Progress Note    Date of Service  5/13/2019    Chief Complaint  53 y.o. male admitted 4/5/2019 with back pain, found to have L1 burst fracture.    Hospital Course    Patient admitted with L1 burst fracture, conservative bracing recommended by neurosurgeon.  Patient with difficult to control pain and trouble with therapies and difficult discharge due to social situation.      Interval Problem Update  4/13 Patient moved from telemetry to medical floor, states pain uncontrolled and requesting IV pain medication, 1 dose IV morphine ordered as needed for severe pain but I've adjusted his percocet from 5mg to 10 mg and increased robaxin and made it scheduled and increased his gabapentin for better pain control.  4/14 Patient states he had rest following morphine dose overnight but pain uncontrolled still with previous changes, will add oxycontin 20 bid as long acting pain medication to help control pain and allow patient to participate in therapies.  Rehab consult placed.  4/15 Patient up to side of bed in [facility] after working with therapy today, complains of uncontrolled pain with the changes made.  I doubt he is having response to oxycodone/oxycontin so will discontinue this and rotate opiates to morphine - MS contin and MSIR.  Physiatry consult pending for recommendations to help get patient out of the hospital.  4/23 Patient s/p cognitive evaluation and showed decline since previous evaluation.  His mentation seemed to change with the initiation of gabapentin that I started increasing on 4/13.  I will discontinue the gabapentin as it does not seem to be helping with his pain control but he is having cog deficits and dizziness - especially with dose increases.  Patient is agreeable to this.  He does not need IV morphine and pain can be controlled with PO only.  4/24 Patient seems more awake and alert today with dc of gabapentin.  He states the dizziness is still present.  Cognitive status seems to  have improved since yesterday also.  He needs continued improvement before he is safe to leave the hospital to an independent setting, no availability for placement given his criminal record and active warrants.  4/25 Patient awake but still making confusing statements, yesterday he wanted paperwork to fill out for a [employer] authorization.  He is making these statements more frequently and I've requested psychiatry to weigh in on their thoughts of his confusion.    4/26 Patient without complaints today, back pain same.  Makes confusing statements but less often.    4/27 Patient with residual pain, no increase in pain medication due to his hallucinations.  Will have psychiatry re-evaluate patient for medication recommendations, likely an antipsychotic medication to help lessen his hallucinations.  4/28 Patient continues to have disorganized thoughts and hallucinations.  I anticipate psychiatry to consult again on Monday.  No acute changes, therapies to resume on Monday.  4/29 Patient with continued decline, he is not eating nor drinking meals and states he's not hungry.  I've started IV fluids and am awaiting psychiatry re-evaluation as he is actively hallucinating.  I will also decrease his MS contin as he appears comfortable and this could also be contributing to his mental suppression.  4/30: Patient still having episodes of confusion.  I have discontinued his MS Contin and morphine since I think it is attributing to his nausea, vomiting and confusion.  I replaced it with Percocet and tramadol.  5/1: After discussing his case with radiology who determined that he would be a good candidate for kyphoplasty.  His overall mental status has improved after discontinuing the medications.  He complained about no increase in pain.  5/2: Patient seen and examined by me he was having increased amounts of pain.  I restarted long-acting oxycodone to see if his pain was resolved.  The kyphoplasty was unable to be completed  since the patient required general anesthesia.  Patient is a difficult discharge and will need to be walking before we can send him home.  5/3: Resting comfortably in bed. Asking for more pain medicine however did not look in pain or distress. No issues overnight per staff.   5/4: Requesting more pain medicine (IV).  Looks comfortable with no distress however.  No issues overnight per staff.  Awake and alert.  Interactive.  Mental status at baseline.  5/5: Continues to ask for more pain medicine.  Stated that he is unable to stand without excruciating pain.  No distress noted however upon exam.  Patient looks comfortable with no apparent distress or suffering noted.  No issues overnight per staff.  Keep n.p.o. after midnight.  5/6: Resting comfortably in bed.  Pain is fairly controlled.  Kyphoplasty procedure postponed till tomorrow.  Patient has no new complaints to report.  5/7 patient states that he continues having lower back pain and cannot find right position.  IV morphine as needed to times a day for only for physical therapy purposes also ordered.  Last bowel movements was day before yesterday.  Kyphoplasty rescheduled for Thursday 5/9 at 1 p.m.  5/8 patient does not appear to be in acute distress, but continues complaining of lower back pain, requesting more IV morphine.  Had some diffuse skin itching and requesting Benadryl.  Complaining of left knee pain  5/9 resting comfortably in his bed.  States that right lower back pain slightly improved.  Awaiting kyphoplasty today  5/10 patient reports worsening of pain after the procedure yesterday.  She understands that he needs to get up out of bed and walk at least 3 times a day in order for the pain and mobility to improve.  No focal deficit  She is complaining of left knee pain, which is becoming chronic at this point.  5/11 patient continues attempts to ambulate.  Pain is somewhat better controlled.  He still is not felt to be ready for discharge back to  independent living.  5/12 patient is sleeping comfortably in his bed.  Keeps complaining of lower back pain.  Progressing slower than expected in terms of functional mobility.  Not ready for discharge.  Encouraged ambulation  5/13 patient is complaining of vertigo with dizziness, worsening with turning his head and changing position, which according to him has been going for several weeks, but has been getting worse in the last 2 days.  Additionally he endorses lower back pain.  He he needs assistance to go to the bathroom.  I started him on meclizine today.  According to the patient, his friend is flying from out of state to help him to be released next week.  I encourage him to ambulate as frequently as possible      Consultants/Specialty  NS[facility] - Pierre - s/o  Psych - s/o    Code Status  full    Disposition  No placement options available,   Patient needs to improve mobility and to be discharged home after that.    Review of Systems  Review of Systems   Constitutional: Negative for chills, diaphoresis, fever, malaise/fatigue and weight loss.   HENT: Negative for ear discharge, ear pain, hearing loss, nosebleeds and tinnitus.    Eyes: Negative for blurred vision, double vision, photophobia and pain.   Respiratory: Negative for cough, hemoptysis and sputum production.    Cardiovascular: Negative for chest pain, palpitations, orthopnea and claudication.   Gastrointestinal: Negative for abdominal pain, constipation, diarrhea, heartburn, nausea and vomiting.   Genitourinary: Negative for dysuria and urgency.   Musculoskeletal: Positive for back pain (Stable ) and myalgias. Negative for joint pain.   Skin: Negative for itching and rash.   Neurological: Negative for dizziness, tingling, tremors, sensory change, speech change, focal weakness and headaches.   Endo/Heme/Allergies: Negative for environmental allergies and polydipsia. Does not bruise/bleed easily.   Psychiatric/Behavioral: Negative for depression,  hallucinations, substance abuse and suicidal ideas. The patient is nervous/anxious. The patient does not have insomnia.         Physical Exam  Temp:  [36.4 °C (97.6 °F)-36.8 °C (98.2 °F)] 36.8 °C (98.2 °F)  Pulse:  [] 105  Resp:  [16-19] 19  BP: (123-131)/(77-92) 123/87  SpO2:  [90 %-95 %] 90 %    Physical Exam   Constitutional: He is oriented to person, place, and time. He appears well-developed and well-nourished. No distress.   HENT:   Head: Normocephalic and atraumatic.   Eyes: Conjunctivae are normal. No scleral icterus.   Neck: Neck supple. No thyromegaly present.   Cardiovascular: Normal rate, regular rhythm and normal heart sounds.  Exam reveals no gallop and no friction rub.    Pulmonary/Chest: Effort normal. No respiratory distress. He has no wheezes.   Abdominal: Soft. Bowel sounds are normal. He exhibits no distension. There is no tenderness. There is no rebound.   Musculoskeletal: He exhibits no tenderness or deformity.   Mild to moderate thoracolumbar kyphoscoliosis.   Right knee is mildly erythematous, tender to palpation   Neurological: He is alert and oriented to person, place, and time.   No gross focal deficit   Skin: Skin is warm and dry. He is not diaphoretic. No erythema.   Psychiatric: His mood appears anxious. Cognition and memory are impaired.        Nursing note and vitals reviewed.  I examined the patient on 5/13.  There is no significant changes from 5/12 except as mentioned above    Fluids    Intake/Output Summary (Last 24 hours) at 05/13/19 1243  Last data filed at 05/13/19 0919   Gross per 24 hour   Intake                0 ml   Output              950 ml   Net             -950 ml       Laboratory                        Imaging  IR-VERTEBROPLASTY   Final Result      1. LUMBAR KYPHOPLASTY AT L1 WITH BIPLANE FLUOROSCOPIC GUIDANCE FOR AN OSTEOPOROTIC INSUFFICIENCY COMPRESSION FRACTURE.      2. CLINICAL OR TELEPHONE FOLLOWUP IS SCHEDULED FOR ONE MONTH, 3 MONTHS, AND 6 MONTHS POST  PROCEDURE.      DX-ESOPHAGUS - BARIUM SWALLOW   Final Result      Small hiatal hernia      DX-CHEST-PORTABLE (1 VIEW)   Final Result      No acute cardiac or pulmonary abnormalities are identified.      DX-CHEST-PORTABLE (1 VIEW)   Final Result         No acute cardiac or pulmonary abnormality is identified.      AB-RSXSBAZ-1 VIEW   Final Result      1.  There is a nonobstructive nonspecific bowel gas pattern.  There is no evidence of free intraperitoneal air.      MR-LUMBAR SPINE-WITH & W/O   Final Result      1.  There is subacute fracture along the superior endplate of L1 vertebral body. There is no posterior retropulsion.   2.  There is no focal bone lesion.   3.  There is no evidence of infection.   4.  Mild degenerative disease as described above.      MR-BRAIN-W/O   Final Result      1.  Mild cerebral atrophy.   2.  Mild chronic microvascular ischemic disease.   3.  No acute abnormality.      DX-KNEE 2- LEFT   Final Result      1.  No fracture or dislocation of LEFT knee.   2.  Minimal degenerative changes.   3.  Diffuse vascular calcifications.      EC-ECHOCARDIOGRAM COMPLETE W/O CONT   Final Result      CT-ABDOMEN-PELVIS WITH   Final Result         1.  No acute abnormality.   2.  Hepatomegaly and diffuse hepatic steatosis   3.  Atherosclerosis and atherosclerotic coronary artery disease.   4.  Pulmonary nodules measuring up to 6.5 mm, see nodule follow-up recommendations below.      Low Risk: CT at 3-6 months, then consider CT at 18-24 months      High Risk: CT at 3-6 months, then at 18-24 months      Comments: Use most suspicious nodule as guide to management. Follow-up intervals may vary according to size and risk.      Low Risk - Minimal or absent history of smoking and of other known risk factors.      High Risk - History of smoking or of other known risk factors.      Note: These recommendations do not apply to lung cancer screening, patients with immunosuppression, or patients with known primary  cancer.      Fleischner Society 2017 Guidelines for Management of Incidentally Detected Pulmonary Nodules in Adults         CT-CTA CHEST PULMONARY ARTERY W/ RECONS   Final Result         1.  No large central pulmonary embolus is appreciated, evaluation of the subsegmental branches is essentially nondiagnostic due to motion artifacts. Additional imaging would be required for definitive exclusion of small distal pulmonary emboli.      DX-LUMBAR SPINE-2 OR 3 VIEWS   Final Result      Limited examination. Mild anterior wedging L2 vertebral body that appears to be old.   If symptoms are persistent, would recommend CT for further evaluation.      DX-THORACIC SPINE-WITH SWIMMERS VIEW   Final Result      Limited examination. The upper thoracic spine is not well-demonstrated.   Mild anterior wedging of some the lower thoracic vertebral bodies and appears old.   No definite acute compression. If symptoms are persistent, CT would be recommended for further evaluation.      DX-CHEST-PORTABLE (1 VIEW)   Final Result      Central catheter projects over the superior right atrium.      Prominent calcified granuloma in the left midlung.      Atherosclerotic plaque.      CT-HEAD W/O   Final Result      No acute intracranial abnormality is identified.           Assessment/Plan  * Closed stable burst fracture of first lumbar vertebra with routine healing   Assessment & Plan    Lumbar MRI found L1 subacute fracture with no protrusion  Neurosurgery was consulted and they recommended brace placement and outpatient follow-up.  I discontinued IV morphine.  I encouraged him to use oral pain medication for pain control.  Physiatry evaluated him and recommended that is not a candidate for inpatient rehab.  Physical therapy evaluated and recommended transitional care facilities.  Currently is very difficult discharge as per physical therapy recommendation patient needs to go to skilled nursing facility but most of the skilled nursing facility  has been declining him for criminal record  Cognitive difficulties more prevalent since initiation of gabapentin - dose reduced and I will discontinue it (likely the cause of his dizziness also)  Discontinue MS Contin and transition him to Percocet and tramadol  Kyphoplasty unable to be completed today since patient required general anesthesia and would likely be completed on Monday  Started the patient on long-acting oxycodone  5/3: Scheduled for kyphoplasty under full anesthesia on Monday.  5/4: Pain is well controlled.   5/5: Plan for L1 vertebral augmentation under anesthesia tomorrow.  5/6: Vertebral augmentation surgery was postponed till tomorrow.  5/7 kyphoplasty rescheduled for 5/9 at 1 PM  5/8 increase dose of scheduled morphine p.o.  5/9 pain slightly improved.  Awaiting for kyphoplasty today.  Will need PT OT reevaluation  5/11 status post kyphoplasty.  Encourage ambulation at least 3 times a day.  Pain control.  Still not at the level to go back home in terms of functional mobility       Pancytopenia (HCC)- (present on admission)   Assessment & Plan    Most likely due to myelodysplastic syndrome and possibly chronic liver disease  His last chemotherapy was in November 2018  No acute symptoms.  Hematology/oncology was consulted and recommended outpatient follow-up.  Monitor for now.            Esophageal dysphagia   Assessment & Plan    Patient complains of dysphagia associated with nausea and vomiting  Patient refused further evaluation including barium swallow study.     Poor nutrition   Assessment & Plan    Allow supplements between meals.  Dietitian following     Hallucinations   Assessment & Plan    Patient seeing butterflies and bats in his room  Psychiatry did not recommend any antipsychotic medications at this point.  Resolved     Delirium   Assessment & Plan    Frequent confusing statements made by patient, unrelated to doses of pain medications  Psychiatry consultation appreciated  Discontinue  Robaxin  Gabapentin discontinued  Resolved   Ammonia is not elevated     Left knee pain   Assessment & Plan    X ray showed arthritis with no acute bony abnormalities or fractures.  Continue to provide him pain management.     Tachycardia   Assessment & Plan    Resolved  It was most likely from pain  metprolol 25 mg p.o. twice daily  Stable     Somnolence- (present on admission)   Assessment & Plan    Waxing and waning  Stopped Ambien and trazodone  Resolved, gabapentin likely contributing - discontinued       Dizziness- (present on admission)   Assessment & Plan    Likely s/e of gabapentin - discontinued  Vitals are stable  Echo did not show any acute normalities  PT/OT  Negative orthostatics vitals  Monitor         Plan of care discussed with multidisciplinary team during rounds.    VTE prophylaxis: heparin

## 2019-05-13 NOTE — PROGRESS NOTES
Received report from day shift RN. Assumed patient care at 1900. Patient resting comfortably, complains of 9/10 back pain. PO morphine provided for relief. Bed locked and in the lowest position. Call light within reach. RN and CNA numbers provided.

## 2019-05-13 NOTE — THERAPY
"Physical Therapy Treatment completed.   Bed Mobility:  Supine to Sit: Modified Independent  Transfers: Sit to Stand: Supervised  Gait: Level Of Assist: Refused with Front-Wheel Walker due to dizziness   Plan of Care: Will benefit from Physical Therapy 2 times per week and Plan to complete next treatment by Thursday 5/16  Discharge Recommendations: Equipment: Will Continue to Assess for Equipment Needs. Post-acute therapy Discharge to a transitional care facility for continued skilled therapy services.    Pt seen today status post kyphoplasty on 5/9. Per MD orders, pt to ambulate at least 3x/day, however, pt reports he has not been ambulating due to dizziness and pain. Pt reports 10/10 back pain and feels that the kyphoplasty did nothing to help. In addition, pt reports dizziness that primarily occurs when sitting and standing. Pt does not complain of dizziness with head turns or rolling over in bed. Pt performed supine to sit with HOB elevated Mod I. Despite claming 10/10 pain, pt in no obvious signs of pain while seated EOB, no facial grimacing. BP taken in sitting, 130/83 with HR of 128. BP in standing 121/78, . Pt reported increased dizziness in standing and unable to stand for duration of standing BP. Attempted oculomotor exam at EOB to help determine if dizziness central vs peripheral. No gaze holding nystagmus in either direction. With tracking in \"H\" pattern, pt reported feeling nauseated and requested for session to be over. Based on finding, dizziness not likely BPPV as symptoms are not consistent and pt having dizziness more with positional changes and visual tracking. Pt is not safe to DC home at this time, will require ongoing PT intervention while in the acute care setting     See \"Rehab Therapy-Acute\" Patient Summary Report for complete documentation.       "

## 2019-05-14 PROCEDURE — A9270 NON-COVERED ITEM OR SERVICE: HCPCS | Performed by: HOSPITALIST

## 2019-05-14 PROCEDURE — A9270 NON-COVERED ITEM OR SERVICE: HCPCS | Performed by: INTERNAL MEDICINE

## 2019-05-14 PROCEDURE — A9270 NON-COVERED ITEM OR SERVICE: HCPCS | Performed by: FAMILY MEDICINE

## 2019-05-14 PROCEDURE — 700111 HCHG RX REV CODE 636 W/ 250 OVERRIDE (IP): Performed by: INTERNAL MEDICINE

## 2019-05-14 PROCEDURE — 700102 HCHG RX REV CODE 250 W/ 637 OVERRIDE(OP): Performed by: INTERNAL MEDICINE

## 2019-05-14 PROCEDURE — 97535 SELF CARE MNGMENT TRAINING: CPT

## 2019-05-14 PROCEDURE — 700102 HCHG RX REV CODE 250 W/ 637 OVERRIDE(OP): Performed by: HOSPITALIST

## 2019-05-14 PROCEDURE — 97530 THERAPEUTIC ACTIVITIES: CPT

## 2019-05-14 PROCEDURE — 700101 HCHG RX REV CODE 250: Performed by: FAMILY MEDICINE

## 2019-05-14 PROCEDURE — 99232 SBSQ HOSP IP/OBS MODERATE 35: CPT | Performed by: HOSPITALIST

## 2019-05-14 PROCEDURE — 770006 HCHG ROOM/CARE - MED/SURG/GYN SEMI*

## 2019-05-14 PROCEDURE — 700102 HCHG RX REV CODE 250 W/ 637 OVERRIDE(OP): Performed by: FAMILY MEDICINE

## 2019-05-14 RX ADMIN — BUSPIRONE HYDROCHLORIDE 15 MG: 30 TABLET ORAL at 18:10

## 2019-05-14 RX ADMIN — MORPHINE SULFATE 4 MG: 4 INJECTION INTRAVENOUS at 09:48

## 2019-05-14 RX ADMIN — SENNOSIDES, DOCUSATE SODIUM 2 TABLET: 50; 8.6 TABLET, FILM COATED ORAL at 05:50

## 2019-05-14 RX ADMIN — OMEPRAZOLE 20 MG: 20 CAPSULE, DELAYED RELEASE ORAL at 05:51

## 2019-05-14 RX ADMIN — ACETAMINOPHEN 1000 MG: 500 TABLET ORAL at 18:10

## 2019-05-14 RX ADMIN — AMITRIPTYLINE HYDROCHLORIDE 25 MG: 25 TABLET, FILM COATED ORAL at 20:17

## 2019-05-14 RX ADMIN — MECLIZINE HYDROCHLORIDE 12.5 MG: 25 TABLET ORAL at 18:11

## 2019-05-14 RX ADMIN — MORPHINE SULFATE 30 MG: 15 TABLET ORAL at 05:48

## 2019-05-14 RX ADMIN — DIPHENHYDRAMINE HCL 25 MG: 25 TABLET ORAL at 20:17

## 2019-05-14 RX ADMIN — MECLIZINE HYDROCHLORIDE 12.5 MG: 25 TABLET ORAL at 12:09

## 2019-05-14 RX ADMIN — OMEPRAZOLE 20 MG: 20 CAPSULE, DELAYED RELEASE ORAL at 18:10

## 2019-05-14 RX ADMIN — MECLIZINE HYDROCHLORIDE 12.5 MG: 25 TABLET ORAL at 05:50

## 2019-05-14 RX ADMIN — THERA TABS 1 TABLET: TAB at 05:51

## 2019-05-14 RX ADMIN — MORPHINE SULFATE 30 MG: 15 TABLET ORAL at 00:40

## 2019-05-14 RX ADMIN — MORPHINE SULFATE 30 MG: 15 TABLET ORAL at 10:48

## 2019-05-14 RX ADMIN — LIDOCAINE 1 PATCH: 50 PATCH TOPICAL at 00:41

## 2019-05-14 RX ADMIN — MORPHINE SULFATE 30 MG: 15 TABLET ORAL at 20:14

## 2019-05-14 RX ADMIN — MORPHINE SULFATE 4 MG: 4 INJECTION INTRAVENOUS at 21:52

## 2019-05-14 RX ADMIN — ACETAMINOPHEN 1000 MG: 500 TABLET ORAL at 05:50

## 2019-05-14 RX ADMIN — MORPHINE SULFATE 30 MG: 15 TABLET ORAL at 15:48

## 2019-05-14 RX ADMIN — ZOLPIDEM TARTRATE 5 MG: 5 TABLET ORAL at 20:17

## 2019-05-14 RX ADMIN — BUSPIRONE HYDROCHLORIDE 15 MG: 30 TABLET ORAL at 05:48

## 2019-05-14 ASSESSMENT — COGNITIVE AND FUNCTIONAL STATUS - GENERAL
DRESSING REGULAR LOWER BODY CLOTHING: A LITTLE
HELP NEEDED FOR BATHING: A LITTLE
TOILETING: A LITTLE
SUGGESTED CMS G CODE MODIFIER DAILY ACTIVITY: CJ
DAILY ACTIVITIY SCORE: 21

## 2019-05-14 ASSESSMENT — ENCOUNTER SYMPTOMS
COUGH: 0
NERVOUS/ANXIOUS: 1
BLURRED VISION: 0
HEADACHES: 0
PALPITATIONS: 0
CHILLS: 0
BRUISES/BLEEDS EASILY: 0
DEPRESSION: 0
FEVER: 0
HEARTBURN: 0
DIZZINESS: 0
HALLUCINATIONS: 0
DOUBLE VISION: 0
HEMOPTYSIS: 0
NAUSEA: 0
MYALGIAS: 0
BACK PAIN: 1

## 2019-05-14 NOTE — CARE PLAN
Problem: Safety  Goal: Will remain free from falls    Intervention: Assess risk factors for falls  Fall measures in place. Call light within reach, personal belongings close-by, bed in lowest position, IV pole on same side of bathroom, upper bed rails up, hourly rounding in place. Will continue to monitor pt for safety.       Problem: Pain Management  Goal: Pain level will decrease to patient's comfort goal    Intervention: Follow pain managment plan developed in collaboration with patient and Interdisciplinary Team  Pt medicated for pain w/ po morphine 30 mg, edu pt about weaning off IV morphine, pt became very agitated after teaching. Hourly rounding in place.

## 2019-05-14 NOTE — THERAPY
Occupational Therapy Contact Note    Pt refusing OOB activity due to pain. Agreeable to 1pm. Will attempt 1pm.    Gloria Mcwilliams, OTR/L

## 2019-05-14 NOTE — PROGRESS NOTES
Assumed care of pt after receiving report from dayshift RN. Bedside rounding completed w/ dayshift RN. Pt resting in bed A&OX4, medicated for pain per mar. Fall measures in place, bed alarm on, call light within reach, personal belongings nearby, bed in lowest position, and hourly rounding in place. Will continue to monitor pt.

## 2019-05-14 NOTE — THERAPY
"Occupational Therapy Treatment completed with focus on ADLs, ADL transfers and patient education.  Functional Status: Pt seen today for OT treatment. Cooperative with encouragement, self limiting at times and easily irritable. Mod I for bed mobility. Supv sit<>stand. Lara functional mobility with FWW to the sink. One seated rest break, and then back to bed. Used urinal seated EOB with mod I. Declined any further OOB activity. He is making progress, but remains limited by weakness, fatigue, impaired balance, and impaired safety awareness which impacts independence in self care and functional mobility.  Plan of Care: Will benefit from Occupational Therapy 3 times per week  Discharge Recommendations:  Equipment Will Continue to Assess for Equipment Needs. Recommend inpatient transitional care services for continued occupational therapy services.       See \"Rehab Therapy-Acute\" Patient Summary Report for complete documentation.   "

## 2019-05-14 NOTE — PROGRESS NOTES
[facility] Medicine Daily Progress Note    Date of Service  5/14/2019    Chief Complaint  53 y.o. male admitted 4/5/2019 with back pain, found to have L1 burst fracture.    Hospital Course    Patient admitted with L1 burst fracture, conservative bracing recommended by neurosurgeon.  Patient with difficult to control pain and trouble with therapies and difficult discharge due to social situation.      Interval Problem Update    Patient is resting in bed, continue c/o back pain no weakness or urinary or bowel problems, patient is tolerating regular diet.   Friend is coming in next week to help on getting back home.   Discussed with patient he needs to ambulate more.     Consultants/Specialty  NSG - [place] - s/o  Psych - s/o    Code Status  full    Disposition  No placement options available,   Patient needs to improve mobility and to be discharged home after that.    Review of Systems  Review of Systems   Constitutional: Negative for chills and fever.   HENT: Negative for hearing loss and tinnitus.    Eyes: Negative for blurred vision and double vision.   Respiratory: Negative for cough and hemoptysis.    Cardiovascular: Negative for chest pain and palpitations.   Gastrointestinal: Negative for heartburn and nausea.   Genitourinary: Negative for dysuria.   Musculoskeletal: Positive for back pain (Stable ). Negative for myalgias.   Skin: Negative for itching.   Neurological: Negative for dizziness and headaches.   Endo/Heme/Allergies: Does not bruise/bleed easily.   Psychiatric/Behavioral: Negative for depression and hallucinations. The patient is nervous/anxious.         Physical Exam  Temp:  [36.2 °C (97.2 °F)-36.6 °C (97.8 °F)] 36.6 °C (97.8 °F)  Pulse:  [] 89  Resp:  [18-19] 18  BP: (110-128)/(71-85) 122/71  SpO2:  [96 %] 96 %    Physical Exam   Constitutional: He is oriented to person, place, and time. He appears well-developed and well-nourished. No distress.   HENT:   Head: Normocephalic and atraumatic.    Eyes: Conjunctivae are normal. No scleral icterus.   Neck: Neck supple.   Cardiovascular: Normal rate, regular rhythm and normal heart sounds.  Exam reveals no gallop and no friction rub.    Pulmonary/Chest: Effort normal. No respiratory distress. He has no wheezes.   Abdominal: Soft. Bowel sounds are normal. He exhibits no distension. There is no tenderness.   Musculoskeletal: He exhibits no tenderness or deformity.   Lymphadenopathy:     He has no cervical adenopathy.   Neurological: He is alert and oriented to person, place, and time. He exhibits normal muscle tone.   Skin: Skin is warm and dry. No erythema.   Psychiatric: His mood appears anxious.        Nursing note and vitals reviewed.  I examined the patient on 5/13.  There is no significant changes from 5/12 except as mentioned above    Fluids    Intake/Output Summary (Last 24 hours) at 05/14/19 1438  Last data filed at 05/14/19 1300   Gross per 24 hour   Intake              240 ml   Output              900 ml   Net             -660 ml       Laboratory                        Imaging  IR-VERTEBROPLASTY   Final Result      1. LUMBAR KYPHOPLASTY AT L1 WITH BIPLANE FLUOROSCOPIC GUIDANCE FOR AN OSTEOPOROTIC INSUFFICIENCY COMPRESSION FRACTURE.      2. CLINICAL OR TELEPHONE FOLLOWUP IS SCHEDULED FOR ONE MONTH, 3 MONTHS, AND 6 MONTHS POST PROCEDURE.      DX-ESOPHAGUS - BARIUM SWALLOW   Final Result      Small hiatal hernia      DX-CHEST-PORTABLE (1 VIEW)   Final Result      No acute cardiac or pulmonary abnormalities are identified.      DX-CHEST-PORTABLE (1 VIEW)   Final Result         No acute cardiac or pulmonary abnormality is identified.      EA-[id number] VIEW   Final Result      1.  There is a nonobstructive nonspecific bowel gas pattern.  There is no evidence of free intraperitoneal air.      MR-LUMBAR SPINE-WITH & W/O   Final Result      1.  There is subacute fracture along the superior endplate of L1 vertebral body. There is no posterior retropulsion.    2.  There is no focal bone lesion.   3.  There is no evidence of infection.   4.  Mild degenerative disease as described above.      MR-BRAIN-W/O   Final Result      1.  Mild cerebral atrophy.   2.  Mild chronic microvascular ischemic disease.   3.  No acute abnormality.      DX-KNEE 2- LEFT   Final Result      1.  No fracture or dislocation of LEFT knee.   2.  Minimal degenerative changes.   3.  Diffuse vascular calcifications.      EC-ECHOCARDIOGRAM COMPLETE W/O CONT   Final Result      CT-ABDOMEN-PELVIS WITH   Final Result         1.  No acute abnormality.   2.  Hepatomegaly and diffuse hepatic steatosis   3.  Atherosclerosis and atherosclerotic coronary artery disease.   4.  Pulmonary nodules measuring up to 6.5 mm, see nodule follow-up recommendations below.      Low Risk: CT at 3-6 months, then consider CT at 18-24 months      High Risk: CT at 3-6 months, then at 18-24 months      Comments: Use most suspicious nodule as guide to management. Follow-up intervals may vary according to size and risk.      Low Risk - Minimal or absent history of smoking and of other known risk factors.      High Risk - History of smoking or of other known risk factors.      Note: These recommendations do not apply to lung cancer screening, patients with immunosuppression, or patients with known primary cancer.      Fleischner Society 2017 Guidelines for Management of Incidentally Detected Pulmonary Nodules in Adults         CT-CTA CHEST PULMONARY ARTERY W/ RECONS   Final Result         1.  No large central pulmonary embolus is appreciated, evaluation of the subsegmental branches is essentially nondiagnostic due to motion artifacts. Additional imaging would be required for definitive exclusion of small distal pulmonary emboli.      DX-LUMBAR SPINE-2 OR 3 VIEWS   Final Result      Limited examination. Mild anterior wedging L2 vertebral body that appears to be old.   If symptoms are persistent, would recommend CT for further  evaluation.      DX-THORACIC SPINE-WITH SWIMMERS VIEW   Final Result      Limited examination. The upper thoracic spine is not well-demonstrated.   Mild anterior wedging of some the lower thoracic vertebral bodies and appears old.   No definite acute compression. If symptoms are persistent, CT would be recommended for further evaluation.      DX-CHEST-PORTABLE (1 VIEW)   Final Result      Central catheter projects over the superior right atrium.      Prominent calcified granuloma in the left midlung.      Atherosclerotic plaque.      CT-HEAD W/O   Final Result      No acute intracranial abnormality is identified.           Assessment/Plan  * Closed stable burst fracture of first lumbar vertebra with routine healing   Assessment & Plan    Lumbar MRI found L1 subacute fracture with no protrusion  Neurosurgery was consulted and they recommended brace placement and outpatient follow-up.  status post kyphoplasty on 5/10.   Continue pain control with po morphine. Needs to ambulate.          Pancytopenia (HCC)- (present on admission)   Assessment & Plan    Most likely due to myelodysplastic syndrome and possibly chronic liver disease  His last chemotherapy was in November 2018  No acute symptoms.  Hematology/oncology was consulted and recommended outpatient follow-up.  Stable.          Esophageal dysphagia   Assessment & Plan    Patient complains of dysphagia associated with nausea and vomiting.  Patient refused further evaluation including barium swallow study.  Regular diet.      Poor nutrition   Assessment & Plan      Dietitian following     Hallucinations   Assessment & Plan    Patient seeing butterflies and bats in his room  Psychiatry did not recommend any antipsychotic medications at this point.  Resolved.     Delirium   Assessment & Plan    Frequent confusing statements made by patient, unrelated to doses of pain medications  Psychiatry consultation appreciated  Discontinue Robaxin  Gabapentin  discontinued  Resolved        Left knee pain   Assessment & Plan    X ray showed arthritis with no acute bony abnormalities or fractures.  Continue to provide him pain management.  Stable.      Tachycardia   Assessment & Plan    Resolved  It was most likely from pain  metprolol 25 mg p.o. twice daily  Improved.     Somnolence- (present on admission)   Assessment & Plan      Stopped Ambien, gabapentin and trazodone  Resolved.         Dizziness- (present on admission)   Assessment & Plan    gabapentin - discontinued  Echo did not show any acute normalities  meclizine              VTE prophylaxis: heparin

## 2019-05-15 ENCOUNTER — APPOINTMENT (OUTPATIENT)
Dept: RADIOLOGY | Facility: MEDICAL CENTER | Age: 54
DRG: 516 | End: 2019-05-15
Attending: HOSPITALIST
Payer: MEDICAID

## 2019-05-15 LAB
BASOPHILS # BLD AUTO: 0.7 % (ref 0–1.8)
BASOPHILS # BLD: 0.03 K/UL (ref 0–0.12)
EOSINOPHIL # BLD AUTO: 0.32 K/UL (ref 0–0.51)
EOSINOPHIL NFR BLD: 7 % (ref 0–6.9)
ERYTHROCYTE [DISTWIDTH] IN BLOOD BY AUTOMATED COUNT: 43.7 FL (ref 35.9–50)
HCT VFR BLD AUTO: 35.5 % (ref 42–52)
HGB BLD-MCNC: 11.5 G/DL (ref 14–18)
IMM GRANULOCYTES # BLD AUTO: 0.01 K/UL (ref 0–0.11)
IMM GRANULOCYTES NFR BLD AUTO: 0.2 % (ref 0–0.9)
LYMPHOCYTES # BLD AUTO: 1.45 K/UL (ref 1–4.8)
LYMPHOCYTES NFR BLD: 31.9 % (ref 22–41)
MCH RBC QN AUTO: 28.7 PG (ref 27–33)
MCHC RBC AUTO-ENTMCNC: 32.4 G/DL (ref 33.7–35.3)
MCV RBC AUTO: 88.5 FL (ref 81.4–97.8)
MONOCYTES # BLD AUTO: 0.59 K/UL (ref 0–0.85)
MONOCYTES NFR BLD AUTO: 13 % (ref 0–13.4)
NEUTROPHILS # BLD AUTO: 2.15 K/UL (ref 1.82–7.42)
NEUTROPHILS NFR BLD: 47.2 % (ref 44–72)
NRBC # BLD AUTO: 0 K/UL
NRBC BLD-RTO: 0 /100 WBC
PLATELET # BLD AUTO: 93 K/UL (ref 164–446)
PMV BLD AUTO: 10 FL (ref 9–12.9)
RBC # BLD AUTO: 4.01 M/UL (ref 4.7–6.1)
WBC # BLD AUTO: 4.6 K/UL (ref 4.8–10.8)

## 2019-05-15 PROCEDURE — A9270 NON-COVERED ITEM OR SERVICE: HCPCS | Performed by: INTERNAL MEDICINE

## 2019-05-15 PROCEDURE — 72100 X-RAY EXAM L-S SPINE 2/3 VWS: CPT

## 2019-05-15 PROCEDURE — 99232 SBSQ HOSP IP/OBS MODERATE 35: CPT | Performed by: HOSPITALIST

## 2019-05-15 PROCEDURE — 92507 TX SP LANG VOICE COMM INDIV: CPT

## 2019-05-15 PROCEDURE — A9270 NON-COVERED ITEM OR SERVICE: HCPCS | Performed by: FAMILY MEDICINE

## 2019-05-15 PROCEDURE — 700111 HCHG RX REV CODE 636 W/ 250 OVERRIDE (IP): Performed by: INTERNAL MEDICINE

## 2019-05-15 PROCEDURE — 700102 HCHG RX REV CODE 250 W/ 637 OVERRIDE(OP): Performed by: FAMILY MEDICINE

## 2019-05-15 PROCEDURE — 700111 HCHG RX REV CODE 636 W/ 250 OVERRIDE (IP): Performed by: FAMILY MEDICINE

## 2019-05-15 PROCEDURE — A9270 NON-COVERED ITEM OR SERVICE: HCPCS | Performed by: HOSPITALIST

## 2019-05-15 PROCEDURE — 700102 HCHG RX REV CODE 250 W/ 637 OVERRIDE(OP): Performed by: HOSPITALIST

## 2019-05-15 PROCEDURE — 770006 HCHG ROOM/CARE - MED/SURG/GYN SEMI*

## 2019-05-15 PROCEDURE — 700102 HCHG RX REV CODE 250 W/ 637 OVERRIDE(OP): Performed by: INTERNAL MEDICINE

## 2019-05-15 PROCEDURE — 700101 HCHG RX REV CODE 250: Performed by: FAMILY MEDICINE

## 2019-05-15 PROCEDURE — 85025 COMPLETE CBC W/AUTO DIFF WBC: CPT

## 2019-05-15 RX ADMIN — ZOLPIDEM TARTRATE 5 MG: 5 TABLET ORAL at 20:49

## 2019-05-15 RX ADMIN — MORPHINE SULFATE 30 MG: 15 TABLET ORAL at 00:25

## 2019-05-15 RX ADMIN — MORPHINE SULFATE 4 MG: 4 INJECTION INTRAVENOUS at 23:06

## 2019-05-15 RX ADMIN — LIDOCAINE 2 PATCH: 50 PATCH TOPICAL at 23:09

## 2019-05-15 RX ADMIN — HEPARIN SODIUM 5000 UNITS: 5000 INJECTION, SOLUTION INTRAVENOUS; SUBCUTANEOUS at 12:43

## 2019-05-15 RX ADMIN — BUSPIRONE HYDROCHLORIDE 15 MG: 30 TABLET ORAL at 04:49

## 2019-05-15 RX ADMIN — MORPHINE SULFATE 30 MG: 15 TABLET ORAL at 18:28

## 2019-05-15 RX ADMIN — THERA TABS 1 TABLET: TAB at 04:50

## 2019-05-15 RX ADMIN — AMITRIPTYLINE HYDROCHLORIDE 25 MG: 25 TABLET, FILM COATED ORAL at 21:00

## 2019-05-15 RX ADMIN — MORPHINE SULFATE 30 MG: 15 TABLET ORAL at 12:37

## 2019-05-15 RX ADMIN — OMEPRAZOLE 20 MG: 20 CAPSULE, DELAYED RELEASE ORAL at 04:50

## 2019-05-15 RX ADMIN — SENNOSIDES, DOCUSATE SODIUM 2 TABLET: 50; 8.6 TABLET, FILM COATED ORAL at 04:50

## 2019-05-15 RX ADMIN — LIDOCAINE 2 PATCH: 50 PATCH TOPICAL at 00:25

## 2019-05-15 RX ADMIN — OMEPRAZOLE 20 MG: 20 CAPSULE, DELAYED RELEASE ORAL at 18:28

## 2019-05-15 RX ADMIN — MORPHINE SULFATE 30 MG: 15 TABLET ORAL at 04:52

## 2019-05-15 RX ADMIN — MECLIZINE HYDROCHLORIDE 12.5 MG: 25 TABLET ORAL at 18:29

## 2019-05-15 RX ADMIN — MORPHINE SULFATE 4 MG: 4 INJECTION INTRAVENOUS at 10:52

## 2019-05-15 RX ADMIN — BUSPIRONE HYDROCHLORIDE 15 MG: 30 TABLET ORAL at 18:28

## 2019-05-15 RX ADMIN — ACETAMINOPHEN 1000 MG: 500 TABLET ORAL at 04:49

## 2019-05-15 RX ADMIN — PROMETHAZINE HYDROCHLORIDE 12.5 MG: 25 TABLET ORAL at 19:29

## 2019-05-15 RX ADMIN — MECLIZINE HYDROCHLORIDE 12.5 MG: 25 TABLET ORAL at 12:37

## 2019-05-15 RX ADMIN — ACETAMINOPHEN 1000 MG: 500 TABLET ORAL at 18:28

## 2019-05-15 RX ADMIN — MECLIZINE HYDROCHLORIDE 12.5 MG: 25 TABLET ORAL at 04:50

## 2019-05-15 RX ADMIN — DIPHENHYDRAMINE HCL 25 MG: 25 TABLET ORAL at 19:26

## 2019-05-15 ASSESSMENT — ENCOUNTER SYMPTOMS
BRUISES/BLEEDS EASILY: 0
NAUSEA: 0
BACK PAIN: 1
NERVOUS/ANXIOUS: 1
COUGH: 0
TINGLING: 0
VOMITING: 0
BLURRED VISION: 0
DOUBLE VISION: 0
DEPRESSION: 0
MYALGIAS: 0
SPUTUM PRODUCTION: 0
HEARTBURN: 0
DIZZINESS: 0
FEVER: 0

## 2019-05-15 NOTE — THERAPY
"Speech Language Therapy cognitive-linguistic treatment completed.   Functional Status:    Patient was seen for cognitive therapy and ongoing assessment of cognitive functioning.  patient needing max encouragement to participate, and although he conceded, he continued to roll his eyes during the course of the session and at times was self defeating by stating \"I don't know.\"   Patient was oriented in all spheres and was able to verbalize that he is not able to return to [place] as he needs care at home and does not have anyone to assist him.  Despite the fact that he recognizes that he cannot return to independent living at this current time, discussed his refusing therapies at times.  He did not see how any of this related.  At this time, patient is presenting with moderate deficits in the areas of attention, thought organization, reasoning, judgement, memory and functional problem solving.  He did have difficulty sustaining attention to task during the test and kept asking when we would be finished despite given a time that I promised we would be finished by. Patient also had difficulty with word finding and reports that he has had this and has had memory deficits since his stroke a few years ago.  Overall cognitive functioning does appear to be slowly improving, but it is difficult to fully assess due to self limiting behaviors.  SLP will continue to follow.  At this time, he is not safe for independent living and will need 24 hour supervision following DC from acute.     Plan of Care: Will benefit from Speech Therapy 2 times per week  Post-Acute Therapy: Discharge to a transitional care facility for continued skilled therapy services.    See \"Rehab Therapy-Acute\" Patient Summary Report for complete documentation.     "

## 2019-05-15 NOTE — THERAPY
"Occupational Therapy Contact Note    This therapist approached by RN as patient was requesting to get up today and participate in functional mobility. When entering the room, pt found sitting EOB. This therapist asked him what he'd like to work on today, pt then refusing any OOB activity, laying himself back down in bed, saying that he was going to go walk in the hallway with the nurses but refusing to work with therapy because we \"push him too hard\". Discussed role of OT in self pacing and working on what he could tolerate but still patient refusing, only wanting to get up with nursing staff.    Gloria Mcwilliams, OTR/L  "

## 2019-05-15 NOTE — CARE PLAN
Problem: Pain Management  Goal: Pain level will decrease to patient's comfort goal  Outcome: PROGRESSING SLOWER THAN EXPECTED  Patient continues to report pain in back with PRN medications. Educated on pain rating scale, nonpharm pain relieving techniques, offered heat or ice packs patient refused. Continue to provided education and medications as needed.     Problem: Mobility  Goal: Risk for activity intolerance will decrease  Outcome: PROGRESSING AS EXPECTED  Patient worked with PT today and walked to sink. Educated patient on importance of mobility to improve strength and endurance. Patient reports he does not want to mobilize due to pain.

## 2019-05-15 NOTE — PROGRESS NOTES
Assumed patient care at 0700.  Patient is alert, awake, and oriented x 4, non labored breathing on room air, no signs of acute distress noted.  Patient complains of lower back pain and dizziness with ambulation--medication given as needed with good effect. Pt has been refusing to get up and ambulate with RN, but did participate with PT today and walked a few steps with FWW and was up to the wheelchair.  Observed to have fair PO intake.  Voiding via urinal without any difficulty. No bowel movement noted. Bed in lowest position. Call light and belongings within reach. [facility]

## 2019-05-15 NOTE — PROGRESS NOTES
[facility] Medicine Daily Progress Note    Date of Service  5/15/2019    Chief Complaint  53 y.o. male admitted 4/5/2019 with back pain, found to have L1 burst fracture.    Hospital Course    Patient admitted with L1 burst fracture, conservative bracing recommended by neurosurgeon.  Patient with difficult to control pain and trouble with therapies and difficult discharge due to social situation.      Interval Problem Update    Patient is resting in bed, he looks comfortable, not in distress, he was advised to ambulate more, denies any urinary or bowel problems, no focal weakness, he is alert oriented and follows commands.  Since patient is continuing complaining of back pain will get L-spine x-ray today    Consultants/Specialty  NSG - [place] - s/o  Psych - s/o    Code Status  full    Disposition  No placement options available,   Patient needs to improve mobility and to be discharged home after that.    Review of Systems  Review of Systems   Constitutional: Negative for fever.   HENT: Negative for ear pain, hearing loss and tinnitus.    Eyes: Negative for blurred vision and double vision.   Respiratory: Negative for cough and sputum production.    Cardiovascular: Negative for chest pain.   Gastrointestinal: Negative for heartburn, nausea and vomiting.   Genitourinary: Negative for dysuria.   Musculoskeletal: Positive for back pain (Stable ). Negative for myalgias.   Skin: Negative for itching.   Neurological: Negative for dizziness and tingling.   Endo/Heme/Allergies: Does not bruise/bleed easily.   Psychiatric/Behavioral: Negative for depression and suicidal ideas. The patient is nervous/anxious.         Physical Exam  Temp:  [36 °C (96.8 °F)-36.4 °C (97.5 °F)] 36.4 °C (97.5 °F)  Pulse:  [100-117] 100  Resp:  [17-21] 21  BP: (106-141)/(74-93) 132/89  SpO2:  [91 %-96 %] 96 %    Physical Exam   Constitutional: He is oriented to person, place, and time. He appears well-developed and well-nourished. No distress.   HENT:    Head: Normocephalic and atraumatic.   Eyes: Conjunctivae are normal.   Neck: Neck supple.   Cardiovascular: Normal rate, regular rhythm and normal heart sounds.  Exam reveals no gallop and no friction rub.    Pulmonary/Chest: Effort normal. No respiratory distress. He has no rales.   Abdominal: Soft. Bowel sounds are normal. He exhibits no distension. There is no rebound.   Musculoskeletal: He exhibits no tenderness or deformity.   Lymphadenopathy:     He has no cervical adenopathy.   Neurological: He is alert and oriented to person, place, and time. He exhibits normal muscle tone.   Skin: Skin is warm and dry. No erythema.   Psychiatric: His mood appears anxious.        Nursing note and vitals reviewed.  I examined the patient on 5/13.  There is no significant changes from 5/12 except as mentioned above    Fluids    Intake/Output Summary (Last 24 hours) at 05/15/19 1231  Last data filed at 05/15/19 0500   Gross per 24 hour   Intake              240 ml   Output             1000 ml   Net             -760 ml       Laboratory  Recent Labs      05/15/19   0510   WBC  4.6*   RBC  4.01*   HEMOGLOBIN  11.5*   HEMATOCRIT  35.5*   MCV  88.5   MCH  28.7   MCHC  32.4*   RDW  43.7   PLATELETCT  93*   MPV  10.0                       Imaging  IR-VERTEBROPLASTY   Final Result      1. LUMBAR KYPHOPLASTY AT L1 WITH BIPLANE FLUOROSCOPIC GUIDANCE FOR AN OSTEOPOROTIC INSUFFICIENCY COMPRESSION FRACTURE.      2. CLINICAL OR TELEPHONE FOLLOWUP IS SCHEDULED FOR ONE MONTH, 3 MONTHS, AND 6 MONTHS POST PROCEDURE.      DX-ESOPHAGUS - BARIUM SWALLOW   Final Result      Small hiatal hernia      DX-CHEST-PORTABLE (1 VIEW)   Final Result      No acute cardiac or pulmonary abnormalities are identified.      DX-CHEST-PORTABLE (1 VIEW)   Final Result         No acute cardiac or pulmonary abnormality is identified.      GQ-DYPAEHX-0 VIEW   Final Result      1.  There is a nonobstructive nonspecific bowel gas pattern.  There is no evidence of free  intraperitoneal air.      MR-LUMBAR SPINE-WITH & W/O   Final Result      1.  There is subacute fracture along the superior endplate of L1 vertebral body. There is no posterior retropulsion.   2.  There is no focal bone lesion.   3.  There is no evidence of infection.   4.  Mild degenerative disease as described above.      MR-BRAIN-W/O   Final Result      1.  Mild cerebral atrophy.   2.  Mild chronic microvascular ischemic disease.   3.  No acute abnormality.      DX-KNEE 2- LEFT   Final Result      1.  No fracture or dislocation of LEFT knee.   2.  Minimal degenerative changes.   3.  Diffuse vascular calcifications.      EC-ECHOCARDIOGRAM COMPLETE W/O CONT   Final Result      CT-ABDOMEN-PELVIS WITH   Final Result         1.  No acute abnormality.   2.  Hepatomegaly and diffuse hepatic steatosis   3.  Atherosclerosis and atherosclerotic coronary artery disease.   4.  Pulmonary nodules measuring up to 6.5 mm, see nodule follow-up recommendations below.      Low Risk: CT at 3-6 months, then consider CT at 18-24 months      High Risk: CT at 3-6 months, then at 18-24 months      Comments: Use most suspicious nodule as guide to management. Follow-up intervals may vary according to size and risk.      Low Risk - Minimal or absent history of smoking and of other known risk factors.      High Risk - History of smoking or of other known risk factors.      Note: These recommendations do not apply to lung cancer screening, patients with immunosuppression, or patients with known primary cancer.      Fleischner Society 2017 Guidelines for Management of Incidentally Detected Pulmonary Nodules in Adults         CT-CTA CHEST PULMONARY ARTERY W/ RECONS   Final Result         1.  No large central pulmonary embolus is appreciated, evaluation of the subsegmental branches is essentially nondiagnostic due to motion artifacts. Additional imaging would be required for definitive exclusion of small distal pulmonary emboli.      DX-LUMBAR  SPINE-2 OR 3 VIEWS   Final Result      Limited examination. Mild anterior wedging L2 vertebral body that appears to be old.   If symptoms are persistent, would recommend CT for further evaluation.      DX-THORACIC SPINE-WITH SWIMMERS VIEW   Final Result      Limited examination. The upper thoracic spine is not well-demonstrated.   Mild anterior wedging of some the lower thoracic vertebral bodies and appears old.   No definite acute compression. If symptoms are persistent, CT would be recommended for further evaluation.      DX-CHEST-PORTABLE (1 VIEW)   Final Result      Central catheter projects over the superior right atrium.      Prominent calcified granuloma in the left midlung.      Atherosclerotic plaque.      CT-HEAD W/O   Final Result      No acute intracranial abnormality is identified.           Assessment/Plan  * Closed stable burst fracture of first lumbar vertebra with routine healing   Assessment & Plan    Lumbar MRI found L1 subacute fracture with no protrusion  Neurosurgery was consulted and they recommended brace placement and outpatient follow-up.  status post kyphoplasty on 5/10.   Continue pain control with po morphine. Needs to ambulate.   Discussed with patient again today he needs to ambulate more, he is stated that he will try to walk more today, his friend is coming tomorrow to discuss probably discharge plan.         Pancytopenia (HCC)- (present on admission)   Assessment & Plan    Most likely due to myelodysplastic syndrome and possibly chronic liver disease  His last chemotherapy was in November 2018  No acute symptoms.  Hematology/oncology was consulted and recommended outpatient follow-up.  Stable and improving         Esophageal dysphagia   Assessment & Plan    Patient complains of dysphagia associated with nausea and vomiting.  Patient refused further evaluation including barium swallow study.  Regular diet.      Poor nutrition   Assessment & Plan      Dietitian following      Hallucinations   Assessment & Plan    Patient seeing butterflies and bats in his room  Psychiatry did not recommend any antipsychotic medications at this point.  Resolved.     Delirium   Assessment & Plan    Frequent confusing statements made by patient, unrelated to doses of pain medications  Psychiatry consultation appreciated  Discontinue Robaxin  Gabapentin discontinued  Resolved.       Left knee pain   Assessment & Plan    X ray showed arthritis with no acute bony abnormalities or fractures.  Continue to provide him pain management.  Stable.      Tachycardia   Assessment & Plan    Resolved  It was most likely from pain  metprolol 25 mg p.o. twice daily (refuses on and off)  Improved.     Somnolence- (present on admission)   Assessment & Plan      Stopped Ambien, gabapentin and trazodone  Resolved.         Dizziness- (present on admission)   Assessment & Plan    gabapentin - discontinued  Echo did not show any acute normalities  meclizine.             VTE prophylaxis: heparin

## 2019-05-15 NOTE — PROGRESS NOTES
"Assumed patient care at 1900. Received Bedside report. Patient  alert and oriented x 4. Appears comfortable in bed. Discussed POC. No s/s of distress. Patient denies any further questions or concerns. Call light in reach, fall precautions in place. Continue hourly rounding. /80   Pulse (!) 110   Temp 36.1 °C (96.9 °F) (Temporal)   Resp 18   Ht 1.803 m (5' 11\")   Wt 91.1 kg (200 lb 13.4 oz)   SpO2 93%   BMI 28.01 kg/m²   "

## 2019-05-15 NOTE — CARE PLAN
"Problem: Pain Management  Goal: Pain level will decrease to patient's comfort goal    Intervention: Follow pain managment plan developed in collaboration with patient and Interdisciplinary Team  Educated patient on pain medication regimen and side effects including increased sedation, decreased breathing, drowsiness, dizziness, and constipation--patient verbalized understanding.       Problem: Mobility  Goal: Risk for activity intolerance will decrease    Intervention: Encourage patient to increase activity level in collaboration with Interdisciplinary Team  Attempted to educate patient regarding importance of ambulation, but patient was irritable and stated to RN \" I know, I know everyone keeps saying that, but I'm trying my best already, but my back is killing me\" Educated patient regarding receiving PO pain medication prior to therapy and ambulation--but patient still continues to be non compliant with therapy        "

## 2019-05-15 NOTE — THERAPY
"Attempted to see pt to PT tx. Pt adamantly refused, stating he \"doesn't want therapy to get him up, he will walk with nurisng later\". Will reattempt as able.    [place], PTA  [phone]  "

## 2019-05-15 NOTE — PROGRESS NOTES
Notified Dr. Castellano, per night RN, patient was observed to be hallucinating. Patient stated to night RN that he was talking to someone on the phone when he wasn't and his cellphone was out of battery and not within his reach.  Patient is also non compliant with therapy, refusing to ambulate due to lower back pain and dizziness.  MD also aware patient is still refusing SCDs, heparin, and metoprolol (states it was making him more dizzy) No further orders noted. Will continue to encourage patient to ambulate and work with therapy.

## 2019-05-16 PROCEDURE — 770006 HCHG ROOM/CARE - MED/SURG/GYN SEMI*

## 2019-05-16 PROCEDURE — 302242 IV POLE: Performed by: HOSPITALIST

## 2019-05-16 PROCEDURE — A9270 NON-COVERED ITEM OR SERVICE: HCPCS | Performed by: HOSPITALIST

## 2019-05-16 PROCEDURE — A9270 NON-COVERED ITEM OR SERVICE: HCPCS | Performed by: INTERNAL MEDICINE

## 2019-05-16 PROCEDURE — 700102 HCHG RX REV CODE 250 W/ 637 OVERRIDE(OP): Performed by: INTERNAL MEDICINE

## 2019-05-16 PROCEDURE — 302128 INFUSION PUMP W/POLE: Performed by: HOSPITALIST

## 2019-05-16 PROCEDURE — 99232 SBSQ HOSP IP/OBS MODERATE 35: CPT | Performed by: HOSPITALIST

## 2019-05-16 PROCEDURE — 97116 GAIT TRAINING THERAPY: CPT

## 2019-05-16 PROCEDURE — 700102 HCHG RX REV CODE 250 W/ 637 OVERRIDE(OP): Performed by: HOSPITALIST

## 2019-05-16 PROCEDURE — 97530 THERAPEUTIC ACTIVITIES: CPT

## 2019-05-16 PROCEDURE — A9270 NON-COVERED ITEM OR SERVICE: HCPCS | Performed by: FAMILY MEDICINE

## 2019-05-16 PROCEDURE — 700102 HCHG RX REV CODE 250 W/ 637 OVERRIDE(OP): Performed by: FAMILY MEDICINE

## 2019-05-16 PROCEDURE — 700111 HCHG RX REV CODE 636 W/ 250 OVERRIDE (IP): Performed by: HOSPITALIST

## 2019-05-16 PROCEDURE — 700111 HCHG RX REV CODE 636 W/ 250 OVERRIDE (IP): Performed by: INTERNAL MEDICINE

## 2019-05-16 RX ADMIN — OMEPRAZOLE 20 MG: 20 CAPSULE, DELAYED RELEASE ORAL at 18:19

## 2019-05-16 RX ADMIN — METOPROLOL TARTRATE 25 MG: 25 TABLET ORAL at 05:45

## 2019-05-16 RX ADMIN — BUSPIRONE HYDROCHLORIDE 15 MG: 30 TABLET ORAL at 05:43

## 2019-05-16 RX ADMIN — OMEPRAZOLE 20 MG: 20 CAPSULE, DELAYED RELEASE ORAL at 05:44

## 2019-05-16 RX ADMIN — DIPHENHYDRAMINE HCL 25 MG: 25 TABLET ORAL at 02:42

## 2019-05-16 RX ADMIN — BUSPIRONE HYDROCHLORIDE 15 MG: 30 TABLET ORAL at 18:17

## 2019-05-16 RX ADMIN — MECLIZINE HYDROCHLORIDE 12.5 MG: 25 TABLET ORAL at 05:44

## 2019-05-16 RX ADMIN — ACETAMINOPHEN 1000 MG: 500 TABLET ORAL at 05:45

## 2019-05-16 RX ADMIN — MORPHINE SULFATE 30 MG: 15 TABLET ORAL at 14:53

## 2019-05-16 RX ADMIN — MECLIZINE HYDROCHLORIDE 12.5 MG: 25 TABLET ORAL at 11:04

## 2019-05-16 RX ADMIN — ALTEPLASE 2 MG: 2.2 INJECTION, POWDER, LYOPHILIZED, FOR SOLUTION INTRAVENOUS at 02:43

## 2019-05-16 RX ADMIN — MORPHINE SULFATE 4 MG: 4 INJECTION INTRAVENOUS at 11:04

## 2019-05-16 RX ADMIN — PROMETHAZINE HYDROCHLORIDE 12.5 MG: 25 TABLET ORAL at 18:18

## 2019-05-16 RX ADMIN — ACETAMINOPHEN 1000 MG: 500 TABLET ORAL at 18:17

## 2019-05-16 RX ADMIN — ZOLPIDEM TARTRATE 5 MG: 5 TABLET ORAL at 20:43

## 2019-05-16 RX ADMIN — AMITRIPTYLINE HYDROCHLORIDE 25 MG: 25 TABLET, FILM COATED ORAL at 20:43

## 2019-05-16 RX ADMIN — DIPHENHYDRAMINE HCL 25 MG: 25 TABLET ORAL at 23:09

## 2019-05-16 RX ADMIN — MORPHINE SULFATE 30 MG: 15 TABLET ORAL at 20:43

## 2019-05-16 RX ADMIN — MORPHINE SULFATE 30 MG: 15 TABLET ORAL at 00:47

## 2019-05-16 RX ADMIN — MORPHINE SULFATE 30 MG: 15 TABLET ORAL at 08:01

## 2019-05-16 RX ADMIN — MECLIZINE HYDROCHLORIDE 12.5 MG: 25 TABLET ORAL at 18:17

## 2019-05-16 RX ADMIN — THERA TABS 1 TABLET: TAB at 05:44

## 2019-05-16 RX ADMIN — MORPHINE SULFATE 4 MG: 4 INJECTION INTRAVENOUS at 23:10

## 2019-05-16 RX ADMIN — PROMETHAZINE HYDROCHLORIDE 12.5 MG: 25 TABLET ORAL at 08:02

## 2019-05-16 ASSESSMENT — COGNITIVE AND FUNCTIONAL STATUS - GENERAL
WALKING IN HOSPITAL ROOM: A LITTLE
SUGGESTED CMS G CODE MODIFIER MOBILITY: CJ
MOBILITY SCORE: 20
CLIMB 3 TO 5 STEPS WITH RAILING: TOTAL

## 2019-05-16 ASSESSMENT — GAIT ASSESSMENTS
ASSISTIVE DEVICE: FRONT WHEEL WALKER
DISTANCE (FEET): 10
GAIT LEVEL OF ASSIST: MINIMAL ASSIST
DEVIATION: STEP TO;DECREASED BASE OF SUPPORT;OTHER (COMMENT)

## 2019-05-16 ASSESSMENT — ENCOUNTER SYMPTOMS
COUGH: 0
NAUSEA: 1
VOMITING: 0
BLURRED VISION: 0
TINGLING: 0
HEARTBURN: 0
DEPRESSION: 0
DIZZINESS: 0
FEVER: 0
NERVOUS/ANXIOUS: 0
MYALGIAS: 0
BRUISES/BLEEDS EASILY: 0
SINUS PAIN: 0
BACK PAIN: 1

## 2019-05-16 NOTE — CARE PLAN
Problem: Discharge Barriers/Planning  Goal: Patient's continuum of care needs will be met    Intervention: Assess potential discharge barriers on admission and throughout hospital stay  SNF referrals declined due to criminal background.       Problem: Bowel/Gastric:  Goal: Normal bowel function is maintained or improved    Intervention: Educate patient and significant other/support system about diet, fluid intake, medications and activity to promote bowel function  Patient had lack of appetite today, stated he wasn't feeling hungry at this time.  Patient also endorsed to RN that he was nauseous towards the end of shift. Educated patient on importance of good nutrition as it helps with wound healing, energy, etc--needs reinforcement.

## 2019-05-16 NOTE — CARE PLAN
Problem: Communication  Goal: The ability to communicate needs accurately and effectively will improve    Intervention: [place] patient and significant other/support system to call light to alert staff of needs  Educated patient on the use of call light for assistance. Went over controls and functions on the remote. Answered any questions patient had. Patient has been calling appropriately.      Problem: Bowel/Gastric:  Goal: Will not experience complications related to bowel motility    Intervention: Assess baseline bowel pattern  Patient had a BM yesterday, but says it is normal for him to go every day. Discussed the importance of having regular bowel functions and educated patient about available interventions for bowel evacuation. Patient declines interventions at this time.

## 2019-05-16 NOTE — DISCHARGE PLANNING
Anticipated Discharge Disposition: Home    Action: LSW met with pt at bedside to discuss discharge plan. Pt friend will be here soon to help him get back home to [place]. Goal for pt to discharge Monday with friend to home. Pt requested W/C for safe mobilization. PT working with pt on w/c mobility. LSW requested order from MD.        Barriers to Discharge: None    Plan: Follow up regarding W/C

## 2019-05-16 NOTE — CARE PLAN
Problem: Bowel/Gastric:  Goal: Normal bowel function is maintained or improved    Intervention: Educate patient and significant other/support system about diet, fluid intake, medications and activity to promote bowel function  Patient complains of right quadrant abdominal pain with nausea, states he has not been eating due to nausea--educated patient on importance of adequate oral intake and hydration to prevent constipation and malnutrition--patient verbalized understanding--needs reinforcement.  Phenergan PO given with good effect. Will continue to monitor.       Problem: Knowledge Deficit  Goal: Knowledge of disease process/condition, treatment plan, diagnostic tests, and medications will improve    Intervention: Assess knowledge level of disease process/condition, treatment plan, diagnostic tests, and medications  Educated patient on importance of saline flushes through both lumens of CVC line to prevent formation of blood clots, patient verbalized understanding.

## 2019-05-16 NOTE — PROGRESS NOTES
Patient double lumen CVC cannot draw blood. Purple one flushes, but cannot draw blood. Red one cannot be flushed at all. Paged MD Echeverria regarding situation and got an order for alteplase for each lumen.

## 2019-05-16 NOTE — PROGRESS NOTES
Assumed patient care at 0700.  Patient is alert, awake, and oriented x4, non labored breathing on room air, no signs of acute distress noted, no complaints of chest pain, shortness of breath, or difficulty breathing.  Patient observed to have poor PO intake--MD aware. Patient was willing to ambulate with RN this afternoon, but he declined after RN came back with therapy, stated therapy was pushing him too hard--education provided regarding importance of ambulation--needs reinforcement. Lumbar xray ordered.  Bed in lowest position. Call light and belongings within reach. [facility]

## 2019-05-16 NOTE — FACE TO FACE
Face to Face Note  -  Durable Medical Equipment    Chip Tobar M.D. - NPI: [id number]  I certify that this patient is under my care and that they had a durable medical equipment(DME)face to face encounter by myself that meets the physician DME face-to-face encounter requirements with this patient on:    Date of encounter:   Patient:                    MRN:                       YOB: 2018  Benito Persaud  [id number]  1965     The encounter with the patient was in whole, or in part, for the following medical condition, which is the primary reason for durable medical equipment:  Other - weakness and back pain.     I certify that, based on my findings, the following durable medical equipment is medically necessary:  Wheel Chair.    HOME O2 Saturation Measurements:(Values must be present for Home Oxygen orders)         ,     ,         My Clinical findings support the need for the above equipment due to:  Other - risk of falling. PT recommendation.

## 2019-05-16 NOTE — THERAPY
"Occupational Therapy Contact Note    Attempted OT treatment. After discussing with nurse Basilio, she reports she and patient had a conversation about therapy. Per RN, patient does not want to work with this therapist anymore, did not give reasons why when asked. When she asked if he wanted a new occupational therapist he declined, reporting that he has already been able to demonstrate socks, gown, toileting, etc. He only wants to get from \"point a to point b\". Will not actively follow anymore as patient is declining OT services, but will remain available for d/c planning purposes.     Gloria Mcwilliams, OTR/L  "

## 2019-05-16 NOTE — PROGRESS NOTES
Assumed care of patient at 1900. Received bedside report from day RN. Patient resting comfortably in bed. No signs of distress. Bed is in low and locked position. Non-slip socks are in place. Call light is within reach.

## 2019-05-16 NOTE — PROGRESS NOTES
[facility] Medicine Daily Progress Note    Date of Service  5/16/2019    Chief Complaint  53 y.o. male admitted 4/5/2019 with back pain, found to have L1 burst fracture.    Hospital Course    Patient admitted with L1 burst fracture, conservative bracing recommended by neurosurgeon.  Patient with difficult to control pain and trouble with therapies and difficult discharge due to social situation.      Interval Problem Update  Patient is resting in bed, no new complaints, he continue complaining of back pain, repeated lumbar spine x-ray yesterday did not show any acute findings/complications, discuss again with patient regarding need to ambulation and that the pain is not going to get better if he lays in bed all day, he states that he is still feels weak and dizzy but that he has not tried to walk yet, discussed with patient again and he agreed to work with physical therapy today.  Patient's friend is coming probably tonight who will help him get back home in the next day or 2.      Consultants/Specialty  NSG - [place] - s/o  Psych - s/o    Code Status  full    Disposition  No placement options available,   Patient needs to improve mobility and to be discharged home after that.    Review of Systems  Review of Systems   Constitutional: Negative for fever.   HENT: Negative for congestion and sinus pain.    Eyes: Negative for blurred vision.   Respiratory: Negative for cough.    Cardiovascular: Negative for chest pain.   Gastrointestinal: Positive for nausea. Negative for heartburn and vomiting.   Genitourinary: Negative for dysuria.   Musculoskeletal: Positive for back pain (Stable ). Negative for myalgias.   Skin: Negative for itching.   Neurological: Negative for dizziness and tingling.   Endo/Heme/Allergies: Does not bruise/bleed easily.   Psychiatric/Behavioral: Negative for depression and suicidal ideas. The patient is not nervous/anxious.         Physical Exam  Temp:  [36 °C (96.8 °F)-36.6 °C (97.9 °F)] 36.2 °C  (97.2 °F)  Pulse:  [] 108  Resp:  [16-18] 18  BP: (117-125)/(82-89) 124/85  SpO2:  [92 %-97 %] 94 %    Physical Exam   Constitutional: He is oriented to person, place, and time. He appears well-developed and well-nourished. No distress.   HENT:   Head: Normocephalic and atraumatic.   Eyes: Conjunctivae are normal.   Neck: Normal range of motion. Neck supple.   Cardiovascular: Normal rate, regular rhythm and normal heart sounds.  Exam reveals no gallop and no friction rub.    Pulmonary/Chest: Effort normal. No respiratory distress. He has no rales.   Abdominal: Soft. Bowel sounds are normal. He exhibits no distension. There is no rebound.   Musculoskeletal: He exhibits no tenderness or deformity.   Mild lumbar spine tenderness.   Lymphadenopathy:     He has no cervical adenopathy.   Neurological: He is alert and oriented to person, place, and time. He exhibits normal muscle tone.   Skin: Skin is warm and dry.   Psychiatric: His mood appears anxious.        Nursing note and vitals reviewed.  I examined the patient on 5/13.  There is no significant changes from 5/12 except as mentioned above    Fluids    Intake/Output Summary (Last 24 hours) at 05/16/19 1134  Last data filed at 05/15/19 1816   Gross per 24 hour   Intake              240 ml   Output                0 ml   Net              240 ml       Laboratory  Recent Labs      05/15/19   0510   WBC  4.6*   RBC  4.01*   HEMOGLOBIN  11.5*   HEMATOCRIT  35.5*   MCV  88.5   MCH  28.7   MCHC  32.4*   RDW  43.7   PLATELETCT  93*   MPV  10.0                       Imaging  DX-LUMBAR SPINE-2 OR 3 VIEWS   Final Result      1.  Interval L1 kyphoplasty without evidence for complication      2.  Facet arthropathy      3.  inferior vena cava filter present      IR-VERTEBROPLASTY   Final Result      1. LUMBAR KYPHOPLASTY AT L1 WITH BIPLANE FLUOROSCOPIC GUIDANCE FOR AN OSTEOPOROTIC INSUFFICIENCY COMPRESSION FRACTURE.      2. CLINICAL OR TELEPHONE FOLLOWUP IS SCHEDULED FOR ONE  MONTH, 3 MONTHS, AND 6 MONTHS POST PROCEDURE.      DX-ESOPHAGUS - BARIUM SWALLOW   Final Result      Small hiatal hernia      DX-CHEST-PORTABLE (1 VIEW)   Final Result      No acute cardiac or pulmonary abnormalities are identified.      DX-CHEST-PORTABLE (1 VIEW)   Final Result         No acute cardiac or pulmonary abnormality is identified.      [id number] VIEW   Final Result      1.  There is a nonobstructive nonspecific bowel gas pattern.  There is no evidence of free intraperitoneal air.      MR-LUMBAR SPINE-WITH & W/O   Final Result      1.  There is subacute fracture along the superior endplate of L1 vertebral body. There is no posterior retropulsion.   2.  There is no focal bone lesion.   3.  There is no evidence of infection.   4.  Mild degenerative disease as described above.      MR-BRAIN-W/O   Final Result      1.  Mild cerebral atrophy.   2.  Mild chronic microvascular ischemic disease.   3.  No acute abnormality.      DX-KNEE 2- LEFT   Final Result      1.  No fracture or dislocation of LEFT knee.   2.  Minimal degenerative changes.   3.  Diffuse vascular calcifications.      EC-ECHOCARDIOGRAM COMPLETE W/O CONT   Final Result      CT-ABDOMEN-PELVIS WITH   Final Result         1.  No acute abnormality.   2.  Hepatomegaly and diffuse hepatic steatosis   3.  Atherosclerosis and atherosclerotic coronary artery disease.   4.  Pulmonary nodules measuring up to 6.5 mm, see nodule follow-up recommendations below.      Low Risk: CT at 3-6 months, then consider CT at 18-24 months      High Risk: CT at 3-6 months, then at 18-24 months      Comments: Use most suspicious nodule as guide to management. Follow-up intervals may vary according to size and risk.      Low Risk - Minimal or absent history of smoking and of other known risk factors.      High Risk - History of smoking or of other known risk factors.      Note: These recommendations do not apply to lung cancer screening, patients with immunosuppression,  or patients with known primary cancer.      Fleischner Society 2017 Guidelines for Management of Incidentally Detected Pulmonary Nodules in Adults         CT-CTA CHEST PULMONARY ARTERY W/ RECONS   Final Result         1.  No large central pulmonary embolus is appreciated, evaluation of the subsegmental branches is essentially nondiagnostic due to motion artifacts. Additional imaging would be required for definitive exclusion of small distal pulmonary emboli.      DX-LUMBAR SPINE-2 OR 3 VIEWS   Final Result      Limited examination. Mild anterior wedging L2 vertebral body that appears to be old.   If symptoms are persistent, would recommend CT for further evaluation.      DX-THORACIC SPINE-WITH SWIMMERS VIEW   Final Result      Limited examination. The upper thoracic spine is not well-demonstrated.   Mild anterior wedging of some the lower thoracic vertebral bodies and appears old.   No definite acute compression. If symptoms are persistent, CT would be recommended for further evaluation.      DX-CHEST-PORTABLE (1 VIEW)   Final Result      Central catheter projects over the superior right atrium.      Prominent calcified granuloma in the left midlung.      Atherosclerotic plaque.      CT-HEAD W/O   Final Result      No acute intracranial abnormality is identified.           Assessment/Plan  * Closed stable burst fracture of first lumbar vertebra with routine healing   Assessment & Plan    Lumbar MRI found L1 subacute fracture with no protrusion  Neurosurgery was consulted and they recommended brace placement and outpatient follow-up.  status post kyphoplasty on 5/10.   Continue pain control with po morphine. Needs to ambulate.   Repeated x-ray lumbar spine did not show any acute complications, discussed again with patient regarding ambulating the need to get out of bed, patient expressed understanding and he said that he will try to walk and work with physical therapy today.         Pancytopenia (HCC)- (present on  admission)   Assessment & Plan    Most likely due to myelodysplastic syndrome and possibly chronic liver disease  His last chemotherapy was in November 2018  No acute symptoms.  Hematology/oncology was consulted and recommended outpatient follow-up.  Stable and improving.         Esophageal dysphagia   Assessment & Plan    Patient complains of dysphagia associated with nausea and vomiting.  Patient refused further evaluation including barium swallow study.  Regular diet.      Poor nutrition   Assessment & Plan      Dietitian following     Hallucinations   Assessment & Plan    Patient seeing butterflies and bats in his room  Psychiatry did not recommend any antipsychotic medications at this point.  Resolved.     Delirium   Assessment & Plan    Frequent confusing statements made by patient, unrelated to doses of pain medications  Psychiatry consultation appreciated  Discontinue Robaxin  Gabapentin discontinued  Resolved.       Left knee pain   Assessment & Plan    X ray showed arthritis with no acute bony abnormalities or fractures.  Continue to provide him pain management.  Stable.      Tachycardia   Assessment & Plan    Resolved  It was most likely from pain  metprolol 25 mg p.o. twice daily (refuses on and off)  Improved.     Somnolence- (present on admission)   Assessment & Plan      Stopped Ambien, gabapentin and trazodone  Resolved.         Dizziness- (present on admission)   Assessment & Plan    gabapentin - discontinued  Echo did not show any acute normalities  meclizine, stable             VTE prophylaxis: heparin

## 2019-05-16 NOTE — THERAPY
"Physical Therapy Treatment completed.   Bed Mobility:  Supine to Sit: Modified Independent  Transfers: Sit to Stand: Supervised  Gait: Level Of Assist: Minimal Assist with Front-Wheel Walker       Plan of Care: Will benefit from Physical Therapy 2 times per week  Discharge Recommendations: Equipment: Will Continue to Assess for Equipment Needs. Recommend inpatient transitional care services for continued physical therapy services.      See \"Rehab Therapy-Acute\" Patient Summary Report for complete documentation.     Pt is progressing slower than expected with functional mobility due to self limiting behavoirs and dec in functional mobility. Pt was able to demonstrate Min A for ambulation with FWW and w/c follow, SPV for sit<>stand and transfer to w/c, and SPV for w/c mobility. Pt was able to tolerate about 10 ft of ambulation and reported of dizziness and weakness. Pt declined to ambulate further distances and preferred to be in mobilize in w/c. Pt demonstarte safe mechanics and awareness using w/c and requires occasional cues for locking brakes. Therapy has attempted multiple times for gait training and ambulation, however, pt does not seem to be making any progress. Will progress pt towards functional w/c mobility and transfers for safe mobilization at this time. Will continue to recommend post acute therapy prior to d[facility]   "

## 2019-05-17 PROCEDURE — 700111 HCHG RX REV CODE 636 W/ 250 OVERRIDE (IP): Performed by: INTERNAL MEDICINE

## 2019-05-17 PROCEDURE — 700111 HCHG RX REV CODE 636 W/ 250 OVERRIDE (IP): Performed by: FAMILY MEDICINE

## 2019-05-17 PROCEDURE — 99232 SBSQ HOSP IP/OBS MODERATE 35: CPT | Performed by: HOSPITALIST

## 2019-05-17 PROCEDURE — A9270 NON-COVERED ITEM OR SERVICE: HCPCS | Performed by: HOSPITALIST

## 2019-05-17 PROCEDURE — A9270 NON-COVERED ITEM OR SERVICE: HCPCS | Performed by: FAMILY MEDICINE

## 2019-05-17 PROCEDURE — 700102 HCHG RX REV CODE 250 W/ 637 OVERRIDE(OP): Performed by: HOSPITALIST

## 2019-05-17 PROCEDURE — 700102 HCHG RX REV CODE 250 W/ 637 OVERRIDE(OP): Performed by: INTERNAL MEDICINE

## 2019-05-17 PROCEDURE — A9270 NON-COVERED ITEM OR SERVICE: HCPCS | Performed by: INTERNAL MEDICINE

## 2019-05-17 PROCEDURE — 700102 HCHG RX REV CODE 250 W/ 637 OVERRIDE(OP): Performed by: FAMILY MEDICINE

## 2019-05-17 PROCEDURE — 770006 HCHG ROOM/CARE - MED/SURG/GYN SEMI*

## 2019-05-17 PROCEDURE — 700111 HCHG RX REV CODE 636 W/ 250 OVERRIDE (IP): Performed by: HOSPITALIST

## 2019-05-17 RX ORDER — MORPHINE SULFATE 4 MG/ML
4 INJECTION, SOLUTION INTRAMUSCULAR; INTRAVENOUS ONCE
Status: COMPLETED | OUTPATIENT
Start: 2019-05-17 | End: 2019-05-17

## 2019-05-17 RX ADMIN — MORPHINE SULFATE 4 MG: 4 INJECTION INTRAVENOUS at 21:42

## 2019-05-17 RX ADMIN — ACETAMINOPHEN 1000 MG: 500 TABLET ORAL at 17:57

## 2019-05-17 RX ADMIN — OMEPRAZOLE 20 MG: 20 CAPSULE, DELAYED RELEASE ORAL at 17:57

## 2019-05-17 RX ADMIN — ONDANSETRON 4 MG: 2 SOLUTION INTRAMUSCULAR; INTRAVENOUS at 09:00

## 2019-05-17 RX ADMIN — MORPHINE SULFATE 30 MG: 15 TABLET ORAL at 17:57

## 2019-05-17 RX ADMIN — BUSPIRONE HYDROCHLORIDE 15 MG: 30 TABLET ORAL at 17:56

## 2019-05-17 RX ADMIN — MECLIZINE HYDROCHLORIDE 12.5 MG: 25 TABLET ORAL at 04:35

## 2019-05-17 RX ADMIN — AMITRIPTYLINE HYDROCHLORIDE 25 MG: 25 TABLET, FILM COATED ORAL at 21:42

## 2019-05-17 RX ADMIN — MECLIZINE HYDROCHLORIDE 12.5 MG: 25 TABLET ORAL at 13:33

## 2019-05-17 RX ADMIN — MORPHINE SULFATE 30 MG: 15 TABLET ORAL at 09:00

## 2019-05-17 RX ADMIN — THERA TABS 1 TABLET: TAB at 04:37

## 2019-05-17 RX ADMIN — MORPHINE SULFATE 30 MG: 15 TABLET ORAL at 04:37

## 2019-05-17 RX ADMIN — OMEPRAZOLE 20 MG: 20 CAPSULE, DELAYED RELEASE ORAL at 04:36

## 2019-05-17 RX ADMIN — ACETAMINOPHEN 1000 MG: 500 TABLET ORAL at 04:37

## 2019-05-17 RX ADMIN — HEPARIN SODIUM 5000 UNITS: 5000 INJECTION, SOLUTION INTRAVENOUS; SUBCUTANEOUS at 13:33

## 2019-05-17 RX ADMIN — DIPHENHYDRAMINE HCL 25 MG: 25 TABLET ORAL at 22:39

## 2019-05-17 RX ADMIN — MECLIZINE HYDROCHLORIDE 12.5 MG: 25 TABLET ORAL at 17:57

## 2019-05-17 RX ADMIN — ONDANSETRON 4 MG: 2 SOLUTION INTRAMUSCULAR; INTRAVENOUS at 17:56

## 2019-05-17 RX ADMIN — ONDANSETRON 4 MG: 2 SOLUTION INTRAMUSCULAR; INTRAVENOUS at 04:40

## 2019-05-17 RX ADMIN — BUSPIRONE HYDROCHLORIDE 15 MG: 30 TABLET ORAL at 04:36

## 2019-05-17 RX ADMIN — ZOLPIDEM TARTRATE 5 MG: 5 TABLET ORAL at 21:42

## 2019-05-17 RX ADMIN — MORPHINE SULFATE 30 MG: 15 TABLET ORAL at 13:33

## 2019-05-17 ASSESSMENT — ENCOUNTER SYMPTOMS
HEARTBURN: 0
COUGH: 0
BACK PAIN: 1
SINUS PAIN: 0
DIZZINESS: 0
DEPRESSION: 0
NAUSEA: 0
VOMITING: 0
CHILLS: 0
DOUBLE VISION: 0
TINGLING: 0
NERVOUS/ANXIOUS: 0
MYALGIAS: 0
BRUISES/BLEEDS EASILY: 0

## 2019-05-17 NOTE — PROGRESS NOTES
[facility] Medicine Daily Progress Note    Date of Service  5/17/2019    Chief Complaint  53 y.o. male admitted 4/5/2019 with back pain, found to have L1 burst fracture.    Hospital Course    Patient admitted with L1 burst fracture, conservative bracing recommended by neurosurgeon.  Patient with difficult to control pain and trouble with therapies and difficult discharge due to social situation.      Interval Problem Update  Continue resting in bed, discussed again regarding need to ambulate, patient's friends arriving later today, wheelchair ordered.   Continue having poor appetite.   If stable might be able to be dc in 1-2 days with friend.      Consultants/Specialty  NSG - Pierre - s/o  Psych - s/o    Code Status  full    Disposition  No placement options available,   Patient needs to improve mobility and to be discharged home after that.    Review of Systems  Review of Systems   Constitutional: Negative for chills.   HENT: Negative for congestion and sinus pain.    Eyes: Negative for double vision.   Respiratory: Negative for cough.    Cardiovascular: Negative for chest pain.   Gastrointestinal: Negative for heartburn, nausea and vomiting.   Genitourinary: Negative for dysuria.   Musculoskeletal: Positive for back pain (Stable ). Negative for myalgias.   Skin: Negative for itching.   Neurological: Negative for dizziness and tingling.   Endo/Heme/Allergies: Does not bruise/bleed easily.   Psychiatric/Behavioral: Negative for depression and suicidal ideas. The patient is not nervous/anxious.         Physical Exam  Temp:  [36.2 °C (97.2 °F)-36.6 °C (97.8 °F)] 36.3 °C (97.3 °F)  Pulse:  [] 104  Resp:  [17-18] 18  BP: (103-127)/(73-84) 127/84  SpO2:  [92 %-95 %] 92 %    Physical Exam   Constitutional: He is oriented to person, place, and time. He appears well-developed and well-nourished. No distress.   HENT:   Head: Normocephalic and atraumatic.   Eyes: Conjunctivae are normal.   Neck: Normal range of motion.  Neck supple.   Cardiovascular: Normal rate, regular rhythm and normal heart sounds.  Exam reveals no gallop and no friction rub.    Pulmonary/Chest: Effort normal. No respiratory distress. He has no wheezes.   Abdominal: Soft. Bowel sounds are normal. He exhibits no distension. There is no tenderness.   Musculoskeletal: He exhibits no tenderness or deformity.   Mild lumbar spine tenderness.   Neurological: He is alert and oriented to person, place, and time. He exhibits normal muscle tone.   Skin: Skin is warm and dry.   Psychiatric: His mood appears anxious.        Nursing note and vitals reviewed.  I examined the patient on 5/13.  There is no significant changes from 5/12 except as mentioned above    Fluids    Intake/Output Summary (Last 24 hours) at 05/17/19 1329  Last data filed at 05/17/19 0838   Gross per 24 hour   Intake              477 ml   Output              400 ml   Net               77 ml       Laboratory  Recent Labs      05/15/19   0510   WBC  4.6*   RBC  4.01*   HEMOGLOBIN  11.5*   HEMATOCRIT  35.5*   MCV  88.5   MCH  28.7   MCHC  32.4*   RDW  43.7   PLATELETCT  93*   MPV  10.0                       Imaging  DX-LUMBAR SPINE-2 OR 3 VIEWS   Final Result      1.  Interval L1 kyphoplasty without evidence for complication      2.  Facet arthropathy      3.  inferior vena cava filter present      IR-VERTEBROPLASTY   Final Result      1. LUMBAR KYPHOPLASTY AT L1 WITH BIPLANE FLUOROSCOPIC GUIDANCE FOR AN OSTEOPOROTIC INSUFFICIENCY COMPRESSION FRACTURE.      2. CLINICAL OR TELEPHONE FOLLOWUP IS SCHEDULED FOR ONE MONTH, 3 MONTHS, AND 6 MONTHS POST PROCEDURE.      DX-ESOPHAGUS - BARIUM SWALLOW   Final Result      Small hiatal hernia      DX-CHEST-PORTABLE (1 VIEW)   Final Result      No acute cardiac or pulmonary abnormalities are identified.      DX-CHEST-PORTABLE (1 VIEW)   Final Result         No acute cardiac or pulmonary abnormality is identified.      QK-[id number]-4 VIEW   Final Result      1.  There is a  nonobstructive nonspecific bowel gas pattern.  There is no evidence of free intraperitoneal air.      MR-LUMBAR SPINE-WITH & W/O   Final Result      1.  There is subacute fracture along the superior endplate of L1 vertebral body. There is no posterior retropulsion.   2.  There is no focal bone lesion.   3.  There is no evidence of infection.   4.  Mild degenerative disease as described above.      MR-BRAIN-W/O   Final Result      1.  Mild cerebral atrophy.   2.  Mild chronic microvascular ischemic disease.   3.  No acute abnormality.      DX-KNEE 2- LEFT   Final Result      1.  No fracture or dislocation of LEFT knee.   2.  Minimal degenerative changes.   3.  Diffuse vascular calcifications.      EC-ECHOCARDIOGRAM COMPLETE W/O CONT   Final Result      CT-ABDOMEN-PELVIS WITH   Final Result         1.  No acute abnormality.   2.  Hepatomegaly and diffuse hepatic steatosis   3.  Atherosclerosis and atherosclerotic coronary artery disease.   4.  Pulmonary nodules measuring up to 6.5 mm, see nodule follow-up recommendations below.      Low Risk: CT at 3-6 months, then consider CT at 18-24 months      High Risk: CT at 3-6 months, then at 18-24 months      Comments: Use most suspicious nodule as guide to management. Follow-up intervals may vary according to size and risk.      Low Risk - Minimal or absent history of smoking and of other known risk factors.      High Risk - History of smoking or of other known risk factors.      Note: These recommendations do not apply to lung cancer screening, patients with immunosuppression, or patients with known primary cancer.      Fleischner Society 2017 Guidelines for Management of Incidentally Detected Pulmonary Nodules in Adults         CT-CTA CHEST PULMONARY ARTERY W/ RECONS   Final Result         1.  No large central pulmonary embolus is appreciated, evaluation of the subsegmental branches is essentially nondiagnostic due to motion artifacts. Additional imaging would be required  for definitive exclusion of small distal pulmonary emboli.      DX-LUMBAR SPINE-2 OR 3 VIEWS   Final Result      Limited examination. Mild anterior wedging L2 vertebral body that appears to be old.   If symptoms are persistent, would recommend CT for further evaluation.      DX-THORACIC SPINE-WITH SWIMMERS VIEW   Final Result      Limited examination. The upper thoracic spine is not well-demonstrated.   Mild anterior wedging of some the lower thoracic vertebral bodies and appears old.   No definite acute compression. If symptoms are persistent, CT would be recommended for further evaluation.      DX-CHEST-PORTABLE (1 VIEW)   Final Result      Central catheter projects over the superior right atrium.      Prominent calcified granuloma in the left midlung.      Atherosclerotic plaque.      CT-HEAD W/O   Final Result      No acute intracranial abnormality is identified.           Assessment/Plan  * Closed stable burst fracture of first lumbar vertebra with routine healing   Assessment & Plan    Lumbar MRI found L1 subacute fracture with no protrusion  Neurosurgery was consulted and they recommended brace placement and outpatient follow-up.  status post kyphoplasty on 5/10.   Continue pain control with po morphine. Needs to ambulate.   Repeated x-ray lumbar spine did not show any acute complications.  Stable.      Pancytopenia (HCC)- (present on admission)   Assessment & Plan    Most likely due to myelodysplastic syndrome and possibly chronic liver disease  His last chemotherapy was in November 2018  No acute symptoms.  Hematology/oncology was consulted and recommended outpatient follow-up.  Stable and improving.         Esophageal dysphagia   Assessment & Plan    Patient complains of dysphagia associated with nausea and vomiting.  Patient refused further evaluation including barium swallow study.  Regular diet.      Poor nutrition   Assessment & Plan      Dietitian following     Hallucinations   Assessment & Plan     Patient seeing butterflies and bats in his room  Psychiatry did not recommend any antipsychotic medications at this point.  Resolved.     Delirium   Assessment & Plan    Frequent confusing statements made by patient, unrelated to doses of pain medications  Psychiatry consultation appreciated  Discontinue Robaxin  Gabapentin discontinued  Resolved.       Left knee pain   Assessment & Plan    X ray showed arthritis with no acute bony abnormalities or fractures.  Continue to provide him pain management.  Stable.      Tachycardia   Assessment & Plan    Resolved  It was most likely from pain  metprolol 25 mg p.o. twice daily (refuses on and off)  Stable      Somnolence- (present on admission)   Assessment & Plan      Stopped Ambien, gabapentin and trazodone  Resolved.         Dizziness- (present on admission)   Assessment & Plan    gabapentin - discontinued  Echo did not show any acute normalities  meclizine, stable             VTE prophylaxis: heparin

## 2019-05-17 NOTE — PROGRESS NOTES
"Pt refusing to ambulate, argumentative about pain meds. When told IV morphine is for PT only pt argued he had been getting it and \"the doctor said its for me to have whenever I want, even if PT doesn't show up\". Discussed w Dr. CORTES, this is not the case, instructed to hold until actively working w PT. WCTM  "

## 2019-05-17 NOTE — DISCHARGE PLANNING
Received Choice form at 1135  Agency/Facility Name: [facility]  Referral sent per Choice form @ 1135     @1240  Agency/Facility Name: [facility]  Spoke To: Kina  Outcome: Per request resent order.    @1335  Agency/Facility Name: [facility]  Spoke To: Kina  Outcome: Order says shower chair, face to face says wheelchair, which does patient need.  Informed Kina it was suppose to be a wheelchair order, told not to deliver shower chair and will need new order for Wheelchair.    @1410  Agency/Facility Name: [facility]  Outcome: Sent new order.    @1515  Agency/Facility Name: [facility]  Spoke To: Chela  Outcome: Wheelchair will be delivered by 1800.

## 2019-05-17 NOTE — PROGRESS NOTES
Assumed patient care at 0700.  Patient is alert, awake, and oriented x4, non labored breathing on room air, no signs of acute distress noted, no complaints of chest pain, shortness of breath, or difficulty breathing. Patient states he felt nauseous this morning, medication given with good effect.  Patient participated with PT today, but was unable to tolerate walking long distance due to pain and dizziness. Observed to have fair PO intake. Patient voiding without any difficulty. Bed in lowest position. Call light and belongings within reach. [facility]

## 2019-05-17 NOTE — PROGRESS NOTES
Pt agitated and confused upon start of shift this AM. Yelling at staff, demanding he wont be 'treated like this', was under the impression it was nighttime and he had missed dinner. Pt still hallucinating at times, saying obscure things. VSS, LINSEY

## 2019-05-17 NOTE — CARE PLAN
Problem: Safety  Goal: Will remain free from injury    Intervention: Provide assistance with mobility  Ambulated patient to bathroom using FWW with the assistance of CNA. Patient tires quickly and does not tolerate well.      Problem: Bowel/Gastric:  Goal: Will not experience complications related to bowel motility    Intervention: Assess baseline bowel pattern  Discussed the importance of having regular bowel functions and educated patient about available interventions for bowel evacuation.

## 2019-05-18 LAB — EKG IMPRESSION: NORMAL

## 2019-05-18 PROCEDURE — 700102 HCHG RX REV CODE 250 W/ 637 OVERRIDE(OP): Performed by: INTERNAL MEDICINE

## 2019-05-18 PROCEDURE — 93005 ELECTROCARDIOGRAM TRACING: CPT | Performed by: HOSPITALIST

## 2019-05-18 PROCEDURE — 99231 SBSQ HOSP IP/OBS SF/LOW 25: CPT | Performed by: HOSPITALIST

## 2019-05-18 PROCEDURE — 700102 HCHG RX REV CODE 250 W/ 637 OVERRIDE(OP): Performed by: HOSPITALIST

## 2019-05-18 PROCEDURE — 700102 HCHG RX REV CODE 250 W/ 637 OVERRIDE(OP): Performed by: FAMILY MEDICINE

## 2019-05-18 PROCEDURE — 700111 HCHG RX REV CODE 636 W/ 250 OVERRIDE (IP): Performed by: INTERNAL MEDICINE

## 2019-05-18 PROCEDURE — 93010 ELECTROCARDIOGRAM REPORT: CPT | Performed by: INTERNAL MEDICINE

## 2019-05-18 PROCEDURE — A9270 NON-COVERED ITEM OR SERVICE: HCPCS | Performed by: INTERNAL MEDICINE

## 2019-05-18 PROCEDURE — A9270 NON-COVERED ITEM OR SERVICE: HCPCS | Performed by: HOSPITALIST

## 2019-05-18 PROCEDURE — A9270 NON-COVERED ITEM OR SERVICE: HCPCS | Performed by: FAMILY MEDICINE

## 2019-05-18 PROCEDURE — 770006 HCHG ROOM/CARE - MED/SURG/GYN SEMI*

## 2019-05-18 RX ADMIN — MECLIZINE HYDROCHLORIDE 12.5 MG: 25 TABLET ORAL at 18:14

## 2019-05-18 RX ADMIN — BUSPIRONE HYDROCHLORIDE 15 MG: 30 TABLET ORAL at 18:15

## 2019-05-18 RX ADMIN — METOPROLOL TARTRATE 25 MG: 25 TABLET ORAL at 05:05

## 2019-05-18 RX ADMIN — MECLIZINE HYDROCHLORIDE 12.5 MG: 25 TABLET ORAL at 05:06

## 2019-05-18 RX ADMIN — ZOLPIDEM TARTRATE 5 MG: 5 TABLET ORAL at 22:36

## 2019-05-18 RX ADMIN — MORPHINE SULFATE 30 MG: 15 TABLET ORAL at 01:11

## 2019-05-18 RX ADMIN — ACETAMINOPHEN 1000 MG: 500 TABLET ORAL at 05:05

## 2019-05-18 RX ADMIN — ONDANSETRON 4 MG: 2 SOLUTION INTRAMUSCULAR; INTRAVENOUS at 05:05

## 2019-05-18 RX ADMIN — ONDANSETRON 4 MG: 2 SOLUTION INTRAMUSCULAR; INTRAVENOUS at 01:11

## 2019-05-18 RX ADMIN — BUSPIRONE HYDROCHLORIDE 15 MG: 30 TABLET ORAL at 05:04

## 2019-05-18 RX ADMIN — DIPHENHYDRAMINE HCL 25 MG: 25 TABLET ORAL at 22:43

## 2019-05-18 RX ADMIN — ACETAMINOPHEN 1000 MG: 500 TABLET ORAL at 18:15

## 2019-05-18 RX ADMIN — MORPHINE SULFATE 30 MG: 15 TABLET ORAL at 18:15

## 2019-05-18 RX ADMIN — OMEPRAZOLE 20 MG: 20 CAPSULE, DELAYED RELEASE ORAL at 05:06

## 2019-05-18 RX ADMIN — MORPHINE SULFATE 30 MG: 15 TABLET ORAL at 22:36

## 2019-05-18 RX ADMIN — ONDANSETRON 4 MG: 2 SOLUTION INTRAMUSCULAR; INTRAVENOUS at 18:15

## 2019-05-18 RX ADMIN — AMITRIPTYLINE HYDROCHLORIDE 25 MG: 25 TABLET, FILM COATED ORAL at 22:37

## 2019-05-18 RX ADMIN — MORPHINE SULFATE 30 MG: 15 TABLET ORAL at 05:06

## 2019-05-18 RX ADMIN — THERA TABS 1 TABLET: TAB at 05:06

## 2019-05-18 RX ADMIN — OMEPRAZOLE 20 MG: 20 CAPSULE, DELAYED RELEASE ORAL at 18:15

## 2019-05-18 RX ADMIN — ONDANSETRON 4 MG: 2 SOLUTION INTRAMUSCULAR; INTRAVENOUS at 22:38

## 2019-05-18 ASSESSMENT — ENCOUNTER SYMPTOMS
CHILLS: 0
BRUISES/BLEEDS EASILY: 0
SINUS PAIN: 0
VOMITING: 0
FEVER: 0
TINGLING: 0
BACK PAIN: 1
DEPRESSION: 0
COUGH: 0
MYALGIAS: 0
PHOTOPHOBIA: 0
HEARTBURN: 0
NERVOUS/ANXIOUS: 0
DOUBLE VISION: 0
DIZZINESS: 0

## 2019-05-18 NOTE — PROGRESS NOTES
Hospital Medicine Daily Progress Note    Date of Service  5/18/2019    Chief Complaint  53 y.o. male admitted 4/5/2019 with back pain, found to have L1 burst fracture.    Hospital Course    Patient admitted with L1 burst fracture, conservative bracing recommended by neurosurgeon.  Patient with difficult to control pain and trouble with therapies and difficult discharge due to social situation.      Interval Problem Update  Patient is resting in bed no new complaints, he is stated that he has been able to walk back and forth to the bathroom twice, and he was encouraged to continue ambulating, his heart rate was elevated in the 120s today EKG showed stable sinus tachycardia, patient is supposed to be on metoprolol but he refuses to take medication      Consultants/Specialty  NSG - [place] - s/o  Psych - s/o    Code Status  full    Disposition  No placement options available,   Waiting for patient's friend to come pick him up.    Review of Systems  Review of Systems   Constitutional: Negative for chills and fever.   HENT: Negative for congestion and sinus pain.    Eyes: Negative for double vision and photophobia.   Respiratory: Negative for cough.    Cardiovascular: Negative for chest pain.   Gastrointestinal: Negative for heartburn and vomiting.   Genitourinary: Negative for dysuria.   Musculoskeletal: Positive for back pain (Stable ). Negative for myalgias.   Skin: Negative for itching.   Neurological: Negative for dizziness and tingling.   Endo/Heme/Allergies: Does not bruise/bleed easily.   Psychiatric/Behavioral: Negative for depression and suicidal ideas. The patient is not nervous/anxious.         Physical Exam  Temp:  [36.3 °C (97.3 °F)-36.6 °C (97.9 °F)] 36.3 °C (97.3 °F)  Pulse:  [] 118  Resp:  [18-19] 19  BP: (117-129)/(86-90) 120/89  SpO2:  [92 %-98 %] 92 %    Physical Exam   Constitutional: He is oriented to person, place, and time. He appears well-developed and well-nourished. No distress.   HENT:    Head: Normocephalic and atraumatic.   Eyes: Conjunctivae are normal.   Neck: Normal range of motion. Neck supple.   Cardiovascular: Normal rate, regular rhythm and normal heart sounds.  Exam reveals no gallop and no friction rub.    Pulmonary/Chest: Effort normal. No respiratory distress. He has no rales.   Abdominal: Soft. Bowel sounds are normal. He exhibits no distension. There is no rebound.   Musculoskeletal: He exhibits no tenderness or deformity.   Mild lumbar spine tenderness.   Neurological: He is alert and oriented to person, place, and time. He exhibits normal muscle tone.   Skin: Skin is warm and dry.   Psychiatric: His mood appears anxious.        Nursing note and vitals reviewed.  I examined the patient on 5/13.  There is no significant changes from 5/12 except as mentioned above    Fluids    Intake/Output Summary (Last 24 hours) at 05/18/19 1129  Last data filed at 05/18/19 0830   Gross per 24 hour   Intake              837 ml   Output                0 ml   Net              837 ml       Laboratory                        Imaging  DX-LUMBAR SPINE-2 OR 3 VIEWS   Final Result      1.  Interval L1 kyphoplasty without evidence for complication      2.  Facet arthropathy      3.  inferior vena cava filter present      IR-VERTEBROPLASTY   Final Result      1. LUMBAR KYPHOPLASTY AT L1 WITH BIPLANE FLUOROSCOPIC GUIDANCE FOR AN OSTEOPOROTIC INSUFFICIENCY COMPRESSION FRACTURE.      2. CLINICAL OR TELEPHONE FOLLOWUP IS SCHEDULED FOR ONE MONTH, 3 MONTHS, AND 6 MONTHS POST PROCEDURE.      DX-ESOPHAGUS - BARIUM SWALLOW   Final Result      Small hiatal hernia      DX-CHEST-PORTABLE (1 VIEW)   Final Result      No acute cardiac or pulmonary abnormalities are identified.      DX-CHEST-PORTABLE (1 VIEW)   Final Result         No acute cardiac or pulmonary abnormality is identified.      [id number]-7 VIEW   Final Result      1.  There is a nonobstructive nonspecific bowel gas pattern.  There is no evidence of free  intraperitoneal air.      MR-LUMBAR SPINE-WITH & W/O   Final Result      1.  There is subacute fracture along the superior endplate of L1 vertebral body. There is no posterior retropulsion.   2.  There is no focal bone lesion.   3.  There is no evidence of infection.   4.  Mild degenerative disease as described above.      MR-BRAIN-W/O   Final Result      1.  Mild cerebral atrophy.   2.  Mild chronic microvascular ischemic disease.   3.  No acute abnormality.      DX-KNEE 2- LEFT   Final Result      1.  No fracture or dislocation of LEFT knee.   2.  Minimal degenerative changes.   3.  Diffuse vascular calcifications.      EC-ECHOCARDIOGRAM COMPLETE W/O CONT   Final Result      CT-ABDOMEN-PELVIS WITH   Final Result         1.  No acute abnormality.   2.  Hepatomegaly and diffuse hepatic steatosis   3.  Atherosclerosis and atherosclerotic coronary artery disease.   4.  Pulmonary nodules measuring up to 6.5 mm, see nodule follow-up recommendations below.      Low Risk: CT at 3-6 months, then consider CT at 18-24 months      High Risk: CT at 3-6 months, then at 18-24 months      Comments: Use most suspicious nodule as guide to management. Follow-up intervals may vary according to size and risk.      Low Risk - Minimal or absent history of smoking and of other known risk factors.      High Risk - History of smoking or of other known risk factors.      Note: These recommendations do not apply to lung cancer screening, patients with immunosuppression, or patients with known primary cancer.      Fleischner Society 2017 Guidelines for Management of Incidentally Detected Pulmonary Nodules in Adults         CT-CTA CHEST PULMONARY ARTERY W/ RECONS   Final Result         1.  No large central pulmonary embolus is appreciated, evaluation of the subsegmental branches is essentially nondiagnostic due to motion artifacts. Additional imaging would be required for definitive exclusion of small distal pulmonary emboli.      DX-LUMBAR  SPINE-2 OR 3 VIEWS   Final Result      Limited examination. Mild anterior wedging L2 vertebral body that appears to be old.   If symptoms are persistent, would recommend CT for further evaluation.      DX-THORACIC SPINE-WITH SWIMMERS VIEW   Final Result      Limited examination. The upper thoracic spine is not well-demonstrated.   Mild anterior wedging of some the lower thoracic vertebral bodies and appears old.   No definite acute compression. If symptoms are persistent, CT would be recommended for further evaluation.      DX-CHEST-PORTABLE (1 VIEW)   Final Result      Central catheter projects over the superior right atrium.      Prominent calcified granuloma in the left midlung.      Atherosclerotic plaque.      CT-HEAD W/O   Final Result      No acute intracranial abnormality is identified.           Assessment/Plan  * Closed stable burst fracture of first lumbar vertebra with routine healing   Assessment & Plan    Lumbar MRI found L1 subacute fracture with no protrusion  Neurosurgery was consulted and they recommended brace placement and outpatient follow-up.  status post kyphoplasty on 5/10.   Continue pain control with po morphine. Needs to ambulate.   Repeated x-ray lumbar spine did not show any acute complications.  Stable.      Pancytopenia (HCC)- (present on admission)   Assessment & Plan    Most likely due to myelodysplastic syndrome and possibly chronic liver disease  His last chemotherapy was in November 2018  No acute symptoms.  Hematology/oncology was consulted and recommended outpatient follow-up.  Stable and improving.         Esophageal dysphagia   Assessment & Plan    Patient complains of dysphagia associated with nausea and vomiting.  Patient refused further evaluation including barium swallow study.  Regular diet.  Continue complaining of low appetite     Poor nutrition   Assessment & Plan    Dietitian following     Hallucinations   Assessment & Plan    Patient seeing butterflies and bats in  his room  Psychiatry did not recommend any antipsychotic medications at this point.  Resolved.     Delirium   Assessment & Plan    Frequent confusing statements made by patient, unrelated to doses of pain medications  Psychiatry consultation appreciated  Discontinue Robaxin  Gabapentin discontinued  Resolved.       Left knee pain   Assessment & Plan    X ray showed arthritis with no acute bony abnormalities or fractures.  Continue to provide him pain management.  Stable.      Tachycardia   Assessment & Plan    Resolved  It was most likely from pain  metprolol 25 mg p.o. twice daily (refuses on and off)  Stable Repeated EKG today shows stable sinus tachycardia     Somnolence- (present on admission)   Assessment & Plan    Stopped Ambien, gabapentin and trazodone  Resolved.         Dizziness- (present on admission)   Assessment & Plan    gabapentin - discontinued  Echo did not show any acute normalities  Meclizine.             VTE prophylaxis: heparin

## 2019-05-18 NOTE — PROGRESS NOTES
Assumed care of pt after receiving report from dayshift RN. Bedside rounding completed w/ dayshift RN. Pt resting in bed A&OX4, medicated for pain per mar. Fall measures in place, call light within reach, personal belongings nearby, bed in lowest position, and hourly rounding in place. Will continue to monitor pt.

## 2019-05-19 PROCEDURE — 700102 HCHG RX REV CODE 250 W/ 637 OVERRIDE(OP): Performed by: INTERNAL MEDICINE

## 2019-05-19 PROCEDURE — 700102 HCHG RX REV CODE 250 W/ 637 OVERRIDE(OP): Performed by: HOSPITALIST

## 2019-05-19 PROCEDURE — 700111 HCHG RX REV CODE 636 W/ 250 OVERRIDE (IP): Performed by: INTERNAL MEDICINE

## 2019-05-19 PROCEDURE — A9270 NON-COVERED ITEM OR SERVICE: HCPCS | Performed by: INTERNAL MEDICINE

## 2019-05-19 PROCEDURE — 700102 HCHG RX REV CODE 250 W/ 637 OVERRIDE(OP): Performed by: FAMILY MEDICINE

## 2019-05-19 PROCEDURE — 770006 HCHG ROOM/CARE - MED/SURG/GYN SEMI*

## 2019-05-19 PROCEDURE — 99231 SBSQ HOSP IP/OBS SF/LOW 25: CPT | Performed by: HOSPITALIST

## 2019-05-19 PROCEDURE — A9270 NON-COVERED ITEM OR SERVICE: HCPCS | Performed by: FAMILY MEDICINE

## 2019-05-19 PROCEDURE — 700111 HCHG RX REV CODE 636 W/ 250 OVERRIDE (IP): Performed by: FAMILY MEDICINE

## 2019-05-19 PROCEDURE — A9270 NON-COVERED ITEM OR SERVICE: HCPCS | Performed by: HOSPITALIST

## 2019-05-19 RX ORDER — LIDOCAINE 50 MG/G
2 PATCH TOPICAL EVERY 24 HOURS
Qty: 10 PATCH | Refills: 0 | Status: ON HOLD | OUTPATIENT
Start: 2019-05-19 | End: 2019-08-14

## 2019-05-19 RX ORDER — AMITRIPTYLINE HYDROCHLORIDE 25 MG/1
25 TABLET, FILM COATED ORAL
Qty: 30 TAB | Refills: 0 | Status: ON HOLD | OUTPATIENT
Start: 2019-05-19 | End: 2019-08-14

## 2019-05-19 RX ORDER — MORPHINE SULFATE 30 MG/1
30 TABLET ORAL EVERY 6 HOURS PRN
Qty: 12 TAB | Refills: 0 | Status: SHIPPED | OUTPATIENT
Start: 2019-05-19 | End: 2019-05-22

## 2019-05-19 RX ORDER — MORPHINE SULFATE 30 MG/1
30 TABLET ORAL EVERY 4 HOURS PRN
Qty: 18 TAB | Refills: 0 | Status: SHIPPED | OUTPATIENT
Start: 2019-05-19 | End: 2019-05-19

## 2019-05-19 RX ORDER — ACETAMINOPHEN 500 MG
500 TABLET ORAL EVERY 6 HOURS PRN
COMMUNITY
Start: 2019-05-19 | End: 2022-03-26

## 2019-05-19 RX ORDER — MECLIZINE HCL 12.5 MG/1
12.5 TABLET ORAL 3 TIMES DAILY PRN
Qty: 15 TAB | Refills: 0 | Status: SHIPPED | OUTPATIENT
Start: 2019-05-19 | End: 2019-05-24

## 2019-05-19 RX ORDER — ERGOCALCIFEROL 1.25 MG/1
50000 CAPSULE ORAL
Qty: 4 CAP | Refills: 0 | Status: SHIPPED | OUTPATIENT
Start: 2019-05-20 | End: 2019-06-19

## 2019-05-19 RX ORDER — MORPHINE SULFATE 4 MG/ML
2 INJECTION, SOLUTION INTRAMUSCULAR; INTRAVENOUS EVERY 12 HOURS PRN
Status: DISCONTINUED | OUTPATIENT
Start: 2019-05-19 | End: 2019-05-20 | Stop reason: HOSPADM

## 2019-05-19 RX ADMIN — ZOLPIDEM TARTRATE 5 MG: 5 TABLET ORAL at 21:41

## 2019-05-19 RX ADMIN — DIPHENHYDRAMINE HCL 25 MG: 25 TABLET ORAL at 13:33

## 2019-05-19 RX ADMIN — AMITRIPTYLINE HYDROCHLORIDE 25 MG: 25 TABLET, FILM COATED ORAL at 21:41

## 2019-05-19 RX ADMIN — DIPHENHYDRAMINE HCL 25 MG: 25 TABLET ORAL at 21:41

## 2019-05-19 RX ADMIN — ACETAMINOPHEN 1000 MG: 500 TABLET ORAL at 06:14

## 2019-05-19 RX ADMIN — MECLIZINE HYDROCHLORIDE 12.5 MG: 25 TABLET ORAL at 13:33

## 2019-05-19 RX ADMIN — MORPHINE SULFATE 30 MG: 15 TABLET ORAL at 13:34

## 2019-05-19 RX ADMIN — ONDANSETRON 4 MG: 2 SOLUTION INTRAMUSCULAR; INTRAVENOUS at 18:16

## 2019-05-19 RX ADMIN — MORPHINE SULFATE 30 MG: 15 TABLET ORAL at 23:29

## 2019-05-19 RX ADMIN — BUSPIRONE HYDROCHLORIDE 15 MG: 30 TABLET ORAL at 06:13

## 2019-05-19 RX ADMIN — MECLIZINE HYDROCHLORIDE 12.5 MG: 25 TABLET ORAL at 18:00

## 2019-05-19 RX ADMIN — MORPHINE SULFATE 30 MG: 15 TABLET ORAL at 18:16

## 2019-05-19 RX ADMIN — BUSPIRONE HYDROCHLORIDE 15 MG: 30 TABLET ORAL at 18:00

## 2019-05-19 RX ADMIN — ACETAMINOPHEN 1000 MG: 500 TABLET ORAL at 18:00

## 2019-05-19 RX ADMIN — OMEPRAZOLE 20 MG: 20 CAPSULE, DELAYED RELEASE ORAL at 06:15

## 2019-05-19 RX ADMIN — OMEPRAZOLE 20 MG: 20 CAPSULE, DELAYED RELEASE ORAL at 18:00

## 2019-05-19 RX ADMIN — MORPHINE SULFATE 30 MG: 15 TABLET ORAL at 06:14

## 2019-05-19 RX ADMIN — THERA TABS 1 TABLET: TAB at 06:14

## 2019-05-19 RX ADMIN — MECLIZINE HYDROCHLORIDE 12.5 MG: 25 TABLET ORAL at 06:15

## 2019-05-19 RX ADMIN — ONDANSETRON 4 MG: 2 SOLUTION INTRAMUSCULAR; INTRAVENOUS at 13:33

## 2019-05-19 NOTE — DISCHARGE PLANNING
Medical Social Work  Pfeifer Text from Dr. Castellano, that patient will be d/c today around 6:00, and if [facility] needs to be notified.  PC to [facility], [phone], SW spoke to Artie, verified that they will  patient when d/c.  Requested we call at least two hours in advance.   Tiger Text to Dr. Castellano with an update  PC to patient's nurse to inform her of above information

## 2019-05-19 NOTE — CARE PLAN
Problem: Safety  Goal: Will remain free from falls    Intervention: Assess risk factors for falls  Fall measures in place. Call light within reach, personal belongings close-by, bed in lowest position, IV pole on same side of bathroom, upper bed rails up, hourly rounding in place. Will continue to monitor pt for safety.       Problem: Pain Management  Goal: Pain level will decrease to patient's comfort goal    Intervention: Follow pain managment plan developed in collaboration with patient and Interdisciplinary Team  Pt medicated for pain per mar, houlry rounding in place.

## 2019-05-19 NOTE — PROGRESS NOTES
Assumed care of pt after receiving report from dayshift RN. Bedside rounding completed w/ dayshift RN. Pt resting in bed A&OX4, denies any hallucinations, pt medcicated for pain and sleep per mar, discussed poc w/ pt. Fall measures in place, call light within reach, personal belongings nearby, bed in lowest position, and hourly rounding in place. Will continue to monitor pt.

## 2019-05-19 NOTE — DISCHARGE SUMMARY
Discharge Summary    CHIEF COMPLAINT ON ADMISSION  Chief Complaint   Patient presents with   • Low Back Pain   • Dizziness       Reason for Admission  Generalized weakness    Admission Date  4/5/2019    CODE STATUS  Full Code    HPI & HOSPITAL COURSE  Please see original H&P and consult note for specific information, patient was admitted due to generalized weakness having nausea vomiting, she was found to have tachycardia heart rate in the 180s and hypertension, patient has history of PE status post IVC filter in 2017 he is not on any anticoagulation due to thrombocytopenia, he has pancytopenia, patient electrolyte imbalance was corrected, patient has CTA head CTA chest CT abdomen did were all unremarkable, EKG shows sinus tachycardia no acute ischemic changes, patient is supposed to be on metoprolol this is continued patient had MRI of the back that showed L1 compression fracture, neurosurgery was consulted and recommended brace placement for pain management, and PT OT evaluation, PT OT recommending skilled nursing placement, patient continue complaining of back pain also very unmotivated to ambulate his pain medication were adjusted, gabapentin was increased, per his pancytopenia oncology recommended follow-up as outpatient, had abnormal cognitive evaluation thought to be related to the gabapentin for that this was stopped, psychiatry was consulted, psychiatry recommended antipsychotics medication, since patient continued having pain and pain medication was causing confusion radiology was consulted for kyphoplasty, patient went for kyphoplasty on 5/9/2018, did not have any complication, patient unfortunately was not able to be accepted at any nursing home 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, patient continued to have pain, patient was encouraged multiple times to ambulate, patient requesting wheelchair, patient gradually has been improving, he has been able to ambulate from his bed to the bathroom, today he is  feeling better, his friend is coming from out of state to picking up to take him back home, patient at this time his hemodynamically stable, he will need to follow-up with pain management as outpatient with primary care physician, patient had repeated L spine x-ray that did not show any complications, patient is going to be discharged today he will need to continue with PT OT as outpatient, he needs to ambulate as much as possible, patient expressed understanding of his discharge plan and agree with it all questions answered.  Patient is to be compliant with his metoprolol for his tachycardia, patient had echocardiogram that did not show any acute findings and has normal ejection fraction.  Patient has a left chest port/line for chemotherapy, patient needs to f/u with his hematologist/oncology upon discharge.   Needs close monitoring to prevent infection.       Therefore, he is discharged in fair and stable condition to home with close outpatient follow-up.    The patient met 2-midnight criteria for an inpatient stay at the time of discharge.    Discharge Date  5/19/2019    FOLLOW UP ITEMS POST DISCHARGE  Primary care physician in 1 week  Hematology/oncology.     DISCHARGE DIAGNOSES  Principal Problem:    Closed stable burst fracture of first lumbar vertebra with routine healing POA: Unknown  Active Problems:    Pancytopenia (HCC) POA: Yes    Dizziness POA: Yes    Somnolence POA: Yes    Tachycardia POA: Unknown    Left knee pain POA: Unknown    Delirium POA: Unknown    Hallucinations POA: Unknown    Poor nutrition POA: Unknown    Esophageal dysphagia POA: Unknown  Resolved Problems:    Hypokalemia POA: Yes    Hypocalcemia POA: Yes    SIRS (systemic inflammatory response syndrome) (HCC) POA: Yes    Hypophosphatemia POA: Unknown      FOLLOW UP  No future appointments.  No follow-up provider specified.    MEDICATIONS ON DISCHARGE     Medication List      START taking these medications      Instructions   acetaminophen  500 MG Tabs  Commonly known as:  TYLENOL   Take 1 Tab by mouth every 6 hours as needed for Moderate Pain.  Dose:  500 mg     lidocaine 5 % Ptch  Commonly known as:  LIDODERM   Apply 2 Patches to skin as directed every 24 hours.  Dose:  2 Patch     meclizine 12.5 MG Tabs  Commonly known as:  ANTIVERT   Take 1 Tab by mouth 3 times a day as needed for Dizziness or Vertigo for up to 5 days.  Dose:  12.5 mg     morphine 30 MG tablet  Commonly known as:  MS IR   Take 1 Tab by mouth every 6 hours as needed for Severe Pain for up to 3 days.  Dose:  30 mg     multivitamin Tabs  Start taking on:  5/20/2019   Take 1 Tab by mouth every day.  Dose:  1 Tab     vitamin D (Ergocalciferol) 14627 units Caps capsule  Start taking on:  5/20/2019  Commonly known as:  DRISDOL   Take 1 Cap by mouth every 7 days for 30 days.  Dose:  80726 Units        CHANGE how you take these medications      Instructions   amitriptyline 25 MG Tabs  What changed:  · medication strength  · how much to take  · when to take this  Commonly known as:  ELAVIL   Take 1 Tab by mouth every bedtime.  Dose:  25 mg        CONTINUE taking these medications      Instructions   busPIRone 15 MG tablet  Commonly known as:  BUSPAR   Take 15 mg by mouth 2 times a day.  Dose:  15 mg     metoprolol 25 MG Tabs  Commonly known as:  LOPRESSOR   Take 12.5 mg by mouth 2 times a day.  Dose:  12.5 mg     pantoprazole 40 MG Tbec  Commonly known as:  PROTONIX   Take 40 mg by mouth 2 Times a Day.  Dose:  40 mg     zolpidem 10 MG Tabs  Commonly known as:  AMBIEN   Take 10 mg by mouth every bedtime.  Dose:  10 mg        STOP taking these medications    hydrOXYzine HCl 50 MG Tabs  Commonly known as:  ATARAX     traZODone 50 MG Tabs  Commonly known as:  DESYREL            Allergies  Allergies   Allergen Reactions   • Iodine Anaphylaxis   • Keflex Diarrhea and Vomiting     Vomiting & Diarrhea  Tolerates ceftriaxone   • Reglan [Metoclopramide] Nausea     Asthma     • Shellfish Allergy  Anaphylaxis   • Toradol Hives       DIET  Orders Placed This Encounter   Procedures   • Diet Order Regular     Standing Status:   Standing     Number of Occurrences:   1     Order Specific Question:   Diet:     Answer:   Regular [1]       ACTIVITY  As tolerated.  Weight bearing as tolerated    CONSULTATIONS  Psychiatry  IR    PROCEDURES  L1 kyphoplasty    LABORATORY  Lab Results   Component Value Date    SODIUM 138 05/09/2019    POTASSIUM 4.4 05/09/2019    CHLORIDE 102 05/09/2019    CO2 29 05/09/2019    GLUCOSE 86 05/09/2019    BUN 10 05/09/2019    CREATININE 0.83 05/09/2019        Lab Results   Component Value Date    WBC 4.6 (L) 05/15/2019    HEMOGLOBIN 11.5 (L) 05/15/2019    HEMATOCRIT 35.5 (L) 05/15/2019    PLATELETCT 93 (L) 05/15/2019        Total time of the discharge process exceeds 35 minutes.

## 2019-05-20 VITALS
HEIGHT: 71 IN | SYSTOLIC BLOOD PRESSURE: 135 MMHG | DIASTOLIC BLOOD PRESSURE: 98 MMHG | OXYGEN SATURATION: 95 % | TEMPERATURE: 97.3 F | BODY MASS INDEX: 28.15 KG/M2 | RESPIRATION RATE: 18 BRPM | HEART RATE: 62 BPM | WEIGHT: 201.06 LBS

## 2019-05-20 PROCEDURE — A9270 NON-COVERED ITEM OR SERVICE: HCPCS | Performed by: FAMILY MEDICINE

## 2019-05-20 PROCEDURE — 700102 HCHG RX REV CODE 250 W/ 637 OVERRIDE(OP): Performed by: FAMILY MEDICINE

## 2019-05-20 PROCEDURE — 700102 HCHG RX REV CODE 250 W/ 637 OVERRIDE(OP): Performed by: INTERNAL MEDICINE

## 2019-05-20 PROCEDURE — A9270 NON-COVERED ITEM OR SERVICE: HCPCS | Performed by: INTERNAL MEDICINE

## 2019-05-20 PROCEDURE — 99239 HOSP IP/OBS DSCHRG MGMT >30: CPT | Performed by: HOSPITALIST

## 2019-05-20 RX ADMIN — MORPHINE SULFATE 30 MG: 15 TABLET ORAL at 06:13

## 2019-05-20 RX ADMIN — THERA TABS 1 TABLET: TAB at 06:13

## 2019-05-20 RX ADMIN — BUSPIRONE HYDROCHLORIDE 15 MG: 30 TABLET ORAL at 06:11

## 2019-05-20 RX ADMIN — ACETAMINOPHEN 1000 MG: 500 TABLET ORAL at 06:13

## 2019-05-20 RX ADMIN — OMEPRAZOLE 20 MG: 20 CAPSULE, DELAYED RELEASE ORAL at 06:15

## 2019-05-20 RX ADMIN — MECLIZINE HYDROCHLORIDE 12.5 MG: 25 TABLET ORAL at 06:13

## 2019-05-20 ASSESSMENT — ENCOUNTER SYMPTOMS
PHOTOPHOBIA: 0
BLURRED VISION: 0
COUGH: 0
BRUISES/BLEEDS EASILY: 0
TINGLING: 0
HEARTBURN: 0
FEVER: 0
NAUSEA: 0
CHILLS: 0
NERVOUS/ANXIOUS: 0
MYALGIAS: 0
DEPRESSION: 0
DIZZINESS: 0
SINUS PAIN: 0
BACK PAIN: 1

## 2019-05-20 NOTE — PROGRESS NOTES
Assumed care of pt after receiving report from dayshift RN. Bedside rounding completed w/ dayshift RN. Pt resting in bed A&OX4, pt medicated for pain per mar, discussed poc w/ pt. Fall measures in place, call light within reach, personal belongings nearby, bed in lowest position, and hourly rounding in place. Will continue to monitor pt.

## 2019-05-20 NOTE — PROGRESS NOTES
Educated patient on discharge information including follow up care--including follow up with PCP in one week, hematology (to set up chemotherapy and central line care), as well as psych. Patient also instructed on importance of taking medications as prescribed, as well as central line care, signs and symptoms of infection and when to go to the emergency room. Central line dressing changed prior to discharge.  Per Dr. Castellano, patient will be discharged with central line for future chemotherapy sessions.      Patient was discharged via wheelchair  with Rusty PIERRE who had clarified with his supervisor regarding if patient is able to have central line while in correction, stated they will make an exception and patient will be discharged to correction with central line--patient instructed to establish care with hematologist--stated that he had a few hematologist recommendations that he will look into.  Patient discharged with all of his belongings including wheelchair and FWW.  Patient given narcotic prescription. Discharged in stable condition with central line in place.

## 2019-05-20 NOTE — CARE PLAN
Problem: Safety  Goal: Will remain free from falls    Intervention: Assess risk factors for falls  Fall measures in place. Call light within reach, personal belongings close-by, bed in lowest position, IV pole on same side of bathroom, upper bed rails up, hourly rounding in place. Will continue to monitor pt for safety.       Problem: Pain Management  Goal: Pain level will decrease to patient's comfort goal    Intervention: Follow pain managment plan developed in collaboration with patient and Interdisciplinary Team  Pt medicated for pain per mar, edu pt about IV morphine orders for PT only, pt understands, and willing to cont oral pain meds.

## 2019-05-20 NOTE — PROGRESS NOTES
Assumed patient care at 0700.  Patient is alert, awake, and oriented x 4, non labored breathing on room air, no signs of acute distress noted.  Patient denies any chest pain, shortness of breath, or difficulty breathing. Patient still endorsing 10/10 back pain--PRN pain medication given prior to shift report. Left chest dressing: clean, dry, and intact. Patient was refusing to have central line catheter care done.  Bed in lowest position. Call light and belongings within reach. Will continue to monitor.

## 2019-05-20 NOTE — DISCHARGE INSTRUCTIONS
Discharge Instructions    Discharged to other by car with escort. Discharged via wheelchair, hospital escort: Yes.  Special equipment needed: Walker    Be sure to schedule a follow-up appointment with your primary care doctor or any specialists as instructed.     Discharge Plan:   Diet Plan: Discussed  Activity Level: Discussed  Confirmed Follow up Appointment: Patient to Call and Schedule Appointment  Confirmed Symptoms Management: Discussed  Medication Reconciliation Updated: Yes  Influenza Vaccine Indication: Patient Refuses    I understand that a diet low in cholesterol, fat, and sodium is recommended for good health. Unless I have been given specific instructions below for another diet, I accept this instruction as my diet prescription.   Other diet: Regular    Special Instructions: Weekly central line change dressings. Continue to ambulate and ice therapy    · Is patient discharged on Warfarin / Coumadin?   No       Lumbar Fracture  Introduction  A lumbar fracture is a break in one of the bones of the lower back. Lumbar fractures range in severity. Severe fractures can damage the spinal cord.  What are the causes?  This condition may be caused by:  · A fall (common).  · A car accident (common).  · A gunshot wound.  · A hard, direct hit to the back.  · Osteoporosis.  What are the signs or symptoms?  The main symptom of this condition is severe pain in the lower back. If a fracture is complex or severe, there may also be:  · A misshapen or swollen area on the lower back.  · A limited ability to move an area of the lower back.  · An inability to empty the bladder or bowel.  · A loss of strength or sensation in the legs, feet, and toes.  · Paralysis.  How is this diagnosed?  This condition is diagnosed based on:  · A physical exam.  · Symptoms and what happened just before they developed.  · The results of imaging tests, such as an X-ray, CT scan, or MRI.  If your nerves have been damaged, you may also have other  tests to find out how much damage there is.  How is this treated?  Treatment for this condition depends on the specifics of the injury. Most fractures can be treated with:  · A back brace.  · Bed rest and activity restrictions.  · Pain medicine.  · Physical therapy.  Fractures that are complex, involve multiple bones, or make the spine unstable may require surgery to remove pressure from the nerves or spinal cord and to stabilize the broken pieces of bone.  During recovery, it is normal to have pain and stiffness in the back for weeks.  Follow these instructions at home:  Medicines  · Take medicines only as directed by your health care provider.  · Do not drive or operate heavy machinery while taking pain medicine.  Activity  · Stay in bed for as long as directed by your health care provider.  · If you were shown how to do any exercises to improve motion and strength in your back, do them as directed by your health care provider.  · Return to your normal activities as directed by your health care provider. Ask your health care provider what activities are safe for you.  General instructions  · If you were given a neck brace or back brace, wear it as directed by your health care provider.  · Keep all follow-up visits as directed by your health care provider. This is important. Failure to follow-up as recommended could result in permanent injury, disability, and long-lasting (chronic) pain.  Contact a health care provider if:  · Your pain does not improve over time.  · You have a persistent cough.  · You cannot return to your normal activities as planned or expected.  Get help right away if:  · You have severe pain or your pain suddenly gets worse.  · You are unable to move.  · You have numbness, tingling, weakness, or paralysis in any part of your body.  · You cannot control your bladder or bowel.  · You have difficulty breathing.  · You have a fever.  · You have pain in your chest or abdomen.  · You vomit.  This  information is not intended to replace advice given to you by your health care provider. Make sure you discuss any questions you have with your health care provider.  Document Released: 04/03/2008 Document Revised: 05/25/2017 Document Reviewed: 12/14/2015  © 2017 [employer]      Percutaneous Vertebroplasty, Care After  These instructions give you information on caring for yourself after your procedure. Your doctor may also give you more specific instructions. Call your doctor if you have any problems or questions after your procedure.  HOME CARE  · Take medicine as told by your doctor.  · Keep your wound dry and covered for 24 hours or as told by your doctor.  · Ask your doctor when you can bathe or shower.  · Put an ice pack on your wound.  ¨ Put ice in a plastic bag.  ¨ Place a towel between your skin and the bag.  ¨ Leave the ice on for 15-20 minutes, 3-4 times a day.  · Rest in your bed for 24 hours or as told by your doctor.  · Return to normal activities as told by your doctor.  · Ask your doctor what stretches and exercises you can do.  · Do not bend or lift anything heavy as told by your doctor.  GET HELP IF:  · Your wound becomes red, puffy (swollen), or tender to the touch.  · You are bleeding or leaking fluid from the wound.  · You are sick to your stomach (nauseous) or throw up (vomit) for more than 24 hours after the procedure.  · Your back pain does not get better.  · You have a fever.  GET HELP RIGHT AWAY IF:   · You have bad back pain that comes on suddenly.  · You cannot control when you pee (urinate) or poop (bowel movement).  · You lose feeling (numbness) or have tingling in your legs or feet, or they become weak.  · You have sudden weakness in your arms or legs.  · You have shooting pain down your legs.  · You have chest pain or a hard time breathing.  · You feel dizzy or pass out (faint).  · Your vision changes or you cannot talk as you normally do.     This information is not intended to replace  advice given to you by your health care provider. Make sure you discuss any questions you have with your health care provider.     Document Released: 03/14/2011 Document Revised: 12/23/2014 Document Reviewed: 08/26/2014  [employer] Interactive Patient Education ©2016 [employer]    Lidocaine dermal patch  What is this medicine?  LIDOCAINE (LYE rahman damon) causes loss of feeling in the skin and surrounding area. The medicine helps treat pain, including nerve pain.  This medicine may be used for other purposes; ask your health care provider or pharmacist if you have questions.  COMMON BRAND NAME(S): Lidocare, Lidoderm  What should I tell my health care provider before I take this medicine?  They need to know if you have any of these conditions:  -heart disease  -history of irregular heart beat  -liver disease  -skin conditions or sensitivity  -skin infection  -an unusual or allergic reaction to lidocaine, parabens, other medicines, foods, dyes, or preservatives  -pregnant or trying to get pregnant  -breast-feeding  How should I use this medicine?  This medicine is for external use only. Follow the directions on the prescription label or package.  Talk to your pediatrician regarding the use of this medicine in children. While this drug may be prescribed for children as young as 12 years for selected conditions, precautions do apply.  Overdosage: If you think you have taken too much of this medicine contact a poison control center or emergency room at once.  NOTE: This medicine is only for you. Do not share this medicine with others.  What if I miss a dose?  Apply the patches as needed for pain.  What may interact with this medicine?  Do not take this medicine with any of the following medications:  -certain medicines for irregular heart beat  -MAOIs like Carbex, Eldepryl, Marplan, Nardil, and Parnate  This medicine may also interact with the following medications:  -other local anesthetics like pramoxine, tetracaine  Do  not use any other skin products on the affected area without asking your doctor or health care professional.  This list may not describe all possible interactions. Give your health care provider a list of all the medicines, herbs, non-prescription drugs, or dietary supplements you use. Also tell them if you smoke, drink alcohol, or use illegal drugs. Some items may interact with your medicine.  What should I watch for while using this medicine?  Tell your doctor or healthcare professional if your symptoms do not start to get better or if they get worse.  Be careful to avoid injury while the area is numb from the medicine, and you are not aware of pain.  If you are going to need surgery, a MRI, CT scan, or other procedure, tell your doctor that you are using this medicine. You may need to remove this patch before the procedure.  Do not get this medicine in your eyes. If you do, rinse out with plenty of cool tap water.  This medicine can make certain skin conditions worse. Only use it for conditions for which your doctor or health care professional has prescribed.  What side effects may I notice from receiving this medicine?  Side effects that you should report to your doctor or health care professional as soon as possible:  -allergic reactions like skin rash, itching or hives, swelling of the face, lips, or tongue  -breathing problems  -chest pain or chest tightness  -dizzines  Side effects that usually do not require medical attention (report to your doctor or health care professional if they continue or are bothersome):  -tingling, numbness at site where applied  This list may not describe all possible side effects. Call your doctor for medical advice about side effects. You may report side effects to FDA at 8-874-FDA-0882.  Where should I keep my medicine?  Keep out of the reach of children.  See product for storage instructions. Each product may have different instructions.  NOTE: This sheet is a summary. It may  not cover all possible information. If you have questions about this medicine, talk to your doctor, pharmacist, or health care provider.  © 2018 [employer] (2016-10-28 11:05:19)    Meclizine tablets or capsules  What is this medicine?  MECLIZINE (DESTINEE dietrich) is an antihistamine. It is used to prevent nausea, vomiting, or dizziness caused by motion sickness. It is also used to prevent and treat vertigo (extreme dizziness or a feeling that you or your surroundings are tilting or spinning around).  This medicine may be used for other purposes; ask your health care provider or pharmacist if you have questions.  COMMON BRAND NAME(S): Antivert, Dramamine Less Drowsy, Medivert, Meni-D  What should I tell my health care provider before I take this medicine?  They need to know if you have any of these conditions:  -glaucoma  -lung or breathing disease, like asthma  -problems urinating  -prostate disease  -stomach or intestine problems  -an unusual or allergic reaction to meclizine, other medicines, foods, dyes, or preservatives  -pregnant or trying to get pregnant  -breast-feeding  How should I use this medicine?  Take this medicine by mouth with a glass of water. Follow the directions on the prescription label. If you are using this medicine to prevent motion sickness, take the dose at least 1 hour before travel. If it upsets your stomach, take it with food or milk. Take your doses at regular intervals. Do not take your medicine more often than directed.  Talk to your pediatrician regarding the use of this medicine in children. Special care may be needed.  Overdosage: If you think you have taken too much of this medicine contact a poison control center or emergency room at once.  NOTE: This medicine is only for you. Do not share this medicine with others.  What if I miss a dose?  If you miss a dose, take it as soon as you can. If it is almost time for your next dose, take only that dose. Do not take double or  extra doses.  What may interact with this medicine?  Do not take this medicine with any of the following medications:  -MAOIs like Carbex, Eldepryl, Marplan, Nardil, and Parnate  This medicine may also interact with the following medications:  -alcohol  -antihistamines for allergy, cough and cold  -certain medicines for anxiety or sleep  -certain medicines for depression, like amitriptyline, fluoxetine, sertraline  -certain medicines for seizures like phenobarbital, primidone  -general anesthetics like halothane, isoflurane, methoxyflurane, propofol  -local anesthetics like lidocaine, pramoxine, tetracaine  -medicines that relax muscles for surgery  -narcotic medicines for pain  -phenothiazines like chlorpromazine, mesoridazine, prochlorperazine, thioridazine  This list may not describe all possible interactions. Give your health care provider a list of all the medicines, herbs, non-prescription drugs, or dietary supplements you use. Also tell them if you smoke, drink alcohol, or use illegal drugs. Some items may interact with your medicine.  What should I watch for while using this medicine?  Tell your doctor or healthcare professional if your symptoms do not start to get better or if they get worse.  You may get drowsy or dizzy. Do not drive, use machinery, or do anything that needs mental alertness until you know how this medicine affects you. Do not stand or sit up quickly, especially if you are an older patient. This reduces the risk of dizzy or fainting spells. Alcohol may interfere with the effect of this medicine. Avoid alcoholic drinks.  Your mouth may get dry. Chewing sugarless gum or sucking hard candy, and drinking plenty of water may help. Contact your doctor if the problem does not go away or is severe.  This medicine may cause dry eyes and blurred vision. If you wear contact lenses you may feel some discomfort. Lubricating drops may help. See your eye doctor if the problem does not go away or is  severe.  What side effects may I notice from receiving this medicine?  Side effects that you should report to your doctor or health care professional as soon as possible:  -feeling faint or lightheaded, falls  -fast, irregular heartbeat  Side effects that usually do not require medical attention (report to your doctor or health care professional if they continue or are bothersome):  -constipation  -headache  -trouble passing urine or change in the amount of urine  -trouble sleeping  -upset stomach  This list may not describe all possible side effects. Call your doctor for medical advice about side effects. You may report side effects to FDA at 3-336-FDA-2129.  Where should I keep my medicine?  Keep out of the reach of children.  Store at room temperature between 15 and 30 degrees C (59 and 86 degrees F). Keep container tightly closed. Throw away any unused medicine after the expiration date.  NOTE: This sheet is a summary. It may not cover all possible information. If you have questions about this medicine, talk to your doctor, pharmacist, or health care provider.  © 2018 [employer] (2017-01-18 19:41:02)    Morphine immediate release tablets  What is this medicine?  MORPHINE (MOR feen) is a pain reliever. It is used to treat moderate to severe pain.  This medicine may be used for other purposes; ask your health care provider or pharmacist if you have questions.  COMMON BRAND NAME(S): MSIR  What should I tell my health care provider before I take this medicine?  They need to know if you have any of these conditions:  -brain tumor  -drug abuse or addiction  -head injury  -heart disease  -if you often drink alcohol  -liver disease  -lung or breathing disease, like asthma  -problems urinating  -seizures  -stomach or intestine problems  -taken an MAOI like Carbex, Eldepryl, Marplan, Nardil, or Parnate in the last 14 days  -an unusual or allergic reaction to morphine, other medications, foods, dyes, or  preservatives  -pregnant or trying to get pregnant  -breast-feeding  How should I use this medicine?  Take this medicine by mouth with a glass of water. Follow the directions on the prescription label. You can take this medicine with or without food. If it upsets your stomach, take it with food. Take your medicine at regular intervals. Do not take it more often than directed. Do not stop taking except on your doctor's advice.  A special MedGuide will be given to you by the pharmacist with each prescription and refill. Be sure to read this information carefully each time.  Talk to your pediatrician regarding the use of this medicine in children. Special care may be needed.  Overdosage: If you think you have taken too much of this medicine contact a poison control center or emergency room at once.  NOTE: This medicine is only for you. Do not share this medicine with others.  What if I miss a dose?  If you miss a dose, take it as soon as you can. If it is almost time for your next dose, take only that dose. Do not take double or extra doses.  What may interact with this medicine?  This medicine may interact with the following medications:  -alcohol  -antihistamines for allergy, cough and cold  -atropine  -certain medicines for anxiety or sleep  -certain medicines for bladder problems like oxybutynin, tolterodine  -certain medicines for depression like amitriptyline, fluoxetine, sertraline  -certain medicines for Parkinson's disease like benztropine, trihexyphenidyl  -certain medicines for seizures like phenobarbital, primidone  -certain medicines for stomach problems like dicyclomine, hyoscyamine  -certain medicines for travel sickness like scopolamine  -cimetidine  -general anesthetics like halothane, isoflurane, methoxyflurane, propofol  -ipratropium  -local anesthetics like lidocaine, pramoxine, tetracaine  -MAOIs like Carbex, Eldepryl, Marplan, Nardil, and Parnate  -medicines that relax muscles for surgery  -other  narcotic medicines for pain or cough  -phenothiazines like chlorpromazine, mesoridazine, prochlorperazine, thioridazine  This list may not describe all possible interactions. Give your health care provider a list of all the medicines, herbs, non-prescription drugs, or dietary supplements you use. Also tell them if you smoke, drink alcohol, or use illegal drugs. Some items may interact with your medicine.  What should I watch for while using this medicine?  Tell your doctor or health care professional if your pain does not go away, if it gets worse, or if you have new or a different type of pain. You may develop tolerance to the medicine. Tolerance means that you will need a higher dose of the medicine for pain relief. Tolerance is normal and is expected if you take this medicine for a long time.  Do not suddenly stop taking your medicine because you may develop a severe reaction. Your body becomes used to the medicine. This does NOT mean you are addicted. Addiction is a behavior related to getting and using a drug for a non-medical reason. If you have pain, you have a medical reason to take pain medicine. Your doctor will tell you how much medicine to take. If your doctor wants you to stop the medicine, the dose will be slowly lowered over time to avoid any side effects.  There are different types of narcotic medicines (opiates). If you take more than one type at the same time or if you are taking another medicine that also causes drowsiness, you may have more side effects. Give your health care provider a list of all medicines you use. Your doctor will tell you how much medicine to take. Do not take more medicine than directed. Call emergency for help if you have problems breathing or unusual sleepiness.  You may get drowsy or dizzy. Do not drive, use machinery, or do anything that needs mental alertness until you know how this medicine affects you. Do not stand or sit up quickly, especially if you are an older  patient. This reduces the risk of dizzy or fainting spells. Alcohol may interfere with the effect of this medicine. Avoid alcoholic drinks.  This medicine will cause constipation. Try to have a bowel movement at least every 2 to 3 days. If you do not have a bowel movement for 3 days, call your doctor or health care professional.  Your mouth may get dry. Chewing sugarless gum or sucking hard candy, and drinking plenty of water may help. Contact your doctor if the problem does not go away or is severe.  What side effects may I notice from receiving this medicine?  Side effects that you should report to your doctor or health care professional as soon as possible:  -allergic reactions like skin rash, itching or hives, swelling of the face, lips, or tongue  -breathing problems  -confusion  -seizures  -signs and symptoms of low blood pressure like dizziness; feeling faint or lightheaded, falls; unusually weak or tired  -trouble passing urine or change in the amount of urine  Side effects that usually do not require medical attention (report to your doctor or health care professional if they continue or are bothersome):  -constipation  -dry mouth  -nausea, vomiting  -tiredness  This list may not describe all possible side effects. Call your doctor for medical advice about side effects. You may report side effects to FDA at 0-602-FDA-2247.  Where should I keep my medicine?  Keep out of the reach of children. This medicine can be abused. Keep it in a safe place to protect it from theft. Do not share this medicine with anyone. Selling or giving away this medicine is dangerous and is against the law.  Store at room temperature between 15 and 30 degrees C (59 and 86 degrees F).  This medicine may cause accidental overdose and death if it is taken by other adults, children, or pets. Flush any unused medicine down the toilet to reduce the chance of harm. Do not use the medicine after the expiration date.  NOTE: This sheet is a  summary. It may not cover all possible information. If you have questions about this medicine, talk to your doctor, pharmacist, or health care provider.  © 2018 [employer] (2016-09-12 15:15:31)        Depression / Suicide Risk    As you are discharged from this [facility] facility, it is important to learn how to keep safe from harming yourself.    Recognize the warning signs:  · Abrupt changes in personality, positive or negative- including increase in energy   · Giving away possessions  · Change in eating patterns- significant weight changes-  positive or negative  · Change in sleeping patterns- unable to sleep or sleeping all the time   · Unwillingness or inability to communicate  · Depression  · Unusual sadness, discouragement and loneliness  · Talk of wanting to die  · Neglect of personal appearance   · Rebelliousness- reckless behavior  · Withdrawal from people/activities they love  · Confusion- inability to concentrate     If you or a loved one observes any of these behaviors or has concerns about self-harm, here's what you can do:  · Talk about it- your feelings and reasons for harming yourself  · Remove any means that you might use to hurt yourself (examples: pills, rope, extension cords, firearm)  · Get professional help from the community (Mental Health, Substance Abuse, psychological counseling)  · Do not be alone:Call your Safe Contact- someone whom you trust who will be there for you.  · Call your local CRISIS HOTLINE 609-6346 or 988-900-9614  · Call your local Children's Mobile Crisis Response Team [place] (780) 741-9855 or www.Circl  · Call the toll free National Suicide Prevention Hotlines   · National Suicide Prevention Lifeline 908-124-YUCO (2373)  · National Hope Line Network 800-SUICIDE (978-2705)

## 2019-05-20 NOTE — PROGRESS NOTES
[facility] Medicine Daily Progress Note    Date of Service  5/20/2019    Chief Complaint  53 y.o. male admitted 4/5/2019 with back pain, found to have L1 burst fracture.    Hospital Course    Patient admitted with L1 burst fracture, conservative bracing recommended by neurosurgeon.  Patient with difficult to control pain and trouble with therapies and difficult discharge due to social situation.      Interval Problem Update  Resting in bed, no new complains tolerating diet, no fever or chills, pain is stable again encouraged to ambulate.     Consultants/Specialty  NSG - Pierre - s/o  Psych - s/o    Code Status  full    Disposition  No placement options available,   Waiting for patient's friend to come pick him up.    Review of Systems  Review of Systems   Constitutional: Negative for chills and fever.   HENT: Negative for congestion and sinus pain.    Eyes: Negative for blurred vision and photophobia.   Respiratory: Negative for cough.    Cardiovascular: Negative for chest pain.   Gastrointestinal: Negative for heartburn and nausea.   Genitourinary: Negative for dysuria.   Musculoskeletal: Positive for back pain (Stable ). Negative for myalgias.   Skin: Negative for itching.   Neurological: Negative for dizziness and tingling.   Endo/Heme/Allergies: Does not bruise/bleed easily.   Psychiatric/Behavioral: Negative for depression and suicidal ideas. The patient is not nervous/anxious.         Physical Exam  Temp:  [36.4 °C (97.6 °F)-37.1 °C (98.8 °F)] 36.7 °C (98 °F)  Pulse:  [] 104  Resp:  [17-18] 17  BP: (108-148)/(57-89) 138/57  SpO2:  [90 %-100 %] 90 %    Physical Exam   Constitutional: He is oriented to person, place, and time. He appears well-developed and well-nourished. No distress.   HENT:   Head: Normocephalic and atraumatic.   Eyes: Conjunctivae are normal.   Neck: Normal range of motion. Neck supple.   Cardiovascular: Normal rate, regular rhythm and normal heart sounds.  Exam reveals no gallop and no  friction rub.    Pulmonary/Chest: Effort normal. No respiratory distress. He has no rales.   Abdominal: Soft. Bowel sounds are normal. He exhibits no distension. There is no rebound.   Musculoskeletal: He exhibits no tenderness or deformity.   Mild lumbar spine tenderness.   Neurological: He is alert and oriented to person, place, and time. He exhibits normal muscle tone.   Skin: Skin is warm and dry.   Psychiatric: His mood appears anxious.        Nursing note and vitals reviewed.  I examined the patient on 5/13.  There is no significant changes from 5/12 except as mentioned above    Fluids    Intake/Output Summary (Last 24 hours) at 05/20/19 6840  Last data filed at 05/20/19 0350   Gross per 24 hour   Intake              340 ml   Output              500 ml   Net             -160 ml       Laboratory                        Imaging  DX-LUMBAR SPINE-2 OR 3 VIEWS   Final Result      1.  Interval L1 kyphoplasty without evidence for complication      2.  Facet arthropathy      3.  inferior vena cava filter present      IR-VERTEBROPLASTY   Final Result      1. LUMBAR KYPHOPLASTY AT L1 WITH BIPLANE FLUOROSCOPIC GUIDANCE FOR AN OSTEOPOROTIC INSUFFICIENCY COMPRESSION FRACTURE.      2. CLINICAL OR TELEPHONE FOLLOWUP IS SCHEDULED FOR ONE MONTH, 3 MONTHS, AND 6 MONTHS POST PROCEDURE.      DX-ESOPHAGUS - BARIUM SWALLOW   Final Result      Small hiatal hernia      DX-CHEST-PORTABLE (1 VIEW)   Final Result      No acute cardiac or pulmonary abnormalities are identified.      DX-CHEST-PORTABLE (1 VIEW)   Final Result         No acute cardiac or pulmonary abnormality is identified.      KF-CQINDXM-0 VIEW   Final Result      1.  There is a nonobstructive nonspecific bowel gas pattern.  There is no evidence of free intraperitoneal air.      MR-LUMBAR SPINE-WITH & W/O   Final Result      1.  There is subacute fracture along the superior endplate of L1 vertebral body. There is no posterior retropulsion.   2.  There is no focal bone  lesion.   3.  There is no evidence of infection.   4.  Mild degenerative disease as described above.      MR-BRAIN-W/O   Final Result      1.  Mild cerebral atrophy.   2.  Mild chronic microvascular ischemic disease.   3.  No acute abnormality.      DX-KNEE 2- LEFT   Final Result      1.  No fracture or dislocation of LEFT knee.   2.  Minimal degenerative changes.   3.  Diffuse vascular calcifications.      EC-ECHOCARDIOGRAM COMPLETE W/O CONT   Final Result      CT-ABDOMEN-PELVIS WITH   Final Result         1.  No acute abnormality.   2.  Hepatomegaly and diffuse hepatic steatosis   3.  Atherosclerosis and atherosclerotic coronary artery disease.   4.  Pulmonary nodules measuring up to 6.5 mm, see nodule follow-up recommendations below.      Low Risk: CT at 3-6 months, then consider CT at 18-24 months      High Risk: CT at 3-6 months, then at 18-24 months      Comments: Use most suspicious nodule as guide to management. Follow-up intervals may vary according to size and risk.      Low Risk - Minimal or absent history of smoking and of other known risk factors.      High Risk - History of smoking or of other known risk factors.      Note: These recommendations do not apply to lung cancer screening, patients with immunosuppression, or patients with known primary cancer.      Fleischner Society 2017 Guidelines for Management of Incidentally Detected Pulmonary Nodules in Adults         CT-CTA CHEST PULMONARY ARTERY W/ RECONS   Final Result         1.  No large central pulmonary embolus is appreciated, evaluation of the subsegmental branches is essentially nondiagnostic due to motion artifacts. Additional imaging would be required for definitive exclusion of small distal pulmonary emboli.      DX-LUMBAR SPINE-2 OR 3 VIEWS   Final Result      Limited examination. Mild anterior wedging L2 vertebral body that appears to be old.   If symptoms are persistent, would recommend CT for further evaluation.      DX-THORACIC  SPINE-WITH SWIMMERS VIEW   Final Result      Limited examination. The upper thoracic spine is not well-demonstrated.   Mild anterior wedging of some the lower thoracic vertebral bodies and appears old.   No definite acute compression. If symptoms are persistent, CT would be recommended for further evaluation.      DX-CHEST-PORTABLE (1 VIEW)   Final Result      Central catheter projects over the superior right atrium.      Prominent calcified granuloma in the left midlung.      Atherosclerotic plaque.      CT-HEAD W/O   Final Result      No acute intracranial abnormality is identified.           Assessment/Plan  * Closed stable burst fracture of first lumbar vertebra with routine healing   Assessment & Plan    Lumbar MRI found L1 subacute fracture with no protrusion  Neurosurgery was consulted and they recommended brace placement and outpatient follow-up.  status post kyphoplasty on 5/10.   Continue pain control with po morphine. Needs to ambulate.   Repeated x-ray lumbar spine did not show any acute complications.  Stable.      Pancytopenia (HCC)- (present on admission)   Assessment & Plan    Most likely due to myelodysplastic syndrome and possibly chronic liver disease  His last chemotherapy was in November 2018  No acute symptoms.  Hematology/oncology was consulted and recommended outpatient follow-up.  Stable and improving.         Esophageal dysphagia   Assessment & Plan    Patient complains of dysphagia associated with nausea and vomiting.  Patient refused further evaluation including barium swallow study.  Regular diet.  Continue complaining of low appetite     Poor nutrition   Assessment & Plan    Dietitian following     Hallucinations   Assessment & Plan    Patient seeing butterflies and bats in his room  Psychiatry did not recommend any antipsychotic medications at this point.  Resolved.     Delirium   Assessment & Plan    Frequent confusing statements made by patient, unrelated to doses of pain  medications  Psychiatry consultation appreciated  Discontinue Robaxin  Gabapentin discontinued  Resolved.       Left knee pain   Assessment & Plan    X ray showed arthritis with no acute bony abnormalities or fractures.  Continue to provide him pain management.  Stable.      Tachycardia   Assessment & Plan    Resolved  It was most likely from pain  metprolol 25 mg p.o. twice daily (refuses on and off)  Stable Repeated EKG today shows stable sinus tachycardia  Continue monitoring     Somnolence- (present on admission)   Assessment & Plan    Stopped Ambien, gabapentin and trazodone  Resolved.         Dizziness- (present on admission)   Assessment & Plan    gabapentin - discontinued  Echo did not show any acute normalities  Meclizine.             VTE prophylaxis: heparin

## 2019-05-20 NOTE — DISCHARGE SUMMARY
Discharge Summary    CHIEF COMPLAINT ON ADMISSION  Chief Complaint   Patient presents with   • Low Back Pain   • Dizziness       Reason for Admission  Generalized weakness    Admission Date  4/5/2019    CODE STATUS  Full code    HPI & HOSPITAL COURSE  Please see discharge summary from 5/19/2018 for specific information.  Patient was seen and examined today, physical examination is unchanged  Patient will be discharged today in stable condition.  Patient has a left chest port/central line that was placed prior to this admission for chemotherapy according to discharge papers from \[facility]\"" in [place] for this patient will keep his access he needs to follow-up with hematology oncology when he gets back home, needs dressing changes every 7 days for his access, needs to watch for signs of infection.  Again discussed with patient discharge plan, he expressed understanding and agreed with it, all questions answered.  Patient is feeling much better he has been able to ambulate to the bathroom.      Please see original H&P and consult note for specific information, patient was admitted due to generalized weakness having nausea vomiting, she was found to have tachycardia heart rate in the 180s and hypertension, patient has history of PE status post IVC filter in 2017 he is not on any anticoagulation due to thrombocytopenia, he has pancytopenia, patient electrolyte imbalance was corrected, patient has CTA head CTA chest CT abdomen did were all unremarkable, EKG shows sinus tachycardia no acute ischemic changes, patient is supposed to be on metoprolol this is continued patient had MRI of the back that showed L1 compression fracture, neurosurgery was consulted and recommended brace placement for pain management, and PT OT evaluation, PT OT recommending skilled nursing placement, patient continue complaining of back pain also very unmotivated to ambulate his pain medication were adjusted, gabapentin was increased, per his  pancytopenia oncology recommended follow-up as outpatient, had abnormal cognitive evaluation thought to be related to the gabapentin for that this was stopped, psychiatry was consulted, psychiatry recommended antipsychotics medication, since patient continued having pain and pain medication was causing confusion radiology was consulted for kyphoplasty, patient went for kyphoplasty on 5/9/2018, did not have any complication, patient unfortunately was not able to be accepted at any nursing home since he has history of being in care home, patient continued to have pain, patient was encouraged multiple times to ambulate, patient requesting wheelchair, patient gradually has been improving, he has been able to ambulate from his bed to the bathroom, today he is feeling better, his friend is coming from out of state to picking up to take him back home, patient at this time his hemodynamically stable, he will need to follow-up with pain management as outpatient with primary care physician, patient had repeated L spine x-ray that did not show any complications, patient is going to be discharged today he will need to continue with PT OT as outpatient, he needs to ambulate as much as possible, patient expressed understanding of his discharge plan and agree with it all questions answered.  Patient needs to be compliant with his metoprolol for his tachycardia, patient had echocardiogram that did not show any acute findings and has normal ejection fraction.  Patient has a left chest port/line for chemotherapy, patient needs to f/u with his hematologist/oncology upon discharge.   Needs close monitoring to prevent infection.       Therefore, he is discharged in fair and stable condition to home with close outpatient follow-up.    The patient met 2-midnight criteria for an inpatient stay at the time of discharge.    Discharge Date  5/20/2019    FOLLOW UP ITEMS POST DISCHARGE  Primary care physician in 1 week  Hematology oncology when he  gets back home    DISCHARGE DIAGNOSES  Principal Problem:    Closed stable burst fracture of first lumbar vertebra with routine healing POA: Unknown  Active Problems:    Pancytopenia (HCC) POA: Yes    Dizziness POA: Yes    Somnolence POA: Yes    Tachycardia POA: Unknown    Left knee pain POA: Unknown    Delirium POA: Unknown    Hallucinations POA: Unknown    Poor nutrition POA: Unknown    Esophageal dysphagia POA: Unknown  Resolved Problems:    Hypokalemia POA: Yes    Hypocalcemia POA: Yes    SIRS (systemic inflammatory response syndrome) (HCC) POA: Yes    Hypophosphatemia POA: Unknown      FOLLOW UP  No future appointments.        Follow up with PCP in one week.           Follow up with Psych in one week          Follow up with hematology in one week regarding central line access.      MEDICATIONS ON DISCHARGE     Medication List      START taking these medications      Instructions   acetaminophen 500 MG Tabs  Commonly known as:  TYLENOL   Take 1 Tab by mouth every 6 hours as needed for Moderate Pain.  Dose:  500 mg     lidocaine 5 % Ptch  Commonly known as:  LIDODERM   Apply 2 Patches to skin as directed every 24 hours.  Dose:  2 Patch     meclizine 12.5 MG Tabs  Commonly known as:  ANTIVERT   Take 1 Tab by mouth 3 times a day as needed for Dizziness or Vertigo for up to 5 days.  Dose:  12.5 mg     morphine 30 MG tablet  Commonly known as:  MS IR   Take 1 Tab by mouth every 6 hours as needed for Severe Pain for up to 3 days.  Dose:  30 mg     multivitamin Tabs   Take 1 Tab by mouth every day.  Dose:  1 Tab     vitamin D (Ergocalciferol) 29001 units Caps capsule  Commonly known as:  DRISDOL   Take 1 Cap by mouth every 7 days for 30 days.  Dose:  32914 Units        CHANGE how you take these medications      Instructions   amitriptyline 25 MG Tabs  What changed:  · medication strength  · how much to take  · when to take this  Commonly known as:  ELAVIL   Take 1 Tab by mouth every bedtime.  Dose:  25 mg         CONTINUE taking these medications      Instructions   busPIRone 15 MG tablet  Commonly known as:  BUSPAR   Take 15 mg by mouth 2 times a day.  Dose:  15 mg     metoprolol 25 MG Tabs  Commonly known as:  LOPRESSOR   Take 12.5 mg by mouth 2 times a day.  Dose:  12.5 mg     pantoprazole 40 MG Tbec  Commonly known as:  PROTONIX   Take 40 mg by mouth 2 Times a Day.  Dose:  40 mg     zolpidem 10 MG Tabs  Commonly known as:  AMBIEN   Take 10 mg by mouth every bedtime.  Dose:  10 mg        STOP taking these medications    hydrOXYzine HCl 50 MG Tabs  Commonly known as:  ATARAX     traZODone 50 MG Tabs  Commonly known as:  DESYREL            Allergies  Allergies   Allergen Reactions   • Iodine Anaphylaxis   • Keflex Diarrhea and Vomiting     Vomiting & Diarrhea  Tolerates ceftriaxone   • Reglan [Metoclopramide] Nausea     Asthma     • Shellfish Allergy Anaphylaxis   • Toradol Hives       DIET  Healthy diet    ACTIVITY  As tolerated.  Weight bearing as tolerated    CONSULTATIONS  Psychiatry  IR    PROCEDURES  Kyphoplasty    LABORATORY  Lab Results   Component Value Date    SODIUM 138 05/09/2019    POTASSIUM 4.4 05/09/2019    CHLORIDE 102 05/09/2019    CO2 29 05/09/2019    GLUCOSE 86 05/09/2019    BUN 10 05/09/2019    CREATININE 0.83 05/09/2019        Lab Results   Component Value Date    WBC 4.6 (L) 05/15/2019    HEMOGLOBIN 11.5 (L) 05/15/2019    HEMATOCRIT 35.5 (L) 05/15/2019    PLATELETCT 93 (L) 05/15/2019        Total time of the discharge process exceeds 33 minutes.

## 2019-05-20 NOTE — CARE PLAN
Problem: Mobility  Goal: Risk for activity intolerance will decrease    Intervention: Encourage patient to increase activity level in collaboration with Interdisciplinary Team  Patient was up to the wheelchair--tolerated well.  Patient was requesting for front wheel walker prior to discharge--charge RN Yamilka made aware.       Problem: Discharge Barriers/Planning  Goal: Patient's continuum of care needs will be met    Intervention: Explain discharge instructions and medication reconcilliation to patient and significant other/support system  Educated patient on discharge information including follow up care--including follow up with PCP, hematology (to set up chemotherapy and central line care), as well as psych. Patient also instructed on importance of taking medications as prescribed, as well as central line care, signs and symptoms of infection and when to go to the emergency room.  Central line dressing changed prior to discharge.

## 2019-06-12 ENCOUNTER — HOSPITAL ENCOUNTER (OUTPATIENT)
Dept: HOSPITAL 8 - ED | Age: 54
Setting detail: OBSERVATION
LOS: 1 days | Discharge: HOME | End: 2019-06-13
Attending: HOSPITALIST | Admitting: HOSPITALIST
Payer: COMMERCIAL

## 2019-06-12 VITALS — HEIGHT: 72 IN | BODY MASS INDEX: 27.47 KG/M2 | WEIGHT: 202.83 LBS

## 2019-06-12 DIAGNOSIS — Z88.8: ICD-10-CM

## 2019-06-12 DIAGNOSIS — M54.9: ICD-10-CM

## 2019-06-12 DIAGNOSIS — Z88.1: ICD-10-CM

## 2019-06-12 DIAGNOSIS — G89.29: ICD-10-CM

## 2019-06-12 DIAGNOSIS — F32.9: ICD-10-CM

## 2019-06-12 DIAGNOSIS — Z85.6: ICD-10-CM

## 2019-06-12 DIAGNOSIS — I10: ICD-10-CM

## 2019-06-12 DIAGNOSIS — Z88.6: ICD-10-CM

## 2019-06-12 DIAGNOSIS — Z85.47: ICD-10-CM

## 2019-06-12 DIAGNOSIS — F41.9: ICD-10-CM

## 2019-06-12 DIAGNOSIS — K21.9: ICD-10-CM

## 2019-06-12 DIAGNOSIS — R06.02: ICD-10-CM

## 2019-06-12 DIAGNOSIS — Z91.041: ICD-10-CM

## 2019-06-12 DIAGNOSIS — R42: Primary | ICD-10-CM

## 2019-06-12 DIAGNOSIS — Z79.899: ICD-10-CM

## 2019-06-12 DIAGNOSIS — Z91.013: ICD-10-CM

## 2019-06-12 LAB
ALBUMIN SERPL-MCNC: 3.2 G/DL (ref 3.4–5)
ALP SERPL-CCNC: 144 U/L (ref 45–117)
ALT SERPL-CCNC: 16 U/L (ref 12–78)
ANION GAP SERPL CALC-SCNC: 8 MMOL/L (ref 5–15)
APTT BLD: 24 SECONDS (ref 25–31)
BASOPHILS # BLD AUTO: 0.02 X10^3/UL (ref 0–0.1)
BASOPHILS NFR BLD AUTO: 0 % (ref 0–1)
BILIRUB SERPL-MCNC: 0.5 MG/DL (ref 0.2–1)
CALCIUM SERPL-MCNC: 9.2 MG/DL (ref 8.5–10.1)
CHLORIDE SERPL-SCNC: 107 MMOL/L (ref 98–107)
CREAT SERPL-MCNC: 1.03 MG/DL (ref 0.7–1.3)
D DIMER PPP FEU-MCNC: 1.61 UG/MLFEU (ref 0–0.52)
EOSINOPHIL # BLD AUTO: 0.21 X10^3/UL (ref 0–0.4)
EOSINOPHIL NFR BLD AUTO: 5 % (ref 1–7)
ERYTHROCYTE [DISTWIDTH] IN BLOOD BY AUTOMATED COUNT: 14.1 % (ref 9.4–14.8)
INR PPP: 1.04 (ref 0.93–1.1)
LYMPHOCYTES # BLD AUTO: 1.37 X10^3/UL (ref 1–3.4)
LYMPHOCYTES NFR BLD AUTO: 34 % (ref 22–44)
MCH RBC QN AUTO: 27.4 PG (ref 27.5–34.5)
MCHC RBC AUTO-ENTMCNC: 31.9 G/DL (ref 33.2–36.2)
MCV RBC AUTO: 85.9 FL (ref 81–97)
MD: (no result)
MONOCYTES # BLD AUTO: 0.48 X10^3/UL (ref 0.2–0.8)
MONOCYTES NFR BLD AUTO: 12 % (ref 2–9)
NEUTROPHILS # BLD AUTO: 2 X10^3/UL (ref 1.8–6.8)
NEUTROPHILS NFR BLD AUTO: 49 % (ref 42–75)
PLATELET # BLD AUTO: 95 X10^3/UL (ref 130–400)
PMV BLD AUTO: 7.9 FL (ref 7.4–10.4)
PROT SERPL-MCNC: 6.8 G/DL (ref 6.4–8.2)
PROTHROMBIN TIME: 10.9 SECONDS (ref 9.6–11.5)
RBC # BLD AUTO: 4.92 X10^6/UL (ref 4.38–5.82)

## 2019-06-12 PROCEDURE — 96360 HYDRATION IV INFUSION INIT: CPT

## 2019-06-12 PROCEDURE — 80053 COMPREHEN METABOLIC PANEL: CPT

## 2019-06-12 PROCEDURE — 93971 EXTREMITY STUDY: CPT

## 2019-06-12 PROCEDURE — 84439 ASSAY OF FREE THYROXINE: CPT

## 2019-06-12 PROCEDURE — 85610 PROTHROMBIN TIME: CPT

## 2019-06-12 PROCEDURE — A9585 GADOBUTROL INJECTION: HCPCS

## 2019-06-12 PROCEDURE — 97162 PT EVAL MOD COMPLEX 30 MIN: CPT

## 2019-06-12 PROCEDURE — 36415 COLL VENOUS BLD VENIPUNCTURE: CPT

## 2019-06-12 PROCEDURE — 85025 COMPLETE CBC W/AUTO DIFF WBC: CPT

## 2019-06-12 PROCEDURE — 71045 X-RAY EXAM CHEST 1 VIEW: CPT

## 2019-06-12 PROCEDURE — 74018 RADEX ABDOMEN 1 VIEW: CPT

## 2019-06-12 PROCEDURE — 85730 THROMBOPLASTIN TIME PARTIAL: CPT

## 2019-06-12 PROCEDURE — 82272 OCCULT BLD FECES 1-3 TESTS: CPT

## 2019-06-12 PROCEDURE — 99284 EMERGENCY DEPT VISIT MOD MDM: CPT

## 2019-06-12 PROCEDURE — 84481 FREE ASSAY (FT-3): CPT

## 2019-06-12 PROCEDURE — 93005 ELECTROCARDIOGRAM TRACING: CPT

## 2019-06-12 PROCEDURE — 70553 MRI BRAIN STEM W/O & W/DYE: CPT

## 2019-06-12 PROCEDURE — 85379 FIBRIN DEGRADATION QUANT: CPT

## 2019-06-12 PROCEDURE — 96361 HYDRATE IV INFUSION ADD-ON: CPT

## 2019-06-12 PROCEDURE — 80048 BASIC METABOLIC PNL TOTAL CA: CPT

## 2019-06-12 PROCEDURE — 71275 CT ANGIOGRAPHY CHEST: CPT

## 2019-06-12 PROCEDURE — G0378 HOSPITAL OBSERVATION PER HR: HCPCS

## 2019-06-12 PROCEDURE — 84443 ASSAY THYROID STIM HORMONE: CPT

## 2019-06-12 RX ADMIN — DEXTROSE, SODIUM CHLORIDE, AND POTASSIUM CHLORIDE SCH MLS/HR: 5; .45; .15 INJECTION INTRAVENOUS at 23:00

## 2019-06-12 RX ADMIN — PREDNISONE PRN MG: 50 TABLET ORAL at 22:50

## 2019-06-12 RX ADMIN — ACETAMINOPHEN PRN MG: 325 TABLET, FILM COATED ORAL at 22:48

## 2019-06-12 RX ADMIN — METOPROLOL TARTRATE SCH MG: 25 TABLET, FILM COATED ORAL at 18:54

## 2019-06-12 RX ADMIN — PREDNISONE PRN MG: 50 TABLET ORAL at 16:48

## 2019-06-12 NOTE — NUR
pt presents with reports of pain at the left clavicle from placement of a 
central line. pt also reports dizziness x 2 days. hx testicular cancer and 
lymphoma. pt connected to cardiac monitor. officer of the law at bedside.

## 2019-06-13 VITALS — DIASTOLIC BLOOD PRESSURE: 96 MMHG | SYSTOLIC BLOOD PRESSURE: 151 MMHG

## 2019-06-13 VITALS — SYSTOLIC BLOOD PRESSURE: 142 MMHG | DIASTOLIC BLOOD PRESSURE: 97 MMHG

## 2019-06-13 VITALS — SYSTOLIC BLOOD PRESSURE: 139 MMHG | DIASTOLIC BLOOD PRESSURE: 93 MMHG

## 2019-06-13 VITALS — DIASTOLIC BLOOD PRESSURE: 97 MMHG | SYSTOLIC BLOOD PRESSURE: 146 MMHG

## 2019-06-13 VITALS — SYSTOLIC BLOOD PRESSURE: 131 MMHG | DIASTOLIC BLOOD PRESSURE: 99 MMHG

## 2019-06-13 LAB
ANION GAP SERPL CALC-SCNC: 10 MMOL/L (ref 5–15)
BASOPHILS # BLD AUTO: 0.01 X10^3/UL (ref 0–0.1)
BASOPHILS NFR BLD AUTO: 0 % (ref 0–1)
CALCIUM SERPL-MCNC: 9.1 MG/DL (ref 8.5–10.1)
CHLORIDE SERPL-SCNC: 110 MMOL/L (ref 98–107)
CREAT SERPL-MCNC: 1.21 MG/DL (ref 0.7–1.3)
EOSINOPHIL # BLD AUTO: 0.01 X10^3/UL (ref 0–0.4)
EOSINOPHIL NFR BLD AUTO: 0 % (ref 1–7)
ERYTHROCYTE [DISTWIDTH] IN BLOOD BY AUTOMATED COUNT: 13.7 % (ref 9.4–14.8)
GFR SERPL CREATININE-BSD FRML MDRD: NEGATIVE ML/MIN/{1.73_M2}
LYMPHOCYTES # BLD AUTO: 0.51 X10^3/UL (ref 1–3.4)
LYMPHOCYTES NFR BLD AUTO: 20 % (ref 22–44)
MCH RBC QN AUTO: 28.5 PG (ref 27.5–34.5)
MCHC RBC AUTO-ENTMCNC: 33.1 G/DL (ref 33.2–36.2)
MCV RBC AUTO: 86.1 FL (ref 81–97)
MD: (no result)
MONOCYTES # BLD AUTO: 0.05 X10^3/UL (ref 0.2–0.8)
MONOCYTES NFR BLD AUTO: 2 % (ref 2–9)
NEUTROPHILS # BLD AUTO: 1.97 X10^3/UL (ref 1.8–6.8)
NEUTROPHILS NFR BLD AUTO: 78 % (ref 42–75)
PLATELET # BLD AUTO: 83 X10^3/UL (ref 130–400)
PMV BLD AUTO: 8.8 FL (ref 7.4–10.4)
RBC # BLD AUTO: 4.23 X10^6/UL (ref 4.38–5.82)
T4 FREE SERPL-MCNC: 0.88 NG/DL (ref 0.76–1.46)

## 2019-06-13 RX ADMIN — METOPROLOL TARTRATE SCH MG: 25 TABLET, FILM COATED ORAL at 12:22

## 2019-06-13 RX ADMIN — METOPROLOL TARTRATE SCH MG: 25 TABLET, FILM COATED ORAL at 06:00

## 2019-06-13 RX ADMIN — DEXTROSE, SODIUM CHLORIDE, AND POTASSIUM CHLORIDE SCH MLS/HR: 5; .45; .15 INJECTION INTRAVENOUS at 01:41

## 2019-06-13 RX ADMIN — ACETAMINOPHEN PRN MG: 325 TABLET, FILM COATED ORAL at 06:18

## 2019-06-13 RX ADMIN — PREDNISONE PRN MG: 50 TABLET ORAL at 05:02

## 2019-06-13 RX ADMIN — DEXTROSE, SODIUM CHLORIDE, AND POTASSIUM CHLORIDE SCH MLS/HR: 5; .45; .15 INJECTION INTRAVENOUS at 08:17

## 2019-06-26 ENCOUNTER — HOSPITAL ENCOUNTER (EMERGENCY)
Facility: MEDICAL CENTER | Age: 54
End: 2019-06-26
Attending: EMERGENCY MEDICINE
Payer: MEDICAID

## 2019-06-26 ENCOUNTER — APPOINTMENT (OUTPATIENT)
Dept: RADIOLOGY | Facility: MEDICAL CENTER | Age: 54
End: 2019-06-26
Attending: EMERGENCY MEDICINE
Payer: MEDICAID

## 2019-06-26 VITALS
WEIGHT: 198.41 LBS | BODY MASS INDEX: 27.67 KG/M2 | TEMPERATURE: 97.5 F | RESPIRATION RATE: 16 BRPM | SYSTOLIC BLOOD PRESSURE: 138 MMHG | DIASTOLIC BLOOD PRESSURE: 88 MMHG | HEART RATE: 86 BPM | OXYGEN SATURATION: 98 %

## 2019-06-26 DIAGNOSIS — R06.02 SHORTNESS OF BREATH: ICD-10-CM

## 2019-06-26 DIAGNOSIS — R07.89 ATYPICAL CHEST PAIN: ICD-10-CM

## 2019-06-26 LAB
ALBUMIN SERPL BCP-MCNC: 4.2 G/DL (ref 3.2–4.9)
ALBUMIN/GLOB SERPL: 1.4 G/DL
ALP SERPL-CCNC: 125 U/L (ref 30–99)
ALT SERPL-CCNC: 11 U/L (ref 2–50)
ANION GAP SERPL CALC-SCNC: 13 MMOL/L (ref 0–11.9)
AST SERPL-CCNC: 18 U/L (ref 12–45)
BASOPHILS # BLD AUTO: 0.4 % (ref 0–1.8)
BASOPHILS # BLD: 0.02 K/UL (ref 0–0.12)
BILIRUB SERPL-MCNC: 0.6 MG/DL (ref 0.1–1.5)
BUN SERPL-MCNC: 11 MG/DL (ref 8–22)
CALCIUM SERPL-MCNC: 9.8 MG/DL (ref 8.5–10.5)
CHLORIDE SERPL-SCNC: 105 MMOL/L (ref 96–112)
CO2 SERPL-SCNC: 23 MMOL/L (ref 20–33)
CREAT SERPL-MCNC: 1.01 MG/DL (ref 0.5–1.4)
EKG IMPRESSION: NORMAL
EOSINOPHIL # BLD AUTO: 0.17 K/UL (ref 0–0.51)
EOSINOPHIL NFR BLD: 3.8 % (ref 0–6.9)
ERYTHROCYTE [DISTWIDTH] IN BLOOD BY AUTOMATED COUNT: 39.8 FL (ref 35.9–50)
GLOBULIN SER CALC-MCNC: 3 G/DL (ref 1.9–3.5)
GLUCOSE SERPL-MCNC: 81 MG/DL (ref 65–99)
HCT VFR BLD AUTO: 39.2 % (ref 42–52)
HGB BLD-MCNC: 13.3 G/DL (ref 14–18)
IMM GRANULOCYTES # BLD AUTO: 0.01 K/UL (ref 0–0.11)
IMM GRANULOCYTES NFR BLD AUTO: 0.2 % (ref 0–0.9)
LYMPHOCYTES # BLD AUTO: 1.18 K/UL (ref 1–4.8)
LYMPHOCYTES NFR BLD: 26.5 % (ref 22–41)
MCH RBC QN AUTO: 28.3 PG (ref 27–33)
MCHC RBC AUTO-ENTMCNC: 33.9 G/DL (ref 33.7–35.3)
MCV RBC AUTO: 83.4 FL (ref 81.4–97.8)
MONOCYTES # BLD AUTO: 0.42 K/UL (ref 0–0.85)
MONOCYTES NFR BLD AUTO: 9.4 % (ref 0–13.4)
NEUTROPHILS # BLD AUTO: 2.65 K/UL (ref 1.82–7.42)
NEUTROPHILS NFR BLD: 59.7 % (ref 44–72)
NRBC # BLD AUTO: 0 K/UL
NRBC BLD-RTO: 0 /100 WBC
PLATELET # BLD AUTO: 78 K/UL (ref 164–446)
PMV BLD AUTO: 9.3 FL (ref 9–12.9)
POTASSIUM SERPL-SCNC: 4.3 MMOL/L (ref 3.6–5.5)
PROT SERPL-MCNC: 7.2 G/DL (ref 6–8.2)
RBC # BLD AUTO: 4.7 M/UL (ref 4.7–6.1)
SODIUM SERPL-SCNC: 141 MMOL/L (ref 135–145)
TROPONIN I SERPL-MCNC: <0.01 NG/ML (ref 0–0.04)
WBC # BLD AUTO: 4.5 K/UL (ref 4.8–10.8)

## 2019-06-26 PROCEDURE — 85025 COMPLETE CBC W/AUTO DIFF WBC: CPT

## 2019-06-26 PROCEDURE — 71275 CT ANGIOGRAPHY CHEST: CPT

## 2019-06-26 PROCEDURE — 700111 HCHG RX REV CODE 636 W/ 250 OVERRIDE (IP): Performed by: EMERGENCY MEDICINE

## 2019-06-26 PROCEDURE — 84484 ASSAY OF TROPONIN QUANT: CPT

## 2019-06-26 PROCEDURE — 96374 THER/PROPH/DIAG INJ IV PUSH: CPT | Mod: XU

## 2019-06-26 PROCEDURE — 36415 COLL VENOUS BLD VENIPUNCTURE: CPT

## 2019-06-26 PROCEDURE — 99285 EMERGENCY DEPT VISIT HI MDM: CPT

## 2019-06-26 PROCEDURE — 71045 X-RAY EXAM CHEST 1 VIEW: CPT

## 2019-06-26 PROCEDURE — 93005 ELECTROCARDIOGRAM TRACING: CPT | Performed by: EMERGENCY MEDICINE

## 2019-06-26 PROCEDURE — 96375 TX/PRO/DX INJ NEW DRUG ADDON: CPT | Mod: XU

## 2019-06-26 PROCEDURE — 80053 COMPREHEN METABOLIC PANEL: CPT

## 2019-06-26 PROCEDURE — 700117 HCHG RX CONTRAST REV CODE 255: Performed by: EMERGENCY MEDICINE

## 2019-06-26 RX ORDER — DIPHENHYDRAMINE HYDROCHLORIDE 50 MG/ML
50 INJECTION INTRAMUSCULAR; INTRAVENOUS ONCE
Status: COMPLETED | OUTPATIENT
Start: 2019-06-26 | End: 2019-06-26

## 2019-06-26 RX ORDER — METHYLPREDNISOLONE SODIUM SUCCINATE 125 MG/2ML
125 INJECTION, POWDER, LYOPHILIZED, FOR SOLUTION INTRAMUSCULAR; INTRAVENOUS ONCE
Status: COMPLETED | OUTPATIENT
Start: 2019-06-26 | End: 2019-06-26

## 2019-06-26 RX ADMIN — DIPHENHYDRAMINE HYDROCHLORIDE 50 MG: 50 INJECTION INTRAMUSCULAR; INTRAVENOUS at 12:16

## 2019-06-26 RX ADMIN — IOHEXOL 75 ML: 350 INJECTION, SOLUTION INTRAVENOUS at 14:14

## 2019-06-26 RX ADMIN — METHYLPREDNISOLONE SODIUM SUCCINATE 125 MG: 125 INJECTION, POWDER, FOR SOLUTION INTRAMUSCULAR; INTRAVENOUS at 12:19

## 2019-06-26 NOTE — ED TRIAGE NOTES
.  Chief Complaint   Patient presents with   • Chest Pain     onset last night radiates to left back.    • Shortness of Breath   [facility] from shelter. Pt reports onset sob then cp last night while lying down. Pain radiates to left back. Pt has hx pe, not currently taking blood thinners

## 2019-06-26 NOTE — DISCHARGE INSTRUCTIONS
Your laboratory tests, x-ray, and CT were all reassuring.  There is no sign of any dangerous cause of your symptoms.  Your shortness of breath and chest discomfort may be caused by your asthma.  Although you may not be interested in treatment, please follow-up with the oncology group to discuss your options.  There may be treatments that you are not aware of.  Until then, since it is difficult to obtain IV access for you, it is best to leave your central line in place.

## 2019-06-26 NOTE — ED PROVIDER NOTES
ED Provider Note    Scribed for Guevara Johnston M.D. by Guevara Johnston. 6/26/2019,  10:33 AM.    CHIEF COMPLAINT  Chief Complaint   Patient presents with   • Chest Pain     onset last night radiates to left back.    • Shortness of Breath       HPI  Melissa Newell is a 54 y.o. male inmate, who presents to the Emergency Department reporting chest pain since last night, which radiates towards the left back.  He has a history of cancer and PE.  He is not currently on blood thinners.  He reports associated shortness of breath.  However, he is not hypoxic or tachypneic or tachycardic, and his room air oxygen saturation on arrival is 100%, after breathing treatment was administered prior to transfer.  He reports a history of lifelong asthma.  He has difficult time characterizing the chest pain, but says that it is on the left side, and seems to radiate towards the back.  He denies fevers or chills or nausea or vomiting cough, diarrhea constipation or dysuria.  He has not had any chest trauma.    His records show an admission in May to this hospital, due to generalized weakness with nausea and vomiting.  He has an IVC filter placed in 2017, and apparently is not anticoagulated intentionally, due to history of thrombocytopenia and pancytopenia.  While here in May, he had a CT pulmonary angiogram which is unremarkable.  Oncology recommended outpatient follow-up.  The patient had some confusion, attributed first to gabapentin, which was stopped, but had been started because of ongoing back pain due to a compression fracture, which neurosurgery recommended only bracing for pain control.  Psychiatry was consulted, and recommended antipsychotic medication.  Patient ultimately went for kyphoplasty of his compression fracture on May 9.  Prior to discharge, he was able to ambulate.  Apparently, this patient has become incarcerated since his recent discharge.  He reports a cancer history of testicular cancer with  "orchiectomy.    Patient has a left chest port/central line that was placed prior to his recent admission here.  This apparently was placed in a hospital in [place], according to discharge paperwork from that hospital, reviewed while he was recently admitted.  This access was kept in place after his recent admission, with a plan for him to follow-up with hematology/oncology.  The patient now reports that he \"needs to get this out,\" since he is refusing to do any chemotherapy while incarcerated.  According to his records, he was picked up by [facility] after his recent discharge on the 19th or 20th of last month, and has been incarcerated since then.        REVIEW OF SYSTEMS  See HPI for further details. All other systems are negative.     PAST MEDICAL HISTORY   has a past medical history of Asthma and Cancer (HCC) (11/01/2016).    SOCIAL HISTORY  Social History     Social History Main Topics   • Smoking status: Former Smoker   • Smokeless tobacco: Never Used   • Alcohol use Not on file   • Drug use: Unknown   • Sexual activity: Not on file     History   Drug use: Unknown       SURGICAL HISTORY  patient denies any surgical history    CURRENT MEDICATIONS  Home Medications     Reviewed by Stephani Mullen R.N. (Registered Nurse) on 06/26/19 at 1021  Med List Status: Partial   Medication Last Dose Status   acetaminophen (TYLENOL) 500 MG Tab  Active   amitriptyline (ELAVIL) 25 MG Tab 6/26/2019 Active   busPIRone (BUSPAR) 15 MG tablet 6/26/2019 Active   lidocaine (LIDODERM) 5 % Patch  Active   metoprolol (LOPRESSOR) 25 MG Tab 6/26/2019 Active   multivitamin (THERAGRAN) Tab  Active   pantoprazole (PROTONIX) 40 MG Tablet Delayed Response 6/26/2019 Active   zolpidem (AMBIEN) 10 MG Tab  Active                ALLERGIES  Allergies   Allergen Reactions   • Iodine Anaphylaxis   • Keflex Diarrhea and Vomiting     Vomiting & Diarrhea  Tolerates ceftriaxone   • Reglan [Metoclopramide] Nausea     Asthma     • Shellfish Allergy " Anaphylaxis   • Toradol Hives       PHYSICAL EXAM  VITAL SIGNS: /88   Pulse 86   Temp 36.4 °C (97.5 °F) (Temporal)   Resp 16   Wt 90 kg (198 lb 6.6 oz)   SpO2 98%   BMI 27.67 kg/m²   Pulse ox interpretation: I interpret this pulse ox as normal.  Constitutional: Alert in no apparent distress.  HENT: No signs of trauma, Bilateral external ears normal, Nose normal.   Eyes: Conjunctiva normal, Non-icteric.   Neck: Normal range of motion, Supple, No stridor.   Lymphatic: No lymphadenopathy noted.   Cardiovascular: Regular rate and rhythm, no murmurs.   Thorax & Lungs: Normal breath sounds, No respiratory distress, No wheezing, No chest tenderness.   Abdomen: Bowel sounds normal, Soft, No tenderness, No masses, No pulsatile masses. No peritoneal signs.  Skin: Warm, Dry, No erythema, No rash.   Back: No midline bony tenderness.   Extremities: Intact distal pulses, No edema, No cyanosis.  Musculoskeletal: Good range of motion in all major joints. No or major deformities noted.   Neurologic: Alert , Normal motor function, Normal sensory function, No focal deficits noted.   Psychiatric: Affect normal, Judgment normal, Mood normal.     DIAGNOSTIC STUDIES / PROCEDURES    EKG  Interpreted by me    Rhythm:  Normal sinus rhythm   Axis: normal  Intervals: normal  Ectopy: none  BORDERLINE LOW VOLTAGE IN FRONTAL LEADS  No concerning changes compared to 5/18/2019    LABS  Labs Reviewed   CBC WITH DIFFERENTIAL - Abnormal; Notable for the following:        Result Value    WBC 4.5 (*)     Hemoglobin 13.3 (*)     Hematocrit 39.2 (*)     Platelet Count 78 (*)     All other components within normal limits   COMP METABOLIC PANEL - Abnormal; Notable for the following:     Anion Gap 13.0 (*)     Alkaline Phosphatase 125 (*)     All other components within normal limits   TROPONIN   ESTIMATED GFR     All labs reviewed by me.    RADIOLOGY  CT-CTA CHEST PULMONARY ARTERY W/ RECONS   Final Result      1.  No large central or segmental  pulmonary embolus. Motion degraded exam.   2.  Evidence of remote granulomatous process involving the RIGHT lung and RIGHT hilar lymph nodes            DX-CHEST-PORTABLE (1 VIEW)   Final Result      No acute cardiac or pulmonary abnormalities are identified. Lung volumes are low.        The radiologist's interpretation of all radiological studies have been reviewed by me.    COURSE & MEDICAL DECISION MAKING  Nursing notes, VS PMSFHx reviewed in chart.     10:33 AM Patient seen and examined at bedside. Differential diagnosis includes but is not limited to ACS, PE, pneumonia, bronchitis, viral syndrome, musculoskeletal chest pain. Ordered for screening laboratory tests to evaluate.  Given this patient's complaints, and his history of DVT/PE, and the fact that he cannot be anticoagulated, my pretest probability for PE is too high to trust a d-dimer in case of a false negative.  I think a CT pulmonary angiogram is indicated.  Screening laboratory tests for ACS and infection of also been ordered. patient will be treated with supplemental oxygen as needed for his symptoms, but at the moment, he is asymptomatic..     10:45 AM CXR is unremarkable.    This patient's CT angiogram was also unremarkable, and his labs were reassuring.  Apparently, he got much better in route with a DuoNeb.  He has not had any significant respiratory symptoms here.  He does not have a DVT or PE or evidence of ACS.  He feels reassured by that.  He did want us to remove his central line, but even here, it was extremely difficult to obtain IV access on him, and he agrees that it has always been difficult since his previous chemotherapy.  I explained that since he has not yet followed up with hematology/oncology to discuss treatment options, it is certainly in his best interest to leave the access line in place, at least until that consultation, and he agrees.     The patient will return for new or worsening symptoms and is stable at the time of  discharge.    The patient is referred to a primary physician for blood pressure management, diabetic screening, and for all other preventative health concerns.      DISPOSITION:  Patient will be discharged home in stable condition.    FOLLOW UP:  [facility]  [address], [address]  [place] [id number] [phone]  Schedule an appointment as soon as possible for a visit         OUTPATIENT MEDICATIONS:  Discharge Medication List as of 6/26/2019  2:58 PM            FINAL IMPRESSION  1. Atypical chest pain    2. Shortness of breath

## 2019-08-14 ENCOUNTER — APPOINTMENT (OUTPATIENT)
Dept: RADIOLOGY | Facility: MEDICAL CENTER | Age: 54
End: 2019-08-14
Attending: SURGERY
Payer: COMMERCIAL

## 2019-08-14 ENCOUNTER — HOSPITAL ENCOUNTER (OUTPATIENT)
Facility: MEDICAL CENTER | Age: 54
End: 2019-08-14
Attending: SURGERY | Admitting: SURGERY
Payer: COMMERCIAL

## 2019-08-14 ENCOUNTER — ANESTHESIA EVENT (OUTPATIENT)
Dept: SURGERY | Facility: MEDICAL CENTER | Age: 54
End: 2019-08-14
Payer: COMMERCIAL

## 2019-08-14 ENCOUNTER — ANESTHESIA (OUTPATIENT)
Dept: SURGERY | Facility: MEDICAL CENTER | Age: 54
End: 2019-08-14
Payer: COMMERCIAL

## 2019-08-14 VITALS
RESPIRATION RATE: 18 BRPM | HEIGHT: 72 IN | BODY MASS INDEX: 28.4 KG/M2 | HEART RATE: 78 BPM | DIASTOLIC BLOOD PRESSURE: 87 MMHG | OXYGEN SATURATION: 97 % | SYSTOLIC BLOOD PRESSURE: 132 MMHG | TEMPERATURE: 98.2 F | WEIGHT: 209.66 LBS

## 2019-08-14 PROCEDURE — 160048 HCHG OR STATISTICAL LEVEL 1-5: Performed by: SURGERY

## 2019-08-14 PROCEDURE — 700111 HCHG RX REV CODE 636 W/ 250 OVERRIDE (IP): Performed by: ANESTHESIOLOGY

## 2019-08-14 PROCEDURE — A9270 NON-COVERED ITEM OR SERVICE: HCPCS | Performed by: ANESTHESIOLOGY

## 2019-08-14 PROCEDURE — 160002 HCHG RECOVERY MINUTES (STAT): Performed by: SURGERY

## 2019-08-14 PROCEDURE — C1788 PORT, INDWELLING, IMP: HCPCS | Performed by: SURGERY

## 2019-08-14 PROCEDURE — 502240 HCHG MISC OR SUPPLY RC 0272: Performed by: SURGERY

## 2019-08-14 PROCEDURE — 160046 HCHG PACU - 1ST 60 MINS PHASE II: Performed by: SURGERY

## 2019-08-14 PROCEDURE — 160028 HCHG SURGERY MINUTES - 1ST 30 MINS LEVEL 3: Performed by: SURGERY

## 2019-08-14 PROCEDURE — 501445 HCHG STAPLER, SKIN DISP: Performed by: SURGERY

## 2019-08-14 PROCEDURE — 700105 HCHG RX REV CODE 258: Performed by: SURGERY

## 2019-08-14 PROCEDURE — A6402 STERILE GAUZE <= 16 SQ IN: HCPCS | Performed by: SURGERY

## 2019-08-14 PROCEDURE — 700102 HCHG RX REV CODE 250 W/ 637 OVERRIDE(OP): Performed by: ANESTHESIOLOGY

## 2019-08-14 PROCEDURE — 160039 HCHG SURGERY MINUTES - EA ADDL 1 MIN LEVEL 3: Performed by: SURGERY

## 2019-08-14 PROCEDURE — 160036 HCHG PACU - EA ADDL 30 MINS PHASE I: Performed by: SURGERY

## 2019-08-14 PROCEDURE — 160035 HCHG PACU - 1ST 60 MINS PHASE I: Performed by: SURGERY

## 2019-08-14 PROCEDURE — 160025 RECOVERY II MINUTES (STATS): Performed by: SURGERY

## 2019-08-14 PROCEDURE — 71045 X-RAY EXAM CHEST 1 VIEW: CPT

## 2019-08-14 PROCEDURE — 700111 HCHG RX REV CODE 636 W/ 250 OVERRIDE (IP): Performed by: SURGERY

## 2019-08-14 PROCEDURE — 501838 HCHG SUTURE GENERAL: Performed by: SURGERY

## 2019-08-14 PROCEDURE — 700101 HCHG RX REV CODE 250: Performed by: ANESTHESIOLOGY

## 2019-08-14 PROCEDURE — 160009 HCHG ANES TIME/MIN: Performed by: SURGERY

## 2019-08-14 DEVICE — POWER PORTMRI COMPATABLE: Type: IMPLANTABLE DEVICE | Status: FUNCTIONAL

## 2019-08-14 RX ORDER — AMITRIPTYLINE HYDROCHLORIDE 100 MG/1
100 TABLET ORAL NIGHTLY
COMMUNITY
End: 2021-11-09 | Stop reason: SDUPTHER

## 2019-08-14 RX ORDER — HYDROMORPHONE HYDROCHLORIDE 1 MG/ML
0.2 INJECTION, SOLUTION INTRAMUSCULAR; INTRAVENOUS; SUBCUTANEOUS
Status: DISCONTINUED | OUTPATIENT
Start: 2019-08-14 | End: 2019-08-14 | Stop reason: HOSPADM

## 2019-08-14 RX ORDER — LIDOCAINE HYDROCHLORIDE 10 MG/ML
INJECTION, SOLUTION EPIDURAL; INFILTRATION; INTRACAUDAL; PERINEURAL
Status: COMPLETED
Start: 2019-08-14 | End: 2019-08-14

## 2019-08-14 RX ORDER — DEXAMETHASONE SODIUM PHOSPHATE 4 MG/ML
INJECTION, SOLUTION INTRA-ARTICULAR; INTRALESIONAL; INTRAMUSCULAR; INTRAVENOUS; SOFT TISSUE PRN
Status: DISCONTINUED | OUTPATIENT
Start: 2019-08-14 | End: 2019-08-14 | Stop reason: SURG

## 2019-08-14 RX ORDER — CHOLECALCIFEROL (VITAMIN D3) 1250 MCG
1 CAPSULE ORAL
COMMUNITY
End: 2022-05-04

## 2019-08-14 RX ORDER — TRAZODONE HYDROCHLORIDE 100 MG/1
200 TABLET ORAL NIGHTLY
COMMUNITY
End: 2021-11-09

## 2019-08-14 RX ORDER — DIPHENHYDRAMINE HYDROCHLORIDE 50 MG/ML
12.5 INJECTION INTRAMUSCULAR; INTRAVENOUS
Status: DISCONTINUED | OUTPATIENT
Start: 2019-08-14 | End: 2019-08-14 | Stop reason: HOSPADM

## 2019-08-14 RX ORDER — CLINDAMYCIN PHOSPHATE 150 MG/ML
INJECTION, SOLUTION INTRAVENOUS PRN
Status: DISCONTINUED | OUTPATIENT
Start: 2019-08-14 | End: 2019-08-14 | Stop reason: SURG

## 2019-08-14 RX ORDER — SODIUM CHLORIDE, SODIUM LACTATE, POTASSIUM CHLORIDE, CALCIUM CHLORIDE 600; 310; 30; 20 MG/100ML; MG/100ML; MG/100ML; MG/100ML
INJECTION, SOLUTION INTRAVENOUS CONTINUOUS
Status: DISCONTINUED | OUTPATIENT
Start: 2019-08-14 | End: 2019-08-14 | Stop reason: HOSPADM

## 2019-08-14 RX ORDER — ACETAMINOPHEN 500 MG
1000 TABLET ORAL EVERY 4 HOURS PRN
Status: DISCONTINUED | OUTPATIENT
Start: 2019-08-14 | End: 2019-08-14 | Stop reason: HOSPADM

## 2019-08-14 RX ORDER — ONDANSETRON 2 MG/ML
4 INJECTION INTRAMUSCULAR; INTRAVENOUS
Status: DISCONTINUED | OUTPATIENT
Start: 2019-08-14 | End: 2019-08-14 | Stop reason: HOSPADM

## 2019-08-14 RX ORDER — MEPERIDINE HYDROCHLORIDE 25 MG/ML
12.5 INJECTION INTRAMUSCULAR; INTRAVENOUS; SUBCUTANEOUS
Status: DISCONTINUED | OUTPATIENT
Start: 2019-08-14 | End: 2019-08-14 | Stop reason: HOSPADM

## 2019-08-14 RX ORDER — LABETALOL HYDROCHLORIDE 5 MG/ML
5 INJECTION, SOLUTION INTRAVENOUS
Status: DISCONTINUED | OUTPATIENT
Start: 2019-08-14 | End: 2019-08-14 | Stop reason: HOSPADM

## 2019-08-14 RX ORDER — HYDROMORPHONE HYDROCHLORIDE 1 MG/ML
0.1 INJECTION, SOLUTION INTRAMUSCULAR; INTRAVENOUS; SUBCUTANEOUS
Status: DISCONTINUED | OUTPATIENT
Start: 2019-08-14 | End: 2019-08-14 | Stop reason: HOSPADM

## 2019-08-14 RX ORDER — ONDANSETRON 4 MG/1
4 TABLET, ORALLY DISINTEGRATING ORAL
COMMUNITY
End: 2022-03-26

## 2019-08-14 RX ORDER — ONDANSETRON 2 MG/ML
INJECTION INTRAMUSCULAR; INTRAVENOUS PRN
Status: DISCONTINUED | OUTPATIENT
Start: 2019-08-14 | End: 2019-08-14 | Stop reason: SURG

## 2019-08-14 RX ORDER — METOPROLOL TARTRATE 1 MG/ML
1 INJECTION, SOLUTION INTRAVENOUS
Status: DISCONTINUED | OUTPATIENT
Start: 2019-08-14 | End: 2019-08-14 | Stop reason: HOSPADM

## 2019-08-14 RX ORDER — OXYCODONE HCL 5 MG/5 ML
10 SOLUTION, ORAL ORAL
Status: COMPLETED | OUTPATIENT
Start: 2019-08-14 | End: 2019-08-14

## 2019-08-14 RX ORDER — MIDAZOLAM HYDROCHLORIDE 1 MG/ML
1 INJECTION INTRAMUSCULAR; INTRAVENOUS
Status: DISCONTINUED | OUTPATIENT
Start: 2019-08-14 | End: 2019-08-14 | Stop reason: HOSPADM

## 2019-08-14 RX ORDER — HALOPERIDOL 5 MG/ML
1 INJECTION INTRAMUSCULAR
Status: DISCONTINUED | OUTPATIENT
Start: 2019-08-14 | End: 2019-08-14 | Stop reason: HOSPADM

## 2019-08-14 RX ORDER — OXYCODONE HCL 5 MG/5 ML
5 SOLUTION, ORAL ORAL
Status: COMPLETED | OUTPATIENT
Start: 2019-08-14 | End: 2019-08-14

## 2019-08-14 RX ORDER — HYDROMORPHONE HYDROCHLORIDE 1 MG/ML
0.4 INJECTION, SOLUTION INTRAMUSCULAR; INTRAVENOUS; SUBCUTANEOUS
Status: DISCONTINUED | OUTPATIENT
Start: 2019-08-14 | End: 2019-08-14 | Stop reason: HOSPADM

## 2019-08-14 RX ADMIN — PROPOFOL 200 MG: 10 INJECTION, EMULSION INTRAVENOUS at 13:09

## 2019-08-14 RX ADMIN — ONDANSETRON 4 MG: 2 INJECTION INTRAMUSCULAR; INTRAVENOUS at 13:51

## 2019-08-14 RX ADMIN — CLINDAMYCIN PHOSPHATE 900 MG: 150 INJECTION, SOLUTION INTRAMUSCULAR; INTRAVENOUS at 13:06

## 2019-08-14 RX ADMIN — MIDAZOLAM HYDROCHLORIDE 2 MG: 1 INJECTION, SOLUTION INTRAMUSCULAR; INTRAVENOUS at 13:09

## 2019-08-14 RX ADMIN — MIDAZOLAM HYDROCHLORIDE 2 MG: 1 INJECTION, SOLUTION INTRAMUSCULAR; INTRAVENOUS at 13:17

## 2019-08-14 RX ADMIN — SODIUM CHLORIDE, POTASSIUM CHLORIDE, SODIUM LACTATE AND CALCIUM CHLORIDE: 600; 310; 30; 20 INJECTION, SOLUTION INTRAVENOUS at 12:01

## 2019-08-14 RX ADMIN — OXYCODONE HYDROCHLORIDE 10 MG: 5 SOLUTION ORAL at 14:57

## 2019-08-14 RX ADMIN — DEXAMETHASONE SODIUM PHOSPHATE 4 MG: 4 INJECTION, SOLUTION INTRA-ARTICULAR; INTRALESIONAL; INTRAMUSCULAR; INTRAVENOUS; SOFT TISSUE at 13:18

## 2019-08-14 RX ADMIN — SODIUM CHLORIDE, POTASSIUM CHLORIDE, SODIUM LACTATE AND CALCIUM CHLORIDE: 600; 310; 30; 20 INJECTION, SOLUTION INTRAVENOUS at 13:06

## 2019-08-14 RX ADMIN — FENTANYL CITRATE 50 MCG: 50 INJECTION, SOLUTION INTRAMUSCULAR; INTRAVENOUS at 14:45

## 2019-08-14 RX ADMIN — FENTANYL CITRATE 50 MCG: 50 INJECTION, SOLUTION INTRAMUSCULAR; INTRAVENOUS at 13:09

## 2019-08-14 RX ADMIN — ACETAMINOPHEN 1000 MG: 500 TABLET ORAL at 14:52

## 2019-08-14 RX ADMIN — FENTANYL CITRATE 50 MCG: 50 INJECTION, SOLUTION INTRAMUSCULAR; INTRAVENOUS at 13:28

## 2019-08-14 RX ADMIN — FENTANYL CITRATE 50 MCG: 50 INJECTION, SOLUTION INTRAMUSCULAR; INTRAVENOUS at 14:30

## 2019-08-14 SDOH — HEALTH STABILITY: MENTAL HEALTH: HOW OFTEN DO YOU HAVE A DRINK CONTAINING ALCOHOL?: NEVER

## 2019-08-14 ASSESSMENT — PAIN SCALES - GENERAL: PAIN_LEVEL: 0

## 2019-08-14 NOTE — OR NURSING
"Pt brought to the recovery room from OR.  Pt breathing on own with LMA in place and 6L nc through LMA.  VSS.  Handoff received from OR nurse and anesthesia provider.    Pt woke up on own easily and LMA removed by PACU nurse without difficulty.     Pt complained of \"burning\" pain at the new port site.  Dressing CDI.  No bruising or hematoma noted.  Pt HOB elevated and ice pack applied.     Pt complaint of pain addressed mult-modal with IV and oral pain meds, ice, elevation and emotional support.  Pt resting at this time.      No complaints of nausea and has tolerated water and oral medications at this time.      VS have remained stable throughout the recovery phase.  Report called to the Phase II nurse and patient transferred to the discharge area per stretcher with guard present.    Adrián Jones RN BSN  "

## 2019-08-14 NOTE — ANESTHESIA PREPROCEDURE EVALUATION
Relevant Problems   No relevant active problems       Physical Exam    Airway   Mallampati: II  TM distance: >3 FB  Neck ROM: full       Cardiovascular - normal exam  Rhythm: regular  Rate: normal  (-) murmur     Dental - normal exam         Pulmonary - normal exam  Breath sounds clear to auscultation     Abdominal    Neurological - normal exam                 Anesthesia Plan    ASA 3       Plan - general       Airway plan will be LMA                  Informed Consent:        leukemia; cancer

## 2019-08-14 NOTE — ANESTHESIA QCDR
2019 [facility] Clinical Data Registry (for Quality Improvement)     Postoperative nausea/vomiting risk protocol (Adult = 18 yrs and Pediatric 3-17 yrs)- (430 and 463)  General inhalation anesthetic (NOT TIVA) with PONV risk factors: Yes  Provision of anti-emetic therapy with at least 2 different classes of agents: Yes   Patient DID NOT receive anti-emetic therapy and reason is documented in Medical Record:  N/A    Multimodal Pain Management- (AQI59)  Patient undergoing Elective Surgery (i.e. Outpatient, or ASC, or Prescheduled Surgery prior to Hospital Admission): No  Use of Multimodal Pain Management, two or more drugs and/or interventions, NOT including systemic opioids: N/A  Exception: Documented allergy to multiple classes of analgesics: N/A    PACU assessment of acute postoperative pain prior to Anesthesia Care End- Applies to Patients Age = 18- (ABG7)  Initial PACU pain score is which of the following: < 7/10  Patient unable to report pain score: N/A    Post-anesthetic transfer of care checklist/protocol to PACU/ICU- (426 and 427)  Upon conclusion of case, patient transferred to which of the following locations: PACU/Non-ICU  Use of transfer checklist/protocol: Yes  Exclusion: Service Performed in Patient Hospital Room (and thus did not require transfer): N/A    PACU Reintubation- (AQI31)  General anesthesia requiring endotracheal intubation (ETT) along with subsequent extubation in OR or PACU: No  Required reintubation in the PACU: N/A  Extubation was a planned trial documented in the medical record prior to removal of the original airway device: N/A    Unplanned admission to ICU related to anesthesia service up through end of PACU care- (MD51)  Unplanned admission to ICU (not initially anticipated at anesthesia start time): No

## 2019-08-14 NOTE — OR NURSING
Patient allergies and NPO status verified, home medication reconciliation completed and belongings secured. Patient verbalizes understanding of pain scale, expected course of stay and plan of care. Surgical site verified with patient. Call light within reach. No further needs at this time; hourly rounding.

## 2019-08-14 NOTE — ANESTHESIA PROCEDURE NOTES
Airway  Date/Time: 8/14/2019 1:12 PM  Performed by: Clayton Win M.D.  Authorized by: Clayton Win M.D.     Location:  OR  Urgency:  Elective  Indications for Airway Management:  Anesthesia  Spontaneous Ventilation: absent    Sedation Level:  Deep  Preoxygenated: Yes    Mask Difficulty Assessment:  1 - vent by mask  Final Airway Type:  Supraglottic airway  Final Supraglottic Airway:  Standard LMA  SGA Size:  5  Number of Attempts at Approach:  1

## 2019-08-14 NOTE — OP REPORT
08/14/19    OPERATIVE NOTE    PRE-OPERATIVE DIAGNOSIS -    1. Leukemia   2. Malfunctioning Groshong Catheter     POST-OPERATIVE DIAGNOSIS - Same    PROCEDURE PERFORMED -     1. Placement of subcutaneous Power Port   2. Removal Malfunctioning Groshong Catheter   3.  Ultrasound-guided access right internal jugular vein    SURGEON - Sonny Enamorado M.D.    ASSISTANT - SARITA Shankar    TYPE OF ANESTHESIA - General     ANESTHESIOLOGIST  - Quirino      SPECIMENS - None    PROCEDURE IN DETAIL -     Patient was taken to the operating suite placed in the supine position.  After adequate anesthesia the patient's bilateral neck and chest wall regions were prepped and draped in sterile fashion.  Using ultrasound guidance a thinwall access needle was inserted into the right internal jugular vein.  A wire was advanced through the needle into the internal jugular vein but the wire would not pass through the superior vena cava.  At that point I elected to remove the needle and wire the malfunctioning catheter to a new subcutaneous a tunneled port.  Small incision was made at the base of the left neck in the region of the catheter.  The incision was dissected from the surrounding tissue and was transected.  An 018 wire was advanced down the lumen of the catheter into the right atrium documented on fluoroscopy.  The malfunctioning catheter was then removed.  Next a peel-away sheath and dilator were advanced over the wire under fluoroscopic guidance.  A new catheter was then advanced over the wire and properly positioned in the right atrium/superior vena cava.  Next a small incision was made in transverse fashion in the left patient's left chest wall.  A pocket was developed and the catheter was tunneled from the neck incision to the transverse incision.  The catheter was then connected to the port and the port was placed within the subcutaneous pocket.  3-0 Vicryl subcutaneous sutures were placed followed by a 4 oh strata fix to  reapproximate the skin incision.  The neck incision was closed using a 4-0 Monocryl suture.  A Shane needle was then placed within the port for use postoperatively.  Catheter was flushed with heparinized saline.  Sterile dressings were applied.      Sonny Enamorado M.D.

## 2019-08-14 NOTE — PROGRESS NOTES
Pt arrived to floor from PACU. Pt A+Ox4, able to make needs known, Port [place] and SL. Pain 6/10 to Left chest. CSMT's and ALMA's WNL , Ice pack applied.      Pt's VSS; denies N/V; states pain is 5/10 but tolerable level. Dressing CDI to left chest. D/c orders received. IV dc'd. Pt changed into clothing with assistance. Discharge instructions given; pt and gaurds verbalized understanding and questions answered. Patient states ready to d/c back to [facility].Pt  Completed phase 2.

## 2019-08-14 NOTE — DISCHARGE INSTRUCTIONS
ACTIVITY: Rest and take it easy for the first 24 hours.  A responsible adult is recommended to remain with you during that time.  It is normal to feel sleepy.  We encourage you to not do anything that requires balance, judgment or coordination.    MILD FLU-LIKE SYMPTOMS ARE NORMAL. YOU MAY EXPERIENCE GENERALIZED MUSCLE ACHES, THROAT IRRITATION, HEADACHE AND/OR SOME NAUSEA.    FOR 24 HOURS DO NOT:  Drive, operate machinery or run household appliances.  Drink beer or alcoholic beverages.   Make important decisions or sign legal documents.    SPECIAL INSTRUCTIONS:   D/C instructions:    1. DIET: Upon discharge from the hospital you may resume your normal preoperative diet. Depending on how you are feeling and whether you have nausea or not, you may wish to stay with a bland diet for the first few days. However, you can advance this as quickly as you feel ready.    2. ACTIVITIES: After discharge from the hospital, you may resume full routine activities. However, there should be no heavy lifting (greater than 15 pounds) and no strenuous activities until after your follow-up visit. Otherwise, routine activities of daily living are acceptable.    3. DRIVING: You may drive whenever you are off pain medications and are able to perform the activities needed to drive, i.e. turning, bending, twisting, etc.    4. BATHING: You may get the wound wet at any time after leaving the hospital. You may shower, but do not submerge in a bath for at least a week. Dressings may come off after 48 hours. Leave steri strips on until they fall off.  It may be necessary to trim the edges.     5. BOWEL FUNCTION: Constipation is common after an operation, especially with pain medications. The combination of pain medication and decreased activity level can cause constipation in otherwise normal patients. If you feel this is occurring, take a laxative (Milk of Magnesia, Ex-Lax, Senokot, etc.) until the problem has resolved.    6. PAIN MEDICATION:  You will be given a prescription for pain medication at discharge. Please take these as directed. It is important to remember not to take medications on an empty stomach as this may cause nausea.    7.CALL IF YOU HAVE: (1) Fevers to more than 101.0 F, (2) Unusual chest or leg pain, (3) Drainage or fluid from incision that may be foul smelling, increased tenderness or soreness at the wound or the wound edges are no longer together, redness or swelling at the incision site. Please do not hesitate to call with any other questions.     8. APPOINTMENT: Contact our office at [phone] if needed.    If you have any additional questions, please do not hesitate to call the office and speak to either myself or the physician on call.    Office address:   [id number]  Sonny Enamorado MD  [facility]  [phone]      DIET: To avoid nausea, slowly advance diet as tolerated, avoiding spicy or greasy foods for the first day.  Add more substantial food to your diet according to your physician's instructions.  Babies can be fed formula or breast milk as soon as they are hungry.  INCREASE FLUIDS AND FIBER TO AVOID CONSTIPATION.    SURGICAL DRESSING/BATHING:  BATHING: You may get the wound wet at any time after leaving the hospital. You may shower, but do not submerge in a bath for at least a week. Dressings may come off after 48 hours. Leave steri strips on until they fall off.  It may be necessary to trim the edges.     FOLLOW-UP APPOINTMENT:  A follow-up appointment should be arranged with your doctor in 1-2 weeks; call to schedule.    You should CALL YOUR PHYSICIAN if you develop:  Fever greater than 101 degrees F.  Pain not relieved by medication, or persistent nausea or vomiting.  Excessive bleeding (blood soaking through dressing) or unexpected drainage from the wound.  Extreme redness or swelling around the incision site, drainage of pus or foul smelling drainage.  Inability to urinate or empty your  bladder within 8 hours.  Problems with breathing or chest pain.    You should call 911 if you develop problems with breathing or chest pain.  If you are unable to contact your doctor or surgical center, you should go to the nearest emergency room or urgent care center.  Physician's telephone #: [phone]    If any questions arise, call your doctor.  If your doctor is not available, please feel free to call the Surgical Center at [phone].  The Center is open Monday through Friday from 7AM to 7PM.  You can also call the [facility] HOTLINE open 24 hours/day, 7 days/week and speak to a nurse at [phone], or toll free at [phone].    A registered nurse may call you a few days after your surgery to see how you are doing after your procedure.    MEDICATIONS: Resume taking daily medication.  Take prescribed pain medication with food.  If no medication is prescribed, you may take non-aspirin pain medication if needed.  PAIN MEDICATION CAN BE VERY CONSTIPATING.  Take a stool softener or laxative such as senokot, pericolace, or milk of magnesia if needed.    No prescription given.  Last pain medication given at 5:57 pm.    If your physician has prescribed pain medication that includes Acetaminophen (Tylenol), do not take additional Acetaminophen (Tylenol) while taking the prescribed medication.    Depression / Suicide Risk    As you are discharged from this [facility] facility, it is important to learn how to keep safe from harming yourself.    Recognize the warning signs:  · Abrupt changes in personality, positive or negative- including increase in energy   · Giving away possessions  · Change in eating patterns- significant weight changes-  positive or negative  · Change in sleeping patterns- unable to sleep or sleeping all the time   · Unwillingness or inability to communicate  · Depression  · Unusual sadness, discouragement and loneliness  · Talk of wanting to die  · Neglect of personal  appearance   · Rebelliousness- reckless behavior  · Withdrawal from people/activities they love  · Confusion- inability to concentrate     If you or a loved one observes any of these behaviors or has concerns about self-harm, here's what you can do:  · Talk about it- your feelings and reasons for harming yourself  · Remove any means that you might use to hurt yourself (examples: pills, rope, extension cords, firearm)  · Get professional help from the community (Mental Health, Substance Abuse, psychological counseling)  · Do not be alone:Call your Safe Contact- someone whom you trust who will be there for you.  · Call your local CRISIS HOTLINE 173-7833 or 593-399-6379  · Call your local Children's Mobile Crisis Response Team [place] (703) 782-5160 or www.[employer]  · Call the toll free National Suicide Prevention Hotlines   · National Suicide Prevention Lifeline 456-396-KWSN (9048)  · National Hope Line Network 800-SUICIDE (846-9061)

## 2019-08-14 NOTE — H&P
Surgery General History & Physical Note    Date  8/14/2019    Primary Care Physician  No primary care provider on file.    CC  Malfunctioning Groshong cath    HPI  This is a 54 y.o. male who has leukemia.  Patient has a history of testicular cancer as well.  Patient has a Groshong catheter in his left internal jugular vein which is malfunctioning.  The patient will need additional chemotherapy for his leukemia.  It has been requested that the Groshong catheter be removed and a subcutaneous port placed.  Discussed the risk benefits and alternatives with the patient we will proceed with the above-mentioned plan.    Past Medical History:   Diagnosis Date   • Asthma    • Cancer (HCC) 11/01/2016       History reviewed. No pertinent surgical history.    Current Facility-Administered Medications   Medication Dose Route Frequency Provider Last Rate Last Dose   • lidocaine (XYLOCAINE) 1 % injection 0.5 mL  0.5 mL Intradermal Once PRN Sonny Enamorado M.D.       • lactated ringers infusion   Intravenous Continuous Sonny Enamorado M.D. 10 mL/hr at 08/14/19 1201         Social History     Socioeconomic History   • Marital status: Single     Spouse name: Not on file   • Number of children: Not on file   • Years of education: Not on file   • Highest education level: Not on file   Occupational History   • Not on file   Social Needs   • Financial resource strain: Not on file   • Food insecurity:     Worry: Not on file     Inability: Not on file   • Transportation needs:     Medical: Not on file     Non-medical: Not on file   Tobacco Use   • Smoking status: Former Smoker   • Smokeless tobacco: Former User     Quit date: 11/8/2018   Substance and Sexual Activity   • Alcohol use: Never     Frequency: Never   • Drug use: Never   • Sexual activity: Not on file   Lifestyle   • Physical activity:     Days per week: Not on file     Minutes per session: Not on file   • Stress: Not on file   Relationships   • Social connections:     Talks on  phone: Not on file     Gets together: Not on file     Attends [employer] service: Not on file     Active member of club or organization: Not on file     Attends meetings of clubs or organizations: Not on file     Relationship status: Not on file   • Intimate partner violence:     Fear of current or ex partner: Not on file     Emotionally abused: Not on file     Physically abused: Not on file     Forced sexual activity: Not on file   Other Topics Concern   • Not on file   Social History Narrative   • Not on file       Family History   Problem Relation Age of Onset   • Cancer Mother    • Psychiatric Illness Mother    • Diabetes Mother    • Hypertension Mother    • Hyperlipidemia Mother    • Arterial Aneurysm Father    • Heart Disease Maternal Grandmother        Allergies  Iodine; Keflex; Reglan [metoclopramide]; Shellfish allergy; and Toradol    Review of Systems  Negative    Physical Exam     Heart-regular rate and rhythm  Lungs-clear to auscultation bilaterally  Abdomen-soft nontender nondistended  Neck-Groshong catheter left internal jugular vein exiting at anterior chest wall left side  Neuro-intact    Vital Signs  Blood Pressure: 118/82   Temperature: 36 °C (96.8 °F)   Pulse: 87   Respiration: 19   Pulse Oximetry: 98 %       Labs:                    Radiology:  No orders to display         Assessment/Plan:  Leukemia  Malfunctioning Groshong cath    Plan-remove malfunctioning Groshong catheter and place subcutaneously tunneled port for chemotherapy

## 2019-08-14 NOTE — ANESTHESIA TIME REPORT
Anesthesia Start and Stop Event Times     Date Time Event    8/14/2019 1253 Ready for Procedure     1306 Anesthesia Start     1401 Anesthesia Stop        Responsible Staff  08/14/19    Name Role Begin End    Clayton Win M.D. Anesth 1306 1401        Preop Diagnosis (Free Text):  Pre-op Diagnosis     LEUKEMIA, Malfunctioning Groshong cath        Preop Diagnosis (Codes):    Post op Diagnosis  Leukemia (HCC)      Premium Reason  Non-Premium    Comments:

## 2019-08-15 ENCOUNTER — PHYSICAL THERAPY (OUTPATIENT)
Dept: PHYSICAL THERAPY | Facility: REHABILITATION | Age: 54
End: 2019-08-15
Attending: NURSE PRACTITIONER
Payer: COMMERCIAL

## 2019-08-15 DIAGNOSIS — R53.81 PHYSICAL DECONDITIONING: ICD-10-CM

## 2019-08-15 DIAGNOSIS — R42 DIZZINESS: ICD-10-CM

## 2019-08-15 DIAGNOSIS — R26.89 LOSS OF BALANCE: ICD-10-CM

## 2019-08-15 PROCEDURE — 97163 PT EVAL HIGH COMPLEX 45 MIN: CPT

## 2019-08-15 PROCEDURE — 97110 THERAPEUTIC EXERCISES: CPT

## 2019-08-15 ASSESSMENT — ENCOUNTER SYMPTOMS
QUALITY: THROBBING
PAIN TIMING: CONTINUOUSLY
PAIN LOCATION: LUMBAR SPINE
PAIN SCALE: 8
QUALITY: SHOOTING

## 2019-08-15 ASSESSMENT — ACTIVITIES OF DAILY LIVING (ADL): POOR_BALANCE: 1

## 2019-08-15 NOTE — OP THERAPY EVALUATION
Outpatient Physical Therapy  INITIAL EVALUATION    [facility] [address]  [address]  [facility] [address]  Phone:  [phone]  Fax:  [phone]    Date of Evaluation: 08/15/2019    Patient: Darryl RVMIAMI  YOB: 1965  MRN: [id number]     Referring Provider: GRADY Jones  [address] [place] [address]   Referring Diagnosis loss of balance     Time Calculation  Start time: 0928  Stop time: 1020 Time Calculation (min): 52 minutes       Physical Therapy Occurrence Codes    Date of onset of impairment:  2/18/19   Date physical therapy care plan established or reviewed:  8/15/19   Date physical therapy treatment started:  8/15/19          Chief Complaint: Fall    Visit Diagnoses     ICD-10-CM   1. Loss of balance R26.89   2. Physical deconditioning R53.81   3. Dizziness R42         Subjective:   History of Present Illness:     Date of onset:  2/18/2019    Mechanism of injury:  Patient is a 54 year old male that has been referred due to repeated falls/loss of balance. States that he has a history of 2 strokes (first on 11/27/18, second on 2/18/19), which has resulted in right hemiparesis and significant dizziness. Describes dizziness as constant in a torsional pattern that results in blurry and double vision (states that he must position his head rotated to the right to prevent him from seeing multiples). Past medical history also includes testicular cancer and leukemia with chemotherapy treatment resulting in peripheral neuropathy and bilateral knee injuries (PCL tear on the right, PCL and meniscus tear on the left). Additionally, in April 2019, patient had a fall resulting in multi-level back fractures with subsequent kyphoplasty.     Due to his extensive medical history and frequent loss of balance, patient began using a wheelchair in May of this year. Since using the wheelchair for mobility, he has had 5 falls. Twice while transferring  into/out of wheelchair while brakes were not fully engaged, twice while walking with FWW, and once while negotiating stairs to enter the yard.         Pain:     Current pain ratin    Location:  Lumbar spine     Quality:  Throbbing and shooting    Pain timing:  Continuously  Treatments:     Current treatment:  Home exercise program  Activities of Daily Living:     Patient reported ADL status: Patient is able to independently maneuver within his cell using wheelchair. Performs transfers onto/off of toilet with the assistance of a grab bar with Nitesh.   Patient Goals:     Other patient goals:  Improve balance and decrease falls.       Past Medical History:   Diagnosis Date   • Asthma    • Cancer (HCC) 2016     Past Surgical History:   Procedure Laterality Date   • CATH REMOVAL N/A 2019    Procedure: REMOVAL, CATHETER AND PLACEMENT OF POWER PORT;  Surgeon: Sonny Enamorado M.D.;  Location: SURGERY [facility];  Service: General     Social History     Tobacco Use   • Smoking status: Former Smoker   • Smokeless tobacco: Former User     Quit date: 2018   Substance Use Topics   • Alcohol use: Never     Frequency: Never     Family and Occupational History     Socioeconomic History   • Marital status: Single     Spouse name: Not on file   • Number of children: Not on file   • Years of education: Not on file   • Highest education level: Not on file   Occupational History   • Not on file       Objective     Neurological Testing     Sensation     Hip   Left Hip   Paresthesia: light touch    Right Hip   Paresthesia: light touch    Ankle/Foot   Left Ankle/Foot   Paresthesia: light touch    Right Ankle/Foot   Paresthesia: light touch    Active Range of Motion   Left Knee   Normal active range of motion    Right Knee   Normal active range of motion  Left Ankle/Foot   Normal active range of motion    Right Ankle/Foot   Normal active range of motion    Additional Active Range of Motion Details  Decreased hip  extension ROM bilaterally with increased pain in lumbar spine. All other hip ROM WFL.     Strength:      Left Hip   Planes of Motion   Flexion: 4  Extension: 4  Abduction: 4  Adduction: 4    Right Hip   Planes of Motion   Flexion: 4  Extension: 4  Abduction: 4  Adduction: 4    Left Knee   Flexion: 4-  Extension: 4+    Right Knee   Flexion: 3  Extension: 4+    Left Ankle/Foot   Dorsiflexion: 5  Inversion: 5  Eversion: 5    Right Ankle/Foot   Dorsiflexion: 5  Inversion: 5  Eversion: 5  Ambulation     Observational Gait     Additional Observational Gait Details  While ambulating with FWW and close SBA-Lara, demonstrated decreased stride length bilaterally, one loss of balance requiring Lara to correct, path deviation, and poor foot clearance.     Functional Assessment     Comments  Patient able to perform transfers from wheelchair to level surface with Nitesh. Required CGA to complete sit<>stand transfer without UE support. Unable to maintain static standing balance without CGA-Lara with feet in normal stance; close SBA x 5 seconds with wide stance.    Dizziness remained unchanged throughout session with note of horizontal nystagmus at end ranges.          Therapeutic Exercises (CPT 61014):     1. Bridge, Completing at home already. Reviewed pain free range of motion and using glut squeezes if bridging cannot be performed without pain.    2. SLR , Added to HEP    3. Sidelying hip abduction , Completing at home already.     4. Mini-Squats, Added to HEP with emphasis on using grab bar and wheelchair placed behind patient    5. Standing marching with UE support, Added to HEP with use of grab bar for balance    6. Heel/toe raises in standing with UE support , Added to HEP with use of grab bar for balance      Therapeutic Exercise Summary: Written instructions provided for patient.     Therapeutic Treatments and Modalities:     1. Neuromuscular Re-education (CPT 30263), Balance progression provided     Therapeutic Treatment  and Modalities Summary: Standing at grab bar, move through the following progression:  1. Standing at grab bar with feet in normal stance with one hand on bar x 30 seconds  2. Standing at grab bar with feet in normal stance with hands hovering above bar x 30 seconds  3. Standing at grab bar with feet close together with one hand on bar x 30 seconds  4. Standing at grab bar with feet close together with hands hovering above bar x 30 seconds  5. Standing at grab bar with one foot slightly in front of the other with one hand on bar x 30 seconds and progress to feet moving closer together     Time-based treatments/modalities:  Therapeutic exercise minutes (CPT 93808): 10 minutes  Neuromusc re-ed, balance, coor, post minutes (CPT 96837): 5 minutes       Assessment, Response and Plan:   Impairments: abnormal gait, abnormal or restricted ROM, impaired functional mobility, impaired balance, impaired physical strength, lacks appropriate home exercise program, pain with function and safety issue    Assessment details:  Patient is a pleasant 54 year old male who was referred for balance impairments and multiple falls. He presents with the following impairments: back pain, significant dizziness and visual changes, impaired proximal lower extremity strength, impaired lower extremity sensation, significantly impaired standing balance, and decreased safety with ambulation.  Based on these evaluation findings, recommend that patient use his wheelchair for all mobility to decrease risk for falling, which he is independent with. Due to dizziness, impaired sensation, and impaired balance it is not recommended that patient ambulate with a walker at this time. Reviewed current exercise routine with patient and added additional strengthening exercises to be performed in supine and standing with a grab bar. As wheelchair will be patient's primary form of mobility, highly recommend that facility adjust wheel locks to ensure that brakes are  fully engaged during patient transfers.      Barriers to therapy: Current inmate.  Prognosis: fair    Goals:   Short Term Goals:   1. Educate in home exercise program (HEP) for strength and balance. MET  2. Provide recommendations to facility to increase patient safety and decrease risk for falling. MET - see below.   Short term goal time span:  1-2 weeks      Long Term Goals:    N/A  PT long term goal timespan: N/A.    Plan:   Therapy options:  No further treatment needed  Planned therapy interventions:  Neuromuscular Re-education (CPT 17684) and Therapeutic Exercise (CPT 32602)  Frequency:  1x week  Duration in weeks:  1  Discussed with:  Patient and caregiver  Plan details:  Recommendations for facility to increase safety:  -Encourage patient to move slowly to minimize dizziness   -Adjust wheel locks to ensure brakes can fully engage during transfers  -Complete provided home exercise program (HEP) daily   -Use wheelchair for all mobility         Functional Assessment Used        Referring provider co-signature:  I have reviewed this plan of care and my co-signature certifies the need for services.  Certification Dates:   From 08/15/19   To Other 08/15/2019    Physician Signature: ________________________________ Date: ______________

## 2021-11-09 ENCOUNTER — OFFICE VISIT (OUTPATIENT)
Dept: MEDICAL GROUP | Facility: CLINIC | Age: 56
End: 2021-11-09
Payer: MEDICAID

## 2021-11-09 VITALS
OXYGEN SATURATION: 96 % | HEART RATE: 85 BPM | HEIGHT: 71 IN | RESPIRATION RATE: 12 BRPM | DIASTOLIC BLOOD PRESSURE: 68 MMHG | WEIGHT: 200 LBS | BODY MASS INDEX: 28 KG/M2 | SYSTOLIC BLOOD PRESSURE: 128 MMHG | TEMPERATURE: 98 F

## 2021-11-09 DIAGNOSIS — Z86.711 HISTORY OF PULMONARY EMBOLISM: ICD-10-CM

## 2021-11-09 DIAGNOSIS — G47.00 INSOMNIA, UNSPECIFIED TYPE: ICD-10-CM

## 2021-11-09 DIAGNOSIS — Z85.47 HISTORY OF TESTICULAR CANCER: ICD-10-CM

## 2021-11-09 DIAGNOSIS — J45.20 MILD INTERMITTENT ASTHMA WITHOUT COMPLICATION: ICD-10-CM

## 2021-11-09 DIAGNOSIS — G25.81 RESTLESS LEG SYNDROME: ICD-10-CM

## 2021-11-09 DIAGNOSIS — R42 VERTIGO AS LATE EFFECT OF CEREBROVASCULAR ACCIDENT (CVA): ICD-10-CM

## 2021-11-09 DIAGNOSIS — K22.719 BARRETT'S ESOPHAGUS WITH DYSPLASIA: ICD-10-CM

## 2021-11-09 DIAGNOSIS — D46.9 MYELODYSPLASTIC SYNDROME (HCC): ICD-10-CM

## 2021-11-09 DIAGNOSIS — Z86.73 HISTORY OF STROKE: ICD-10-CM

## 2021-11-09 DIAGNOSIS — H54.7 VISION IMPAIRMENT: ICD-10-CM

## 2021-11-09 DIAGNOSIS — I69.398 VERTIGO AS LATE EFFECT OF CEREBROVASCULAR ACCIDENT (CVA): ICD-10-CM

## 2021-11-09 PROBLEM — K22.70 BARRETT ESOPHAGUS: Status: ACTIVE | Noted: 2021-11-09

## 2021-11-09 PROCEDURE — 99205 OFFICE O/P NEW HI 60 MIN: CPT | Mod: GC | Performed by: STUDENT IN AN ORGANIZED HEALTH CARE EDUCATION/TRAINING PROGRAM

## 2021-11-09 RX ORDER — AMITRIPTYLINE HYDROCHLORIDE 100 MG/1
150 TABLET ORAL DAILY
Qty: 30 TABLET | Refills: 0 | Status: SHIPPED | OUTPATIENT
Start: 2021-11-09 | End: 2021-12-09 | Stop reason: SDUPTHER

## 2021-11-09 RX ORDER — DIPHENHYDRAMINE HCL 25 MG
100 TABLET ORAL NIGHTLY PRN
Qty: 120 TABLET | Refills: 0 | Status: SHIPPED | OUTPATIENT
Start: 2021-11-09 | End: 2021-12-09 | Stop reason: SDUPTHER

## 2021-11-09 RX ORDER — IPRATROPIUM BROMIDE AND ALBUTEROL 20; 100 UG/1; UG/1
1 SPRAY, METERED RESPIRATORY (INHALATION) 4 TIMES DAILY
Qty: 1 EACH | Refills: 5 | Status: SHIPPED | OUTPATIENT
Start: 2021-11-09 | End: 2021-12-09 | Stop reason: SDUPTHER

## 2021-11-09 RX ORDER — GABAPENTIN 800 MG/1
800 TABLET ORAL 3 TIMES DAILY
Qty: 90 TABLET | Refills: 1 | Status: SHIPPED | OUTPATIENT
Start: 2021-11-09 | End: 2021-12-15 | Stop reason: SDUPTHER

## 2021-11-09 RX ORDER — OMEPRAZOLE 20 MG/1
20 CAPSULE, DELAYED RELEASE ORAL 2 TIMES DAILY
Qty: 60 CAPSULE | Refills: 2 | Status: SHIPPED | OUTPATIENT
Start: 2021-11-09 | End: 2021-12-09

## 2021-11-09 ASSESSMENT — PATIENT HEALTH QUESTIONNAIRE - PHQ9: CLINICAL INTERPRETATION OF PHQ2 SCORE: 0

## 2021-11-09 NOTE — ASSESSMENT & PLAN NOTE
"Per patient, he was previously diagnosed with myelodysplastic syndrome and that previously had a bone marrow biopsy done that was \"positive\".  He has never received treatment for this and is currently not getting treated for this at this time.  He is unsure if he wants treatment for this.  We will further discuss this with patient at next visit at his follow-up appointment in 1 month.  "

## 2021-11-09 NOTE — ASSESSMENT & PLAN NOTE
Patient states that he has a history of Bates's esophagus and is currently on 20 mg omeprazole 2 times daily for this.  He denied any symptoms at today's visit.  20 mg of omeprazole 2 times daily prescription sent to pharmacy.  Patient will follow up in 1 month.

## 2021-11-09 NOTE — ASSESSMENT & PLAN NOTE
Patient has been previously diagnosed with restless leg syndrome.  He is currently taking gabapentin 800 mg 3 times daily for this and states this helps his restless legs and leg pain.  Prescription sent for Pending 100 mg 3 times daily.  Patient will follow up in 1 month.

## 2021-11-09 NOTE — ASSESSMENT & PLAN NOTE
"Patient with a history of insomnia and \"sleep deprivation\".  He is currently taking 10 mg Ambien, 150 mg diphenhydramine nightly with improvement of his sleep as well as amitriptyline daily.  Prescription for Ambien, amitriptyline, and diphenhydramine sent to pharmacy.  Patient will follow up in 1 month.  "

## 2021-11-09 NOTE — ASSESSMENT & PLAN NOTE
Patient with history of stroke, and states that he has issues with vertigo and has been told that this is due to neurological issues.  Per patient this is a minor infarct of the left side and otherwise had recovered well from this.  Referral sent to neuro-ophthalmology for further evaluation of vertigo and vision impairment.  Patient currently uses a wheelchair to mobilize as he is unable to stand or walk unassisted without falling.  Information sent to [facility] for electric chair.  Patient will follow up in 1 month.

## 2021-11-09 NOTE — ASSESSMENT & PLAN NOTE
This is a chronic problem.  Records will be sent to this office from previous primary care physician.  Patient recently diagnosed with asthma and using Combivent Respimat as needed which improves his symptoms.  Prescription given for Combivent Respimat with plan for patient follow-up in 1 month.

## 2021-11-09 NOTE — ASSESSMENT & PLAN NOTE
Patient with several month history of worsening vision in bilateral eyes.  He saw an eye doctor in July 2021 in [place] who told him that he has about 80% vision loss in his left eye and about 36% vision left in his right.  He has never seen a neuro-ophthalmologist although this did start after he had a stroke.  Referral sent for neuro-ophthalmology for further evaluation.  Patient will follow up in 1 month.

## 2021-11-09 NOTE — PROGRESS NOTES
Subjective:     CC: Establish care    HISTORY OF THE PRESENT ILLNESS: Patient is a 56 y.o. male with past medical history of leukemia, PE status post IVC filter placement 2017, asthma, compression fracture lumbar spine status post kyphoplasty in May 2019, and testicular cancer status post left orchiectomy.  He is here today to establish care and discuss vision impairment, vertigo, falls, and right leg pain.  Patient previous primary care physician was in [place] which is where he moved from recently and he is having all of this information faxed to our office.  Patient states that he has been having to use a wheelchair as his vertigo is so severe that he falls when trying to stand up or walk without assistance.  He also endorses some recent falls out of his wheelchair as he was trying to get out of the bus and states this is from the ramp down to uneven sidewalk concrete.  He states that he fell and broke his nose and chipped some of his teeth.  He is concerned about his wheelchair as his brakes no longer walk, and he had fallen at [facility] secondary to this.  It is difficult for him to use this wheelchair and to be mobile in this wheelchair.  He does complain of some scrapes to his right elbow and right hand from this fall.  He also had a fracture to right knee occurred in November 2020 from a fall out of his wheelchair as well.  He did not hit his head during this fall.  Patient states that for the last 9 days he has noticed an increase in pain in his right calf.  He is taking his Eliquis daily.  He denies any shortness of breath or chest pain.  He does have an IVC filter in place.  He also states that he has about 8% vision left in his left eye and about 35% left and his right.  He has never been to a neuro-ophthalmologist for this.  This originally started in August 2018, but has started worsening several months ago.  He is still able to read signs and make out faces in his right eye.    He is fully vaccinated  "for Covid.    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 1 Each 5   • amitriptyline (ELAVIL) 100 MG Tab Take 1.5 Tablets by mouth every day. 30 Tablet 0   • diphenhydrAMINE (BENADRYL ALLERGY) 25 MG Tab Take 4 Tablets by mouth at bedtime as needed for Sleep for up to 30 days. 120 Tablet 0   • omeprazole (PRILOSEC) 20 MG delayed-release capsule Take 1 Capsule by mouth 2 times a day for 30 days. 60 Capsule 2   • gabapentin (NEURONTIN) 800 MG tablet Take 1 Tablet by mouth 3 times a day. 90 Tablet 1   • ondansetron (ZOFRAN ODT) 4 MG TABLET DISPERSIBLE Take 4 mg by mouth at bedtime as needed for Nausea.     • Cholecalciferol (VITAMIN D3) 59547 units Cap Take 1 Cap by mouth every 14 days.     • acetaminophen (TYLENOL) 500 MG Tab Take 1 Tab by mouth every 6 hours as needed for Moderate Pain.     • multivitamin (THERAGRAN) Tab Take 1 Tab by mouth every day. 30 Tab 0     No current Epic-ordered facility-administered medications on file.       ROS:   Gen: no fevers/chills, no changes in weight  Eyes: Vision change  ENT: no sore throat, no hearing loss, no bloody nose  Pulm: Chronic cough, no sob  CV: no chest pain, no palpitations  GI: no nausea/vomiting, no diarrhea  : no dysuria  MSk: Right leg pain  Skin: no rash  Neuro: no headaches, no numbness/tingling  Heme/Lymph: no easy bruising      Objective:     Exam: /68 (BP Location: Right arm, Patient Position: Sitting, BP Cuff Size: Adult long)   Pulse 85   Temp 36.7 °C (98 °F) (Temporal)   Resp 12   Ht 1.803 m (5' 11\")   Wt 90.7 kg (200 lb)  "  SpO2 96%  Body mass index is 27.89 kg/m².    General: Normal appearing. No distress.  In wheelchair.  HEENT: Normocephalic. Eyes conjunctiva clear lids without ptosis, pupils equal and reactive to light accommodation, ears normal shape and contour, tympanic membranes are benign, nasal mucosa benign, oropharynx is without erythema, edema or exudates.  Poor dentition.  Neck: Supple without JVD  Pulmonary: Normal effort.  Minimal diffuse expiratory wheezes throughout.  No rales or ronchi.  Cardiovascular: Regular rate and rhythm without murmur.   Abdomen: Soft, nontender, nondistended. Normal bowel sounds. Liver and spleen are not palpable  Neurologic: Grossly nonfocal  Skin: Warm and dry.  Well-healing abrasions noted to right elbow and right posterior hand.  Well-healing abrasion also noted to right anterior knee.  Port in place in the left upper chest.  Musculoskeletal: In wheelchair.  1-2+ edema noted to bilateral lower extremities.  Some tenderness to palpation to right posterior calf but no obvious swelling to the calf.  No extremity cyanosis, or clubbing.  Psych: Normal mood and affect. Alert and oriented x3. Judgment and insight is normal.      Assessment & Plan:   56 y.o. male with the following -    Problem List Items Addressed This Visit     Mild intermittent asthma without complication     This is a chronic problem.  Records will be sent to this office from previous primary care physician.  Patient recently diagnosed with asthma and using Combivent Respimat as needed which improves his symptoms.  Prescription given for Combivent Respimat with plan for patient follow-up in 1 month.         Relevant Medications    ipratropium-albuterol (COMBIVENT RESPIMAT)  MCG/ACT Aero Soln    Restless leg syndrome     Patient has been previously diagnosed with restless leg syndrome.  He is currently taking gabapentin 800 mg 3 times daily for this and states this helps his restless legs and leg pain.  Prescription  "sent for Pending 100 mg 3 times daily.  Patient will follow up in 1 month.         Relevant Medications    gabapentin (NEURONTIN) 800 MG tablet    Vision impairment     Patient with several month history of worsening vision in bilateral eyes.  He saw an eye doctor in July 2021 in [place] who told him that he has about 80% vision loss in his left eye and about 36% vision left in his right.  He has never seen a neuro-ophthalmologist although this did start after he had a stroke.  Referral sent for neuro-ophthalmology for further evaluation.  Patient will follow up in 1 month.         Relevant Orders    Referral to Neuroopthamology    History of testicular cancer     Patient with a history of testicular cancer and was treated with radiation and chemotherapy.  He is status post orchiectomy of the left testicle and he is currently in remission.  Treatment was from February 2019 to May 2019.  He denies any symptoms at this time.  We will continue to monitor.         Myelodysplastic syndrome (HCC)     Per patient, he was previously diagnosed with myelodysplastic syndrome and that previously had a bone marrow biopsy done that was \"positive\".  He has never received treatment for this and is currently not getting treated for this at this time.  He is unsure if he wants treatment for this.  We will further discuss this with patient at next visit at his follow-up appointment in 1 month.         History of pulmonary embolism     Patient with history of multiple pulmonary emboli.  Awaiting previous medical documentation to be sent to the office. He was trialed on warfarin, and was unable to control his INR.  He is currently on Eliquis and takes this daily.  He denied any shortness of breath at this visit but does state that he has had some right calf pain.  He has a history of multiple DVTs as well.  Discussed with patient that if symptoms persist and if he gets shortness of breath, he will need to be evaluated in the ER.  " "Patient understands and verbalizes agreement this plan.  Patient will follow up in 1 month.         Bates esophagus     Patient states that he has a history of Bates's esophagus and is currently on 20 mg omeprazole 2 times daily for this.  He denied any symptoms at today's visit.  20 mg of omeprazole 2 times daily prescription sent to pharmacy.  Patient will follow up in 1 month.         Relevant Medications    omeprazole (PRILOSEC) 20 MG delayed-release capsule    Insomnia     Patient with a history of insomnia and \"sleep deprivation\".  He is currently taking 10 mg Ambien, 150 mg diphenhydramine nightly with improvement of his sleep as well as amitriptyline daily.  Prescription for Ambien, amitriptyline, and diphenhydramine sent to pharmacy.  Patient will follow up in 1 month.         Relevant Medications    amitriptyline (ELAVIL) 100 MG Tab    diphenhydrAMINE (BENADRYL ALLERGY) 25 MG Tab    Vertigo as late effect of cerebrovascular accident (CVA)     Patient with history of stroke, and states that he has issues with vertigo and has been told that this is due to neurological issues.  Per patient this is a minor infarct of the left side and otherwise had recovered well from this.  Referral sent to neuro-ophthalmology for further evaluation of vertigo and vision impairment.  Patient will follow up in 1 month.           Other Visit Diagnoses     History of stroke        Relevant Orders    Referral to Neuroopthamology          I spent a total of 70 minutes with record review, exam, communication with the patient, communication with other providers, and documentation of this encounter.    Return in about 4 weeks (around 12/7/2021) for Long.    Please note that this dictation was created using voice recognition software. I have made every reasonable attempt to correct obvious errors, but I expect that there are errors of grammar and possibly content that I did not discover before finalizing the note.  "

## 2021-11-09 NOTE — ASSESSMENT & PLAN NOTE
Patient with a history of testicular cancer and was treated with radiation and chemotherapy.  He is status post orchiectomy of the left testicle and he is currently in remission.  Treatment was from February 2019 to May 2019.  He denies any symptoms at this time.  We will continue to monitor.

## 2021-11-09 NOTE — ASSESSMENT & PLAN NOTE
Patient with history of multiple pulmonary emboli.  Awaiting previous medical documentation to be sent to the office. He was trialed on warfarin, and was unable to control his INR.  He is currently on Eliquis and takes this daily.  He denied any shortness of breath at this visit but does state that he has had some right calf pain.  He has a history of multiple DVTs as well.  Discussed with patient that if symptoms persist and if he gets shortness of breath, he will need to be evaluated in the ER.  Patient understands and verbalizes agreement this plan.  Patient will follow up in 1 month.

## 2021-11-22 ENCOUNTER — HOSPITAL ENCOUNTER (EMERGENCY)
Facility: MEDICAL CENTER | Age: 56
End: 2021-11-22
Attending: EMERGENCY MEDICINE
Payer: MEDICAID

## 2021-11-22 VITALS
OXYGEN SATURATION: 98 % | HEIGHT: 71 IN | HEART RATE: 101 BPM | TEMPERATURE: 99 F | WEIGHT: 263.23 LBS | RESPIRATION RATE: 15 BRPM | DIASTOLIC BLOOD PRESSURE: 86 MMHG | SYSTOLIC BLOOD PRESSURE: 124 MMHG | BODY MASS INDEX: 36.85 KG/M2

## 2021-11-22 DIAGNOSIS — L03.317 CELLULITIS OF BUTTOCK: ICD-10-CM

## 2021-11-22 DIAGNOSIS — L08.0 PUSTULAR RASH: ICD-10-CM

## 2021-11-22 LAB
ALBUMIN SERPL BCP-MCNC: 4.3 G/DL (ref 3.2–4.9)
ALBUMIN/GLOB SERPL: 1.4 G/DL
ALP SERPL-CCNC: 134 U/L (ref 30–99)
ALT SERPL-CCNC: 21 U/L (ref 2–50)
ANION GAP SERPL CALC-SCNC: 13 MMOL/L (ref 7–16)
AST SERPL-CCNC: 24 U/L (ref 12–45)
BASOPHILS # BLD AUTO: 0.8 % (ref 0–1.8)
BASOPHILS # BLD: 0.06 K/UL (ref 0–0.12)
BILIRUB SERPL-MCNC: 0.5 MG/DL (ref 0.1–1.5)
BUN SERPL-MCNC: 9 MG/DL (ref 8–22)
CALCIUM SERPL-MCNC: 9.2 MG/DL (ref 8.5–10.5)
CHLORIDE SERPL-SCNC: 108 MMOL/L (ref 96–112)
CO2 SERPL-SCNC: 24 MMOL/L (ref 20–33)
CREAT SERPL-MCNC: 1.09 MG/DL (ref 0.5–1.4)
EOSINOPHIL # BLD AUTO: 0.13 K/UL (ref 0–0.51)
EOSINOPHIL NFR BLD: 1.6 % (ref 0–6.9)
ERYTHROCYTE [DISTWIDTH] IN BLOOD BY AUTOMATED COUNT: 41.9 FL (ref 35.9–50)
GLOBULIN SER CALC-MCNC: 3.1 G/DL (ref 1.9–3.5)
GLUCOSE SERPL-MCNC: 78 MG/DL (ref 65–99)
HCT VFR BLD AUTO: 47.5 % (ref 42–52)
HGB BLD-MCNC: 16.1 G/DL (ref 14–18)
LYMPHOCYTES # BLD AUTO: 1.68 K/UL (ref 1–4.8)
LYMPHOCYTES NFR BLD: 21 % (ref 22–41)
MANUAL DIFF BLD: NORMAL
MCH RBC QN AUTO: 29.9 PG (ref 27–33)
MCHC RBC AUTO-ENTMCNC: 33.9 G/DL (ref 33.7–35.3)
MCV RBC AUTO: 88.3 FL (ref 81.4–97.8)
MONOCYTES # BLD AUTO: 0.78 K/UL (ref 0–0.85)
MONOCYTES NFR BLD AUTO: 9.7 % (ref 0–13.4)
MORPHOLOGY BLD-IMP: NORMAL
NEUTROPHILS # BLD AUTO: 5.35 K/UL (ref 1.82–7.42)
NEUTROPHILS NFR BLD: 66.9 % (ref 44–72)
NRBC # BLD AUTO: 0 K/UL
NRBC BLD-RTO: 0 /100 WBC
PLATELET # BLD AUTO: 119 K/UL (ref 164–446)
PLATELET BLD QL SMEAR: NORMAL
PMV BLD AUTO: 9.1 FL (ref 9–12.9)
POTASSIUM SERPL-SCNC: 4.3 MMOL/L (ref 3.6–5.5)
PROT SERPL-MCNC: 7.4 G/DL (ref 6–8.2)
RBC # BLD AUTO: 5.38 M/UL (ref 4.7–6.1)
RBC BLD AUTO: NORMAL
SODIUM SERPL-SCNC: 145 MMOL/L (ref 135–145)
WBC # BLD AUTO: 8 K/UL (ref 4.8–10.8)

## 2021-11-22 PROCEDURE — 85007 BL SMEAR W/DIFF WBC COUNT: CPT

## 2021-11-22 PROCEDURE — A9270 NON-COVERED ITEM OR SERVICE: HCPCS | Performed by: EMERGENCY MEDICINE

## 2021-11-22 PROCEDURE — 80053 COMPREHEN METABOLIC PANEL: CPT

## 2021-11-22 PROCEDURE — 700102 HCHG RX REV CODE 250 W/ 637 OVERRIDE(OP): Performed by: EMERGENCY MEDICINE

## 2021-11-22 PROCEDURE — 85027 COMPLETE CBC AUTOMATED: CPT

## 2021-11-22 PROCEDURE — 99283 EMERGENCY DEPT VISIT LOW MDM: CPT

## 2021-11-22 RX ORDER — AMOXICILLIN 500 MG/1
500 CAPSULE ORAL ONCE
Status: COMPLETED | OUTPATIENT
Start: 2021-11-22 | End: 2021-11-22

## 2021-11-22 RX ORDER — AMOXICILLIN 500 MG/1
1000 CAPSULE ORAL 3 TIMES DAILY
Qty: 30 CAPSULE | Refills: 0 | Status: SHIPPED | OUTPATIENT
Start: 2021-11-22 | End: 2021-11-27

## 2021-11-22 RX ORDER — HYDROCODONE BITARTRATE AND ACETAMINOPHEN 5; 325 MG/1; MG/1
1 TABLET ORAL ONCE
Status: COMPLETED | OUTPATIENT
Start: 2021-11-22 | End: 2021-11-22

## 2021-11-22 RX ADMIN — HYDROCODONE BITARTRATE AND ACETAMINOPHEN 1 TABLET: 5; 325 TABLET ORAL at 19:47

## 2021-11-22 RX ADMIN — AMOXICILLIN 500 MG: 500 CAPSULE ORAL at 19:47

## 2021-11-22 ASSESSMENT — LIFESTYLE VARIABLES: DO YOU DRINK ALCOHOL: NO

## 2021-11-23 NOTE — ED TRIAGE NOTES
Chief Complaint   Patient presents with   • Wound Check     sores on left anterior tibia and buttocks      Pt to triage via wheel chair for above complaint. Pt notes sores have been present x 1 week.    Pt educated on triage process and returned to lobby.

## 2021-11-23 NOTE — ED NOTES
Reviewed discharge instructions, pt verbalized understanding of instructions and medication. States he will schedule follow-up appointment. Denies further questions at this time. Pt wheeled himself out of the ED with the use of his wheelchair.

## 2021-11-23 NOTE — ED PROVIDER NOTES
ED Provider Note    Chief Complaint:   Skin lesions    HPI:  Benito Persaud is a very pleasant 56-year-old gentleman who presents to the emergency department for evaluation of skin lesions.  His symptoms have been present for about 4 days.  He is noticed to pustular lesions overlying the calf of the right leg, 1 pustular lesions overlying the shin, all pustules do have a very small surrounding area of redness.  Additionally he has several scattered similar lesions across his buttocks in the area where he contacts his wheelchair.  He reports no associated fevers, he does have some mild tachycardia with heart rate of 100 on arrival.  He does have some associated pain to the affected areas which is progressively worsened over the past 4 days.  He does not have any history of similar symptoms, he does not have any bedbugs or insect bites that he could have come into contact with.  He does have a past medical history significant for myelodysplastic syndrome, not currently under treatment, as well as pulmonary embolism, and he is currently anticoagulated.  He has no lower extremity swelling, no upper extremity swelling.  He has no new chest pain or shortness of breath.  No upper respiratory symptoms, no cough.  He is vaccinated for COVID-19.  He does use a wheelchair to get around.    Review of Systems:  See HPI for pertinent positives and negatives. All other systems negative.    Past Medical History:   has a past medical history of Asthma and Cancer (HCC) (11/01/2016).    Social History:  Social History     Tobacco Use   • Smoking status: Former Smoker   • Smokeless tobacco: Current User     Last attempt to quit: 11/8/2018   Substance and Sexual Activity   • Alcohol use: Never   • Drug use: Never   • Sexual activity: Not on file       Surgical History:   has a past surgical history that includes cath removal (N/A, 8/14/2019).    Current Medications:  Home Medications     Reviewed by Tiff Cohen R.N. (Registered  "Nurse) on 11/22/21 at 1749  Med List Status: Partial   Medication Last Dose Status   acetaminophen (TYLENOL) 500 MG Tab  Active   amitriptyline (ELAVIL) 100 MG Tab  Active   Cholecalciferol (VITAMIN D3) 87431 units Cap  Active   diphenhydrAMINE (BENADRYL ALLERGY) 25 MG Tab  Active   gabapentin (NEURONTIN) 800 MG tablet  Active   ipratropium-albuterol (COMBIVENT RESPIMAT)  MCG/ACT Aero Soln  Active   multivitamin (THERAGRAN) Tab  Active   omeprazole (PRILOSEC) 20 MG delayed-release capsule  Active   ondansetron (ZOFRAN ODT) 4 MG TABLET DISPERSIBLE  Active                Allergies:  Allergies   Allergen Reactions   • Iodine Anaphylaxis   • Keflex Diarrhea and Vomiting     Vomiting & Diarrhea  Tolerates ceftriaxone   • Reglan [Metoclopramide] Nausea     Asthma     • Shellfish Allergy Anaphylaxis   • Toradol Hives       Physical Exam:  Vital Signs: /86   Pulse (!) 101   Temp 37.2 °C (99 °F) (Temporal)   Resp 15   Ht 1.803 m (5' 11\")   Wt 119 kg (263 lb 3.7 oz)   SpO2 98%   BMI 36.71 kg/m²   Constitutional: Alert, no acute distress  HENT: Normocephalic, mask in place  Eyes: Pupils equal and reactive, normal conjunctiva  Neck: Supple, normal range of motion, no stridor  Cardiovascular: Extremities are warm and well perfused  Pulmonary: No respiratory distress, normal work of breathing  Abdomen: Soft, non-distended  Skin: 2 small pustular lesions with about 6 cm diameter of surrounding cellulitis to the right calf, 1 pustular lesion with similar surrounding cellulitis to the left shin, multiple scattered pustules to the buttocks, again with a similar appearance.  No areas of fluctuance appreciated, no evidence of abscess.  He has no severe decubitus ulcerations.  Musculoskeletal: No deformity noted to the upper nor lower extremities  Neurologic: Alert, oriented, normal speech, normal motor function  Psychiatric: Normal and appropriate mood and affect    Medical records reviewed for continuity of care.  " Family medicine resident note reviewed from 11/9/2021.  Patient presented to family medicine clinic to establish care.  He has a past medical history significant for leukemia, pulmonary embolism status post IVC filter placement in 2017, asthma, compression fracture of the lumbar spine status post kyphoplasty in May 2019, and testicular cancer status post left orchiectomy.  He also has a history of vertigo, which has caused falls in the past.  He reports significant vision loss bilaterally.  At the time of this visit, he was on Eliquis due to history of pulmonary embolism.  No concern for wounds noted at this visit.    Labs:  Labs Reviewed   CBC WITH DIFFERENTIAL - Abnormal; Notable for the following components:       Result Value    Platelet Count 119 (*)     All other components within normal limits   COMP METABOLIC PANEL - Abnormal; Notable for the following components:    Alkaline Phosphatase 134 (*)     All other components within normal limits   ESTIMATED GFR       Radiology:  No orders to display        ED Medications Administered:  Medications   amoxicillin (AMOXIL) capsule 500 mg (500 mg Oral Given 11/22/21 1947)   HYDROcodone-acetaminophen (NORCO) 5-325 MG per tablet 1 Tablet (1 Tablet Oral Given 11/22/21 1947)       Differential diagnosis:  Cellulitis, staph or strep infection, impaired immunity, less likely Sirs or sepsis    MDM:  Mr. Persaud presents to the emergency department today for evaluation of multiple posterior lesions documented above.  The appearance is consistent with a mild staph or strep infection.  Due to his mild tachycardia, I did order some screening labs to look for any evidence of a Sirs response.  Lab work is reassuring with a normal white blood count on CBC, and no significant electrolyte abnormalities.  Differential pending at this time.  Glucose is within normal limits.    Wounds were dressed with antibiotic ointment.  He was given a prescription for amoxicillin, first dose was given  in the emergency department.  He was also given a dose of hydrocodone for pain.  At this time, do not believe he requires any further emergent diagnostics or treatment.  He is discharged home with instructions to follow-up with his primary care physician within 24 to 48 hours. Return precautions were discussed with the patient, and provided in written form with the patient's discharge instructions.     Personal protective equipment including N95 surgical respirator, goggles, and gloves were used during this encounter.       Disposition:  Discharge home in stable condition    Final Impression:  1. Pustular rash    2. Cellulitis of buttock        Electronically signed by: Esther Lakhani MD, 11/22/2021 10:06 PM

## 2021-11-23 NOTE — ED NOTES
Pt wheeled himself back from the lobby. Pt moved from wheelchair to gurney on his own. Pt asked to change into a gown and placed on a pulse ox and bp cuff.

## 2021-11-23 NOTE — DISCHARGE INSTRUCTIONS
Please follow-up with your primary care physician for complete recheck within 24 to 48 hours.  Please take the antibiotics as prescribed.  Return to the emergency department if you develop any new or worsening symptoms including worsening pain, drainage, swelling, fevers, or if you have any further concerns.

## 2021-11-23 NOTE — ED NOTES
Patient A&O, personal wheelchair at bedside; patient reports new wounds x4 days from unknown source.

## 2021-12-06 DIAGNOSIS — G47.00 INSOMNIA, UNSPECIFIED TYPE: ICD-10-CM

## 2021-12-06 DIAGNOSIS — J45.20 MILD INTERMITTENT ASTHMA WITHOUT COMPLICATION: ICD-10-CM

## 2021-12-06 NOTE — TELEPHONE ENCOUNTER
Called patient to see what medications needed refilled. Voicemail box not set up yet, unable to leave message.

## 2021-12-10 NOTE — TELEPHONE ENCOUNTER
Patient requesting refill of: Amitriptyline 100mg, Combivent inhaler, Benadryl to [address]

## 2021-12-11 RX ORDER — AMITRIPTYLINE HYDROCHLORIDE 100 MG/1
150 TABLET ORAL DAILY
Qty: 30 TABLET | Refills: 0 | Status: SHIPPED | OUTPATIENT
Start: 2021-12-11 | End: 2021-12-15 | Stop reason: SDUPTHER

## 2021-12-11 RX ORDER — DIPHENHYDRAMINE HCL 25 MG
100 TABLET ORAL NIGHTLY PRN
Qty: 120 TABLET | Refills: 0 | Status: SHIPPED | OUTPATIENT
Start: 2021-12-11 | End: 2021-12-15

## 2021-12-11 RX ORDER — IPRATROPIUM BROMIDE AND ALBUTEROL 20; 100 UG/1; UG/1
1 SPRAY, METERED RESPIRATORY (INHALATION) 4 TIMES DAILY
Qty: 1 EACH | Refills: 5 | Status: SHIPPED | OUTPATIENT
Start: 2021-12-11 | End: 2021-12-15 | Stop reason: SDUPTHER

## 2021-12-13 NOTE — TELEPHONE ENCOUNTER
Phone Number Called: [phone]    Call outcome: Left detailed message for patient. Informed to call back with any additional questions.    Message: L/M letting pt know his Rx refills were approved, per patient request.

## 2021-12-15 ENCOUNTER — OFFICE VISIT (OUTPATIENT)
Dept: MEDICAL GROUP | Facility: CLINIC | Age: 56
End: 2021-12-15
Payer: MEDICAID

## 2021-12-15 VITALS
HEIGHT: 71 IN | OXYGEN SATURATION: 94 % | DIASTOLIC BLOOD PRESSURE: 83 MMHG | BODY MASS INDEX: 36.82 KG/M2 | RESPIRATION RATE: 16 BRPM | HEART RATE: 105 BPM | SYSTOLIC BLOOD PRESSURE: 158 MMHG | WEIGHT: 263 LBS

## 2021-12-15 DIAGNOSIS — G89.29 OTHER CHRONIC PAIN: ICD-10-CM

## 2021-12-15 DIAGNOSIS — G47.00 INSOMNIA, UNSPECIFIED TYPE: ICD-10-CM

## 2021-12-15 DIAGNOSIS — H54.7 VISION IMPAIRMENT: ICD-10-CM

## 2021-12-15 DIAGNOSIS — J06.9 UPPER RESPIRATORY TRACT INFECTION, UNSPECIFIED TYPE: ICD-10-CM

## 2021-12-15 DIAGNOSIS — R42 VERTIGO AS LATE EFFECT OF CEREBROVASCULAR ACCIDENT (CVA): ICD-10-CM

## 2021-12-15 DIAGNOSIS — K22.719 BARRETT'S ESOPHAGUS WITH DYSPLASIA: ICD-10-CM

## 2021-12-15 DIAGNOSIS — Z86.73 HISTORY OF STROKE: ICD-10-CM

## 2021-12-15 DIAGNOSIS — J45.20 MILD INTERMITTENT ASTHMA WITHOUT COMPLICATION: ICD-10-CM

## 2021-12-15 DIAGNOSIS — G25.81 RESTLESS LEG SYNDROME: ICD-10-CM

## 2021-12-15 DIAGNOSIS — I69.398 VERTIGO AS LATE EFFECT OF CEREBROVASCULAR ACCIDENT (CVA): ICD-10-CM

## 2021-12-15 DIAGNOSIS — Z86.711 HISTORY OF PULMONARY EMBOLISM: ICD-10-CM

## 2021-12-15 PROCEDURE — 99214 OFFICE O/P EST MOD 30 MIN: CPT | Mod: GC | Performed by: STUDENT IN AN ORGANIZED HEALTH CARE EDUCATION/TRAINING PROGRAM

## 2021-12-15 RX ORDER — GABAPENTIN 800 MG/1
800 TABLET ORAL 3 TIMES DAILY
Qty: 90 TABLET | Refills: 1 | Status: SHIPPED | OUTPATIENT
Start: 2021-12-15 | End: 2021-12-15

## 2021-12-15 RX ORDER — DIPHENHYDRAMINE HCL 50 MG
CAPSULE ORAL
Qty: 30 CAPSULE | Refills: 10 | Status: SHIPPED | OUTPATIENT
Start: 2021-12-15 | End: 2022-01-12

## 2021-12-15 RX ORDER — AZITHROMYCIN 250 MG/1
TABLET, FILM COATED ORAL
Qty: 6 TABLET | Refills: 0 | Status: SHIPPED | OUTPATIENT
Start: 2021-12-15 | End: 2022-03-26

## 2021-12-15 RX ORDER — DIPHENHYDRAMINE HCL 50 MG
CAPSULE ORAL
Qty: 30 CAPSULE | Refills: 10 | Status: SHIPPED | OUTPATIENT
Start: 2021-12-15 | End: 2021-12-15

## 2021-12-15 RX ORDER — IPRATROPIUM BROMIDE AND ALBUTEROL 20; 100 UG/1; UG/1
1 SPRAY, METERED RESPIRATORY (INHALATION) 4 TIMES DAILY
Qty: 1 EACH | Refills: 5 | Status: SHIPPED | OUTPATIENT
Start: 2021-12-15 | End: 2022-01-12 | Stop reason: SDUPTHER

## 2021-12-15 RX ORDER — ZOLPIDEM TARTRATE 10 MG/1
10 TABLET ORAL NIGHTLY PRN
Qty: 30 TABLET | Refills: 2 | Status: SHIPPED | OUTPATIENT
Start: 2021-12-15 | End: 2022-01-12 | Stop reason: SDUPTHER

## 2021-12-15 RX ORDER — IPRATROPIUM BROMIDE AND ALBUTEROL 20; 100 UG/1; UG/1
1 SPRAY, METERED RESPIRATORY (INHALATION) 4 TIMES DAILY
Qty: 1 EACH | Refills: 5 | Status: SHIPPED | OUTPATIENT
Start: 2021-12-15 | End: 2021-12-15

## 2021-12-15 RX ORDER — NICOTINE POLACRILEX 4 MG/1
GUM, CHEWING ORAL
COMMUNITY
Start: 2021-11-12 | End: 2021-12-15 | Stop reason: SDUPTHER

## 2021-12-15 RX ORDER — GABAPENTIN 800 MG/1
800 TABLET ORAL 3 TIMES DAILY
Qty: 90 TABLET | Refills: 1 | Status: SHIPPED | OUTPATIENT
Start: 2021-12-15 | End: 2022-01-12

## 2021-12-15 RX ORDER — AMITRIPTYLINE HYDROCHLORIDE 100 MG/1
150 TABLET ORAL DAILY
Qty: 30 TABLET | Refills: 0 | Status: SHIPPED | OUTPATIENT
Start: 2021-12-15 | End: 2021-12-15

## 2021-12-15 RX ORDER — AMITRIPTYLINE HYDROCHLORIDE 100 MG/1
150 TABLET ORAL DAILY
Qty: 30 TABLET | Refills: 0 | Status: SHIPPED | OUTPATIENT
Start: 2021-12-15 | End: 2022-01-12

## 2021-12-15 RX ORDER — NICOTINE POLACRILEX 4 MG/1
40 GUM, CHEWING ORAL DAILY
Qty: 30 TABLET | Refills: 9 | Status: SHIPPED | OUTPATIENT
Start: 2021-12-15 | End: 2021-12-15

## 2021-12-15 RX ORDER — NICOTINE POLACRILEX 4 MG/1
40 GUM, CHEWING ORAL DAILY
Qty: 30 TABLET | Refills: 9 | Status: SHIPPED | OUTPATIENT
Start: 2021-12-15 | End: 2022-01-12

## 2021-12-15 RX ORDER — AZITHROMYCIN 250 MG/1
TABLET, FILM COATED ORAL
Qty: 6 TABLET | Refills: 0 | Status: SHIPPED | OUTPATIENT
Start: 2021-12-15 | End: 2021-12-15

## 2021-12-15 ASSESSMENT — ENCOUNTER SYMPTOMS
BACK PAIN: 1
COUGH: 1
DIZZINESS: 1
GASTROINTESTINAL NEGATIVE: 1
CARDIOVASCULAR NEGATIVE: 1
SPUTUM PRODUCTION: 1
CONSTITUTIONAL NEGATIVE: 1
PSYCHIATRIC NEGATIVE: 1

## 2021-12-15 ASSESSMENT — FIBROSIS 4 INDEX: FIB4 SCORE: 2.42

## 2021-12-16 NOTE — ASSESSMENT & PLAN NOTE
Patient with 1 month history of cough and congestion.  Cough is productive of green sputum.  No fever or chills.  He recently got tested for Covid and it was negative.  He denied any increased work of breathing.  Z-Rafael given the patient due to chronicity of symptoms.  Patient will follow up in 1 month.

## 2021-12-16 NOTE — ASSESSMENT & PLAN NOTE
Patient with chronic history of insomnia on 10 mg Ambien as well as 100 mg Benadryl at night.  He has been out of these medications and states that he has not been able to get a good night sleep.  Prescription was sent to pharmacy for Ambien 10 mg nightly as needed and 100 mg nightly as needed.  Risk and side effects discussed with patient. Patient will follow up in 1 month.

## 2021-12-16 NOTE — ASSESSMENT & PLAN NOTE
Patient with history of pulmonary is embolism and IVC filter placed currently on 5 mg Eliquis 2 times daily.  He states he is almost out of this medication.  Prescription sent for 5 mg Eliquis 2 times daily.  Patient will follow up in 1 month.

## 2021-12-16 NOTE — ASSESSMENT & PLAN NOTE
Patient with history of chronic vertigo since he suffered from a stroke.  He also has associated vision loss the vertigo makes it difficult for him to move around, so now he is in the electric wheelchair and states that he has had less difficulty since using the electric wheelchair.  He has not been evaluated by a neuro-ophthalmologist for this.  Referral sent to neuro-ophthalmology.  Patient will follow up in 1 month.

## 2021-12-16 NOTE — ASSESSMENT & PLAN NOTE
Patient with history of chronic asthma well-controlled on Combivent Respimat 1 puff 4 times daily.  He feels he is doing well with this.  He does need a refill for this prescription at today's visit.  Prescription sent to pharmacy for Combivent Respimat 1 puff 4 times daily.  Patient will follow up in 1 month.

## 2021-12-16 NOTE — ASSESSMENT & PLAN NOTE
Patient with history of Bates esophagus on omeprazole 40 mg daily.  Prescription sent for omeprazole 40 mg daily.  Patient will follow up in 1 month.

## 2021-12-16 NOTE — ASSESSMENT & PLAN NOTE
Patient with recent visit at optometry who recommended referral to neuro-ophthalmology for further evaluation.  Referral to neuro-ophthalmology was placed at last visit, but patient states he has not heard from them.  Referral again placed to neuro-ophthalmology for further evaluation as patient had vision loss and vertigo after he suffered a stroke.  Patient is now in an electric wheelchair and states that this is making it easier for him to get around.  Patient will follow up in 1 month.

## 2021-12-16 NOTE — PROGRESS NOTES
Subjective:     CC: Follow-up    HPI:   Benito presents today for 1 month follow-up.  Patient states that he had difficulty picking up his prescriptions from the pharmacy as they said they did not have orders for these.  He was able to get a electric wheelchair and states that this is working better for him than his previous wheelchair.  He has not had any falls recently.  He was recently seen in the ER on 11/22 and was diagnosed with cellulitis of bilateral lower extremities and was discharged home on Amoxicillin 500mg which he completed the course of.  He states that the swelling of his legs has resolved and that the redness has improved and he currently denies any pain in the legs.  He has been elevating his feet as well to help with the swelling.  He has also had approximately 1 month of congestion and cough.  He denies any fever or chills.  He states that the cough is productive with green-colored sputum.  He is almost out of his inhalers as well.  He denies any increased need to use his inhalers.    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 30 Tablet 2   • amitriptyline (ELAVIL) 100 MG Tab Take 1.5 Tablets by mouth every day. 30 Tablet 0   • diphenhydrAMINE (BENADRYL) 50 MG Cap Take 2 tablets by mouth at bedtime. 30 Capsule 10   • ipratropium-albuterol (COMBIVENT RESPIMAT)  MCG/ACT Aero Soln Inhale 1 Puff 4 times a day. 1 Each 5   • omeprazole (PRILOSEC) 20 MG Tablet Delayed Response delayed-release tablet Take 2 Tablets by mouth every day. 30 Tablet 9   • azithromycin (ZITHROMAX) 250 MG Tab Take 2  "tablets on day 1 and then 1 tablet every day for remaining 4 days. 6 Tablet 0   • gabapentin (NEURONTIN) 800 MG tablet Take 1 Tablet by mouth 3 times a day. 90 Tablet 1   • apixaban (ELIQUIS) 5mg Tab Take 1 Tablet by mouth 2 times a day. 60 Tablet 3   • ondansetron (ZOFRAN ODT) 4 MG TABLET DISPERSIBLE Take 4 mg by mouth at bedtime as needed for Nausea.     • Cholecalciferol (VITAMIN D3) 06372 units Cap Take 1 Cap by mouth every 14 days.     • acetaminophen (TYLENOL) 500 MG Tab Take 1 Tab by mouth every 6 hours as needed for Moderate Pain.     • multivitamin (THERAGRAN) Tab Take 1 Tab by mouth every day. 30 Tab 0     No current Epic-ordered facility-administered medications on file.       ROS:  Review of Systems   Constitutional: Negative.    HENT: Positive for congestion.    Respiratory: Positive for cough and sputum production.    Cardiovascular: Negative.    Gastrointestinal: Negative.    Genitourinary: Negative.    Musculoskeletal: Positive for back pain and joint pain (bilateral knees).   Skin: Negative.    Neurological: Positive for dizziness.   Psychiatric/Behavioral: Negative.        Objective:     Exam:  /83 (BP Location: Left arm, Patient Position: Sitting, BP Cuff Size: Adult)   Pulse (!) 105   Resp 16   Ht 1.803 m (5' 11\")   Wt 119 kg (263 lb)   SpO2 94%   BMI 36.68 kg/m²  Body mass index is 36.68 kg/m².    Physical Exam  Constitutional:       General: He is not in acute distress.     Appearance: Normal appearance.   HENT:      Head: Normocephalic and atraumatic.   Eyes:      Extraocular Movements: Extraocular movements intact.   Cardiovascular:      Rate and Rhythm: Normal rate and regular rhythm.      Heart sounds: No murmur heard.      Pulmonary:      Effort: Pulmonary effort is normal. No respiratory distress.      Breath sounds: Normal breath sounds. No wheezing.   Abdominal:      General: Abdomen is flat.      Palpations: Abdomen is soft.   Musculoskeletal:         General: Normal range " of motion.      Cervical back: Normal range of motion.   Skin:     General: Skin is warm and dry.      Findings: No rash.   Neurological:      Mental Status: He is alert. Mental status is at baseline.   Psychiatric:         Mood and Affect: Mood normal.         Behavior: Behavior normal.         Thought Content: Thought content normal.         Judgment: Judgment normal.           Assessment & Plan:     56 y.o. male with the following -     Problem List Items Addressed This Visit     Mild intermittent asthma without complication     Patient with history of chronic asthma well-controlled on Combivent Respimat 1 puff 4 times daily.  He feels he is doing well with this.  He does need a refill for this prescription at today's visit.  Prescription sent to pharmacy for Combivent Respimat 1 puff 4 times daily.  Patient will follow up in 1 month.         Relevant Medications    ipratropium-albuterol (COMBIVENT RESPIMAT)  MCG/ACT Aero Soln    Restless leg syndrome     Patient with chronic history of restless leg syndrome taking 800 mg gabapentin 3 times daily.  This does make it difficult for him to sleep and he feels the gabapentin does work for him.  Order sent for gabapentin 800 mg 3 times daily.  Patient will follow up in 1 month.         Relevant Medications    gabapentin (NEURONTIN) 800 MG tablet    Vision impairment     Patient with recent visit at optometry who recommended referral to neuro-ophthalmology for further evaluation.  Referral to neuro-ophthalmology was placed at last visit, but patient states he has not heard from them.  Referral again placed to neuro-ophthalmology for further evaluation as patient had vision loss and vertigo after he suffered a stroke.  Patient is now in an electric wheelchair and states that this is making it easier for him to get around.  Patient will follow up in 1 month.         Relevant Orders    Referral to Neuroopthamology    History of pulmonary embolism     Patient with  history of pulmonary is embolism and IVC filter placed currently on 5 mg Eliquis 2 times daily.  He states he is almost out of this medication.  Prescription sent for 5 mg Eliquis 2 times daily.  Patient will follow up in 1 month.         Bates esophagus     Patient with history of Bates esophagus on omeprazole 40 mg daily.  Prescription sent for omeprazole 40 mg daily.  Patient will follow up in 1 month.         Insomnia     Patient with chronic history of insomnia on 10 mg Ambien as well as 100 mg Benadryl at night.  He has been out of these medications and states that he has not been able to get a good night sleep.  Prescription was sent to pharmacy for Ambien 10 mg nightly as needed and 100 mg nightly as needed.  Risk and side effects discussed with patient. Patient will follow up in 1 month.         Relevant Medications    zolpidem (AMBIEN) 10 MG Tab    amitriptyline (ELAVIL) 100 MG Tab    Vertigo as late effect of cerebrovascular accident (CVA)     Patient with history of chronic vertigo since he suffered from a stroke.  He also has associated vision loss the vertigo makes it difficult for him to move around, so now he is in the electric wheelchair and states that he has had less difficulty since using the electric wheelchair.  He has not been evaluated by a neuro-ophthalmologist for this.  Referral sent to neuro-ophthalmology.  Patient will follow up in 1 month.         URI (upper respiratory infection)     Patient with 1 month history of cough and congestion.  Cough is productive of green sputum.  No fever or chills.  He recently got tested for Covid and it was negative.  He denied any increased work of breathing.  Z-Rafael given the patient due to chronicity of symptoms.  Patient will follow up in 1 month.           Other Visit Diagnoses     History of stroke        Relevant Orders    Referral to Neuroopthamology    Other chronic pain        Relevant Medications    amitriptyline (ELAVIL) 100 MG Tab     gabapentin (NEURONTIN) 800 MG tablet    Other Relevant Orders    Referral to Pain Clinic          I spent a total of 45 minutes with record review, exam, communication with the patient, communication with other providers, and documentation of this encounter.      Return in about 4 weeks (around 1/12/2022) for Long.    Please note that this dictation was created using voice recognition software. I have made every reasonable attempt to correct obvious errors, but I expect that there are errors of grammar and possibly content that I did not discover before finalizing the note.

## 2021-12-16 NOTE — ASSESSMENT & PLAN NOTE
Patient with chronic history of restless leg syndrome taking 800 mg gabapentin 3 times daily.  This does make it difficult for him to sleep and he feels the gabapentin does work for him.  Order sent for gabapentin 800 mg 3 times daily.  Patient will follow up in 1 month.

## 2021-12-23 ENCOUNTER — TELEPHONE (OUTPATIENT)
Dept: MEDICAL GROUP | Facility: CLINIC | Age: 56
End: 2021-12-23

## 2021-12-23 NOTE — TELEPHONE ENCOUNTER
Caller Name: Benito Persaud  Call Back Number: [phone]    How would the patient prefer to be contacted with a response: Phone call do NOT leave a detailed message    Patient called stating he was supposed to get three other referrals when he was in clinic on 12/15. He states the referrals needed (on top of what was already sent in) are: Neurology with Dr. Bloch, back & spine, as well as ortho.

## 2021-12-31 ENCOUNTER — APPOINTMENT (OUTPATIENT)
Dept: RADIOLOGY | Facility: MEDICAL CENTER | Age: 56
End: 2021-12-31
Attending: EMERGENCY MEDICINE
Payer: MEDICAID

## 2021-12-31 ENCOUNTER — HOSPITAL ENCOUNTER (EMERGENCY)
Facility: MEDICAL CENTER | Age: 56
End: 2022-01-01
Attending: EMERGENCY MEDICINE
Payer: MEDICAID

## 2021-12-31 DIAGNOSIS — B34.9 VIRAL ILLNESS: ICD-10-CM

## 2021-12-31 LAB
ALBUMIN SERPL BCP-MCNC: 3.1 G/DL (ref 3.2–4.9)
ALBUMIN/GLOB SERPL: 0.9 G/DL
ALP SERPL-CCNC: 102 U/L (ref 30–99)
ALT SERPL-CCNC: 9 U/L (ref 2–50)
ANION GAP SERPL CALC-SCNC: 9 MMOL/L (ref 7–16)
AST SERPL-CCNC: 14 U/L (ref 12–45)
BASOPHILS # BLD AUTO: 0.2 % (ref 0–1.8)
BASOPHILS # BLD: 0.01 K/UL (ref 0–0.12)
BILIRUB SERPL-MCNC: 0.6 MG/DL (ref 0.1–1.5)
BUN SERPL-MCNC: 12 MG/DL (ref 8–22)
CALCIUM SERPL-MCNC: 7.6 MG/DL (ref 8.4–10.2)
CHLORIDE SERPL-SCNC: 106 MMOL/L (ref 96–112)
CO2 SERPL-SCNC: 23 MMOL/L (ref 20–33)
CREAT SERPL-MCNC: 1.18 MG/DL (ref 0.5–1.4)
EOSINOPHIL # BLD AUTO: 0.02 K/UL (ref 0–0.51)
EOSINOPHIL NFR BLD: 0.4 % (ref 0–6.9)
ERYTHROCYTE [DISTWIDTH] IN BLOOD BY AUTOMATED COUNT: 42.1 FL (ref 35.9–50)
FLUAV RNA SPEC QL NAA+PROBE: NEGATIVE
FLUBV RNA SPEC QL NAA+PROBE: NEGATIVE
GLOBULIN SER CALC-MCNC: 3.4 G/DL (ref 1.9–3.5)
GLUCOSE SERPL-MCNC: 106 MG/DL (ref 65–99)
HCT VFR BLD AUTO: 42.1 % (ref 42–52)
HGB BLD-MCNC: 13.5 G/DL (ref 14–18)
IMM GRANULOCYTES # BLD AUTO: 0.03 K/UL (ref 0–0.11)
IMM GRANULOCYTES NFR BLD AUTO: 0.6 % (ref 0–0.9)
LIPASE SERPL-CCNC: 11 U/L (ref 7–58)
LYMPHOCYTES # BLD AUTO: 0.97 K/UL (ref 1–4.8)
LYMPHOCYTES NFR BLD: 18 % (ref 22–41)
MCH RBC QN AUTO: 28.7 PG (ref 27–33)
MCHC RBC AUTO-ENTMCNC: 32.1 G/DL (ref 33.7–35.3)
MCV RBC AUTO: 89.4 FL (ref 81.4–97.8)
MONOCYTES # BLD AUTO: 0.4 K/UL (ref 0–0.85)
MONOCYTES NFR BLD AUTO: 7.4 % (ref 0–13.4)
NEUTROPHILS # BLD AUTO: 3.96 K/UL (ref 1.82–7.42)
NEUTROPHILS NFR BLD: 73.4 % (ref 44–72)
NRBC # BLD AUTO: 0 K/UL
NRBC BLD-RTO: 0 /100 WBC
PLATELET # BLD AUTO: 93 K/UL (ref 164–446)
PMV BLD AUTO: 8.6 FL (ref 9–12.9)
POTASSIUM SERPL-SCNC: 3.9 MMOL/L (ref 3.6–5.5)
PROT SERPL-MCNC: 6.5 G/DL (ref 6–8.2)
RBC # BLD AUTO: 4.71 M/UL (ref 4.7–6.1)
RSV RNA SPEC QL NAA+PROBE: NEGATIVE
SARS-COV-2 RNA RESP QL NAA+PROBE: NOTDETECTED
SODIUM SERPL-SCNC: 138 MMOL/L (ref 135–145)
SPECIMEN SOURCE: NORMAL
WBC # BLD AUTO: 5.4 K/UL (ref 4.8–10.8)

## 2021-12-31 PROCEDURE — 36415 COLL VENOUS BLD VENIPUNCTURE: CPT

## 2021-12-31 PROCEDURE — 83690 ASSAY OF LIPASE: CPT

## 2021-12-31 PROCEDURE — 96374 THER/PROPH/DIAG INJ IV PUSH: CPT

## 2021-12-31 PROCEDURE — 99284 EMERGENCY DEPT VISIT MOD MDM: CPT

## 2021-12-31 PROCEDURE — 71045 X-RAY EXAM CHEST 1 VIEW: CPT

## 2021-12-31 PROCEDURE — 700105 HCHG RX REV CODE 258: Performed by: EMERGENCY MEDICINE

## 2021-12-31 PROCEDURE — 0241U HCHG SARS-COV-2 COVID-19 NFCT DS RESP RNA 4 TRGT MIC: CPT

## 2021-12-31 PROCEDURE — C9803 HOPD COVID-19 SPEC COLLECT: HCPCS | Performed by: EMERGENCY MEDICINE

## 2021-12-31 PROCEDURE — 80053 COMPREHEN METABOLIC PANEL: CPT

## 2021-12-31 PROCEDURE — 700111 HCHG RX REV CODE 636 W/ 250 OVERRIDE (IP): Performed by: EMERGENCY MEDICINE

## 2021-12-31 PROCEDURE — 85025 COMPLETE CBC W/AUTO DIFF WBC: CPT

## 2021-12-31 RX ORDER — SODIUM CHLORIDE 9 MG/ML
1000 INJECTION, SOLUTION INTRAVENOUS ONCE
Status: COMPLETED | OUTPATIENT
Start: 2021-12-31 | End: 2022-01-01

## 2021-12-31 RX ORDER — ONDANSETRON 2 MG/ML
4 INJECTION INTRAMUSCULAR; INTRAVENOUS ONCE
Status: COMPLETED | OUTPATIENT
Start: 2021-12-31 | End: 2021-12-31

## 2021-12-31 RX ADMIN — ONDANSETRON 4 MG: 2 INJECTION INTRAMUSCULAR; INTRAVENOUS at 22:43

## 2021-12-31 RX ADMIN — SODIUM CHLORIDE 1000 ML: 9 INJECTION, SOLUTION INTRAVENOUS at 22:43

## 2022-01-01 VITALS
TEMPERATURE: 98 F | RESPIRATION RATE: 18 BRPM | DIASTOLIC BLOOD PRESSURE: 75 MMHG | SYSTOLIC BLOOD PRESSURE: 123 MMHG | BODY MASS INDEX: 36.59 KG/M2 | WEIGHT: 262.35 LBS | OXYGEN SATURATION: 92 % | HEART RATE: 99 BPM

## 2022-01-01 RX ORDER — PROMETHAZINE HYDROCHLORIDE 25 MG/1
25 TABLET ORAL EVERY 6 HOURS PRN
Qty: 10 TABLET | Refills: 0 | Status: SHIPPED | OUTPATIENT
Start: 2022-01-01 | End: 2022-01-12

## 2022-01-01 ASSESSMENT — FIBROSIS 4 INDEX: FIB4 SCORE: 2.76

## 2022-01-01 NOTE — ED PROVIDER NOTES
ED Provider Note    CHIEF COMPLAINT  Chief Complaint   Patient presents with   • Fever   • N/V       HPI  Benito Persaud is a 56 y.o. male who presents with a fever and nausea and vomiting.  The patient states his been sick since this morning.  He states he is staying at [facility] as he is currently homeless.  He states he had a fever of 104 this morning.  He states he been vomiting throughout the day.  He does have some congestion and some slight dyspnea.  He states he is vaccinated.  The patient does not have any diarrhea.    REVIEW OF SYSTEMS  See HPI for further details. All other systems are negative.     PAST MEDICAL HISTORY  Past Medical History:   Diagnosis Date   • Cancer (HCC) 11/01/2016   • Asthma        FAMILY HISTORY  [unfilled]    SOCIAL HISTORY  Social History     Socioeconomic History   • Marital status: Single     Spouse name: Not on file   • Number of children: Not on file   • Years of education: Not on file   • Highest education level: Not on file   Occupational History   • Not on file   Tobacco Use   • Smoking status: Former Smoker   • Smokeless tobacco: Current User     Last attempt to quit: 11/8/2018   Vaping Use   • Vaping Use: Never used   Substance and Sexual Activity   • Alcohol use: Never   • Drug use: Never   • Sexual activity: Not on file   Other Topics Concern   • Not on file   Social History Narrative   • Not on file     Social Determinants of Health     Financial Resource Strain:    • Difficulty of Paying Living Expenses: Not on file   Food Insecurity:    • Worried About Running Out of Food in the Last Year: Not on file   • Ran Out of Food in the Last Year: Not on file   Transportation Needs:    • Lack of Transportation (Medical): Not on file   • Lack of Transportation (Non-Medical): Not on file   Physical Activity:    • Days of Exercise per Week: Not on file   • Minutes of Exercise per Session: Not on file   Stress:    • Feeling of Stress : Not on file   Social Connections:     • Frequency of Communication with Friends and Family: Not on file   • Frequency of Social Gatherings with Friends and Family: Not on file   • Attends Yarsanism Services: Not on file   • Active Member of Clubs or Organizations: Not on file   • Attends Club or Organization Meetings: Not on file   • Marital Status: Not on file   Intimate Partner Violence:    • Fear of Current or Ex-Partner: Not on file   • Emotionally Abused: Not on file   • Physically Abused: Not on file   • Sexually Abused: Not on file   Housing Stability:    • Unable to Pay for Housing in the Last Year: Not on file   • Number of Places Lived in the Last Year: Not on file   • Unstable Housing in the Last Year: Not on file       SURGICAL HISTORY  Past Surgical History:   Procedure Laterality Date   • CATH REMOVAL N/A 8/14/2019    Procedure: REMOVAL, CATHETER AND PLACEMENT OF POWER PORT;  Surgeon: Sonny Enamorado M.D.;  Location: SURGERY [facility];  Service: General       CURRENT MEDICATIONS  Home Medications    **Home medications have not yet been reviewed for this encounter**         ALLERGIES  Allergies   Allergen Reactions   • Iodine Anaphylaxis   • Keflex Diarrhea and Vomiting     Vomiting & Diarrhea  Tolerates ceftriaxone   • Reglan [Metoclopramide] Nausea     Asthma     • Shellfish Allergy Anaphylaxis   • Toradol Hives       PHYSICAL EXAM  VITAL SIGNS: /72   Pulse (!) 113   Temp 37.2 °C (99 °F) (Temporal)   Resp 18   SpO2 95%       Constitutional: Well developed, Well nourished, No acute distress, Non-toxic appearance.   HENT: Normocephalic, Atraumatic, Bilateral external ears normal, Oropharynx dry, no oral exudates, Nose normal.   Eyes: PERRLA, EOMI, Conjunctiva normal, No discharge.   Neck: Normal range of motion, No tenderness, Supple, No stridor.   Lymphatic: No lymphadenopathy noted.   Cardiovascular: Tachycardic heart rate, Normal rhythm, No murmurs, No rubs, No gallops.   Thorax & Lungs: Normal breath sounds, No  respiratory distress, No wheezing, No chest tenderness.   Abdomen: Bowel sounds normal, Soft, No tenderness, No masses, No pulsatile masses.   Skin: Warm, Dry, No erythema, No rash.   Back: No tenderness, No CVA tenderness.   Extremities: Intact distal pulses, No edema, No tenderness, No cyanosis, No clubbing.   Neurologic: Alert & oriented x 3, Normal motor function, Normal sensory function, No focal deficits noted.   Psychiatric: Affect normal, Judgment normal, Mood normal.     Results for orders placed or performed during the hospital encounter of 12/31/21   CBC WITH DIFFERENTIAL   Result Value Ref Range    WBC 5.4 4.8 - 10.8 K/uL    RBC 4.71 4.70 - 6.10 M/uL    Hemoglobin 13.5 (L) 14.0 - 18.0 g/dL    Hematocrit 42.1 42.0 - 52.0 %    MCV 89.4 81.4 - 97.8 fL    MCH 28.7 27.0 - 33.0 pg    MCHC 32.1 (L) 33.7 - 35.3 g/dL    RDW 42.1 35.9 - 50.0 fL    Platelet Count 93 (L) 164 - 446 K/uL    MPV 8.6 (L) 9.0 - 12.9 fL    Neutrophils-Polys 73.40 (H) 44.00 - 72.00 %    Lymphocytes 18.00 (L) 22.00 - 41.00 %    Monocytes 7.40 0.00 - 13.40 %    Eosinophils 0.40 0.00 - 6.90 %    Basophils 0.20 0.00 - 1.80 %    Immature Granulocytes 0.60 0.00 - 0.90 %    Nucleated RBC 0.00 /100 WBC    Neutrophils (Absolute) 3.96 1.82 - 7.42 K/uL    Lymphs (Absolute) 0.97 (L) 1.00 - 4.80 K/uL    Monos (Absolute) 0.40 0.00 - 0.85 K/uL    Eos (Absolute) 0.02 0.00 - 0.51 K/uL    Baso (Absolute) 0.01 0.00 - 0.12 K/uL    Immature Granulocytes (abs) 0.03 0.00 - 0.11 K/uL    NRBC (Absolute) 0.00 K/uL   COMP METABOLIC PANEL   Result Value Ref Range    Sodium 138 135 - 145 mmol/L    Potassium 3.9 3.6 - 5.5 mmol/L    Chloride 106 96 - 112 mmol/L    Co2 23 20 - 33 mmol/L    Anion Gap 9.0 7.0 - 16.0    Glucose 106 (H) 65 - 99 mg/dL    Bun 12 8 - 22 mg/dL    Creatinine 1.18 0.50 - 1.40 mg/dL    Calcium 7.6 (L) 8.4 - 10.2 mg/dL    AST(SGOT) 14 12 - 45 U/L    ALT(SGPT) 9 2 - 50 U/L    Alkaline Phosphatase 102 (H) 30 - 99 U/L    Total Bilirubin 0.6 0.1 - 1.5  mg/dL    Albumin 3.1 (L) 3.2 - 4.9 g/dL    Total Protein 6.5 6.0 - 8.2 g/dL    Globulin 3.4 1.9 - 3.5 g/dL    A-G Ratio 0.9 g/dL   LIPASE   Result Value Ref Range    Lipase 11 7 - 58 U/L   COV-2, FLU A/B, AND RSV BY PCR (2-4 HOURS CEPHEID): Collect NP swab in VTM    Specimen: Nasopharyngeal; Respirate   Result Value Ref Range    Influenza virus A RNA Negative Negative    Influenza virus B, PCR Negative Negative    RSV, PCR Negative Negative    SARS-CoV-2 by PCR NotDetected     SARS-CoV-2 Source NP Swab    ESTIMATED GFR   Result Value Ref Range    GFR If African American >60 >60 mL/min/1.73 m 2    GFR If Non African American >60 >60 mL/min/1.73 m 2       RADIOLOGY/PROCEDURES  DX-CHEST-PORTABLE (1 VIEW)   Final Result      Body habitus limits evaluation. There is hypoventilation with low lung volumes and bronchovascular crowding.            COURSE & MEDICAL DECISION MAKING  Pertinent Labs & Imaging studies reviewed. (See chart for details)  This a 56-year-old male who presents the emergency department with a fever as well as nausea and vomiting.  Clinically do not appreciate any evidence of a focal bacterial infection.  I did order Covid, influenza, and RSV all which were negative.  Chest x-ray does not show any evidence of pneumonia.  His abdomen is benign.  I suspect this is from a viral process.  The patient did receive IV fluids for suspected dehydration as well as Zofran.  The patient has not had any further emesis and does feel better on repeat exam.  He is hemodynamically stable at this time.  I suspect this is from a viral process.  We will discharge the patient home with instructions from take Tylenol as needed for fever and pain control.  He is instructed to drink lots of fluids and will discharge him home on Phenergan.  The patient will return if he is acutely worse.    FINAL IMPRESSION  1.  Viral illness    Disposition  The patient will be discharged in stable condition      Electronically signed by:  Corky Samuels M.D., 12/31/2021 10:16 PM

## 2022-01-01 NOTE — ED NOTES
Patient discharged. All discharged instructions reviewed. Patient verbalizes understanding. PIV removed. Patient feels safe going home. Advised to come back for new or worsening symptoms.    Voucher for taxi provided.

## 2022-01-12 ENCOUNTER — OFFICE VISIT (OUTPATIENT)
Dept: MEDICAL GROUP | Facility: CLINIC | Age: 57
End: 2022-01-12
Payer: MEDICAID

## 2022-01-12 VITALS
BODY MASS INDEX: 35.84 KG/M2 | WEIGHT: 256 LBS | RESPIRATION RATE: 20 BRPM | HEART RATE: 107 BPM | HEIGHT: 71 IN | SYSTOLIC BLOOD PRESSURE: 151 MMHG | DIASTOLIC BLOOD PRESSURE: 97 MMHG | TEMPERATURE: 98.8 F | OXYGEN SATURATION: 94 %

## 2022-01-12 DIAGNOSIS — M25.561 CHRONIC PAIN OF BOTH KNEES: ICD-10-CM

## 2022-01-12 DIAGNOSIS — G47.00 INSOMNIA, UNSPECIFIED TYPE: ICD-10-CM

## 2022-01-12 DIAGNOSIS — J45.20 MILD INTERMITTENT ASTHMA WITHOUT COMPLICATION: ICD-10-CM

## 2022-01-12 DIAGNOSIS — Z13.0 SCREENING FOR DEFICIENCY ANEMIA: ICD-10-CM

## 2022-01-12 DIAGNOSIS — Z86.711 HISTORY OF PULMONARY EMBOLISM: ICD-10-CM

## 2022-01-12 DIAGNOSIS — G89.29 CHRONIC PAIN OF BOTH KNEES: ICD-10-CM

## 2022-01-12 DIAGNOSIS — Z12.11 SCREENING FOR COLON CANCER: ICD-10-CM

## 2022-01-12 DIAGNOSIS — M25.562 CHRONIC PAIN OF BOTH KNEES: ICD-10-CM

## 2022-01-12 PROCEDURE — 99214 OFFICE O/P EST MOD 30 MIN: CPT | Mod: GC | Performed by: STUDENT IN AN ORGANIZED HEALTH CARE EDUCATION/TRAINING PROGRAM

## 2022-01-12 RX ORDER — DIPHENHYDRAMINE HCL 50 MG
50 CAPSULE ORAL EVERY 6 HOURS PRN
Qty: 30 CAPSULE | Refills: 3 | Status: SHIPPED | OUTPATIENT
Start: 2022-01-12 | End: 2022-01-12

## 2022-01-12 RX ORDER — ZOLPIDEM TARTRATE 10 MG/1
10 TABLET ORAL NIGHTLY PRN
Qty: 30 TABLET | Refills: 2 | Status: SHIPPED | OUTPATIENT
Start: 2022-01-12 | End: 2022-01-13 | Stop reason: SDUPTHER

## 2022-01-12 RX ORDER — PROMETHAZINE HYDROCHLORIDE 25 MG/1
25 TABLET ORAL EVERY 6 HOURS PRN
Qty: 15 TABLET | Refills: 0 | Status: SHIPPED | OUTPATIENT
Start: 2022-01-12 | End: 2022-01-13 | Stop reason: SDUPTHER

## 2022-01-12 RX ORDER — AMITRIPTYLINE HYDROCHLORIDE 100 MG/1
150 TABLET ORAL NIGHTLY PRN
Qty: 30 TABLET | Refills: 3 | Status: SHIPPED | OUTPATIENT
Start: 2022-01-12 | End: 2022-01-13 | Stop reason: SDUPTHER

## 2022-01-12 RX ORDER — GABAPENTIN 800 MG/1
800 TABLET ORAL 3 TIMES DAILY
Qty: 90 TABLET | Refills: 3 | Status: SHIPPED | OUTPATIENT
Start: 2022-01-12 | End: 2022-01-13 | Stop reason: SDUPTHER

## 2022-01-12 RX ORDER — DIPHENHYDRAMINE HCL 50 MG
50 CAPSULE ORAL NIGHTLY PRN
Qty: 30 CAPSULE | Refills: 2 | Status: SHIPPED | OUTPATIENT
Start: 2022-01-12 | End: 2022-01-13 | Stop reason: SDUPTHER

## 2022-01-12 RX ORDER — OMEPRAZOLE 20 MG/1
20 CAPSULE, DELAYED RELEASE ORAL 2 TIMES DAILY
Qty: 60 CAPSULE | Refills: 3 | Status: SHIPPED | OUTPATIENT
Start: 2022-01-12 | End: 2022-01-13 | Stop reason: SDUPTHER

## 2022-01-12 RX ORDER — IPRATROPIUM BROMIDE AND ALBUTEROL 20; 100 UG/1; UG/1
1 SPRAY, METERED RESPIRATORY (INHALATION) 4 TIMES DAILY
Qty: 1 EACH | Refills: 5 | Status: SHIPPED | OUTPATIENT
Start: 2022-01-12 | End: 2022-01-13 | Stop reason: SDUPTHER

## 2022-01-12 RX ORDER — DIPHENHYDRAMINE HCL 50 MG
50 CAPSULE ORAL NIGHTLY PRN
Qty: 30 CAPSULE | Refills: 2 | Status: SHIPPED | OUTPATIENT
Start: 2022-01-12 | End: 2022-01-12

## 2022-01-12 ASSESSMENT — ENCOUNTER SYMPTOMS
CARDIOVASCULAR NEGATIVE: 1
CONSTITUTIONAL NEGATIVE: 1
COUGH: 1
GASTROINTESTINAL NEGATIVE: 1
SPUTUM PRODUCTION: 1

## 2022-01-12 ASSESSMENT — FIBROSIS 4 INDEX: FIB4 SCORE: 2.76

## 2022-01-13 ENCOUNTER — TELEPHONE (OUTPATIENT)
Dept: MEDICAL GROUP | Facility: CLINIC | Age: 57
End: 2022-01-13

## 2022-01-13 DIAGNOSIS — J45.20 MILD INTERMITTENT ASTHMA WITHOUT COMPLICATION: ICD-10-CM

## 2022-01-13 DIAGNOSIS — G47.00 INSOMNIA, UNSPECIFIED TYPE: ICD-10-CM

## 2022-01-13 RX ORDER — DIPHENHYDRAMINE HCL 50 MG
50 CAPSULE ORAL NIGHTLY PRN
Qty: 30 CAPSULE | Refills: 2 | Status: ON HOLD | OUTPATIENT
Start: 2022-01-13 | End: 2022-04-27

## 2022-01-13 RX ORDER — IPRATROPIUM BROMIDE AND ALBUTEROL 20; 100 UG/1; UG/1
1 SPRAY, METERED RESPIRATORY (INHALATION) 4 TIMES DAILY
Qty: 1 EACH | Refills: 5 | Status: ON HOLD | OUTPATIENT
Start: 2022-01-13 | End: 2022-04-27

## 2022-01-13 RX ORDER — AMITRIPTYLINE HYDROCHLORIDE 100 MG/1
150 TABLET ORAL NIGHTLY PRN
Qty: 30 TABLET | Refills: 3 | Status: ON HOLD | OUTPATIENT
Start: 2022-01-13 | End: 2022-04-27

## 2022-01-13 RX ORDER — ZOLPIDEM TARTRATE 10 MG/1
10 TABLET ORAL NIGHTLY PRN
Qty: 30 TABLET | Refills: 2 | Status: SHIPPED | OUTPATIENT
Start: 2022-01-13 | End: 2022-11-17

## 2022-01-13 RX ORDER — GABAPENTIN 800 MG/1
800 TABLET ORAL 3 TIMES DAILY
Qty: 90 TABLET | Refills: 3 | Status: ON HOLD | OUTPATIENT
Start: 2022-01-13 | End: 2022-04-27

## 2022-01-13 RX ORDER — OMEPRAZOLE 20 MG/1
20 CAPSULE, DELAYED RELEASE ORAL 2 TIMES DAILY
Qty: 60 CAPSULE | Refills: 3 | Status: SHIPPED | OUTPATIENT
Start: 2022-01-13 | End: 2022-03-26

## 2022-01-13 RX ORDER — PROMETHAZINE HYDROCHLORIDE 25 MG/1
25 TABLET ORAL EVERY 6 HOURS PRN
Qty: 15 TABLET | Refills: 0 | Status: SHIPPED | OUTPATIENT
Start: 2022-01-13 | End: 2022-04-26

## 2022-01-13 NOTE — ASSESSMENT & PLAN NOTE
Patient unsure when his last colonoscopy was but stated it was abnormal with polyps and other lesions that were taken for biopsy however he did not follow-up.  With history of abnormal colonoscopy and no follow-up, referral for colonoscopy sent.  Patient will follow-up in 3 months.

## 2022-01-13 NOTE — PROGRESS NOTES
Subjective:     CC: follow-up    HPI:   Benito presents today for follow-up.  Since his last visit he presented to the ER with complaints of nausea and vomiting.  He stated he was given antinausea medication with some IV fluids and felt better and was sent home.  He still is occasionally having nausea with vomiting.  He denies any fever or chills, or abdominal pain.  He is also now having some darkening of his stools.  He does not remember when his last colonoscopy was but stated they did find polyps.  Per patient, pharmacy has only filled one of his prescriptions and he has been out of his medications for over a month now.  He denies any chest pain or leg swelling.  He is still having bilateral knee pain as well as back pain that he is taking Tylenol at home for.  He states he is unable to take NSAIDs due to his history.      Problem   Screening for Deficiency Anemia   Screening for Colon Cancer   Chronic Pain of Both Knees   Mild Intermittent Asthma Without Complication   History of Pulmonary Embolism   Insomnia       Current Outpatient Medications Ordered in Epic   Medication Sig Dispense Refill   • gabapentin (NEURONTIN) 800 MG tablet Take 1 Tablet by mouth 3 times a day. 90 Tablet 3   • amitriptyline (ELAVIL) 100 MG Tab Take 1.5 Tablets by mouth at bedtime as needed for Sleep. 30 Tablet 3   • omeprazole (PRILOSEC) 20 MG delayed-release capsule Take 1 Capsule by mouth 2 times a day. 60 Capsule 3   • ipratropium-albuterol (COMBIVENT RESPIMAT)  MCG/ACT Aero Soln Inhale 1 Puff 4 times a day. 1 Each 5   • promethazine (PHENERGAN) 25 MG Tab Take 1 Tablet by mouth every 6 hours as needed for Nausea/Vomiting. 15 Tablet 0   • apixaban (ELIQUIS) 5mg Tab Take 1 Tablet by mouth 2 times a day. 60 Tablet 3   • zolpidem (AMBIEN) 10 MG Tab Take 1 Tablet by mouth at bedtime as needed for Sleep for up to 30 days. 30 Tablet 2   • diphenhydrAMINE (BANOPHEN) 50 MG Cap Take 1 Capsule by mouth at bedtime as needed for Sleep.  "30 Capsule 2   • azithromycin (ZITHROMAX) 250 MG Tab Take 2 tablets on day 1 and then 1 tablet every day for remaining 4 days. 6 Tablet 0   • ondansetron (ZOFRAN ODT) 4 MG TABLET DISPERSIBLE Take 4 mg by mouth at bedtime as needed for Nausea.     • Cholecalciferol (VITAMIN D3) 57037 units Cap Take 1 Cap by mouth every 14 days.     • acetaminophen (TYLENOL) 500 MG Tab Take 1 Tab by mouth every 6 hours as needed for Moderate Pain.     • multivitamin (THERAGRAN) Tab Take 1 Tab by mouth every day. 30 Tab 0     No current Epic-ordered facility-administered medications on file.       ROS:  Review of Systems   Constitutional: Negative.    Respiratory: Positive for cough and sputum production.    Cardiovascular: Negative.    Gastrointestinal: Negative.    Genitourinary: Negative.    Musculoskeletal: Positive for joint pain.   Skin: Negative.        Objective:     Exam:  /97 (BP Location: Left arm, Patient Position: Sitting, BP Cuff Size: Large adult)   Pulse (!) 107   Temp 37.1 °C (98.8 °F) (Tympanic)   Resp 20   Ht 1.803 m (5' 11\")   Wt 116 kg (256 lb)   SpO2 94%   BMI 35.70 kg/m²  Body mass index is 35.7 kg/m².    Physical Exam  Constitutional:       General: He is not in acute distress.     Appearance: Normal appearance.   HENT:      Head: Normocephalic and atraumatic.   Eyes:      Extraocular Movements: Extraocular movements intact.      Conjunctiva/sclera: Conjunctivae normal.   Cardiovascular:      Rate and Rhythm: Normal rate and regular rhythm.      Heart sounds: No murmur heard.      Pulmonary:      Effort: Pulmonary effort is normal.      Breath sounds: Wheezing present.   Abdominal:      General: Abdomen is flat. There is no distension.   Musculoskeletal:         General: Normal range of motion.      Cervical back: Normal range of motion.   Skin:     General: Skin is warm and dry.      Findings: No rash.   Neurological:      General: No focal deficit present.      Mental Status: He is alert. "   Psychiatric:         Mood and Affect: Mood normal.         Behavior: Behavior normal.         Thought Content: Thought content normal.         Judgment: Judgment normal.         Assessment & Plan:     56 y.o. male with the following -     Problem List Items Addressed This Visit     Mild intermittent asthma without complication     Patient with history of chronic mild intermittent asthma without complication currently uses an inhaler.  He does need refill on his inhaler today.  Refill sent to [facility].  Patient will follow-up in 3 months.         Relevant Medications    ipratropium-albuterol (COMBIVENT RESPIMAT)  MCG/ACT Aero Soln    History of pulmonary embolism     Patient with a history of pulmonary emboli with IVC filter in place.  He is chronically on anticoagulation with Eliquis 5 mg twice daily.  Patient has been out of this medication for approximately 1 month.  Patient restarted on 5 mg apixaban twice daily.  Patient will follow-up in 3 months.         Insomnia      this is a chronic problem.  Patient with a history of chronic insomnia currently taking 10 mg Ambien nightly, 50 mg Banophen nightly as needed, as well as amitriptyline 100 mg nightly as needed.  He does well with these medications discussed risks and benefits of these medications with patient.  Discussed with patient that since Ambien is controlled substance he needed to be seen every 3 months.  Patient understood and verbalized agreement to this plan.  Refill sent to pharmacy.  Patient will follow-up in 3 months.         Relevant Medications    zolpidem (AMBIEN) 10 MG Tab    Screening for deficiency anemia     Patient with a history of chronic anemia and thrombocytopenia.  Platelets were low at his ER visit last month.  Patient is also having dark stools.  CBC ordered to further evaluate.  Patient will follow-up in 3 months.         Relevant Orders    CBC WITH DIFFERENTIAL    Screening for colon cancer     Patient unsure when his last  colonoscopy was but stated it was abnormal with polyps and other lesions that were taken for biopsy however he did not follow-up.  With history of abnormal colonoscopy and no follow-up, referral for colonoscopy sent.  Patient will follow-up in 3 months.         Relevant Orders    Referral to GI for Colonoscopy    Chronic pain of both knees     Patient with history of chronic bilateral knee pain.  He states that he has previous trauma to the knees.  He has been taking Tylenol at home without alleviation of this pain.  Discussed with patient about continuing to take Tylenol as needed, however feeling not comfortable with prescribing narcotics at this time.   discussed physical therapy with the use of a pool with the patient who understood and verbalized agreement to this plan.  I previously sent referral to pain management physician for chronic pain however this referral was denied.  Patient will follow-up in 3 months.         Relevant Medications    gabapentin (NEURONTIN) 800 MG tablet    amitriptyline (ELAVIL) 100 MG Tab    Other Relevant Orders    Referral to Physical Therapy        Of note patient was noted to be mildly hypertensive at this visit, but previously blood pressures have been within normal range.  Discussed with patient about blood pressure.  Plan to follow-up at next visit.      I spent a total of 75 minutes with record review, exam, communication with the patient, communication with other providers, and documentation of this encounter.      Return in about 3 months (around 4/12/2022) for Long.

## 2022-01-13 NOTE — ASSESSMENT & PLAN NOTE
Patient with a history of chronic anemia and thrombocytopenia.  Platelets were low at his ER visit last month.  Patient is also having dark stools.  CBC ordered to further evaluate.  Patient will follow-up in 3 months.

## 2022-01-13 NOTE — ASSESSMENT & PLAN NOTE
Patient with history of chronic bilateral knee pain.  He states that he has previous trauma to the knees.  He has been taking Tylenol at home without alleviation of this pain.  Discussed with patient about continuing to take Tylenol as needed, however feeling not comfortable with prescribing narcotics at this time.   discussed physical therapy with the use of a pool with the patient who understood and verbalized agreement to this plan.  I previously sent referral to pain management physician for chronic pain however this referral was denied.  Patient will follow-up in 3 months.

## 2022-01-13 NOTE — ASSESSMENT & PLAN NOTE
this is a chronic problem.  Patient with a history of chronic insomnia currently taking 10 mg Ambien nightly, 50 mg Banophen nightly as needed, as well as amitriptyline 100 mg nightly as needed.  He does well with these medications discussed risks and benefits of these medications with patient.  Discussed with patient that since Ambien is controlled substance he needed to be seen every 3 months.  Patient understood and verbalized agreement to this plan.  Refill sent to pharmacy.  Patient will follow-up in 3 months.   eval and treat  WBAT

## 2022-01-13 NOTE — ASSESSMENT & PLAN NOTE
Patient with history of chronic mild intermittent asthma without complication currently uses an inhaler.  He does need refill on his inhaler today.  Refill sent to [facility].  Patient will follow-up in 3 months.

## 2022-01-13 NOTE — TELEPHONE ENCOUNTER
Patient came in stating that his medications were sent to [facility] and they no longer take Medicaid. Could we resend the pended medications to [facility]? Dr. Wills is not in today and patient states he is urgently needing the medications.

## 2022-01-13 NOTE — ASSESSMENT & PLAN NOTE
Patient with a history of pulmonary emboli with IVC filter in place.  He is chronically on anticoagulation with Eliquis 5 mg twice daily.  Patient has been out of this medication for approximately 1 month.  Patient restarted on 5 mg apixaban twice daily.  Patient will follow-up in 3 months.

## 2022-01-20 ENCOUNTER — APPOINTMENT (OUTPATIENT)
Dept: RADIOLOGY | Facility: MEDICAL CENTER | Age: 57
End: 2022-01-20
Attending: EMERGENCY MEDICINE
Payer: MEDICAID

## 2022-01-20 ENCOUNTER — HOSPITAL ENCOUNTER (EMERGENCY)
Facility: MEDICAL CENTER | Age: 57
End: 2022-01-20
Attending: EMERGENCY MEDICINE
Payer: MEDICAID

## 2022-01-20 VITALS
BODY MASS INDEX: 35.84 KG/M2 | DIASTOLIC BLOOD PRESSURE: 64 MMHG | SYSTOLIC BLOOD PRESSURE: 117 MMHG | TEMPERATURE: 97.7 F | HEIGHT: 71 IN | RESPIRATION RATE: 16 BRPM | OXYGEN SATURATION: 95 % | HEART RATE: 65 BPM | WEIGHT: 256 LBS

## 2022-01-20 DIAGNOSIS — R55 SYNCOPE, UNSPECIFIED SYNCOPE TYPE: ICD-10-CM

## 2022-01-20 DIAGNOSIS — R91.1 PULMONARY NODULE: ICD-10-CM

## 2022-01-20 DIAGNOSIS — S09.90XA CLOSED HEAD INJURY, INITIAL ENCOUNTER: ICD-10-CM

## 2022-01-20 DIAGNOSIS — S30.1XXA CONTUSION OF FLANK, INITIAL ENCOUNTER: ICD-10-CM

## 2022-01-20 DIAGNOSIS — S22.080A COMPRESSION FRACTURE OF T11 VERTEBRA, INITIAL ENCOUNTER (HCC): ICD-10-CM

## 2022-01-20 LAB
ALBUMIN SERPL BCP-MCNC: 3.7 G/DL (ref 3.2–4.9)
ALBUMIN/GLOB SERPL: 0.9 G/DL
ALP SERPL-CCNC: 156 U/L (ref 30–99)
ALT SERPL-CCNC: 15 U/L (ref 2–50)
ANION GAP SERPL CALC-SCNC: 10 MMOL/L (ref 7–16)
APPEARANCE UR: CLEAR
AST SERPL-CCNC: 16 U/L (ref 12–45)
BASOPHILS # BLD AUTO: 0.6 % (ref 0–1.8)
BASOPHILS # BLD: 0.03 K/UL (ref 0–0.12)
BILIRUB SERPL-MCNC: 0.4 MG/DL (ref 0.1–1.5)
BILIRUB UR QL STRIP.AUTO: NEGATIVE
BUN SERPL-MCNC: 10 MG/DL (ref 8–22)
CALCIUM SERPL-MCNC: 8.9 MG/DL (ref 8.5–10.5)
CHLORIDE SERPL-SCNC: 104 MMOL/L (ref 96–112)
CO2 SERPL-SCNC: 25 MMOL/L (ref 20–33)
COLOR UR: YELLOW
CREAT SERPL-MCNC: 1.15 MG/DL (ref 0.5–1.4)
EKG IMPRESSION: NORMAL
EOSINOPHIL # BLD AUTO: 0.22 K/UL (ref 0–0.51)
EOSINOPHIL NFR BLD: 4.3 % (ref 0–6.9)
ERYTHROCYTE [DISTWIDTH] IN BLOOD BY AUTOMATED COUNT: 43 FL (ref 35.9–50)
GLOBULIN SER CALC-MCNC: 4 G/DL (ref 1.9–3.5)
GLUCOSE BLD-MCNC: 153 MG/DL (ref 65–99)
GLUCOSE SERPL-MCNC: 130 MG/DL (ref 65–99)
GLUCOSE UR STRIP.AUTO-MCNC: NEGATIVE MG/DL
HCT VFR BLD AUTO: 46.1 % (ref 42–52)
HGB BLD-MCNC: 15.1 G/DL (ref 14–18)
IMM GRANULOCYTES # BLD AUTO: 0.02 K/UL (ref 0–0.11)
IMM GRANULOCYTES NFR BLD AUTO: 0.4 % (ref 0–0.9)
KETONES UR STRIP.AUTO-MCNC: ABNORMAL MG/DL
LEUKOCYTE ESTERASE UR QL STRIP.AUTO: NEGATIVE
LYMPHOCYTES # BLD AUTO: 1.72 K/UL (ref 1–4.8)
LYMPHOCYTES NFR BLD: 33.3 % (ref 22–41)
MCH RBC QN AUTO: 28.7 PG (ref 27–33)
MCHC RBC AUTO-ENTMCNC: 32.8 G/DL (ref 33.7–35.3)
MCV RBC AUTO: 87.6 FL (ref 81.4–97.8)
MICRO URNS: ABNORMAL
MONOCYTES # BLD AUTO: 0.45 K/UL (ref 0–0.85)
MONOCYTES NFR BLD AUTO: 8.7 % (ref 0–13.4)
NEUTROPHILS # BLD AUTO: 2.73 K/UL (ref 1.82–7.42)
NEUTROPHILS NFR BLD: 52.7 % (ref 44–72)
NITRITE UR QL STRIP.AUTO: NEGATIVE
NRBC # BLD AUTO: 0 K/UL
NRBC BLD-RTO: 0 /100 WBC
PH UR STRIP.AUTO: 6.5 [PH] (ref 5–8)
PLATELET # BLD AUTO: 120 K/UL (ref 164–446)
PMV BLD AUTO: 8.4 FL (ref 9–12.9)
POTASSIUM SERPL-SCNC: 4.4 MMOL/L (ref 3.6–5.5)
PROT SERPL-MCNC: 7.7 G/DL (ref 6–8.2)
PROT UR QL STRIP: NEGATIVE MG/DL
RBC # BLD AUTO: 5.26 M/UL (ref 4.7–6.1)
RBC UR QL AUTO: NEGATIVE
SODIUM SERPL-SCNC: 139 MMOL/L (ref 135–145)
SP GR UR STRIP.AUTO: 1.04
TROPONIN T SERPL-MCNC: 11 NG/L (ref 6–19)
UROBILINOGEN UR STRIP.AUTO-MCNC: 2 MG/DL
WBC # BLD AUTO: 5.2 K/UL (ref 4.8–10.8)

## 2022-01-20 PROCEDURE — 93005 ELECTROCARDIOGRAM TRACING: CPT

## 2022-01-20 PROCEDURE — 72128 CT CHEST SPINE W/O DYE: CPT

## 2022-01-20 PROCEDURE — 72125 CT NECK SPINE W/O DYE: CPT

## 2022-01-20 PROCEDURE — 80053 COMPREHEN METABOLIC PANEL: CPT

## 2022-01-20 PROCEDURE — 700117 HCHG RX CONTRAST REV CODE 255: Performed by: EMERGENCY MEDICINE

## 2022-01-20 PROCEDURE — 99285 EMERGENCY DEPT VISIT HI MDM: CPT

## 2022-01-20 PROCEDURE — A9270 NON-COVERED ITEM OR SERVICE: HCPCS | Performed by: EMERGENCY MEDICINE

## 2022-01-20 PROCEDURE — 85025 COMPLETE CBC W/AUTO DIFF WBC: CPT

## 2022-01-20 PROCEDURE — 70450 CT HEAD/BRAIN W/O DYE: CPT

## 2022-01-20 PROCEDURE — 700105 HCHG RX REV CODE 258: Performed by: EMERGENCY MEDICINE

## 2022-01-20 PROCEDURE — 84484 ASSAY OF TROPONIN QUANT: CPT

## 2022-01-20 PROCEDURE — 96375 TX/PRO/DX INJ NEW DRUG ADDON: CPT | Mod: XU

## 2022-01-20 PROCEDURE — 700102 HCHG RX REV CODE 250 W/ 637 OVERRIDE(OP): Performed by: EMERGENCY MEDICINE

## 2022-01-20 PROCEDURE — 71045 X-RAY EXAM CHEST 1 VIEW: CPT

## 2022-01-20 PROCEDURE — 71260 CT THORAX DX C+: CPT

## 2022-01-20 PROCEDURE — 93005 ELECTROCARDIOGRAM TRACING: CPT | Performed by: EMERGENCY MEDICINE

## 2022-01-20 PROCEDURE — 96374 THER/PROPH/DIAG INJ IV PUSH: CPT | Mod: XU

## 2022-01-20 PROCEDURE — 81003 URINALYSIS AUTO W/O SCOPE: CPT

## 2022-01-20 PROCEDURE — 700111 HCHG RX REV CODE 636 W/ 250 OVERRIDE (IP): Performed by: EMERGENCY MEDICINE

## 2022-01-20 PROCEDURE — 82962 GLUCOSE BLOOD TEST: CPT

## 2022-01-20 RX ORDER — HYDROCODONE BITARTRATE AND ACETAMINOPHEN 5; 325 MG/1; MG/1
1 TABLET ORAL EVERY 6 HOURS PRN
Qty: 12 TABLET | Refills: 0 | Status: SHIPPED | OUTPATIENT
Start: 2022-01-20 | End: 2022-01-23

## 2022-01-20 RX ORDER — SODIUM CHLORIDE 9 MG/ML
1000 INJECTION, SOLUTION INTRAVENOUS ONCE
Status: COMPLETED | OUTPATIENT
Start: 2022-01-20 | End: 2022-01-20

## 2022-01-20 RX ORDER — DIPHENHYDRAMINE HYDROCHLORIDE 50 MG/ML
25 INJECTION INTRAMUSCULAR; INTRAVENOUS ONCE
Status: COMPLETED | OUTPATIENT
Start: 2022-01-20 | End: 2022-01-20

## 2022-01-20 RX ORDER — ONDANSETRON 2 MG/ML
4 INJECTION INTRAMUSCULAR; INTRAVENOUS ONCE
Status: COMPLETED | OUTPATIENT
Start: 2022-01-20 | End: 2022-01-20

## 2022-01-20 RX ORDER — ONDANSETRON 4 MG/1
4 TABLET, ORALLY DISINTEGRATING ORAL EVERY 6 HOURS PRN
Qty: 10 TABLET | Refills: 0 | Status: SHIPPED | OUTPATIENT
Start: 2022-01-20 | End: 2022-03-26

## 2022-01-20 RX ORDER — METHYLPREDNISOLONE SODIUM SUCCINATE 125 MG/2ML
125 INJECTION, POWDER, LYOPHILIZED, FOR SOLUTION INTRAMUSCULAR; INTRAVENOUS ONCE
Status: COMPLETED | OUTPATIENT
Start: 2022-01-20 | End: 2022-01-20

## 2022-01-20 RX ORDER — HYDROCODONE BITARTRATE AND ACETAMINOPHEN 5; 325 MG/1; MG/1
1 TABLET ORAL ONCE
Status: COMPLETED | OUTPATIENT
Start: 2022-01-20 | End: 2022-01-20

## 2022-01-20 RX ADMIN — DIPHENHYDRAMINE HYDROCHLORIDE 25 MG: 50 INJECTION INTRAMUSCULAR; INTRAVENOUS at 14:41

## 2022-01-20 RX ADMIN — HYDROCODONE BITARTRATE AND ACETAMINOPHEN 1 TABLET: 5; 325 TABLET ORAL at 15:58

## 2022-01-20 RX ADMIN — METHYLPREDNISOLONE SODIUM SUCCINATE 125 MG: 125 INJECTION, POWDER, FOR SOLUTION INTRAMUSCULAR; INTRAVENOUS at 14:41

## 2022-01-20 RX ADMIN — ONDANSETRON 4 MG: 2 INJECTION INTRAMUSCULAR; INTRAVENOUS at 16:09

## 2022-01-20 RX ADMIN — SODIUM CHLORIDE 1000 ML: 9 INJECTION, SOLUTION INTRAVENOUS at 16:35

## 2022-01-20 RX ADMIN — IOHEXOL 100 ML: 350 INJECTION, SOLUTION INTRAVENOUS at 15:21

## 2022-01-20 ASSESSMENT — ENCOUNTER SYMPTOMS
NAUSEA: 1
SEIZURES: 0
ABDOMINAL PAIN: 1
SHORTNESS OF BREATH: 1
BLURRED VISION: 0
LOSS OF CONSCIOUSNESS: 1
VOMITING: 1
DOUBLE VISION: 0
BACK PAIN: 1

## 2022-01-20 ASSESSMENT — FIBROSIS 4 INDEX: FIB4 SCORE: 2.76

## 2022-01-20 NOTE — ED TRIAGE NOTES
Pt comes in reporting he had a syncopal event today. Pt stating he was trying to get onto the bus and stood up and had a syncopal event then vomited.

## 2022-01-20 NOTE — ED NOTES
PT brought back to the room in his electronic wheelchair.  Ambulated with assistance to the gurney.  PT changed into gown & placed on cardiac mon. Pulse ox, & BP cuff.

## 2022-01-20 NOTE — DISCHARGE INSTRUCTIONS
Rest, take Norco for pain.  Follow-up with your doctor.  Return for worsening pain or other concerns.  Return if you have dizziness, or pass out again.  Follow-up with your doctor for spinal fracture.    He also the pulmonary nodule that requires follow-up.  See your doctor.  This has been present on previous imaging.

## 2022-01-20 NOTE — ED PROVIDER NOTES
"ED Provider Note    Scribed for Clayton Cardoza M.D. by Ke Holguin. 1/20/2022, 12:47 PM.    Primary care provider: Hanny Wills M.D.  Means of arrival: Walk-in  History obtained from: Patient  History limited by: None    CHIEF COMPLAINT  Chief Complaint   Patient presents with   • Syncope       HPI  Benito Persaud is a 56 y.o. male who presents to the Emergency Department for evaluation following an episode of syncope. Onset 4 hours ago. He states that he was going to [facility] to pick-up a prescription and was standing up to get on the bus when he had the syncopal event.  Prior to that he was sitting in his motorized chair.  When he stood up he passed out.  He fell landing on his right side. He believes he was down for 2-3 minutes. He denies feeling warm, nauseous, or having any vision changes prior to the episode. Following the episode when he sat-up he vomited three times. He currently has associated symptoms of nausea. He also notes associated symptoms of incontinence, polyuria, back pain, and abdominal pain. He also notes shortness of breath onset last night. He denies any history of similar episodes. He denies any seizure or hitting his head during the episode. He also denies any chest pain or dysuria. He has a medical history of lukemia, Asthma, multiple DVTs, two strokes, and \"infinity\" vertigo. He was taking meclizine for his vertigo but was recently taken off it. His last stroke was in 2019, and he is currently taking eliquis. He takes all his medications regularly. He has a surgical history of lumbar surgery in 2019.     Patient is on chronic anticoagulation.      REVIEW OF SYSTEMS  Review of Systems   Eyes: Negative for blurred vision and double vision.   Respiratory: Positive for shortness of breath.    Cardiovascular: Negative for chest pain.   Gastrointestinal: Positive for abdominal pain, nausea and vomiting.   Genitourinary: Positive for frequency and urgency. Negative for dysuria. " "  Musculoskeletal: Positive for back pain.   Neurological: Positive for loss of consciousness. Negative for seizures.   All other systems reviewed and are negative.      PAST MEDICAL HISTORY   has a past medical history of Asthma and Cancer (HCC) (11/01/2016).    SURGICAL HISTORY   has a past surgical history that includes cath removal (N/A, 8/14/2019).    SOCIAL HISTORY  Social History     Tobacco Use   • Smoking status: Never Smoker   • Smokeless tobacco: Former User   Vaping Use   • Vaping Use: Never used   Substance Use Topics      Social History     Substance and Sexual Activity   Drug Use Not on file       FAMILY HISTORY  Family History   Problem Relation Age of Onset   • Cancer Mother    • Psychiatric Illness Mother    • Diabetes Mother    • Hypertension Mother    • Hyperlipidemia Mother    • Arterial Aneurysm Father    • Heart Disease Maternal Grandmother        CURRENT MEDICATIONS  Current medications can be seen on the nurse's note.       ALLERGIES  Allergies   Allergen Reactions   • Iodine Anaphylaxis   • Keflex Diarrhea and Vomiting     Vomiting & Diarrhea  Tolerates ceftriaxone   • Reglan [Metoclopramide] Nausea     Asthma     • Shellfish Allergy Anaphylaxis   • Toradol Hives       PHYSICAL EXAM  VITAL SIGNS: /94   Pulse 96   Temp 37 °C (98.6 °F) (Temporal)   Resp 16   Ht 1.803 m (5' 11\")   Wt 116 kg (256 lb)   SpO2 97%   BMI 35.70 kg/m²   Vitals reviewed.  Constitutional: Well developed, Well nourished, No acute distress, Non-toxic appearance.   HENT: Normocephalic, Atraumatic, no obvious signs of trauma  Eyes: PERRL, EOMI, Conjunctiva normal, No discharge.   Neck: Normal range of motion, No tenderness, Supple, No stridor.   Cardiovascular: Normal heart rate, Normal rhythm, No murmurs, No rubs, No gallops.   Thorax & Lungs: Normal breath sounds, No respiratory distress, No wheezing, right-sided chest wall tenderness.  Abdomen: Tender to right upper quadrant, Bowel sounds normal, " Soft,  Skin: Warm, Dry, No erythema, No rash.   Back: T-spine tender, Contusion on the thoracic spine, No tenderness to the midline, No tenderness to the c-spine No CVA tenderness.   Musculoskeletal: Good range of motion in all major joints.  Mild edema  Neurologic:, Alert, Normal motor function, No focal deficits noted. CN II-XII intact, speech normal, normal finger-to-nose, no pronator drift  Psychiatric: Affect normal      LABS  Results for orders placed or performed during the hospital encounter of 01/20/22   CBC with Differential   Result Value Ref Range    WBC 5.2 4.8 - 10.8 K/uL    RBC 5.26 4.70 - 6.10 M/uL    Hemoglobin 15.1 14.0 - 18.0 g/dL    Hematocrit 46.1 42.0 - 52.0 %    MCV 87.6 81.4 - 97.8 fL    MCH 28.7 27.0 - 33.0 pg    MCHC 32.8 (L) 33.7 - 35.3 g/dL    RDW 43.0 35.9 - 50.0 fL    Platelet Count 120 (L) 164 - 446 K/uL    MPV 8.4 (L) 9.0 - 12.9 fL    Neutrophils-Polys 52.70 44.00 - 72.00 %    Lymphocytes 33.30 22.00 - 41.00 %    Monocytes 8.70 0.00 - 13.40 %    Eosinophils 4.30 0.00 - 6.90 %    Basophils 0.60 0.00 - 1.80 %    Immature Granulocytes 0.40 0.00 - 0.90 %    Nucleated RBC 0.00 /100 WBC    Neutrophils (Absolute) 2.73 1.82 - 7.42 K/uL    Lymphs (Absolute) 1.72 1.00 - 4.80 K/uL    Monos (Absolute) 0.45 0.00 - 0.85 K/uL    Eos (Absolute) 0.22 0.00 - 0.51 K/uL    Baso (Absolute) 0.03 0.00 - 0.12 K/uL    Immature Granulocytes (abs) 0.02 0.00 - 0.11 K/uL    NRBC (Absolute) 0.00 K/uL   Complete Metabolic Panel (CMP)   Result Value Ref Range    Sodium 139 135 - 145 mmol/L    Potassium 4.4 3.6 - 5.5 mmol/L    Chloride 104 96 - 112 mmol/L    Co2 25 20 - 33 mmol/L    Anion Gap 10.0 7.0 - 16.0    Glucose 130 (H) 65 - 99 mg/dL    Bun 10 8 - 22 mg/dL    Creatinine 1.15 0.50 - 1.40 mg/dL    Calcium 8.9 8.5 - 10.5 mg/dL    AST(SGOT) 16 12 - 45 U/L    ALT(SGPT) 15 2 - 50 U/L    Alkaline Phosphatase 156 (H) 30 - 99 U/L    Total Bilirubin 0.4 0.1 - 1.5 mg/dL    Albumin 3.7 3.2 - 4.9 g/dL    Total Protein 7.7  6.0 - 8.2 g/dL    Globulin 4.0 (H) 1.9 - 3.5 g/dL    A-G Ratio 0.9 g/dL   Troponin   Result Value Ref Range    Troponin T 11 6 - 19 ng/L   ESTIMATED GFR   Result Value Ref Range    GFR If African American >60 >60 mL/min/1.73 m 2    GFR If Non African American >60 >60 mL/min/1.73 m 2   EKG   Result Value Ref Range    Report       [facility] Emergency Dept.    Test Date:  2022  Pt Name:    ZINA SALGUERO                 Department: ER  MRN:        [id number]                      Room:  Gender:     Male                         Technician: [id number]  :        1965                   Requested By:ER TRIAGE PROTOCOL  Order #:    [id number]                    Reading MD: JESUSITA MINER. [facility]    Measurements  Intervals                                Axis  Rate:       87                           P:          51  MS:         172                          QRS:        35  QRSD:       82                           T:          47  QT:         344  QTc:        414    Interpretive Statements  SINUS RHYTHM  Compared to ECG 2019 10:16:34  Atrial flutter no longer present  Electronically Signed On 2022 13:13:57 PST by JESUSITA MINER. [facility]     POCT glucose device results   Result Value Ref Range    Glucose - Accu-Ck 153 (H) 65 - 99 mg/dL       All labs reviewed by me.        RADIOLOGY  CT-CHEST,ABDOMEN,PELVIS WITH   Final Result      1.  No definite acute abnormality in thorax, abdomen and pelvis CT scan.   2.  T11 vertebral compression fracture is new since 2019 but is age indeterminate on this exam. Further assessment could be performed with MRI if clinically appropriate.   3.  Evidence of remote granulomatous process involving the RIGHT upper lobe and RIGHT hilar and mediastinal lymph nodes   4.  Prior cholecystectomy   5.  Splenomegaly   6.  6 mm RIGHT lower lobe pulmonary nodule unchanged since at least 2019 and likely benign      CT-TSPINE W/O PLUS RECONS   Final Result      1.  Moderate  compression deformity of T11 is of indeterminate age but new compared to 2019.   2.  Mild loss of height of T9 and T12 with a superior Schmorl's node at T12. This may be chronic.   3.  Minimal loss of height of C7, T1, T6 may be chronic.   4.  Multilevel degenerative changes.      CT-CSPINE WITHOUT PLUS RECONS   Final Result      No acute abnormality identified.      CT-HEAD W/O   Final Result      Head CT without contrast within normal limits. No evidence of acute cerebral infarction, hemorrhage or mass lesion.         DX-CHEST-PORTABLE (1 VIEW)   Final Result      No acute cardiac or pulmonary abnormalities are identified. Lung volumes are low.        The radiologist's interpretation of all radiological studies have been reviewed by me.    COURSE & MEDICAL DECISION MAKING  Pertinent Labs & Imaging studies reviewed. (See chart for details)    Obtained and reviewed past medical records from previous visit for baseline labs or previous work-up for comparison.    12:47 PM Patient seen and examined at bedside. The patient presents with an episode of syncope, and the differential diagnosis includes but is not limited to closed head injury.  The patient is on chronic anticoagulation needs a head CT.  He is focal neck thoracic and lumbar pain and right flank pain.  Possible spinal injury, or intrathoracic or intra-abdominal injury.  Ordered for Dx-Chest, CT-Chest, abdomen, pelvis with, CT-Tspine without plus recons, CT-Cspine without plus recons, CT-head without, Urinalysis, CBC with differential, CMP, Troponin, and EKG to evaluate. Patient will be treated with solu-medrol 125 mg injection and benadryl injection 25 mg for his symptoms.     Patient is premedicated for CT contrast.  I believe his syncope is likely orthostatic.  Happened when he stood up.  His EKG was not significantly abnormal.  Labs are reassuring.  Patient is a recent negative COVID test.  He does not have COVID symptoms.      From a trauma perspective  his work-up was negative except for his thoracic spine shows a T11 fracture.  I discussed this with Dr. Didier barron on call for neurosurgery.  He is reviewed the images feels this only needs pain control.      The patient is given a Norco.  The plan be to discharge him with a prescription for pain medications.  She has had increased urinary frequency awaiting for urinalysis.  CT scan does not been enlarged bladder suggesting retention.  Waiting on a urinalysis.  If this is positive we will treat him with a antibiotic for UTI.    There is always the patient is reasonably ambulatory on [place] can be discharged home.  I will prescribe him Norco for his discomfort.    In prescribing controlled substances to this patient, I certify that I have obtained and reviewed the medical history of Benito Persaud. I have also made a good bijal effort to obtain applicable records from other providers who have treated the patient and records did not demonstrate any increased risk of substance abuse that would prevent me from prescribing controlled substances.     I have conducted a physical exam and documented it. I have reviewed Mr. Persaud’s prescription history as maintained by the [place] Prescription Monitoring Program.     I have assessed the patient’s risk for abuse, dependency, and addiction using the validated Opioid Risk Tool available at https://www.mdcalc.com/ugqrgi-fsob-cazh-ort-narcotic-abuse.     Given the above, I believe the benefits of controlled substance therapy outweigh the risks. The reasons for prescribing controlled substances include non-narcotic, oral analgesic alternatives have been inadequate for pain control. Accordingly, I have discussed the risk and benefits, treatment plan, and alternative therapies with the patient.     The patient is pending urinalysis if this is negative anticipate him to be discharged home.  His questions were answered, he is agreeable with plan.  Zofran for nausea.    Turned over  to my partner to check the urinalysis he is discharged home.    Hanny Wills M.D.  [address]  [place] [id number] [phone]                      FINAL IMPRESSION  1. Syncope, unspecified syncope type    2. Contusion of flank, initial encounter    3. Closed head injury, initial encounter    4. Compression fracture of T11 vertebra, initial encounter ([facility])    5. Pulmonary nodule          I, Ke Holguin (Scribe), am scribing for, and in the presence of, Clayton Cardoza M.D..    Electronically signed by: Ke Holguin (Scribe), 1/20/2022    IClayton M.D. personally performed the services described in this documentation, as scribed by Ke Holguin in my presence, and it is both accurate and complete.    The note accurately reflects work and decisions made by me.  Clayton Cardoza M.D.  1/20/2022  4:01 PM

## 2022-01-21 NOTE — ED NOTES
IVF infusing on a pressure bag, VSS, given a mouth swab, pt rahman snot want anything to eat or drink at this time.

## 2022-03-25 ENCOUNTER — APPOINTMENT (OUTPATIENT)
Dept: RADIOLOGY | Facility: MEDICAL CENTER | Age: 57
DRG: 603 | End: 2022-03-25
Attending: EMERGENCY MEDICINE
Payer: MEDICAID

## 2022-03-25 ENCOUNTER — HOSPITAL ENCOUNTER (INPATIENT)
Facility: MEDICAL CENTER | Age: 57
LOS: 3 days | DRG: 603 | End: 2022-03-28
Attending: EMERGENCY MEDICINE | Admitting: FAMILY MEDICINE
Payer: MEDICAID

## 2022-03-25 DIAGNOSIS — L02.416 CELLULITIS AND ABSCESS OF LEFT LEG: ICD-10-CM

## 2022-03-25 DIAGNOSIS — E11.65 TYPE 2 DIABETES MELLITUS WITH HYPERGLYCEMIA, WITHOUT LONG-TERM CURRENT USE OF INSULIN (HCC): ICD-10-CM

## 2022-03-25 DIAGNOSIS — L03.116 CELLULITIS AND ABSCESS OF LEFT LEG: ICD-10-CM

## 2022-03-25 PROBLEM — E11.10 DKA, TYPE 2, NOT AT GOAL (HCC): Status: ACTIVE | Noted: 2022-03-25

## 2022-03-25 LAB
ALBUMIN SERPL BCP-MCNC: 3.9 G/DL (ref 3.2–4.9)
ALBUMIN/GLOB SERPL: 1 G/DL
ALP SERPL-CCNC: 191 U/L (ref 30–99)
ALT SERPL-CCNC: 18 U/L (ref 2–50)
ANION GAP SERPL CALC-SCNC: 17 MMOL/L (ref 7–16)
APPEARANCE UR: CLEAR
AST SERPL-CCNC: 20 U/L (ref 12–45)
B-OH-BUTYR SERPL-MCNC: 0.04 MMOL/L (ref 0.02–0.27)
BASOPHILS # BLD AUTO: 0.4 % (ref 0–1.8)
BASOPHILS # BLD: 0.02 K/UL (ref 0–0.12)
BILIRUB SERPL-MCNC: 0.3 MG/DL (ref 0.1–1.5)
BILIRUB UR QL STRIP.AUTO: NEGATIVE
BUN SERPL-MCNC: 10 MG/DL (ref 8–22)
CALCIUM SERPL-MCNC: 9.1 MG/DL (ref 8.5–10.5)
CHLORIDE SERPL-SCNC: 98 MMOL/L (ref 96–112)
CO2 SERPL-SCNC: 16 MMOL/L (ref 20–33)
COLOR UR: YELLOW
CREAT SERPL-MCNC: 1.09 MG/DL (ref 0.5–1.4)
EKG IMPRESSION: NORMAL
EOSINOPHIL # BLD AUTO: 0.27 K/UL (ref 0–0.51)
EOSINOPHIL NFR BLD: 5.1 % (ref 0–6.9)
ERYTHROCYTE [DISTWIDTH] IN BLOOD BY AUTOMATED COUNT: 45.5 FL (ref 35.9–50)
GFR SERPLBLD CREATININE-BSD FMLA CKD-EPI: 80 ML/MIN/1.73 M 2
GLOBULIN SER CALC-MCNC: 3.9 G/DL (ref 1.9–3.5)
GLUCOSE SERPL-MCNC: 526 MG/DL (ref 65–99)
GLUCOSE UR STRIP.AUTO-MCNC: >=1000 MG/DL
HCT VFR BLD AUTO: 45.1 % (ref 42–52)
HGB BLD-MCNC: 15.2 G/DL (ref 14–18)
IMM GRANULOCYTES # BLD AUTO: 0.02 K/UL (ref 0–0.11)
IMM GRANULOCYTES NFR BLD AUTO: 0.4 % (ref 0–0.9)
KETONES UR STRIP.AUTO-MCNC: NEGATIVE MG/DL
LACTATE BLD-SCNC: 1.8 MMOL/L (ref 0.5–2)
LACTATE BLD-SCNC: 2.1 MMOL/L (ref 0.5–2)
LACTATE BLD-SCNC: 2.8 MMOL/L (ref 0.5–2)
LEUKOCYTE ESTERASE UR QL STRIP.AUTO: NEGATIVE
LYMPHOCYTES # BLD AUTO: 1.89 K/UL (ref 1–4.8)
LYMPHOCYTES NFR BLD: 35.6 % (ref 22–41)
MCH RBC QN AUTO: 29.7 PG (ref 27–33)
MCHC RBC AUTO-ENTMCNC: 33.7 G/DL (ref 33.7–35.3)
MCV RBC AUTO: 88.1 FL (ref 81.4–97.8)
MICRO URNS: ABNORMAL
MONOCYTES # BLD AUTO: 0.46 K/UL (ref 0–0.85)
MONOCYTES NFR BLD AUTO: 8.7 % (ref 0–13.4)
NEUTROPHILS # BLD AUTO: 2.65 K/UL (ref 1.82–7.42)
NEUTROPHILS NFR BLD: 49.8 % (ref 44–72)
NITRITE UR QL STRIP.AUTO: NEGATIVE
NRBC # BLD AUTO: 0 K/UL
NRBC BLD-RTO: 0 /100 WBC
PH UR STRIP.AUTO: 5.5 [PH] (ref 5–8)
PLATELET # BLD AUTO: 88 K/UL (ref 164–446)
PMV BLD AUTO: 8.7 FL (ref 9–12.9)
POTASSIUM SERPL-SCNC: 4.8 MMOL/L (ref 3.6–5.5)
PROT SERPL-MCNC: 7.8 G/DL (ref 6–8.2)
PROT UR QL STRIP: NEGATIVE MG/DL
RBC # BLD AUTO: 5.12 M/UL (ref 4.7–6.1)
RBC UR QL AUTO: NEGATIVE
SODIUM SERPL-SCNC: 131 MMOL/L (ref 135–145)
SP GR UR STRIP.AUTO: 1.03
UROBILINOGEN UR STRIP.AUTO-MCNC: 0.2 MG/DL
WBC # BLD AUTO: 5.3 K/UL (ref 4.8–10.8)

## 2022-03-25 PROCEDURE — 93005 ELECTROCARDIOGRAM TRACING: CPT

## 2022-03-25 PROCEDURE — 93005 ELECTROCARDIOGRAM TRACING: CPT | Performed by: EMERGENCY MEDICINE

## 2022-03-25 PROCEDURE — 87040 BLOOD CULTURE FOR BACTERIA: CPT | Mod: 91

## 2022-03-25 PROCEDURE — 36415 COLL VENOUS BLD VENIPUNCTURE: CPT

## 2022-03-25 PROCEDURE — A9270 NON-COVERED ITEM OR SERVICE: HCPCS | Performed by: EMERGENCY MEDICINE

## 2022-03-25 PROCEDURE — 84100 ASSAY OF PHOSPHORUS: CPT

## 2022-03-25 PROCEDURE — 85025 COMPLETE CBC W/AUTO DIFF WBC: CPT

## 2022-03-25 PROCEDURE — 80053 COMPREHEN METABOLIC PANEL: CPT

## 2022-03-25 PROCEDURE — 81003 URINALYSIS AUTO W/O SCOPE: CPT

## 2022-03-25 PROCEDURE — 83605 ASSAY OF LACTIC ACID: CPT | Mod: 91

## 2022-03-25 PROCEDURE — 71045 X-RAY EXAM CHEST 1 VIEW: CPT

## 2022-03-25 PROCEDURE — 83735 ASSAY OF MAGNESIUM: CPT

## 2022-03-25 PROCEDURE — 93922 UPR/L XTREMITY ART 2 LEVELS: CPT

## 2022-03-25 PROCEDURE — 96365 THER/PROPH/DIAG IV INF INIT: CPT

## 2022-03-25 PROCEDURE — 700111 HCHG RX REV CODE 636 W/ 250 OVERRIDE (IP): Performed by: STUDENT IN AN ORGANIZED HEALTH CARE EDUCATION/TRAINING PROGRAM

## 2022-03-25 PROCEDURE — 82010 KETONE BODYS QUAN: CPT

## 2022-03-25 PROCEDURE — 87205 SMEAR GRAM STAIN: CPT

## 2022-03-25 PROCEDURE — 99285 EMERGENCY DEPT VISIT HI MDM: CPT

## 2022-03-25 PROCEDURE — 96375 TX/PRO/DX INJ NEW DRUG ADDON: CPT

## 2022-03-25 PROCEDURE — 87086 URINE CULTURE/COLONY COUNT: CPT

## 2022-03-25 PROCEDURE — 700102 HCHG RX REV CODE 250 W/ 637 OVERRIDE(OP): Performed by: EMERGENCY MEDICINE

## 2022-03-25 PROCEDURE — 96366 THER/PROPH/DIAG IV INF ADDON: CPT

## 2022-03-25 PROCEDURE — 700105 HCHG RX REV CODE 258: Performed by: EMERGENCY MEDICINE

## 2022-03-25 PROCEDURE — 99222 1ST HOSP IP/OBS MODERATE 55: CPT | Mod: GC | Performed by: FAMILY MEDICINE

## 2022-03-25 PROCEDURE — 770001 HCHG ROOM/CARE - MED/SURG/GYN PRIV*

## 2022-03-25 PROCEDURE — 700111 HCHG RX REV CODE 636 W/ 250 OVERRIDE (IP): Performed by: EMERGENCY MEDICINE

## 2022-03-25 PROCEDURE — 87077 CULTURE AEROBIC IDENTIFY: CPT

## 2022-03-25 PROCEDURE — 87070 CULTURE OTHR SPECIMN AEROBIC: CPT

## 2022-03-25 PROCEDURE — 87147 CULTURE TYPE IMMUNOLOGIC: CPT

## 2022-03-25 PROCEDURE — 83036 HEMOGLOBIN GLYCOSYLATED A1C: CPT

## 2022-03-25 PROCEDURE — 87186 SC STD MICRODIL/AGAR DIL: CPT

## 2022-03-25 RX ORDER — SODIUM CHLORIDE 9 MG/ML
2000 INJECTION, SOLUTION INTRAVENOUS ONCE
Status: DISCONTINUED | OUTPATIENT
Start: 2022-03-25 | End: 2022-03-25

## 2022-03-25 RX ORDER — GABAPENTIN 400 MG/1
800 CAPSULE ORAL 3 TIMES DAILY
Status: DISCONTINUED | OUTPATIENT
Start: 2022-03-26 | End: 2022-03-28 | Stop reason: HOSPADM

## 2022-03-25 RX ORDER — AMOXICILLIN 250 MG
2 CAPSULE ORAL 2 TIMES DAILY
Status: DISCONTINUED | OUTPATIENT
Start: 2022-03-25 | End: 2022-03-28 | Stop reason: HOSPADM

## 2022-03-25 RX ORDER — AMITRIPTYLINE HYDROCHLORIDE 75 MG/1
150 TABLET ORAL NIGHTLY PRN
Status: DISCONTINUED | OUTPATIENT
Start: 2022-03-25 | End: 2022-03-28 | Stop reason: HOSPADM

## 2022-03-25 RX ORDER — PROMETHAZINE HYDROCHLORIDE 25 MG/1
12.5-25 TABLET ORAL EVERY 4 HOURS PRN
Status: DISCONTINUED | OUTPATIENT
Start: 2022-03-25 | End: 2022-03-28 | Stop reason: HOSPADM

## 2022-03-25 RX ORDER — MAGNESIUM SULFATE HEPTAHYDRATE 40 MG/ML
2 INJECTION, SOLUTION INTRAVENOUS
Status: DISCONTINUED | OUTPATIENT
Start: 2022-03-25 | End: 2022-03-25

## 2022-03-25 RX ORDER — POLYETHYLENE GLYCOL 3350 17 G/17G
1 POWDER, FOR SOLUTION ORAL
Status: DISCONTINUED | OUTPATIENT
Start: 2022-03-25 | End: 2022-03-28 | Stop reason: HOSPADM

## 2022-03-25 RX ORDER — INSULIN LISPRO 100 [IU]/ML
2-9 INJECTION, SOLUTION INTRAVENOUS; SUBCUTANEOUS EVERY 6 HOURS
Status: DISCONTINUED | OUTPATIENT
Start: 2022-03-26 | End: 2022-03-27

## 2022-03-25 RX ORDER — SODIUM CHLORIDE 9 MG/ML
INJECTION, SOLUTION INTRAVENOUS CONTINUOUS
Status: DISCONTINUED | OUTPATIENT
Start: 2022-03-25 | End: 2022-03-28 | Stop reason: HOSPADM

## 2022-03-25 RX ORDER — HYDROMORPHONE HYDROCHLORIDE 1 MG/ML
0.5 INJECTION, SOLUTION INTRAMUSCULAR; INTRAVENOUS; SUBCUTANEOUS
Status: DISCONTINUED | OUTPATIENT
Start: 2022-03-25 | End: 2022-03-28 | Stop reason: HOSPADM

## 2022-03-25 RX ORDER — ONDANSETRON 2 MG/ML
4 INJECTION INTRAMUSCULAR; INTRAVENOUS EVERY 4 HOURS PRN
Status: DISCONTINUED | OUTPATIENT
Start: 2022-03-25 | End: 2022-03-28 | Stop reason: HOSPADM

## 2022-03-25 RX ORDER — DEXTROSE AND SODIUM CHLORIDE 5; .45 G/100ML; G/100ML
INJECTION, SOLUTION INTRAVENOUS CONTINUOUS
Status: DISCONTINUED | OUTPATIENT
Start: 2022-03-25 | End: 2022-03-25

## 2022-03-25 RX ORDER — PROMETHAZINE HYDROCHLORIDE 25 MG/1
12.5-25 SUPPOSITORY RECTAL EVERY 4 HOURS PRN
Status: DISCONTINUED | OUTPATIENT
Start: 2022-03-25 | End: 2022-03-28 | Stop reason: HOSPADM

## 2022-03-25 RX ORDER — LABETALOL HYDROCHLORIDE 5 MG/ML
10 INJECTION, SOLUTION INTRAVENOUS EVERY 4 HOURS PRN
Status: DISCONTINUED | OUTPATIENT
Start: 2022-03-25 | End: 2022-03-28 | Stop reason: HOSPADM

## 2022-03-25 RX ORDER — MAGNESIUM SULFATE HEPTAHYDRATE 40 MG/ML
4 INJECTION, SOLUTION INTRAVENOUS
Status: DISCONTINUED | OUTPATIENT
Start: 2022-03-25 | End: 2022-03-25

## 2022-03-25 RX ORDER — OMEPRAZOLE 20 MG/1
40 CAPSULE, DELAYED RELEASE ORAL 2 TIMES DAILY
Status: DISCONTINUED | OUTPATIENT
Start: 2022-03-26 | End: 2022-03-28 | Stop reason: HOSPADM

## 2022-03-25 RX ORDER — MORPHINE SULFATE 4 MG/ML
4 INJECTION INTRAVENOUS ONCE
Status: COMPLETED | OUTPATIENT
Start: 2022-03-25 | End: 2022-03-25

## 2022-03-25 RX ORDER — CEFTRIAXONE 2 G/1
2 INJECTION, POWDER, FOR SOLUTION INTRAMUSCULAR; INTRAVENOUS ONCE
Status: COMPLETED | OUTPATIENT
Start: 2022-03-25 | End: 2022-03-25

## 2022-03-25 RX ORDER — LEVOFLOXACIN 5 MG/ML
500 INJECTION, SOLUTION INTRAVENOUS ONCE
Status: DISCONTINUED | OUTPATIENT
Start: 2022-03-25 | End: 2022-03-25

## 2022-03-25 RX ORDER — BISACODYL 10 MG
10 SUPPOSITORY, RECTAL RECTAL
Status: DISCONTINUED | OUTPATIENT
Start: 2022-03-25 | End: 2022-03-28 | Stop reason: HOSPADM

## 2022-03-25 RX ORDER — DIPHENHYDRAMINE HCL 25 MG
50 TABLET ORAL NIGHTLY PRN
Status: DISCONTINUED | OUTPATIENT
Start: 2022-03-25 | End: 2022-03-28 | Stop reason: HOSPADM

## 2022-03-25 RX ORDER — SODIUM CHLORIDE, SODIUM LACTATE, POTASSIUM CHLORIDE, AND CALCIUM CHLORIDE .6; .31; .03; .02 G/100ML; G/100ML; G/100ML; G/100ML
2000 INJECTION, SOLUTION INTRAVENOUS ONCE
Status: DISCONTINUED | OUTPATIENT
Start: 2022-03-25 | End: 2022-03-25

## 2022-03-25 RX ORDER — PROCHLORPERAZINE EDISYLATE 5 MG/ML
5-10 INJECTION INTRAMUSCULAR; INTRAVENOUS EVERY 4 HOURS PRN
Status: DISCONTINUED | OUTPATIENT
Start: 2022-03-25 | End: 2022-03-28 | Stop reason: HOSPADM

## 2022-03-25 RX ORDER — PROMETHAZINE HYDROCHLORIDE 25 MG/1
25 TABLET ORAL ONCE
Status: COMPLETED | OUTPATIENT
Start: 2022-03-25 | End: 2022-03-25

## 2022-03-25 RX ORDER — OXYCODONE HYDROCHLORIDE 10 MG/1
10 TABLET ORAL
Status: DISCONTINUED | OUTPATIENT
Start: 2022-03-25 | End: 2022-03-28 | Stop reason: HOSPADM

## 2022-03-25 RX ORDER — DEXTROSE AND SODIUM CHLORIDE 10; .45 G/100ML; G/100ML
INJECTION, SOLUTION INTRAVENOUS CONTINUOUS
Status: DISCONTINUED | OUTPATIENT
Start: 2022-03-25 | End: 2022-03-25

## 2022-03-25 RX ORDER — ONDANSETRON 4 MG/1
4 TABLET, ORALLY DISINTEGRATING ORAL EVERY 4 HOURS PRN
Status: DISCONTINUED | OUTPATIENT
Start: 2022-03-25 | End: 2022-03-28 | Stop reason: HOSPADM

## 2022-03-25 RX ORDER — SODIUM CHLORIDE 9 MG/ML
1000 INJECTION, SOLUTION INTRAVENOUS ONCE
Status: COMPLETED | OUTPATIENT
Start: 2022-03-25 | End: 2022-03-26

## 2022-03-25 RX ORDER — OXYCODONE HYDROCHLORIDE 5 MG/1
5 TABLET ORAL
Status: DISCONTINUED | OUTPATIENT
Start: 2022-03-25 | End: 2022-03-28 | Stop reason: HOSPADM

## 2022-03-25 RX ADMIN — SODIUM CHLORIDE 1000 ML: 9 INJECTION, SOLUTION INTRAVENOUS at 21:04

## 2022-03-25 RX ADMIN — CEFTRIAXONE SODIUM 2 G: 2 INJECTION, POWDER, FOR SOLUTION INTRAMUSCULAR; INTRAVENOUS at 20:59

## 2022-03-25 RX ADMIN — HYDROMORPHONE HYDROCHLORIDE 0.5 MG: 1 INJECTION, SOLUTION INTRAMUSCULAR; INTRAVENOUS; SUBCUTANEOUS at 23:38

## 2022-03-25 RX ADMIN — VANCOMYCIN HYDROCHLORIDE 3000 MG: 500 INJECTION, POWDER, LYOPHILIZED, FOR SOLUTION INTRAVENOUS at 22:06

## 2022-03-25 RX ADMIN — ONDANSETRON 4 MG: 2 INJECTION INTRAMUSCULAR; INTRAVENOUS at 23:38

## 2022-03-25 RX ADMIN — MORPHINE SULFATE 4 MG: 4 INJECTION INTRAVENOUS at 21:59

## 2022-03-25 RX ADMIN — PROMETHAZINE HYDROCHLORIDE 25 MG: 25 TABLET ORAL at 21:58

## 2022-03-25 RX ADMIN — INSULIN HUMAN 10 UNITS: 100 INJECTION, SOLUTION PARENTERAL at 20:56

## 2022-03-25 ASSESSMENT — PAIN DESCRIPTION - PAIN TYPE: TYPE: ACUTE PAIN

## 2022-03-25 ASSESSMENT — FIBROSIS 4 INDEX: FIB4 SCORE: 1.893458524812514922

## 2022-03-26 LAB
ANION GAP SERPL CALC-SCNC: 10 MMOL/L (ref 7–16)
APTT PPP: 28.5 SEC (ref 24.7–36)
BUN SERPL-MCNC: 10 MG/DL (ref 8–22)
CALCIUM SERPL-MCNC: 8.8 MG/DL (ref 8.5–10.5)
CHLORIDE SERPL-SCNC: 105 MMOL/L (ref 96–112)
CO2 SERPL-SCNC: 22 MMOL/L (ref 20–33)
CREAT SERPL-MCNC: 1 MG/DL (ref 0.5–1.4)
EST. AVERAGE GLUCOSE BLD GHB EST-MCNC: 177 MG/DL
GFR SERPLBLD CREATININE-BSD FMLA CKD-EPI: 88 ML/MIN/1.73 M 2
GLUCOSE BLD STRIP.AUTO-MCNC: 119 MG/DL (ref 65–99)
GLUCOSE BLD STRIP.AUTO-MCNC: 140 MG/DL (ref 65–99)
GLUCOSE BLD STRIP.AUTO-MCNC: 207 MG/DL (ref 65–99)
GLUCOSE BLD STRIP.AUTO-MCNC: 216 MG/DL (ref 65–99)
GLUCOSE BLD STRIP.AUTO-MCNC: 246 MG/DL (ref 65–99)
GLUCOSE BLD STRIP.AUTO-MCNC: 267 MG/DL (ref 65–99)
GLUCOSE SERPL-MCNC: 169 MG/DL (ref 65–99)
GRAM STN SPEC: NORMAL
HBA1C MFR BLD: 7.8 % (ref 4–5.6)
MAGNESIUM SERPL-MCNC: 1.8 MG/DL (ref 1.5–2.5)
PHOSPHATE SERPL-MCNC: 2.5 MG/DL (ref 2.5–4.5)
POTASSIUM SERPL-SCNC: 3.9 MMOL/L (ref 3.6–5.5)
SCCMEC + MECA PNL NOSE NAA+PROBE: POSITIVE
SIGNIFICANT IND 70042: NORMAL
SITE SITE: NORMAL
SODIUM SERPL-SCNC: 137 MMOL/L (ref 135–145)
SOURCE SOURCE: NORMAL

## 2022-03-26 PROCEDURE — 80048 BASIC METABOLIC PNL TOTAL CA: CPT

## 2022-03-26 PROCEDURE — 82962 GLUCOSE BLOOD TEST: CPT | Mod: 91

## 2022-03-26 PROCEDURE — 81240 F2 GENE: CPT

## 2022-03-26 PROCEDURE — 96375 TX/PRO/DX INJ NEW DRUG ADDON: CPT

## 2022-03-26 PROCEDURE — 36415 COLL VENOUS BLD VENIPUNCTURE: CPT

## 2022-03-26 PROCEDURE — 85306 CLOT INHIBIT PROT S FREE: CPT

## 2022-03-26 PROCEDURE — A9270 NON-COVERED ITEM OR SERVICE: HCPCS | Performed by: STUDENT IN AN ORGANIZED HEALTH CARE EDUCATION/TRAINING PROGRAM

## 2022-03-26 PROCEDURE — 94760 N-INVAS EAR/PLS OXIMETRY 1: CPT

## 2022-03-26 PROCEDURE — 770001 HCHG ROOM/CARE - MED/SURG/GYN PRIV*

## 2022-03-26 PROCEDURE — 86147 CARDIOLIPIN ANTIBODY EA IG: CPT | Mod: 91

## 2022-03-26 PROCEDURE — 99232 SBSQ HOSP IP/OBS MODERATE 35: CPT | Mod: GC | Performed by: FAMILY MEDICINE

## 2022-03-26 PROCEDURE — 700105 HCHG RX REV CODE 258: Performed by: STUDENT IN AN ORGANIZED HEALTH CARE EDUCATION/TRAINING PROGRAM

## 2022-03-26 PROCEDURE — 85303 CLOT INHIBIT PROT C ACTIVITY: CPT

## 2022-03-26 PROCEDURE — 96372 THER/PROPH/DIAG INJ SC/IM: CPT

## 2022-03-26 PROCEDURE — 700111 HCHG RX REV CODE 636 W/ 250 OVERRIDE (IP): Performed by: STUDENT IN AN ORGANIZED HEALTH CARE EDUCATION/TRAINING PROGRAM

## 2022-03-26 PROCEDURE — 700102 HCHG RX REV CODE 250 W/ 637 OVERRIDE(OP)

## 2022-03-26 PROCEDURE — 700101 HCHG RX REV CODE 250: Performed by: STUDENT IN AN ORGANIZED HEALTH CARE EDUCATION/TRAINING PROGRAM

## 2022-03-26 PROCEDURE — 85300 ANTITHROMBIN III ACTIVITY: CPT

## 2022-03-26 PROCEDURE — 96366 THER/PROPH/DIAG IV INF ADDON: CPT

## 2022-03-26 PROCEDURE — 94640 AIRWAY INHALATION TREATMENT: CPT

## 2022-03-26 PROCEDURE — 85730 THROMBOPLASTIN TIME PARTIAL: CPT

## 2022-03-26 PROCEDURE — 81241 F5 GENE: CPT

## 2022-03-26 PROCEDURE — 87641 MR-STAPH DNA AMP PROBE: CPT

## 2022-03-26 PROCEDURE — 96376 TX/PRO/DX INJ SAME DRUG ADON: CPT

## 2022-03-26 PROCEDURE — 700102 HCHG RX REV CODE 250 W/ 637 OVERRIDE(OP): Performed by: STUDENT IN AN ORGANIZED HEALTH CARE EDUCATION/TRAINING PROGRAM

## 2022-03-26 PROCEDURE — A9270 NON-COVERED ITEM OR SERVICE: HCPCS

## 2022-03-26 RX ORDER — IPRATROPIUM BROMIDE AND ALBUTEROL SULFATE 2.5; .5 MG/3ML; MG/3ML
3 SOLUTION RESPIRATORY (INHALATION)
Status: DISCONTINUED | OUTPATIENT
Start: 2022-03-26 | End: 2022-03-27

## 2022-03-26 RX ORDER — ZOLPIDEM TARTRATE 5 MG/1
10 TABLET ORAL NIGHTLY PRN
Status: DISCONTINUED | OUTPATIENT
Start: 2022-03-26 | End: 2022-03-28 | Stop reason: HOSPADM

## 2022-03-26 RX ORDER — OMEPRAZOLE 40 MG/1
40 CAPSULE, DELAYED RELEASE ORAL 2 TIMES DAILY
COMMUNITY
End: 2022-04-05 | Stop reason: SDUPTHER

## 2022-03-26 RX ORDER — ZOLPIDEM TARTRATE 10 MG/1
10 TABLET ORAL NIGHTLY PRN
COMMUNITY
End: 2022-05-04 | Stop reason: SDUPTHER

## 2022-03-26 RX ADMIN — GABAPENTIN 800 MG: 400 CAPSULE ORAL at 01:58

## 2022-03-26 RX ADMIN — VANCOMYCIN HYDROCHLORIDE 1250 MG: 500 INJECTION, POWDER, LYOPHILIZED, FOR SOLUTION INTRAVENOUS at 11:38

## 2022-03-26 RX ADMIN — CEFTRIAXONE SODIUM 2 G: 10 INJECTION, POWDER, FOR SOLUTION INTRAVENOUS at 17:05

## 2022-03-26 RX ADMIN — GABAPENTIN 800 MG: 400 CAPSULE ORAL at 17:05

## 2022-03-26 RX ADMIN — HYDROMORPHONE HYDROCHLORIDE 0.5 MG: 1 INJECTION, SOLUTION INTRAMUSCULAR; INTRAVENOUS; SUBCUTANEOUS at 14:03

## 2022-03-26 RX ADMIN — APIXABAN 5 MG: 5 TABLET, FILM COATED ORAL at 17:05

## 2022-03-26 RX ADMIN — METFORMIN HYDROCHLORIDE 500 MG: 500 TABLET ORAL at 11:38

## 2022-03-26 RX ADMIN — HYDROMORPHONE HYDROCHLORIDE 0.5 MG: 1 INJECTION, SOLUTION INTRAMUSCULAR; INTRAVENOUS; SUBCUTANEOUS at 23:05

## 2022-03-26 RX ADMIN — DIPHENHYDRAMINE HYDROCHLORIDE 50 MG: 25 TABLET ORAL at 01:59

## 2022-03-26 RX ADMIN — DIPHENHYDRAMINE HYDROCHLORIDE 50 MG: 25 TABLET ORAL at 23:57

## 2022-03-26 RX ADMIN — APIXABAN 5 MG: 5 TABLET, FILM COATED ORAL at 05:12

## 2022-03-26 RX ADMIN — INSULIN LISPRO 3 UNITS: 100 INJECTION, SOLUTION INTRAVENOUS; SUBCUTANEOUS at 23:05

## 2022-03-26 RX ADMIN — OXYCODONE HYDROCHLORIDE 10 MG: 10 TABLET ORAL at 17:12

## 2022-03-26 RX ADMIN — IPRATROPIUM BROMIDE AND ALBUTEROL SULFATE 3 ML: 2.5; .5 SOLUTION RESPIRATORY (INHALATION) at 15:18

## 2022-03-26 RX ADMIN — ONDANSETRON 4 MG: 2 INJECTION INTRAMUSCULAR; INTRAVENOUS at 23:59

## 2022-03-26 RX ADMIN — OMEPRAZOLE 40 MG: 20 CAPSULE, DELAYED RELEASE ORAL at 01:57

## 2022-03-26 RX ADMIN — OXYCODONE 5 MG: 5 TABLET ORAL at 09:18

## 2022-03-26 RX ADMIN — GABAPENTIN 800 MG: 400 CAPSULE ORAL at 09:18

## 2022-03-26 RX ADMIN — ZOLPIDEM TARTRATE 10 MG: 5 TABLET ORAL at 23:05

## 2022-03-26 RX ADMIN — AMITRIPTYLINE HYDROCHLORIDE 150 MG: 75 TABLET, FILM COATED ORAL at 01:59

## 2022-03-26 RX ADMIN — HYDROMORPHONE HYDROCHLORIDE 0.5 MG: 1 INJECTION, SOLUTION INTRAMUSCULAR; INTRAVENOUS; SUBCUTANEOUS at 05:13

## 2022-03-26 RX ADMIN — IPRATROPIUM BROMIDE AND ALBUTEROL SULFATE 3 ML: 2.5; .5 SOLUTION RESPIRATORY (INHALATION) at 18:38

## 2022-03-26 RX ADMIN — SODIUM CHLORIDE: 9 INJECTION, SOLUTION INTRAVENOUS at 01:00

## 2022-03-26 RX ADMIN — OMEPRAZOLE 40 MG: 20 CAPSULE, DELAYED RELEASE ORAL at 11:38

## 2022-03-26 RX ADMIN — INSULIN LISPRO 3 UNITS: 100 INJECTION, SOLUTION INTRAVENOUS; SUBCUTANEOUS at 12:38

## 2022-03-26 RX ADMIN — HYDROMORPHONE HYDROCHLORIDE 0.5 MG: 1 INJECTION, SOLUTION INTRAMUSCULAR; INTRAVENOUS; SUBCUTANEOUS at 20:03

## 2022-03-26 RX ADMIN — IPRATROPIUM BROMIDE AND ALBUTEROL SULFATE 3 ML: 2.5; .5 SOLUTION RESPIRATORY (INHALATION) at 08:07

## 2022-03-26 RX ADMIN — INSULIN LISPRO 5 UNITS: 100 INJECTION, SOLUTION INTRAVENOUS; SUBCUTANEOUS at 02:03

## 2022-03-26 RX ADMIN — VANCOMYCIN HYDROCHLORIDE 1250 MG: 500 INJECTION, POWDER, LYOPHILIZED, FOR SOLUTION INTRAVENOUS at 21:38

## 2022-03-26 RX ADMIN — GABAPENTIN 800 MG: 400 CAPSULE ORAL at 23:04

## 2022-03-26 RX ADMIN — METFORMIN HYDROCHLORIDE 500 MG: 500 TABLET ORAL at 17:05

## 2022-03-26 RX ADMIN — OXYCODONE HYDROCHLORIDE 10 MG: 10 TABLET ORAL at 02:15

## 2022-03-26 RX ADMIN — OMEPRAZOLE 40 MG: 20 CAPSULE, DELAYED RELEASE ORAL at 23:04

## 2022-03-26 ASSESSMENT — PAIN DESCRIPTION - PAIN TYPE
TYPE: ACUTE PAIN

## 2022-03-26 ASSESSMENT — COGNITIVE AND FUNCTIONAL STATUS - GENERAL
MOBILITY SCORE: 21
TOILETING: A LITTLE
HELP NEEDED FOR BATHING: A LITTLE
MOVING FROM LYING ON BACK TO SITTING ON SIDE OF FLAT BED: A LITTLE
DAILY ACTIVITIY SCORE: 20
MOVING TO AND FROM BED TO CHAIR: A LITTLE
SUGGESTED CMS G CODE MODIFIER DAILY ACTIVITY: CJ
DRESSING REGULAR UPPER BODY CLOTHING: A LITTLE
STANDING UP FROM CHAIR USING ARMS: A LITTLE
DRESSING REGULAR LOWER BODY CLOTHING: A LITTLE
SUGGESTED CMS G CODE MODIFIER MOBILITY: CJ

## 2022-03-26 ASSESSMENT — PATIENT HEALTH QUESTIONNAIRE - PHQ9
2. FEELING DOWN, DEPRESSED, IRRITABLE, OR HOPELESS: NOT AT ALL
SUM OF ALL RESPONSES TO PHQ9 QUESTIONS 1 AND 2: 0
1. LITTLE INTEREST OR PLEASURE IN DOING THINGS: NOT AT ALL
1. LITTLE INTEREST OR PLEASURE IN DOING THINGS: NOT AT ALL
SUM OF ALL RESPONSES TO PHQ9 QUESTIONS 1 AND 2: 0
2. FEELING DOWN, DEPRESSED, IRRITABLE, OR HOPELESS: NOT AT ALL

## 2022-03-26 ASSESSMENT — FIBROSIS 4 INDEX: FIB4 SCORE: 2.95

## 2022-03-26 ASSESSMENT — LIFESTYLE VARIABLES
AVERAGE NUMBER OF DAYS PER WEEK YOU HAVE A DRINK CONTAINING ALCOHOL: 0
EVER FELT BAD OR GUILTY ABOUT YOUR DRINKING: NO
HOW MANY TIMES IN THE PAST YEAR HAVE YOU HAD 5 OR MORE DRINKS IN A DAY: 0
TOTAL SCORE: 0
EVER HAD A DRINK FIRST THING IN THE MORNING TO STEADY YOUR NERVES TO GET RID OF A HANGOVER: NO
CONSUMPTION TOTAL: NEGATIVE
TOTAL SCORE: 0
HAVE YOU EVER FELT YOU SHOULD CUT DOWN ON YOUR DRINKING: NO
TOTAL SCORE: 0
ALCOHOL_USE: NO
DOES PATIENT WANT TO STOP DRINKING: NO
HAVE PEOPLE ANNOYED YOU BY CRITICIZING YOUR DRINKING: NO
ON A TYPICAL DAY WHEN YOU DRINK ALCOHOL HOW MANY DRINKS DO YOU HAVE: 0

## 2022-03-26 NOTE — ED TRIAGE NOTES
"Chief Complaint   Patient presents with   • Wound Check     Pt cut left lower leg 1 [place]. Wound has white/yellow color drainage. Causing pt severe pain. Pt is unable to walk. He has tried to clean it but has been unsuccessful.   • GLF     Pt fell this AM. -head injury. -loc. Pt had CP occurring with fall. Hx MI 2019.     EKG ordered.    /103   Pulse (!) 111   Temp 36.2 °C (97.2 °F) (Temporal)   Resp 16   Ht 1.803 m (5' 11\")   Wt 120 kg (264 lb)   SpO2 98%   BMI 36.82 kg/m²     "

## 2022-03-26 NOTE — PROGRESS NOTES
"Pharmacy Vancomycin Kinetics Note for 3/26/2022     56 y.o. male on Vancomycin day # 1     Vancomycin Indication (AUC Dosing): Skin/skin structure infection    Provider specified end date: 03/28/22    Active Antibiotics (From admission, onward)    Ordered     Ordering Provider       Fri Mar 25, 2022 11:34 PM    03/25/22 2334  cefTRIAXone (Rocephin) syringe 2 g  EVERY 24 HOURS         Lilo Gaspar M.D.    03/25/22 2334  MD Alert...Vancomycin per Pharmacy  PHARMACY TO DOSE        Question:  Indication(s) for vancomycin?  Answer:  Skin and soft tissue infection    Lilo Gaspar M.D.       Fri Mar 25, 2022  8:12 PM    03/25/22 2012  vancomycin (VANCOCIN) 3,000 mg in  mL IVPB  (vancomycin (VANCOCIN) IV (LD + Maintenance))  ONCE         Sonny Quiroz M.D.          Dosing Weight: 120 kg (264 lb 8.8 oz)      Admission History: Admitted on 3/25/2022 for DKA, type 2, not at goal (HCC) [E11.10]  Pertinent history: Patient with cut on lower left leg 1 month ago presents with wound that has not improved and is presenting with yellow drainage.    Allergies:     Iodine, Keflex, Reglan [metoclopramide], Shellfish allergy, and Toradol     Pertinent cultures to date:     Results     Procedure Component Value Units Date/Time    BLOOD CULTURE [[id number]] Collected: 03/25/22 2042    Order Status: Sent Specimen: Blood from Peripheral Updated: 03/25/22 2055    Narrative:      Per Hospital Policy: Only change Specimen Src: to \"Line\" if  specified by physician order.    URINALYSIS [[id number]]  (Abnormal) Collected: 03/25/22 2009    Order Status: Completed Specimen: Urine, Clean Catch Updated: 03/25/22 2025     Color Yellow     Character Clear     Specific Gravity 1.027     Ph 5.5     Glucose >=1000 mg/dL      Ketones Negative mg/dL      Protein Negative mg/dL      Bilirubin Negative     Urobilinogen, Urine 0.2     Nitrite Negative     Leukocyte Esterase Negative     Occult Blood Negative     Micro Urine Req see " "below     Comment: Microscopic examination not performed when specimen is clear  and chemically negative for protein, blood, leukocyte esterase  and nitrite.         Narrative:      Indication for culture:->Evaluation for sepsis without a  clear source of infection    URINE CULTURE(NEW) [[id number]] Collected: 22    Order Status: Sent Specimen: Urine, Clean Catch Updated: 22    Narrative:      Indication for culture:->Evaluation for sepsis without a  clear source of infection    CULTURE WOUND W/ GRAM STAIN [[id number]]     Order Status: Sent Specimen: Wound from Abscess     BLOOD CULTURE [[id number]] Collected: 22    Order Status: Sent Specimen: Blood from Peripheral Updated: 22    Narrative:      Per Hospital Policy: Only change Specimen Src: to \"Line\" if  specified by physician order.          Labs:     Estimated Creatinine Clearance: 100.9 mL/min (by C-G formula based on SCr of 1.09 mg/dL).  Recent Labs     22   WBC 5.3   NEUTSPOLYS 49.80     Recent Labs     22   BUN 10   CREATININE 1.09   ALBUMIN 3.9     No intake or output data in the 24 hours ending 22 0007   BP (!) 164/97   Pulse 95   Temp 36.2 °C (97.2 °F) (Temporal)   Resp 16   Ht 1.803 m (5' 11\")   Wt 120 kg (264 lb)   SpO2 94%  Temp (24hrs), Av.2 °C (97.2 °F), Min:36.2 °C (97.2 °F), Max:36.2 °C (97.2 °F)      List concerns for Vancomycin clearance:     Obesity    Pharmacokinetics:    AUC kinetics:   Ke (hr ^-1): 0.0881 hr^-1  Half life: 7.87 hr  Clearance: 5.716  Estimated TDD: 2858  Estimated Dose: 1179  Estimated interval: 9.9    A/P:     -  Vancomycin dose: Loading dose 3000mg @3/25/22 2206, followed by 1250mg Q12H @1000 and @2200.     -  Next vancomycin level(s):   Recommend after 4th maintenance dose before 5th maintenance dose. Currently not ordered. To be ordered by clinical pharmacist in future.         -  Predicted vancomycin AUC from initial AUC test calculator: 437 " mg·hr/L    -  Comments: Concerned with risk of accumulation due to patient BMI>30, dosed more conservatively, but within AUC calculator predicted goal. Continue to monitor and adjust based on changes in renal function and patient clinical status if necessary.     Huey Ya, PharmD

## 2022-03-26 NOTE — H&P
[facility] MEDICINE HISTORY AND PHYSICAL     PATIENT ID:  NAME:  Benito Persaud  MRN:               [id number]  YOB: 1965    Date of Admission:   3/25/2022     Attending:   Dr. Miller    Resident:   Lilo Gaspar, PGY-2    Primary Care Physician:    Dr. Wills    CC:    Leg pain    HPI:   Benito Persaud is a 56 y.o. 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 Patient has no recollection of how the wound was actually caused; he just noticed greenish discharge from his shin one day. The pain has worsened to the point where he cannot bear weight on the left leg. He has not used anything at home to relieve the pain, but he has been cleaning the area relentlessly at home and using topical antibiotics OTC. He has no known history of diabetes. Patient also notes nausea and some emesis and poor PO intake for the last 4 days, which patient associates with leg pain.    ERCourse:  - Labs indicate hyperglycemia to 526, acidosis with bicarb of 16, anion gap of 17, hyponatremia to 131, lactic acidosis to 2.8  - Tx with 10U insulin, sepsis bolus, vancomycin, ceftriaxone  - Wound culture obtained  - OLU performed, which was within normal limits  - Pt given morphine for pain, phenergan for nausea  - CXR wnl, EKG wnl      REVIEW OF SYSTEMS:   Ten systems reviewed and were negative except as noted in the HPI.                PAST MEDICAL HISTORY:  Past Medical History:   Diagnosis Date   • Asthma    • Cancer (HCC) 11/01/2016   • MI (myocardial infarction) (HCC) 2019       PAST SURGICAL HISTORY:  Past Surgical History:   Procedure Laterality Date   • CATH REMOVAL N/A 8/14/2019    Procedure: REMOVAL, CATHETER AND PLACEMENT OF POWER PORT;  Surgeon: Sonny Enamorado M.D.;  Location: SURGERY [facility];  Service: General       FAMILY HISTORY:  Family History   Problem  Relation Age of Onset   • Cancer Mother    • Psychiatric Illness Mother    • Diabetes Mother    • Hypertension Mother    • Hyperlipidemia Mother    • Arterial Aneurysm Father    • Heart Disease Maternal Grandmother        SOCIAL HISTORY:   Patient works as a . No drug use, tobacco use, EtOH intake.    DIET:   Orders Placed This Encounter   Procedures   • Diet Order Diet: Consistent CHO (Diabetic)     Standing Status:   Standing     Number of Occurrences:   1     Order Specific Question:   Diet:     Answer:   Consistent CHO (Diabetic) [4]       ALLERGIES:  Allergies   Allergen Reactions   • Iodine Anaphylaxis   • Keflex Diarrhea and Vomiting     Vomiting & Diarrhea  Tolerates ceftriaxone   • Reglan [Metoclopramide] Nausea     Asthma     • Shellfish Allergy Anaphylaxis   • Toradol Hives       OUTPATIENT MEDICATIONS:    Current Facility-Administered Medications:   •  vancomycin (VANCOCIN) 3,000 mg in  mL IVPB, 25 mg/kg, Intravenous, Once, Sonny Quiroz M.D., Last Rate: 166.7 mL/hr at 03/25/22 2206, 3,000 mg at 03/25/22 2206  •  NS infusion, , Intravenous, Continuous, Lilo Gaspar M.D.  •  senna-docusate (PERICOLACE or SENOKOT S) 8.6-50 MG per tablet 2 Tablet, 2 Tablet, Oral, BID **AND** polyethylene glycol/lytes (MIRALAX) PACKET 1 Packet, 1 Packet, Oral, QDAY PRN **AND** magnesium hydroxide (MILK OF MAGNESIA) suspension 30 mL, 30 mL, Oral, QDAY PRN **AND** bisacodyl (DULCOLAX) suppository 10 mg, 10 mg, Rectal, QDAY PRN, Lilo Gaspar M.D.  •  [START ON 3/26/2022] enoxaparin (LOVENOX) inj 40 mg, 40 mg, Subcutaneous, DAILY, Lilo Gaspar M.D.  •  labetalol (NORMODYNE/TRANDATE) injection 10 mg, 10 mg, Intravenous, Q4HRS PRN, Lilo Gaspar M.D.  •  ondansetron (ZOFRAN) syringe/vial injection 4 mg, 4 mg, Intravenous, Q4HRS PRN, Lilo Gaspar M.D., 4 mg at 03/25/22 2338  •  ondansetron (ZOFRAN ODT) dispertab 4 mg, 4 mg, Oral, Q4HRS PRN, Lilo Gaspar M.D.  •   promethazine (PHENERGAN) tablet 12.5-25 mg, 12.5-25 mg, Oral, Q4HRS PRN, Lilo Gaspar M.D.  •  promethazine (PHENERGAN) suppository 12.5-25 mg, 12.5-25 mg, Rectal, Q4HRS PRN, Lilo Gaspar M.D.  •  prochlorperazine (COMPAZINE) injection 5-10 mg, 5-10 mg, Intravenous, Q4HRS PRN, Lilo Gaspar M.D.  •  Notify provider if pain remains uncontrolled, , , CONTINUOUS **AND** Use the Numeric Rating Scale (NRS), Tai-Baker Faces (WBF), or FLACC on regular floors and Critical-Care Pain Observation Tool (CPOT) on ICUs/Trauma to assess pain, , , CONTINUOUS **AND** Pulse Ox, , , CONTINUOUS **AND** Pharmacy Consult Request ...Pain Management Review 1 Each, 1 Each, Other, PHARMACY TO DOSE **AND** If patient difficult to arouse and/or has respiratory depression (respiratory rate of 10 or less), stop any opiates that are currently infusing and call a Rapid Response., , , CONTINUOUS, Lilo Gaspar M.D.  •  oxyCODONE immediate-release (ROXICODONE) tablet 5 mg, 5 mg, Oral, Q3HRS PRN **OR** oxyCODONE immediate-release (ROXICODONE) tablet 10 mg, 10 mg, Oral, Q3HRS PRN **OR** HYDROmorphone (Dilaudid) injection 0.5 mg, 0.5 mg, Intravenous, Q3HRS PRN, Lilo Gaspar M.D., 0.5 mg at 03/25/22 1968  •  [START ON 3/26/2022] insulin GLARGINE (Lantus,Semglee) injection, 15 Units, Subcutaneous, Q EVENING **AND** [START ON 3/26/2022] insulin LISPRO (AdmeLOG,HumaLOG) injection, 2-9 Units, Subcutaneous, Q6HRS **AND** POC blood glucose manual result, , , Q6H **AND** NOTIFY MD and PharmD, , , Once **AND** Administer 20 grams of glucose (approximately 8 ounces of fruit juice) every 15 minutes PRN FSBG less than 70 mg/dL, , , PRN **AND** dextrose 10 % BOLUS 25 g, 25 g, Intravenous, Q15 MIN PRN, Lilo Gaspar M.D.  •  amitriptyline (ELAVIL) tablet 150 mg, 150 mg, Oral, HS PRN, Lilo Gaspar M.D.  •  [START ON 3/26/2022] apixaban (ELIQUIS) tablet 5 mg, 5 mg, Oral, BID, Lilo Gaspar,  M.D.  •  diphenhydrAMINE (BENADRYL) capsule 50 mg, 50 mg, Oral, HS PRN, Lilo Gaspar M.D.  •  [START ON 3/26/2022] gabapentin (NEURONTIN) tablet 800 mg, 800 mg, Oral, TID, Lilo Gaspar M.D.  •  [START ON 3/26/2022] ipratropium-albuterol (COMBIVENT RESPIMAT) inhaler 1 Puff, 1 Puff, Inhalation, 4X/DAY, Lilo Gaspar M.D.  •  [START ON 3/26/2022] omeprazole (PRILOSEC) capsule 20 mg, 20 mg, Oral, BID, Lilo Gaspar M.D.  •  [START ON 3/26/2022] cefTRIAXone (Rocephin) syringe 2 g, 2 g, Intravenous, Q24HRS, Lilo Gaspar M.D.  •  MD Alert...Vancomycin per Pharmacy, , Other, PHARMACY TO DOSE, Lilo Gaspar M.D.    Current Outpatient Medications:   •  ondansetron (ZOFRAN ODT) 4 MG TABLET DISPERSIBLE, Take 1 Tablet by mouth every 6 hours as needed for Nausea., Disp: 10 Tablet, Rfl: 0  •  gabapentin (NEURONTIN) 800 MG tablet, Take 1 Tablet by mouth 3 times a day., Disp: 90 Tablet, Rfl: 3  •  ipratropium-albuterol (COMBIVENT RESPIMAT)  MCG/ACT Aero Soln, Inhale 1 Puff 4 times a day., Disp: 1 Each, Rfl: 5  •  amitriptyline (ELAVIL) 100 MG Tab, Take 1.5 Tablets by mouth at bedtime as needed for Sleep., Disp: 30 Tablet, Rfl: 3  •  omeprazole (PRILOSEC) 20 MG delayed-release capsule, Take 1 Capsule by mouth 2 times a day., Disp: 60 Capsule, Rfl: 3  •  promethazine (PHENERGAN) 25 MG Tab, Take 1 Tablet by mouth every 6 hours as needed for Nausea/Vomiting., Disp: 15 Tablet, Rfl: 0  •  apixaban (ELIQUIS) 5mg Tab, Take 1 Tablet by mouth 2 times a day., Disp: 60 Tablet, Rfl: 3  •  diphenhydrAMINE (BANOPHEN) 50 MG Cap, Take 1 Capsule by mouth at bedtime as needed for Sleep., Disp: 30 Capsule, Rfl: 2  •  azithromycin (ZITHROMAX) 250 MG Tab, Take 2 tablets on day 1 and then 1 tablet every day for remaining 4 days., Disp: 6 Tablet, Rfl: 0  •  ondansetron (ZOFRAN ODT) 4 MG TABLET DISPERSIBLE, Take 4 mg by mouth at bedtime as needed for Nausea., Disp: , Rfl:   •  Cholecalciferol  "(VITAMIN D3) 23319 units Cap, Take 1 Cap by mouth every 14 days., Disp: , Rfl:   •  acetaminophen (TYLENOL) 500 MG Tab, Take 1 Tab by mouth every 6 hours as needed for Moderate Pain., Disp: , Rfl:   •  multivitamin (THERAGRAN) Tab, Take 1 Tab by mouth every day., Disp: 30 Tab, Rfl: 0    PHYSICAL EXAM:  Vitals:    22 1745 22 1755 22 2331   BP: 149/103  (!) 164/97   Pulse: (!) 111  95   Resp: 16     Temp: 36.2 °C (97.2 °F)     TempSrc: Temporal     SpO2: 98%  94%   Weight:  120 kg (264 lb)    Height:  1.803 m (5' 11\")    , Temp (24hrs), Av.2 °C (97.2 °F), Min:36.2 °C (97.2 °F), Max:36.2 °C (97.2 °F)  , Pulse Oximetry: 94 %      General: Pt resting in mild distress, cooperative, obese  Skin:  Pink, warm and dry. Large scab wound on anterior aspect of LLE with some scabbing of periphery and no obvious exudative discharge with surrounding edema  HEENT: NC/AT. PERRL. EOMI. MMM. No nasal discharge. Oropharynx nonerythematous without exudate/plaques  Neck:  Supple without lymphadenopathy or rigidity. No JVD   Lungs:  Symmetrical.  CTAB with no W/R/R.  Limited by body habitus  Cardiovascular:  S1/S2 RRR without M/R/G.  Abdomen:  Abdomen is soft NT/ND. +BS. No masses noted.  Extremities:  Full range of motion. No gross deformities noted. 2+ pulses in all extremities. No C/C/E. Bilateral feet cold      LAB TESTS:   Recent Labs     22  1836   WBC 5.3   RBC 5.12   HEMOGLOBIN 15.2   HEMATOCRIT 45.1   MCV 88.1   MCH 29.7   RDW 45.5   PLATELETCT 88*   MPV 8.7*   NEUTSPOLYS 49.80   LYMPHOCYTES 35.60   MONOCYTES 8.70   EOSINOPHILS 5.10   BASOPHILS 0.40         Recent Labs     22  1836   SODIUM 131*   POTASSIUM 4.8   CHLORIDE 98   CO2 16*   BUN 10   CREATININE 1.09   CALCIUM 9.1   ALBUMIN 3.9       CULTURES:   Results     Procedure Component Value Units Date/Time    BLOOD CULTURE [[id number]] Collected: 22    Order Status: Sent Specimen: Blood from Peripheral Updated: 22    " "Narrative:      Per Hospital Policy: Only change Specimen Src: to \"Line\" if  specified by physician order.    URINALYSIS [[id number]]  (Abnormal) Collected: 03/25/22 2009    Order Status: Completed Specimen: Urine, Clean Catch Updated: 03/25/22 2025     Color Yellow     Character Clear     Specific Gravity 1.027     Ph 5.5     Glucose >=1000 mg/dL      Ketones Negative mg/dL      Protein Negative mg/dL      Bilirubin Negative     Urobilinogen, Urine 0.2     Nitrite Negative     Leukocyte Esterase Negative     Occult Blood Negative     Micro Urine Req see below     Comment: Microscopic examination not performed when specimen is clear  and chemically negative for protein, blood, leukocyte esterase  and nitrite.         Narrative:      Indication for culture:->Evaluation for sepsis without a  clear source of infection    URINE CULTURE(NEW) [[id number]] Collected: 03/25/22 2009    Order Status: Sent Specimen: Urine, Clean Catch Updated: 03/25/22 2017    Narrative:      Indication for culture:->Evaluation for sepsis without a  clear source of infection    CULTURE WOUND W/ GRAM STAIN [[id number]]     Order Status: Sent Specimen: Wound from Abscess     BLOOD CULTURE [[id number]] Collected: 03/25/22 1836    Order Status: Sent Specimen: Blood from Peripheral Updated: 03/25/22 1848    Narrative:      Per Hospital Policy: Only change Specimen Src: to \"Line\" if  specified by physician order.          IMAGING:   US-OLU SINGLE LEVEL BILAT   Final Result      DX-CHEST-PORTABLE (1 VIEW)   Final Result         1. No acute cardiopulmonary abnormalities are identified.          CONSULTS:   None    ASSESSMENT/PLAN:   56 y.o. 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     # Cellulitis, lower left extremity  # Lactic Acidosis, downtrending  - No leukocytosis. Patient is tachycardic in ER and in tremendous pain. Area " of erythema and soft tissue swelling surrounding wound. CTX and Vancomycin started in ER, will continue.    - F/u wound culture and blood culture  - Tylenol, Oxy, Dilaudid PRN pain, depending on severity  - OLU performed in ER without evidence of arterial disease  - Likely poor wound healing in setting of new onset of diabetes, see below  - S/p fluid bolus, continue NS at 150cc/hr     # Hyperglycemia, 526  # High anion gap metabolic acidosis  # Nausea  - Likely secondary to new onset of type 2 diabetes. HbA1c ordered, not yet resulted. Hyperglycemia is in DKA range, but absence of ketonemia. S/p 10U insulin in ER.   - Acidosis could be related to lactic acidosis instead of DKA  - Will start SSI medium dose with 15U lantus basal to start  - Hypoglycemic protocol  - Diabetic diet  - Recheck BMP tomorrow to evaluate closing of anion gap  - Zofran, phenergan PRN nausea  - IP diabetes education ordered    # Hx of DVTs, PE  - Continue Eliquis. IVC filter in place    # Chronic thrombocytopenia  - Plt of 88 on admission, as low as 70s historically    # Asthma  - Continue combivent    # Insomnia  - Continue benadryl, elavil PRN    # GERD  - Continue PPI    # Restless leg syndrome  - Continue Gabapentin    Core Measures:  Lines: PIV  Tubes: None  Diet: Diabetic  Abx: CTX, Vancomycin  DVT Ppx: Eliquis  GI Ppx: PPI  PCP: Dr. Wills  CODE STATUS: Full    Dispo: Inpatient for IV Abx and diabetes education    Lilo Gaspar M.D.  PGY-2  [facility]

## 2022-03-26 NOTE — ED PROVIDER NOTES
ED Provider Note    CHIEF COMPLAINT  Chief Complaint   Patient presents with   • Wound Check     Pt cut left lower leg 1 [place]. Wound has white/yellow color drainage. Causing pt severe pain. Pt is unable to walk. He has tried to clean it but has been unsuccessful.   • GLF     Pt fell this AM. -head injury. -loc. Pt had CP occurring with fall. Hx MI 2019.       HPI  Benito Persaud is a 56 y.o. male who presents with chief complaint of leg wound.  Patient reports he cut his leg approximately a month ago.  He reports he thinks it is infected as there is some discharge now from the wound.  Patient reports the wound has been there for quite some time.  He reports is been progressively more painful.  He denies any fevers or chills.  Patient denies any history of diabetes.  He reports that he fell out of his chair today onto his right side.  He denies any associated head trauma.  Patient is on Eliquis for a history of DVTs.  Patient has a complicated medical history including DVTs, testicular cancer status post orchiectomy, CAD.     REVIEW OF SYSTEMS  ROS    See HPI for further details. All other systems are negative.     PAST MEDICAL HISTORY   has a past medical history of Asthma, Cancer ([facility]) (11/01/2016), and MI (myocardial infarction) ([facility]) (2019).    SOCIAL HISTORY  Social History     Tobacco Use   • Smoking status: Never Smoker   • Smokeless tobacco: Former User   Vaping Use   • Vaping Use: Never used   Substance and Sexual Activity   • Alcohol use: Not Currently   • Drug use: Not Currently   • Sexual activity: Not on file       SURGICAL HISTORY   has a past surgical history that includes cath removal (N/A, 8/14/2019).    CURRENT MEDICATIONS  Home Medications    **Home medications have not yet been reviewed for this encounter**         ALLERGIES  Allergies   Allergen Reactions   • Iodine Anaphylaxis   • Keflex Diarrhea and Vomiting     Vomiting & Diarrhea  Tolerates ceftriaxone   •  Reglan [Metoclopramide] Nausea     Asthma     • Shellfish Allergy Anaphylaxis   • Toradol Hives       PHYSICAL EXAM  Vitals:    03/25/22 1745   BP: 149/103   Pulse: (!) 111   Resp: 16   Temp: 36.2 °C (97.2 °F)   SpO2: 98%       Physical Exam  Constitutional:       Appearance: He is well-developed.   HENT:      Head: Normocephalic and atraumatic.   Eyes:      Conjunctiva/sclera: Conjunctivae normal.      Pupils: Pupils are equal, round, and reactive to light.   Cardiovascular:      Rate and Rhythm: Regular rhythm. Tachycardia present.      Heart sounds: No murmur heard.    No friction rub. No gallop.   Pulmonary:      Effort: Pulmonary effort is normal. No respiratory distress.      Breath sounds: Normal breath sounds. No wheezing.   Abdominal:      General: Bowel sounds are normal. There is no distension.      Palpations: Abdomen is soft.      Tenderness: There is no abdominal tenderness. There is no rebound.   Musculoskeletal:      Cervical back: Normal range of motion and neck supple.      Comments: Left lower extremity with chronic wound, there is scabs with associated small underlying ulceration, there is yellow exudate draining.  There is approximately 3 cm of surrounding erythema.  There is no associated edema of the extremity. Distal pulses 2+   Skin:     General: Skin is warm and dry.   Neurological:      Mental Status: He is alert and oriented to person, place, and time.   Psychiatric:         Behavior: Behavior normal.           DIAGNOSTIC STUDIES / PROCEDURES    EKG  See below    LABS  Results for orders placed or performed during the hospital encounter of 03/25/22   Lactic acid (lactate)   Result Value Ref Range    Lactic Acid 2.8 (H) 0.5 - 2.0 mmol/L   Lactic acid (lactate): Repeat if initial lactic acid result is greater than 2   Result Value Ref Range    Lactic Acid 2.1 (H) 0.5 - 2.0 mmol/L   CBC WITH DIFFERENTIAL   Result Value Ref Range    WBC 5.3 4.8 - 10.8 K/uL    RBC 5.12 4.70 - 6.10 M/uL     Hemoglobin 15.2 14.0 - 18.0 g/dL    Hematocrit 45.1 42.0 - 52.0 %    MCV 88.1 81.4 - 97.8 fL    MCH 29.7 27.0 - 33.0 pg    MCHC 33.7 33.7 - 35.3 g/dL    RDW 45.5 35.9 - 50.0 fL    Platelet Count 88 (L) 164 - 446 K/uL    MPV 8.7 (L) 9.0 - 12.9 fL    Neutrophils-Polys 49.80 44.00 - 72.00 %    Lymphocytes 35.60 22.00 - 41.00 %    Monocytes 8.70 0.00 - 13.40 %    Eosinophils 5.10 0.00 - 6.90 %    Basophils 0.40 0.00 - 1.80 %    Immature Granulocytes 0.40 0.00 - 0.90 %    Nucleated RBC 0.00 /100 WBC    Neutrophils (Absolute) 2.65 1.82 - 7.42 K/uL    Lymphs (Absolute) 1.89 1.00 - 4.80 K/uL    Monos (Absolute) 0.46 0.00 - 0.85 K/uL    Eos (Absolute) 0.27 0.00 - 0.51 K/uL    Baso (Absolute) 0.02 0.00 - 0.12 K/uL    Immature Granulocytes (abs) 0.02 0.00 - 0.11 K/uL    NRBC (Absolute) 0.00 K/uL   COMP METABOLIC PANEL   Result Value Ref Range    Sodium 131 (L) 135 - 145 mmol/L    Potassium 4.8 3.6 - 5.5 mmol/L    Chloride 98 96 - 112 mmol/L    Co2 16 (L) 20 - 33 mmol/L    Anion Gap 17.0 (H) 7.0 - 16.0    Glucose 526 (HH) 65 - 99 mg/dL    Bun 10 8 - 22 mg/dL    Creatinine 1.09 0.50 - 1.40 mg/dL    Calcium 9.1 8.5 - 10.5 mg/dL    AST(SGOT) 20 12 - 45 U/L    ALT(SGPT) 18 2 - 50 U/L    Alkaline Phosphatase 191 (H) 30 - 99 U/L    Total Bilirubin 0.3 0.1 - 1.5 mg/dL    Albumin 3.9 3.2 - 4.9 g/dL    Total Protein 7.8 6.0 - 8.2 g/dL    Globulin 3.9 (H) 1.9 - 3.5 g/dL    A-G Ratio 1.0 g/dL   URINALYSIS    Specimen: Urine, Clean Catch   Result Value Ref Range    Color Yellow     Character Clear     Specific Gravity 1.027 <1.035    Ph 5.5 5.0 - 8.0    Glucose >=1000 (A) Negative mg/dL    Ketones Negative Negative mg/dL    Protein Negative Negative mg/dL    Bilirubin Negative Negative    Urobilinogen, Urine 0.2 Negative    Nitrite Negative Negative    Leukocyte Esterase Negative Negative    Occult Blood Negative Negative    Micro Urine Req see below    ESTIMATED GFR   Result Value Ref Range    GFR (CKD-EPI) 80 >60 mL/min/1.73 m 2    BETA-HYDROXYBUTYRIC ACID   Result Value Ref Range    beta-Hydroxybutyric Acid 0.04 0.02 - 0.27 mmol/L   EKG   Result Value Ref Range    Report       [facility] Emergency Dept.    Test Date:  2022  Pt Name:    ZINA SALGUERO                 Department: ER  MRN:        [id number]                      Room:  Gender:     Male                         Technician: [id number]  :        1965                   Requested By:ER TRIAGE PROTOCOL  Order #:    [id number]                    Reading MD: Richie Dennis MD    Measurements  Intervals                                Axis  Rate:       99                           P:          56  ND:         182                          QRS:        48  QRSD:       86                           T:          45  QT:         312  QTc:        400    Interpretive Statements  EKG is normal sinus rhythm, normal axis normal intervals no ST changes  consistent with acute regional ischemia  Electronically Signed On 3- 21:16:37 PDT by Richie Dennis MD           RADIOLOGY  US-OLU SINGLE LEVEL BILAT   Final Result      DX-CHEST-PORTABLE (1 VIEW)   Final Result         1. No acute cardiopulmonary abnormalities are identified.              COURSE & MEDICAL DECISION MAKING  Pertinent Labs & Imaging studies reviewed. (See chart for details)    Patient here with what appears to be a chronic wound that is acutely infected.  Patient is tachycardic.  His blood pressure is normal.  Given the appearance of his wound his associated tachycardia sepsis orders were sent.  Patient given IV antibiotics.  Blood cultures have been sent.  Patient given a fluid bolus.  Patient basic labs are concerning for new onset diabetes and developing DKA.  Patient given IV fluids.  Patient gap is minimal, I believe that IV insulin single dose should suffice to close the gap safely.  Patient's beta hydroxybutyric acid is not significantly elevated, the gap acidosis could be related to patient's lactic  acid.  Will continue to trend.  I discussed the case with hospitalist who has agreed to admit.  Given the poorly healing wound I did check a arterial duplex of the affected extremity though I believe this is likely secondary to patient's newfound diabetes rather than underlying vascular disease.  Wound culture sent      FINAL IMPRESSION  1.  Gap acidosis, hyperglycemia, sepsis, cellulitis    Electronically signed by: Sonny Quiroz M.D., 3/25/2022 7:54 PM

## 2022-03-26 NOTE — PROGRESS NOTES
[facility] MEDICINE PROGRESS NOTE     Attending: Jenny Miller MD    Resident: Jacob Stewart MD    PATIENT: Benito Persaud; [id number]; 1965    ID: 56 y.o. 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       SUBJECTIVE: No acute events overnight, pt is still complaining of leg pain but is now eating. He reports that the redness of his leg has improved.    OBJECTIVE:  Temp:  [36.2 °C (97.2 °F)] 36.2 °C (97.2 °F)  Pulse:  [] 98  Resp:  [16] 16  BP: (120-166)/() 134/80  SpO2:  [91 %-98 %] 93 %    No intake or output data in the 24 hours ending 03/26/22 0524    PE:   General: No acute distress, resting comfortably in bed.  Skin:  Pink, warm and dry. Large scab wound on anterior aspect of LLE with some scabbing of periphery and no obvious exudative discharge with surrounding edema  HEENT: NC/AT. EOMI. MMM  Cardiovascular: RRR, no m/r/g, normal S1/S2  Respiratory: Symmetric inspiratory effort. CTAB with no w/r/r, limited by body habitus  Abdomen: BS+, soft, NT/ND   EXT:  SARGENT, 2+ radial pulses, no lower extremity edema bilaterally  Neuro: Non focal, alert and oriented    LABS:  Recent Labs     03/25/22 1836   WBC 5.3   RBC 5.12   HEMOGLOBIN 15.2   HEMATOCRIT 45.1   MCV 88.1   MCH 29.7   RDW 45.5   PLATELETCT 88*   MPV 8.7*   NEUTSPOLYS 49.80   LYMPHOCYTES 35.60   MONOCYTES 8.70   EOSINOPHILS 5.10   BASOPHILS 0.40     Recent Labs     03/25/22  1836 03/25/22  2326   SODIUM 131*  --    POTASSIUM 4.8  --    CHLORIDE 98  --    CO2 16*  --    BUN 10  --    CREATININE 1.09  --    CALCIUM 9.1  --    MAGNESIUM  --  1.8   PHOSPHORUS  --  2.5   ALBUMIN 3.9  --      Estimated GFR/CRCL = Estimated Creatinine Clearance: 100.9 mL/min (by C-G formula based on SCr of 1.09 mg/dL).  Recent Labs     03/25/22  1836   GLUCOSE 526*     Recent Labs     03/25/22  1836   ASTSGOT 20   ALTSGPT 18   TBILIRUBIN 0.3  "  ALKPHOSPHAT 191*   GLOBULIN 3.9*             No results for input(s): INR, APTT, FIBRINOGEN in the last 72 hours.    Invalid input(s): DIMER    MICROBIOLOGY:  Results     Procedure Component Value Units Date/Time    MRSA By PCR (Amp) [[id number]] Collected: 03/26/22 0219    Order Status: Completed Specimen: Respirate from Nares Updated: 03/26/22 0446     MRSA by PCR POSITIVE    GRAM STAIN [[id number]] Collected: 03/25/22 2113    Order Status: Completed Specimen: Wound Updated: 03/26/22 0252     Significant Indicator .     Source WND     Site ABSCESS     Gram Stain Result Rare WBCs.  Many Gram positive cocci.      CULTURE WOUND W/ GRAM STAIN [[id number]] Collected: 03/25/22 2113    Order Status: Sent Specimen: Wound from Abscess Updated: 03/26/22 0251     Significant Indicator NEG     Source WND     Site ABSCESS     Culture Result -     Gram Stain Result Rare WBCs.  Many Gram positive cocci.      BLOOD CULTURE [[id number]] Collected: 03/25/22 2042    Order Status: Sent Specimen: Blood from Peripheral Updated: 03/25/22 2055    Narrative:      Per Hospital Policy: Only change Specimen Src: to \"Line\" if  specified by physician order.    URINALYSIS [[id number]]  (Abnormal) Collected: 03/25/22 2009    Order Status: Completed Specimen: Urine, Clean Catch Updated: 03/25/22 2025     Color Yellow     Character Clear     Specific Gravity 1.027     Ph 5.5     Glucose >=1000 mg/dL      Ketones Negative mg/dL      Protein Negative mg/dL      Bilirubin Negative     Urobilinogen, Urine 0.2     Nitrite Negative     Leukocyte Esterase Negative     Occult Blood Negative     Micro Urine Req see below     Comment: Microscopic examination not performed when specimen is clear  and chemically negative for protein, blood, leukocyte esterase  and nitrite.         Narrative:      Indication for culture:->Evaluation for sepsis without a  clear source of infection    URINE CULTURE(NEW) [[id number]] Collected: 03/25/22 2009    Order Status: Sent " "Specimen: Urine, Clean Catch Updated: 03/25/22 2017    Narrative:      Indication for culture:->Evaluation for sepsis without a  clear source of infection    BLOOD CULTURE [[id number]] Collected: 03/25/22 1836    Order Status: Sent Specimen: Blood from Peripheral Updated: 03/25/22 1848    Narrative:      Per Hospital Policy: Only change Specimen Src: to \"Line\" if  specified by physician order.          IMAGING:  US-OLU SINGLE LEVEL BILAT   Final Result      DX-CHEST-PORTABLE (1 VIEW)   Final Result         1. No acute cardiopulmonary abnormalities are identified.          MEDS:  Current Facility-Administered Medications   Medication Last Admin   • vancomycin (VANCOCIN) 1,250 mg in  mL IVPB     • ipratropium-albuterol (DUONEB) nebulizer solution     • NS infusion New Bag at 03/26/22 0100   • senna-docusate (PERICOLACE or SENOKOT S) 8.6-50 MG per tablet 2 Tablet      And   • polyethylene glycol/lytes (MIRALAX) PACKET 1 Packet      And   • magnesium hydroxide (MILK OF MAGNESIA) suspension 30 mL      And   • bisacodyl (DULCOLAX) suppository 10 mg     • labetalol (NORMODYNE/TRANDATE) injection 10 mg     • ondansetron (ZOFRAN) syringe/vial injection 4 mg 4 mg at 03/25/22 2338   • ondansetron (ZOFRAN ODT) dispertab 4 mg     • promethazine (PHENERGAN) tablet 12.5-25 mg     • promethazine (PHENERGAN) suppository 12.5-25 mg     • prochlorperazine (COMPAZINE) injection 5-10 mg     • Pharmacy Consult Request ...Pain Management Review 1 Each     • oxyCODONE immediate-release (ROXICODONE) tablet 5 mg      Or   • oxyCODONE immediate-release (ROXICODONE) tablet 10 mg 10 mg at 03/26/22 0215    Or   • HYDROmorphone (Dilaudid) injection 0.5 mg 0.5 mg at 03/26/22 0513   • insulin GLARGINE (Lantus,Semglee) injection      And   • insulin LISPRO (AdmeLOG,HumaLOG) injection 5 Units at 03/26/22 0203    And   • dextrose 10 % BOLUS 25 g     • amitriptyline (ELAVIL) tablet 150 mg 150 mg at 03/26/22 0159   • apixaban (ELIQUIS) tablet 5 mg " 5 mg at 03/26/22 0512   • diphenhydrAMINE (BENADRYL) tablet/capsule 50 mg 50 mg at 03/26/22 0159   • gabapentin (NEURONTIN) capsule 800 mg 800 mg at 03/26/22 0158   • omeprazole (PRILOSEC) capsule 40 mg 40 mg at 03/26/22 0157   • cefTRIAXone (Rocephin) syringe 2 g     • MD Alert...Vancomycin per Pharmacy       Current Outpatient Medications   Medication   • omeprazole (PRILOSEC) 40 MG delayed-release capsule   • zolpidem (AMBIEN) 10 MG Tab   • gabapentin (NEURONTIN) 800 MG tablet   • ipratropium-albuterol (COMBIVENT RESPIMAT)  MCG/ACT Aero Soln   • amitriptyline (ELAVIL) 100 MG Tab   • promethazine (PHENERGAN) 25 MG Tab   • apixaban (ELIQUIS) 5mg Tab   • diphenhydrAMINE (BANOPHEN) 50 MG Cap   • Cholecalciferol (VITAMIN D3) 11938 units Cap       PROBLEM LIST:  No problems updated.    ASSESSMENT/PLAN: 56 y.o. 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      # Cellulitis, lower left extremity  No leukocytosis. Patient is tachycardic in ER and in tremendous pain. Area of erythema and soft tissue swelling surrounding wound.   - Continue C3 and Vanc   - MRSA Nares Positive    - Narrow abx when cultures return  - F/u wound culture and blood culture  - Tylenol, Oxy, Dilaudid PRN pain, depending on severity  - OLU performed in ER without evidence of arterial disease  - Likely poor wound healing in setting of new onset of diabetes, see below  - NS at 150cc/hr      # High anion gap metabolic acidosis, resolved  # Lactic Acidosis, resolved  Pt had hyperglycemia in the 500's in the ER, but ketones neg, BetaHB negative. Lactic acid was positive in the ER at 2.8 and resolved with fluids. Anion gap closed overnight.  - CTM chem panel    # Hyperglycemia  # Nausea  Pt had hyperglycemia in the 500's in the ER, which lowered to 200's with 10U regular insulin. HbA1c 7.8. Ketones neg, BetaHB negative. Lactic  acid was positive in the ER at 2.8, but resolved with fluids.  - SSI medium dose  - Pt responded well to insulin. His HgbA1c is 7.8, so we will start oral med that he can take outpatient as he likely does not need to be on insulin at home   - Starting metformin 500 BID   - CTM blood sugars in the hosptial  - Hypoglycemic protocol  - Diabetic diet  - Zofran, phenergan PRN nausea  - IP diabetes education ordered     # Hx of DVTs, PE  - Continue Eliquis  - IVC filter in place     # Chronic thrombocytopenia  Plt of 88 on admission, as low as 70s historically     # Asthma  - Continue combivent     # Insomnia  - Continue benadryl, elavil PRN     # GERD  - Continue PPI     # Restless leg syndrome  - Continue Gabapentin    # Homelessness  - Social work consult    #Disposition: inpatient for management of cellulitis and optimization of diabetes regimen    Core Measures:  Fluids:   Lines: PIV  Abx: C3  Diet: diabetic  PPX: eliquis 5mg daily    CODE STATUS: Full code    Jacob Stewart MD  PGY1  [facility]

## 2022-03-26 NOTE — ED NOTES
Lab called with critical result of glucose of 526 at 2001. Critical lab result read back to lab.   Dr. Quiroz notified of critical lab result at 2002.  Critical lab result read back by Dr. Quiroz.

## 2022-03-26 NOTE — ED NOTES
(assist RN) Linsey Wegener APRN Voalted for orders - pt c/o severe LLE pain after U/male with OLU.

## 2022-03-26 NOTE — CARE PLAN
Problem: Bronchoconstriction  Goal: Improve in air movement and diminished wheezing  Description: Target End Date:  2 to 3 days    1.  Implement inhaled treatments  2.  Evaluate and manage medication effects  Outcome: Progressing  Flowsheets (Taken 3/26/2022 1521)  Bronchodilator Goals/Outcome: Patient at Stable Baseline  Bronchodilator Indications: History / Diagnosis       Respiratory Update    Treatment modality: SVN  Frequency: Q6    Pt tolerating current treatments well with no adverse reactions.

## 2022-03-27 LAB
GLUCOSE BLD STRIP.AUTO-MCNC: 123 MG/DL (ref 65–99)
GLUCOSE BLD STRIP.AUTO-MCNC: 126 MG/DL (ref 65–99)
GLUCOSE BLD STRIP.AUTO-MCNC: 146 MG/DL (ref 65–99)
GLUCOSE BLD STRIP.AUTO-MCNC: 157 MG/DL (ref 65–99)

## 2022-03-27 PROCEDURE — 700102 HCHG RX REV CODE 250 W/ 637 OVERRIDE(OP)

## 2022-03-27 PROCEDURE — 99232 SBSQ HOSP IP/OBS MODERATE 35: CPT | Mod: GC | Performed by: FAMILY MEDICINE

## 2022-03-27 PROCEDURE — 700101 HCHG RX REV CODE 250: Performed by: STUDENT IN AN ORGANIZED HEALTH CARE EDUCATION/TRAINING PROGRAM

## 2022-03-27 PROCEDURE — A9270 NON-COVERED ITEM OR SERVICE: HCPCS

## 2022-03-27 PROCEDURE — 94640 AIRWAY INHALATION TREATMENT: CPT

## 2022-03-27 PROCEDURE — A9270 NON-COVERED ITEM OR SERVICE: HCPCS | Performed by: STUDENT IN AN ORGANIZED HEALTH CARE EDUCATION/TRAINING PROGRAM

## 2022-03-27 PROCEDURE — 97597 DBRDMT OPN WND 1ST 20 CM/<: CPT

## 2022-03-27 PROCEDURE — 700105 HCHG RX REV CODE 258: Performed by: STUDENT IN AN ORGANIZED HEALTH CARE EDUCATION/TRAINING PROGRAM

## 2022-03-27 PROCEDURE — 700102 HCHG RX REV CODE 250 W/ 637 OVERRIDE(OP): Performed by: STUDENT IN AN ORGANIZED HEALTH CARE EDUCATION/TRAINING PROGRAM

## 2022-03-27 PROCEDURE — 770001 HCHG ROOM/CARE - MED/SURG/GYN PRIV*

## 2022-03-27 PROCEDURE — 700111 HCHG RX REV CODE 636 W/ 250 OVERRIDE (IP): Performed by: STUDENT IN AN ORGANIZED HEALTH CARE EDUCATION/TRAINING PROGRAM

## 2022-03-27 PROCEDURE — 82962 GLUCOSE BLOOD TEST: CPT | Mod: 91

## 2022-03-27 RX ORDER — DIPHENHYDRAMINE HYDROCHLORIDE, ZINC ACETATE 2; .1 G/100G; G/100G
CREAM TOPICAL 3 TIMES DAILY PRN
Status: DISCONTINUED | OUTPATIENT
Start: 2022-03-27 | End: 2022-03-28 | Stop reason: HOSPADM

## 2022-03-27 RX ORDER — CEFDINIR 300 MG/1
300 CAPSULE ORAL EVERY 12 HOURS
Status: DISCONTINUED | OUTPATIENT
Start: 2022-03-27 | End: 2022-03-28 | Stop reason: HOSPADM

## 2022-03-27 RX ORDER — DOXYCYCLINE 100 MG/1
100 TABLET ORAL EVERY 12 HOURS
Status: DISCONTINUED | OUTPATIENT
Start: 2022-03-27 | End: 2022-03-28 | Stop reason: HOSPADM

## 2022-03-27 RX ORDER — INSULIN LISPRO 100 [IU]/ML
2-9 INJECTION, SOLUTION INTRAVENOUS; SUBCUTANEOUS
Status: DISCONTINUED | OUTPATIENT
Start: 2022-03-27 | End: 2022-03-28 | Stop reason: HOSPADM

## 2022-03-27 RX ORDER — IPRATROPIUM BROMIDE AND ALBUTEROL SULFATE 2.5; .5 MG/3ML; MG/3ML
3 SOLUTION RESPIRATORY (INHALATION)
Status: DISCONTINUED | OUTPATIENT
Start: 2022-03-27 | End: 2022-03-28 | Stop reason: HOSPADM

## 2022-03-27 RX ORDER — CEFDINIR 300 MG/1
300 CAPSULE ORAL EVERY 12 HOURS
Status: DISCONTINUED | OUTPATIENT
Start: 2022-03-27 | End: 2022-03-27

## 2022-03-27 RX ADMIN — APIXABAN 5 MG: 5 TABLET, FILM COATED ORAL at 17:19

## 2022-03-27 RX ADMIN — GABAPENTIN 800 MG: 400 CAPSULE ORAL at 07:42

## 2022-03-27 RX ADMIN — HYDROMORPHONE HYDROCHLORIDE 0.5 MG: 1 INJECTION, SOLUTION INTRAMUSCULAR; INTRAVENOUS; SUBCUTANEOUS at 03:36

## 2022-03-27 RX ADMIN — HYDROMORPHONE HYDROCHLORIDE 0.5 MG: 1 INJECTION, SOLUTION INTRAMUSCULAR; INTRAVENOUS; SUBCUTANEOUS at 23:19

## 2022-03-27 RX ADMIN — GABAPENTIN 800 MG: 400 CAPSULE ORAL at 15:41

## 2022-03-27 RX ADMIN — IPRATROPIUM BROMIDE AND ALBUTEROL SULFATE 3 ML: 2.5; .5 SOLUTION RESPIRATORY (INHALATION) at 07:44

## 2022-03-27 RX ADMIN — DOXYCYCLINE 100 MG: 100 TABLET, FILM COATED ORAL at 17:19

## 2022-03-27 RX ADMIN — HYDROMORPHONE HYDROCHLORIDE 0.5 MG: 1 INJECTION, SOLUTION INTRAMUSCULAR; INTRAVENOUS; SUBCUTANEOUS at 19:28

## 2022-03-27 RX ADMIN — APIXABAN 5 MG: 5 TABLET, FILM COATED ORAL at 05:35

## 2022-03-27 RX ADMIN — OXYCODONE HYDROCHLORIDE 10 MG: 10 TABLET ORAL at 15:41

## 2022-03-27 RX ADMIN — METFORMIN HYDROCHLORIDE 500 MG: 500 TABLET ORAL at 07:42

## 2022-03-27 RX ADMIN — DIPHENHYDRAMINE HYDROCHLORIDE 50 MG: 25 TABLET ORAL at 19:28

## 2022-03-27 RX ADMIN — SENNOSIDES AND DOCUSATE SODIUM 2 TABLET: 50; 8.6 TABLET ORAL at 05:35

## 2022-03-27 RX ADMIN — ZOLPIDEM TARTRATE 10 MG: 5 TABLET ORAL at 23:19

## 2022-03-27 RX ADMIN — HYDROMORPHONE HYDROCHLORIDE 0.5 MG: 1 INJECTION, SOLUTION INTRAMUSCULAR; INTRAVENOUS; SUBCUTANEOUS at 12:24

## 2022-03-27 RX ADMIN — INSULIN LISPRO 2 UNITS: 100 INJECTION, SOLUTION INTRAVENOUS; SUBCUTANEOUS at 17:22

## 2022-03-27 RX ADMIN — OMEPRAZOLE 40 MG: 20 CAPSULE, DELAYED RELEASE ORAL at 12:24

## 2022-03-27 RX ADMIN — Medication: at 21:15

## 2022-03-27 RX ADMIN — OXYCODONE HYDROCHLORIDE 10 MG: 10 TABLET ORAL at 08:46

## 2022-03-27 RX ADMIN — VANCOMYCIN HYDROCHLORIDE 1250 MG: 500 INJECTION, POWDER, LYOPHILIZED, FOR SOLUTION INTRAVENOUS at 10:37

## 2022-03-27 RX ADMIN — GABAPENTIN 800 MG: 400 CAPSULE ORAL at 23:19

## 2022-03-27 RX ADMIN — METFORMIN HYDROCHLORIDE 500 MG: 500 TABLET ORAL at 17:19

## 2022-03-27 RX ADMIN — AMITRIPTYLINE HYDROCHLORIDE 150 MG: 75 TABLET, FILM COATED ORAL at 23:19

## 2022-03-27 RX ADMIN — OMEPRAZOLE 40 MG: 20 CAPSULE, DELAYED RELEASE ORAL at 23:19

## 2022-03-27 RX ADMIN — CEFDINIR 300 MG: 300 CAPSULE ORAL at 14:32

## 2022-03-27 ASSESSMENT — PAIN DESCRIPTION - PAIN TYPE
TYPE: ACUTE PAIN

## 2022-03-27 NOTE — PROGRESS NOTES
Assumed care at 0700. Patient A&Ox4. C/o 9/10 leg pain, prn given. Call light and belongings within reach. Bed locked and in low position.

## 2022-03-27 NOTE — PROGRESS NOTES
4 Eyes Skin Assessment Completed by CARLOS ALBERTO Bazzi and CARLOS ALBERTO Ghotra.    Head WDL  Ears WDL  Nose WDL  Mouth WDL  Neck WDL  Breast/Chest WDL  Shoulder Blades WDL  Spine WDL  (R) Arm/Elbow/Hand WDL  (L) Arm/Elbow/Hand WDL  Abdomen WDL  Groin WDL  Scrotum/Coccyx/Buttocks Redness and Blanching  (R) Leg WDL  (L) Leg Swelling, Abrasion, Weeping and Edema  (R) Heel/Foot/Toe WDL  (L) Heel/Foot/Toe WDL          Devices In Places n/a      Interventions In Place Pillows, Q2 Turns and Pressure Redistribution Mattress    Possible Skin Injury No    Pictures Uploaded Into Epic N/A  Wound Consult Placed Yes  RN Wound Prevention Protocol Ordered No

## 2022-03-27 NOTE — CARE PLAN
The patient is Stable - Low risk of patient condition declining or worsening    Shift Goals  Clinical Goals: monitor wound and pain  Patient Goals: rest, decrease pain  Family Goals: n/a    Progress made toward(s) clinical / shift goals:    Problem: Pain - Standard  Goal: Alleviation of pain or a reduction in pain to the patient’s comfort goal  Outcome: Progressing     Problem: Knowledge Deficit - Standard  Goal: Patient and family/care givers will demonstrate understanding of plan of care, disease process/condition, diagnostic tests and medications  Outcome: Progressing     Problem: Fall Risk  Goal: Patient will remain free from falls  Outcome: Progressing       Patient is not progressing towards the following goals:

## 2022-03-27 NOTE — CARE PLAN
The patient is Watcher - Medium risk of patient condition declining or worsening         Progress made toward(s) clinical / shift goals:  vitals are stable    Patient is not progressing towards the following goals:

## 2022-03-27 NOTE — PROGRESS NOTES
[facility] MEDICINE PROGRESS NOTE     Attending: Jenny Miller MD    Resident: Jacob Stewart MD    PATIENT: Benito Persaud; [id number]; 1965    ID:56 y.o. 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     SUBJECTIVE: No acute events overnight, pt is still complaining of leg pain but is now eating. He reports that the redness of his leg has improved.    OBJECTIVE:  Temp:  [36.2 °C (97.1 °F)-36.6 °C (97.9 °F)] 36.6 °C (97.8 °F)  Pulse:  [88-98] 88  Resp:  [16-20] 16  BP: (113-122)/() 120/100  SpO2:  [90 %-96 %] 92 %      Intake/Output Summary (Last 24 hours) at 3/27/2022 1601  Last data filed at 3/27/2022 0900  Gross per 24 hour   Intake 4320 ml   Output 725 ml   Net 3595 ml       PE:  General: No acute distress, sitting in bed.  HEENT: NC/AT. EOMI. MMM  Skin:  Pink, warm and dry. Large scab wound on anterior aspect of LLE with some scabbing of periphery and no obvious exudative discharge with surrounding edema  Cardiovascular: RRR, no m/r/g, normal S1/S2  Respiratory: Symmetric inspiratory effort. CTAB with no adventitious sounds  Abdomen: BS+, soft, NT/ND   EXT:  SARGENT, 2+ radial pulses.  Neuro: Non focal, alert and oriented    LABS:  Recent Labs     03/25/22 1836   WBC 5.3   RBC 5.12   HEMOGLOBIN 15.2   HEMATOCRIT 45.1   MCV 88.1   MCH 29.7   RDW 45.5   PLATELETCT 88*   MPV 8.7*   NEUTSPOLYS 49.80   LYMPHOCYTES 35.60   MONOCYTES 8.70   EOSINOPHILS 5.10   BASOPHILS 0.40     Recent Labs     03/25/22  1836 03/25/22  2326 03/26/22  0530   SODIUM 131*  --  137   POTASSIUM 4.8  --  3.9   CHLORIDE 98  --  105   CO2 16*  --  22   BUN 10  --  10   CREATININE 1.09  --  1.00   CALCIUM 9.1  --  8.8   MAGNESIUM  --  1.8  --    PHOSPHORUS  --  2.5  --    ALBUMIN 3.9  --   --      Estimated GFR/CRCL = Estimated Creatinine Clearance: 112.9 mL/min (by C-G formula based on SCr of 1 mg/dL).  Recent Labs     03/25/22  5366  "03/26/22  0530   GLUCOSE 526* 169*     Recent Labs     03/25/22  1836   ASTSGOT 20   ALTSGPT 18   TBILIRUBIN 0.3   ALKPHOSPHAT 191*   GLOBULIN 3.9*             Recent Labs     03/26/22  1144   APTT 28.5       MICROBIOLOGY:  Results     Procedure Component Value Units Date/Time    CULTURE WOUND W/ GRAM STAIN [[id number]]  (Abnormal) Collected: 03/25/22 2113    Order Status: Completed Specimen: Wound from Abscess Updated: 03/27/22 1336     Significant Indicator POS     Source WND     Site ABSCESS     Culture Result -     Gram Stain Result Rare WBCs.  Many Gram positive cocci.       Culture Result Beta Hemolytic Streptococcus group A  Heavy growth        Staphylococcus aureus  Moderate growth  Methicillin resistant via screening method.      Narrative:      CALL  [place]  MS[facility] tel. [phone],  CALLED  MS5 tel. [phone] 03/27/2022, 13:36, RB PERF. RESULTS CALLED  TO:Tracey Lira Rn    URINE CULTURE(NEW) [[id number]] Collected: 03/25/22 2009    Order Status: Completed Specimen: Urine, Clean Catch Updated: 03/26/22 2134     Significant Indicator NEG     Source UR     Site URINE, CLEAN CATCH     Culture Result Culture in progress.    Narrative:      Indication for culture:->Evaluation for sepsis without a  clear source of infection  Indication for culture:->Evaluation for sepsis without a    BLOOD CULTURE [[id number]] Collected: 03/25/22 1836    Order Status: Completed Specimen: Blood from Peripheral Updated: 03/26/22 0721     Significant Indicator NEG     Source BLD     Site PERIPHERAL     Culture Result No Growth  Note: Blood cultures are incubated for 5 days and  are monitored continuously.Positive blood cultures  are called to the RN and reported as soon as  they are identified.      Narrative:      Per Hospital Policy: Only change Specimen Src: to \"Line\" if  specified by physician order.  Left Hand    BLOOD CULTURE [[id number]] Collected: 03/25/22 2042    Order Status: Completed Specimen: Blood from Peripheral Updated: " "03/26/22 0721     Significant Indicator NEG     Source BLD     Site PERIPHERAL     Culture Result No Growth  Note: Blood cultures are incubated for 5 days and  are monitored continuously.Positive blood cultures  are called to the RN and reported as soon as  they are identified.      Narrative:      Per Hospital Policy: Only change Specimen Src: to \"Line\" if  specified by physician order.  No site indicated    MRSA By PCR (Amp) [[id number]] Collected: 03/26/22 0219    Order Status: Completed Specimen: Respirate from Nares Updated: 03/26/22 0446     MRSA by PCR POSITIVE    GRAM STAIN [[id number]] Collected: 03/25/22 2113    Order Status: Completed Specimen: Wound Updated: 03/26/22 0252     Significant Indicator .     Source WND     Site ABSCESS     Gram Stain Result Rare WBCs.  Many Gram positive cocci.      URINALYSIS [[id number]]  (Abnormal) Collected: 03/25/22 2009    Order Status: Completed Specimen: Urine, Clean Catch Updated: 03/25/22 2025     Color Yellow     Character Clear     Specific Gravity 1.027     Ph 5.5     Glucose >=1000 mg/dL      Ketones Negative mg/dL      Protein Negative mg/dL      Bilirubin Negative     Urobilinogen, Urine 0.2     Nitrite Negative     Leukocyte Esterase Negative     Occult Blood Negative     Micro Urine Req see below     Comment: Microscopic examination not performed when specimen is clear  and chemically negative for protein, blood, leukocyte esterase  and nitrite.         Narrative:      Indication for culture:->Evaluation for sepsis without a  clear source of infection          IMAGING:  US-OLU SINGLE LEVEL BILAT   Final Result      DX-CHEST-PORTABLE (1 VIEW)   Final Result         1. No acute cardiopulmonary abnormalities are identified.          MEDS:  Current Facility-Administered Medications   Medication Last Admin   • doxycycline monohydrate (ADOXA) tablet 100 mg     • cefdinir (OMNICEF) capsule 300 mg 300 mg at 03/27/22 1432   • ipratropium-albuterol (DUONEB) nebulizer " solution     • metFORMIN (GLUCOPHAGE) tablet 500 mg 500 mg at 03/27/22 0742   • zolpidem (AMBIEN) tablet 10 mg 10 mg at 03/26/22 2305   • NS infusion Stopped at 03/27/22 0746   • senna-docusate (PERICOLACE or SENOKOT S) 8.6-50 MG per tablet 2 Tablet 2 Tablet at 03/27/22 0535    And   • polyethylene glycol/lytes (MIRALAX) PACKET 1 Packet      And   • magnesium hydroxide (MILK OF MAGNESIA) suspension 30 mL      And   • bisacodyl (DULCOLAX) suppository 10 mg     • labetalol (NORMODYNE/TRANDATE) injection 10 mg     • ondansetron (ZOFRAN) syringe/vial injection 4 mg 4 mg at 03/26/22 2359   • ondansetron (ZOFRAN ODT) dispertab 4 mg     • promethazine (PHENERGAN) tablet 12.5-25 mg     • promethazine (PHENERGAN) suppository 12.5-25 mg     • prochlorperazine (COMPAZINE) injection 5-10 mg     • Pharmacy Consult Request ...Pain Management Review 1 Each     • oxyCODONE immediate-release (ROXICODONE) tablet 5 mg 5 mg at 03/26/22 0918    Or   • oxyCODONE immediate release (ROXICODONE) tablet 10 mg 10 mg at 03/27/22 1541    Or   • HYDROmorphone (Dilaudid) injection 0.5 mg 0.5 mg at 03/27/22 1224   • insulin LISPRO (AdmeLOG,HumaLOG) injection 3 Units at 03/26/22 2305    And   • dextrose 10 % BOLUS 25 g     • amitriptyline (ELAVIL) tablet 150 mg 150 mg at 03/26/22 0159   • apixaban (ELIQUIS) tablet 5 mg 5 mg at 03/27/22 0535   • diphenhydrAMINE (BENADRYL) tablet/capsule 50 mg 50 mg at 03/26/22 2357   • gabapentin (NEURONTIN) capsule 800 mg 800 mg at 03/27/22 1541   • omeprazole (PRILOSEC) capsule 40 mg 40 mg at 03/27/22 1224       PROBLEM LIST:  No problems updated.    ASSESSMENT/PLAN: 56 y.o. male with PMHx obesity, asthma, Hx of DVTs and PE on eliquis and with IVC filter in place admitted for cellulitis in setting of newly diagnosed diabetes, likely type 2.      # Cellulitis, lower left extremity  No leukocytosis. Patient is tachycardic in ER and in tremendous pain. Area of erythema and soft tissue swelling surrounding wound. OLU  in ER negative for arterial disease. Likely poor wound healing in setting of new onset of diabetes  - Cultures returned positive:   - MRSA +   - Group A Strep +  - Switching to oral Abx with coverage for MRSA and strep:   - Omnicef   - Doxycycline  - Tylenol, Oxy, Dilaudid PRN pain, depending on severity     # High anion gap metabolic acidosis, resolved  # Lactic Acidosis, resolved  Pt had hyperglycemia in the 500's in the ER, but ketones neg, BetaHB negative. Lactic acid was positive in the ER at 2.8 and resolved with fluids. Anion gap closed overnight.  - CTM chem panel     #Type 2 Diabetes, newly diagnosed  Pt presented with BG in the 500's, resolved with insulin. A1c=7.8. Now well controlled in the hospital with diabetic diet, SSI, and metformin.  - Continue metformin 500 BID  - CTM BG  - IP diabetes education ordered    # Hx of DVTs, PE  - Continue Eliquis  - IVC filter in place     # Chronic thrombocytopenia  Plt of 88 on admission, as low as 70s historically     # Asthma  - Continue combivent     # Insomnia  - Continue benadryl, elavil PRN     # GERD  - Continue PPI     # Restless leg syndrome  - Continue Gabapentin     # Homelessness  - Social work consult     #Disposition: inpatient for diabetes education     Core Measures:  Fluids: none  Lines: PIV  Abx: omnicef + doxy  Diet: diabetic  PPX: eliquis 5mg daily     CODE STATUS: Full code     Jacob Stewart MD  PGY1  [facility]

## 2022-03-28 ENCOUNTER — PHARMACY VISIT (OUTPATIENT)
Dept: PHARMACY | Facility: MEDICAL CENTER | Age: 57
End: 2022-03-28
Payer: COMMERCIAL

## 2022-03-28 VITALS
HEART RATE: 89 BPM | OXYGEN SATURATION: 90 % | RESPIRATION RATE: 18 BRPM | TEMPERATURE: 98.3 F | BODY MASS INDEX: 38.89 KG/M2 | HEIGHT: 71 IN | SYSTOLIC BLOOD PRESSURE: 110 MMHG | WEIGHT: 277.78 LBS | DIASTOLIC BLOOD PRESSURE: 64 MMHG

## 2022-03-28 PROBLEM — L03.90 CELLULITIS: Status: ACTIVE | Noted: 2022-03-28

## 2022-03-28 PROBLEM — E11.9 DIABETES (HCC): Status: ACTIVE | Noted: 2022-03-28

## 2022-03-28 LAB
BACTERIA UR CULT: NORMAL
BACTERIA WND AEROBE CULT: ABNORMAL
GLUCOSE BLD STRIP.AUTO-MCNC: 121 MG/DL (ref 65–99)
GLUCOSE BLD STRIP.AUTO-MCNC: 138 MG/DL (ref 65–99)
GRAM STN SPEC: ABNORMAL
SIGNIFICANT IND 70042: ABNORMAL
SIGNIFICANT IND 70042: NORMAL
SITE SITE: ABNORMAL
SITE SITE: NORMAL
SOURCE SOURCE: ABNORMAL
SOURCE SOURCE: NORMAL

## 2022-03-28 PROCEDURE — 82962 GLUCOSE BLOOD TEST: CPT | Mod: 91

## 2022-03-28 PROCEDURE — 700111 HCHG RX REV CODE 636 W/ 250 OVERRIDE (IP): Performed by: STUDENT IN AN ORGANIZED HEALTH CARE EDUCATION/TRAINING PROGRAM

## 2022-03-28 PROCEDURE — A9270 NON-COVERED ITEM OR SERVICE: HCPCS | Performed by: STUDENT IN AN ORGANIZED HEALTH CARE EDUCATION/TRAINING PROGRAM

## 2022-03-28 PROCEDURE — A9270 NON-COVERED ITEM OR SERVICE: HCPCS

## 2022-03-28 PROCEDURE — 99238 HOSP IP/OBS DSCHRG MGMT 30/<: CPT | Mod: GC | Performed by: FAMILY MEDICINE

## 2022-03-28 PROCEDURE — RXMED WILLOW AMBULATORY MEDICATION CHARGE: Performed by: STUDENT IN AN ORGANIZED HEALTH CARE EDUCATION/TRAINING PROGRAM

## 2022-03-28 PROCEDURE — 700102 HCHG RX REV CODE 250 W/ 637 OVERRIDE(OP)

## 2022-03-28 PROCEDURE — 700102 HCHG RX REV CODE 250 W/ 637 OVERRIDE(OP): Performed by: STUDENT IN AN ORGANIZED HEALTH CARE EDUCATION/TRAINING PROGRAM

## 2022-03-28 RX ORDER — DOXYCYCLINE 100 MG/1
100 CAPSULE ORAL EVERY 12 HOURS
Qty: 22 CAPSULE | Refills: 0 | Status: SHIPPED | OUTPATIENT
Start: 2022-03-28 | End: 2022-04-08

## 2022-03-28 RX ORDER — CEFDINIR 300 MG/1
300 CAPSULE ORAL EVERY 12 HOURS
Qty: 22 CAPSULE | Refills: 0 | Status: SHIPPED | OUTPATIENT
Start: 2022-03-28 | End: 2022-04-08

## 2022-03-28 RX ORDER — OXYCODONE HYDROCHLORIDE 5 MG/1
5 TABLET ORAL
Qty: 20 TABLET | Refills: 0 | Status: SHIPPED | OUTPATIENT
Start: 2022-03-28 | End: 2022-04-12

## 2022-03-28 RX ORDER — DIPHENHYDRAMINE HYDROCHLORIDE, ZINC ACETATE 2; .1 G/100G; G/100G
1 CREAM TOPICAL 3 TIMES DAILY PRN
Qty: 28 G | Refills: 0 | Status: ON HOLD | OUTPATIENT
Start: 2022-03-28 | End: 2022-04-29

## 2022-03-28 RX ORDER — HEPARIN SODIUM (PORCINE) LOCK FLUSH IV SOLN 100 UNIT/ML 100 UNIT/ML
300-500 SOLUTION INTRAVENOUS PRN
Status: DISCONTINUED | OUTPATIENT
Start: 2022-03-28 | End: 2022-03-28 | Stop reason: HOSPADM

## 2022-03-28 RX ADMIN — APIXABAN 5 MG: 5 TABLET, FILM COATED ORAL at 06:13

## 2022-03-28 RX ADMIN — HYDROMORPHONE HYDROCHLORIDE 0.5 MG: 1 INJECTION, SOLUTION INTRAMUSCULAR; INTRAVENOUS; SUBCUTANEOUS at 06:09

## 2022-03-28 RX ADMIN — CEFDINIR 300 MG: 300 CAPSULE ORAL at 06:13

## 2022-03-28 RX ADMIN — GABAPENTIN 800 MG: 400 CAPSULE ORAL at 09:01

## 2022-03-28 RX ADMIN — HYDROMORPHONE HYDROCHLORIDE 0.5 MG: 1 INJECTION, SOLUTION INTRAMUSCULAR; INTRAVENOUS; SUBCUTANEOUS at 09:01

## 2022-03-28 RX ADMIN — DOXYCYCLINE 100 MG: 100 TABLET, FILM COATED ORAL at 06:13

## 2022-03-28 RX ADMIN — OXYCODONE HYDROCHLORIDE 10 MG: 10 TABLET ORAL at 12:18

## 2022-03-28 RX ADMIN — SENNOSIDES AND DOCUSATE SODIUM 2 TABLET: 50; 8.6 TABLET ORAL at 06:13

## 2022-03-28 RX ADMIN — OMEPRAZOLE 40 MG: 20 CAPSULE, DELAYED RELEASE ORAL at 12:18

## 2022-03-28 RX ADMIN — METFORMIN HYDROCHLORIDE 500 MG: 500 TABLET ORAL at 09:01

## 2022-03-28 RX ADMIN — Medication 500 UNITS: at 13:48

## 2022-03-28 ASSESSMENT — PAIN DESCRIPTION - PAIN TYPE
TYPE: ACUTE PAIN
TYPE: ACUTE PAIN

## 2022-03-28 NOTE — CARE PLAN
The patient is Stable - Low risk of patient condition declining or worsening    Shift Goals  Clinical Goals: monitor wound and pain  Patient Goals: to have less pain  Family Goals: n/a    Progress made toward(s) clinical / shift goals:  vitals are stable    Patient is not progressing towards the following goals:

## 2022-03-28 NOTE — DISCHARGE INSTRUCTIONS
Diabetes Basics    Diabetes (diabetes mellitus) is a long-term (chronic) disease. It occurs when the body does not properly use sugar (glucose) that is released from food after you eat.  Diabetes may be caused by one or both of these problems:  · Your pancreas does not make enough of a hormone called insulin.  · Your body does not react in a normal way to insulin that it makes.  Insulin lets sugars (glucose) go into cells in your body. This gives you energy. If you have diabetes, sugars cannot get into cells. This causes high blood sugar (hyperglycemia).  Follow these instructions at home:  How is diabetes treated?  You may need to take insulin or other diabetes medicines daily to keep your blood sugar in balance. Take your diabetes medicines every day as told by your doctor. List your diabetes medicines here:  Diabetes medicines  · Name of medicine: ______________________________  ? Amount (dose): _______________ Time (a.m./p.m.): _______________ Notes: ___________________________________  · Name of medicine: ______________________________  ? Amount (dose): _______________ Time (a.m./p.m.): _______________ Notes: ___________________________________  · Name of medicine: ______________________________  ? Amount (dose): _______________ Time (a.m./p.m.): _______________ Notes: ___________________________________  If you use insulin, you will learn how to give yourself insulin by injection. You may need to adjust the amount based on the food that you eat. List the types of insulin you use here:  Insulin  · Insulin type: ______________________________  ? Amount (dose): _______________ Time (a.m./p.m.): _______________ Notes: ___________________________________  · Insulin type: ______________________________  ? Amount (dose): _______________ Time (a.m./p.m.): _______________ Notes: ___________________________________  · Insulin type: ______________________________  ? Amount (dose): _______________ Time (a.m./p.m.):  _______________ Notes: ___________________________________  · Insulin type: ______________________________  ? Amount (dose): _______________ Time (a.m./p.m.): _______________ Notes: ___________________________________  · Insulin type: ______________________________  ? Amount (dose): _______________ Time (a.m./p.m.): _______________ Notes: ___________________________________  How do I manage my blood sugar?    Check your blood sugar levels using a blood glucose monitor as directed by your doctor.  Your doctor will set treatment goals for you. Generally, you should have these blood sugar levels:  · Before meals (preprandial):  mg/dL (4.4-7.2 mmol/L).  · After meals (postprandial): below 180 mg/dL (10 mmol/L).  · A1c level: less than 7%.  Write down the times that you will check your blood sugar levels:  Blood sugar checks  · Time: _______________ Notes: ___________________________________  · Time: _______________ Notes: ___________________________________  · Time: _______________ Notes: ___________________________________  · Time: _______________ Notes: ___________________________________  · Time: _______________ Notes: ___________________________________  · Time: _______________ Notes: ___________________________________    What do I need to know about low blood sugar?  Low blood sugar is called hypoglycemia. This is when blood sugar is at or below 70 mg/dL (3.9 mmol/L). Symptoms may include:  · Feeling:  ? Hungry.  ? Worried or nervous (anxious).  ? Sweaty and clammy.  ? Confused.  ? Dizzy.  ? Sleepy.  ? Sick to your stomach (nauseous).  · Having:  ? A fast heartbeat.  ? A headache.  ? A change in your vision.  ? Tingling or no feeling (numbness) around the mouth, lips, or tongue.  ? Jerky movements that you cannot control (seizure).  · Having trouble with:  ? Moving (coordination).  ? Sleeping.  ? Passing out (fainting).  ? Getting upset easily (irritability).  Treating low blood sugar  To treat low blood  sugar, eat or drink something sugary right away. If you can think clearly and swallow safely, follow the 15:15 rule:  · Take 15 grams of a fast-acting carb (carbohydrate). Talk with your doctor about how much you should take.  · Some fast-acting carbs are:  ? Sugar tablets (glucose pills). Take 3-4 glucose pills.  ? 6-8 pieces of hard candy.  ? 4-6 oz (120-150 mL) of fruit juice.  ? 4-6 oz (120-150 mL) of regular (not diet) soda.  ? 1 Tbsp (15 mL) honey or sugar.  · Check your blood sugar 15 minutes after you take the carb.  · If your blood sugar is still at or below 70 mg/dL (3.9 mmol/L), take 15 grams of a carb again.  · If your blood sugar does not go above 70 mg/dL (3.9 mmol/L) after 3 tries, get help right away.  · After your blood sugar goes back to normal, eat a meal or a snack within 1 hour.  Treating very low blood sugar  If your blood sugar is at or below 54 mg/dL (3 mmol/L), you have very low blood sugar (severe hypoglycemia). This is an emergency. Do not wait to see if the symptoms will go away. Get medical help right away. Call your local emergency services (911 in the [place] Do not drive yourself to the hospital.  Questions to ask your health care provider  · Do I need to meet with a diabetes educator?  · What equipment will I need to care for myself at home?  · What diabetes medicines do I need? When should I take them?  · How often do I need to check my blood sugar?  · What number can I call if I have questions?  · When is my next doctor's visit?  · Where can I find a support group for people with diabetes?  Where to find more information  · American Diabetes Association: www.diabetes.org  · American Association of Diabetes Educators: www.diabeteseducator.org/patient-resources  Contact a doctor if:  · Your blood sugar is at or above 240 mg/dL (13.3 mmol/L) for 2 days in a row.  · You have been sick or have had a fever for 2 days or more, and you are not getting better.  · You have any of these  problems for more than 6 hours:  ? You cannot eat or drink.  ? You feel sick to your stomach (nauseous).  ? You throw up (vomit).  ? You have watery poop (diarrhea).  Get help right away if:  · Your blood sugar is lower than 54 mg/dL (3 mmol/L).  · You get confused.  · You have trouble:  ? Thinking clearly.  ? Breathing.  Summary  · Diabetes (diabetes mellitus) is a long-term (chronic) disease. It occurs when the body does not properly use sugar (glucose) that is released from food after digestion.  · Take insulin and diabetes medicines as told.  · Check your blood sugar every day, as often as told.  · Keep all follow-up visits as told by your doctor. This is important.  This information is not intended to replace advice given to you by your health care provider. Make sure you discuss any questions you have with your health care provider.  Document Released: 03/22/2019 Document Revised: 02/07/2020 Document Reviewed: 03/22/2019  [employer] Patient Education © 2020 [employer]      2000 Calorie Diabetic Diet  The 2000 calorie diabetic diet is designed for eating up to 2000 calories each day. Following this diet and making healthy meal choices can help improve overall health. It controls blood glucose (sugar) levels. It can also lower blood pressure and cholesterol.  SERVING SIZES  Measuring foods and serving sizes helps to make sure you are getting the right amount of food. The list below tells how big or small some common serving sizes are.  · 1 oz.........4 stacked dice.   · 3 oz.........Deck of cards.   · 1 tsp........Tip of little finger.   · 1 tbs........Thumb.   · 2 tbs........Golf ball.   · ½ cup.......Half of a fist.   · 1 cup........A fist.   GUIDELINES FOR CHOOSING FOODS  The goal of this diet is to eat a variety of foods and limit calories to 2000 each day. This can be done by choosing foods that are low in calories and fat. The diet also suggests eating small amounts of food often. Doing this helps control  your blood glucose levels so they do not get too high or too low. Each meal or snack should contain a protein food source to help you feel more satisfied and to stabilize your blood glucose. Try to eat about the same amount of food around the same time each day. This includes weekend days, travel days, and days off work. Space your meals about 4 to 5 hours apart and add a snack between them if you wish.  For example, a daily food plan could include breakfast, a morning snack, lunch, dinner, and an evening snack. Healthy meals and snacks include whole grains, vegetables, fruits, lean meats, poultry, fish, and dairy products. As you plan your meals, choose a variety of foods. Choose from the bread and starches, vegetables, fruit, dairy, and meat/protein groups. Examples of foods from each group are listed below with their suggested serving sizes. Use measuring cups and spoons to become familiar with what a healthy portion looks like.  Bread and Starches  Each serving equals 15 grams of carbohydrates.  · 1 slice bread.   · ¼ bagel.   · ¾ cup or 1 cup cold cereal (unsweetened).   · ½ cup hot cereal or mashed potatoes.   · 1 small potato (size of a computer mouse).   ·  cup cooked pasta or rice.   · ½ English muffin.   · 1 cup broth-based soup.   · 3 cups popcorn.   · 4 to 6 whole-wheat crackers.   · ½ cup cooked beans, peas, or corn.   Vegetables  Each serving equals 5 grams of carbohydrates.  · ½ cup cooked vegetables.   · 1 cup raw vegetables.   · ½ cup tomato juice.   Fruit  Each serving equals 15 grams of carbohydrates.  · 1 small apple, banana, or orange.   · 1 ¼ cup watermelon or strawberries.   · ½ cup applesauce (no sugar added).   · 2 tbs raisins.   · ½ banana.   · ½ cup unsweetened canned fruit.   · ½ cup unsweetened fruit juice.   Dairy  Each serving equals 12 to 15 grams of carbohydrates.  · 1 cup fat-free milk.   · 6 oz artificially sweetened yogurt.   · 1 cup buttermilk.   · 1 cup soy milk.    Meat/Protein  · 1 large egg.   · 2 to 3 oz meat, poultry, or fish.   · ½ cup cottage cheese.   · 1 tbs peanut butter.   · ½ cup tofu.   · 1 oz cheese.   · ¼ cup tuna canned in water.   SAMPLE 2000 CALORIE DIET PLAN  Breakfast  · 1 English muffin (2 carb servings).   · Reduced fat cream cheese, 1 tbs.   · 1 scrambled egg.   · ½ grapefruit (1 carb serving).   · Fat-free milk, 1 cup (1 carb serving).   Morning Snack  · Artificially sweetened yogurt, 6 oz (1 carb serving).   · 2 tbs chopped nuts.   · 1 small peach (1 carb serving).   Lunch  · Grilled chicken sandwich.   · 1 hamburger bun (2 carb servings).   · 2 oz chicken breast.   · 1 lettuce leaf.   · 2 slices tomato.   · Reduced fat mayonnaise, 1 tbs.   · Carrot sticks, 1 cup.   · Celery, 1 cup.   · 1 small apple (1 carb serving).   · Fat-free milk, 1 cup (1 carb serving).   Afternoon Snack  · ½ cup low-fat cottage cheese.   · 1 ¼ cups strawberries (1 carb serving).   Dinner  · Steak fajitas.   · 2 oz lean steak.   · 1 whole-wheat tortilla, 8 inches (1 ½ carb servings).   · Shredded lettuce, ¼ cup.   · 2 slices tomato.   · Salsa, ¼ cup.   · Low-fat sour cream, 2 tbs.   · Brown rice,  cup (1 carb serving).   · 1 small orange (1 carb serving).   Evening Snack  · 4 reduced fat whole-wheat crackers (1 carb serving).   · 1 tbs peanut butter.   · 12 to 15 grapes (1 carb serving).   MEAL PLAN  Use this worksheet to help you make a daily meal plan based on the 2000 calorie diabetic diet suggestions. The total amount of carbohydrates in your meal or snack is more important than making sure you include all of the food groups at every meal or snack. If you are using this plan to help you control your blood glucose, you may interchange carbohydrate containing foods (dairy, starches, and fruits). Choose a variety of fresh foods of varying colors and flavors. You can choose from the following foods to build your day's meals:  · 11 Starches.   · 4 Vegetables.   · 3 Fruits.   · 3  Dairy.   · 8 oz Meat.   · Up to 6 Fats.   Your dietician can use this worksheet to help you decide how many servings and what types of foods are right for you.  BREAKFAST  Food Group and Servings / Food Choice  Starches ___________________________________________  Dairy ______________________________________________  Fruit ______________________________________________  Meat ______________________________________________  Fat________________________________________________  LUNCH  Food Group and Servings / Food Choice  Starch _____________________________________________  Meat ______________________________________________  Vegetables _________________________________________  Fruit ______________________________________________  Dairy______________________________________________  Fat________________________________________________  AFTERNOON SNACK  Food Group and Servings / Food Choice  Starch________________________________________________  Meat_________________________________________________  Fruit__________________________________________________  DINNER  Food Group and Servings / Food Choice  Starches ____________________________________________  Meat _______________________________________________  Dairy _______________________________________________  Vegetables __________________________________________  Fruit ________________________________________________  Fat_________________________________________________  EVENING SNACK  Food Group and Servings / Food Choice  Fruit _______________________________________________  Meat _______________________________________________  Starch ______________________________________________  DAILY TOTALS  Starches ________________________  Vegetables ______________________  Fruit ___________________________  Dairy ___________________________  Meat ___________________________  Fat _____________________________  Document Released: 07/10/2006 Document Revised: 03/11/2013  Document Reviewed: 07/26/2010  [employer]® Patient Information ©2013 [employer].      Diabetic Ketoacidosis  Diabetic ketoacidosis is a serious complication of diabetes. This condition develops when there is not enough insulin in the body. Insulin is an hormone that regulates blood sugar levels in the body. Normally, insulin allows glucose to enter the cells in the body. The cells break down glucose for energy. Without enough insulin, the body cannot break down glucose, so it breaks down fats instead. This leads to high blood glucose levels in the body and the production of acids that are called ketones. Ketones are poisonous at high levels.  If diabetic ketoacidosis is not treated, it can cause severe dehydration and can lead to a coma or death.  What are the causes?  This condition develops when a lack of insulin causes the body to break down fats instead of glucose. This may be triggered by:  · Stress on the body. This stress is brought on by an illness.  · Infection.  · Medicines that raise blood glucose levels.  · Not taking diabetes medicine.  · New onset of type 1 diabetes mellitus.  What are the signs or symptoms?  Symptoms of this condition include:  · Fatigue.  · Weight loss.  · Excessive thirst.  · Light-headedness.  · Fruity or sweet-smelling breath.  · Excessive urination.  · Vision changes.  · Confusion or irritability.  · Nausea.  · Vomiting.  · Rapid breathing.  · Abdominal pain.  · Feeling flushed.  How is this diagnosed?  This condition is diagnosed based on your medical history, a physical exam, and blood tests. You may also have a urine test to check for ketones.  How is this treated?  This condition may be treated with:  · Fluid replacement. This may be done to correct dehydration.  · Insulin injections. These may be given through the skin or through an IV tube.  · Electrolyte replacement. Electrolytes are minerals in your blood. Electrolytes such as potassium and sodium may be given in pill  form or through an IV tube.  · Antibiotic medicines. These may be prescribed if your condition was caused by an infection.  Diabetic ketoacidosis is a serious medical condition. You may need emergency treatment in the hospital to monitor your condition.  Follow these instructions at home:  Eating and drinking  · Drink enough fluids to keep your urine clear or pale yellow.  · If you are not able to eat, drink clear fluids in small amounts as you are able. Clear fluids include water, ice chips, fruit juice with water added (diluted), and low-calorie sports drinks. You may also have sugar-free jello or popsicles.  · If you are able to eat, follow your usual diet and drink sugar-free liquids, such as water.  Medicines  · Take over-the-counter and prescription medicines only as told by your health care provider.  · Continue to take insulin and other diabetes medicines as told by your health care provider.  · If you were prescribed an antibiotic, take it as told by your health care provider. Do not stop taking the antibiotic even if you start to feel better.  General instructions    · Check your urine for ketones when you are ill and as told by your health care provider.  ? If your blood glucose is 240 mg/dL (13.3 mmol/L) or higher, check your urine ketones every 4-6 hours.  · Check your blood glucose every day, as often as told by your health care provider.  ? If your blood glucose is high, drink plenty of fluids. This helps to flush out ketones.  ? If your blood glucose is above your target for 2 tests in a row, contact your health care provider.  · Carry a medical alert card or wear medical alert jewelry that says that you have diabetes.  · Rest and exercise only as told by your health care provider. Do not exercise when your blood glucose is high and you have ketones in your urine.  · If you get sick, call your health care provider and begin treatment quickly. Your body often needs extra insulin to fight an illness.  Check your blood glucose every 4-6 hours when you are sick.  · Keep all follow-up visits as told by your health care provider. This is important.  Contact a health care provider if:  · Your blood glucose level is higher than 240 mg/dL (13.3 mmol/L) for 2 days in a row.  · You have moderate or large ketones in your urine.  · You have a fever.  · You cannot eat or drink without vomiting.  · You have been vomiting for more than 2 hours.  · You continue to have symptoms of diabetic ketoacidosis.  · You develop new symptoms.  Get help right away if:  · Your blood glucose monitor reads “high” even when you are taking insulin.  · You faint.  · You have chest pain.  · You have trouble breathing.  · You have sudden trouble speaking or swallowing.  · You have vomiting or diarrhea that gets worse after 3 hours.  · You are unable to stay awake.  · You have trouble thinking.  · You are severely dehydrated. Symptoms of severe dehydration include:  ? Extreme thirst.  ? Dry mouth.  ? Rapid breathing.  These symptoms may represent a serious problem that is an emergency. Do not wait to see if the symptoms will go away. Get medical help right away. Call your local emergency services (911 in the [place] Do not drive yourself to the hospital.  Summary  · Diabetic ketoacidosis is a serious complication of diabetes. This condition develops when there is not enough insulin in the body.  · This condition is diagnosed based on your medical history, a physical exam, and blood tests. You may also have a urine test to check for ketones.  · Diabetic ketoacidosis is a serious medical condition. You may need emergency treatment in the hospital to monitor your condition.  · Contact your health care provider if your blood glucose is higher than 240 mg/dl for 2 days in a row or if you have moderate or large ketones in your urine.  This information is not intended to replace advice given to you by your health care provider. Make sure you discuss any  questions you have with your health care provider.  Document Released: 12/15/2001 Document Revised: 02/02/2018 Document Reviewed: 01/22/2018  "[employer] Patient Education © 2020 "[employer] Inc[employer]      Discharge Instructions    Discharged to home by taxi with self. Discharged via wheelchair, hospital escort: Refused.  Special equipment needed: Not Applicable    Be sure to schedule a follow-up appointment with your primary care doctor or any specialists as instructed.     Discharge Plan:   Diet Plan: Discussed  Activity Level: Discussed  Confirmed Follow up Appointment: Patient to Call and Schedule Appointment  Confirmed Symptoms Management: Discussed  Medication Reconciliation Updated: Yes  Influenza Vaccine Indication: Not indicated: Previously immunized this influenza season and > 8 years of age    I understand that a diet low in cholesterol, fat, and sodium is recommended for good health. Unless I have been given specific instructions below for another diet, I accept this instruction as my diet prescription.   Other diet: Diabetic    Special Instructions: None    · Is patient discharged on Warfarin / Coumadin?   No     Depression / Suicide Risk    As you are discharged from this [facility] facility, it is important to learn how to keep safe from harming yourself.    Recognize the warning signs:  · Abrupt changes in personality, positive or negative- including increase in energy   · Giving away possessions  · Change in eating patterns- significant weight changes-  positive or negative  · Change in sleeping patterns- unable to sleep or sleeping all the time   · Unwillingness or inability to communicate  · Depression  · Unusual sadness, discouragement and loneliness  · Talk of wanting to die  · Neglect of personal appearance   · Rebelliousness- reckless behavior  · Withdrawal from people/activities they love  · Confusion- inability to concentrate     If you or a loved one observes any of these behaviors or has concerns  about self-harm, here's what you can do:  · Talk about it- your feelings and reasons for harming yourself  · Remove any means that you might use to hurt yourself (examples: pills, rope, extension cords, firearm)  · Get professional help from the community (Mental Health, Substance Abuse, psychological counseling)  · Do not be alone:Call your Safe Contact- someone whom you trust who will be there for you.  · Call your local CRISIS HOTLINE 674-9600 or 088-237-8735  · Call your local Children's Mobile Crisis Response Team [place] (331) 021-6604 or www.[employer]  · Call the toll free National Suicide Prevention Hotlines   · National Suicide Prevention Lifeline 324-069-FFIR (0348)  · National Hope Line Network 800-SUICIDE (561-1217)

## 2022-03-28 NOTE — WOUND TEAM
"[facility]  Inpatient Services  Initial Wound and Skin Care Evaluation    Admission Date: 3/25/2022     Last order of IP CONSULT TO WOUND CARE was found on 3/26/2022 from Hospital Encounter on 3/25/2022     HPI, PMH, SH: Reviewed    Past Surgical History:   Procedure Laterality Date   • CATH REMOVAL N/A 8/14/2019    Procedure: REMOVAL, CATHETER AND PLACEMENT OF POWER PORT;  Surgeon: Sonny Enamorado M.D.;  Location: SURGERY [facility];  Service: General     Social History     Tobacco Use   • Smoking status: Never Smoker   • Smokeless tobacco: Former User   Substance Use Topics   • Alcohol use: Not Currently     Chief Complaint   Patient presents with   • Wound Check     Pt cut left lower leg 1 [place]. Wound has white/yellow color drainage. Causing pt severe pain. Pt is unable to walk. He has tried to clean it but has been unsuccessful.   • GLF     Pt fell this AM. -head injury. -loc. Pt had CP occurring with fall. Hx MI 2019.     Diagnosis: DKA, type 2, not at goal (HCC) [E11.10]    Unit where seen by Wound Team: S531/02     WOUND CONSULT/FOLLOW UP RELATED TO: LLE    WOUND HISTORY:  Per H&P: \"Benito Persaud is a 56 y.o. 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 Patient has no recollection of how the wound was actually caused; he just noticed greenish discharge from his shin one day. The pain has worsened to the point where he cannot bear weight on the left leg. He has not used anything at home to relieve the pain, but he has been cleaning the area relentlessly at home and using topical antibiotics OTC. He has no known history of diabetes. Patient also notes nausea and some emesis and poor PO intake for the last 4 days, which patient associates with leg pain:\"    WOUND ASSESSMENT/LDA  Wound 03/26/22 Other (comment) " Pretibial (Active)     predebridement    Post debridement  03/27/22 1650   Site Assessment Red;Pink    Periwound Assessment Red;Pink    Margins Defined edges    Closure Secondary intention    Drainage Amount Small    Drainage Description Serosanguineous    Treatments Cleansed;CSWD - Conservative Sharp Wound Debridement    Wound Cleansing Normal Saline Irrigation    Periwound Protectant Not Applicable    Dressing Cleansing/Solutions Not Applicable    Dressing Options Hydrofera Blue Ready;Silicone Adhesive Foam    Dressing Changed New    Dressing Status Intact    Dressing Change/Treatment Frequency Every 48 hrs, and As Needed    NEXT Dressing Change/Treatment Date 03/29/22    NEXT Weekly Photo (Inpatient Only) 04/03/22    Non-staged Wound Description Partial thickness 03/27/22 1650   Wound Length (cm) 4.2 cm 03/27/22 1650   Wound Width (cm) 1.5 cm 03/27/22 1650   Wound Surface Area (cm^2) 6.3 cm^2 03/27/22 1650   Post-Procedure Length (cm) 4 cm 03/27/22 1650   Post-Procedure Width (cm) 1.5 cm 03/27/22 1650   Post-Procedure Depth (cm) 0.1 cm 03/27/22 1650   Post-Procedure Surface Area (cm^2) 6 cm^2 03/27/22 1650   Post-Procedure Volume (cm^3) 0.6 cm^3 03/27/22 1650   Shape linear    Wound Odor None    Pulses 1+;DP    Exposed Structures None    Number of days: 1        Vascular:    OLU:   OLU Results, Last 30 Days US-OLU SINGLE LEVEL BILAT    Result Date: 3/25/2022  History:         Left lower extremity wound with pain. No prior exams.      Limitations:                     RIGHT      Waveform            Systolic BPs (mmHg)                              147           Brachial   Triphasic                                Common Femoral   Triphasic                                Popliteal   Triphasic                  0             Posterior Tibial   Triphasic                  0             Dorsalis Pedis                                            Digit                                            OLU                                             TBI                           LEFT   Waveform        Systolic BPs (mmHg)                              146           Brachial   Triphasic                                Common Femoral   Triphasic                                Popliteal   Triphasic                  0             Posterior Tibial   Triphasic                  0             Dorsalis Pedis                                            Digit                                            OLU                                            TBI         Findings   Bilateral-   The ankle pressures are not accurately measured due to calcification and    noncompressibility of the tibial vessels.    Doppler waveforms of the common femoral artery and popliteal artery are of    high amplitude and triphasic.    Doppler waveforms at the ankle are brisk and triphasic.       Toe-brachial indices:     Right:  1.26         Left:     1.16   Digit PPG waveforms are normal.       Additional testing was not performed in accordance with lower extremity    arterial evaluation protocol.    Lab Values:    Lab Results   Component Value Date/Time    WBC 5.3 03/25/2022 06:36 PM    RBC 5.12 03/25/2022 06:36 PM    HEMOGLOBIN 15.2 03/25/2022 06:36 PM    HEMATOCRIT 45.1 03/25/2022 06:36 PM    CREACTPROT 0.82 (H) 04/06/2019 10:24 AM    SEDRATEWES 2 04/08/2019 03:00 AM    HBA1C 7.8 (H) 03/25/2022 11:18 PM        Culture Results show:  No results found for this or any previous visit (from the past 720 hour(s)).    Pain Level/Medicated:  Pain with last part of debridement of some firmly adherent crust connie medial edge of wound.    Pt stated it throbbed.    INTERVENTIONS BY WOUND TEAM:  Chart and images reviewed. Discussed with bedside RN. All areas of concern (based on picture review, LDA review and discussion with bedside RN) have been thoroughly assessed. Documentation of areas based on significant findings. This RN in to assess patient. Performed standard wound care which includes  appropriate positioning, dressing removal and non-selective debridement. Pictures and measurements obtained weekly if/when required.  Preparation for Dressing removal: ILENE  Non-selectively Debrided with:  NS and gauze.  Sharp debridement: Using forceps and scissors removed 100% of dried crust from wound bed revealing red/pink tissue with some yellow film, <20 cm^2 debrided. Cleaned after debridement and yellow film was removed.   Lisa wound: Cleansed with NS, dried  Primary Dressing: HFB Ready  Secondary (Outer) Dressing: silicone foam    Interdisciplinary consultation: Patient, Bedside RN  EVALUATION / RATIONALE FOR TREATMENT:  Most Recent Date:  3/27: Pt had partial thickness wound to L shin with s/s of infection. He was able to tolerate debridement and declined requesting RN for pain medicine. Hydrofera Blue applied for the hydrophilic polyurethane foam which contains ethylene oxide used as a bactericidal, fungicidal, and sporicidal disinfectant. Hydrofera Blue also aids in maintaining a moist wound environment. The absorption properties of this dressing are important in collecting exudates and bacteria from the injured area. These harmful fluid secretions bind to the dressing removing it from the wound without the foam sticking to the wound causing more harm.       Goals: Steady decrease in wound area and depth weekly.    WOUND TEAM PLAN OF CARE ([X] for frequency of wound follow up,):   Nursing to follow orders written for wound care. Contact wound team if area fails to progress, deteriorates or with any questions/concerns  Dressing changes by wound team:                   Follow up 3 times weekly:                NPWT change 3 times weekly:     Follow up 1-2 times weekly:      Follow up Bi-Monthly:                   Follow up as needed: x    Other (explain):     NURSING PLAN OF CARE ORDERS (X):  Dressing changes: See Dressing Care orders: x  Skin care: See Skin Care orders:   RN Prevention Protocol:   Rectal  tube care: See Rectal Tube Care orders:   Other orders:    RSKIN:   CURRENTLY IN PLACE (X), APPLIED THIS VISIT (A), ORDERED (O):   Q shift Froilan:  X  Q shift pressure point assessments:  X    Surface/Positioning   Pressure redistribution mattress   x         Low Airloss          Bariatric foam      Bariatric MACEY     Waffle cushion        Waffle Overlay          Reposition q 2 hours      TAPs Turning system     Z Yousif Pillow     Offloading/Redistribution not assessed  Sacral Mepilex (Silicone dressing)     Heel Mepilex (Silicone dressing)         Heel float boots (Prevalon boot)             Float Heels off Bed with Pillows           Respiratory RA  Silicone O2 tubing         Gray Foam Ear protectors     Cannula fixation Device (Tender )          High flow offloading Clip    Elastic head band offloading device      Anchorfast                                                         Trach with Optifoam split foam             Containment/Moisture Prevention not assessed    Rectal tube or BMS    Purwick/Condom Cath        Cisneros Catheter    Barrier wipes           Barrier paste       Antifungal tx      Interdry        Mobilization not assessed      Up to chair        Ambulate      PT/OT      Nutrition       Dietician        Diabetes Education      PO  x   TF     TPN     NPO   # days     Other        Anticipated discharge plans: TBD; outpt referral recommended  LTACH:        SNF/Rehab:                  Home Health Care:           Outpatient Wound Center:            Self/Family Care:        Other:           Vac Discharge Needs:  Not Applicable Pt not on a wound vac:    x   Regular Vac while inpatient, alternative dressing at DC:        Regular Vac in use and continued at DC:            Reg. Vac w/ Skin Sub/Biologic in use. Will need to be changed 2x wkly:      Veraflo Vac while inpatient, ok to transition to Regular Vac on Discharge:           Veraflo Vac while inpatient, will need to remain on Veraflo Vac upon  discharge:

## 2022-03-28 NOTE — DISCHARGE SUMMARY
[facility] DISCHARGE SUMMARY     PATIENT ID:  Name:             Benito Persaud   YOB: 1965  Age:                 56 y.o.  male   MRN:               [id number]  Address:         [address] [place] [address]  Phone:            [phone] (home)    ADMISSION DATE:   3/25/2022    DISCHARGE DATE:   3/28/2022    DISCHARGE DIAGNOSES:   Problem Noted   Cellulitis 3/28/2022   Diabetes (Hcc) 3/28/2022   Dka, Type 2, Not At Goal (Hcc) 3/25/2022       ATTENDING PHYSICIAN:   Jenny Miller MD    RESIDENT:   Saskia Patel MD   PGY-2     CONSULTANTS:    None    PROCEDURES:    None    IMAGING:   US-OLU SINGLE LEVEL BILAT   Final Result      DX-CHEST-PORTABLE (1 VIEW)   Final Result         1. No acute cardiopulmonary abnormalities are identified.            PHYSICAL EXAM:   General: No acute distress, afebrile, resting comfortably  HEENT: NC/AT. EOMI.   Cardiovascular: RRR without murmurs. Normal capillary refill   Respiratory: CTAB, no tachypnea or retractions  Abdomen: soft, nontender, nondistended, no masses  EXT:  SARGENT, healing wound to anterior left mid shin, healing with scant dried serosanguinous drainage. No purulent drainage, minimal surrounding erythema, much improved from admission  Skin: see extremities  Neuro: Non-focal, alert and oriented    LABS:  Recent Labs     03/25/22 1836   WBC 5.3   RBC 5.12   HEMOGLOBIN 15.2   HEMATOCRIT 45.1   MCV 88.1   MCH 29.7   RDW 45.5   PLATELETCT 88*   MPV 8.7*   NEUTSPOLYS 49.80   LYMPHOCYTES 35.60   MONOCYTES 8.70   EOSINOPHILS 5.10   BASOPHILS 0.40     Recent Labs     03/25/22  1836 03/26/22  0530   SODIUM 131* 137   POTASSIUM 4.8 3.9   CHLORIDE 98 105   CO2 16* 22   GLUCOSE 526* 169*   BUN 10 10     No results found for: CHOLSTRLTOT, LDL, HDL, TRIGLYCERIDE    Lab Results   Component Value Date/Time    TROPONINI <0.01 06/26/2019 11:47 AM       Lab Results   Component Value Date/Time    TROPONINI <0.01 06/26/2019 11:47 AM            [facility]  COURSE:   Benito Persaud is a 56 y.o. 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 Wound and blood cultures were collected. He was initiated on C3 and vancomycin.     For his elevated glucose he received 10 units of insulin which significantly improved his hyperglycemia and acidosis. He was then placed on 15 units of lantus and SSI. A1C returned at 7.8. He was transitioned to metformin 500 mg BID and discharged home on this regimen. Suspect significantly elevated blood sugars on admission are due to T2DM new diagnosis in the setting of acute bacterial infection.     Cultures from wound resulted as MRSA and GAS. He was transitioned to oral doxycycline and omnicef to complete a 14 day total course.     On day of discharge his pain was controlled with oxycodone, he was tolerating a diabetic diet, and he met with social work. A diabetic education and nutrition referral was placed on DC and he was instructed to f/u with his PCP regarding his new diabetes diagnosis and to monitor for resolution of his skin infection.     Labs to evaluate for hypercoagulability (h/o DVT, PE, CVA's) were ordered and never drawn. These can be considered as an outpatient. Including: Antithrombin III, Protein C, Protein S, anticardiolipin     DISCHARGE CONDITION:    Stable    DISPOSITION:   Home     DISCHARGE MEDICATIONS:      Medication List      START taking these medications      Instructions   cefdinir 300 MG Caps  Commonly known as: OMNICEF   Take 1 Capsule by mouth every 12 hours for 11 days.  Dose: 300 mg     diphenhydrAMINE-zinc acetate cream  Commonly known as: BENADRYL ITCH   Apply 1 Each topically 3 times a day as needed (Itching) for up to 30 days.  Dose: 1 Each     doxycycline monohydrate 100 MG tablet  Commonly known as: ADOXA   Take 1 Tablet by mouth every 12 hours for 11 days.  Dose: 100 mg      metFORMIN 500 MG Tabs  Commonly known as: GLUCOPHAGE   Take 1 Tablet by mouth 2 times a day with meals for 30 days.  Dose: 500 mg     oxyCODONE immediate-release 5 MG Tabs  Commonly known as: ROXICODONE   Take 1 Tablet by mouth every 3 hours as needed for up to 15 days.  Dose: 5 mg        CONTINUE taking these medications      Instructions   Ambien 10 MG Tabs  Generic drug: zolpidem   Take 10 mg by mouth at bedtime as needed for Sleep.  Dose: 10 mg     amitriptyline 100 MG Tabs  Commonly known as: ELAVIL   Take 1.5 Tablets by mouth at bedtime as needed for Sleep.  Dose: 150 mg     apixaban 5mg Tabs  Commonly known as: Eliquis   Take 1 Tablet by mouth 2 times a day.  Dose: 5 mg     Combivent Respimat  MCG/ACT Aers  Generic drug: ipratropium-albuterol   Inhale 1 Puff 4 times a day.  Dose: 1 Puff     diphenhydrAMINE 50 MG Caps  Commonly known as: Banophen   Take 1 Capsule by mouth at bedtime as needed for Sleep.  Dose: 50 mg     gabapentin 800 MG tablet  Commonly known as: NEURONTIN   Take 1 Tablet by mouth 3 times a day.  Dose: 800 mg     omeprazole 40 MG delayed-release capsule  Commonly known as: PRILOSEC   Take 40 mg by mouth 2 times a day.  Dose: 40 mg     promethazine 25 MG Tabs  Commonly known as: PHENERGAN   Take 1 Tablet by mouth every 6 hours as needed for Nausea/Vomiting.  Dose: 25 mg     Vitamin D3 1.25 MG (01068 UT) Caps   Take 1 Cap by mouth every 14 days.  Dose: 1 Capsule              ACTIVITY:   Normal Activity as Tolerated.    DIET:   Healthy    DISCHARGE INSTRUCTIONS AND FOLLOW UP:  Patient is medically stable for discharge and will be discharged to home     Follow Up: 1 week with PCP Dr. Hanny Wills    Discharge Instructions:   Patient was instructed to return the ER in the event of worsening symptoms including but not limited to fevers, worsening pain, new drainage, nausea, vomiting, chills/sweats or any other major concerns. Patient understands that failure to do so may indicate  worsening of his medical condition(s) and result in adverse clinical outcomes including fatality. We have counseled the patient on the importance of compliance and the patient has agreed to proceed with all medical recommendations and follow up plan indicated above. The patient understands that the failure to do so may result in result in adverse clinical outcomes including fatality.       CC:   Hanny Wills M.D.

## 2022-03-28 NOTE — PROGRESS NOTES
Patient discharged home in stable condition.  Discharge instructions reviewed and signed, all questions answered.  Port de-accessed.  Meds to beds delivered by RN.  Bus pass provided.

## 2022-03-29 LAB
CARDIOLIPIN IGA SER IA-ACNC: <10 APL
CARDIOLIPIN IGG SER IA-ACNC: <10 GPL
CARDIOLIPIN IGM SER IA-ACNC: 10 MPL

## 2022-03-29 NOTE — CONSULTS
Diabetes education: Met with pt this afternoon before discharge. Pt was newly dx with diabetes, admitted with blood sugar of 526 and Hg a1c of 7.8%.  Pt was not on diabetes medications, nor finger sticks but glucose readings were 123, 121, and 138.  Reviewed diabetes, hyperglycemia, need to eat protein and controlled carbs with every meal, metformin and how to take it. Discussed checking blood sugars briefly as well as following up with PCP.  Pt was given a [facility] DM book and questions answered.

## 2022-03-30 LAB
BACTERIA BLD CULT: NORMAL
BACTERIA BLD CULT: NORMAL
PROT C ACT/NOR PPP: 97 % (ref 83–168)
PROT S ACT/NOR PPP: 87 % (ref 66–143)
SIGNIFICANT IND 70042: NORMAL
SIGNIFICANT IND 70042: NORMAL
SITE SITE: NORMAL
SITE SITE: NORMAL
SOURCE SOURCE: NORMAL
SOURCE SOURCE: NORMAL

## 2022-03-31 LAB — AT III ACT/NOR PPP CHRO: 86 % (ref 76–128)

## 2022-04-01 LAB
F2 C.20210G>A GENO BLD/T: NEGATIVE
F5 P.R506Q BLD/T QL: NEGATIVE

## 2022-04-05 RX ORDER — OMEPRAZOLE 40 MG/1
40 CAPSULE, DELAYED RELEASE ORAL 2 TIMES DAILY
Qty: 30 CAPSULE | Refills: 0 | Status: SHIPPED | OUTPATIENT
Start: 2022-04-05 | End: 2022-04-08

## 2022-04-05 NOTE — PROGRESS NOTES
Patient is alert and oriented. Patient did sit at the side of the bed for me this am. Patient rated his pain at a 10 this morning. The pain is in his back and his head. Pain medication given. Will continue to monitor pain control.  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 Patient said he has no living relatives. Both his parents were single children and he was their only child. Reviewed POC with patient. Bed alarm in place, hourly rounding in practice.   Received a call from the microbiology lab at around 9am to notify me that the patient's blood culture grew gram positive bacilli resembling dephtherioids . Called patient and notified her about the critical results. I advised her  to return to the ED for further treatment. Patient confirmed that she will return to the ED today.

## 2022-04-08 ENCOUNTER — TELEPHONE (OUTPATIENT)
Dept: MEDICAL GROUP | Facility: CLINIC | Age: 57
End: 2022-04-08
Payer: MEDICAID

## 2022-04-08 RX ORDER — OMEPRAZOLE 20 MG/1
20 CAPSULE, DELAYED RELEASE ORAL 2 TIMES DAILY
Qty: 60 CAPSULE | Refills: 3 | Status: SHIPPED | OUTPATIENT
Start: 2022-04-08 | End: 2022-05-23

## 2022-04-08 RX ORDER — OMEPRAZOLE 20 MG/1
20 CAPSULE, DELAYED RELEASE ORAL DAILY
Qty: 30 CAPSULE | Refills: 3 | Status: SHIPPED | OUTPATIENT
Start: 2022-04-08 | End: 2022-04-08

## 2022-04-08 NOTE — TELEPHONE ENCOUNTER
Received a fax from pharmacy needing a PA on patient's omeprazole 40mg. Pt came into office stating he does not take 40mg, he takes the 20mg which would not need a PA. Could you please send in new Rx? Thank you!    I sent in a prescription for Omeprazole 20mg. Thank you!

## 2022-04-25 DIAGNOSIS — J45.20 MILD INTERMITTENT ASTHMA WITHOUT COMPLICATION: ICD-10-CM

## 2022-04-26 ENCOUNTER — HOSPITAL ENCOUNTER (INPATIENT)
Facility: MEDICAL CENTER | Age: 57
LOS: 2 days | DRG: 152 | End: 2022-04-29
Attending: EMERGENCY MEDICINE | Admitting: STUDENT IN AN ORGANIZED HEALTH CARE EDUCATION/TRAINING PROGRAM
Payer: MEDICAID

## 2022-04-26 ENCOUNTER — APPOINTMENT (OUTPATIENT)
Dept: RADIOLOGY | Facility: MEDICAL CENTER | Age: 57
DRG: 152 | End: 2022-04-26
Attending: EMERGENCY MEDICINE
Payer: MEDICAID

## 2022-04-26 DIAGNOSIS — E11.69 TYPE 2 DIABETES MELLITUS WITH OTHER SPECIFIED COMPLICATION, WITHOUT LONG-TERM CURRENT USE OF INSULIN (HCC): ICD-10-CM

## 2022-04-26 DIAGNOSIS — R07.9 CHEST PAIN, UNSPECIFIED TYPE: ICD-10-CM

## 2022-04-26 DIAGNOSIS — B34.9 VIRAL SYNDROME: ICD-10-CM

## 2022-04-26 DIAGNOSIS — J00 ACUTE NASOPHARYNGITIS: ICD-10-CM

## 2022-04-26 LAB
ALBUMIN SERPL BCP-MCNC: 3.7 G/DL (ref 3.2–4.9)
ALBUMIN/GLOB SERPL: 0.9 G/DL
ALP SERPL-CCNC: 113 U/L (ref 30–99)
ALT SERPL-CCNC: 30 U/L (ref 2–50)
ANION GAP SERPL CALC-SCNC: 13 MMOL/L (ref 7–16)
AST SERPL-CCNC: 36 U/L (ref 12–45)
BASOPHILS # BLD AUTO: 0.3 % (ref 0–1.8)
BASOPHILS # BLD: 0.01 K/UL (ref 0–0.12)
BILIRUB SERPL-MCNC: 0.7 MG/DL (ref 0.1–1.5)
BUN SERPL-MCNC: 11 MG/DL (ref 8–22)
CALCIUM SERPL-MCNC: 9 MG/DL (ref 8.5–10.5)
CHLORIDE SERPL-SCNC: 101 MMOL/L (ref 96–112)
CO2 SERPL-SCNC: 20 MMOL/L (ref 20–33)
CREAT SERPL-MCNC: 1.1 MG/DL (ref 0.5–1.4)
EKG IMPRESSION: NORMAL
EKG IMPRESSION: NORMAL
EOSINOPHIL # BLD AUTO: 0.15 K/UL (ref 0–0.51)
EOSINOPHIL NFR BLD: 3.9 % (ref 0–6.9)
ERYTHROCYTE [DISTWIDTH] IN BLOOD BY AUTOMATED COUNT: 44.2 FL (ref 35.9–50)
FLUAV RNA SPEC QL NAA+PROBE: NEGATIVE
FLUBV RNA SPEC QL NAA+PROBE: NEGATIVE
GFR SERPLBLD CREATININE-BSD FMLA CKD-EPI: 79 ML/MIN/1.73 M 2
GLOBULIN SER CALC-MCNC: 4.1 G/DL (ref 1.9–3.5)
GLUCOSE BLD STRIP.AUTO-MCNC: 267 MG/DL (ref 65–99)
GLUCOSE SERPL-MCNC: 166 MG/DL (ref 65–99)
HCT VFR BLD AUTO: 48.1 % (ref 42–52)
HGB BLD-MCNC: 16.1 G/DL (ref 14–18)
IMM GRANULOCYTES # BLD AUTO: 0.02 K/UL (ref 0–0.11)
IMM GRANULOCYTES NFR BLD AUTO: 0.5 % (ref 0–0.9)
LACTATE BLD-SCNC: 1.4 MMOL/L (ref 0.5–2)
LYMPHOCYTES # BLD AUTO: 1.59 K/UL (ref 1–4.8)
LYMPHOCYTES NFR BLD: 41.8 % (ref 22–41)
MCH RBC QN AUTO: 29.2 PG (ref 27–33)
MCHC RBC AUTO-ENTMCNC: 33.5 G/DL (ref 33.7–35.3)
MCV RBC AUTO: 87.1 FL (ref 81.4–97.8)
MONOCYTES # BLD AUTO: 0.55 K/UL (ref 0–0.85)
MONOCYTES NFR BLD AUTO: 14.5 % (ref 0–13.4)
NEUTROPHILS # BLD AUTO: 1.48 K/UL (ref 1.82–7.42)
NEUTROPHILS NFR BLD: 39 % (ref 44–72)
NRBC # BLD AUTO: 0 K/UL
NRBC BLD-RTO: 0 /100 WBC
NT-PROBNP SERPL IA-MCNC: 61 PG/ML (ref 0–125)
PLATELET # BLD AUTO: 66 K/UL (ref 164–446)
PMV BLD AUTO: 8.6 FL (ref 9–12.9)
POTASSIUM SERPL-SCNC: 4.3 MMOL/L (ref 3.6–5.5)
PROCALCITONIN SERPL-MCNC: <0.05 NG/ML
PROT SERPL-MCNC: 7.8 G/DL (ref 6–8.2)
RBC # BLD AUTO: 5.52 M/UL (ref 4.7–6.1)
RSV RNA SPEC QL NAA+PROBE: NEGATIVE
SARS-COV-2 RNA RESP QL NAA+PROBE: NOTDETECTED
SCCMEC + MECA PNL NOSE NAA+PROBE: POSITIVE
SODIUM SERPL-SCNC: 134 MMOL/L (ref 135–145)
SPECIMEN SOURCE: NORMAL
TROPONIN T SERPL-MCNC: 19 NG/L (ref 6–19)
TROPONIN T SERPL-MCNC: 24 NG/L (ref 6–19)
TROPONIN T SERPL-MCNC: 27 NG/L (ref 6–19)
WBC # BLD AUTO: 3.8 K/UL (ref 4.8–10.8)

## 2022-04-26 PROCEDURE — 71275 CT ANGIOGRAPHY CHEST: CPT

## 2022-04-26 PROCEDURE — 93005 ELECTROCARDIOGRAM TRACING: CPT | Performed by: INTERNAL MEDICINE

## 2022-04-26 PROCEDURE — 71045 X-RAY EXAM CHEST 1 VIEW: CPT

## 2022-04-26 PROCEDURE — 96365 THER/PROPH/DIAG IV INF INIT: CPT | Mod: XU

## 2022-04-26 PROCEDURE — 700111 HCHG RX REV CODE 636 W/ 250 OVERRIDE (IP): Performed by: EMERGENCY MEDICINE

## 2022-04-26 PROCEDURE — 83880 ASSAY OF NATRIURETIC PEPTIDE: CPT

## 2022-04-26 PROCEDURE — 700102 HCHG RX REV CODE 250 W/ 637 OVERRIDE(OP): Performed by: HOSPITALIST

## 2022-04-26 PROCEDURE — 99285 EMERGENCY DEPT VISIT HI MDM: CPT

## 2022-04-26 PROCEDURE — A9270 NON-COVERED ITEM OR SERVICE: HCPCS | Performed by: INTERNAL MEDICINE

## 2022-04-26 PROCEDURE — 80053 COMPREHEN METABOLIC PANEL: CPT

## 2022-04-26 PROCEDURE — 700105 HCHG RX REV CODE 258: Performed by: EMERGENCY MEDICINE

## 2022-04-26 PROCEDURE — C9803 HOPD COVID-19 SPEC COLLECT: HCPCS | Performed by: EMERGENCY MEDICINE

## 2022-04-26 PROCEDURE — 0241U HCHG SARS-COV-2 COVID-19 NFCT DS RESP RNA 4 TRGT MIC: CPT

## 2022-04-26 PROCEDURE — A9270 NON-COVERED ITEM OR SERVICE: HCPCS | Performed by: HOSPITALIST

## 2022-04-26 PROCEDURE — 93005 ELECTROCARDIOGRAM TRACING: CPT

## 2022-04-26 PROCEDURE — 87641 MR-STAPH DNA AMP PROBE: CPT

## 2022-04-26 PROCEDURE — G0378 HOSPITAL OBSERVATION PER HR: HCPCS

## 2022-04-26 PROCEDURE — 96366 THER/PROPH/DIAG IV INF ADDON: CPT

## 2022-04-26 PROCEDURE — 700102 HCHG RX REV CODE 250 W/ 637 OVERRIDE(OP): Performed by: EMERGENCY MEDICINE

## 2022-04-26 PROCEDURE — 99220 PR INITIAL OBSERVATION CARE,LEVL III: CPT | Performed by: INTERNAL MEDICINE

## 2022-04-26 PROCEDURE — 84484 ASSAY OF TROPONIN QUANT: CPT

## 2022-04-26 PROCEDURE — 82962 GLUCOSE BLOOD TEST: CPT

## 2022-04-26 PROCEDURE — 96372 THER/PROPH/DIAG INJ SC/IM: CPT | Mod: XU

## 2022-04-26 PROCEDURE — 93005 ELECTROCARDIOGRAM TRACING: CPT | Performed by: EMERGENCY MEDICINE

## 2022-04-26 PROCEDURE — 36415 COLL VENOUS BLD VENIPUNCTURE: CPT

## 2022-04-26 PROCEDURE — 96375 TX/PRO/DX INJ NEW DRUG ADDON: CPT | Mod: XU

## 2022-04-26 PROCEDURE — 700102 HCHG RX REV CODE 250 W/ 637 OVERRIDE(OP): Performed by: INTERNAL MEDICINE

## 2022-04-26 PROCEDURE — 96367 TX/PROPH/DG ADDL SEQ IV INF: CPT

## 2022-04-26 PROCEDURE — 94640 AIRWAY INHALATION TREATMENT: CPT

## 2022-04-26 PROCEDURE — A9270 NON-COVERED ITEM OR SERVICE: HCPCS | Performed by: EMERGENCY MEDICINE

## 2022-04-26 PROCEDURE — 700101 HCHG RX REV CODE 250: Performed by: INTERNAL MEDICINE

## 2022-04-26 PROCEDURE — 83605 ASSAY OF LACTIC ACID: CPT

## 2022-04-26 PROCEDURE — 700117 HCHG RX CONTRAST REV CODE 255: Performed by: EMERGENCY MEDICINE

## 2022-04-26 PROCEDURE — 84145 PROCALCITONIN (PCT): CPT

## 2022-04-26 PROCEDURE — 85025 COMPLETE CBC W/AUTO DIFF WBC: CPT

## 2022-04-26 PROCEDURE — 87040 BLOOD CULTURE FOR BACTERIA: CPT | Mod: 91

## 2022-04-26 RX ORDER — OXYCODONE HYDROCHLORIDE 5 MG/1
5 TABLET ORAL EVERY 4 HOURS PRN
Status: DISCONTINUED | OUTPATIENT
Start: 2022-04-26 | End: 2022-04-27

## 2022-04-26 RX ORDER — ONDANSETRON 2 MG/ML
4 INJECTION INTRAMUSCULAR; INTRAVENOUS EVERY 4 HOURS PRN
Status: DISCONTINUED | OUTPATIENT
Start: 2022-04-26 | End: 2022-04-29 | Stop reason: HOSPADM

## 2022-04-26 RX ORDER — BISACODYL 10 MG
10 SUPPOSITORY, RECTAL RECTAL
Status: DISCONTINUED | OUTPATIENT
Start: 2022-04-26 | End: 2022-04-29 | Stop reason: HOSPADM

## 2022-04-26 RX ORDER — ALBUTEROL SULFATE 90 UG/1
2 AEROSOL, METERED RESPIRATORY (INHALATION) ONCE
Status: COMPLETED | OUTPATIENT
Start: 2022-04-26 | End: 2022-04-26

## 2022-04-26 RX ORDER — POLYETHYLENE GLYCOL 3350 17 G/17G
1 POWDER, FOR SOLUTION ORAL
Status: DISCONTINUED | OUTPATIENT
Start: 2022-04-26 | End: 2022-04-29 | Stop reason: HOSPADM

## 2022-04-26 RX ORDER — ALBUTEROL SULFATE 2.5 MG/3ML
2.5 SOLUTION RESPIRATORY (INHALATION)
Status: DISCONTINUED | OUTPATIENT
Start: 2022-04-26 | End: 2022-04-26

## 2022-04-26 RX ORDER — ALBUTEROL SULFATE 90 UG/1
1 AEROSOL, METERED RESPIRATORY (INHALATION)
Status: DISCONTINUED | OUTPATIENT
Start: 2022-04-26 | End: 2022-04-26

## 2022-04-26 RX ORDER — AMINOPHYLLINE 25 MG/ML
100 INJECTION, SOLUTION INTRAVENOUS
Status: DISCONTINUED | OUTPATIENT
Start: 2022-04-26 | End: 2022-04-29 | Stop reason: HOSPADM

## 2022-04-26 RX ORDER — ZOLPIDEM TARTRATE 5 MG/1
10 TABLET ORAL NIGHTLY PRN
Status: DISCONTINUED | OUTPATIENT
Start: 2022-04-26 | End: 2022-04-29 | Stop reason: HOSPADM

## 2022-04-26 RX ORDER — DIPHENHYDRAMINE HYDROCHLORIDE 50 MG/ML
50 INJECTION INTRAMUSCULAR; INTRAVENOUS ONCE
Status: COMPLETED | OUTPATIENT
Start: 2022-04-26 | End: 2022-04-26

## 2022-04-26 RX ORDER — GABAPENTIN 400 MG/1
800 CAPSULE ORAL 3 TIMES DAILY
Status: DISCONTINUED | OUTPATIENT
Start: 2022-04-26 | End: 2022-04-29 | Stop reason: HOSPADM

## 2022-04-26 RX ORDER — AZITHROMYCIN 500 MG/1
500 INJECTION, POWDER, LYOPHILIZED, FOR SOLUTION INTRAVENOUS ONCE
Status: COMPLETED | OUTPATIENT
Start: 2022-04-26 | End: 2022-04-26

## 2022-04-26 RX ORDER — DEXTROSE MONOHYDRATE 25 G/50ML
25 INJECTION, SOLUTION INTRAVENOUS
Status: DISCONTINUED | OUTPATIENT
Start: 2022-04-26 | End: 2022-04-27

## 2022-04-26 RX ORDER — AMITRIPTYLINE HYDROCHLORIDE 75 MG/1
150 TABLET ORAL NIGHTLY PRN
Status: DISCONTINUED | OUTPATIENT
Start: 2022-04-26 | End: 2022-04-29 | Stop reason: HOSPADM

## 2022-04-26 RX ORDER — METHYLPREDNISOLONE SODIUM SUCCINATE 125 MG/2ML
125 INJECTION, POWDER, LYOPHILIZED, FOR SOLUTION INTRAMUSCULAR; INTRAVENOUS ONCE
Status: COMPLETED | OUTPATIENT
Start: 2022-04-26 | End: 2022-04-26

## 2022-04-26 RX ORDER — PROMETHAZINE HYDROCHLORIDE 25 MG/1
12.5-25 SUPPOSITORY RECTAL EVERY 4 HOURS PRN
Status: DISCONTINUED | OUTPATIENT
Start: 2022-04-26 | End: 2022-04-29 | Stop reason: HOSPADM

## 2022-04-26 RX ORDER — PROMETHAZINE HYDROCHLORIDE 25 MG/1
12.5-25 TABLET ORAL EVERY 4 HOURS PRN
Status: DISCONTINUED | OUTPATIENT
Start: 2022-04-26 | End: 2022-04-29 | Stop reason: HOSPADM

## 2022-04-26 RX ORDER — ACETAMINOPHEN 325 MG/1
650 TABLET ORAL EVERY 6 HOURS PRN
Status: DISCONTINUED | OUTPATIENT
Start: 2022-04-26 | End: 2022-04-27

## 2022-04-26 RX ORDER — OMEPRAZOLE 20 MG/1
20 CAPSULE, DELAYED RELEASE ORAL 2 TIMES DAILY
Status: DISCONTINUED | OUTPATIENT
Start: 2022-04-26 | End: 2022-04-29 | Stop reason: HOSPADM

## 2022-04-26 RX ORDER — REGADENOSON 0.08 MG/ML
0.4 INJECTION, SOLUTION INTRAVENOUS
Status: DISCONTINUED | OUTPATIENT
Start: 2022-04-26 | End: 2022-04-29 | Stop reason: HOSPADM

## 2022-04-26 RX ORDER — ONDANSETRON 4 MG/1
4 TABLET, ORALLY DISINTEGRATING ORAL EVERY 4 HOURS PRN
Status: DISCONTINUED | OUTPATIENT
Start: 2022-04-26 | End: 2022-04-29 | Stop reason: HOSPADM

## 2022-04-26 RX ORDER — ALBUTEROL SULFATE 90 UG/1
2 AEROSOL, METERED RESPIRATORY (INHALATION)
Status: DISCONTINUED | OUTPATIENT
Start: 2022-04-26 | End: 2022-04-26

## 2022-04-26 RX ORDER — AMOXICILLIN 250 MG
2 CAPSULE ORAL 2 TIMES DAILY
Status: DISCONTINUED | OUTPATIENT
Start: 2022-04-27 | End: 2022-04-29 | Stop reason: HOSPADM

## 2022-04-26 RX ORDER — ALBUTEROL SULFATE 2.5 MG/3ML
2.5 SOLUTION RESPIRATORY (INHALATION)
Status: DISCONTINUED | OUTPATIENT
Start: 2022-04-26 | End: 2022-04-28

## 2022-04-26 RX ORDER — HYDROMORPHONE HYDROCHLORIDE 1 MG/ML
1 INJECTION, SOLUTION INTRAMUSCULAR; INTRAVENOUS; SUBCUTANEOUS ONCE
Status: COMPLETED | OUTPATIENT
Start: 2022-04-26 | End: 2022-04-26

## 2022-04-26 RX ORDER — ALBUTEROL SULFATE 90 UG/1
2 AEROSOL, METERED RESPIRATORY (INHALATION)
Status: DISCONTINUED | OUTPATIENT
Start: 2022-04-26 | End: 2022-04-28

## 2022-04-26 RX ORDER — MORPHINE SULFATE 4 MG/ML
4 INJECTION INTRAVENOUS ONCE
Status: COMPLETED | OUTPATIENT
Start: 2022-04-26 | End: 2022-04-26

## 2022-04-26 RX ORDER — ACETAMINOPHEN 500 MG
500 TABLET ORAL EVERY 4 HOURS PRN
COMMUNITY
End: 2022-05-04

## 2022-04-26 RX ORDER — CEFTRIAXONE 2 G/1
2 INJECTION, POWDER, FOR SOLUTION INTRAMUSCULAR; INTRAVENOUS ONCE
Status: COMPLETED | OUTPATIENT
Start: 2022-04-26 | End: 2022-04-26

## 2022-04-26 RX ORDER — PROCHLORPERAZINE EDISYLATE 5 MG/ML
5-10 INJECTION INTRAMUSCULAR; INTRAVENOUS EVERY 4 HOURS PRN
Status: DISCONTINUED | OUTPATIENT
Start: 2022-04-26 | End: 2022-04-29 | Stop reason: HOSPADM

## 2022-04-26 RX ORDER — DIPHENHYDRAMINE HCL 25 MG
50 TABLET ORAL
Status: DISCONTINUED | OUTPATIENT
Start: 2022-04-26 | End: 2022-04-29 | Stop reason: HOSPADM

## 2022-04-26 RX ADMIN — VANCOMYCIN HYDROCHLORIDE 3000 MG: 500 INJECTION, POWDER, LYOPHILIZED, FOR SOLUTION INTRAVENOUS at 16:43

## 2022-04-26 RX ADMIN — ALBUTEROL SULFATE 2.5 MG: 2.5 SOLUTION RESPIRATORY (INHALATION) at 22:47

## 2022-04-26 RX ADMIN — HYDROMORPHONE HYDROCHLORIDE 1 MG: 1 INJECTION, SOLUTION INTRAMUSCULAR; INTRAVENOUS; SUBCUTANEOUS at 15:30

## 2022-04-26 RX ADMIN — AZITHROMYCIN MONOHYDRATE 500 MG: 500 INJECTION, POWDER, LYOPHILIZED, FOR SOLUTION INTRAVENOUS at 15:11

## 2022-04-26 RX ADMIN — ACETAMINOPHEN 650 MG: 325 TABLET, FILM COATED ORAL at 21:26

## 2022-04-26 RX ADMIN — INSULIN HUMAN 3 UNITS: 100 INJECTION, SOLUTION PARENTERAL at 21:00

## 2022-04-26 RX ADMIN — DIPHENHYDRAMINE HYDROCHLORIDE 50 MG: 50 INJECTION INTRAMUSCULAR; INTRAVENOUS at 15:29

## 2022-04-26 RX ADMIN — ZOLPIDEM TARTRATE 10 MG: 5 TABLET ORAL at 22:11

## 2022-04-26 RX ADMIN — APIXABAN 5 MG: 5 TABLET, FILM COATED ORAL at 18:39

## 2022-04-26 RX ADMIN — OMEPRAZOLE 20 MG: 20 CAPSULE, DELAYED RELEASE ORAL at 18:39

## 2022-04-26 RX ADMIN — CEFTRIAXONE SODIUM 2 G: 2 INJECTION, POWDER, FOR SOLUTION INTRAMUSCULAR; INTRAVENOUS at 15:10

## 2022-04-26 RX ADMIN — DIPHENHYDRAMINE HYDROCHLORIDE 50 MG: 25 TABLET ORAL at 22:11

## 2022-04-26 RX ADMIN — GABAPENTIN 800 MG: 400 CAPSULE ORAL at 18:39

## 2022-04-26 RX ADMIN — METHYLPREDNISOLONE SODIUM SUCCINATE 125 MG: 125 INJECTION, POWDER, FOR SOLUTION INTRAMUSCULAR; INTRAVENOUS at 15:30

## 2022-04-26 RX ADMIN — ALBUTEROL SULFATE 2 PUFF: 90 AEROSOL, METERED RESPIRATORY (INHALATION) at 17:02

## 2022-04-26 RX ADMIN — AMITRIPTYLINE HYDROCHLORIDE 150 MG: 75 TABLET, FILM COATED ORAL at 21:27

## 2022-04-26 RX ADMIN — MORPHINE SULFATE 4 MG: 4 INJECTION INTRAVENOUS at 14:54

## 2022-04-26 RX ADMIN — IOHEXOL 45 ML: 350 INJECTION, SOLUTION INTRAVENOUS at 16:46

## 2022-04-26 RX ADMIN — OXYCODONE 5 MG: 5 TABLET ORAL at 21:27

## 2022-04-26 RX ADMIN — ALBUTEROL SULFATE 2.5 MG: 2.5 SOLUTION RESPIRATORY (INHALATION) at 20:17

## 2022-04-26 ASSESSMENT — ENCOUNTER SYMPTOMS
FOCAL WEAKNESS: 0
CHILLS: 1
EYE DISCHARGE: 0
SORE THROAT: 0
SINUS PAIN: 0
SPUTUM PRODUCTION: 0
WHEEZING: 0
HEADACHES: 0
NERVOUS/ANXIOUS: 0
INSOMNIA: 0
BLURRED VISION: 0
WEIGHT LOSS: 0
ABDOMINAL PAIN: 0
MYALGIAS: 0
SEIZURES: 0
NAUSEA: 0
BACK PAIN: 0
HEARTBURN: 0
BLOOD IN STOOL: 0
PALPITATIONS: 0
EYE PAIN: 0
DIZZINESS: 0
STRIDOR: 0
EYE REDNESS: 0
NECK PAIN: 0
FEVER: 1
CONSTIPATION: 0
ORTHOPNEA: 0
CHILLS: 0
DEPRESSION: 0
COUGH: 1
DIARRHEA: 0
VOMITING: 0
SHORTNESS OF BREATH: 1
CLAUDICATION: 0

## 2022-04-26 ASSESSMENT — LIFESTYLE VARIABLES: DO YOU DRINK ALCOHOL: NO

## 2022-04-26 ASSESSMENT — PAIN DESCRIPTION - PAIN TYPE
TYPE: ACUTE PAIN
TYPE: ACUTE PAIN

## 2022-04-26 ASSESSMENT — FIBROSIS 4 INDEX
FIB4 SCORE: 2.95
FIB4 SCORE: 5.48

## 2022-04-26 ASSESSMENT — PATIENT HEALTH QUESTIONNAIRE - PHQ9
2. FEELING DOWN, DEPRESSED, IRRITABLE, OR HOPELESS: NOT AT ALL
SUM OF ALL RESPONSES TO PHQ9 QUESTIONS 1 AND 2: 0
1. LITTLE INTEREST OR PLEASURE IN DOING THINGS: NOT AT ALL

## 2022-04-26 NOTE — ED NOTES
Pt. Report minimal relief from morphine admin. Dilaudid to be given. Resting in bed. Premedicated for CT (benadryl and Solu-Medrol). Monitors in place. VSS. Pt. Denies further needs.

## 2022-04-26 NOTE — ED PROVIDER NOTES
"ED Provider Note    Scribed for Ainsley Lewis M.D. by Teo Solano. 4/26/2022, 1:34 PM.    Primary care provider: Hanny Wills M.D.  Means of arrival: Wheel Chair  History obtained from: Patient  History limited by: None    CHIEF COMPLAINT  Chief Complaint   Patient presents with   • Chest Pain     Chest pressure X 2 days.  L sided 8/10 CP radiating to back, worse with coughing.\" \"feels like when I had pneumonia in 9/21.\" 9 x DVT, 2 x PE. 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 2 x DVT in 12/21.\" Taking eliquis as prescribed   • Shortness of Breath     Does not feel like when patient had PE's previously    • Leg Pain     LLE wound, non healing. Antibiotics completed 3 weeks ago   • Flu Like Symptoms     X 2 days       HPI  Benito Persaud is a 56 y.o. male who presents to the Emergency Department for acute chest pressure and shortness of breath. Patient states 2 days ago he began experiencing \"flu-like\" symptoms including chills and fever, as well as a pressure-like pain to his left chest and shortness of breath.  He reports fevers of up to 101.8 at home.  His chest pain peaked last night before gradually decreasing. He has a ongoing wound to he LLE which he states has still not healed completely but is mostly healing. Patient has a history of DVT, PE, and IVC filter.  He is taking Eliquis as he is supposed to.    Review of past medical records show: Patient was recently admitted at the end of March with a left lower extremity wound infection which grew out MRSA, and was treated with IV antibiotics and transitioned to doxycycline PO upon discharge. His blood sugars were also elevated during his stay and were managed.     REVIEW OF SYSTEMS  Review of Systems   Constitutional: Positive for chills and fever.   Respiratory: Positive for shortness of breath.    Cardiovascular: Positive for chest pain.   Skin:        Positive for wound to LLE.   All other systems reviewed " and are negative.    PAST MEDICAL HISTORY   has a past medical history of Asthma, Cancer (HCC) (11/01/2016), and MI (myocardial infarction) ([facility]) (2019).    SURGICAL HISTORY   has a past surgical history that includes cath removal (N/A, 8/14/2019).    SOCIAL HISTORY  Social History     Tobacco Use   • Smoking status: Never Smoker   • Smokeless tobacco: Former User   Vaping Use   • Vaping Use: Never used   Substance Use Topics   • Alcohol use: Not Currently   • Drug use: Not Currently      Social History     Substance and Sexual Activity   Drug Use Not Currently       FAMILY HISTORY  Family History   Problem Relation Age of Onset   • Cancer Mother    • Psychiatric Illness Mother    • Diabetes Mother    • Hypertension Mother    • Hyperlipidemia Mother    • Arterial Aneurysm Father    • Heart Disease Maternal Grandmother        CURRENT MEDICATIONS  Home Medications     Reviewed by Zeenat Sims (Pharmacy Tech) on 04/26/22 at 1540  Med List Status: Complete   Medication Last Dose Status   acetaminophen (TYLENOL) 500 MG Tab 4/25/2022 Active   amitriptyline (ELAVIL) 100 MG Tab 4/25/2022 Active   apixaban (ELIQUIS) 5mg Tab 4/26/2022 Active   Cholecalciferol (VITAMIN D3) 80702 units Cap 4/17/2022 Active   diphenhydrAMINE (BANOPHEN) 50 MG Cap 4/25/2022 Active   diphenhydrAMINE-zinc acetate (BENADRYL ITCH) cream 4/25/2022 Active   gabapentin (NEURONTIN) 800 MG tablet 4/26/2022 Active   ipratropium-albuterol (COMBIVENT RESPIMAT)  MCG/ACT Aero Soln 4/26/2022 Active   metFORMIN (GLUCOPHAGE) 500 MG Tab 4/26/2022 Active   omeprazole (PRILOSEC) 20 MG delayed-release capsule 4/26/2022 Active   zolpidem (AMBIEN) 10 MG Tab 4/25/2022 Active                 ALLERGIES  Allergies   Allergen Reactions   • Green Beans Anaphylaxis   • Iodine Anaphylaxis   • Keflex Diarrhea and Vomiting     Vomiting & Diarrhea  Tolerates ceftriaxone   • Reglan [Metoclopramide] Nausea     Asthma     • Shellfish Allergy Anaphylaxis   • Toradol  "Hives       PHYSICAL EXAM  VITAL SIGNS: /87   Pulse 100   Temp 36.8 °C (98.2 °F) (Temporal)   Resp 16   Ht 1.803 m (5' 11\")   Wt 123 kg (272 lb)   SpO2 96%   BMI 37.94 kg/m²   Vitals reviewed by myself.  Physical Exam  Nursing note and vitals reviewed.  Constitutional: Well-developed and well-nourished. No acute distress.   HENT: Head is normocephalic and atraumatic.  Posterior oropharynx is pink and moist without exudates  Eyes: extra-ocular movements intact  Cardiovascular: Regular rate and regular rhythm. No murmur heard.  Pulmonary/Chest: Hoarse voice, wheezing. No rales. Port to the left chest wall is clean and dry with no surrounding infection.  Abdominal: Soft and non-tender. No distention.    Musculoskeletal: Extremities exhibit normal range of motion without edema or tenderness.  Wound to the left lower extremity is healing with no evidence of acute infection.  Neurological: Awake and alert  Skin: Skin is warm and dry. No rash.       DIAGNOSTIC STUDIES  LABS  Labs Reviewed   CBC WITH DIFFERENTIAL - Abnormal; Notable for the following components:       Result Value    WBC 3.8 (*)     MCHC 33.5 (*)     Platelet Count 66 (*)     MPV 8.6 (*)     Neutrophils-Polys 39.00 (*)     Lymphocytes 41.80 (*)     Monocytes 14.50 (*)     Neutrophils (Absolute) 1.48 (*)     All other components within normal limits   COMP METABOLIC PANEL - Abnormal; Notable for the following components:    Sodium 134 (*)     Glucose 166 (*)     Alkaline Phosphatase 113 (*)     Globulin 4.1 (*)     All other components within normal limits   TROPONIN - Abnormal; Notable for the following components:    Troponin T 27 (*)     All other components within normal limits   COV-2, FLU A/B, AND RSV BY PCR ([facility])   ESTIMATED GFR   PROBRAIN NATRIURETIC PEPTIDE, NT   LACTIC ACID   PROCALCITONIN   BLOOD CULTURE    Narrative:     1 of 2 for Blood Culture x 2 sites order. Per Hospital  Policy: Only change Specimen Src: to \"Line\" if " "specified by  physician order.   BLOOD CULTURE    Narrative:     2 of 2 blood culture x2  Sites order. Per Hospital Policy:  Only change Specimen Src: to \"Line\" if specified by physician  order.   MRSA BY PCR (AMP)     All labs reviewed by me.    EKG Interpretation:  Interpreted by myself    12 Lead EKG interpreted by me to show:  EKG at 12:37 PM: Normal sinus tachycardia, heart rate 105, normal axis, normal intervals, , QRS 87, QTc 431, no acute ST-T segment changes, no evidence of acute arrhythmia or ischemia  My impression of this EKG: Does not indicate ischemia or arrhythmia at this time.    RADIOLOGY  CT-CTA CHEST PULMONARY ARTERY W/ RECONS   Final Result      1.  No evidence of pulmonary embolism.   2.  Evidence of prior granulomatous infection.   3.  Atherosclerosis including coronary artery calcifications   4.   Status post cholecystectomy            DX-CHEST-PORTABLE (1 VIEW)   Final Result      Mild enlargement of the cardiomediastinal silhouette with central pulmonary vascular congestion.        The radiologist's interpretation of all radiological studies have been reviewed by me.    REASSESSMENT    1:34 PM - Patient seen and examined at bedside. Discussed plan of care, including ordering labs and imaging. Patient agrees to the plan of care.      COURSE & MEDICAL DECISION MAKING  Nursing notes, VS, PMSFHx reviewed in chart.    Patient is a 56-year-old male who comes in for evaluation of chest pain, shortness of breath, cough and fevers.  Differential diagnosis includes viral syndrome, pneumonia, sepsis, acute coronary syndrome, arrhythmia, pulmonary embolism..  Diagnostic work-up includes labs, EKG, and CT of the chest.    Patient's initial vitals notable for tachycardia.  He was also having fever at home and appears slightly ill on exam.  Therefore he is empirically started on antibiotics for community-acquired pneumonia with ceftriaxone and azithromycin.  As patient also has a history of MRSA and " a port in his left chest wall I empirically started vancomycin.  Chest x-ray returns and demonstrates mild enlargement of the cardiomediastinal silhouette without evidence of acute lobar pneumonia.  EKG returns and demonstrates no evidence of acute rhythm or ischemia.  Labs returned and are notable for troponin of 27, he also has leukopenia.  Interestingly procalcitonin is within normal limits making bacterial infection less likely.  CT of the chest returns and demonstrates no evidence of pulmonary embolism, patient has prior granulomatous infection that is seen on the CT.  At this time symptoms are likely due to viral syndrome, given his elevated troponin and ongoing chest pain I will hospitalize him for ongoing cardiac work-up.  EKG is nonischemic at this time and troponin elevation is only mild, therefore no need to start heparin at this time.  Discussed the case with hospitalist Dr. Bhatti who has accepted patient for hospitalization.  Patient is in guarded condition.    DISPOSITION:  Patient will be hospitalized by Dr. Mills in guarded condition.      FINAL IMPRESSION  1. Viral syndrome    2. Chest pain, unspecified type          I, Teo Solano (Rajwinder), am scribing for, and in the presence of, Ainsley Lewis M.D..    Electronically signed by: Teo Solano (Rajwinder), 4/26/2022    I, Ainsley Lewis M.D. personally performed the services described in this documentation, as scribed by Teo Solano in my presence, and it is both accurate and complete.     The note accurately reflects work and decisions made by me.  Ainsley Lewis M.D.  4/26/2022  5:21 PM

## 2022-04-26 NOTE — ED TRIAGE NOTES
"Benito Sloan Hung  56 y.o. male  Chief Complaint   Patient presents with   • Chest Pain     Chest pressure X 2 days.  L sided 8/10 CP radiating to back, worse with coughing.\" \"feels like when I had pneumonia in 9/21.\" 9 x DVT, 2 x PE. 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 2 x DVT in 12/21.\" Taking eliquis as prescribed   • Shortness of Breath     Does not feel like when patient had PE's previously    • Leg Pain     LLE wound, non healing. Antibiotics completed 3 weeks ago   • Flu Like Symptoms     X 2 days       Pt to triage with in wheelchair for above complaint.     Pt is alert and oriented, speaking in full sentences, follows commands and responds appropriately to questions. Resp are even and unlabored.     SOB and CP protocol ordered. Charge RN aware of patient. Pt educated on triage process. Pt encouraged to alert staff for any changes. This RN masked and in appropriate PPE during encounter.     Vitals:    04/26/22 1234   BP: 158/84   Pulse: (!) 101   Resp: 16   Temp: 36.8 °C (98.2 °F)   SpO2: 93%     "

## 2022-04-27 ENCOUNTER — PATIENT OUTREACH (OUTPATIENT)
Dept: HEALTH INFORMATION MANAGEMENT | Facility: OTHER | Age: 57
End: 2022-04-27
Payer: MEDICAID

## 2022-04-27 PROBLEM — J96.01 ACUTE RESPIRATORY FAILURE WITH HYPOXIA (HCC): Status: ACTIVE | Noted: 2022-04-27

## 2022-04-27 PROBLEM — Z66 DNR (DO NOT RESUSCITATE): Status: ACTIVE | Noted: 2022-04-27

## 2022-04-27 LAB
ANION GAP SERPL CALC-SCNC: 12 MMOL/L (ref 7–16)
BUN SERPL-MCNC: 17 MG/DL (ref 8–22)
CALCIUM SERPL-MCNC: 8.3 MG/DL (ref 8.5–10.5)
CHLORIDE SERPL-SCNC: 100 MMOL/L (ref 96–112)
CO2 SERPL-SCNC: 20 MMOL/L (ref 20–33)
CREAT SERPL-MCNC: 1.15 MG/DL (ref 0.5–1.4)
EKG IMPRESSION: NORMAL
ERYTHROCYTE [DISTWIDTH] IN BLOOD BY AUTOMATED COUNT: 43.9 FL (ref 35.9–50)
GFR SERPLBLD CREATININE-BSD FMLA CKD-EPI: 75 ML/MIN/1.73 M 2
GLUCOSE BLD STRIP.AUTO-MCNC: 233 MG/DL (ref 65–99)
GLUCOSE BLD STRIP.AUTO-MCNC: 260 MG/DL (ref 65–99)
GLUCOSE BLD STRIP.AUTO-MCNC: 266 MG/DL (ref 65–99)
GLUCOSE BLD STRIP.AUTO-MCNC: 310 MG/DL (ref 65–99)
GLUCOSE BLD STRIP.AUTO-MCNC: 360 MG/DL (ref 65–99)
GLUCOSE SERPL-MCNC: 326 MG/DL (ref 65–99)
HCT VFR BLD AUTO: 46.4 % (ref 42–52)
HGB BLD-MCNC: 15.5 G/DL (ref 14–18)
MCH RBC QN AUTO: 29.9 PG (ref 27–33)
MCHC RBC AUTO-ENTMCNC: 33.4 G/DL (ref 33.7–35.3)
MCV RBC AUTO: 89.4 FL (ref 81.4–97.8)
PLATELET # BLD AUTO: 59 K/UL (ref 164–446)
PMV BLD AUTO: 8.9 FL (ref 9–12.9)
POTASSIUM SERPL-SCNC: 4.6 MMOL/L (ref 3.6–5.5)
RBC # BLD AUTO: 5.19 M/UL (ref 4.7–6.1)
SODIUM SERPL-SCNC: 132 MMOL/L (ref 135–145)
WBC # BLD AUTO: 2.1 K/UL (ref 4.8–10.8)

## 2022-04-27 PROCEDURE — 700111 HCHG RX REV CODE 636 W/ 250 OVERRIDE (IP): Performed by: STUDENT IN AN ORGANIZED HEALTH CARE EDUCATION/TRAINING PROGRAM

## 2022-04-27 PROCEDURE — 700111 HCHG RX REV CODE 636 W/ 250 OVERRIDE (IP): Performed by: INTERNAL MEDICINE

## 2022-04-27 PROCEDURE — 93010 ELECTROCARDIOGRAM REPORT: CPT | Performed by: INTERNAL MEDICINE

## 2022-04-27 PROCEDURE — A9270 NON-COVERED ITEM OR SERVICE: HCPCS | Performed by: HOSPITALIST

## 2022-04-27 PROCEDURE — 96376 TX/PRO/DX INJ SAME DRUG ADON: CPT

## 2022-04-27 PROCEDURE — A9270 NON-COVERED ITEM OR SERVICE: HCPCS | Performed by: STUDENT IN AN ORGANIZED HEALTH CARE EDUCATION/TRAINING PROGRAM

## 2022-04-27 PROCEDURE — 700101 HCHG RX REV CODE 250: Performed by: INTERNAL MEDICINE

## 2022-04-27 PROCEDURE — 99233 SBSQ HOSP IP/OBS HIGH 50: CPT | Performed by: STUDENT IN AN ORGANIZED HEALTH CARE EDUCATION/TRAINING PROGRAM

## 2022-04-27 PROCEDURE — 700102 HCHG RX REV CODE 250 W/ 637 OVERRIDE(OP): Performed by: HOSPITALIST

## 2022-04-27 PROCEDURE — 85027 COMPLETE CBC AUTOMATED: CPT

## 2022-04-27 PROCEDURE — A9270 NON-COVERED ITEM OR SERVICE: HCPCS | Performed by: INTERNAL MEDICINE

## 2022-04-27 PROCEDURE — 82962 GLUCOSE BLOOD TEST: CPT

## 2022-04-27 PROCEDURE — 700102 HCHG RX REV CODE 250 W/ 637 OVERRIDE(OP): Performed by: STUDENT IN AN ORGANIZED HEALTH CARE EDUCATION/TRAINING PROGRAM

## 2022-04-27 PROCEDURE — 770020 HCHG ROOM/CARE - TELE (206)

## 2022-04-27 PROCEDURE — 700102 HCHG RX REV CODE 250 W/ 637 OVERRIDE(OP): Performed by: INTERNAL MEDICINE

## 2022-04-27 PROCEDURE — 80048 BASIC METABOLIC PNL TOTAL CA: CPT

## 2022-04-27 PROCEDURE — 94640 AIRWAY INHALATION TREATMENT: CPT

## 2022-04-27 PROCEDURE — 96372 THER/PROPH/DIAG INJ SC/IM: CPT

## 2022-04-27 RX ORDER — OXYCODONE HYDROCHLORIDE 5 MG/1
5 TABLET ORAL
Status: DISCONTINUED | OUTPATIENT
Start: 2022-04-27 | End: 2022-04-29 | Stop reason: HOSPADM

## 2022-04-27 RX ORDER — ACETAMINOPHEN 500 MG
1000 TABLET ORAL EVERY 6 HOURS PRN
Status: DISCONTINUED | OUTPATIENT
Start: 2022-05-02 | End: 2022-04-29 | Stop reason: HOSPADM

## 2022-04-27 RX ORDER — GABAPENTIN 800 MG/1
TABLET ORAL
Qty: 90 TABLET | Refills: 3 | Status: SHIPPED | OUTPATIENT
Start: 2022-04-27 | End: 2022-08-31 | Stop reason: SDUPTHER

## 2022-04-27 RX ORDER — APIXABAN 5 MG/1
TABLET, FILM COATED ORAL
Qty: 60 TABLET | Refills: 3 | Status: SHIPPED | OUTPATIENT
Start: 2022-04-27 | End: 2022-09-09 | Stop reason: SDUPTHER

## 2022-04-27 RX ORDER — GUAIFENESIN 600 MG/1
1200 TABLET, EXTENDED RELEASE ORAL EVERY 12 HOURS
Status: DISCONTINUED | OUTPATIENT
Start: 2022-04-27 | End: 2022-04-29 | Stop reason: HOSPADM

## 2022-04-27 RX ORDER — OXYCODONE HYDROCHLORIDE 5 MG/1
2.5 TABLET ORAL
Status: DISCONTINUED | OUTPATIENT
Start: 2022-04-27 | End: 2022-04-29 | Stop reason: HOSPADM

## 2022-04-27 RX ORDER — INSULIN LISPRO 100 [IU]/ML
2-9 INJECTION, SOLUTION INTRAVENOUS; SUBCUTANEOUS
Status: DISCONTINUED | OUTPATIENT
Start: 2022-04-27 | End: 2022-04-28

## 2022-04-27 RX ORDER — INSULIN LISPRO 100 [IU]/ML
0.2 INJECTION, SOLUTION INTRAVENOUS; SUBCUTANEOUS
Status: DISCONTINUED | OUTPATIENT
Start: 2022-04-27 | End: 2022-04-28

## 2022-04-27 RX ORDER — IPRATROPIUM BROMIDE AND ALBUTEROL 20; 100 UG/1; UG/1
SPRAY, METERED RESPIRATORY (INHALATION)
Qty: 1 EACH | Refills: 5 | Status: SHIPPED | OUTPATIENT
Start: 2022-04-27 | End: 2022-06-28

## 2022-04-27 RX ORDER — DIPHENHYDRAMINE HCL 50 MG/1
CAPSULE ORAL
Qty: 30 CAPSULE | Refills: 2 | Status: SHIPPED | OUTPATIENT
Start: 2022-04-27 | End: 2022-06-28

## 2022-04-27 RX ORDER — ZOLPIDEM TARTRATE 10 MG/1
TABLET ORAL
Qty: 30 TABLET | Refills: 2 | OUTPATIENT
Start: 2022-04-27

## 2022-04-27 RX ORDER — PREDNISONE 20 MG/1
40 TABLET ORAL DAILY
Status: DISCONTINUED | OUTPATIENT
Start: 2022-04-28 | End: 2022-04-29

## 2022-04-27 RX ORDER — DEXTROSE MONOHYDRATE 25 G/50ML
25 INJECTION, SOLUTION INTRAVENOUS
Status: DISCONTINUED | OUTPATIENT
Start: 2022-04-27 | End: 2022-04-28

## 2022-04-27 RX ORDER — MORPHINE SULFATE 4 MG/ML
1 INJECTION INTRAVENOUS
Status: DISCONTINUED | OUTPATIENT
Start: 2022-04-27 | End: 2022-04-29 | Stop reason: HOSPADM

## 2022-04-27 RX ORDER — ACETAMINOPHEN 500 MG
1000 TABLET ORAL EVERY 6 HOURS
Status: DISCONTINUED | OUTPATIENT
Start: 2022-04-27 | End: 2022-04-29 | Stop reason: HOSPADM

## 2022-04-27 RX ORDER — AMITRIPTYLINE HYDROCHLORIDE 100 MG/1
150 TABLET ORAL NIGHTLY PRN
Qty: 30 TABLET | Refills: 3 | Status: SHIPPED | OUTPATIENT
Start: 2022-04-27 | End: 2022-06-28

## 2022-04-27 RX ADMIN — ZOLPIDEM TARTRATE 10 MG: 5 TABLET ORAL at 23:07

## 2022-04-27 RX ADMIN — ALBUTEROL SULFATE 2.5 MG: 2.5 SOLUTION RESPIRATORY (INHALATION) at 19:02

## 2022-04-27 RX ADMIN — GUAIFENESIN 1200 MG: 600 TABLET, EXTENDED RELEASE ORAL at 17:43

## 2022-04-27 RX ADMIN — INSULIN LISPRO 8 UNITS: 100 INJECTION, SOLUTION INTRAVENOUS; SUBCUTANEOUS at 11:28

## 2022-04-27 RX ADMIN — INSULIN LISPRO 5 UNITS: 100 INJECTION, SOLUTION INTRAVENOUS; SUBCUTANEOUS at 17:00

## 2022-04-27 RX ADMIN — ACETAMINOPHEN 1000 MG: 500 TABLET ORAL at 23:06

## 2022-04-27 RX ADMIN — OXYCODONE 5 MG: 5 TABLET ORAL at 13:36

## 2022-04-27 RX ADMIN — GABAPENTIN 800 MG: 400 CAPSULE ORAL at 14:58

## 2022-04-27 RX ADMIN — ALBUTEROL SULFATE 2.5 MG: 2.5 SOLUTION RESPIRATORY (INHALATION) at 22:39

## 2022-04-27 RX ADMIN — DIPHENHYDRAMINE HYDROCHLORIDE 50 MG: 25 TABLET ORAL at 20:43

## 2022-04-27 RX ADMIN — GABAPENTIN 800 MG: 400 CAPSULE ORAL at 08:07

## 2022-04-27 RX ADMIN — MORPHINE SULFATE 1 MG: 4 INJECTION INTRAVENOUS at 14:30

## 2022-04-27 RX ADMIN — OXYCODONE 5 MG: 5 TABLET ORAL at 08:48

## 2022-04-27 RX ADMIN — APIXABAN 5 MG: 5 TABLET, FILM COATED ORAL at 17:29

## 2022-04-27 RX ADMIN — MORPHINE SULFATE 1 MG: 4 INJECTION INTRAVENOUS at 10:18

## 2022-04-27 RX ADMIN — OXYCODONE 5 MG: 5 TABLET ORAL at 17:28

## 2022-04-27 RX ADMIN — TIOTROPIUM BROMIDE INHALATION SPRAY 5 MCG: 3.12 SPRAY, METERED RESPIRATORY (INHALATION) at 08:07

## 2022-04-27 RX ADMIN — INSULIN LISPRO 5 UNITS: 100 INJECTION, SOLUTION INTRAVENOUS; SUBCUTANEOUS at 11:28

## 2022-04-27 RX ADMIN — INSULIN HUMAN 4 UNITS: 100 INJECTION, SOLUTION PARENTERAL at 08:18

## 2022-04-27 RX ADMIN — GUAIFENESIN 1200 MG: 600 TABLET, EXTENDED RELEASE ORAL at 08:48

## 2022-04-27 RX ADMIN — OMEPRAZOLE 20 MG: 20 CAPSULE, DELAYED RELEASE ORAL at 05:51

## 2022-04-27 RX ADMIN — OXYCODONE 5 MG: 5 TABLET ORAL at 20:43

## 2022-04-27 RX ADMIN — OXYCODONE 5 MG: 5 TABLET ORAL at 05:54

## 2022-04-27 RX ADMIN — INSULIN GLARGINE-YFGN 25 UNITS: 100 INJECTION, SOLUTION SUBCUTANEOUS at 17:35

## 2022-04-27 RX ADMIN — APIXABAN 5 MG: 5 TABLET, FILM COATED ORAL at 05:51

## 2022-04-27 RX ADMIN — OMEPRAZOLE 20 MG: 20 CAPSULE, DELAYED RELEASE ORAL at 17:29

## 2022-04-27 RX ADMIN — PREDNISONE 60 MG: 10 TABLET ORAL at 05:51

## 2022-04-27 RX ADMIN — MORPHINE SULFATE 1 MG: 4 INJECTION INTRAVENOUS at 22:14

## 2022-04-27 RX ADMIN — INSULIN LISPRO 8 UNITS: 100 INJECTION, SOLUTION INTRAVENOUS; SUBCUTANEOUS at 17:00

## 2022-04-27 RX ADMIN — MORPHINE SULFATE 1 MG: 4 INJECTION INTRAVENOUS at 18:40

## 2022-04-27 RX ADMIN — INSULIN LISPRO 3 UNITS: 100 INJECTION, SOLUTION INTRAVENOUS; SUBCUTANEOUS at 21:40

## 2022-04-27 RX ADMIN — AMITRIPTYLINE HYDROCHLORIDE 150 MG: 75 TABLET, FILM COATED ORAL at 23:06

## 2022-04-27 RX ADMIN — ACETAMINOPHEN 1000 MG: 500 TABLET ORAL at 11:17

## 2022-04-27 RX ADMIN — GABAPENTIN 800 MG: 400 CAPSULE ORAL at 20:42

## 2022-04-27 RX ADMIN — ACETAMINOPHEN 1000 MG: 500 TABLET ORAL at 17:29

## 2022-04-27 SDOH — ECONOMIC STABILITY: FOOD INSECURITY: WITHIN THE PAST 12 MONTHS, THE FOOD YOU BOUGHT JUST DIDN'T LAST AND YOU DIDN'T HAVE MONEY TO GET MORE.: SOMETIMES TRUE

## 2022-04-27 SDOH — ECONOMIC STABILITY: FOOD INSECURITY: WITHIN THE PAST 12 MONTHS, YOU WORRIED THAT YOUR FOOD WOULD RUN OUT BEFORE YOU GOT MONEY TO BUY MORE.: SOMETIMES TRUE

## 2022-04-27 SDOH — ECONOMIC STABILITY: TRANSPORTATION INSECURITY
IN THE PAST 12 MONTHS, HAS THE LACK OF TRANSPORTATION KEPT YOU FROM MEDICAL APPOINTMENTS OR FROM GETTING MEDICATIONS?: YES

## 2022-04-27 SDOH — ECONOMIC STABILITY: TRANSPORTATION INSECURITY
IN THE PAST 12 MONTHS, HAS LACK OF TRANSPORTATION KEPT YOU FROM MEETINGS, WORK, OR FROM GETTING THINGS NEEDED FOR DAILY LIVING?: NO

## 2022-04-27 ASSESSMENT — PAIN DESCRIPTION - PAIN TYPE
TYPE: ACUTE PAIN

## 2022-04-27 ASSESSMENT — COGNITIVE AND FUNCTIONAL STATUS - GENERAL
SUGGESTED CMS G CODE MODIFIER MOBILITY: CK
SUGGESTED CMS G CODE MODIFIER DAILY ACTIVITY: CI
STANDING UP FROM CHAIR USING ARMS: A LITTLE
MOVING FROM LYING ON BACK TO SITTING ON SIDE OF FLAT BED: A LITTLE
CLIMB 3 TO 5 STEPS WITH RAILING: TOTAL
MOBILITY SCORE: 16
DAILY ACTIVITIY SCORE: 23
TOILETING: A LITTLE
WALKING IN HOSPITAL ROOM: TOTAL

## 2022-04-27 ASSESSMENT — ENCOUNTER SYMPTOMS
SINUS PAIN: 0
COUGH: 1
BLURRED VISION: 0
CONSTIPATION: 0
WEAKNESS: 1
WHEEZING: 0
SHORTNESS OF BREATH: 1
ABDOMINAL PAIN: 0
NAUSEA: 0
DIARRHEA: 0
DOUBLE VISION: 0
CHILLS: 1
MYALGIAS: 0
FOCAL WEAKNESS: 0
VOMITING: 0
SORE THROAT: 0
HEADACHES: 0
NERVOUS/ANXIOUS: 0
PALPITATIONS: 0
FEVER: 1
SPUTUM PRODUCTION: 1
DIZZINESS: 0

## 2022-04-27 ASSESSMENT — LIFESTYLE VARIABLES
HAVE YOU EVER FELT YOU SHOULD CUT DOWN ON YOUR DRINKING: NO
ON A TYPICAL DAY WHEN YOU DRINK ALCOHOL HOW MANY DRINKS DO YOU HAVE: 0
TOTAL SCORE: 0
AVERAGE NUMBER OF DAYS PER WEEK YOU HAVE A DRINK CONTAINING ALCOHOL: 0
EVER FELT BAD OR GUILTY ABOUT YOUR DRINKING: NO
EVER HAD A DRINK FIRST THING IN THE MORNING TO STEADY YOUR NERVES TO GET RID OF A HANGOVER: NO
TOTAL SCORE: 0
HAVE PEOPLE ANNOYED YOU BY CRITICIZING YOUR DRINKING: NO
CONSUMPTION TOTAL: NEGATIVE
ALCOHOL_USE: NO
DOES PATIENT WANT TO STOP DRINKING: NO
TOTAL SCORE: 0
HOW MANY TIMES IN THE PAST YEAR HAVE YOU HAD 5 OR MORE DRINKS IN A DAY: 0

## 2022-04-27 ASSESSMENT — SOCIAL DETERMINANTS OF HEALTH (SDOH): HOW HARD IS IT FOR YOU TO PAY FOR THE VERY BASICS LIKE FOOD, HOUSING, MEDICAL CARE, AND HEATING?: VERY HARD

## 2022-04-27 NOTE — PROGRESS NOTES
Patient has refused the bed alarm even after fall education had been provided. Patient states they tried to put the alarm on him during his stay last time but that he will not get up. Informed patient I will have to make note of this refusal for his chart for our records. Verbalizes understanding.

## 2022-04-27 NOTE — ASSESSMENT & PLAN NOTE
New onset diabetes mellitus with A1c of 7.8.  ISS with hypoglycemia protocol.  DM diet  Diabetes education consulted.  Adjusted insulin therapy for better glycemic control

## 2022-04-27 NOTE — ASSESSMENT & PLAN NOTE
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  Scheduled Tylenol, pain control, cough medications scheduled.  Continue steroid 40 mg daily.  RT with inhalers  Incentive spirometry  Wean oxygen as tolerated

## 2022-04-27 NOTE — ASSESSMENT & PLAN NOTE
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  Scheduled Tylenol, pain control, cough medications scheduled.  Continue steroid 40 mg daily.  incentive spirometry

## 2022-04-27 NOTE — PROGRESS NOTES
Farzad from Lab called with critical result of WBC 2.1 at 0359. Critical lab result read back to Farzad. Dr. Hui notified of critical lab result at 0418.  Critical lab result read back by Dr. Hui.

## 2022-04-27 NOTE — PROGRESS NOTES
Patient informed the MD has ordered blood sugar checks. Patient asks this nurse why and states he is not a diabetic. Patient education provided. Patient is upset that insulin is ordered and proceeds to tell this nurse that he does not take insulin at home. This nurse informs the patient of his right to refuse. The patient states that he will not take insulin and allows this nurse to check his blood sugar.

## 2022-04-27 NOTE — PROGRESS NOTES
"Hospital Medicine Daily Progress Note    Date of Service  4/27/2022    Chief Complaint  Benito Persaud is a 56 y.o. male admitted 4/26/2022 with pleuritic chest pain   Chest Pain       Chest pressure X 2 days.  L sided 8/10 CP radiating to back, worse with coughing.\" \"feels like when I had pneumonia in 9/21.\" 9 x DVT, 2 x PE. 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 2 x DVT in 12/21.\" Taking eliquis as prescribed   • Shortness of Breath       Does not feel like when patient had PE's previously    • Leg Pain       LLE wound, non healing. Antibiotics completed 3 weeks ago   • Flu Like Symptoms       X 2 days       Hospital Course  A 56 y.o. male with past medical history of bronchial asthma, came into ER with cough, shortness of breath, dyspnea on exertion and pleuritic chest pain for the past 2 days.  The patient denies any sick contact in the family.  She denies smoking history.  He complained about cough with sputum production.  In the ER the patient has CT PE past medical history of study done and it was negative.  COVID and influenza test were negative. procalcitonin was normal.      Interval Problem Update  Patient was seen and examined at bedside.  Complaining of 8 out of 10 pleuritic type chest pain, worsened with coughing.  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  Scheduled Tylenol, pain control, cough medications scheduled.  Continue steroid 40 mg daily.  New onset diabetes mellitus with A1c of 7.8.  ISS with hypoglycemia protocol.  DM diet  Diabetes education consulted.    I have personally seen and examined the patient at bedside. I discussed the plan of care with patient, bedside RN, charge RN,  and pharmacy.    Consultants/Specialty  N/A    Code Status  Full Code    Disposition  Patient is not medically cleared for discharge.   Anticipate discharge to to home with close outpatient follow-up.  I have placed the appropriate " orders for post-discharge needs.    Review of Systems  Review of Systems   Constitutional: Positive for chills, fever and malaise/fatigue.   HENT: Negative for congestion, ear discharge, ear pain, sinus pain and sore throat.    Eyes: Negative for blurred vision and double vision.   Respiratory: Positive for cough, sputum production and shortness of breath. Negative for wheezing.    Cardiovascular: Positive for chest pain. Negative for palpitations and leg swelling.   Gastrointestinal: Negative for abdominal pain, constipation, diarrhea, nausea and vomiting.   Genitourinary: Negative for dysuria, frequency and urgency.   Musculoskeletal: Negative for myalgias.   Neurological: Positive for weakness. Negative for dizziness, focal weakness and headaches.   Psychiatric/Behavioral: The patient is not nervous/anxious.         Physical Exam  Temp:  [35.8 °C (96.5 °F)-36.8 °C (98.2 °F)] 36.3 °C (97.3 °F)  Pulse:  [] 72  Resp:  [13-21] 20  BP: (113-158)/() 137/92  SpO2:  [88 %-96 %] 96 %    Physical Exam  Constitutional:       General: He is not in acute distress.     Appearance: He is obese.   HENT:      Head: Normocephalic and atraumatic.      Nose: Nose normal.      Mouth/Throat:      Mouth: Mucous membranes are moist.      Pharynx: No posterior oropharyngeal erythema.   Eyes:      General: No scleral icterus.     Extraocular Movements: Extraocular movements intact.      Conjunctiva/sclera: Conjunctivae normal.      Pupils: Pupils are equal, round, and reactive to light.   Cardiovascular:      Rate and Rhythm: Normal rate and regular rhythm.      Pulses: Normal pulses.      Heart sounds: Normal heart sounds. No murmur heard.    No gallop.   Pulmonary:      Effort: Pulmonary effort is normal.      Breath sounds: No stridor. Rales present. No wheezing or rhonchi.   Abdominal:      General: Bowel sounds are normal.      Palpations: Abdomen is soft.   Musculoskeletal:         General: No swelling or tenderness.       Cervical back: Normal range of motion and neck supple. No rigidity.   Skin:     General: Skin is warm.   Neurological:      General: No focal deficit present.      Mental Status: He is alert and oriented to person, place, and time.   Psychiatric:         Mood and Affect: Mood normal.         Behavior: Behavior normal.         Fluids    Intake/Output Summary (Last 24 hours) at 4/27/2022 1033  Last data filed at 4/27/2022 0824  Gross per 24 hour   Intake 740.1 ml   Output 775 ml   Net -34.9 ml       Laboratory  Recent Labs     04/26/22  1254 04/27/22  0310   WBC 3.8* 2.1*   RBC 5.52 5.19   HEMOGLOBIN 16.1 15.5   HEMATOCRIT 48.1 46.4   MCV 87.1 89.4   MCH 29.2 29.9   MCHC 33.5* 33.4*   RDW 44.2 43.9   PLATELETCT 66* 59*   MPV 8.6* 8.9*     Recent Labs     04/26/22  1254 04/27/22  0310   SODIUM 134* 132*   POTASSIUM 4.3 4.6   CHLORIDE 101 100   CO2 20 20   GLUCOSE 166* 326*   BUN 11 17   CREATININE 1.10 1.15   CALCIUM 9.0 8.3*                   Imaging  CT-CTA CHEST PULMONARY ARTERY W/ RECONS   Final Result      1.  No evidence of pulmonary embolism.   2.  Evidence of prior granulomatous infection.   3.  Atherosclerosis including coronary artery calcifications   4.   Status post cholecystectomy            DX-CHEST-PORTABLE (1 VIEW)   Final Result      Mild enlargement of the cardiomediastinal silhouette with central pulmonary vascular congestion.           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  Scheduled Tylenol, pain control, cough medications scheduled.  Continue steroid 40 mg daily.  incentive spirometry      DNR (do not resuscitate)  Assessment & Plan  On 4/27/2022 at 11:24 AM. Pt's code status changed to DNR/DNI per pt's wish.     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  Scheduled Tylenol, pain control, cough medications scheduled.  Continue steroid 40 mg daily.  RT with inhalers  Incentive spirometry  Wean oxygen as tolerated      Diabetes (HCC)- (present on admission)  Assessment & Plan  New onset diabetes mellitus with A1c of 7.8.  ISS with hypoglycemia protocol.  DM diet  Diabetes education consulted.      URI (upper respiratory infection)- (present on admission)  Assessment & Plan  Likely viral illness bronchitis  Supportive care  Pain control  procal negative    History of testicular cancer- (present on admission)  Assessment & Plan  To follow up with oncology as outpatient    Mild intermittent asthma without complication- (present on admission)  Assessment & Plan  Will provide nebulizer and inhalers  Also will start him on steroid       VTE prophylaxis: SCDs/TEDs and enoxaparin ppx    I have performed a physical exam and reviewed and updated ROS and Plan today (4/27/2022). In review of yesterday's note (4/26/2022), there are no changes except as documented above.

## 2022-04-27 NOTE — PROGRESS NOTES
"This nurse currently at bedside discussing night time medications with patient. Patient states he takes amitriptyline, ambien and baclofen all together. Discussed emergency department medications with pharmacist Scottie who said no harm would be done in spacing them out. Discussed emergency medications administered with the patient who states he still would like them all together. Requested that patient space them out a little tonight and patient got visibly agitated and states that this nurse is not the doctor. Patient told this nurse understands but I didn't want to cause any harm to the patient. Patient growing increasingly angry and frustrated. This nurse could not explain any further and the patient was told this nurse would get the charge nurse which in response the patient rolled his eyes and states \"why he is just going to tell the same thing. Patient picks up cellphone and dials a number requesting to speak with a doctor. This nurse speaks with charge nurse about current issue.    "

## 2022-04-27 NOTE — PROGRESS NOTES
Patient requested to speak with charge nurse who is now currently at bedside over diet. Charge nurse informed patient that outside food would possibly go against his diet. Both myself and charge nurse offered alternatives and patient refused. Education provided.

## 2022-04-27 NOTE — TELEPHONE ENCOUNTER
Informed patient of the refusal of ambien, patient expressed understanding and has an appointment scheduled next week.

## 2022-04-27 NOTE — PROGRESS NOTES
Report given to Elodia CARCAMO. Patient has grown increasingly agitated and after discussion with charge nurse Yong it was determined that this nurse would switch assignments with another nurse. Patient has verbally said he thinks it would be best. Elodia verbalizes understanding. Bedside shift report complete.

## 2022-04-27 NOTE — ASSESSMENT & PLAN NOTE
On 4/27/2022 at 11:24 AM. Pt's code status changed to DNR/DNI per pt's wish.   POLST form completed with the pt

## 2022-04-27 NOTE — PROGRESS NOTES
Patient arrives to telemetry floor with audible wheezes. RT breathing treatment requested. Patient agitated with transfer and would like to eat. Telemetry monitor intact, patient on 2L NC. Will continue to monitor.

## 2022-04-27 NOTE — H&P
"Hospital Medicine History & Physical Note    Date of Service  4/26/2022    Primary Care Physician  Hanny Wills M.D.    Consultants          Code Status  Full Code    Chief Complaint  Chief Complaint   Patient presents with   • Chest Pain     Chest pressure X 2 days.  L sided 8/10 CP radiating to back, worse with coughing.\" \"feels like when I had pneumonia in 9/21.\" 9 x DVT, 2 x PE. 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 2 x DVT in 12/21.\" Taking eliquis as prescribed   • Shortness of Breath     Does not feel like when patient had PE's previously    • Leg Pain     LLE wound, non healing. Antibiotics completed 3 weeks ago   • Flu Like Symptoms     X 2 days       History of Presenting Illness  Benito Persaud is a 56 y.o. male with past medical history of bronchial asthma, came into ER with cough, shortness of breath, dyspnea on exertion and pleuritic chest pain for the past 2 days.  The patient denies any sick contact in the family.  She denies smoking history.  He complained about cough with sputum production.  In the ER the patient has CT PE past medical history of study done and it was negative.  COVID and influenza test were negative. procalcitonin was normal.  He will be admitted for observation.  I discussed the plan of care with patient.    Review of Systems  Review of Systems   Constitutional: Positive for fever. Negative for chills and weight loss.   HENT: Negative for congestion, hearing loss, nosebleeds, sinus pain and sore throat.    Eyes: Negative for blurred vision, pain, discharge and redness.   Respiratory: Positive for cough and shortness of breath. Negative for sputum production, wheezing and stridor.    Cardiovascular: Positive for chest pain. Negative for palpitations, orthopnea and claudication.   Gastrointestinal: Negative for abdominal pain, blood in stool, constipation, diarrhea, heartburn, nausea and vomiting.   Genitourinary: Negative for dysuria, " frequency, hematuria and urgency.   Musculoskeletal: Negative for back pain, myalgias and neck pain.   Skin: Negative for itching and rash.   Neurological: Negative for dizziness, focal weakness, seizures and headaches.   Psychiatric/Behavioral: Negative for depression. The patient is not nervous/anxious and does not have insomnia.        Past Medical History   has a past medical history of Asthma, Cancer ([facility]) (11/01/2016), and MI (myocardial infarction) ([facility]) (2019).    Surgical History   has a past surgical history that includes cath removal (N/A, 8/14/2019).     Family History  family history includes Arterial Aneurysm in his father; Cancer in his mother; Diabetes in his mother; Heart Disease in his maternal grandmother; Hyperlipidemia in his mother; Hypertension in his mother; Psychiatric Illness in his mother.       Social History   reports that he has never smoked. He quit smokeless tobacco use about 3 years ago. He reports previous alcohol use. He reports previous drug use.    Allergies  Allergies   Allergen Reactions   • Green Beans Anaphylaxis   • Iodine Anaphylaxis   • Keflex Diarrhea and Vomiting     Vomiting & Diarrhea  Tolerates ceftriaxone   • Reglan [Metoclopramide] Nausea     Asthma     • Shellfish Allergy Anaphylaxis   • Toradol Hives       Medications  Prior to Admission Medications   Prescriptions Last Dose Informant Patient Reported? Taking?   Cholecalciferol (VITAMIN D3) 95837 units Cap 4/17/2022 at Q14D Patient Yes No   Sig: Take 1 Cap by mouth every 14 days.   acetaminophen (TYLENOL) 500 MG Tab 4/25/2022 at AM Patient Yes Yes   Sig: Take 500 mg by mouth every four hours as needed. Indications: Pain   amitriptyline (ELAVIL) 100 MG Tab 4/25/2022 at PM Patient No No   Sig: Take 1.5 Tablets by mouth at bedtime as needed for Sleep.   apixaban (ELIQUIS) 5mg Tab 4/26/2022 at AM Patient No No   Sig: Take 1 Tablet by mouth 2 times a day.   diphenhydrAMINE (BANOPHEN) 50 MG Cap 4/25/2022 at PM Patient  No No   Sig: Take 1 Capsule by mouth at bedtime as needed for Sleep.   diphenhydrAMINE-zinc acetate (BENADRYL ITCH) cream 4/25/2022 at PM Patient No No   Sig: Apply 1 Each topically 3 times a day as needed (Itching) for up to 30 days.   gabapentin (NEURONTIN) 800 MG tablet 4/26/2022 at NN Patient No No   Sig: Take 1 Tablet by mouth 3 times a day.   ipratropium-albuterol (COMBIVENT RESPIMAT)  MCG/ACT Aero Soln 4/26/2022 at NN Patient No No   Sig: Inhale 1 Puff 4 times a day.   metFORMIN (GLUCOPHAGE) 500 MG Tab 4/26/2022 at AM Patient No No   Sig: Take 1 Tablet by mouth 2 times a day with meals for 30 days.   omeprazole (PRILOSEC) 20 MG delayed-release capsule 4/26/2022 at AM Patient No No   Sig: Take 1 Capsule by mouth 2 times a day.   zolpidem (AMBIEN) 10 MG Tab 4/25/2022 at PM Patient Yes No   Sig: Take 10 mg by mouth at bedtime as needed for Sleep.      Facility-Administered Medications: None       Physical Exam  Temp:  [36.8 °C (98.2 °F)] 36.8 °C (98.2 °F)  Pulse:  [] 94  Resp:  [13-18] 17  BP: (113-158)/(71-87) 123/71  SpO2:  [90 %-96 %] 95 %  Blood Pressure: 123/71   Temperature: 36.8 °C (98.2 °F)   Pulse: 94   Respiration: 17   Pulse Oximetry: 95 %       Physical Exam  Vitals reviewed.   Constitutional:       General: He is not in acute distress.     Appearance: Normal appearance. He is normal weight. He is not ill-appearing, toxic-appearing or diaphoretic.   HENT:      Head: Normocephalic and atraumatic.      Right Ear: Tympanic membrane, ear canal and external ear normal.      Left Ear: Tympanic membrane, ear canal and external ear normal.      Nose: No congestion or rhinorrhea.   Eyes:      Extraocular Movements: Extraocular movements intact.      Conjunctiva/sclera: Conjunctivae normal.      Pupils: Pupils are equal, round, and reactive to light.   Cardiovascular:      Rate and Rhythm: Normal rate and regular rhythm.      Pulses: Normal pulses.      Heart sounds: Normal heart sounds. No murmur  heard.    No friction rub. No gallop.   Pulmonary:      Effort: Pulmonary effort is normal. No respiratory distress.      Breath sounds: No stridor. Wheezing present. No rhonchi.   Abdominal:      General: Bowel sounds are normal. There is no distension.      Palpations: Abdomen is soft. There is no mass.      Tenderness: There is no abdominal tenderness. There is no guarding or rebound.      Hernia: No hernia is present.   Musculoskeletal:         General: No swelling or tenderness.      Cervical back: Normal range of motion and neck supple.   Skin:     General: Skin is warm.      Findings: No erythema.   Neurological:      General: No focal deficit present.      Mental Status: He is alert and oriented to person, place, and time.         Laboratory:  Recent Labs     04/26/22  1254   WBC 3.8*   RBC 5.52   HEMOGLOBIN 16.1   HEMATOCRIT 48.1   MCV 87.1   MCH 29.2   MCHC 33.5*   RDW 44.2   PLATELETCT 66*   MPV 8.6*     Recent Labs     04/26/22  1254   SODIUM 134*   POTASSIUM 4.3   CHLORIDE 101   CO2 20   GLUCOSE 166*   BUN 11   CREATININE 1.10   CALCIUM 9.0     Recent Labs     04/26/22  1254   ALTSGPT 30   ASTSGOT 36   ALKPHOSPHAT 113*   TBILIRUBIN 0.7   GLUCOSE 166*         Recent Labs     04/26/22  1429   NTPROBNP 61         Recent Labs     04/26/22  1254   TROPONINT 27*       Imaging:  CT-CTA CHEST PULMONARY ARTERY W/ RECONS   Final Result      1.  No evidence of pulmonary embolism.   2.  Evidence of prior granulomatous infection.   3.  Atherosclerosis including coronary artery calcifications   4.   Status post cholecystectomy            DX-CHEST-PORTABLE (1 VIEW)   Final Result      Mild enlargement of the cardiomediastinal silhouette with central pulmonary vascular congestion.      NM-CARDIAC STRESS TEST    (Results Pending)       X-Ray:  I have personally reviewed the images and compared with prior images.  Cardiomegaly with bilateral vascular congestion  EKG , qtc 431, no acute st/t wave changes  noticed  Assessment/Plan:  I anticipate this patient is appropriate for observation status at this time.    * Chest pain- (present on admission)  Assessment & Plan  CTPE study negative for PE  Pleuritic in nature  Likely pleurisy related to viral illness      Diabetes (HCC)- (present on admission)  Assessment & Plan  SSI    URI (upper respiratory infection)- (present on admission)  Assessment & Plan  Likely viral illness bronchitis  Supportive care  Pain control  procal negative    History of testicular cancer- (present on admission)  Assessment & Plan  To follow up with oncology as outpatient    Mild intermittent asthma without complication- (present on admission)  Assessment & Plan  Will provide nebulizer and inhalers  Also will start him on steroid      VTE prophylaxis: enoxaparin ppx

## 2022-04-27 NOTE — PROGRESS NOTES
Monitor check. Per monitor room, monitor check called tech concern for asystole.  Monitor reviewed with tech activity seen in two leads then 38 seconds of asystole shown on monitor. Pt asymptomatic. Pt /101, 111, 16. POC glucose 360. Pt leads off. Leads adjusted and replaced.     Strip not populating in Epic. CC of strip printed to be scanned into chart.

## 2022-04-27 NOTE — PROGRESS NOTES
CHW Kayla introduced community care management. Pt states that he just moved into temporary housing (bridge house) with the help of the [facility]. Pt reports $0/ month income except for $200/month for SNAP benefits. Pt reports difficulty obtaining food. Pt utilizes his electric wheelchair and [employer] for transportation to appointments / grocery store. Pt has a scheduled PCP appointment on 5/4/2022.     Community Health Worker Intake  • Social determinates of health intake complete.  • Identified barriers to financial stability.  • Contact information provided to Benito Persaud   • Has PCP appointment scheduled for 5/4/2022  • Scheduled Food Delivery/Home Visit/Outpatient Visit: following discharge  • Accepted Meds-To-Beds.   • Inpatient assessment completed.    Plan: CHW has provided the information to [facility] to healthcare, the [facility], and senior services in [facility]. CHw will do a 1 time food delivery post DC from the hospital and will also provide 1 bus pass.

## 2022-04-27 NOTE — DISCHARGE PLANNING
CHW Kayla introduced community care management. Pt states that he just moved into temporary housing (bridge house) with the help of the [facility]. Pt reports $0/ month income except for $200/month for SNAP benefits. Pt reports difficulty obtaining food. Pt utilizes his electric wheelchair and [employer] for transportation to appointments / grocery store. Pt has a scheduled PCP appointment on 5/4/2022.     Community Health Worker Intake  • Social determinates of health intake complete.  • Identified barriers to financial stability.  • Contact information provided to Benito Persaud   • Has PCP appointment scheduled for 5/4/2022  • Scheduled Food Delivery/Home Visit/Outpatient Visit: following discharge  • Accepted Meds-To-Beds.   • Inpatient assessment completed.    Plan: CHW has provided the information to Access to healthcare, the [employer], and senior services in [facility]. CHw will do a 1 time food delivery post DC from the hospital and will also provide 1 bus pass.

## 2022-04-27 NOTE — PROGRESS NOTES
Bedside report received and patient care assumed. Pt is resting in bed, A&Ox4, with  complaints of 8/10 chest and left lower leg pain, and is on 2L NC. Tele box on. All fall precautions are in place, belongings at bedside table.  Pt was updated on POC, no questions or concerns. Pt educated on use of call light for assistance.

## 2022-04-27 NOTE — CARE PLAN
The patient is Stable - Low risk of patient condition declining or worsening    Shift Goals  Clinical Goals: Impliment isolation precautions, maintain and improve skin integrity, monior labs, monitor and control blood glucose  Patient Goals: Pain management, snacks  Family Goals: HANNA. No family present.    Progress made toward(s) clinical / shift goals:      Problem: Pain - Standard  Goal: Alleviation of pain or a reduction in pain to the patient’s comfort goal  Outcome: Progressing     Problem: Knowledge Deficit - Standard  Goal: Patient and family/care givers will demonstrate understanding of plan of care, disease process/condition, diagnostic tests and medications  Outcome: Progressing     Problem: Fall Risk  Goal: Patient will remain free from falls  Outcome: Progressing     Problem: Skin Integrity  Goal: Skin integrity is maintained or improved  Outcome: Progressing       Patient is not progressing towards the following goals: NA

## 2022-04-27 NOTE — PROGRESS NOTES
4 Eyes Skin Assessment Completed by CARLOS ALBERTO Lal and CARLOS ALBERTO Clifton.    Head WDL  Ears WDL  Nose WDL  Mouth WDL  Neck WDL  Breast/Chest WDL  Shoulder Blades WDL  Spine WDL  (R) Arm/Elbow/Hand WDL  (L) Arm/Elbow/Hand WDL  Abdomen WDL  Groin WDL  Scrotum/Coccyx/Buttocks WDL  (R) Leg WDL  (L) Leg Scab see photo uploaded  (R) Heel/Foot/Toe WDL  (L) Heel/Foot/Toe WDL          Devices In Places Pulse Ox and Nasal Cannula      Interventions In Place Pillows    Possible Skin Injury Yes    Pictures Uploaded Into Epic Yes  Wound Consult Placed N/A  RN Wound Prevention Protocol Ordered No

## 2022-04-28 LAB
ANION GAP SERPL CALC-SCNC: 13 MMOL/L (ref 7–16)
BASOPHILS # BLD AUTO: 0.2 % (ref 0–1.8)
BASOPHILS # BLD: 0.01 K/UL (ref 0–0.12)
BUN SERPL-MCNC: 24 MG/DL (ref 8–22)
CALCIUM SERPL-MCNC: 8.1 MG/DL (ref 8.5–10.5)
CHLORIDE SERPL-SCNC: 101 MMOL/L (ref 96–112)
CO2 SERPL-SCNC: 20 MMOL/L (ref 20–33)
CREAT SERPL-MCNC: 1.21 MG/DL (ref 0.5–1.4)
EOSINOPHIL # BLD AUTO: 0.01 K/UL (ref 0–0.51)
EOSINOPHIL NFR BLD: 0.2 % (ref 0–6.9)
ERYTHROCYTE [DISTWIDTH] IN BLOOD BY AUTOMATED COUNT: 43.8 FL (ref 35.9–50)
GFR SERPLBLD CREATININE-BSD FMLA CKD-EPI: 70 ML/MIN/1.73 M 2
GLUCOSE BLD STRIP.AUTO-MCNC: 133 MG/DL (ref 65–99)
GLUCOSE BLD STRIP.AUTO-MCNC: 245 MG/DL (ref 65–99)
GLUCOSE BLD STRIP.AUTO-MCNC: 269 MG/DL (ref 65–99)
GLUCOSE BLD STRIP.AUTO-MCNC: 325 MG/DL (ref 65–99)
GLUCOSE SERPL-MCNC: 377 MG/DL (ref 65–99)
HCT VFR BLD AUTO: 43.1 % (ref 42–52)
HGB BLD-MCNC: 14.4 G/DL (ref 14–18)
IMM GRANULOCYTES # BLD AUTO: 0.04 K/UL (ref 0–0.11)
IMM GRANULOCYTES NFR BLD AUTO: 0.8 % (ref 0–0.9)
LYMPHOCYTES # BLD AUTO: 1.15 K/UL (ref 1–4.8)
LYMPHOCYTES NFR BLD: 22.6 % (ref 22–41)
MCH RBC QN AUTO: 29.3 PG (ref 27–33)
MCHC RBC AUTO-ENTMCNC: 33.4 G/DL (ref 33.7–35.3)
MCV RBC AUTO: 87.8 FL (ref 81.4–97.8)
MONOCYTES # BLD AUTO: 0.45 K/UL (ref 0–0.85)
MONOCYTES NFR BLD AUTO: 8.9 % (ref 0–13.4)
NEUTROPHILS # BLD AUTO: 3.42 K/UL (ref 1.82–7.42)
NEUTROPHILS NFR BLD: 67.3 % (ref 44–72)
NRBC # BLD AUTO: 0 K/UL
NRBC BLD-RTO: 0 /100 WBC
PLATELET # BLD AUTO: 67 K/UL (ref 164–446)
PMV BLD AUTO: 8.9 FL (ref 9–12.9)
POTASSIUM SERPL-SCNC: 4 MMOL/L (ref 3.6–5.5)
RBC # BLD AUTO: 4.91 M/UL (ref 4.7–6.1)
SODIUM SERPL-SCNC: 134 MMOL/L (ref 135–145)
WBC # BLD AUTO: 5.1 K/UL (ref 4.8–10.8)

## 2022-04-28 PROCEDURE — 700111 HCHG RX REV CODE 636 W/ 250 OVERRIDE (IP): Performed by: STUDENT IN AN ORGANIZED HEALTH CARE EDUCATION/TRAINING PROGRAM

## 2022-04-28 PROCEDURE — 94760 N-INVAS EAR/PLS OXIMETRY 1: CPT

## 2022-04-28 PROCEDURE — 94640 AIRWAY INHALATION TREATMENT: CPT

## 2022-04-28 PROCEDURE — 770020 HCHG ROOM/CARE - TELE (206)

## 2022-04-28 PROCEDURE — 700102 HCHG RX REV CODE 250 W/ 637 OVERRIDE(OP): Performed by: HOSPITALIST

## 2022-04-28 PROCEDURE — 97162 PT EVAL MOD COMPLEX 30 MIN: CPT

## 2022-04-28 PROCEDURE — 82962 GLUCOSE BLOOD TEST: CPT

## 2022-04-28 PROCEDURE — 85025 COMPLETE CBC W/AUTO DIFF WBC: CPT

## 2022-04-28 PROCEDURE — A9270 NON-COVERED ITEM OR SERVICE: HCPCS | Performed by: INTERNAL MEDICINE

## 2022-04-28 PROCEDURE — A9270 NON-COVERED ITEM OR SERVICE: HCPCS | Performed by: STUDENT IN AN ORGANIZED HEALTH CARE EDUCATION/TRAINING PROGRAM

## 2022-04-28 PROCEDURE — 700101 HCHG RX REV CODE 250: Performed by: STUDENT IN AN ORGANIZED HEALTH CARE EDUCATION/TRAINING PROGRAM

## 2022-04-28 PROCEDURE — 700102 HCHG RX REV CODE 250 W/ 637 OVERRIDE(OP): Performed by: STUDENT IN AN ORGANIZED HEALTH CARE EDUCATION/TRAINING PROGRAM

## 2022-04-28 PROCEDURE — A9270 NON-COVERED ITEM OR SERVICE: HCPCS | Performed by: HOSPITALIST

## 2022-04-28 PROCEDURE — 99232 SBSQ HOSP IP/OBS MODERATE 35: CPT | Performed by: STUDENT IN AN ORGANIZED HEALTH CARE EDUCATION/TRAINING PROGRAM

## 2022-04-28 PROCEDURE — 80048 BASIC METABOLIC PNL TOTAL CA: CPT

## 2022-04-28 PROCEDURE — 700102 HCHG RX REV CODE 250 W/ 637 OVERRIDE(OP): Performed by: INTERNAL MEDICINE

## 2022-04-28 RX ORDER — ALBUTEROL SULFATE 2.5 MG/3ML
2.5 SOLUTION RESPIRATORY (INHALATION)
Status: DISCONTINUED | OUTPATIENT
Start: 2022-04-28 | End: 2022-04-29 | Stop reason: HOSPADM

## 2022-04-28 RX ORDER — ALBUTEROL SULFATE 2.5 MG/3ML
2.5 SOLUTION RESPIRATORY (INHALATION)
Status: DISCONTINUED | OUTPATIENT
Start: 2022-04-28 | End: 2022-04-28

## 2022-04-28 RX ORDER — DEXTROSE MONOHYDRATE 25 G/50ML
25 INJECTION, SOLUTION INTRAVENOUS
Status: DISCONTINUED | OUTPATIENT
Start: 2022-04-28 | End: 2022-04-29 | Stop reason: HOSPADM

## 2022-04-28 RX ORDER — INSULIN LISPRO 100 [IU]/ML
10 INJECTION, SOLUTION INTRAVENOUS; SUBCUTANEOUS
Status: DISCONTINUED | OUTPATIENT
Start: 2022-04-28 | End: 2022-04-29 | Stop reason: HOSPADM

## 2022-04-28 RX ORDER — INSULIN LISPRO 100 [IU]/ML
2-9 INJECTION, SOLUTION INTRAVENOUS; SUBCUTANEOUS
Status: DISCONTINUED | OUTPATIENT
Start: 2022-04-28 | End: 2022-04-29 | Stop reason: HOSPADM

## 2022-04-28 RX ADMIN — INSULIN LISPRO 10 UNITS: 100 INJECTION, SOLUTION INTRAVENOUS; SUBCUTANEOUS at 11:30

## 2022-04-28 RX ADMIN — OMEPRAZOLE 20 MG: 20 CAPSULE, DELAYED RELEASE ORAL at 05:08

## 2022-04-28 RX ADMIN — GUAIFENESIN 1200 MG: 600 TABLET, EXTENDED RELEASE ORAL at 17:40

## 2022-04-28 RX ADMIN — PREDNISONE 40 MG: 20 TABLET ORAL at 05:08

## 2022-04-28 RX ADMIN — INSULIN LISPRO 3 UNITS: 100 INJECTION, SOLUTION INTRAVENOUS; SUBCUTANEOUS at 17:46

## 2022-04-28 RX ADMIN — MORPHINE SULFATE 1 MG: 4 INJECTION INTRAVENOUS at 02:05

## 2022-04-28 RX ADMIN — OXYCODONE 5 MG: 5 TABLET ORAL at 20:11

## 2022-04-28 RX ADMIN — ALBUTEROL SULFATE 2.5 MG: 2.5 SOLUTION RESPIRATORY (INHALATION) at 07:55

## 2022-04-28 RX ADMIN — INSULIN LISPRO 5 UNITS: 100 INJECTION, SOLUTION INTRAVENOUS; SUBCUTANEOUS at 11:31

## 2022-04-28 RX ADMIN — ACETAMINOPHEN 1000 MG: 500 TABLET ORAL at 11:38

## 2022-04-28 RX ADMIN — APIXABAN 5 MG: 5 TABLET, FILM COATED ORAL at 17:40

## 2022-04-28 RX ADMIN — MORPHINE SULFATE 1 MG: 4 INJECTION INTRAVENOUS at 10:23

## 2022-04-28 RX ADMIN — OXYCODONE 5 MG: 5 TABLET ORAL at 15:04

## 2022-04-28 RX ADMIN — APIXABAN 5 MG: 5 TABLET, FILM COATED ORAL at 05:08

## 2022-04-28 RX ADMIN — TIOTROPIUM BROMIDE INHALATION SPRAY 5 MCG: 3.12 SPRAY, METERED RESPIRATORY (INHALATION) at 07:55

## 2022-04-28 RX ADMIN — GABAPENTIN 800 MG: 400 CAPSULE ORAL at 20:11

## 2022-04-28 RX ADMIN — OMEPRAZOLE 20 MG: 20 CAPSULE, DELAYED RELEASE ORAL at 17:40

## 2022-04-28 RX ADMIN — OXYCODONE 5 MG: 5 TABLET ORAL at 04:12

## 2022-04-28 RX ADMIN — OXYCODONE 5 MG: 5 TABLET ORAL at 00:53

## 2022-04-28 RX ADMIN — ACETAMINOPHEN 1000 MG: 500 TABLET ORAL at 05:07

## 2022-04-28 RX ADMIN — GABAPENTIN 800 MG: 400 CAPSULE ORAL at 10:18

## 2022-04-28 RX ADMIN — DIPHENHYDRAMINE HYDROCHLORIDE 50 MG: 25 TABLET ORAL at 20:11

## 2022-04-28 RX ADMIN — INSULIN LISPRO 6 UNITS: 100 INJECTION, SOLUTION INTRAVENOUS; SUBCUTANEOUS at 07:00

## 2022-04-28 RX ADMIN — MORPHINE SULFATE 1 MG: 4 INJECTION INTRAVENOUS at 16:38

## 2022-04-28 RX ADMIN — OXYCODONE 5 MG: 5 TABLET ORAL at 07:54

## 2022-04-28 RX ADMIN — GUAIFENESIN 1200 MG: 600 TABLET, EXTENDED RELEASE ORAL at 05:08

## 2022-04-28 RX ADMIN — ACETAMINOPHEN 1000 MG: 500 TABLET ORAL at 17:40

## 2022-04-28 RX ADMIN — MORPHINE SULFATE 1 MG: 4 INJECTION INTRAVENOUS at 21:15

## 2022-04-28 RX ADMIN — GABAPENTIN 800 MG: 400 CAPSULE ORAL at 15:04

## 2022-04-28 ASSESSMENT — PAIN SCALES - PAIN ASSESSMENT IN ADVANCED DEMENTIA (PAINAD): BODYLANGUAGE: TENSE, DISTRESSED PACING, FIDGETING

## 2022-04-28 ASSESSMENT — ENCOUNTER SYMPTOMS
CONSTIPATION: 0
MYALGIAS: 0
VOMITING: 0
COUGH: 1
HEADACHES: 0
SORE THROAT: 0
WEAKNESS: 1
FEVER: 1
NERVOUS/ANXIOUS: 0
WHEEZING: 0
SPUTUM PRODUCTION: 1
DOUBLE VISION: 0
CHILLS: 1
ABDOMINAL PAIN: 0
FOCAL WEAKNESS: 0
NAUSEA: 0
DIZZINESS: 0
BLURRED VISION: 0
DIARRHEA: 0
PALPITATIONS: 0
SHORTNESS OF BREATH: 1
SINUS PAIN: 0

## 2022-04-28 ASSESSMENT — PAIN DESCRIPTION - PAIN TYPE
TYPE: ACUTE PAIN

## 2022-04-28 ASSESSMENT — FIBROSIS 4 INDEX: FIB4 SCORE: 5.4

## 2022-04-28 ASSESSMENT — COGNITIVE AND FUNCTIONAL STATUS - GENERAL
SUGGESTED CMS G CODE MODIFIER MOBILITY: CK
MOBILITY SCORE: 17
WALKING IN HOSPITAL ROOM: TOTAL
CLIMB 3 TO 5 STEPS WITH RAILING: TOTAL
MOVING FROM LYING ON BACK TO SITTING ON SIDE OF FLAT BED: A LITTLE

## 2022-04-28 ASSESSMENT — GAIT ASSESSMENTS: GAIT LEVEL OF ASSIST: UNABLE TO PARTICIPATE

## 2022-04-28 NOTE — PROGRESS NOTES
Bedside report received and patient care assumed. Pt is resting in bed, A&Ox4, with complaints of pain 6/10, pt made aware pain medications can be given at 2030, and is on 2L NC. Tele box on. All fall precautions are in place, belongings at bedside table.  Pt was updated on POC, no questions or concerns. Pt educated on use of call light for assistance.

## 2022-04-28 NOTE — PROGRESS NOTES
Diabetes education: Met with pt briefly this afternoon. Please see consult note.  Plan: Pt will need insulin instruction and finger sticks if needing insulin at discharge and pt willing to have this education. Please when pt agrees, have him give his insulin and do finger sticks  ( unless he was doing finger sticks prior to admit). CDE will continue to follow. Please send text via Voalte if needs change.

## 2022-04-28 NOTE — CARE PLAN
The patient is Stable - Low risk of patient condition declining or worsening    Shift Goals  Clinical Goals: Wean O2, pain management, monitor BG  Patient Goals: Discharge  Family Goals: HANNA    Progress made toward(s) clinical / shift goals: Oxygen down to 1L, will further wean as able.       Problem: Pain - Standard  Goal: Alleviation of pain or a reduction in pain to the patient’s comfort goal  Outcome: Progressing     Problem: Knowledge Deficit - Standard  Goal: Patient and family/care givers will demonstrate understanding of plan of care, disease process/condition, diagnostic tests and medications  Outcome: Progressing     Problem: Fall Risk  Goal: Patient will remain free from falls  Outcome: Progressing     Problem: Skin Integrity  Goal: Skin integrity is maintained or improved  Outcome: Progressing

## 2022-04-28 NOTE — PROGRESS NOTES
"Hospital Medicine Daily Progress Note    Date of Service  4/28/2022    Chief Complaint  Benito Persaud is a 56 y.o. male admitted 4/26/2022 with pleuritic chest pain   Chest Pain       Chest pressure X 2 days.  L sided 8/10 CP radiating to back, worse with coughing.\" \"feels like when I had pneumonia in 9/21.\" 9 x DVT, 2 x PE. 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 2 x DVT in 12/21.\" Taking eliquis as prescribed   • Shortness of Breath       Does not feel like when patient had PE's previously    • Leg Pain       LLE wound, non healing. Antibiotics completed 3 weeks ago   • Flu Like Symptoms       X 2 days       Hospital Course  A 56 y.o. male with past medical history of bronchial asthma, came into ER with cough, shortness of breath, dyspnea on exertion and pleuritic chest pain for the past 2 days.  The patient denies any sick contact in the family.  She denies smoking history.  He complained about cough with sputum production.  In the ER the patient has CT PE past medical history of study done and it was negative.  COVID and influenza test were negative. procalcitonin was normal.      Interval Problem Update  Patient was seen and examined at bedside.  Complaining of 5-6 out of 10 pleuritic type chest pain, worsened with coughing.  Patient stated that he is feeling better compared to yesterday.  On 2L O2.  Patient is satting 89 to 91% on room air during a.m. rounds  Wean O2 as tolerated.  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  Scheduled Tylenol, pain control, cough medications scheduled.  Continue steroid 40 mg daily.  New onset diabetes mellitus with A1c of 7.8.  ISS with hypoglycemia protocol.  DM diet  Diabetes education consulted.  Adjusted insulin therapy for better glycemic control    I have personally seen and examined the patient at bedside. I discussed the plan of care with patient, bedside RN, charge RN,  and " pharmacy.    Consultants/Specialty  N/A    Code Status  DNAR/DNI    Disposition  Patient is not medically cleared for discharge.   Anticipate discharge to to home with close outpatient follow-up.  I have placed the appropriate orders for post-discharge needs.    Review of Systems  Review of Systems   Constitutional: Positive for chills, fever and malaise/fatigue.   HENT: Negative for congestion, ear discharge, ear pain, sinus pain and sore throat.    Eyes: Negative for blurred vision and double vision.   Respiratory: Positive for cough, sputum production and shortness of breath. Negative for wheezing.    Cardiovascular: Positive for chest pain. Negative for palpitations and leg swelling.   Gastrointestinal: Negative for abdominal pain, constipation, diarrhea, nausea and vomiting.   Genitourinary: Negative for dysuria, frequency and urgency.   Musculoskeletal: Negative for myalgias.   Neurological: Positive for weakness. Negative for dizziness, focal weakness and headaches.   Psychiatric/Behavioral: The patient is not nervous/anxious.         Physical Exam  Temp:  [36.1 °C (97 °F)-36.6 °C (97.9 °F)] 36.1 °C (97 °F)  Pulse:  [] 95  Resp:  [16-20] 16  BP: (127-157)/(81-96) 157/95  SpO2:  [91 %-99 %] 96 %    Physical Exam  Constitutional:       General: He is not in acute distress.     Appearance: He is obese.   HENT:      Head: Normocephalic and atraumatic.      Nose: Nose normal.      Mouth/Throat:      Mouth: Mucous membranes are moist.      Pharynx: No posterior oropharyngeal erythema.   Eyes:      General: No scleral icterus.     Extraocular Movements: Extraocular movements intact.      Conjunctiva/sclera: Conjunctivae normal.      Pupils: Pupils are equal, round, and reactive to light.   Cardiovascular:      Rate and Rhythm: Normal rate and regular rhythm.      Pulses: Normal pulses.      Heart sounds: Normal heart sounds. No murmur heard.    No gallop.      Comments: Chemo-Port on left upper chest    Pulmonary:      Effort: Pulmonary effort is normal.      Breath sounds: No stridor. Rales present. No wheezing or rhonchi.   Abdominal:      General: Bowel sounds are normal.      Palpations: Abdomen is soft.   Musculoskeletal:         General: No swelling or tenderness.      Cervical back: Normal range of motion and neck supple. No rigidity.   Skin:     General: Skin is warm.   Neurological:      General: No focal deficit present.      Mental Status: He is alert and oriented to person, place, and time.   Psychiatric:         Mood and Affect: Mood normal.         Behavior: Behavior normal.         Fluids    Intake/Output Summary (Last 24 hours) at 4/28/2022 1020  Last data filed at 4/27/2022 2030  Gross per 24 hour   Intake 150 ml   Output 300 ml   Net -150 ml       Laboratory  Recent Labs     04/26/22  1254 04/27/22  0310 04/28/22  0415   WBC 3.8* 2.1* 5.1   RBC 5.52 5.19 4.91   HEMOGLOBIN 16.1 15.5 14.4   HEMATOCRIT 48.1 46.4 43.1   MCV 87.1 89.4 87.8   MCH 29.2 29.9 29.3   MCHC 33.5* 33.4* 33.4*   RDW 44.2 43.9 43.8   PLATELETCT 66* 59* 67*   MPV 8.6* 8.9* 8.9*     Recent Labs     04/26/22  1254 04/27/22  0310 04/28/22  0415   SODIUM 134* 132* 134*   POTASSIUM 4.3 4.6 4.0   CHLORIDE 101 100 101   CO2 20 20 20   GLUCOSE 166* 326* 377*   BUN 11 17 24*   CREATININE 1.10 1.15 1.21   CALCIUM 9.0 8.3* 8.1*                   Imaging  CT-CTA CHEST PULMONARY ARTERY W/ RECONS   Final Result      1.  No evidence of pulmonary embolism.   2.  Evidence of prior granulomatous infection.   3.  Atherosclerosis including coronary artery calcifications   4.   Status post cholecystectomy            DX-CHEST-PORTABLE (1 VIEW)   Final Result      Mild enlargement of the cardiomediastinal silhouette with central pulmonary vascular congestion.           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  Scheduled Tylenol, pain control, cough medications scheduled.  Continue steroid 40 mg daily.  incentive spirometry      DNR (do not resuscitate)  Assessment & Plan  On 4/27/2022 at 11:24 AM. Pt's code status changed to DNR/DNI per pt's wish.   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  Scheduled Tylenol, pain control, cough medications scheduled.  Continue steroid 40 mg daily.  RT with inhalers  Incentive spirometry  Wean oxygen as tolerated      Diabetes (HCC)- (present on admission)  Assessment & Plan  New onset diabetes mellitus with A1c of 7.8.  ISS with hypoglycemia protocol.  DM diet  Diabetes education consulted.  Adjusted insulin therapy for better glycemic control          URI (upper respiratory infection)- (present on admission)  Assessment & Plan  Likely viral illness bronchitis  Supportive care  Pain control  procal negative    History of testicular cancer- (present on admission)  Assessment & Plan  To follow up with oncology as outpatient    Mild intermittent asthma without complication- (present on admission)  Assessment & Plan  Will provide nebulizer and inhalers  Also will start him on steroid       VTE prophylaxis: SCDs/TEDs and enoxaparin ppx    I have performed a physical exam and reviewed and updated ROS and Plan today (4/28/2022). In review of yesterday's note (4/27/2022), there are no changes except as documented above.

## 2022-04-28 NOTE — PROGRESS NOTES
END OF SHIFT    Pt's code status changed to DNR/DNI today.  Pt is wheelchair bound - wheelchair in room.  Oxycodone x3 & morphine x3 given for pleuritic chest pain. Pt states pain medicine only helps a little bit.  pt AAOx4. pt resting, NADN. bed in lowest position & call light within reach.    Vitals:    04/27/22 1553   BP: 137/86   Pulse: 74   Resp: 20   Temp: 36.2 °C (97.2 °F)   SpO2: 95%     Apoorva Orosco RN

## 2022-04-28 NOTE — DOCUMENTATION QUERY
"                                                                         [facility]                                                                       Query Response Note      PATIENT:               ZINA SALGUERO  ACCT #:                  [id number]  MRN:                     [id number]  :                      1965  ADMIT DATE:       2022 12:57 PM  DISCH DATE:          RESPONDING  PROVIDER #:        [id number]           QUERY TEXT:    Based on your medical judgment, can you please clarify in the medical record whether the acute respiratory failure with hypoxia is confirmed?        The patient's Clinical Indicators include:  The diagnosis of acute respiratory failure with is documented in the progress note with documentation of that the patient is \"not in acute distress,\" \"pulmonary effort is normal,\" and \"no stridor.\" There is no documentation of high flow oxygen use nor was an ABG done.    : 90% RA, 90-95% O2 2L NC  : 88-99% 2L NC  Options provided:   -- No, acute respiratory failure hypoxia is confirmed in the absence of acute respiratory distress.   -- Yes, acute respiratory failure is being treated. (PLEASE PROVIDE RELEVANT CLINICAL INDICATORS TO SUPPORT DIAGNOSIS)   -- Other diagnosis   -- Clinically Insignificant Findings   -- Clinically Unable to Determine      Query created by: Asia Aguilar on 2022 8:26 AM    RESPONSE TEXT:    Yes, acute respiratory failure is being treated. (PLEASE PROVIDE RELEVANT CLINICAL INDICATORS TO SUPPORT DIAGNOSIS)          Electronically signed by:  PEBBLES HORTON MD 2022 9:09 AM              "

## 2022-04-28 NOTE — PROGRESS NOTES
Assumed pt care at shift change, report received from Kim CARCAMO. Pt A&O x4, c/o 7/10 pain, medicated per MAR.  Updated on POC, communication board updated. Pt eager to DC home, attempted to leave unit, educated on discharge process and that he has to be seen by MD and then will talk DC, pt verbalizes understanding. Bed locked and in lowest position. Call light and belongings within reach. Non-skid socks in place. Needs met, will continue to monitor.

## 2022-04-28 NOTE — THERAPY
Physical Therapy   Initial Evaluation     Patient Name: Benito Persaud  Age:  56 y.o., Sex:  male  Medical Record #: [id number]  Today's Date: 4/28/2022     Precautions  Precautions: Other (See Comments)  Comments: Standard precautions    Assessment  Patient is 56 y.o. male presenting for chest pain and SOB 2/2 upper respiratory infection and a PMH of DM, testicular CA, and PE w/ IVC filter placed 5/21.  He lives alone in a 3rd story apt w/ an elevator to enter, and reports having friends available who can assist on occasion if needed (see flowsheet for home set up and PLOF). Currenty, the pt presents w/ baseline functional independence and reports no concerns for completion of functional mobility tasks at home.  He demo's bed mobility spv w/ hob flat and no bed rails, along w/ SPT eob<>power wc spv w/ BUE support on armrests and/or bed rails.  Recommend pt dc home and continue to f/u w/ OP PT for higher level gait and balance deficits given baseline functional mobility demo'd by the pt at this time.  Will not follow for acute PT needs, please re consult should there be a change in the pt's condition.      Plan    Recommend Physical Therapy for Evaluation only     DC Equipment Recommendations: None  Discharge Recommendations: Recommend outpatient physical therapy services to address higher level deficits       Subjective/Objective       04/28/22 1314   Prior Living Situation   Prior Services Home-Independent   Housing / Facility 1 Story Apartment / Condo  (3rd level)   Steps Into Home 0   Steps In Home 0   Elevator Yes   Equipment Owned Wheelchair;Power Wheel Chair   Lives with - Patient's Self Care Capacity Alone and Able to Care For Self   Comments pt lives alone, independent w/ mobility tasks, ADL's and IADL's however reports friends available to assist on occasion if needed   Prior Level of Functional Mobility   Bed Mobility Independent   Transfer Status Independent   Ambulation Other (Comments)  (pt has not  been ambulatory >1yr)   Assistive Devices Used Power Wheel Chair  (wc in home, power chair in community)   Wheelchair Independent   History of Falls   History of Falls No   Date of Last Fall   (pt denies any recent falls)   Cognition    Cognition / Consciousness WDL   Level of Consciousness Alert   Comments pt pleasant and cooperative   Passive ROM Lower Body   Passive ROM Lower Body WDL   Active ROM Lower Body    Active ROM Lower Body  WDL   Comments observed during functional mobility   Strength Lower Body   Lower Body Strength  X   Comments generalized weakness BLE, pt reporting baseline   Sensation Lower Body   Lower Extremity Sensation   WDL   Comments sensation intact to LT/DP BLE   Coordination Lower Body    Coordination Lower Body  X   Toe Tapping Right Impaired   Toe Tapping Left Impaired   Balance Assessment   Sitting Balance (Static) Fair +   Sitting Balance (Dynamic) Fair +   Standing Balance (Static) Fair   Standing Balance (Dynamic) Fair   Weight Shift Sitting Good   Weight Shift Standing Fair   Comments stand w/ BUE support   Gait Analysis   Gait Level Of Assist Unable to Participate   Weight Bearing Status no restrictions   Comments pt non-ambulatory >1yr   Bed Mobility    Supine to Sit Supervised   Sit to Supine Supervised   Scooting Supervised   Comments hob flat, no bed rails   Functional Mobility   Sit to Stand Supervised   Bed, Chair, Wheelchair Transfer Supervised   Transfer Method Stand Pivot   Mobility eob<>power wc w/ BUE support on armrest and/or bed rail   Activity Tolerance   Sitting in Chair 3min   Sitting Edge of Bed 10min   Standing <1min   Edema / Skin Assessment   Edema / Skin  Not Assessed   Education Group   Education Provided Role of Physical Therapist   Role of Physical Therapist Patient Response Patient;Acceptance;Explanation;Demonstration;Action Demonstration   Additional Comments pt receptive of [facility] provided   Problem List    Problems None   Session Information   Date /  Session Number  4/28- eval only

## 2022-04-28 NOTE — CONSULTS
"Diabetes education: Per chart and last visit, pt has a hx of diabetes ( supposed to be on Metformin). Per pt he does not have diabetes and blood sugars are usually in the low 100's. Pt was admitted with blood sugar of 320 and Hg a1c of 7.8% from 3/25/22. Pt is currently on Prednisone 40 mg daily to start tomorrow ( down from 60 mg x 1, 4/27 and Solumedrol 125 mg on 4/26) as well as Glargine 25 units pm, first dose given this pm , Lispro 8 units tid meals and Lispro per sliding scale coverage ac and hs. Blood sugars are 266 ( 5 + 8 units), and 260 ( 5 + 8 units).  Met with pt briefly as he does not feel he needs diabetes education, agrees to take the insulin as he knows the steroids are making his blood sugars high and he knows he needs them for his\"breathing\". Pt understands he may need insulin at discharge if he still is using the steroids and agrees to have education at that time prior to discharge.  Plan: Pt will need insulin instruction and finger sticks if needing insulin at discharge and pt willing to have this education. Please when pt agrees, have him give his insulin and do finger sticks  ( unless he was doing finger sticks prior to admit). CDE will continue to follow. Please send text via Voalte if needs change.      Previous education:     Denisse Krishnamurthy R.N.   Diabetes Health Educator      Consults       Signed   Date of Service:  3/28/2022  6:19 PM                 Diabetes education: Met with pt this afternoon before discharge. Pt was newly dx with diabetes, admitted with blood sugar of 526 and Hg a1c of 7.8%.  Pt was not on diabetes medications, nor finger sticks but glucose readings were 123, 121, and 138.  Reviewed diabetes, hyperglycemia, need to eat protein and controlled carbs with every meal, metformin and how to take it. Discussed checking blood sugars briefly as well as following up with PCP.  Pt was given a [facility] DM book and questions answered.             "

## 2022-04-28 NOTE — DISCHARGE PLANNING
Anticipated Discharge Disposition: Home with close outpatient f/u     Action: pt pending medical clearance, pt currently on 2 liters O2, 6 clicks are 16/23. Orientation: A&Ox4.      Barriers to Discharge: medical clearance, O2 requirements, [place] Medicaid     Plan: f/u with pt and medical team to discuss dc needs and barriers.

## 2022-04-29 ENCOUNTER — PHARMACY VISIT (OUTPATIENT)
Dept: PHARMACY | Facility: MEDICAL CENTER | Age: 57
End: 2022-04-29
Payer: COMMERCIAL

## 2022-04-29 VITALS
BODY MASS INDEX: 38.3 KG/M2 | WEIGHT: 273.59 LBS | SYSTOLIC BLOOD PRESSURE: 136 MMHG | DIASTOLIC BLOOD PRESSURE: 88 MMHG | RESPIRATION RATE: 20 BRPM | TEMPERATURE: 97.6 F | HEIGHT: 71 IN | OXYGEN SATURATION: 99 % | HEART RATE: 65 BPM

## 2022-04-29 PROBLEM — J96.01 ACUTE RESPIRATORY FAILURE WITH HYPOXIA (HCC): Status: RESOLVED | Noted: 2022-04-27 | Resolved: 2022-04-29

## 2022-04-29 PROBLEM — J06.9 URI (UPPER RESPIRATORY INFECTION): Status: RESOLVED | Noted: 2021-12-15 | Resolved: 2022-04-29

## 2022-04-29 LAB — GLUCOSE BLD STRIP.AUTO-MCNC: 245 MG/DL (ref 65–99)

## 2022-04-29 PROCEDURE — 82962 GLUCOSE BLOOD TEST: CPT

## 2022-04-29 PROCEDURE — A9270 NON-COVERED ITEM OR SERVICE: HCPCS | Performed by: INTERNAL MEDICINE

## 2022-04-29 PROCEDURE — 700102 HCHG RX REV CODE 250 W/ 637 OVERRIDE(OP): Performed by: STUDENT IN AN ORGANIZED HEALTH CARE EDUCATION/TRAINING PROGRAM

## 2022-04-29 PROCEDURE — 700111 HCHG RX REV CODE 636 W/ 250 OVERRIDE (IP): Performed by: STUDENT IN AN ORGANIZED HEALTH CARE EDUCATION/TRAINING PROGRAM

## 2022-04-29 PROCEDURE — A9270 NON-COVERED ITEM OR SERVICE: HCPCS | Performed by: STUDENT IN AN ORGANIZED HEALTH CARE EDUCATION/TRAINING PROGRAM

## 2022-04-29 PROCEDURE — 700102 HCHG RX REV CODE 250 W/ 637 OVERRIDE(OP): Performed by: INTERNAL MEDICINE

## 2022-04-29 PROCEDURE — RXMED WILLOW AMBULATORY MEDICATION CHARGE: Performed by: STUDENT IN AN ORGANIZED HEALTH CARE EDUCATION/TRAINING PROGRAM

## 2022-04-29 PROCEDURE — 99239 HOSP IP/OBS DSCHRG MGMT >30: CPT | Performed by: STUDENT IN AN ORGANIZED HEALTH CARE EDUCATION/TRAINING PROGRAM

## 2022-04-29 RX ORDER — OMEPRAZOLE 20 MG/1
20 CAPSULE, DELAYED RELEASE ORAL 2 TIMES DAILY
Qty: 60 CAPSULE | Refills: 3 | Status: CANCELLED | OUTPATIENT
Start: 2022-04-29

## 2022-04-29 RX ORDER — HEPARIN SODIUM (PORCINE) LOCK FLUSH IV SOLN 100 UNIT/ML 100 UNIT/ML
300-500 SOLUTION INTRAVENOUS PRN
Status: DISCONTINUED | OUTPATIENT
Start: 2022-04-29 | End: 2022-04-29 | Stop reason: HOSPADM

## 2022-04-29 RX ORDER — GUAIFENESIN 1200 MG/1
1200 TABLET, EXTENDED RELEASE ORAL EVERY 12 HOURS
Qty: 14 TABLET | Refills: 0 | Status: SHIPPED | OUTPATIENT
Start: 2022-04-29 | End: 2022-05-04

## 2022-04-29 RX ORDER — OXYCODONE HYDROCHLORIDE 5 MG/1
5 TABLET ORAL EVERY 8 HOURS PRN
Qty: 15 TABLET | Refills: 0 | Status: SHIPPED | OUTPATIENT
Start: 2022-04-29 | End: 2022-05-04

## 2022-04-29 RX ADMIN — ZOLPIDEM TARTRATE 10 MG: 5 TABLET ORAL at 00:01

## 2022-04-29 RX ADMIN — PREDNISONE 40 MG: 20 TABLET ORAL at 05:38

## 2022-04-29 RX ADMIN — OXYCODONE 5 MG: 5 TABLET ORAL at 02:19

## 2022-04-29 RX ADMIN — OMEPRAZOLE 20 MG: 20 CAPSULE, DELAYED RELEASE ORAL at 05:38

## 2022-04-29 RX ADMIN — ACETAMINOPHEN 1000 MG: 500 TABLET ORAL at 00:01

## 2022-04-29 RX ADMIN — GUAIFENESIN 1200 MG: 600 TABLET, EXTENDED RELEASE ORAL at 05:38

## 2022-04-29 RX ADMIN — GABAPENTIN 800 MG: 400 CAPSULE ORAL at 08:25

## 2022-04-29 RX ADMIN — AMITRIPTYLINE HYDROCHLORIDE 150 MG: 75 TABLET, FILM COATED ORAL at 00:02

## 2022-04-29 RX ADMIN — APIXABAN 5 MG: 5 TABLET, FILM COATED ORAL at 05:38

## 2022-04-29 RX ADMIN — MORPHINE SULFATE 1 MG: 4 INJECTION INTRAVENOUS at 05:57

## 2022-04-29 RX ADMIN — HEPARIN 500 UNITS: 100 SYRINGE at 11:10

## 2022-04-29 RX ADMIN — ACETAMINOPHEN 1000 MG: 500 TABLET ORAL at 05:38

## 2022-04-29 ASSESSMENT — PAIN DESCRIPTION - PAIN TYPE
TYPE: ACUTE PAIN
TYPE: ACUTE PAIN

## 2022-04-29 NOTE — PROGRESS NOTES
Pt deemed adequate for discharge to home. Port de-accessed @ 1110 per [facility] policies and procedures. No complications with de-accessing. Site dressed with gauze and tegaderm.Pt denied having any further questions. Pt has all belongings and is dressed appropriately for the weather. Pt states he lives close enough to get home in his electric wheelchair, no need for a ride to be setup. Pt escorted by writer to exit and pt stated he knew how to get home from the exit that goes into the loop for [employer].

## 2022-04-29 NOTE — PROGRESS NOTES
Discharge instructions reviewed and signed. Tele monitor removed. Pt. Awaiting [id number] delivery.

## 2022-04-29 NOTE — DISCHARGE INSTRUCTIONS
Discharge Instructions    Discharged to home by car with self. Discharged via wheelchair, hospital escort: Refused.  Special equipment needed: Not Applicable    Be sure to schedule a follow-up appointment with your primary care doctor or any specialists as instructed.     Discharge Plan:   Diet Plan: Discussed  Activity Level: Discussed  Confirmed Follow up Appointment: Patient to Call and Schedule Appointment  Confirmed Symptoms Management: Discussed  Medication Reconciliation Updated: Yes    I understand that a diet low in cholesterol, fat, and sodium is recommended for good health. Unless I have been given specific instructions below for another diet, I accept this instruction as my diet prescription.   Other diet: cardiac, diabetic    Special Instructions: None    · Is patient discharged on Warfarin / Coumadin?   No     Depression / Suicide Risk    As you are discharged from this [facility] facility, it is important to learn how to keep safe from harming yourself.    Recognize the warning signs:  · Abrupt changes in personality, positive or negative- including increase in energy   · Giving away possessions  · Change in eating patterns- significant weight changes-  positive or negative  · Change in sleeping patterns- unable to sleep or sleeping all the time   · Unwillingness or inability to communicate  · Depression  · Unusual sadness, discouragement and loneliness  · Talk of wanting to die  · Neglect of personal appearance   · Rebelliousness- reckless behavior  · Withdrawal from people/activities they love  · Confusion- inability to concentrate     If you or a loved one observes any of these behaviors or has concerns about self-harm, here's what you can do:  · Talk about it- your feelings and reasons for harming yourself  · Remove any means that you might use to hurt yourself (examples: pills, rope, extension cords, firearm)  · Get professional help from the community (Mental Health, Substance Abuse,  psychological counseling)  · Do not be alone:Call your Safe Contact- someone whom you trust who will be there for you.  · Call your local CRISIS HOTLINE 135-6934 or 403-764-8095  · Call your local Children's Mobile Crisis Response Team [place] (301) 643-2600 or www.[employer]  · Call the toll free National Suicide Prevention Hotlines   · National Suicide Prevention Lifeline 302-565-TOTP (2653)  · [place] Hope Line Network 800-SUICIDE (096-7778)    Bronchitis  Bronchitis is a problem of the air tubes leading to your lungs. This problem makes it hard for air to get in and out of the lungs. You may cough a lot because your air tubes are narrow. Going without care can cause lasting (chronic) bronchitis.  HOME CARE   · Drink enough fluids to keep your pee (urine) clear or pale yellow.  · Use a cool mist humidifier.  · Quit smoking if you smoke. If you keep smoking, the bronchitis might not get better.  · Only take medicine as told by your doctor.  GET HELP RIGHT AWAY IF:   · Coughing keeps you awake.  · You start to wheeze.  · You become more sick or weak.  · You have a hard time breathing or get short of breath.  · You cough up blood.  · Coughing lasts more than 2 weeks.  · You have a fever.  · Your baby is older than 3 months with a rectal temperature of 102° F (38.9° C) or higher.  · Your baby is 3 months old or younger with a rectal temperature of 100.4° F (38° C) or higher.  MAKE SURE YOU:  · Understand these instructions.  · Will watch your condition.  · Will get help right away if you are not doing well or get worse.  Document Released: 06/05/2009 Document Revised: 03/11/2013 Document Reviewed: 11/19/2010  [employer]® Patient Information ©2014 [employer].

## 2022-04-29 NOTE — DISCHARGE SUMMARY
"Discharge Summary    CHIEF COMPLAINT ON ADMISSION  Chief Complaint   Patient presents with   • Chest Pain     Chest pressure X 2 days.  L sided 8/10 CP radiating to back, worse with coughing.\" \"feels like when I had pneumonia in 9/21.\" 9 x DVT, 2 x PE. 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 2 x DVT in 12/21.\" Taking eliquis as prescribed   • Shortness of Breath     Does not feel like when patient had PE's previously    • Leg Pain     LLE wound, non healing. Antibiotics completed 3 weeks ago   • Flu Like Symptoms     X 2 days       Reason for Admission  Chest pain flu like symptons     Admission Date  4/26/2022    CODE STATUS  DNAR/DNI    HPI & HOSPITAL COURSE  A 56 y.o. male with  medical history of bronchial asthma, PE/DVT on chronic anticoagulation with Eliquis came into ER with cough, shortness of breath, dyspnea on exertion and pleuritic chest pain for the past 2 days.  The patient denies any sick contact in the family.  She denies smoking history.  He complained about cough with sputum production.  In the ER the patient has CT PE past medical history of study done and it was negative.  COVID and influenza test were negative. procalcitonin was normal.  Found to be in acute hypoxic respiratory failure requiring 2 to 3 L of oxygen.  Trop negative.  EKG showed no ST-T wave changes.  Pleuritic type chest pain, worsened with coughing. 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  Scheduled Tylenol, pain control, cough medications scheduled.  Initiated on steroid 40 mg daily.  New onset diabetes mellitus with A1c of 7.8.  ISS with hypoglycemia protocol.  Diabetes diet and diabetes education consulted.  POLST done on 4/27.  Patient is DNR/DNI.  On 4/29 - pt is on RA.  Sleeping comfortably this morning without complaining of chest pain.  Will be discharged home with metformin for diabetes, cough medication & pain medication    Therefore, he is discharged in good and stable " condition to home with close outpatient follow-up.    The patient met 2-midnight criteria for an inpatient stay at the time of discharge.    Discharge Date  4/29/2022    FOLLOW UP ITEMS POST DISCHARGE  PCP     DISCHARGE DIAGNOSES  Principal Problem:    Chest pain POA: Yes  Active Problems:    Mild intermittent asthma without complication POA: Yes    History of testicular cancer POA: Yes    Diabetes (HCC) POA: Yes    DNR (do not resuscitate) POA: Unknown  Resolved Problems:    URI (upper respiratory infection) POA: Yes    Acute respiratory failure with hypoxia (HCC) POA: Unknown      FOLLOW UP  Future Appointments   Date Time Provider Department Center   5/4/2022  3:30 PM Hanny Wills M.D. [facility]  [phone]  [address] [place] [id number]    Call  Please call for assistance with housing, transportation, and food.     Access to Healthcare  Phone number: [phone]  Call  Please call for free transportation.       MEDICATIONS ON DISCHARGE     Medication List      START taking these medications      Instructions   Guaifenesin 1200 MG Tb12   Take 1 Tablet by mouth every 12 hours for 7 days.  Dose: 1,200 mg     oxyCODONE immediate-release 5 MG Tabs  Commonly known as: ROXICODONE   Take 1 Tablet by mouth every 8 hours as needed for Severe Pain for up to 5 days.  Dose: 5 mg        CHANGE how you take these medications      Instructions   Banophen 50 MG Caps  What changed: when to take this  Generic drug: diphenhydrAMINE   TAKE 1 CAPSULE BY MOUTH AT BEDTIME AS NEEDED FOR SLEEP     gabapentin 800 MG tablet  What changed: when to take this  Commonly known as: NEURONTIN   TAKE 1 TABLET BY MOUTH THREE TIMES A DAY     metFORMIN 500 MG Tabs  What changed: how much to take  Commonly known as: GLUCOPHAGE   Take 2 Tablets by mouth 2 times a day with meals for 30 days.  Dose: 1,000 mg        CONTINUE taking these medications      Instructions   acetaminophen 500 MG  Tabs  Commonly known as: TYLENOL   Take 500 mg by mouth every four hours as needed. Indications: Pain  Dose: 500 mg     amitriptyline 100 MG Tabs  Commonly known as: ELAVIL   TAKE 1.5 TABLETS BY MOUTH AT BEDTIME AS NEEDED FOR SLEEP.  Dose: 150 mg     Combivent Respimat  MCG/ACT Aers  Generic drug: ipratropium-albuterol   INHALE 1 PUFF BY MOUTH 4 TIMES A DAY     Eliquis 5mg Tabs  Generic drug: apixaban   TAKE 1 TABLET BY MOUTH TWICE A DAY     omeprazole 20 MG delayed-release capsule  Commonly known as: PRILOSEC   Take 1 Capsule by mouth 2 times a day.  Dose: 20 mg     Vitamin D3 1.25 MG (98632 UT) Caps   Take 1 Cap by mouth every 14 days.  Dose: 1 Capsule     zolpidem 10 MG Tabs  Commonly known as: AMBIEN   Take 10 mg by mouth at bedtime as needed for Sleep.  Dose: 10 mg        ASK your doctor about these medications      Instructions   diphenhydrAMINE-zinc acetate cream  Commonly known as: BENADRYL ITCH  Ask about: Should I take this medication?   Apply 1 Each topically 3 times a day as needed (Itching) for up to 30 days.  Dose: 1 Each            Allergies  Allergies   Allergen Reactions   • Green Beans Anaphylaxis   • Iodine Anaphylaxis   • Keflex Diarrhea and Vomiting     Vomiting & Diarrhea  Tolerates ceftriaxone   • Reglan [Metoclopramide] Nausea     Asthma     • Shellfish Allergy Anaphylaxis   • Toradol Hives       DIET  Orders Placed This Encounter   Procedures   • Diet Order Diet: Cardiac (please bring water bottle with each tray); Second Modifier: (optional): Consistent CHO (Diabetic); Miscellaneous modifications: (optional): No Decaf, No Caffeine(for test)     Standing Status:   Standing     Number of Occurrences:   1     Order Specific Question:   Diet:     Answer:   Cardiac [6]     Comments:   please bring water bottle with each tray     Order Specific Question:   Second Modifier: (optional)     Answer:   Consistent CHO (Diabetic) [4]     Order Specific Question:   Miscellaneous modifications:  (optional)     Answer:   No Decaf, No Caffeine(for test) [11]       ACTIVITY  As tolerated.  Weight bearing as tolerated    CONSULTATIONS  None     PROCEDURES  Imaging  CT-CTA CHEST PULMONARY ARTERY W/ RECONS   Final Result       1.  No evidence of pulmonary embolism.   2.  Evidence of prior granulomatous infection.   3.  Atherosclerosis including coronary artery calcifications   4.   Status post cholecystectomy               DX-CHEST-PORTABLE (1 VIEW)   Final Result       Mild enlargement of the cardiomediastinal silhouette with central pulmonary vascular congestion.         LABORATORY  Lab Results   Component Value Date    SODIUM 134 (L) 04/28/2022    POTASSIUM 4.0 04/28/2022    CHLORIDE 101 04/28/2022    CO2 20 04/28/2022    GLUCOSE 377 (H) 04/28/2022    BUN 24 (H) 04/28/2022    CREATININE 1.21 04/28/2022        Lab Results   Component Value Date    WBC 5.1 04/28/2022    HEMOGLOBIN 14.4 04/28/2022    HEMATOCRIT 43.1 04/28/2022    PLATELETCT 67 (L) 04/28/2022        Total time of the discharge process exceeds 43 minutes.

## 2022-04-29 NOTE — CARE PLAN
The patient is Stable - Low risk of patient condition declining or worsening    Shift Goals  Clinical Goals: Wean O2, Pain management, Maintain safety, monitor vital signs  Patient Goals: comfort  Family Goals: HANNA    Progress made toward(s) clinical / shift goals: progressing towards goals    Problem: Knowledge Deficit - Standard  Goal: Patient and family/care givers will demonstrate understanding of plan of care, disease process/condition, diagnostic tests and medications  Outcome: Progressing     Problem: Fall Risk  Goal: Patient will remain free from falls  Outcome: Progressing     Problem: Skin Integrity  Goal: Skin integrity is maintained or improved  Outcome: Progressing       Patient is not progressing towards the following goals:

## 2022-04-29 NOTE — PROGRESS NOTES
Bedside report received and patient care assumed. Pt is resting in bed, A&Ox4, with 7/10 pain, Rn to give oral pain medication,  and is on 1 L NC. Tele box on. All fall precautions are in place, belongings at bedside table.  Pt was updated on POC, no questions or concerns. Pt educated on use of call light for assistance.

## 2022-04-29 NOTE — TELEPHONE ENCOUNTER
Received request via: Pharmacy    Was the patient seen in the last year in this department? Yes, 1/12/22    Does the patient have an active prescription (recently filled or refills available) for medication(s) requested? No

## 2022-04-29 NOTE — DISCHARGE PLANNING
Anticipated Discharge Disposition: Home with close outpatient follow-up     Action: pt has active discharge order, pt currently on 0 liters O2, 6 clicks are 17/23. Orientation: A&Ox4.      Barriers to Discharge: none noted     Plan: f/u with pt and medical team to discuss dc needs and barriers.

## 2022-05-01 LAB
BACTERIA BLD CULT: NORMAL
BACTERIA BLD CULT: NORMAL
SIGNIFICANT IND 70042: NORMAL
SIGNIFICANT IND 70042: NORMAL
SITE SITE: NORMAL
SITE SITE: NORMAL
SOURCE SOURCE: NORMAL
SOURCE SOURCE: NORMAL

## 2022-05-02 ENCOUNTER — PATIENT OUTREACH (OUTPATIENT)
Dept: HEALTH INFORMATION MANAGEMENT | Facility: OTHER | Age: 57
End: 2022-05-02
Payer: MEDICAID

## 2022-05-04 ENCOUNTER — OFFICE VISIT (OUTPATIENT)
Dept: MEDICAL GROUP | Facility: CLINIC | Age: 57
End: 2022-05-04
Payer: MEDICAID

## 2022-05-04 VITALS
WEIGHT: 268 LBS | HEART RATE: 100 BPM | BODY MASS INDEX: 37.52 KG/M2 | OXYGEN SATURATION: 95 % | HEIGHT: 71 IN | SYSTOLIC BLOOD PRESSURE: 119 MMHG | DIASTOLIC BLOOD PRESSURE: 82 MMHG | TEMPERATURE: 98 F

## 2022-05-04 DIAGNOSIS — M25.562 CHRONIC PAIN OF BOTH KNEES: ICD-10-CM

## 2022-05-04 DIAGNOSIS — J00 ACUTE NASOPHARYNGITIS: ICD-10-CM

## 2022-05-04 DIAGNOSIS — M25.561 CHRONIC PAIN OF BOTH KNEES: ICD-10-CM

## 2022-05-04 DIAGNOSIS — D46.9 MYELODYSPLASTIC SYNDROME (HCC): ICD-10-CM

## 2022-05-04 DIAGNOSIS — K75.81 NASH (NONALCOHOLIC STEATOHEPATITIS): ICD-10-CM

## 2022-05-04 DIAGNOSIS — G47.00 INSOMNIA, UNSPECIFIED TYPE: ICD-10-CM

## 2022-05-04 DIAGNOSIS — I73.9 PERIPHERAL VASCULAR DISEASE (HCC): ICD-10-CM

## 2022-05-04 DIAGNOSIS — G89.29 CHRONIC PAIN OF BOTH KNEES: ICD-10-CM

## 2022-05-04 DIAGNOSIS — J45.30 MILD PERSISTENT ASTHMA, UNSPECIFIED WHETHER COMPLICATED: ICD-10-CM

## 2022-05-04 PROCEDURE — 99214 OFFICE O/P EST MOD 30 MIN: CPT | Mod: GC | Performed by: STUDENT IN AN ORGANIZED HEALTH CARE EDUCATION/TRAINING PROGRAM

## 2022-05-04 RX ORDER — ZOLPIDEM TARTRATE 10 MG/1
10 TABLET ORAL NIGHTLY PRN
Qty: 30 TABLET | Refills: 2 | Status: SHIPPED | OUTPATIENT
Start: 2022-05-04 | End: 2022-11-17

## 2022-05-04 RX ORDER — DEXAMETHASONE 4 MG/1
1 TABLET ORAL 2 TIMES DAILY
Qty: 1 EACH | Refills: 2 | Status: SHIPPED | OUTPATIENT
Start: 2022-05-04 | End: 2022-08-22 | Stop reason: SDUPTHER

## 2022-05-04 RX ORDER — GUAIFENESIN 1200 MG/1
1200 TABLET, EXTENDED RELEASE ORAL EVERY 12 HOURS
Qty: 14 TABLET | Refills: 0 | Status: SHIPPED | OUTPATIENT
Start: 2022-05-04 | End: 2022-05-11

## 2022-05-04 ASSESSMENT — ENCOUNTER SYMPTOMS
CONSTITUTIONAL NEGATIVE: 1
SPUTUM PRODUCTION: 1
WHEEZING: 1
COUGH: 1
GASTROINTESTINAL NEGATIVE: 1

## 2022-05-04 ASSESSMENT — FIBROSIS 4 INDEX: FIB4 SCORE: 5.4

## 2022-05-04 NOTE — PROGRESS NOTES
Subjective:     CC: follow-up    HPI:   Benito presents today for concerns of the wound on his leg not healing as well as a rash on his neck.  Patient states that he was recently hospitalized and wound care has seen him at the time for the wound on his leg and had recommended keeping area clean and dry which patient has been doing at home.  He was previously on antibiotics for an infection of the wound and states it has not gone away since that time.  Patient also concerned that his feet and legs always feel cold and that he occasionally gets swelling of the left foot.    Patient also discussed today about possible intervention for myelodysplastic syndrome and states that he would like a hematology and oncology referral for this.    Patient states that he has had a rash on his neck for the last month.  He initially believes this to be from his lanyard and has been using lotion at home for this without improvement.  He describes it as occasionally itching.  He denies any redness.    Problem   Mild Persistent Asthma   Peripheral Vascular Disease (Hcc)   Chronic Pain of Both Knees   Myelodysplastic Syndrome (Hcc)   Insomnia       Current Outpatient Medications Ordered in Epic   Medication Sig Dispense Refill   • Guaifenesin 1200 MG TABLET SR 12 HR Take 1 Tablet by mouth every 12 hours for 7 days. 14 Tablet 0   • zolpidem (AMBIEN) 10 MG Tab Take 1 Tablet by mouth at bedtime as needed for Sleep for up to 30 days. 30 Tablet 2   • fluticasone (FLOVENT HFA) 110 MCG/ACT Aerosol Inhale 1 Puff 2 times a day. 1 Each 2   • oxyCODONE immediate-release (ROXICODONE) 5 MG Tab Take 1 Tablet by mouth every 8 hours as needed for Severe Pain for up to 5 days. 15 Tablet 0   • metFORMIN (GLUCOPHAGE) 500 MG Tab Take 2 Tablets by mouth 2 times a day with meals for 30 days. 120 Tablet 0   • gabapentin (NEURONTIN) 800 MG tablet TAKE 1 TABLET BY MOUTH THREE TIMES A DAY 90 Tablet 3   • COMBIVENT RESPIMAT  MCG/ACT Aero Soln INHALE 1 PUFF  "BY MOUTH 4 TIMES A DAY 1 Each 5   • amitriptyline (ELAVIL) 100 MG Tab TAKE 1.5 TABLETS BY MOUTH AT BEDTIME AS NEEDED FOR SLEEP. 30 Tablet 3   • BANOPHEN 50 MG Cap TAKE 1 CAPSULE BY MOUTH AT BEDTIME AS NEEDED FOR SLEEP 30 Capsule 2   • ELIQUIS 5 MG Tab TAKE 1 TABLET BY MOUTH TWICE A DAY 60 Tablet 3   • omeprazole (PRILOSEC) 20 MG delayed-release capsule Take 1 Capsule by mouth 2 times a day. 60 Capsule 3     No current [facility]-ordered facility-administered medications on file.     ROS:  Review of Systems   Constitutional: Negative.    Respiratory: Positive for cough (chronic), sputum production (chronic) and wheezing.    Cardiovascular: Positive for leg swelling (left foot, chronic).   Gastrointestinal: Negative.    Musculoskeletal: Positive for joint pain (bilateral knees).   Neurological:        Vertigo (chronic)       Objective:     Exam:  /82 (BP Location: Right arm, Patient Position: Sitting, BP Cuff Size: Large adult)   Pulse 100   Temp 36.7 °C (98 °F) (Temporal)   Ht 1.803 m (5' 11\")   Wt 122 kg (268 lb) Comment: pt reported  SpO2 95%   BMI 37.38 kg/m²  Body mass index is 37.38 kg/m².    Physical Exam  Constitutional:       General: He is not in acute distress.  HENT:      Head: Normocephalic and atraumatic.   Eyes:      Extraocular Movements: Extraocular movements intact.      Conjunctiva/sclera: Conjunctivae normal.   Cardiovascular:      Rate and Rhythm: Normal rate and regular rhythm.      Heart sounds: No murmur heard.     Comments: Bilateral pedal pulses difficult to palpate.  Bilateral popliteal pulses also difficult to palpate.  Pulmonary:      Effort: Pulmonary effort is normal. No respiratory distress.      Breath sounds: No stridor. Wheezing (scattered) present.   Abdominal:      General: There is no distension.      Palpations: Abdomen is soft.   Musculoskeletal:      Cervical back: Normal range of motion.      Comments: Bilateral lower extremities cool to the touch below the knees.  " Nonpitting edema present in left foot.  He is in a wheelchair.   Skin:     Comments: Chronic ulcer appearing lesion to left lower lateral leg without surrounding erythema, not warm to the touch.  Center with small amount of eschar.   Neurological:      Mental Status: He is alert. Mental status is at baseline.   Psychiatric:         Mood and Affect: Mood normal.         Behavior: Behavior normal.         Thought Content: Thought content normal.         Judgment: Judgment normal.           Assessment & Plan:     56 y.o. male with the following -     Problem List Items Addressed This Visit     Myelodysplastic syndrome (HCC)     Patient with a history of myelodysplastic syndrome and at previous visits did not want referral to hematology oncology however he would like to be evaluated by hematology oncology at this time.  Referral sent to hematology oncology.  Patient will follow up in 1 month.         Relevant Orders    Referral to Hematology Oncology    Insomnia     Patient with a history of insomnia and takes 10 mg Ambien nightly as needed for sleep. Refill of Ambien 10 mg nightly as needed sent to pharmacy.  Patient will follow up in 1 month.         Relevant Medications    zolpidem (AMBIEN) 10 MG Tab    Chronic pain of both knees     Patient has history of chronic pain of bilateral knees.  He was referred to physical therapy for aquatics at prior visit and arrived to this appointment and was told that his insurance did not cover this.  Referral again sent to physical therapy.  Discussed with patient that I am not comfortable with prescribing opioids at this time due to the other medications that he is on and feel like physical therapy would be more beneficial for him.  He is agreeable to this plan.  Patient will follow up in 1 month.         Relevant Orders    Referral to Physical Therapy    Mild persistent asthma     Patient with reported history of asthma.  He has a chronic cough as well as wheezing nightly.  He  uses Combivent Respimat as needed at home with some improvement in symptoms.  Discussed with patient about starting a daily inhaled corticosteroid such as Flovent for control of symptoms as patient was recently hospitalized due to shortness of breath.  Patient is agreeable to this plan and will follow-up in 1 month or sooner as needed.         Relevant Medications    fluticasone (FLOVENT HFA) 110 MCG/ACT Aerosol    Peripheral vascular disease (HCC)     Patient presented with complaints of nonhealing left lower extremity wound as well as complaints of his bilateral feet being cold and with occasional swelling of the left foot with associated pain.  On physical exam bilateral lower extremities cool to the touch and unable to palpate bilateral pedal pulses on exam.  Referral sent to vascular surgery as patient would like to be evaluated for possible intervention.  Patient will follow up in 1 month.         Relevant Orders    Referral to Vascular Surgery      Other Visit Diagnoses     ODELL (nonalcoholic steatohepatitis)        Acute nasopharyngitis        Relevant Medications    Guaifenesin 1200 MG TABLET SR 12 HR        #Rash  History of rash present for approximately 1 month.  He has tried using lotion on this at home without improvement.  On exam dry skin noted to left lateral base of neck.  Discussed with patient about using Vaseline on the area at home.  Patient agreeable to plan.  Patient will follow up in 1 month.    I spent a total of 60 minutes with record review, exam, communication with the patient, communication with other providers, and documentation of this encounter.      Return in about 4 weeks (around 6/1/2022).    Please note that this dictation was created using voice recognition software. I have made every reasonable attempt to correct obvious errors, but I expect that there are errors of grammar and possibly content that I did not discover before finalizing the note.

## 2022-05-05 NOTE — ASSESSMENT & PLAN NOTE
Patient with a history of insomnia and takes 10 mg Ambien nightly as needed for sleep. Refill of Ambien 10 mg nightly as needed sent to pharmacy.  Patient will follow up in 1 month.

## 2022-05-05 NOTE — ASSESSMENT & PLAN NOTE
Patient has history of chronic pain of bilateral knees.  He was referred to physical therapy for aquatics at prior visit and arrived to this appointment and was told that his insurance did not cover this.  Referral again sent to physical therapy.  Discussed with patient that I am not comfortable with prescribing opioids at this time due to the other medications that he is on and feel like physical therapy would be more beneficial for him.  He is agreeable to this plan.  Patient will follow up in 1 month.

## 2022-05-05 NOTE — ASSESSMENT & PLAN NOTE
Patient with a history of myelodysplastic syndrome and at previous visits did not want referral to hematology oncology however he would like to be evaluated by hematology oncology at this time.  Referral sent to hematology oncology.  Patient will follow up in 1 month.

## 2022-05-05 NOTE — ASSESSMENT & PLAN NOTE
Patient with reported history of asthma.  He has a chronic cough as well as wheezing nightly.  He uses Combivent Respimat as needed at home with some improvement in symptoms.  Discussed with patient about starting a daily inhaled corticosteroid such as Flovent for control of symptoms as patient was recently hospitalized due to shortness of breath.  Patient is agreeable to this plan and will follow-up in 1 month or sooner as needed.

## 2022-05-05 NOTE — ASSESSMENT & PLAN NOTE
Patient presented with complaints of nonhealing left lower extremity wound as well as complaints of his bilateral feet being cold and with occasional swelling of the left foot with associated pain.  On physical exam bilateral lower extremities cool to the touch and unable to palpate bilateral pedal pulses on exam.  Referral sent to vascular surgery as patient would like to be evaluated for possible intervention.  Patient will follow up in 1 month.

## 2022-05-16 RX ORDER — NICOTINE POLACRILEX 4 MG/1
GUM, CHEWING ORAL
Qty: 180 TABLET | Refills: 3 | Status: SHIPPED | OUTPATIENT
Start: 2022-05-16 | End: 2022-06-16

## 2022-05-16 NOTE — TELEPHONE ENCOUNTER
Dr. Wills has not yet responded and the patient is out of medication, could you please send this in? Thank you!

## 2022-05-18 ENCOUNTER — HOSPITAL ENCOUNTER (EMERGENCY)
Facility: MEDICAL CENTER | Age: 57
End: 2022-05-19
Attending: EMERGENCY MEDICINE
Payer: MEDICAID

## 2022-05-18 DIAGNOSIS — L03.211 CELLULITIS OF FACE: ICD-10-CM

## 2022-05-18 PROCEDURE — 99284 EMERGENCY DEPT VISIT MOD MDM: CPT

## 2022-05-18 RX ORDER — DIPHENHYDRAMINE HCL 25 MG
50 TABLET ORAL ONCE
Status: DISCONTINUED | OUTPATIENT
Start: 2022-05-18 | End: 2022-05-19 | Stop reason: HOSPADM

## 2022-05-18 RX ORDER — CLINDAMYCIN HYDROCHLORIDE 150 MG/1
300 CAPSULE ORAL 3 TIMES DAILY
Qty: 42 CAPSULE | Refills: 0 | Status: SHIPPED | OUTPATIENT
Start: 2022-05-18 | End: 2022-05-23

## 2022-05-18 RX ORDER — CLINDAMYCIN HYDROCHLORIDE 150 MG/1
300 CAPSULE ORAL ONCE
Status: COMPLETED | OUTPATIENT
Start: 2022-05-19 | End: 2022-05-19

## 2022-05-18 RX ORDER — CLINDAMYCIN HYDROCHLORIDE 150 MG/1
300 CAPSULE ORAL 3 TIMES DAILY
Qty: 42 CAPSULE | Refills: 0 | Status: SHIPPED | OUTPATIENT
Start: 2022-05-18 | End: 2022-05-18 | Stop reason: SDUPTHER

## 2022-05-18 ASSESSMENT — FIBROSIS 4 INDEX: FIB4 SCORE: 5.4

## 2022-05-19 VITALS
OXYGEN SATURATION: 93 % | DIASTOLIC BLOOD PRESSURE: 92 MMHG | RESPIRATION RATE: 18 BRPM | BODY MASS INDEX: 38.55 KG/M2 | TEMPERATURE: 98.8 F | HEART RATE: 99 BPM | HEIGHT: 71 IN | WEIGHT: 275.35 LBS | SYSTOLIC BLOOD PRESSURE: 164 MMHG

## 2022-05-19 PROCEDURE — A9270 NON-COVERED ITEM OR SERVICE: HCPCS | Performed by: EMERGENCY MEDICINE

## 2022-05-19 PROCEDURE — 700102 HCHG RX REV CODE 250 W/ 637 OVERRIDE(OP): Performed by: EMERGENCY MEDICINE

## 2022-05-19 RX ORDER — CLINDAMYCIN HYDROCHLORIDE 150 MG/1
CAPSULE ORAL
Status: DISCONTINUED
Start: 2022-05-19 | End: 2022-05-19 | Stop reason: HOSPADM

## 2022-05-19 RX ADMIN — CLINDAMYCIN HYDROCHLORIDE 300 MG: 150 CAPSULE ORAL at 00:10

## 2022-05-19 NOTE — ED TRIAGE NOTES
"Chief Complaint   Patient presents with   • Spider Bite     Pt states he thought it was a brown and black spider that bit him directly under the nose today around 0730.  Swelling to top of lip radiating to the L side of face.  Numbness noted to top of lip and L upper cheek.         Pt wheeled self to triage . Pt A&Ox4, came in for above complaint. No difficulties breathing or swallowing at this time.  States unable to \"blow nose\". Provided with ice pack for swelling.    Pt to lobby . Pt educated on alerting staff in changes to condition. Pt verbalized understanding.     BP (!) 165/102   Pulse (!) 104   Temp 36.8 °C (98.3 °F) (Temporal)   Resp 16   Ht 1.803 m (5' 11\")   Wt 125 kg (275 lb 5.7 oz)   SpO2 98%   BMI 38.40 kg/m²     "

## 2022-05-19 NOTE — ED NOTES
Pt to TCS 03 via personal wheelchair. PT txfd to recliner via self without assistance. Chart up for next ERP.

## 2022-05-19 NOTE — ED PROVIDER NOTES
"ED Provider Note        Primary care provider: Hanny Wills M.D.    I verified that the patient was wearing a mask and I was wearing appropriate PPE every time I entered the room. The patient's mask was on the patient at all times during my encounter except for a brief view of the oropharynx.      CHIEF COMPLAINT  Chief Complaint   Patient presents with   • Spider Bite     Pt states he thought it was a brown and black spider that bit him directly under the nose today around 0730.  Swelling to top of lip radiating to the L side of face.  Numbness noted to top of lip and L upper cheek.         HPI  Benito Persaud is a 56 y.o. male who presents to the Emergency Department with chief complaint of \"spider bite\".  Patient reports that he woke this evening and felt something on his upper lip stated that he was swatting at it but was not able to get it.  Over the next several hours he reported that he was having some pain and swelling of the upper lip.  He took Benadryl prior to arrival with some minimal improvement.  He reports no Other skin changes he has had no throat swelling no difficulty swallowing or breathing no fevers no chills no nausea no vomiting noDrainage from the area.    REVIEW OF SYSTEMS  10 systems reviewed and otherwise negative, pertinent positives and negatives listed in the history of present illness.    PAST MEDICAL HISTORY   has a past medical history of Asthma, Cancer ([facility]) (11/01/2016), and MI (myocardial infarction) ([facility]) (2019).    SURGICAL HISTORY   has a past surgical history that includes cath removal (N/A, 8/14/2019).    SOCIAL HISTORY  Social History     Tobacco Use   • Smoking status: Never Smoker   • Smokeless tobacco: Current User     Types: Chew   Vaping Use   • Vaping Use: Never used   Substance Use Topics   • Alcohol use: Not Currently   • Drug use: Not Currently      Social History     Substance and Sexual Activity   Drug Use Not Currently       FAMILY " "HISTORY  Non-Contributory    CURRENT MEDICATIONS  Home Medications     Reviewed by Susana Arnett R.N. (Registered Nurse) on 05/18/22 at 2103  Med List Status: Partial   Medication Last Dose Status   amitriptyline (ELAVIL) 100 MG Tab  Active   BANOPHEN 50 MG Cap  Active   COMBIVENT RESPIMAT  MCG/ACT Aero Soln  Active   ELIQUIS 5 MG Tab  Active   fluticasone (FLOVENT HFA) 110 MCG/ACT Aerosol  Active   gabapentin (NEURONTIN) 800 MG tablet  Active   metFORMIN (GLUCOPHAGE) 500 MG Tab  Active   omeprazole (PRILOSEC) 20 MG delayed-release capsule  Active   omeprazole (PRILOSEC) 20 MG Tablet Delayed Response delayed-release tablet  Active   zolpidem (AMBIEN) 10 MG Tab  Active                ALLERGIES  Allergies   Allergen Reactions   • Green Beans Anaphylaxis   • Iodine Anaphylaxis   • Keflex Diarrhea and Vomiting     Vomiting & Diarrhea  Tolerates ceftriaxone   • Reglan [Metoclopramide] Nausea     Asthma     • Shellfish Allergy Anaphylaxis   • Toradol Hives       PHYSICAL EXAM  VITAL SIGNS: BP (!) 165/102   Pulse (!) 104   Temp 36.8 °C (98.3 °F) (Temporal)   Resp 16   Ht 1.803 m (5' 11\")   Wt 125 kg (275 lb 5.7 oz)   SpO2 98%   BMI 38.40 kg/m²   Pulse ox interpretation: I interpret this pulse ox as normal.  Constitutional: Alert and oriented x 3, Minimal Distress  HEENT: Atraumatic normocephalic, pupils are equal round, extraocular movements are intact. The nares is clear, external ears are normal, mouth shows moist mucous membranes, Patient has been some minimal swelling of the upper lip erythematous warm to the touch no fluctuance no drainage there is minimal area of skin breakdown of the upper philtrum  Neck: no obvious JVD or tracheal deviation  Cardiovascular: Regular rate and rhythm no murmur rub or gallop   Thorax & Lungs: No respiratory distress, no wheezes rales or rhonchi, No chest tenderness.   GI: Soft nontender nondistended positive bowel sounds, no peritoneal signs  Skin: Warm dry no obvious " "acute rash or lesion  Musculoskeletal: Moving all extremities with normal range strength, no acute  deformity  Neurologic: Cranial nerves III through XII are grossly intact, no sensory deficit, no cerebellar dysfunction   Psychiatric: Appropriate affect for situation at this time      DIAGNOSTIC STUDIES / PROCEDURES      COURSE & MEDICAL DECISION MAKING  Pertinent Labs & Imaging studies reviewed. (See chart for details)    11:45 PM - Patient seen and examined at bedside.         Patient noted to have slightly elevated blood pressure likely circumstantial secondary to presenting complaint. Referred to primary care physician for further evaluation.      Medical Decision Making: Patient has small area of skin breakdown and some erythema and induration of the upper lip.  There is no posterior swelling or difficulty swallowing breathing there is no sign of any systemic reaction or toxicity.  Patient is allergic to cephalosporins he is given a dose of clindamycin here and will be discharged with the same.  Given instructions to return for worsening pain redness swelling difficulty swallowing breathing any other acute symptoms or concerns otherwise take all antibiotics as directed and follow-up with primary care.    BP (!) 164/92   Pulse 99   Temp 37.1 °C (98.8 °F) (Temporal)   Resp 18   Ht 1.803 m (5' 11\")   Wt 125 kg (275 lb 5.7 oz)   SpO2 93%   BMI 38.40 kg/m²     Hanny Wills M.D.  [address] [address]  [phone]    In 3 days  if symptoms persist    [facility], Emergency Dept  [address] [facility] [address] [facility] [address] [phone]    in 12-24 hours if symptoms persist, immediately If symptoms worsen, or if you develop any other symptoms or concerns      FINAL IMPRESSION  1. Cellulitis of face          This dictation has been created using voice recognition software and/or scribes. The accuracy of the dictation is limited by the abilities of the software and the " expertise of the scribes. I expect there may be some errors of grammar and possibly content. I made every attempt to manually correct the errors within my dictation. However, errors related to voice recognition software and/or scribes may still exist and should be interpreted within the appropriate context.

## 2022-05-22 ENCOUNTER — HOSPITAL ENCOUNTER (INPATIENT)
Facility: MEDICAL CENTER | Age: 57
LOS: 4 days | DRG: 603 | End: 2022-05-27
Attending: EMERGENCY MEDICINE | Admitting: FAMILY MEDICINE
Payer: MEDICAID

## 2022-05-22 DIAGNOSIS — L02.01 FACIAL ABSCESS: ICD-10-CM

## 2022-05-22 DIAGNOSIS — L03.211 FACIAL CELLULITIS: ICD-10-CM

## 2022-05-22 DIAGNOSIS — K13.0 LIP PAIN: ICD-10-CM

## 2022-05-22 LAB
ALBUMIN SERPL BCP-MCNC: 3.5 G/DL (ref 3.2–4.9)
ALBUMIN/GLOB SERPL: 0.9 G/DL
ALP SERPL-CCNC: 129 U/L (ref 30–99)
ALT SERPL-CCNC: 14 U/L (ref 2–50)
ANION GAP SERPL CALC-SCNC: 12 MMOL/L (ref 7–16)
AST SERPL-CCNC: 18 U/L (ref 12–45)
BASOPHILS # BLD AUTO: 0.2 % (ref 0–1.8)
BASOPHILS # BLD: 0.01 K/UL (ref 0–0.12)
BILIRUB SERPL-MCNC: 0.8 MG/DL (ref 0.1–1.5)
BUN SERPL-MCNC: 15 MG/DL (ref 8–22)
CALCIUM SERPL-MCNC: 9 MG/DL (ref 8.5–10.5)
CHLORIDE SERPL-SCNC: 100 MMOL/L (ref 96–112)
CO2 SERPL-SCNC: 20 MMOL/L (ref 20–33)
CREAT SERPL-MCNC: 1.06 MG/DL (ref 0.5–1.4)
EOSINOPHIL # BLD AUTO: 0.16 K/UL (ref 0–0.51)
EOSINOPHIL NFR BLD: 2.6 % (ref 0–6.9)
ERYTHROCYTE [DISTWIDTH] IN BLOOD BY AUTOMATED COUNT: 43.2 FL (ref 35.9–50)
GFR SERPLBLD CREATININE-BSD FMLA CKD-EPI: 82 ML/MIN/1.73 M 2
GLOBULIN SER CALC-MCNC: 4 G/DL (ref 1.9–3.5)
GLUCOSE SERPL-MCNC: 240 MG/DL (ref 65–99)
HCT VFR BLD AUTO: 45 % (ref 42–52)
HGB BLD-MCNC: 15.7 G/DL (ref 14–18)
IMM GRANULOCYTES # BLD AUTO: 0.02 K/UL (ref 0–0.11)
IMM GRANULOCYTES NFR BLD AUTO: 0.3 % (ref 0–0.9)
LYMPHOCYTES # BLD AUTO: 1.84 K/UL (ref 1–4.8)
LYMPHOCYTES NFR BLD: 29.5 % (ref 22–41)
MCH RBC QN AUTO: 29.9 PG (ref 27–33)
MCHC RBC AUTO-ENTMCNC: 34.9 G/DL (ref 33.7–35.3)
MCV RBC AUTO: 85.7 FL (ref 81.4–97.8)
MONOCYTES # BLD AUTO: 0.56 K/UL (ref 0–0.85)
MONOCYTES NFR BLD AUTO: 9 % (ref 0–13.4)
NEUTROPHILS # BLD AUTO: 3.64 K/UL (ref 1.82–7.42)
NEUTROPHILS NFR BLD: 58.4 % (ref 44–72)
NRBC # BLD AUTO: 0 K/UL
NRBC BLD-RTO: 0 /100 WBC
PLATELET # BLD AUTO: 107 K/UL (ref 164–446)
PMV BLD AUTO: 9.1 FL (ref 9–12.9)
POTASSIUM SERPL-SCNC: 3.8 MMOL/L (ref 3.6–5.5)
PROT SERPL-MCNC: 7.5 G/DL (ref 6–8.2)
RBC # BLD AUTO: 5.25 M/UL (ref 4.7–6.1)
SODIUM SERPL-SCNC: 132 MMOL/L (ref 135–145)
WBC # BLD AUTO: 6.2 K/UL (ref 4.8–10.8)

## 2022-05-22 PROCEDURE — 700111 HCHG RX REV CODE 636 W/ 250 OVERRIDE (IP): Performed by: EMERGENCY MEDICINE

## 2022-05-22 PROCEDURE — 94760 N-INVAS EAR/PLS OXIMETRY 1: CPT

## 2022-05-22 PROCEDURE — 96365 THER/PROPH/DIAG IV INF INIT: CPT

## 2022-05-22 PROCEDURE — 96375 TX/PRO/DX INJ NEW DRUG ADDON: CPT

## 2022-05-22 PROCEDURE — 80053 COMPREHEN METABOLIC PANEL: CPT

## 2022-05-22 PROCEDURE — 85025 COMPLETE CBC W/AUTO DIFF WBC: CPT

## 2022-05-22 PROCEDURE — 87040 BLOOD CULTURE FOR BACTERIA: CPT | Mod: 91

## 2022-05-22 PROCEDURE — 700105 HCHG RX REV CODE 258: Performed by: EMERGENCY MEDICINE

## 2022-05-22 PROCEDURE — 99285 EMERGENCY DEPT VISIT HI MDM: CPT

## 2022-05-22 PROCEDURE — 36415 COLL VENOUS BLD VENIPUNCTURE: CPT

## 2022-05-22 RX ORDER — MORPHINE SULFATE 4 MG/ML
4 INJECTION INTRAVENOUS ONCE
Status: COMPLETED | OUTPATIENT
Start: 2022-05-22 | End: 2022-05-22

## 2022-05-22 RX ORDER — SODIUM CHLORIDE 9 MG/ML
INJECTION, SOLUTION INTRAVENOUS CONTINUOUS
Status: DISCONTINUED | OUTPATIENT
Start: 2022-05-22 | End: 2022-05-23

## 2022-05-22 RX ORDER — HEPARIN SODIUM (PORCINE) LOCK FLUSH IV SOLN 100 UNIT/ML 100 UNIT/ML
300-500 SOLUTION INTRAVENOUS PRN
Status: COMPLETED | OUTPATIENT
Start: 2022-05-22 | End: 2022-05-22

## 2022-05-22 RX ORDER — ONDANSETRON 2 MG/ML
4 INJECTION INTRAMUSCULAR; INTRAVENOUS ONCE
Status: COMPLETED | OUTPATIENT
Start: 2022-05-22 | End: 2022-05-22

## 2022-05-22 RX ADMIN — ONDANSETRON 4 MG: 2 INJECTION INTRAMUSCULAR; INTRAVENOUS at 23:45

## 2022-05-22 RX ADMIN — MORPHINE SULFATE 4 MG: 4 INJECTION INTRAVENOUS at 23:44

## 2022-05-22 RX ADMIN — SODIUM CHLORIDE 3 G: 900 INJECTION INTRAVENOUS at 23:57

## 2022-05-22 RX ADMIN — HEPARIN 500 UNITS: 100 SYRINGE at 22:35

## 2022-05-22 RX ADMIN — SODIUM CHLORIDE: 9 INJECTION, SOLUTION INTRAVENOUS at 23:57

## 2022-05-22 ASSESSMENT — FIBROSIS 4 INDEX: FIB4 SCORE: 5.49

## 2022-05-22 ASSESSMENT — PAIN DESCRIPTION - PAIN TYPE: TYPE: ACUTE PAIN

## 2022-05-23 ENCOUNTER — APPOINTMENT (OUTPATIENT)
Dept: RADIOLOGY | Facility: MEDICAL CENTER | Age: 57
DRG: 603 | End: 2022-05-23
Attending: STUDENT IN AN ORGANIZED HEALTH CARE EDUCATION/TRAINING PROGRAM
Payer: MEDICAID

## 2022-05-23 PROBLEM — E87.1 HYPONATREMIA: Status: ACTIVE | Noted: 2022-05-23

## 2022-05-23 PROBLEM — L03.211 CELLULITIS OF FACE: Status: ACTIVE | Noted: 2022-05-23

## 2022-05-23 PROBLEM — D69.6 THROMBOCYTOPENIA (HCC): Status: ACTIVE | Noted: 2022-05-23

## 2022-05-23 LAB
ALBUMIN SERPL BCP-MCNC: 3.7 G/DL (ref 3.2–4.9)
ALBUMIN/GLOB SERPL: 1 G/DL
ALP SERPL-CCNC: 125 U/L (ref 30–99)
ALT SERPL-CCNC: 15 U/L (ref 2–50)
ANION GAP SERPL CALC-SCNC: 10 MMOL/L (ref 7–16)
AST SERPL-CCNC: 19 U/L (ref 12–45)
BASOPHILS # BLD AUTO: 0.3 % (ref 0–1.8)
BASOPHILS # BLD: 0.02 K/UL (ref 0–0.12)
BILIRUB SERPL-MCNC: 0.8 MG/DL (ref 0.1–1.5)
BUN SERPL-MCNC: 15 MG/DL (ref 8–22)
CALCIUM SERPL-MCNC: 8.8 MG/DL (ref 8.5–10.5)
CHLORIDE SERPL-SCNC: 100 MMOL/L (ref 96–112)
CO2 SERPL-SCNC: 26 MMOL/L (ref 20–33)
CREAT SERPL-MCNC: 1.14 MG/DL (ref 0.5–1.4)
EOSINOPHIL # BLD AUTO: 0.22 K/UL (ref 0–0.51)
EOSINOPHIL NFR BLD: 3.3 % (ref 0–6.9)
ERYTHROCYTE [DISTWIDTH] IN BLOOD BY AUTOMATED COUNT: 44.5 FL (ref 35.9–50)
EST. AVERAGE GLUCOSE BLD GHB EST-MCNC: 200 MG/DL
GFR SERPLBLD CREATININE-BSD FMLA CKD-EPI: 75 ML/MIN/1.73 M 2
GLOBULIN SER CALC-MCNC: 3.6 G/DL (ref 1.9–3.5)
GLUCOSE BLD STRIP.AUTO-MCNC: 139 MG/DL (ref 65–99)
GLUCOSE BLD STRIP.AUTO-MCNC: 177 MG/DL (ref 65–99)
GLUCOSE BLD STRIP.AUTO-MCNC: 182 MG/DL (ref 65–99)
GLUCOSE BLD STRIP.AUTO-MCNC: 184 MG/DL (ref 65–99)
GLUCOSE BLD STRIP.AUTO-MCNC: 189 MG/DL (ref 65–99)
GLUCOSE SERPL-MCNC: 197 MG/DL (ref 65–99)
GRAM STN SPEC: NORMAL
HBA1C MFR BLD: 8.6 % (ref 4–5.6)
HCT VFR BLD AUTO: 44.9 % (ref 42–52)
HGB BLD-MCNC: 15.4 G/DL (ref 14–18)
IMM GRANULOCYTES # BLD AUTO: 0.03 K/UL (ref 0–0.11)
IMM GRANULOCYTES NFR BLD AUTO: 0.4 % (ref 0–0.9)
LYMPHOCYTES # BLD AUTO: 1.66 K/UL (ref 1–4.8)
LYMPHOCYTES NFR BLD: 24.9 % (ref 22–41)
MCH RBC QN AUTO: 30.4 PG (ref 27–33)
MCHC RBC AUTO-ENTMCNC: 34.3 G/DL (ref 33.7–35.3)
MCV RBC AUTO: 88.7 FL (ref 81.4–97.8)
MONOCYTES # BLD AUTO: 0.63 K/UL (ref 0–0.85)
MONOCYTES NFR BLD AUTO: 9.4 % (ref 0–13.4)
NEUTROPHILS # BLD AUTO: 4.12 K/UL (ref 1.82–7.42)
NEUTROPHILS NFR BLD: 61.7 % (ref 44–72)
NRBC # BLD AUTO: 0 K/UL
NRBC BLD-RTO: 0 /100 WBC
PLATELET # BLD AUTO: 108 K/UL (ref 164–446)
PMV BLD AUTO: 8.5 FL (ref 9–12.9)
POTASSIUM SERPL-SCNC: 3.6 MMOL/L (ref 3.6–5.5)
PROT SERPL-MCNC: 7.3 G/DL (ref 6–8.2)
RBC # BLD AUTO: 5.06 M/UL (ref 4.7–6.1)
SIGNIFICANT IND 70042: NORMAL
SITE SITE: NORMAL
SODIUM SERPL-SCNC: 136 MMOL/L (ref 135–145)
SOURCE SOURCE: NORMAL
WBC # BLD AUTO: 6.7 K/UL (ref 4.8–10.8)

## 2022-05-23 PROCEDURE — 83036 HEMOGLOBIN GLYCOSYLATED A1C: CPT

## 2022-05-23 PROCEDURE — 700111 HCHG RX REV CODE 636 W/ 250 OVERRIDE (IP): Performed by: STUDENT IN AN ORGANIZED HEALTH CARE EDUCATION/TRAINING PROGRAM

## 2022-05-23 PROCEDURE — 80053 COMPREHEN METABOLIC PANEL: CPT

## 2022-05-23 PROCEDURE — 700102 HCHG RX REV CODE 250 W/ 637 OVERRIDE(OP): Performed by: STUDENT IN AN ORGANIZED HEALTH CARE EDUCATION/TRAINING PROGRAM

## 2022-05-23 PROCEDURE — A9270 NON-COVERED ITEM OR SERVICE: HCPCS | Performed by: STUDENT IN AN ORGANIZED HEALTH CARE EDUCATION/TRAINING PROGRAM

## 2022-05-23 PROCEDURE — 96372 THER/PROPH/DIAG INJ SC/IM: CPT

## 2022-05-23 PROCEDURE — 700105 HCHG RX REV CODE 258: Performed by: STUDENT IN AN ORGANIZED HEALTH CARE EDUCATION/TRAINING PROGRAM

## 2022-05-23 PROCEDURE — 87205 SMEAR GRAM STAIN: CPT

## 2022-05-23 PROCEDURE — 87070 CULTURE OTHR SPECIMN AEROBIC: CPT

## 2022-05-23 PROCEDURE — 770001 HCHG ROOM/CARE - MED/SURG/GYN PRIV*

## 2022-05-23 PROCEDURE — 76536 US EXAM OF HEAD AND NECK: CPT

## 2022-05-23 PROCEDURE — 85025 COMPLETE CBC W/AUTO DIFF WBC: CPT

## 2022-05-23 PROCEDURE — 82962 GLUCOSE BLOOD TEST: CPT | Mod: 91

## 2022-05-23 PROCEDURE — 96375 TX/PRO/DX INJ NEW DRUG ADDON: CPT

## 2022-05-23 PROCEDURE — 96376 TX/PRO/DX INJ SAME DRUG ADON: CPT

## 2022-05-23 PROCEDURE — 36415 COLL VENOUS BLD VENIPUNCTURE: CPT

## 2022-05-23 PROCEDURE — 87147 CULTURE TYPE IMMUNOLOGIC: CPT

## 2022-05-23 PROCEDURE — 70486 CT MAXILLOFACIAL W/O DYE: CPT

## 2022-05-23 PROCEDURE — 87186 SC STD MICRODIL/AGAR DIL: CPT | Mod: 91

## 2022-05-23 PROCEDURE — 87077 CULTURE AEROBIC IDENTIFY: CPT

## 2022-05-23 PROCEDURE — 94640 AIRWAY INHALATION TREATMENT: CPT

## 2022-05-23 RX ORDER — INSULIN LISPRO 100 [IU]/ML
1-6 INJECTION, SOLUTION INTRAVENOUS; SUBCUTANEOUS EVERY 6 HOURS
Status: DISCONTINUED | OUTPATIENT
Start: 2022-05-23 | End: 2022-05-23

## 2022-05-23 RX ORDER — ONDANSETRON 4 MG/1
4 TABLET, ORALLY DISINTEGRATING ORAL EVERY 4 HOURS PRN
Status: DISCONTINUED | OUTPATIENT
Start: 2022-05-23 | End: 2022-05-27 | Stop reason: HOSPADM

## 2022-05-23 RX ORDER — DEXTROSE MONOHYDRATE 25 G/50ML
25 INJECTION, SOLUTION INTRAVENOUS
Status: DISCONTINUED | OUTPATIENT
Start: 2022-05-23 | End: 2022-05-23

## 2022-05-23 RX ORDER — HYDRALAZINE HYDROCHLORIDE 20 MG/ML
10 INJECTION INTRAMUSCULAR; INTRAVENOUS EVERY 4 HOURS PRN
Status: DISCONTINUED | OUTPATIENT
Start: 2022-05-23 | End: 2022-05-27 | Stop reason: HOSPADM

## 2022-05-23 RX ORDER — DIPHENHYDRAMINE HCL 25 MG
50 TABLET ORAL NIGHTLY PRN
Refills: 2 | Status: DISCONTINUED | OUTPATIENT
Start: 2022-05-23 | End: 2022-05-27 | Stop reason: HOSPADM

## 2022-05-23 RX ORDER — CLOTRIMAZOLE 1 %
CREAM (GRAM) TOPICAL 2 TIMES DAILY
Status: DISCONTINUED | OUTPATIENT
Start: 2022-05-23 | End: 2022-05-27 | Stop reason: HOSPADM

## 2022-05-23 RX ORDER — GABAPENTIN 400 MG/1
800 CAPSULE ORAL DAILY
Refills: 3 | Status: DISCONTINUED | OUTPATIENT
Start: 2022-05-23 | End: 2022-05-27 | Stop reason: HOSPADM

## 2022-05-23 RX ORDER — PROCHLORPERAZINE EDISYLATE 5 MG/ML
5-10 INJECTION INTRAMUSCULAR; INTRAVENOUS EVERY 4 HOURS PRN
Status: DISCONTINUED | OUTPATIENT
Start: 2022-05-23 | End: 2022-05-27 | Stop reason: HOSPADM

## 2022-05-23 RX ORDER — MORPHINE SULFATE 4 MG/ML
1 INJECTION INTRAVENOUS
Status: COMPLETED | OUTPATIENT
Start: 2022-05-23 | End: 2022-05-23

## 2022-05-23 RX ORDER — POLYETHYLENE GLYCOL 3350 17 G/17G
1 POWDER, FOR SOLUTION ORAL
Status: DISCONTINUED | OUTPATIENT
Start: 2022-05-23 | End: 2022-05-27 | Stop reason: HOSPADM

## 2022-05-23 RX ORDER — ALBUTEROL SULFATE 90 UG/1
1-2 AEROSOL, METERED RESPIRATORY (INHALATION) EVERY 6 HOURS
Status: DISCONTINUED | OUTPATIENT
Start: 2022-05-23 | End: 2022-05-27 | Stop reason: HOSPADM

## 2022-05-23 RX ORDER — SODIUM CHLORIDE 9 MG/ML
INJECTION, SOLUTION INTRAVENOUS CONTINUOUS
Status: DISCONTINUED | OUTPATIENT
Start: 2022-05-23 | End: 2022-05-24

## 2022-05-23 RX ORDER — ACETAMINOPHEN 500 MG
500-1000 TABLET ORAL EVERY 6 HOURS PRN
COMMUNITY
End: 2022-09-09

## 2022-05-23 RX ORDER — OXYCODONE HYDROCHLORIDE 5 MG/1
5 TABLET ORAL
Status: DISCONTINUED | OUTPATIENT
Start: 2022-05-23 | End: 2022-05-27 | Stop reason: HOSPADM

## 2022-05-23 RX ORDER — AMOXICILLIN 250 MG
2 CAPSULE ORAL 2 TIMES DAILY
Status: DISCONTINUED | OUTPATIENT
Start: 2022-05-23 | End: 2022-05-27 | Stop reason: HOSPADM

## 2022-05-23 RX ORDER — DOXYCYCLINE 100 MG/1
100 TABLET ORAL EVERY 12 HOURS
Status: DISCONTINUED | OUTPATIENT
Start: 2022-05-23 | End: 2022-05-27 | Stop reason: HOSPADM

## 2022-05-23 RX ORDER — ACETAMINOPHEN 325 MG/1
650 TABLET ORAL EVERY 6 HOURS
Status: DISCONTINUED | OUTPATIENT
Start: 2022-05-23 | End: 2022-05-27 | Stop reason: HOSPADM

## 2022-05-23 RX ORDER — HYDROMORPHONE HYDROCHLORIDE 1 MG/ML
0.5 INJECTION, SOLUTION INTRAMUSCULAR; INTRAVENOUS; SUBCUTANEOUS
Status: DISCONTINUED | OUTPATIENT
Start: 2022-05-23 | End: 2022-05-23

## 2022-05-23 RX ORDER — OXYCODONE HYDROCHLORIDE 10 MG/1
10 TABLET ORAL
Status: DISCONTINUED | OUTPATIENT
Start: 2022-05-23 | End: 2022-05-27 | Stop reason: HOSPADM

## 2022-05-23 RX ORDER — OMEPRAZOLE 20 MG/1
40 CAPSULE, DELAYED RELEASE ORAL DAILY
Refills: 3 | Status: DISCONTINUED | OUTPATIENT
Start: 2022-05-23 | End: 2022-05-24

## 2022-05-23 RX ORDER — PROMETHAZINE HYDROCHLORIDE 25 MG/1
12.5-25 TABLET ORAL EVERY 4 HOURS PRN
Status: DISCONTINUED | OUTPATIENT
Start: 2022-05-23 | End: 2022-05-27 | Stop reason: HOSPADM

## 2022-05-23 RX ORDER — ONDANSETRON 2 MG/ML
4 INJECTION INTRAMUSCULAR; INTRAVENOUS EVERY 4 HOURS PRN
Status: DISCONTINUED | OUTPATIENT
Start: 2022-05-23 | End: 2022-05-27 | Stop reason: HOSPADM

## 2022-05-23 RX ORDER — ZOLPIDEM TARTRATE 5 MG/1
10 TABLET ORAL
Status: DISCONTINUED | OUTPATIENT
Start: 2022-05-23 | End: 2022-05-27 | Stop reason: HOSPADM

## 2022-05-23 RX ORDER — MORPHINE SULFATE 4 MG/ML
1 INJECTION INTRAVENOUS ONCE
Status: DISCONTINUED | OUTPATIENT
Start: 2022-05-23 | End: 2022-05-23

## 2022-05-23 RX ORDER — INSULIN LISPRO 100 [IU]/ML
1-6 INJECTION, SOLUTION INTRAVENOUS; SUBCUTANEOUS
Status: DISCONTINUED | OUTPATIENT
Start: 2022-05-23 | End: 2022-05-27 | Stop reason: HOSPADM

## 2022-05-23 RX ORDER — INSULIN LISPRO 100 [IU]/ML
1-6 INJECTION, SOLUTION INTRAVENOUS; SUBCUTANEOUS
Status: DISCONTINUED | OUTPATIENT
Start: 2022-05-24 | End: 2022-05-23

## 2022-05-23 RX ORDER — BISACODYL 10 MG
10 SUPPOSITORY, RECTAL RECTAL
Status: DISCONTINUED | OUTPATIENT
Start: 2022-05-23 | End: 2022-05-27 | Stop reason: HOSPADM

## 2022-05-23 RX ORDER — PROMETHAZINE HYDROCHLORIDE 25 MG/1
12.5-25 SUPPOSITORY RECTAL EVERY 4 HOURS PRN
Status: DISCONTINUED | OUTPATIENT
Start: 2022-05-23 | End: 2022-05-27 | Stop reason: HOSPADM

## 2022-05-23 RX ORDER — FLUTICASONE PROPIONATE 110 UG/1
1 AEROSOL, METERED RESPIRATORY (INHALATION) EVERY 12 HOURS
Status: DISCONTINUED | OUTPATIENT
Start: 2022-05-23 | End: 2022-05-27 | Stop reason: HOSPADM

## 2022-05-23 RX ORDER — ACETAMINOPHEN 325 MG/1
650 TABLET ORAL EVERY 6 HOURS PRN
Status: DISCONTINUED | OUTPATIENT
Start: 2022-05-23 | End: 2022-05-27 | Stop reason: HOSPADM

## 2022-05-23 RX ORDER — DEXTROSE MONOHYDRATE 25 G/50ML
25 INJECTION, SOLUTION INTRAVENOUS
Status: DISCONTINUED | OUTPATIENT
Start: 2022-05-23 | End: 2022-05-27 | Stop reason: HOSPADM

## 2022-05-23 RX ADMIN — FLUTICASONE PROPIONATE 110 MCG: 110 AEROSOL, METERED RESPIRATORY (INHALATION) at 06:21

## 2022-05-23 RX ADMIN — SODIUM CHLORIDE: 9 INJECTION, SOLUTION INTRAVENOUS at 01:25

## 2022-05-23 RX ADMIN — GABAPENTIN 800 MG: 400 CAPSULE ORAL at 06:19

## 2022-05-23 RX ADMIN — ACETAMINOPHEN 650 MG: 325 TABLET ORAL at 20:39

## 2022-05-23 RX ADMIN — ZOLPIDEM TARTRATE 10 MG: 5 TABLET ORAL at 22:42

## 2022-05-23 RX ADMIN — AMITRIPTYLINE HYDROCHLORIDE 150 MG: 100 TABLET, FILM COATED ORAL at 03:56

## 2022-05-23 RX ADMIN — OMEPRAZOLE 40 MG: 20 CAPSULE, DELAYED RELEASE ORAL at 06:19

## 2022-05-23 RX ADMIN — DIPHENHYDRAMINE HYDROCHLORIDE 50 MG: 25 TABLET ORAL at 04:36

## 2022-05-23 RX ADMIN — INSULIN LISPRO 1 UNITS: 100 INJECTION, SOLUTION INTRAVENOUS; SUBCUTANEOUS at 11:37

## 2022-05-23 RX ADMIN — ALBUTEROL SULFATE 2 PUFF: 90 AEROSOL, METERED RESPIRATORY (INHALATION) at 11:25

## 2022-05-23 RX ADMIN — OXYCODONE HYDROCHLORIDE 10 MG: 10 TABLET ORAL at 11:47

## 2022-05-23 RX ADMIN — HYDROMORPHONE HYDROCHLORIDE 0.5 MG: 1 INJECTION, SOLUTION INTRAMUSCULAR; INTRAVENOUS; SUBCUTANEOUS at 01:46

## 2022-05-23 RX ADMIN — ALBUTEROL SULFATE 2 PUFF: 90 AEROSOL, METERED RESPIRATORY (INHALATION) at 01:27

## 2022-05-23 RX ADMIN — SODIUM CHLORIDE 3 G: 900 INJECTION INTRAVENOUS at 11:25

## 2022-05-23 RX ADMIN — PROMETHAZINE HYDROCHLORIDE 25 MG: 25 TABLET ORAL at 02:39

## 2022-05-23 RX ADMIN — ALBUTEROL SULFATE 2 PUFF: 90 AEROSOL, METERED RESPIRATORY (INHALATION) at 21:13

## 2022-05-23 RX ADMIN — SODIUM CHLORIDE 3 G: 900 INJECTION INTRAVENOUS at 18:42

## 2022-05-23 RX ADMIN — AMITRIPTYLINE HYDROCHLORIDE 150 MG: 100 TABLET, FILM COATED ORAL at 22:42

## 2022-05-23 RX ADMIN — ONDANSETRON HYDROCHLORIDE 4 MG: 2 SOLUTION INTRAMUSCULAR; INTRAVENOUS at 20:39

## 2022-05-23 RX ADMIN — MORPHINE SULFATE 1 MG: 4 INJECTION INTRAVENOUS at 17:05

## 2022-05-23 RX ADMIN — OXYCODONE HYDROCHLORIDE 10 MG: 10 TABLET ORAL at 05:11

## 2022-05-23 RX ADMIN — OXYCODONE HYDROCHLORIDE 10 MG: 10 TABLET ORAL at 20:39

## 2022-05-23 RX ADMIN — ACETAMINOPHEN 650 MG: 325 TABLET ORAL at 15:14

## 2022-05-23 RX ADMIN — OXYCODONE HYDROCHLORIDE 10 MG: 10 TABLET ORAL at 15:13

## 2022-05-23 RX ADMIN — INSULIN LISPRO 1 UNITS: 100 INJECTION, SOLUTION INTRAVENOUS; SUBCUTANEOUS at 18:42

## 2022-05-23 RX ADMIN — ONDANSETRON HYDROCHLORIDE 4 MG: 2 SOLUTION INTRAMUSCULAR; INTRAVENOUS at 01:24

## 2022-05-23 RX ADMIN — DOXYCYCLINE 100 MG: 100 TABLET, FILM COATED ORAL at 06:19

## 2022-05-23 RX ADMIN — CLOTRIMAZOLE: 10 CREAM TOPICAL at 18:41

## 2022-05-23 RX ADMIN — SODIUM CHLORIDE 3 G: 900 INJECTION INTRAVENOUS at 23:57

## 2022-05-23 RX ADMIN — DIPHENHYDRAMINE HYDROCHLORIDE 50 MG: 25 TABLET ORAL at 22:42

## 2022-05-23 RX ADMIN — APIXABAN 5 MG: 5 TABLET, FILM COATED ORAL at 18:42

## 2022-05-23 RX ADMIN — SODIUM CHLORIDE 3 G: 900 INJECTION INTRAVENOUS at 06:12

## 2022-05-23 RX ADMIN — DOXYCYCLINE 100 MG: 100 TABLET, FILM COATED ORAL at 18:42

## 2022-05-23 RX ADMIN — INSULIN LISPRO 1 UNITS: 100 INJECTION, SOLUTION INTRAVENOUS; SUBCUTANEOUS at 02:19

## 2022-05-23 RX ADMIN — APIXABAN 5 MG: 5 TABLET, FILM COATED ORAL at 03:56

## 2022-05-23 RX ADMIN — TIOTROPIUM BROMIDE INHALATION SPRAY 5 MCG: 3.12 SPRAY, METERED RESPIRATORY (INHALATION) at 06:21

## 2022-05-23 RX ADMIN — ALBUTEROL SULFATE 1 PUFF: 90 AEROSOL, METERED RESPIRATORY (INHALATION) at 06:21

## 2022-05-23 RX ADMIN — INSULIN LISPRO 1 UNITS: 100 INJECTION, SOLUTION INTRAVENOUS; SUBCUTANEOUS at 06:29

## 2022-05-23 RX ADMIN — ZOLPIDEM TARTRATE 10 MG: 5 TABLET ORAL at 05:25

## 2022-05-23 RX ADMIN — CLOTRIMAZOLE: 10 CREAM TOPICAL at 06:18

## 2022-05-23 ASSESSMENT — LIFESTYLE VARIABLES
ON A TYPICAL DAY WHEN YOU DRINK ALCOHOL HOW MANY DRINKS DO YOU HAVE: 0
ALCOHOL_USE: NO
TOTAL SCORE: 0
HAVE YOU EVER FELT YOU SHOULD CUT DOWN ON YOUR DRINKING: NO
AVERAGE NUMBER OF DAYS PER WEEK YOU HAVE A DRINK CONTAINING ALCOHOL: 0
HAVE PEOPLE ANNOYED YOU BY CRITICIZING YOUR DRINKING: NO
CONSUMPTION TOTAL: NEGATIVE
DOES PATIENT WANT TO STOP DRINKING: NO
TOTAL SCORE: 0
EVER FELT BAD OR GUILTY ABOUT YOUR DRINKING: NO
HOW MANY TIMES IN THE PAST YEAR HAVE YOU HAD 5 OR MORE DRINKS IN A DAY: 0
EVER HAD A DRINK FIRST THING IN THE MORNING TO STEADY YOUR NERVES TO GET RID OF A HANGOVER: NO
TOTAL SCORE: 0

## 2022-05-23 ASSESSMENT — COGNITIVE AND FUNCTIONAL STATUS - GENERAL
CLIMB 3 TO 5 STEPS WITH RAILING: TOTAL
STANDING UP FROM CHAIR USING ARMS: A LITTLE
MOVING FROM LYING ON BACK TO SITTING ON SIDE OF FLAT BED: A LITTLE
DRESSING REGULAR LOWER BODY CLOTHING: A LITTLE
DAILY ACTIVITIY SCORE: 21
TOILETING: A LITTLE
HELP NEEDED FOR BATHING: A LITTLE
WALKING IN HOSPITAL ROOM: TOTAL
SUGGESTED CMS G CODE MODIFIER MOBILITY: CK
MOBILITY SCORE: 15
MOVING TO AND FROM BED TO CHAIR: A LITTLE
SUGGESTED CMS G CODE MODIFIER DAILY ACTIVITY: CJ

## 2022-05-23 ASSESSMENT — PAIN DESCRIPTION - PAIN TYPE
TYPE: ACUTE PAIN

## 2022-05-23 NOTE — ED NOTES
Report from Rebeca RN, assumed care of patient. Pt asleep in bed, able to arouse but groggy from sleep medication administered a few hours ago. Pt maintaining airways, VSS. On 2L NC. Will continue to monitor. Call light within reach.

## 2022-05-23 NOTE — ED NOTES
Pt medicated per MAR with morning meds, pt resting on gurney comfortably, denies further needs at this time.

## 2022-05-23 NOTE — ED NOTES
Pt medicated per MAR. Pain meds given for 10/10 pain to face. Pt given diet coke, does not want food at this time. On all monitoring, NAD, on oxy mask. Call light within reach.

## 2022-05-23 NOTE — ED TRIAGE NOTES
Chief Complaint   Patient presents with   • Facial Swelling     Pt BIB EMS from home for above complaint. Pt was seen on Thursday for scab under nose and prescribed clindamycin. Since Thursday, pt reports wound has doubled in size and began draining pus. Pt complaining of 10/10 sinus/faical pain.

## 2022-05-23 NOTE — ED NOTES
Report given to CARLOS ALBERTO Justice.Pt moved on to hospital bed & moved to [address]. U/S at bedside.

## 2022-05-23 NOTE — CARE PLAN
The patient is Stable - Low risk of patient condition declining or worsening         Progress made toward(s) clinical / shift goals: Prevent skin breakdown by turning.    Patient is not progressing towards the following goals: Attempting to manage pain appropriately.      Problem: Pain - Standard  Goal: Alleviation of pain or a reduction in pain to the patient’s comfort goal  Outcome: Progressing     Problem: Knowledge Deficit - Standard  Goal: Patient and family/care givers will demonstrate understanding of plan of care, disease process/condition, diagnostic tests and medications  Outcome: Progressing     Problem: Skin Integrity  Goal: Skin integrity is maintained or improved  Outcome: Progressing     Problem: Fall Risk  Goal: Patient will remain free from falls  Outcome: Progressing

## 2022-05-23 NOTE — ED PROVIDER NOTES
CHIEF COMPLAINT  Chief Complaint   Patient presents with   • Facial Swelling       [facility]  Benito Persaud is a 57 y.o. male who presents tonight for the second time in 3 days for chief complaint of swelling to his upper lip which has now worsened and extending into his facial and nasal region.  Patient was seen here on Thursday for a smaller lesion.  He was placed on clindamycin 300 mg 3 times daily for 7 days.  He states he has been taking it regularly but the swelling has gotten worse and he cannot stand it any longer.  He states it is gone up into his nose and facial area and he cannot talk or move without severe pain.  He states he has been running a temperature and his T-max was 100.8.  He denies any nausea, vomiting, fever or shaking chills.  He does have a history of leukemia and has a Port-A-Cath in his left subclavian region.    REVIEW OF SYSTEMS  See HPI for further details. All other system reviews are negative.    PAST MEDICAL HISTORY  Past Medical History:   Diagnosis Date   • Asthma    • Cancer ([facility]) 11/01/2016   • MI (myocardial infarction) ([facility]) 2019       FAMILY HISTORY  Family History   Problem Relation Age of Onset   • Cancer Mother    • Psychiatric Illness Mother    • Diabetes Mother    • Hypertension Mother    • Hyperlipidemia Mother    • Arterial Aneurysm Father    • Heart Disease Maternal Grandmother        SOCIAL HISTORY  Social History     Socioeconomic History   • Marital status: Single   Tobacco Use   • Smoking status: Never Smoker   • Smokeless tobacco: Current User     Types: Chew   Vaping Use   • Vaping Use: Never used   Substance and Sexual Activity   • Alcohol use: Not Currently   • Drug use: Not Currently     Social Determinants of Health     Financial Resource Strain: High Risk   • Difficulty of Paying Living Expenses: Very hard   Food Insecurity: Food Insecurity Present   • Worried About Running Out of Food in the Last Year: Sometimes true   • Ran Out of Food in the Last Year:  "Sometimes true   Transportation Needs: Unmet Transportation Needs   • Lack of Transportation (Medical): Yes   • Lack of Transportation (Non-Medical): No       SURGICAL HISTORY  Past Surgical History:   Procedure Laterality Date   • CATH REMOVAL N/A 8/14/2019    Procedure: REMOVAL, CATHETER AND PLACEMENT OF POWER PORT;  Surgeon: Sonny Enamorado M.D.;  Location: SURGERY [facility];  Service: General       CURRENT MEDICATIONS  See nurses notes    ALLERGIES  Allergies   Allergen Reactions   • Green Beans Anaphylaxis   • Iodine Anaphylaxis   • Keflex Diarrhea and Vomiting     Vomiting & Diarrhea  Tolerates ceftriaxone   • Reglan [Metoclopramide] Nausea     Asthma     • Shellfish Allergy Anaphylaxis   • Toradol Hives       PHYSICAL EXAM  VITAL SIGNS: BP (!) 146/99   Pulse 88   Temp 36.4 °C (97.6 °F) (Temporal)   Resp 20   Ht 1.803 m (5' 11\")   Wt 125 kg (275 lb)   SpO2 96%   BMI 38.35 kg/m²     Constitutional: Patient is well developed, well nourished in moderate distress from his lip swelling.  HENT: Normocephalic,  Oropharynx moist without erythema or exudates, moderate amount of soft tissue swelling to his upper lip extending into his nostrils bilaterally and entire maxillary region.  There is increased erythema, warmth, purulent pustules just below the nares.  His teeth and gingiva just below the frenulum are very tender to the touch.  There is no focal abscess noted at this time.  Eyes: PERRL, EOMI, Conjunctiva without erythema or exudates.   Neck: Supple with no anterior/posterior cervical adenopathy.  Lymphatic: No lymphadenopathy noted.   Cardiovascular: Normal heart rate and rhythm. No murmur  Thorax & Lungs: Clear and equal breath sounds with good excursion. No respiratory distress, no rhonchi, wheezing.  Abdomen: Bowel sounds normal in all four quadrants. Soft,nontender  Skin: Warm, Dry  Back: No cervical, thoracic, or lumbosacral tenderness.   Extremities: Peripheral pulses 4/4 No edema, No " tenderness  Musculoskeletal: Normal range of motion in all major joints.  Neurologic: Alert & oriented x 3, Normal motor function, Normal sensory function  Psychiatric: Affect anxious.       Results for orders placed or performed during the hospital encounter of 05/22/22   CBC WITH DIFFERENTIAL   Result Value Ref Range    WBC 6.2 4.8 - 10.8 K/uL    RBC 5.25 4.70 - 6.10 M/uL    Hemoglobin 15.7 14.0 - 18.0 g/dL    Hematocrit 45.0 42.0 - 52.0 %    MCV 85.7 81.4 - 97.8 fL    MCH 29.9 27.0 - 33.0 pg    MCHC 34.9 33.7 - 35.3 g/dL    RDW 43.2 35.9 - 50.0 fL    Platelet Count 107 (L) 164 - 446 K/uL    MPV 9.1 9.0 - 12.9 fL    Neutrophils-Polys 58.40 44.00 - 72.00 %    Lymphocytes 29.50 22.00 - 41.00 %    Monocytes 9.00 0.00 - 13.40 %    Eosinophils 2.60 0.00 - 6.90 %    Basophils 0.20 0.00 - 1.80 %    Immature Granulocytes 0.30 0.00 - 0.90 %    Nucleated RBC 0.00 /100 WBC    Neutrophils (Absolute) 3.64 1.82 - 7.42 K/uL    Lymphs (Absolute) 1.84 1.00 - 4.80 K/uL    Monos (Absolute) 0.56 0.00 - 0.85 K/uL    Eos (Absolute) 0.16 0.00 - 0.51 K/uL    Baso (Absolute) 0.01 0.00 - 0.12 K/uL    Immature Granulocytes (abs) 0.02 0.00 - 0.11 K/uL    NRBC (Absolute) 0.00 K/uL   COMP METABOLIC PANEL   Result Value Ref Range    Sodium 132 (L) 135 - 145 mmol/L    Potassium 3.8 3.6 - 5.5 mmol/L    Chloride 100 96 - 112 mmol/L    Co2 20 20 - 33 mmol/L    Anion Gap 12.0 7.0 - 16.0    Glucose 240 (H) 65 - 99 mg/dL    Bun 15 8 - 22 mg/dL    Creatinine 1.06 0.50 - 1.40 mg/dL    Calcium 9.0 8.5 - 10.5 mg/dL    AST(SGOT) 18 12 - 45 U/L    ALT(SGPT) 14 2 - 50 U/L    Alkaline Phosphatase 129 (H) 30 - 99 U/L    Total Bilirubin 0.8 0.1 - 1.5 mg/dL    Albumin 3.5 3.2 - 4.9 g/dL    Total Protein 7.5 6.0 - 8.2 g/dL    Globulin 4.0 (H) 1.9 - 3.5 g/dL    A-G Ratio 0.9 g/dL   ESTIMATED GFR   Result Value Ref Range    GFR (CKD-EPI) 82 >60 mL/min/1.73 m 2   CBC with Differential   Result Value Ref Range    WBC 6.7 4.8 - 10.8 K/uL    RBC 5.06 4.70 - 6.10 M/uL     Hemoglobin 15.4 14.0 - 18.0 g/dL    Hematocrit 44.9 42.0 - 52.0 %    MCV 88.7 81.4 - 97.8 fL    MCH 30.4 27.0 - 33.0 pg    MCHC 34.3 33.7 - 35.3 g/dL    RDW 44.5 35.9 - 50.0 fL    Platelet Count 108 (L) 164 - 446 K/uL    MPV 8.5 (L) 9.0 - 12.9 fL    Neutrophils-Polys 61.70 44.00 - 72.00 %    Lymphocytes 24.90 22.00 - 41.00 %    Monocytes 9.40 0.00 - 13.40 %    Eosinophils 3.30 0.00 - 6.90 %    Basophils 0.30 0.00 - 1.80 %    Immature Granulocytes 0.40 0.00 - 0.90 %    Nucleated RBC 0.00 /100 WBC    Neutrophils (Absolute) 4.12 1.82 - 7.42 K/uL    Lymphs (Absolute) 1.66 1.00 - 4.80 K/uL    Monos (Absolute) 0.63 0.00 - 0.85 K/uL    Eos (Absolute) 0.22 0.00 - 0.51 K/uL    Baso (Absolute) 0.02 0.00 - 0.12 K/uL    Immature Granulocytes (abs) 0.03 0.00 - 0.11 K/uL    NRBC (Absolute) 0.00 K/uL   Comp Metabolic Panel (CMP)   Result Value Ref Range    Sodium 136 135 - 145 mmol/L    Potassium 3.6 3.6 - 5.5 mmol/L    Chloride 100 96 - 112 mmol/L    Co2 26 20 - 33 mmol/L    Anion Gap 10.0 7.0 - 16.0    Glucose 197 (H) 65 - 99 mg/dL    Bun 15 8 - 22 mg/dL    Creatinine 1.14 0.50 - 1.40 mg/dL    Calcium 8.8 8.5 - 10.5 mg/dL    AST(SGOT) 19 12 - 45 U/L    ALT(SGPT) 15 2 - 50 U/L    Alkaline Phosphatase 125 (H) 30 - 99 U/L    Total Bilirubin 0.8 0.1 - 1.5 mg/dL    Albumin 3.7 3.2 - 4.9 g/dL    Total Protein 7.3 6.0 - 8.2 g/dL    Globulin 3.6 (H) 1.9 - 3.5 g/dL    A-G Ratio 1.0 g/dL   HEMOGLOBIN A1C   Result Value Ref Range    Glycohemoglobin 8.6 (H) 4.0 - 5.6 %    Est Avg Glucose 200 mg/dL   ESTIMATED GFR   Result Value Ref Range    GFR (CKD-EPI) 75 >60 mL/min/1.73 m 2   POCT glucose device results   Result Value Ref Range    POC Glucose, Blood 177 (H) 65 - 99 mg/dL         RADIOLOGY/PROCEDURES  CT-MAXILLOFACIAL W/O   Final Result         1.  No acute traumatic facial bony injuries identified.   2.  Soft tissue swelling of the base of the nose involving the upper lip, could represent phlegmon or hematoma in the setting of trauma,  noncontrast technique limits evaluation for abscess.   3.  Mildly prominent bilateral cervical chain lymph nodes, may be reactive            COURSE & MEDICAL DECISION MAKING  Pertinent Labs & Imaging studies reviewed. (See chart for details)  Patient received an IV, laboratories were drawn we were unable to flush and withdraw properly with his left subclavian Port-A-Cath so therefore it was not used and it will need to be reevaluated while he is in the hospital.  His overall white blood cell count is normal.  His blood glucose was 177.  His CMP is unremarkable.  He was treated with Unasyn 3 g IV piggyback as he has failed outpatient clindamycin treatment.  Patient is extremely uncomfortable with any type of movement or talking with his lip.  He was treated with pain medication, morphine and Zofran.  I did not order a CT maxillofacial since we have a nationwide shortage of IV contrast because the patient will need to be treated regardless.  I spoke with [facility] doctor on-call.  And patient will be admitted to their service for further evaluation and treatment.  He is currently in guarded condition.    FINAL IMPRESSION  1.  Upper lip/Lisa nasal cellulitis  2.  Historical fever   3.         Electronically signed by: Opal De Dios D.O., 5/22/2022 10:02 [facility] Provider Note

## 2022-05-23 NOTE — ED NOTES
Pt's port accessed, flushed without resistance, no signs of infiltration but unable to get blood return. Per Dr. De Dios, port not used for medication administration and Daniel CARCAMO at bedside for US IV.

## 2022-05-23 NOTE — H&P
[facility] MEDICINE HISTORY AND PHYSICAL     PATIENT ID:  NAME:  Benito Persaud  MRN:               [id number]  YOB: 1965    Date of Admission: 5/22/2022     Attending: Mason Mullen MD    Resident: Jacobo Yepez M.D., PGY-3    Primary Care Physician:  Hanny Wills MD    CC:  Infection of upper lip    HPI: Benito Persaud is a 57 y.o. 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 He states he awoke on Tuesday and his upper lip felt strange and painful. Over the next few days it started to swell and the pain increased. He presented to the ER on Wednesday and was prescribed clindamycin as he has a hx of MRSA cellulitis of the left leg 2 months ago that has healed. He reports taking this medication, but his pain and swelling only worsened. The pain now extends from his upper lip to his eyebrows. He reports the pain is worse on the left side of his face and extends posteriorly to his ear. He woke up this morning and yellow-green-blood-tinged pus was on his shirt and pillow. He denies fevers or chills, but has been vomiting daily and is now having a few days of diarrhea. He reports he has not eaten since Tuesday and vomits after drinking most liquids. He also notice a rash of his right groin this morning.     REVIEW OF SYSTEMS:   Ten systems reviewed and were negative except as noted in the HPI.                PAST MEDICAL HISTORY:  Past Medical History:   Diagnosis Date   • Asthma    • Cancer ([facility]) 11/01/2016   • MI (myocardial infarction) ([facility]) 2019       PAST SURGICAL HISTORY:  Past Surgical History:   Procedure Laterality Date   • CATH REMOVAL N/A 8/14/2019    Procedure: REMOVAL, CATHETER AND PLACEMENT OF POWER PORT;   Surgeon: Sonny Enamorado M.D.;  Location: SURGERY [facility];  Service: General   Cholecystectomy, appendectomy, Kyphoplasty of lumbar spine, orchectomy     FAMILY HISTORY:  Family History   Problem Relation Age of Onset   • Cancer Mother    • Psychiatric Illness Mother    • Diabetes Mother    • Hypertension Mother    • Hyperlipidemia Mother    • Arterial Aneurysm Father    • Heart Disease Maternal Grandmother        SOCIAL HISTORY:   Living situation: Lives at [facility], like a group home. Uses a wheel chair due to extreme vertigo since CVA.  Tobacco: Daily chew tobacco, but has been cutting back significantly. Denies any cigarette use  Alcohol: denies   Illicit drugs: denies    DIET:   Orders Placed This Encounter   Procedures   • Diet Order Diet: Consistent CHO (Diabetic)     Standing Status:   Standing     Number of Occurrences:   1     Order Specific Question:   Diet:     Answer:   Consistent CHO (Diabetic) [4]       ALLERGIES:  Allergies   Allergen Reactions   • Green Beans Anaphylaxis   • Iodine Anaphylaxis   • Keflex Diarrhea and Vomiting     Vomiting & Diarrhea  Tolerates ceftriaxone   • Reglan [Metoclopramide] Nausea     Asthma     • Shellfish Allergy Anaphylaxis   • Toradol Hives       OUTPATIENT MEDICATIONS:    Current Facility-Administered Medications:   •  amitriptyline (ELAVIL) tablet 150 mg, 150 mg, Oral, HS PRN, Jacobo Yepez M.D.  •  diphenhydrAMINE (BENADRYL) tablet/capsule 50 mg, 50 mg, Oral, HS PRN, Jacobo Yepez M.D.  •  apixaban (ELIQUIS) tablet 5 mg, 5 mg, Oral, BID, Jacobo Yepez M.D.  •  fluticasone (FLOVENT HFA) 110 MCG/ACT inhaler 110 mcg, 1 Puff, Inhalation, Q12HRS, Jacobo Yepez M.D.  •  gabapentin (NEURONTIN) capsule 800 mg, 800 mg, Oral, DAILY, Jacobo Yepez M.D.  •  omeprazole (PRILOSEC) capsule 40 mg, 40 mg, Oral, DAILY, Jacobo Yepez M.D.  •  zolpidem (AMBIEN) tablet 10 mg, 10 mg, Oral, QHS, Jacobo Yepez M.D.  •  albuterol inhaler 1-2 Puff, 1-2  Puff, Inhalation, Q6HRS, Jacobo Yepez M.D.  •  tiotropium (Spiriva Respimat) 2.5 mcg/Act inhalation spray 5 mcg, 5 mcg, Inhalation, DAILY, Jacobo Yepez M.D.  •  senna-docusate (PERICOLACE or SENOKOT S) 8.6-50 MG per tablet 2 Tablet, 2 Tablet, Oral, BID **AND** polyethylene glycol/lytes (MIRALAX) PACKET 1 Packet, 1 Packet, Oral, QDAY PRN **AND** magnesium hydroxide (MILK OF MAGNESIA) suspension 30 mL, 30 mL, Oral, QDAY PRN **AND** bisacodyl (DULCOLAX) suppository 10 mg, 10 mg, Rectal, QDAY PRN, Jacobo Yepez M.D.  •  NS infusion, , Intravenous, Continuous, Jacobo Yepez M.D.  •  acetaminophen (Tylenol) tablet 650 mg, 650 mg, Oral, Q6HRS PRN, Jacobo Yepez M.D.  •  Notify provider if pain remains uncontrolled, , , CONTINUOUS **AND** Use the Numeric Rating Scale (NRS), Tai-Baker Faces (WBF), or FLACC on regular floors and Critical-Care Pain Observation Tool (CPOT) on ICUs/Trauma to assess pain, , , CONTINUOUS **AND** Pulse Ox, , , CONTINUOUS **AND** Pharmacy Consult Request ...Pain Management Review 1 Each, 1 Each, Other, PHARMACY TO DOSE **AND** If patient difficult to arouse and/or has respiratory depression (respiratory rate of 10 or less), stop any opiates that are currently infusing and call a Rapid Response., , , CONTINUOUS, Jacobo Yepez M.D.  •  oxyCODONE immediate-release (ROXICODONE) tablet 5 mg, 5 mg, Oral, Q3HRS PRN **OR** oxyCODONE immediate-release (ROXICODONE) tablet 10 mg, 10 mg, Oral, Q3HRS PRN **OR** HYDROmorphone (Dilaudid) injection 0.5 mg, 0.5 mg, Intravenous, Q3HRS PRN, Jacobo Yepez M.D.  •  ondansetron (ZOFRAN) syringe/vial injection 4 mg, 4 mg, Intravenous, Q4HRS PRN, Jacobo Yepez M.D.  •  ondansetron (ZOFRAN ODT) dispertab 4 mg, 4 mg, Oral, Q4HRS PRN, Jacobo Yepez M.D.  •  promethazine (PHENERGAN) tablet 12.5-25 mg, 12.5-25 mg, Oral, Q4HRS PRN, Jacobo Yepez M.D.  •  promethazine (PHENERGAN) suppository 12.5-25 mg, 12.5-25 mg, Rectal, Q4HRS PRN,  Jacobo Yepez M.D.  •  prochlorperazine (COMPAZINE) injection 5-10 mg, 5-10 mg, Intravenous, Q4HRS PRN, Jacobo Yepez M.D.  •  hydrALAZINE (APRESOLINE) injection 10 mg, 10 mg, Intravenous, Q4HRS PRN, Jacobo Yepez M.D.  •  insulin LISPRO (AdmeLOG,HumaLOG) injection, 1-6 Units, Subcutaneous, Q6HRS **AND** POC blood glucose manual result, , , Q6H **AND** NOTIFY MD and PharmD, , , Once **AND** Administer 20 grams of glucose (approximately 8 ounces of fruit juice) every 15 minutes PRN FSBG less than 70 mg/dL, , , PRN **AND** dextrose 50% (D50W) injection 25 g, 25 g, Intravenous, Q15 MIN PRN, Jacobo Yepez M.D.  •  ampicillin/sulbactam (UNASYN) 3 g in  mL IVPB, 3 g, Intravenous, Q6HRS, Jacobo Yepez M.D.  •  NS infusion, , Intravenous, Continuous, Opal De Dios D.O., Last Rate: 150 mL/hr at 05/22/22 2357, New Bag at 05/22/22 2357    Current Outpatient Medications:   •  acetaminophen (TYLENOL) 500 MG Tab, Take 500-1,000 mg by mouth every 6 hours as needed for Mild Pain., Disp: , Rfl:   •  omeprazole (PRILOSEC) 20 MG Tablet Delayed Response delayed-release tablet, TAKE 2 TABLETS BY MOUTH EVERY DAY, Disp: 180 Tablet, Rfl: 3  •  zolpidem (AMBIEN) 10 MG Tab, Take 1 Tablet by mouth at bedtime as needed for Sleep for up to 30 days., Disp: 30 Tablet, Rfl: 2  •  fluticasone (FLOVENT HFA) 110 MCG/ACT Aerosol, Inhale 1 Puff 2 times a day., Disp: 1 Each, Rfl: 2  •  metFORMIN (GLUCOPHAGE) 500 MG Tab, Take 2 Tablets by mouth 2 times a day with meals for 30 days., Disp: 120 Tablet, Rfl: 0  •  gabapentin (NEURONTIN) 800 MG tablet, TAKE 1 TABLET BY MOUTH THREE TIMES A DAY (Patient taking differently: Take 800 mg by mouth every day.), Disp: 90 Tablet, Rfl: 3  •  COMBIVENT RESPIMAT  MCG/ACT Aero Soln, INHALE 1 PUFF BY MOUTH 4 TIMES A DAY (Patient taking differently: Inhale 1 Puff 4 times a day.), Disp: 1 Each, Rfl: 5  •  amitriptyline (ELAVIL) 100 MG Tab, TAKE 1.5 TABLETS BY MOUTH AT BEDTIME AS NEEDED FOR  SLEEP., Disp: 30 Tablet, Rfl: 3  •  BANOPHEN 50 MG Cap, TAKE 1 CAPSULE BY MOUTH AT BEDTIME AS NEEDED FOR SLEEP (Patient taking differently: Take 50 mg by mouth at bedtime as needed.), Disp: 30 Capsule, Rfl: 2  •  ELIQUIS 5 MG Tab, TAKE 1 TABLET BY MOUTH TWICE A DAY (Patient taking differently: Take 5 mg by mouth 2 times a day.), Disp: 60 Tablet, Rfl: 3    PHYSICAL EXAM:  Vitals:    22 2133 22 2200 22 2300 22 0000   BP: (!) 146/99 (!) 150/92 (!) 154/81 139/85   Pulse: 88 86 87 89   Resp: 20 20 (!) 22 15   Temp: 36.4 °C (97.6 °F)      TempSrc: Temporal      SpO2: 96% 93% 93% 90%   Weight:       Height:       , Temp (24hrs), Av.4 °C (97.6 °F), Min:36.4 °C (97.6 °F), Max:36.4 °C (97.6 °F)  , Pulse Oximetry: 90 %, O2 Delivery Device: None - Room Air    General: Pt resting in NAD, cooperative   Skin:  Pink, warm and dry.  Right sided chest port in place  HEENT: NC/AT. PERRL. EOMI. MMM. No nasal discharge. Oropharynx nonerythematous without exudate/plaques. Upper lip is swollen with pustule that is crusted over. Warm and painful to the touch of the nose, bilateral maxillary sinuses. Tenderness extends to the ear on the left side.   Neck:  Supple without lymphadenopathy or rigidity. No JVD   Lungs:  Symmetrical.  CTAB with no W/R/R.  Good air movement   Cardiovascular:  S1/S2 RRR without M/R/G.  Abdomen:  Abdomen is soft NT/ND. +BS. No masses noted.  Genitourinary:  Rash of right side of groin.  Extremities:  Full range of motion. No gross deformities noted. 2+ pulses in all extremities.  Spine:  Straight without vertebral anomalies.  CNS:  Muscle tone is normal. Cranial nerves II-XII grossly intact. 2+ DTRs.       ERCourse:  Patient was found to have an infection of the upper lip with a pustule that was previously draining. Was previously on clindamycin x6 days outpatient, and symptoms have been worsening. Lab results show decreased platelet count (patient with MDS), low sodium of 132, elevated  "glucose of 240, and elevated Alk Phos of 129. He was treated with unasyn, morphine and NS.   He has a port in place as he was recently diagnosed with MDS and is planning on starting chemotherapy soon. The nursing staff was unable to draw from the port, and heparin flushes were unsuccessful.     LAB TESTS:   Recent Labs     05/22/22 2237   WBC 6.2   RBC 5.25   HEMOGLOBIN 15.7   HEMATOCRIT 45.0   MCV 85.7   MCH 29.9   RDW 43.2   PLATELETCT 107*   MPV 9.1   NEUTSPOLYS 58.40   LYMPHOCYTES 29.50   MONOCYTES 9.00   EOSINOPHILS 2.60   BASOPHILS 0.20         Recent Labs     05/22/22 2237   SODIUM 132*   POTASSIUM 3.8   CHLORIDE 100   CO2 20   BUN 15   CREATININE 1.06   CALCIUM 9.0   ALBUMIN 3.5       CULTURES:   Results     Procedure Component Value Units Date/Time    BLOOD CULTURE [[id number]] Collected: 05/22/22 2237    Order Status: Sent Specimen: Blood from Peripheral Updated: 05/22/22 2353    Narrative:      Per Hospital Policy: Only change Specimen Src: to \"Line\" if  specified by physician order.    BLOOD CULTURE [[id number]] Collected: 05/22/22 2312    Order Status: Sent Specimen: Blood from Peripheral Updated: 05/22/22 2352    Narrative:      Per Hospital Policy: Only change Specimen Src: to \"Line\" if  specified by physician order.    CULTURE WOUND W/ GRAM STAIN [[id number]]     Order Status: Sent Specimen: Wound from Abscess           IMAGES:  none    CONSULTS:   none    ASSESSMENT/PLAN: 57 y.o. 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 He is admitted for worseing cellulitis and likely abscess of the upper lip.     # Upper Lip Cellulitis and Abscess  # Hx of MRSA in March  Patient with 6 days of swelling and pain of the upper lip. " It is now spontaneously draining purulent fluid. He was on clindamycin PO outpatient and the symptoms worsened. He denies fevers, but has had nausea, vomiting, and diarrhea. Patient with significant tenderness  From his upper lip to his eyebrows L>R.   - admit patient  - Unasyn and Doxy (recent MRSA infection)  - IV fluids   - CT face to evaluate sinuses and possibility of deep infection, patient with anaphylaxis to contrast, will start with non-contrast  - wound care    # Hyponatremia  Mild hyponatremia of 132 in the setting of dehydration. IV fluids, recheck CMP in the AM    # Myelodysplastic Syndrome  Patient with port on the right chest, has not yet started chemo for MDS. Last chemotherapy was Oct 2020 for testicular cancer which is now in remission. The staff in the ER were unable to draw from the port and attempted heparin flushes with no success  - if unable to access port tomorrow, day team to consider vascular surgery consult    # Bates's Esophagus  - continue omeprazole BID    # Insomnia  Patient takes benadryl, amitriptyline, and ambien every night for sleep.  - continue sleep aid medications     # Chronic pain  - continue gabapentin    # DM2   Patient takes metformin BID  This is a recent diagnosis in the last few months.  - ISS while inpatient  - continue metformin outpatient  - goal BS <180 for wound healing  - A1c tomorrow    Abx: Unasyn and doxy  Lines: PIV, portacath in place, but staff has been unable to draw from this in the ER  DVT PPX: Eliquis  Dispo: Admit to inpatient     Code Status: DNR/DNI, patient completed POLST during last hospitalization    Jacobo Yepez M.D., PGY-3  [facility]

## 2022-05-23 NOTE — ED NOTES
Bed ready for pt - s6 [address]acho 01. Transport arranged, attempted to call report, RN to call back for telephone report.

## 2022-05-23 NOTE — ASSESSMENT & PLAN NOTE
Mild hyponatremia of 132 in the setting of dehydration on admission that has resolved with correction to 136 with NS @ 100ml/hr.

## 2022-05-23 NOTE — ED NOTES
Med rec completed per patient  Allergies reviewed    Patient currently on a course of clindamycin

## 2022-05-23 NOTE — PROGRESS NOTES
4 Eyes Skin Assessment Completed by Pardeep RN and Tonja RN.    Head Pealing sunburn  Ears WDL  Nose Redness  Mouth Redness  Neck Redness  Breast/Chest WDL  Shoulder Blades WDL  Spine WDL  (R) Arm/Elbow/Hand WDL  (L) Arm/Elbow/Hand WDL  Abdomen WDL  Groin WDL  Scrotum/Coccyx/Buttocks WDL  (R) Leg WDL  (L) Leg Redness  (R) Heel/Foot/Toe WDL  (L) Heel/Foot/Toe WDL          Devices In Places N/A      Interventions In Place N/A    Possible Skin Injury Yes    Pictures Uploaded Into Epic Yes  Wound Consult Placed Yes  RN Wound Prevention Protocol Ordered No

## 2022-05-23 NOTE — ASSESSMENT & PLAN NOTE
Patient with port on the right chest, has not yet started chemo for MDS. Last chemotherapy was Oct 2020 for testicular cancer which is now in remission. The staff in the ER were unable to draw from the port and attempted heparin flushes with no success  - consider vascular surgery consult as unable to flush    report

report given to o/c shift

## 2022-05-23 NOTE — ASSESSMENT & PLAN NOTE
Patient takes benadryl, amitriptyline, and ambien every night for sleep.  - continue sleep aid medications  -Consider outpatient de escalation of medications

## 2022-05-23 NOTE — PROGRESS NOTES
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     SUBJECTIVE:   No acute events overnight. Patient difficult to arouse this AM due to having just received Ambien for sleep.    OBJECTIVE:  Vitals:    05/23/22 0630 05/23/22 0700 05/23/22 0704 05/23/22 0801   BP: (!) 149/90 (!) 153/81 (!) 153/81 (!) 152/95   Pulse: (!) 109 (!) 108 (!) 109 (!) 109   Resp: 16 16     Temp:       TempSrc:       SpO2: 90% 96% 97% 95%   Weight:       Height:           Intake/Output Summary (Last 24 hours) at 5/23/2022 0526  Last data filed at 5/23/2022 0027  Gross per 24 hour   Intake 100 ml   Output --   Net 100 ml       PHYSICAL EXAM:   General: No acute distress, afebrile, resting comfortably  HEENT: NC/AT. EOMI. MMM. Upper lips swollen with pustule that is crusted over, swelling of upper lip and nose up to level of the eyes  Cardiovascular: RRR without murmurs, rubs, heaves. Normal capillary refill   Respiratory: CTAB, no tachypnea or retractions   Abdomen: soft, nontender, nondistended, no masses  EXT:  SARGENT, no edema  Skin: No erythema/lesions   Neuro: Non-focal, alert and orientated       LABS:  Recent Labs     05/22/22 2237 05/23/22  0327   WBC 6.2 6.7   RBC 5.25 5.06   HEMOGLOBIN 15.7 15.4   HEMATOCRIT 45.0 44.9   MCV 85.7 88.7   MCH 29.9 30.4   RDW 43.2 44.5   PLATELETCT 107* 108*   MPV 9.1 8.5*   NEUTSPOLYS 58.40 61.70   LYMPHOCYTES 29.50 24.90   MONOCYTES 9.00 9.40  "  EOSINOPHILS 2.60 3.30   BASOPHILS 0.20 0.30     Recent Labs     05/22/22 2237 05/23/22 0327   SODIUM 132* 136   POTASSIUM 3.8 3.6   CHLORIDE 100 100   CO2 20 26   BUN 15 15   CREATININE 1.06 1.14   CALCIUM 9.0 8.8   ALBUMIN 3.5 3.7     Estimated GFR/CRCL = Estimated Creatinine Clearance: 97.4 mL/min (by C-G formula based on SCr of 1.14 mg/dL).  Recent Labs     05/22/22 2237 05/23/22 0327   GLUCOSE 240* 197*     Recent Labs     05/22/22 2237 05/23/22 0327   ASTSGOT 18 19   ALTSGPT 14 15   TBILIRUBIN 0.8 0.8   ALKPHOSPHAT 129* 125*   GLOBULIN 4.0* 3.6*             No results for input(s): INR, APTT, FIBRINOGEN in the last 72 hours.    Invalid input(s): DIMER    IMAGING:   CT-MAXILLOFACIAL W/O   Final Result         1.  No acute traumatic facial bony injuries identified.   2.  Soft tissue swelling of the base of the nose involving the upper lip, could represent phlegmon or hematoma in the setting of trauma, noncontrast technique limits evaluation for abscess.   3.  Mildly prominent bilateral cervical chain lymph nodes, may be reactive          CULTURES:   Results     Procedure Component Value Units Date/Time    CULTURE WOUND W/ GRAM STAIN [[id number]] Collected: 05/23/22 0000    Order Status: Sent Specimen: Wound from Abscess Updated: 05/23/22 0650    BLOOD CULTURE [[id number]] Collected: 05/22/22 2312    Order Status: Completed Specimen: Blood from Peripheral Updated: 05/23/22 0623     Significant Indicator NEG     Source BLD     Site PERIPHERAL     Culture Result No Growth  Note: Blood cultures are incubated for 5 days and  are monitored continuously.Positive blood cultures  are called to the RN and reported as soon as  they are identified.      Narrative:      Per Hospital Policy: Only change Specimen Src: to \"Line\" if  specified by physician order.  Left Hand    BLOOD CULTURE [[id number]] Collected: 05/22/22 2237    Order Status: Completed Specimen: Blood from Peripheral Updated: 05/23/22 0623     Significant " "Indicator NEG     Source BLD     Site PERIPHERAL     Culture Result No Growth  Note: Blood cultures are incubated for 5 days and  are monitored continuously.Positive blood cultures  are called to the RN and reported as soon as  they are identified.      Narrative:      Per Hospital Policy: Only change Specimen Src: to \"Line\" if  specified by physician order.  Left Hand          MEDS:  Current Facility-Administered Medications   Medication Last Admin   • amitriptyline (ELAVIL) tablet 150 mg 150 mg at 05/23/22 0356   • diphenhydrAMINE (BENADRYL) tablet/capsule 50 mg 50 mg at 05/23/22 0436   • apixaban (ELIQUIS) tablet 5 mg 5 mg at 05/23/22 0356   • fluticasone (FLOVENT HFA) 110 MCG/ACT inhaler 110 mcg 110 mcg at 05/23/22 0621   • gabapentin (NEURONTIN) capsule 800 mg 800 mg at 05/23/22 0619   • omeprazole (PRILOSEC) capsule 40 mg 40 mg at 05/23/22 0619   • zolpidem (AMBIEN) tablet 10 mg 10 mg at 05/23/22 0525   • albuterol inhaler 1-2 Puff 1 Puff at 05/23/22 0621   • tiotropium (Spiriva Respimat) 2.5 mcg/Act inhalation spray 5 mcg 5 mcg at 05/23/22 0621   • senna-docusate (PERICOLACE or SENOKOT S) 8.6-50 MG per tablet 2 Tablet      And   • polyethylene glycol/lytes (MIRALAX) PACKET 1 Packet      And   • magnesium hydroxide (MILK OF MAGNESIA) suspension 30 mL      And   • bisacodyl (DULCOLAX) suppository 10 mg     • NS infusion New Bag at 05/23/22 0125   • acetaminophen (Tylenol) tablet 650 mg     • Pharmacy Consult Request ...Pain Management Review 1 Each     • oxyCODONE immediate-release (ROXICODONE) tablet 5 mg      Or   • oxyCODONE immediate-release (ROXICODONE) tablet 10 mg 10 mg at 05/23/22 0511    Or   • HYDROmorphone (Dilaudid) injection 0.5 mg 0.5 mg at 05/23/22 0146   • ondansetron (ZOFRAN) syringe/vial injection 4 mg 4 mg at 05/23/22 0124   • ondansetron (ZOFRAN ODT) dispertab 4 mg     • promethazine (PHENERGAN) tablet 12.5-25 mg 25 mg at 05/23/22 0239   • promethazine (PHENERGAN) suppository 12.5-25 mg   "   • prochlorperazine (COMPAZINE) injection 5-10 mg     • hydrALAZINE (APRESOLINE) injection 10 mg     • insulin LISPRO (AdmeLOG,HumaLOG) injection 1 Units at 05/23/22 0629    And   • dextrose 50% (D50W) injection 25 g     • ampicillin/sulbactam (UNASYN) 3 g in  mL IVPB Stopped at 05/23/22 0643   • clotrimazole (LOTRIMIN) 1 % cream Given at 05/23/22 0618   • doxycycline monohydrate (ADOXA) tablet 100 mg 100 mg at 05/23/22 0619     Current Outpatient Medications   Medication   • acetaminophen (TYLENOL) 500 MG Tab   • omeprazole (PRILOSEC) 20 MG Tablet Delayed Response delayed-release tablet   • zolpidem (AMBIEN) 10 MG Tab   • fluticasone (FLOVENT HFA) 110 MCG/ACT Aerosol   • metFORMIN (GLUCOPHAGE) 500 MG Tab   • gabapentin (NEURONTIN) 800 MG tablet   • COMBIVENT RESPIMAT  MCG/ACT Aero Soln   • amitriptyline (ELAVIL) 100 MG Tab   • BANOPHEN 50 MG Cap   • ELIQUIS 5 MG Tab       ASSESSMENT/PLAN: 57 y.o. male admitted for:    * Cellulitis of face- (present on admission)  Assessment & Plan  Patient with 6 days of swelling and pain of the upper lip. It is now spontaneously draining purulent fluid. He was on clindamycin PO outpatient x6 days and the symptoms worsened. He denies fevers, but has had nausea, vomiting, and diarrhea. Patient with significant tenderness from his upper lip to his eyebrows L>R. History of MRSA cellulitis in 3/2022. Wound culture collected in ED. CT maxilofacial without contrast (allergy to contrast) showed soft tissue swelling of the base of the nose involving the upper lip possibly representing phlegmon or hematoma but limited exam due to no contrast and mildly prominent bilateral cervical chain lymph nodes which may be reactive.   - Unasyn and Doxy (recent MRSA infection)  - IV fluids  - CT face to evaluate sinuses and possibility of deep infection, patient with anaphylaxis to contrast, will start with non-contrast  - wound care  -follow-up blood cultures and wound cultures   -US face  to evaluate for abscess, consider MRI-will discuss with radiology     Thrombocytopenia (HCC)  Assessment & Plan  Chronic. Patient with history of MDS with plan for chemotherapy but has not started. Platelets 107 on admission.     Hyponatremia  Assessment & Plan  Mild hyponatremia of 132 in the setting of dehydration on admission that has resolved with correction to 136 with NS @ 100ml/hr.    Type 2 diabetes mellitus, without long-term current use of insulin (HCC)- (present on admission)  Assessment & Plan  Recent diagnosis within the last few months. Patient takes metformin 1000mg BID. A1c on admission was 8.6.  - ISS while inpatient, consider restarting metformin inpatient.  - continue metformin outpatient  - goal BS <180 for wound healing    Chronic pain of both knees- (present on admission)  Assessment & Plan  Continue gabapentin 800mg daily.     Insomnia- (present on admission)  Assessment & Plan  Patient takes benadryl, amitriptyline, and ambien every night for sleep.  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 Last chemotherapy was Oct 2020 for testicular cancer which is now in remission. The staff in the ER were unable to draw from the port and attempted heparin flushes with no success  - if unable to access port consider vascular surgery consult      Core Measures:   Fluids: NS @ 100ml/hr  Lines: PIV, portcath (unable to draw from)  Abx: Unasyn and doxycycline  DVT prophylaxis: on Eliquis  Code Status: DNAR/DNI    Disposition: Inpatient for IV abx, pending US vs MRI    Hanny Wills MD   PGY-1 Family Medicine Resident   [facility]

## 2022-05-23 NOTE — ED NOTES
Report given to CARLOS ALBERTO Roberto. Pt transferred up to floor on hospital bed with all belongings. Stable. On 2L O2 by oxymask.

## 2022-05-23 NOTE — ASSESSMENT & PLAN NOTE
Chronic. Patient with history of MDS with plan for chemotherapy but has not started. Platelets 107 on admission, now down to 88.  -CTM

## 2022-05-23 NOTE — ASSESSMENT & PLAN NOTE
Patient with 6 days of swelling and pain of the upper lip. It is now spontaneously draining purulent fluid. He was on clindamycin PO outpatient x6 days and the symptoms worsened. He denies fevers, but has had nausea, vomiting, and diarrhea. Patient with significant tenderness from his upper lip to his eyebrows L>R. History of MRSA cellulitis in 3/2022 on left leg. Wound culture collected in ED. CT maxilofacial without contrast (allergy to contrast) showed soft tissue swelling of the base of the nose involving the upper lip possibly representing phlegmon or hematoma but limited exam due to no contrast and mildly prominent bilateral cervical chain lymph nodes which may be reactive. U/S significant for upper lip abscess 2x0.7cm. Blood cultures NGTD.  -POD2 s/p I&D with Dr. Ortiz, ENT  -wound cultures positive for MRSA  -Continue doxycycline; Unasyn DC'd 5/25 after positive wound culture for MRSA resulted  -MRI w/contrast ordered to further evaluate   -Pain control with tylenol and prn medications

## 2022-05-24 PROBLEM — E87.1 HYPONATREMIA: Status: RESOLVED | Noted: 2022-05-23 | Resolved: 2022-05-24

## 2022-05-24 LAB
ANION GAP SERPL CALC-SCNC: 10 MMOL/L (ref 7–16)
BUN SERPL-MCNC: 13 MG/DL (ref 8–22)
CALCIUM SERPL-MCNC: 8 MG/DL (ref 8.5–10.5)
CHLORIDE SERPL-SCNC: 103 MMOL/L (ref 96–112)
CO2 SERPL-SCNC: 22 MMOL/L (ref 20–33)
CREAT SERPL-MCNC: 1.05 MG/DL (ref 0.5–1.4)
ERYTHROCYTE [DISTWIDTH] IN BLOOD BY AUTOMATED COUNT: 46.3 FL (ref 35.9–50)
GFR SERPLBLD CREATININE-BSD FMLA CKD-EPI: 83 ML/MIN/1.73 M 2
GLUCOSE BLD STRIP.AUTO-MCNC: 151 MG/DL (ref 65–99)
GLUCOSE BLD STRIP.AUTO-MCNC: 157 MG/DL (ref 65–99)
GLUCOSE BLD STRIP.AUTO-MCNC: 161 MG/DL (ref 65–99)
GLUCOSE BLD STRIP.AUTO-MCNC: 173 MG/DL (ref 65–99)
GLUCOSE SERPL-MCNC: 179 MG/DL (ref 65–99)
HCT VFR BLD AUTO: 51.8 % (ref 42–52)
HGB BLD-MCNC: 17.5 G/DL (ref 14–18)
MCH RBC QN AUTO: 30.4 PG (ref 27–33)
MCHC RBC AUTO-ENTMCNC: 33.8 G/DL (ref 33.7–35.3)
MCV RBC AUTO: 90.1 FL (ref 81.4–97.8)
PLATELET # BLD AUTO: 88 K/UL (ref 164–446)
PMV BLD AUTO: 9.1 FL (ref 9–12.9)
POTASSIUM SERPL-SCNC: 4 MMOL/L (ref 3.6–5.5)
RBC # BLD AUTO: 5.75 M/UL (ref 4.7–6.1)
SODIUM SERPL-SCNC: 135 MMOL/L (ref 135–145)
WBC # BLD AUTO: 5.5 K/UL (ref 4.8–10.8)

## 2022-05-24 PROCEDURE — 97163 PT EVAL HIGH COMPLEX 45 MIN: CPT

## 2022-05-24 PROCEDURE — 82962 GLUCOSE BLOOD TEST: CPT | Mod: 91

## 2022-05-24 PROCEDURE — 99232 SBSQ HOSP IP/OBS MODERATE 35: CPT | Mod: GC | Performed by: FAMILY MEDICINE

## 2022-05-24 PROCEDURE — 700111 HCHG RX REV CODE 636 W/ 250 OVERRIDE (IP): Performed by: STUDENT IN AN ORGANIZED HEALTH CARE EDUCATION/TRAINING PROGRAM

## 2022-05-24 PROCEDURE — 80048 BASIC METABOLIC PNL TOTAL CA: CPT

## 2022-05-24 PROCEDURE — 770001 HCHG ROOM/CARE - MED/SURG/GYN PRIV*

## 2022-05-24 PROCEDURE — 85027 COMPLETE CBC AUTOMATED: CPT

## 2022-05-24 PROCEDURE — 97165 OT EVAL LOW COMPLEX 30 MIN: CPT

## 2022-05-24 PROCEDURE — A9270 NON-COVERED ITEM OR SERVICE: HCPCS | Performed by: STUDENT IN AN ORGANIZED HEALTH CARE EDUCATION/TRAINING PROGRAM

## 2022-05-24 PROCEDURE — 700105 HCHG RX REV CODE 258: Performed by: STUDENT IN AN ORGANIZED HEALTH CARE EDUCATION/TRAINING PROGRAM

## 2022-05-24 PROCEDURE — 700102 HCHG RX REV CODE 250 W/ 637 OVERRIDE(OP): Performed by: STUDENT IN AN ORGANIZED HEALTH CARE EDUCATION/TRAINING PROGRAM

## 2022-05-24 PROCEDURE — 36415 COLL VENOUS BLD VENIPUNCTURE: CPT

## 2022-05-24 RX ORDER — MORPHINE SULFATE 4 MG/ML
2 INJECTION INTRAVENOUS EVERY 4 HOURS PRN
Status: DISCONTINUED | OUTPATIENT
Start: 2022-05-24 | End: 2022-05-24

## 2022-05-24 RX ORDER — OMEPRAZOLE 20 MG/1
20 CAPSULE, DELAYED RELEASE ORAL 2 TIMES DAILY
Status: DISCONTINUED | OUTPATIENT
Start: 2022-05-25 | End: 2022-05-27 | Stop reason: HOSPADM

## 2022-05-24 RX ORDER — MORPHINE SULFATE 4 MG/ML
2-3 INJECTION INTRAVENOUS EVERY 4 HOURS PRN
Status: DISCONTINUED | OUTPATIENT
Start: 2022-05-24 | End: 2022-05-27 | Stop reason: HOSPADM

## 2022-05-24 RX ADMIN — DOXYCYCLINE 100 MG: 100 TABLET, FILM COATED ORAL at 17:30

## 2022-05-24 RX ADMIN — AMITRIPTYLINE HYDROCHLORIDE 150 MG: 100 TABLET, FILM COATED ORAL at 23:08

## 2022-05-24 RX ADMIN — INSULIN LISPRO 1 UNITS: 100 INJECTION, SOLUTION INTRAVENOUS; SUBCUTANEOUS at 13:16

## 2022-05-24 RX ADMIN — OXYCODONE HYDROCHLORIDE 10 MG: 10 TABLET ORAL at 17:30

## 2022-05-24 RX ADMIN — INSULIN LISPRO 1 UNITS: 100 INJECTION, SOLUTION INTRAVENOUS; SUBCUTANEOUS at 17:35

## 2022-05-24 RX ADMIN — FLUTICASONE PROPIONATE 110 MCG: 110 AEROSOL, METERED RESPIRATORY (INHALATION) at 17:28

## 2022-05-24 RX ADMIN — FLUTICASONE PROPIONATE 110 MCG: 110 AEROSOL, METERED RESPIRATORY (INHALATION) at 05:37

## 2022-05-24 RX ADMIN — SODIUM CHLORIDE 3 G: 900 INJECTION INTRAVENOUS at 23:07

## 2022-05-24 RX ADMIN — MORPHINE SULFATE 2 MG: 4 INJECTION INTRAVENOUS at 14:32

## 2022-05-24 RX ADMIN — OMEPRAZOLE 40 MG: 20 CAPSULE, DELAYED RELEASE ORAL at 05:29

## 2022-05-24 RX ADMIN — APIXABAN 5 MG: 5 TABLET, FILM COATED ORAL at 05:29

## 2022-05-24 RX ADMIN — GABAPENTIN 800 MG: 400 CAPSULE ORAL at 05:29

## 2022-05-24 RX ADMIN — MORPHINE SULFATE 3 MG: 4 INJECTION INTRAVENOUS at 19:19

## 2022-05-24 RX ADMIN — ACETAMINOPHEN 650 MG: 325 TABLET ORAL at 05:28

## 2022-05-24 RX ADMIN — ZOLPIDEM TARTRATE 10 MG: 5 TABLET ORAL at 23:08

## 2022-05-24 RX ADMIN — TIOTROPIUM BROMIDE INHALATION SPRAY 5 MCG: 3.12 SPRAY, METERED RESPIRATORY (INHALATION) at 05:29

## 2022-05-24 RX ADMIN — ACETAMINOPHEN 650 MG: 325 TABLET ORAL at 11:35

## 2022-05-24 RX ADMIN — MORPHINE SULFATE 2 MG: 4 INJECTION INTRAVENOUS at 10:28

## 2022-05-24 RX ADMIN — INSULIN LISPRO 1 UNITS: 100 INJECTION, SOLUTION INTRAVENOUS; SUBCUTANEOUS at 20:30

## 2022-05-24 RX ADMIN — ACETAMINOPHEN 650 MG: 325 TABLET ORAL at 23:07

## 2022-05-24 RX ADMIN — SODIUM CHLORIDE 3 G: 900 INJECTION INTRAVENOUS at 05:30

## 2022-05-24 RX ADMIN — DOXYCYCLINE 100 MG: 100 TABLET, FILM COATED ORAL at 05:29

## 2022-05-24 RX ADMIN — SODIUM CHLORIDE: 9 INJECTION, SOLUTION INTRAVENOUS at 05:31

## 2022-05-24 RX ADMIN — SODIUM CHLORIDE 3 G: 900 INJECTION INTRAVENOUS at 17:28

## 2022-05-24 RX ADMIN — ALBUTEROL SULFATE 2 PUFF: 90 AEROSOL, METERED RESPIRATORY (INHALATION) at 05:30

## 2022-05-24 RX ADMIN — SODIUM CHLORIDE 3 G: 900 INJECTION INTRAVENOUS at 11:37

## 2022-05-24 RX ADMIN — INSULIN LISPRO 1 UNITS: 100 INJECTION, SOLUTION INTRAVENOUS; SUBCUTANEOUS at 09:17

## 2022-05-24 RX ADMIN — ACETAMINOPHEN 650 MG: 325 TABLET ORAL at 17:29

## 2022-05-24 RX ADMIN — ALBUTEROL SULFATE 2 PUFF: 90 AEROSOL, METERED RESPIRATORY (INHALATION) at 13:17

## 2022-05-24 RX ADMIN — ALBUTEROL SULFATE 2 PUFF: 90 AEROSOL, METERED RESPIRATORY (INHALATION) at 17:29

## 2022-05-24 RX ADMIN — DIPHENHYDRAMINE HYDROCHLORIDE 50 MG: 25 TABLET ORAL at 23:08

## 2022-05-24 RX ADMIN — OXYCODONE HYDROCHLORIDE 10 MG: 10 TABLET ORAL at 11:34

## 2022-05-24 RX ADMIN — MORPHINE SULFATE 3 MG: 4 INJECTION INTRAVENOUS at 23:10

## 2022-05-24 RX ADMIN — OXYCODONE HYDROCHLORIDE 10 MG: 10 TABLET ORAL at 05:29

## 2022-05-24 RX ADMIN — CLOTRIMAZOLE: 10 CREAM TOPICAL at 05:29

## 2022-05-24 RX ADMIN — CLOTRIMAZOLE: 10 CREAM TOPICAL at 17:28

## 2022-05-24 ASSESSMENT — COGNITIVE AND FUNCTIONAL STATUS - GENERAL
MOVING FROM LYING ON BACK TO SITTING ON SIDE OF FLAT BED: A LITTLE
STANDING UP FROM CHAIR USING ARMS: A LITTLE
SUGGESTED CMS G CODE MODIFIER DAILY ACTIVITY: CJ
DAILY ACTIVITIY SCORE: 21
DRESSING REGULAR LOWER BODY CLOTHING: A LITTLE
MOBILITY SCORE: 15
TOILETING: A LITTLE
WALKING IN HOSPITAL ROOM: TOTAL
MOVING TO AND FROM BED TO CHAIR: A LITTLE
CLIMB 3 TO 5 STEPS WITH RAILING: TOTAL
HELP NEEDED FOR BATHING: A LITTLE
SUGGESTED CMS G CODE MODIFIER MOBILITY: CK

## 2022-05-24 ASSESSMENT — PAIN DESCRIPTION - PAIN TYPE
TYPE: ACUTE PAIN

## 2022-05-24 ASSESSMENT — ACTIVITIES OF DAILY LIVING (ADL): TOILETING: INDEPENDENT

## 2022-05-24 ASSESSMENT — GAIT ASSESSMENTS: GAIT LEVEL OF ASSIST: UNABLE TO PARTICIPATE

## 2022-05-24 NOTE — DIETARY
"Nutrition services: Day 1 of admit.  Benito Persaud is a 57 y.o. male with admitting DX of Cellulitis of face.     Pt seen for poor PO/ wt loss per admit screen, malnutrition screening tool score of 3. Pt reports poor intake over past 9-10 months related to leukemia and other health issues but eating ~ 50% or more of usual intake with no wt loss, UBW of 268#. Pt states minimal intake over past week related to nausea/vomiting and now having very difficult time eating any foods due to severe pain due to abscess on mouth, pt states he is only tolerating ginger ale and broths. Discussed importance of adequate intake, pt declined Boost (dislikes them) but agreeable to Magic Cups, broths and ginger ale added to meals. Pt particular about what foods he eats, not receptive to liquidized/strained soup or pureed foods for improved tolerance and to increased kcal/protein intake. Discussed feeding tube as option to review with MD if PO intake is too painful/difficult with current mouth/face swelling. Pt adamant about not getting tube as he has had this in past. Pt appeared upset and frustrated at time of interview; no physical assessment done. Pt appeared well nourished at bedside.       Assessment:  Height: 180.3 cm (5' 11\")  Weight: 125 kg (275 lb)- no measured wt available.   Body mass index is 38.35 kg/m²., BMI classification: Class II obesity.   Diet/Intake: Consistent CHO diet.     Evaluation:   1. Pertinent Labs: am Glu 179. FSBS: 139-189 (5/23-5/24).   2. Pertinent Meds: Unasyn, SSI, Prilosec, Zofran PRN, Compazine/Phenergan PRN, Bowel protocol.   3. Skin: Partial thickness wound to lip/nose.     Malnutrition Risk: Limited information (no current measured wt), high risk related to minimal intake over past week.     Recommendations/Plan:  1. Adding Magic Cups, broths and gingerale added to meals  2. Encourage PO and document intake  as % taken in ADL's to provide interdisciplinary communication across all shifts. "   3. Monitor weight, provide measured wt as feasible (preferably stand up scale if possible).   4. Nutrition rep will continue to see patient for ongoing meal and snack preferences.     RD following.

## 2022-05-24 NOTE — DISCHARGE PLANNING
Care Transition Team Assessment  Patient is currently living at the [facility], [facility].  When SW asked patient were he was living prior to this.  Patient stated he was at [facility] for 28 months, grand theft under $2500.  Patient asked if this be a problem for him.  SW stated no.   Patient has no income, pending Social Security Disability.  Applied in October of 2021, and is hoping to have an outcome this October.   SW asked if patient receives any food stamps, SW told yes $347 per month.  Patient has an electric and manual wheelchair which are at his residence at the [facility].     Information Source  Orientation Level: Oriented X4  Information Given By: Patient  Informant's Name:  (Benito)  Who is responsible for making decisions for patient? : Patient    Readmission Evaluation  Is this a readmission?: No    Elopement Risk  Legal Hold: No  Ambulatory or Self Mobile in Wheelchair: Yes  Disoriented: No  Psychiatric Symptoms: None  History of Wandering: No  Elopement this Admit: No  Vocalizing Wanting to Leave: No  Displays Behaviors, Body Language Wanting to Leave: No-Not at Risk for Elopement    Interdisciplinary Discharge Planning  Lives with - Patient's Self Care Capacity: Alone and Able to Care For Self  Patient or legal guardian wants to designate a caregiver: No  Support Systems: Friends / Neighbors  Housing / Facility: 3 Story Apartment / Condo  Prior Services: None  Durable Medical Equipment: Not Applicable    Discharge Preparedness  What is your plan after discharge?: Uncertain - pending medical team collaboration  What are your discharge supports?: Other (comment) (friends, states he has no living family)  Prior Functional Level: Ambulatory  Difficulity with ADLs: Walking  Difficulty with ADLs Comment:  (wheel chair bound)  Difficulity with IADLs: Driving    Functional Assesment  Prior Functional Level: Ambulatory    Finances  Financial Barriers to Discharge: Yes  Average Monthly Income:   (no income)  Prescription Coverage: Yes    Vision / Hearing Impairment  Vision Impairment : Yes  Left Eye Vision: Blind  Hearing Impairment : No         Advance Directive  Advance Directive?: POLST    Domestic Abuse  Have you ever been the victim of abuse or violence?: No  Physical Abuse or Sexual Abuse: No  Verbal Abuse or Emotional Abuse: No  Possible Abuse/Neglect Reported to:: Not Applicable    Psychological Assessment  History of Substance Abuse: None  History of Psychiatric Problems: No  Non-compliant with Treatment: No  Newly Diagnosed Illness: Yes    Discharge Risks or Barriers  Discharge risks or barriers?: Uninsured / underinsured  Patient risk factors: Homeless    Anticipated Discharge Information  Discharge Disposition: Discharged to home/self care (01)

## 2022-05-24 NOTE — PROGRESS NOTES
[facility] PROGRESS NOTE     Attending:   Mason Mullen M.d.    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    SUBJECTIVE:   No acute events overnight, however patient continues to have throbbing pain in in the upper lip that radiates to the left maxillary area. Patient does not note any improvement in swelling or pain since admission.    OBJECTIVE:  Vitals:    05/23/22 1705 05/23/22 2024 05/24/22 0442 05/24/22 0738   BP:  118/87 133/78 108/64   Pulse:  (!) 104 92 92   Resp: 16 18 18 18   Temp:  36.2 °C (97.2 °F) 36.3 °C (97.3 °F) 36.3 °C (97.3 °F)   TempSrc:  Temporal Temporal Temporal   SpO2:  92% 92% 91%   Weight:       Height:           Intake/Output Summary (Last 24 hours) at 5/24/2022 1144  Last data filed at 5/24/2022 1023  Gross per 24 hour   Intake 580 ml   Output 500 ml   Net 80 ml       PHYSICAL EXAM:  General: No acute distress, afebrile, appears uncomfortable.  HEENT: NC/AT. EOMI. MMM. Philtrum is swollen with a  pustule that is crusted over, swelling of upper lip and nose up to level of the eyes. No intraoral lesions noted. No active pustular drainage. Minimal erythema.  Cardiovascular: RRR without murmurs. Normal capillary refill   Respiratory: CTAB, no tachypnea or retractions  Abdomen: soft, nontender, nondistended, no masses  EXT:  SARGENT, no edema  Skin: See HEENT exam  Neuro: Non-focal    LABS:  Recent Labs     05/22/22  2237 05/23/22  0327 05/24/22  0336   WBC 6.2 6.7 5.5   RBC 5.25 5.06 5.75   HEMOGLOBIN 15.7 15.4 17.5   HEMATOCRIT 45.0 44.9 51.8   MCV 85.7 88.7 90.1   MCH 29.9 30.4 30.4   RDW 43.2 44.5 46.3   PLATELETCT 107*  108* 88*   MPV 9.1 8.5* 9.1   NEUTSPOLYS 58.40 61.70  --    LYMPHOCYTES 29.50 24.90  --    MONOCYTES 9.00 9.40  --    EOSINOPHILS 2.60 3.30  --    BASOPHILS 0.20 0.30  --      Recent Labs     05/22/22 2237 05/23/22 0327 05/24/22  0709   SODIUM 132* 136 135   POTASSIUM 3.8 3.6 4.0   CHLORIDE 100 100 103   CO2 20 26 22   BUN 15 15 13   CREATININE 1.06 1.14 1.05   CALCIUM 9.0 8.8 8.0*   ALBUMIN 3.5 3.7  --      Estimated GFR/CRCL = Estimated Creatinine Clearance: 105.8 mL/min (by C-G formula based on SCr of 1.05 mg/dL).  Recent Labs     05/22/22 2237 05/23/22 0327 05/24/22  0709   GLUCOSE 240* 197* 179*     Recent Labs     05/22/22 2237 05/23/22 0327   ASTSGOT 18 19   ALTSGPT 14 15   TBILIRUBIN 0.8 0.8   ALKPHOSPHAT 129* 125*   GLOBULIN 4.0* 3.6*             No results for input(s): INR, APTT, FIBRINOGEN in the last 72 hours.    Invalid input(s): DIMER      IMAGING:  US-SOFT TISSUES OF HEAD - NECK   Final Result      Likely cellulitis of the upper lip with complex hypoechoic area measuring 2.5 x 0.7 cm most compatible with abscess.      CT-MAXILLOFACIAL W/O   Final Result         1.  No acute traumatic facial bony injuries identified.   2.  Soft tissue swelling of the base of the nose involving the upper lip, could represent phlegmon or hematoma in the setting of trauma, noncontrast technique limits evaluation for abscess.   3.  Mildly prominent bilateral cervical chain lymph nodes, may be reactive      MR-ORBITS, FACE, NECK-WITH    (Results Pending)       MEDS:  Current Facility-Administered Medications   Medication Last Admin   • morphine 4 MG/ML injection 2 mg 2 mg at 05/24/22 1028   • amitriptyline (ELAVIL) tablet 150 mg 150 mg at 05/23/22 2242   • diphenhydrAMINE (BENADRYL) tablet/capsule 50 mg 50 mg at 05/23/22 2242   • apixaban (ELIQUIS) tablet 5 mg 5 mg at 05/24/22 0529   • fluticasone (FLOVENT HFA) 110 MCG/ACT inhaler 110 mcg 110 mcg at 05/24/22 0537   • gabapentin (NEURONTIN) capsule 800 mg 800 mg at  05/24/22 0529   • omeprazole (PRILOSEC) capsule 40 mg 40 mg at 05/24/22 0529   • zolpidem (AMBIEN) tablet 10 mg 10 mg at 05/23/22 2242   • albuterol inhaler 1-2 Puff 2 Puff at 05/24/22 0530   • tiotropium (Spiriva Respimat) 2.5 mcg/Act inhalation spray 5 mcg 5 mcg at 05/24/22 0529   • senna-docusate (PERICOLACE or SENOKOT S) 8.6-50 MG per tablet 2 Tablet      And   • polyethylene glycol/lytes (MIRALAX) PACKET 1 Packet      And   • magnesium hydroxide (MILK OF MAGNESIA) suspension 30 mL      And   • bisacodyl (DULCOLAX) suppository 10 mg     • NS infusion New Bag at 05/24/22 0531   • acetaminophen (Tylenol) tablet 650 mg 650 mg at 05/23/22 1514   • Pharmacy Consult Request ...Pain Management Review 1 Each     • oxyCODONE immediate-release (ROXICODONE) tablet 5 mg      Or   • oxyCODONE immediate release (ROXICODONE) tablet 10 mg 10 mg at 05/24/22 1134   • ondansetron (ZOFRAN) syringe/vial injection 4 mg 4 mg at 05/23/22 2039   • ondansetron (ZOFRAN ODT) dispertab 4 mg     • promethazine (PHENERGAN) tablet 12.5-25 mg 25 mg at 05/23/22 0239   • promethazine (PHENERGAN) suppository 12.5-25 mg     • prochlorperazine (COMPAZINE) injection 5-10 mg     • hydrALAZINE (APRESOLINE) injection 10 mg     • ampicillin/sulbactam (UNASYN) 3 g in  mL IVPB 3 g at 05/24/22 1137   • clotrimazole (LOTRIMIN) 1 % cream Given at 05/24/22 0529   • doxycycline monohydrate (ADOXA) tablet 100 mg 100 mg at 05/24/22 0529   • acetaminophen (Tylenol) tablet 650 mg 650 mg at 05/24/22 1135   • insulin lispro (AdmeLOG,HumaLOG) injection 1 Units at 05/24/22 0917    And   • dextrose 50% (D50W) injection 25 g         ASSESSMENT/PLAN:    * Cellulitis of face- (present on admission)  Assessment & Plan  Patient with 6 days of swelling and pain of the upper lip. It is now spontaneously draining purulent fluid. He was on clindamycin PO outpatient x6 days and the symptoms worsened. He denies fevers, but has had nausea, vomiting, and diarrhea. Patient with  significant tenderness from his upper lip to his eyebrows L>R. History of MRSA cellulitis in 3/2022 on left leg. Wound culture collected in ED. CT maxilofacial without contrast (allergy to contrast) showed soft tissue swelling of the base of the nose involving the upper lip possibly representing phlegmon or hematoma but limited exam due to no contrast and mildly prominent bilateral cervical chain lymph nodes which may be reactive. U/S significant for upper lip abscess 2x0.7cm.  -Otolaryngology consulted, Dr Ortiz to evaluate for possible I&D. Appreciate recommendations.  - Unasyn and Doxy (recent MRSA infection). Consulted with pharmacy, recommended continuation of current therapy   -MRI w/contrast ordered to further evaluate  -follow-up blood cultures and wound cultures   -Pain control with tylenol and prn medications     Thrombocytopenia (HCC)  Assessment & Plan  Chronic. Patient with history of MDS with plan for chemotherapy but has not started. Platelets 107 on admission, now down to 88.  -CTM    Type 2 diabetes mellitus, without long-term current use of insulin (HCC)- (present on admission)  Assessment & Plan  Recent diagnosis within the last few months. Patient takes metformin 1000mg BID. A1c on admission was 8.6.  - ISS while inpatient, consider restarting metformin inpatient.  - continue metformin outpatient  - goal BS <180 for wound healing    Chronic pain of both knees- (present on admission)  Assessment & Plan  Continue gabapentin 800mg daily.     Insomnia- (present on admission)  Assessment & Plan  Patient takes benadryl, amitriptyline, and ambien every night for sleep.  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 Last  chemotherapy was Oct 2020 for testicular cancer which is now in remission. The staff in the ER were unable to draw from the port and attempted heparin flushes with no success  - if unable to access port consider vascular surgery consult      Lines: PIV  Abx: Unasyn, doxycycline  DVT prophylaxis: Eliquis (therapeutic)  CODE Status: DNR/DNI    Fitz Alicia DO  PGY-2  [facility]

## 2022-05-24 NOTE — THERAPY
Occupational Therapy   Initial Evaluation     Patient Name: Benito Persaud  Age:  57 y.o., Sex:  male  Medical Record #: [id number]  Today's Date: 5/24/2022     Precautions  Precautions: (P) Fall Risk    Assessment    Patient is 57 y.o. male admitted for facial cellulitis, upper lip abscess; pt with PMH of vertigo, visual deficits left eye, testicular cancer. Pt normally independent with all functional mobility and ADLs at baseline living in an apartment alone. Pt able to complete all functional mobility and necessary ADLs with supervision, anticipate pt is at his functional baseline. No further OT needs identified, will complete order at this time. Patient will not be actively followed for occupational therapy services at this time, however may be seen if requested by physician for 1 more visit within 30 days to address any discharge or equipment needs.       Plan    Recommend Occupational Therapy for Evaluation only.    DC Equipment Recommendations: (P) None  Discharge Recommendations: (P) Anticipate that the patient will have no further occupational therapy needs after discharge from the hospital     Objective       05/24/22 0877   Prior Living Situation   Prior Services None   Housing / Facility 3 Story Apartment / Condo   Elevator Yes   Bathroom Set up Bathtub / Shower Combination;Grab Bars;Shower Chair   Equipment Owned Wheelchair;Tub / Shower Seat   Lives with - Patient's Self Care Capacity Alone and Able to Care For Self   Prior Level of ADL Function   Self Feeding Independent   Grooming / Hygiene Independent   Bathing Independent   Dressing Independent   Toileting Independent   Prior Level of IADL Function   Medication Management Independent   Laundry Independent   Kitchen Mobility Independent   Finances Independent   Home Management Independent   Shopping Independent   Prior Level Of Mobility Uses Wheel Chair for Community Mobility;Uses Wheel Chair for in Home Mobility   Precautions   Precautions Fall  Risk   Pain 0 - 10 Group   Therapist Pain Assessment Post Activity Pain Same as Prior to Activity;Nurse Notified   Cognition    Cognition / Consciousness WDL   Level of Consciousness Alert   Comments Irritable but agreeable   Active ROM Upper Body   Active ROM Upper Body  WDL   Strength Upper Body   Upper Body Strength  WDL   Balance Assessment   Sitting Balance (Static) Fair   Sitting Balance (Dynamic) Fair   Standing Balance (Static) Fair   Standing Balance (Dynamic) Fair   Weight Shift Sitting Fair   Weight Shift Standing Fair   Comments pivot transfers back and forth from chair   Bed Mobility    Supine to Sit Supervised   Sit to Supine Supervised   Scooting Supervised   ADL Assessment   Eating Supervision   Grooming Supervision   Lower Body Dressing Supervision   How much help from another person does the patient currently need...   Putting on and taking off regular lower body clothing? 3   Bathing (including washing, rinsing, and drying)? 3   Toileting, which includes using a toilet, bedpan, or urinal? 3   Putting on and taking off regular upper body clothing? 4   Taking care of personal grooming such as brushing teeth? 4   Eating meals? 4   6 Clicks Daily Activity Score 21   Functional Mobility   Sit to Stand Supervised   Bed, Chair, Wheelchair Transfer Supervised   Toilet Transfers Refused   Transfer Method Stand Pivot   Mobility bed mobility, pivot to chair and back to bed   Comments no AD   Activity Tolerance   Sitting in Chair 5 min   Sitting Edge of Bed 2 min   Standing 1 min   Education Group   Education Provided Role of Occupational Therapist   Role of Occupational Therapist Patient Response Patient;Nonacceptance;Explanation;No Learning Evidence;Teaching Refused   Problem List   Problem List None   Anticipated Discharge Equipment and Recommendations   DC Equipment Recommendations None   Discharge Recommendations Anticipate that the patient will have no further occupational therapy needs after discharge  from the hospital   Interdisciplinary Plan of Care Collaboration   IDT Collaboration with  Nursing;Physical Therapist   Patient Position at End of Therapy In Bed;Bed Alarm On;Call Light within Reach;Tray Table within Reach;Phone within Reach   Collaboration Comments RN updated

## 2022-05-24 NOTE — CARE PLAN
The patient is Watcher - Medium risk of patient condition declining or worsening    Shift Goals  Clinical Goals: Pain mgmt  Patient Goals: Comfort    Progress made toward(s) clinical / shift goals:  yes    Patient is not progressing towards the following goals:      Problem: Pain - Standard  Goal: Alleviation of pain or a reduction in pain to the patient’s comfort goal  Outcome: Progressing     Problem: Knowledge Deficit - Standard  Goal: Patient and family/care givers will demonstrate understanding of plan of care, disease process/condition, diagnostic tests and medications  Outcome: Progressing     Problem: Skin Integrity  Goal: Skin integrity is maintained or improved  Outcome: Progressing     Problem: Fall Risk  Goal: Patient will remain free from falls  Outcome: Progressing

## 2022-05-24 NOTE — THERAPY
"Physical Therapy   Initial Evaluation     Patient Name: Benito Persaud  Age:  57 y.o., Sex:  male  Medical Record #: [id number]  Today's Date: 5/24/2022     Precautions  Precautions: Fall Risk    Assessment  Patient is 57 y.o. male admitted with facial swelling, found cellulitis and abscess. PMHx of recurrent DVT/PE, CVA x2, testicular CA, DM, IVC filter placement 5/21, and Barett's esophagus. Pt recently admitted 2 times in past 3 days for facial swelling. Pt presenting mainly with facial pain, otherwise at baseline functional mobility. Pt transfers <> w/c at baseline, required SPV today. Recommend home with no needs. Patient will not be actively followed for physical therapy services at this time, however may be seen if requested by physician for 1 more visit within 30 days to address any discharge or equipment needs.     Plan    Recommend Physical Therapy for Evaluation only     DC Equipment Recommendations: None  Discharge Recommendations: Anticipate that the patient will have no further physical therapy needs after discharge from the hospital       Subjective    \"It would have been easier if you didn't make me get out of bed.\"      Objective     05/24/22 0902   Vitals   O2 Delivery Device None - Room Air   Pain 0 - 10 Group   Therapist Pain Assessment Post Activity Pain Same as Prior to Activity;Nurse Notified  (facial pain not rated, agreeable to mobility)   Prior Living Situation   Prior Services None   Housing / Facility 3 Story Apartment / Condo   Steps Into Home 0   Elevator Yes   Bathroom Set up Bathtub / Shower Combination;Grab Bars;Shower Chair   Equipment Owned Wheelchair;Tub / Shower Seat;Power Wheel Chair   Lives with - Patient's Self Care Capacity Alone and Able to Care For Self   Comments Pt reports only transferring <> w/c, no assistance at home is independent   Prior Level of Functional Mobility   Bed Mobility Independent   Transfer Status Independent   Ambulation   (has not ambulated in > 1 " yr)   Assistive Devices Used Wheelchair   Cognition    Cognition / Consciousness WDL   Level of Consciousness Alert   Comments Cooperative with encouragement   Active ROM Lower Body    Active ROM Lower Body  WDL   Strength Lower Body   Lower Body Strength  WDL   Comments grossly 3+/5 BLE, functional for transfers, reports baseline   Strength Upper Body   Upper Body Strength  WDL   Coordination Upper Body   Coordination WDL   Coordination Lower Body    Coordination Lower Body  WDL   Balance Assessment   Sitting Balance (Static) Fair +   Sitting Balance (Dynamic) Fair   Standing Balance (Static) Fair   Standing Balance (Dynamic) Fair   Weight Shift Sitting Fair   Weight Shift Standing Fair   Comments squat pivot transfer <> chair with BUE support   Gait Analysis   Gait Level Of Assist Unable to Participate   Weight Bearing Status no restrictions   Bed Mobility    Supine to Sit Supervised   Sit to Supine Supervised   Scooting Supervised   Functional Mobility   Sit to Stand Supervised   Bed, Chair, Wheelchair Transfer Supervised   Transfer Method Stand Pivot   Mobility transfer <> chair   How much difficulty does the patient currently have...   Turning over in bed (including adjusting bedclothes, sheets and blankets)? 4   Sitting down on and standing up from a chair with arms (e.g., wheelchair, bedside commode, etc.) 3   Moving from lying on back to sitting on the side of the bed? 3   How much help from another person does the patient currently need...   Moving to and from a bed to a chair (including a wheelchair)? 3   Need to walk in a hospital room? 1   Climbing 3-5 steps with a railing? 1   6 clicks Mobility Score 15   Activity Tolerance   Sitting in Chair 5 min   Sitting Edge of Bed 2 min   Standing 1 min   Edema / Skin Assessment   Edema / Skin  X   Comments facial swelling   Education Group   Education Provided Role of Physical Therapist   Role of Physical Therapist Patient Response  Patient;Acceptance;Explanation;Action Demonstration;Verbal Demonstration   Problem List    Problems Pain;Impaired Bed Mobility;Impaired Transfers;Impaired Balance;Impaired Coordination;Decreased Activity Tolerance  (however, baseline)   Anticipated Discharge Equipment and Recommendations   DC Equipment Recommendations None   Discharge Recommendations Anticipate that the patient will have no further physical therapy needs after discharge from the hospital   Interdisciplinary Plan of Care Collaboration   IDT Collaboration with  Nursing;Occupational Therapist   Patient Position at End of Therapy In Bed;Call Light within Reach;Tray Table within Reach;Phone within Reach   Collaboration Comments Rn updated   Session Information   Date / Session Number  5/24: PT [place] only. D/c needs

## 2022-05-25 ENCOUNTER — ANESTHESIA (OUTPATIENT)
Dept: SURGERY | Facility: MEDICAL CENTER | Age: 57
DRG: 603 | End: 2022-05-25
Payer: MEDICAID

## 2022-05-25 ENCOUNTER — ANESTHESIA EVENT (OUTPATIENT)
Dept: SURGERY | Facility: MEDICAL CENTER | Age: 57
DRG: 603 | End: 2022-05-25
Payer: MEDICAID

## 2022-05-25 PROBLEM — L02.01 FACIAL ABSCESS: Status: ACTIVE | Noted: 2022-05-23

## 2022-05-25 LAB
BACTERIA WND AEROBE CULT: ABNORMAL
BACTERIA WND AEROBE CULT: ABNORMAL
FUNGUS SPEC FUNGUS STN: NORMAL
GLUCOSE BLD STRIP.AUTO-MCNC: 118 MG/DL (ref 65–99)
GLUCOSE BLD STRIP.AUTO-MCNC: 273 MG/DL (ref 65–99)
GRAM STN SPEC: ABNORMAL
SIGNIFICANT IND 70042: ABNORMAL
SIGNIFICANT IND 70042: NORMAL
SITE SITE: ABNORMAL
SITE SITE: NORMAL
SOURCE SOURCE: ABNORMAL
SOURCE SOURCE: NORMAL

## 2022-05-25 PROCEDURE — 700101 HCHG RX REV CODE 250: Performed by: ANESTHESIOLOGY

## 2022-05-25 PROCEDURE — 700101 HCHG RX REV CODE 250: Performed by: SPECIALIST

## 2022-05-25 PROCEDURE — 87102 FUNGUS ISOLATION CULTURE: CPT

## 2022-05-25 PROCEDURE — 87077 CULTURE AEROBIC IDENTIFY: CPT

## 2022-05-25 PROCEDURE — 700105 HCHG RX REV CODE 258: Performed by: ANESTHESIOLOGY

## 2022-05-25 PROCEDURE — 160038 HCHG SURGERY MINUTES - EA ADDL 1 MIN LEVEL 2: Performed by: SPECIALIST

## 2022-05-25 PROCEDURE — 700105 HCHG RX REV CODE 258: Performed by: STUDENT IN AN ORGANIZED HEALTH CARE EDUCATION/TRAINING PROGRAM

## 2022-05-25 PROCEDURE — 700111 HCHG RX REV CODE 636 W/ 250 OVERRIDE (IP): Performed by: STUDENT IN AN ORGANIZED HEALTH CARE EDUCATION/TRAINING PROGRAM

## 2022-05-25 PROCEDURE — 87205 SMEAR GRAM STAIN: CPT | Mod: 91

## 2022-05-25 PROCEDURE — A9270 NON-COVERED ITEM OR SERVICE: HCPCS | Performed by: STUDENT IN AN ORGANIZED HEALTH CARE EDUCATION/TRAINING PROGRAM

## 2022-05-25 PROCEDURE — 87075 CULTR BACTERIA EXCEPT BLOOD: CPT

## 2022-05-25 PROCEDURE — 94640 AIRWAY INHALATION TREATMENT: CPT

## 2022-05-25 PROCEDURE — 82962 GLUCOSE BLOOD TEST: CPT | Mod: 91

## 2022-05-25 PROCEDURE — 160009 HCHG ANES TIME/MIN: Performed by: SPECIALIST

## 2022-05-25 PROCEDURE — 160048 HCHG OR STATISTICAL LEVEL 1-5: Performed by: SPECIALIST

## 2022-05-25 PROCEDURE — 160035 HCHG PACU - 1ST 60 MINS PHASE I: Performed by: SPECIALIST

## 2022-05-25 PROCEDURE — 700111 HCHG RX REV CODE 636 W/ 250 OVERRIDE (IP): Performed by: ANESTHESIOLOGY

## 2022-05-25 PROCEDURE — 700102 HCHG RX REV CODE 250 W/ 637 OVERRIDE(OP): Performed by: STUDENT IN AN ORGANIZED HEALTH CARE EDUCATION/TRAINING PROGRAM

## 2022-05-25 PROCEDURE — 87186 SC STD MICRODIL/AGAR DIL: CPT

## 2022-05-25 PROCEDURE — 99232 SBSQ HOSP IP/OBS MODERATE 35: CPT | Mod: GC | Performed by: FAMILY MEDICINE

## 2022-05-25 PROCEDURE — 87070 CULTURE OTHR SPECIMN AEROBIC: CPT

## 2022-05-25 PROCEDURE — 770001 HCHG ROOM/CARE - MED/SURG/GYN PRIV*

## 2022-05-25 PROCEDURE — 0H91XZZ DRAINAGE OF FACE SKIN, EXTERNAL APPROACH: ICD-10-PCS | Performed by: SPECIALIST

## 2022-05-25 PROCEDURE — 99140 ANES COMP EMERGENCY COND: CPT | Performed by: ANESTHESIOLOGY

## 2022-05-25 PROCEDURE — 87147 CULTURE TYPE IMMUNOLOGIC: CPT

## 2022-05-25 PROCEDURE — 00300 ANES ALL PX INTEG H/N/PTRUNK: CPT | Performed by: ANESTHESIOLOGY

## 2022-05-25 PROCEDURE — 160002 HCHG RECOVERY MINUTES (STAT): Performed by: SPECIALIST

## 2022-05-25 PROCEDURE — 160027 HCHG SURGERY MINUTES - 1ST 30 MINS LEVEL 2: Performed by: SPECIALIST

## 2022-05-25 RX ORDER — MIDAZOLAM HYDROCHLORIDE 1 MG/ML
INJECTION INTRAMUSCULAR; INTRAVENOUS PRN
Status: DISCONTINUED | OUTPATIENT
Start: 2022-05-25 | End: 2022-05-25 | Stop reason: SURG

## 2022-05-25 RX ORDER — OXYCODONE HCL 5 MG/5 ML
5 SOLUTION, ORAL ORAL
Status: DISCONTINUED | OUTPATIENT
Start: 2022-05-25 | End: 2022-05-25 | Stop reason: HOSPADM

## 2022-05-25 RX ORDER — SODIUM CHLORIDE, SODIUM LACTATE, POTASSIUM CHLORIDE, CALCIUM CHLORIDE 600; 310; 30; 20 MG/100ML; MG/100ML; MG/100ML; MG/100ML
INJECTION, SOLUTION INTRAVENOUS
Status: DISCONTINUED | OUTPATIENT
Start: 2022-05-25 | End: 2022-05-25 | Stop reason: SURG

## 2022-05-25 RX ORDER — SODIUM CHLORIDE, SODIUM LACTATE, POTASSIUM CHLORIDE, CALCIUM CHLORIDE 600; 310; 30; 20 MG/100ML; MG/100ML; MG/100ML; MG/100ML
INJECTION, SOLUTION INTRAVENOUS CONTINUOUS
Status: DISCONTINUED | OUTPATIENT
Start: 2022-05-25 | End: 2022-05-25 | Stop reason: HOSPADM

## 2022-05-25 RX ORDER — LIDOCAINE HYDROCHLORIDE 10 MG/ML
INJECTION, SOLUTION EPIDURAL; INFILTRATION; INTRACAUDAL; PERINEURAL PRN
Status: DISCONTINUED | OUTPATIENT
Start: 2022-05-25 | End: 2022-05-25 | Stop reason: SURG

## 2022-05-25 RX ORDER — DEXAMETHASONE SODIUM PHOSPHATE 4 MG/ML
INJECTION, SOLUTION INTRA-ARTICULAR; INTRALESIONAL; INTRAMUSCULAR; INTRAVENOUS; SOFT TISSUE PRN
Status: DISCONTINUED | OUTPATIENT
Start: 2022-05-25 | End: 2022-05-25 | Stop reason: SURG

## 2022-05-25 RX ORDER — KETAMINE HYDROCHLORIDE 50 MG/ML
INJECTION, SOLUTION INTRAMUSCULAR; INTRAVENOUS PRN
Status: DISCONTINUED | OUTPATIENT
Start: 2022-05-25 | End: 2022-05-25 | Stop reason: SURG

## 2022-05-25 RX ORDER — BACITRACIN ZINC 500 [USP'U]/G
OINTMENT TOPICAL
Status: DISPENSED
Start: 2022-05-25 | End: 2022-05-26

## 2022-05-25 RX ORDER — HYDROMORPHONE HYDROCHLORIDE 1 MG/ML
0.4 INJECTION, SOLUTION INTRAMUSCULAR; INTRAVENOUS; SUBCUTANEOUS
Status: DISCONTINUED | OUTPATIENT
Start: 2022-05-25 | End: 2022-05-25 | Stop reason: HOSPADM

## 2022-05-25 RX ORDER — ALBUTEROL SULFATE 2.5 MG/3ML
2.5 SOLUTION RESPIRATORY (INHALATION)
Status: DISCONTINUED | OUTPATIENT
Start: 2022-05-25 | End: 2022-05-25 | Stop reason: HOSPADM

## 2022-05-25 RX ORDER — DIPHENHYDRAMINE HYDROCHLORIDE 50 MG/ML
12.5 INJECTION INTRAMUSCULAR; INTRAVENOUS
Status: DISCONTINUED | OUTPATIENT
Start: 2022-05-25 | End: 2022-05-25 | Stop reason: HOSPADM

## 2022-05-25 RX ORDER — HYDRALAZINE HYDROCHLORIDE 20 MG/ML
5 INJECTION INTRAMUSCULAR; INTRAVENOUS
Status: DISCONTINUED | OUTPATIENT
Start: 2022-05-25 | End: 2022-05-25 | Stop reason: HOSPADM

## 2022-05-25 RX ORDER — OXYCODONE HCL 5 MG/5 ML
10 SOLUTION, ORAL ORAL
Status: DISCONTINUED | OUTPATIENT
Start: 2022-05-25 | End: 2022-05-25 | Stop reason: HOSPADM

## 2022-05-25 RX ORDER — HYDROMORPHONE HYDROCHLORIDE 1 MG/ML
0.5 INJECTION, SOLUTION INTRAMUSCULAR; INTRAVENOUS; SUBCUTANEOUS
Status: DISCONTINUED | OUTPATIENT
Start: 2022-05-25 | End: 2022-05-25 | Stop reason: HOSPADM

## 2022-05-25 RX ORDER — HYDROMORPHONE HYDROCHLORIDE 1 MG/ML
0.2 INJECTION, SOLUTION INTRAMUSCULAR; INTRAVENOUS; SUBCUTANEOUS
Status: DISCONTINUED | OUTPATIENT
Start: 2022-05-25 | End: 2022-05-25 | Stop reason: HOSPADM

## 2022-05-25 RX ORDER — MEPERIDINE HYDROCHLORIDE 25 MG/ML
12.5 INJECTION INTRAMUSCULAR; INTRAVENOUS; SUBCUTANEOUS
Status: DISCONTINUED | OUTPATIENT
Start: 2022-05-25 | End: 2022-05-25 | Stop reason: HOSPADM

## 2022-05-25 RX ORDER — ONDANSETRON 2 MG/ML
INJECTION INTRAMUSCULAR; INTRAVENOUS PRN
Status: DISCONTINUED | OUTPATIENT
Start: 2022-05-25 | End: 2022-05-25 | Stop reason: SURG

## 2022-05-25 RX ORDER — HALOPERIDOL 5 MG/ML
1 INJECTION INTRAMUSCULAR
Status: DISCONTINUED | OUTPATIENT
Start: 2022-05-25 | End: 2022-05-25 | Stop reason: HOSPADM

## 2022-05-25 RX ADMIN — FLUTICASONE PROPIONATE 110 MCG: 110 AEROSOL, METERED RESPIRATORY (INHALATION) at 06:15

## 2022-05-25 RX ADMIN — MORPHINE SULFATE 3 MG: 4 INJECTION INTRAVENOUS at 06:30

## 2022-05-25 RX ADMIN — ZOLPIDEM TARTRATE 10 MG: 5 TABLET ORAL at 23:45

## 2022-05-25 RX ADMIN — OXYCODONE HYDROCHLORIDE 10 MG: 10 TABLET ORAL at 22:55

## 2022-05-25 RX ADMIN — SODIUM CHLORIDE 3 G: 900 INJECTION INTRAVENOUS at 05:40

## 2022-05-25 RX ADMIN — ACETAMINOPHEN 650 MG: 325 TABLET ORAL at 17:18

## 2022-05-25 RX ADMIN — OMEPRAZOLE 20 MG: 20 CAPSULE, DELAYED RELEASE ORAL at 05:35

## 2022-05-25 RX ADMIN — DOXYCYCLINE 100 MG: 100 TABLET, FILM COATED ORAL at 17:18

## 2022-05-25 RX ADMIN — ALBUTEROL SULFATE 2 PUFF: 90 AEROSOL, METERED RESPIRATORY (INHALATION) at 17:19

## 2022-05-25 RX ADMIN — OXYCODONE HYDROCHLORIDE 10 MG: 10 TABLET ORAL at 00:38

## 2022-05-25 RX ADMIN — OXYCODONE HYDROCHLORIDE 10 MG: 10 TABLET ORAL at 05:34

## 2022-05-25 RX ADMIN — TIOTROPIUM BROMIDE INHALATION SPRAY 5 MCG: 3.12 SPRAY, METERED RESPIRATORY (INHALATION) at 05:36

## 2022-05-25 RX ADMIN — INSULIN LISPRO 1 UNITS: 100 INJECTION, SOLUTION INTRAVENOUS; SUBCUTANEOUS at 17:25

## 2022-05-25 RX ADMIN — MIDAZOLAM HYDROCHLORIDE 2 MG: 1 INJECTION, SOLUTION INTRAMUSCULAR; INTRAVENOUS at 13:09

## 2022-05-25 RX ADMIN — OXYCODONE HYDROCHLORIDE 10 MG: 10 TABLET ORAL at 09:01

## 2022-05-25 RX ADMIN — LIDOCAINE HYDROCHLORIDE 60 MG: 10 INJECTION, SOLUTION EPIDURAL; INFILTRATION; INTRACAUDAL; PERINEURAL at 13:14

## 2022-05-25 RX ADMIN — CLOTRIMAZOLE: 10 CREAM TOPICAL at 05:43

## 2022-05-25 RX ADMIN — PROPOFOL 150 MG: 10 INJECTION, EMULSION INTRAVENOUS at 13:14

## 2022-05-25 RX ADMIN — CLINDAMYCIN PHOSPHATE 900 MG: 150 INJECTION, SOLUTION INTRAMUSCULAR; INTRAVENOUS at 13:10

## 2022-05-25 RX ADMIN — CLOTRIMAZOLE: 10 CREAM TOPICAL at 17:25

## 2022-05-25 RX ADMIN — SENNOSIDES AND DOCUSATE SODIUM 2 TABLET: 50; 8.6 TABLET ORAL at 17:18

## 2022-05-25 RX ADMIN — ONDANSETRON 4 MG: 2 INJECTION INTRAMUSCULAR; INTRAVENOUS at 13:17

## 2022-05-25 RX ADMIN — GABAPENTIN 800 MG: 400 CAPSULE ORAL at 05:35

## 2022-05-25 RX ADMIN — MORPHINE SULFATE 2 MG: 4 INJECTION INTRAVENOUS at 23:44

## 2022-05-25 RX ADMIN — OXYCODONE HYDROCHLORIDE 10 MG: 10 TABLET ORAL at 17:18

## 2022-05-25 RX ADMIN — ONDANSETRON 4 MG: 4 TABLET, ORALLY DISINTEGRATING ORAL at 00:36

## 2022-05-25 RX ADMIN — ALBUTEROL SULFATE 2 PUFF: 90 AEROSOL, METERED RESPIRATORY (INHALATION) at 19:59

## 2022-05-25 RX ADMIN — DEXAMETHASONE SODIUM PHOSPHATE 4 MG: 4 INJECTION, SOLUTION INTRA-ARTICULAR; INTRALESIONAL; INTRAMUSCULAR; INTRAVENOUS; SOFT TISSUE at 13:17

## 2022-05-25 RX ADMIN — OMEPRAZOLE 20 MG: 20 CAPSULE, DELAYED RELEASE ORAL at 17:18

## 2022-05-25 RX ADMIN — APIXABAN 5 MG: 5 TABLET, FILM COATED ORAL at 17:19

## 2022-05-25 RX ADMIN — FLUTICASONE PROPIONATE 110 MCG: 110 AEROSOL, METERED RESPIRATORY (INHALATION) at 17:19

## 2022-05-25 RX ADMIN — MORPHINE SULFATE 2 MG: 4 INJECTION INTRAVENOUS at 18:51

## 2022-05-25 RX ADMIN — AMITRIPTYLINE HYDROCHLORIDE 150 MG: 100 TABLET, FILM COATED ORAL at 23:46

## 2022-05-25 RX ADMIN — DOXYCYCLINE 100 MG: 100 TABLET, FILM COATED ORAL at 05:35

## 2022-05-25 RX ADMIN — FENTANYL CITRATE 100 MCG: 50 INJECTION, SOLUTION INTRAMUSCULAR; INTRAVENOUS at 13:14

## 2022-05-25 RX ADMIN — KETAMINE HYDROCHLORIDE 50 MG: 50 INJECTION INTRAMUSCULAR; INTRAVENOUS at 13:14

## 2022-05-25 RX ADMIN — SODIUM CHLORIDE, POTASSIUM CHLORIDE, SODIUM LACTATE AND CALCIUM CHLORIDE: 600; 310; 30; 20 INJECTION, SOLUTION INTRAVENOUS at 13:09

## 2022-05-25 RX ADMIN — ACETAMINOPHEN 650 MG: 325 TABLET ORAL at 23:45

## 2022-05-25 RX ADMIN — ACETAMINOPHEN 650 MG: 325 TABLET ORAL at 05:35

## 2022-05-25 RX ADMIN — INSULIN LISPRO 3 UNITS: 100 INJECTION, SOLUTION INTRAVENOUS; SUBCUTANEOUS at 21:15

## 2022-05-25 RX ADMIN — ALBUTEROL SULFATE 2 PUFF: 90 AEROSOL, METERED RESPIRATORY (INHALATION) at 05:36

## 2022-05-25 RX ADMIN — DIPHENHYDRAMINE HYDROCHLORIDE 50 MG: 25 TABLET ORAL at 23:45

## 2022-05-25 ASSESSMENT — PAIN DESCRIPTION - PAIN TYPE
TYPE: ACUTE PAIN
TYPE: SURGICAL PAIN
TYPE: SURGICAL PAIN
TYPE: ACUTE PAIN
TYPE: ACUTE PAIN

## 2022-05-25 NOTE — PROGRESS NOTES
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    SUBJECTIVE:   No acute events overnight, vital signs stable. He is still having pain on the left side of his face. He denies any other complaints. He is aware of plan to go to the OR today for drainage of the abscess.     OBJECTIVE:  Vitals:    05/24/22 1634 05/24/22 1730 05/24/22 2100 05/25/22 0500   BP: (!) 145/95  (!) 136/97 125/88   Pulse: (!) 107  87 79   Resp: 17 16 18 18   Temp: 36.6 °C (97.8 °F)  36.2 °C (97.1 °F) 36.3 °C (97.3 °F)   TempSrc: Temporal  Temporal Temporal   SpO2: 90%  93% 92%   Weight:       Height:           Intake/Output Summary (Last 24 hours) at 5/25/2022 0557  Last data filed at 5/24/2022 2200  Gross per 24 hour   Intake 1070 ml   Output 1100 ml   Net -30 ml       PHYSICAL EXAM:   General: No acute distress, afebrile, resting comfortably, conversational   HEENT: NC/AT. EOMI. Philtrum is swollen with a  pustule that is crusted over, swelling of upper lip and nose up to level of the eyes. Minimal erythema.  Cardiovascular: RRR without murmurs, rubs, heaves. Normal capillary refill   Respiratory: CTAB, no tachypnea or retractions   Abdomen: normal bowel sounds, soft, nontender, nondistended, no masses, no organomegaly   EXT:  SARGENT, no edema  Neuro: Non-focal, alert and orientated       LABS:  Recent Labs     05/22/22  2237 05/23/22  0327 05/24/22  0336   WBC 6.2 6.7 5.5   RBC 5.25 5.06 5.75   HEMOGLOBIN 15.7 15.4 17.5   HEMATOCRIT 45.0 44.9 51.8   MCV 85.7 88.7 90.1   MCH 29.9 30.4 30.4   RDW 43.2 44.5  46.3   PLATELETCT 107* 108* 88*   MPV 9.1 8.5* 9.1   NEUTSPOLYS 58.40 61.70  --    LYMPHOCYTES 29.50 24.90  --    MONOCYTES 9.00 9.40  --    EOSINOPHILS 2.60 3.30  --    BASOPHILS 0.20 0.30  --      Recent Labs     05/22/22 2237 05/23/22 0327 05/24/22  0709   SODIUM 132* 136 135   POTASSIUM 3.8 3.6 4.0   CHLORIDE 100 100 103   CO2 20 26 22   BUN 15 15 13   CREATININE 1.06 1.14 1.05   CALCIUM 9.0 8.8 8.0*   ALBUMIN 3.5 3.7  --      Estimated GFR/CRCL = Estimated Creatinine Clearance: 105.8 mL/min (by C-G formula based on SCr of 1.05 mg/dL).  Recent Labs     05/22/22 2237 05/23/22 0327 05/24/22  0709   GLUCOSE 240* 197* 179*     Recent Labs     05/22/22 2237 05/23/22 0327   ASTSGOT 18 19   ALTSGPT 14 15   TBILIRUBIN 0.8 0.8   ALKPHOSPHAT 129* 125*   GLOBULIN 4.0* 3.6*             No results for input(s): INR, APTT, FIBRINOGEN in the last 72 hours.    Invalid input(s): DIMER    IMAGING:   US-SOFT TISSUES OF HEAD - NECK   Final Result      Likely cellulitis of the upper lip with complex hypoechoic area measuring 2.5 x 0.7 cm most compatible with abscess.      CT-MAXILLOFACIAL W/O   Final Result         1.  No acute traumatic facial bony injuries identified.   2.  Soft tissue swelling of the base of the nose involving the upper lip, could represent phlegmon or hematoma in the setting of trauma, noncontrast technique limits evaluation for abscess.   3.  Mildly prominent bilateral cervical chain lymph nodes, may be reactive      MR-ORBITS, FACE, NECK-WITH    (Results Pending)       CULTURES:   Results     Procedure Component Value Units Date/Time    CULTURE WOUND W/ GRAM STAIN [[id number]]  (Abnormal) Collected: 05/23/22 0000    Order Status: Completed Specimen: Wound from Abscess Updated: 05/24/22 1241     Significant Indicator POS     Source WND     Site upper lip     Culture Result -     Gram Stain Result No organisms seen.     Culture Result Staphylococcus aureus  Light growth  Methicillin resistant by screening  "method.  Susceptibilities in progress        Staphylococcus epidermidis  Light growth  Susceptibilities in progress      GRAM STAIN [[id number]] Collected: 05/23/22 0000    Order Status: Completed Specimen: Wound Updated: 05/23/22 1127     Significant Indicator .     Source WND     Site upper lip     Gram Stain Result No organisms seen.    BLOOD CULTURE [[id number]] Collected: 05/22/22 2312    Order Status: Completed Specimen: Blood from Peripheral Updated: 05/23/22 0623     Significant Indicator NEG     Source BLD     Site PERIPHERAL     Culture Result No Growth  Note: Blood cultures are incubated for 5 days and  are monitored continuously.Positive blood cultures  are called to the RN and reported as soon as  they are identified.      Narrative:      Per Hospital Policy: Only change Specimen Src: to \"Line\" if  specified by physician order.  Left Hand    BLOOD CULTURE [[id number]] Collected: 05/22/22 2237    Order Status: Completed Specimen: Blood from Peripheral Updated: 05/23/22 0623     Significant Indicator NEG     Source BLD     Site PERIPHERAL     Culture Result No Growth  Note: Blood cultures are incubated for 5 days and  are monitored continuously.Positive blood cultures  are called to the RN and reported as soon as  they are identified.      Narrative:      Per Hospital Policy: Only change Specimen Src: to \"Line\" if  specified by physician order.  Left Hand          MEDS:  Current Facility-Administered Medications   Medication Last Admin   • morphine 4 MG/ML injection 2-3 mg 3 mg at 05/24/22 2310   • omeprazole (PRILOSEC) capsule 20 mg 20 mg at 05/25/22 0535   • amitriptyline (ELAVIL) tablet 150 mg 150 mg at 05/24/22 2308   • diphenhydrAMINE (BENADRYL) tablet/capsule 50 mg 50 mg at 05/24/22 2308   • apixaban (ELIQUIS) tablet 5 mg 5 mg at 05/24/22 0529   • fluticasone (FLOVENT HFA) 110 MCG/ACT inhaler 110 mcg 110 mcg at 05/24/22 1728   • gabapentin (NEURONTIN) capsule 800 mg 800 mg at 05/25/22 0535   • zolpidem " (AMBIEN) tablet 10 mg 10 mg at 05/24/22 2308   • albuterol inhaler 1-2 Puff 2 Puff at 05/25/22 0536   • tiotropium (Spiriva Respimat) 2.5 mcg/Act inhalation spray 5 mcg 5 mcg at 05/25/22 0536   • senna-docusate (PERICOLACE or SENOKOT S) 8.6-50 MG per tablet 2 Tablet      And   • polyethylene glycol/lytes (MIRALAX) PACKET 1 Packet      And   • magnesium hydroxide (MILK OF MAGNESIA) suspension 30 mL      And   • bisacodyl (DULCOLAX) suppository 10 mg     • acetaminophen (Tylenol) tablet 650 mg 650 mg at 05/23/22 1514   • Pharmacy Consult Request ...Pain Management Review 1 Each     • oxyCODONE immediate-release (ROXICODONE) tablet 5 mg      Or   • oxyCODONE immediate release (ROXICODONE) tablet 10 mg 10 mg at 05/25/22 0534   • ondansetron (ZOFRAN) syringe/vial injection 4 mg 4 mg at 05/23/22 2039   • ondansetron (ZOFRAN ODT) dispertab 4 mg 4 mg at 05/25/22 0036   • promethazine (PHENERGAN) tablet 12.5-25 mg 25 mg at 05/23/22 0239   • promethazine (PHENERGAN) suppository 12.5-25 mg     • prochlorperazine (COMPAZINE) injection 5-10 mg     • hydrALAZINE (APRESOLINE) injection 10 mg     • ampicillin/sulbactam (UNASYN) 3 g in  mL IVPB 3 g at 05/25/22 0540   • clotrimazole (LOTRIMIN) 1 % cream Given at 05/25/22 0543   • doxycycline monohydrate (ADOXA) tablet 100 mg 100 mg at 05/25/22 0535   • acetaminophen (Tylenol) tablet 650 mg 650 mg at 05/25/22 0535   • insulin lispro (AdmeLOG,HumaLOG) injection 1 Units at 05/24/22 2030    And   • dextrose 50% (D50W) injection 25 g         ASSESSMENT/PLAN: 57 y.o. male admitted for:    * Facial abscess- (present on admission)  Assessment & Plan  Patient with 6 days of swelling and pain of the upper lip. It is now spontaneously draining purulent fluid. He was on clindamycin PO outpatient x6 days and the symptoms worsened. He denies fevers, but has had nausea, vomiting, and diarrhea. Patient with significant tenderness from his upper lip to his eyebrows L>R. History of MRSA  cellulitis in 3/2022 on left leg. Wound culture collected in ED. CT maxilofacial without contrast (allergy to contrast) showed soft tissue swelling of the base of the nose involving the upper lip possibly representing phlegmon or hematoma but limited exam due to no contrast and mildly prominent bilateral cervical chain lymph nodes which may be reactive. U/S significant for upper lip abscess 2x0.7cm.  -Otolaryngology consulted, I&D in OR today with Dr Ortiz  -Continue doxycycline; Unasyn DC'd after positive wound culture for MRSA resulted  -MRI w/contrast ordered to further evaluate  -follow-up blood cultures   -Pain control with tylenol and prn medications     Thrombocytopenia (HCC)  Assessment & Plan  Chronic. Patient with history of MDS with plan for chemotherapy but has not started. Platelets 107 on admission, now down to 88.  -CTM    Type 2 diabetes mellitus, without long-term current use of insulin (HCC)- (present on admission)  Assessment & Plan  Recent diagnosis within the last few months. Patient takes metformin 1000mg BID. A1c on admission was 8.6.  - ISS while inpatient, consider restarting metformin inpatient.  - continue metformin outpatient  - goal BS <180 for wound healing    Chronic pain of both knees- (present on admission)  Assessment & Plan  Continue gabapentin 800mg daily.     Insomnia- (present on admission)  Assessment & Plan  Patient takes benadryl, amitriptyline, and ambien every night for sleep.  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 Last chemotherapy was Oct 2020 for testicular cancer which is now in remission. The staff in the ER were unable to draw from the port and attempted heparin flushes with no  success  - consider vascular surgery consult as unable to flush         Core Measures:   Fluids: None  Lines: PIV  Abx: Doxycycline   DVT prophylaxis: On Eliquis (held for OR)  Code Status: DNAR/DNI    Disposition: pending procedure     Fitz Alicia D.O.

## 2022-05-25 NOTE — ANESTHESIA PROCEDURE NOTES
Airway    Date/Time: 5/25/2022 1:15 PM  Performed by: Wolfgang Mahajan M.D.  Authorized by: Wolfgang Mahajan M.D.     Location:  OR  Urgency:  Elective  Difficult Airway: No    Indications for Airway Management:  Anesthesia      Spontaneous Ventilation: absent    Sedation Level:  Deep  Preoxygenated: Yes    Mask Difficulty Assessment:  0 - not attempted  Final Airway Type:  Supraglottic airway  Final Supraglottic Airway:  Standard LMA    SGA Size:  4  Number of Attempts at Approach:  1

## 2022-05-25 NOTE — ANESTHESIA PREPROCEDURE EVALUATION
Case: [id number] Date/Time: 05/25/22 1349    Procedure: IRRIGATION AND DEBRIDEMENT, WOUND-FACIAL ABSCESS    Location: [facility] ROOM 22 / SURGERY SAME DAY [facility]    Surgeons: Adolph Ortiz M.D.          Relevant Problems   PULMONARY   (positive) Mild intermittent asthma without complication   (positive) Mild persistent asthma      NEURO   (positive) History of pulmonary embolism   (positive) History of testicular cancer      ENDO   (positive) DKA, type 2, not at goal (HCC)   (positive) Type 2 diabetes mellitus, without long-term current use of insulin (HCC)      Other   (positive) Bates esophagus   (positive) Facial abscess   (positive) Myelodysplastic syndrome (HCC)     Anes H&P:  PAST MEDICAL HISTORY:   57 y.o. male who presents for Procedure(s):  IRRIGATION AND DEBRIDEMENT, WOUND-FACIAL ABSCESS.  He has current and past medical problems significant for:    Past Medical History:   Diagnosis Date   • Asthma    • Cancer ([facility]) 11/01/2016   • MI (myocardial infarction) ([facility]) 2019       SMOKING/ALCOHOL/RECREATIONAL DRUG USE:  Social History     Tobacco Use   • Smoking status: Never Smoker   • Smokeless tobacco: Current User     Types: Chew   Vaping Use   • Vaping Use: Never used   Substance Use Topics   • Alcohol use: Not Currently   • Drug use: Not Currently     Social History     Substance and Sexual Activity   Drug Use Not Currently       PAST SURGICAL HISTORY:  Past Surgical History:   Procedure Laterality Date   • CATH REMOVAL N/A 8/14/2019    Procedure: REMOVAL, CATHETER AND PLACEMENT OF POWER PORT;  Surgeon: Sonny Enamorado M.D.;  Location: SURGERY [facility];  Service: General       ALLERGIES:   Allergies   Allergen Reactions   • Green Beans Anaphylaxis   • Iodine Anaphylaxis   • Keflex Diarrhea and Vomiting     Vomiting & Diarrhea  Tolerates ceftriaxone   • Reglan [Metoclopramide] Nausea     Asthma     • Shellfish Allergy Anaphylaxis   • Toradol Hives       MEDICATIONS:  No current facility-administered  medications on file prior to encounter.     Current Outpatient Medications on File Prior to Encounter   Medication Sig Dispense Refill   • acetaminophen (TYLENOL) 500 MG Tab Take 500-1,000 mg by mouth every 6 hours as needed for Mild Pain.     • omeprazole (PRILOSEC) 20 MG Tablet Delayed Response delayed-release tablet TAKE 2 TABLETS BY MOUTH EVERY  Tablet 3   • zolpidem (AMBIEN) 10 MG Tab Take 1 Tablet by mouth at bedtime as needed for Sleep for up to 30 days. 30 Tablet 2   • fluticasone (FLOVENT HFA) 110 MCG/ACT Aerosol Inhale 1 Puff 2 times a day. 1 Each 2   • metFORMIN (GLUCOPHAGE) 500 MG Tab Take 2 Tablets by mouth 2 times a day with meals for 30 days. 120 Tablet 0   • gabapentin (NEURONTIN) 800 MG tablet TAKE 1 TABLET BY MOUTH THREE TIMES A DAY (Patient taking differently: Take 800 mg by mouth every day.) 90 Tablet 3   • COMBIVENT RESPIMAT  MCG/ACT Aero Soln INHALE 1 PUFF BY MOUTH 4 TIMES A DAY (Patient taking differently: Inhale 1 Puff 4 times a day.) 1 Each 5   • amitriptyline (ELAVIL) 100 MG Tab TAKE 1.5 TABLETS BY MOUTH AT BEDTIME AS NEEDED FOR SLEEP. 30 Tablet 3   • BANOPHEN 50 MG Cap TAKE 1 CAPSULE BY MOUTH AT BEDTIME AS NEEDED FOR SLEEP (Patient taking differently: Take 50 mg by mouth at bedtime as needed.) 30 Capsule 2   • ELIQUIS 5 MG Tab TAKE 1 TABLET BY MOUTH TWICE A DAY (Patient taking differently: Take 5 mg by mouth 2 times a day.) 60 Tablet 3       LABS:  Lab Results   Component Value Date/Time    HEMOGLOBIN 17.5 05/24/2022 0336    HEMATOCRIT 51.8 05/24/2022 0336    WBC 5.5 05/24/2022 0336     Lab Results   Component Value Date/Time    SODIUM 135 05/24/2022 0709    POTASSIUM 4.0 05/24/2022 0709    CHLORIDE 103 05/24/2022 0709    CO2 22 05/24/2022 0709    GLUCOSE 179 (H) 05/24/2022 0709    BUN 13 05/24/2022 0709    CALCIUM 8.0 (L) 05/24/2022 0709         PREVIOUS ANESTHETICS: See EMR  __________________________________________      Physical Exam    Airway   Mallampati: III  TM  distance: >3 FB  Neck ROM: full       Cardiovascular - normal exam  Rhythm: regular  Rate: normal  (-) murmur     Dental - normal exam           Pulmonary - normal exam  Breath sounds clear to auscultation     Abdominal   (+) obese     Neurological - normal exam                 Anesthesia Plan    ASA 3- EMERGENT   ASA physical status emergent criteria: acutely contaminated wound or identified infection source    Plan - general       Airway plan will be LMA          Induction: intravenous    Postoperative Plan: Postoperative administration of opioids is intended.    Pertinent diagnostic labs and testing reviewed    Informed Consent:    Anesthetic plan and risks discussed with patient.    Use of blood products discussed with: patient whom consented to blood products.

## 2022-05-25 NOTE — CARE PLAN
The patient is Stable - Low risk of patient condition declining or worsening    Shift Goals  Clinical Goals: Pain management  Patient Goals: pt's pain will be managed and able to sleep comfortably    Progress made toward(s) clinical / shift goals:      Patient is not progressing towards the following goals:

## 2022-05-25 NOTE — PROGRESS NOTES
"Pt is high fall risk. Safety education provided, informed pt that bed alarm will be applied. Pt refused and said \"if that's the thing that will make a sound and wake me up when Im sleeping, I don't want that please\". Instead reinforced the use of call light when needing assistance.  "

## 2022-05-25 NOTE — ANESTHESIA POSTPROCEDURE EVALUATION
Patient: Benito Persaud    Procedure Summary     Date: 05/25/22 Room / Location: [facility] ROOM 22 / SURGERY SAME DAY [facility]    Anesthesia Start: 1309 Anesthesia Stop: 1343    Procedure: IRRIGATION AND DEBRIDEMENT, WOUND-FACIAL ABSCESS (N/A Face) Diagnosis: (Facial Abcess)    Surgeons: Adolph Ortiz M.D. Responsible Provider: Wolfgang Mahajan M.D.    Anesthesia Type: general ASA Status: 3 - Emergent          Final Anesthesia Type: general  Last vitals  BP   Blood Pressure: 139/86    Temp   36.2 °C (97.2 °F)    Pulse   82   Resp   16    SpO2   98 %      Anesthesia Post Evaluation    Patient location during evaluation: PACU  Patient participation: complete - patient participated  Level of consciousness: sleepy but conscious    Airway patency: patent  Anesthetic complications: no  Cardiovascular status: hemodynamically stable  Respiratory status: acceptable  Hydration status: balanced    PONV: none          No complications documented.     Nurse Pain Score: 7 (NPRS)

## 2022-05-25 NOTE — WOUND TEAM
Wound team consulted for nose and left LE.  Patient in surgery for facial abscess with Dr. Ortiz.  Left LE is scar tissue with discoloration and dry skin.  Wound consult completed at this time.  Please re-consult wound team if there is a need after surgery.

## 2022-05-25 NOTE — OR NURSING
1340 from OR to PACU QR3. Connected to monitor. Report received from anesthesia & RN. VSS. 02 6L via mask with oral airway in place. Breaths calm, even, unlabored.      Strip packing, dry gauze, and tape to nares in place, small amount of bloody drainage noted; has not soaked through gauze.     1412 oral airway removed and 02 weaned to 2L. Patient briefly opened his eyes, then fell back asleep.     1417 Spoke to S6 RN Pardeep & gave report; all questions answered.     1424 Transport request placed.     1500-Transport personnel leaving PACU with patient to return to room.

## 2022-05-25 NOTE — OR SURGEON
Immediate Post OP Note    PreOp Diagnosis: facial abscess    PostOp Diagnosis: same      Procedure(s):  INCISION AND DRAINAGE WOUND-FACIAL ABSCESS - Wound Class: Dirty or Infected    Surgeon(s):  Adolph Ortiz M.D.    Anesthesiologist/Type of Anesthesia:  Anesthesiologist: Wolfgang Mahajan M.D./General lma    Surgical Staff:  Circulator: Lucía Fisher R.N.  Relief Circulator: Ashwini Robledo R.N.  Scrub Person: Nicky Pina    Specimens removed if any:  * No specimens in log *    Estimated Blood Loss: <5ml    Findings: gauze pack  Complications: none        5/25/2022 1:35 PM Adolph Ortiz M.D.

## 2022-05-25 NOTE — PROGRESS NOTES
"Per pt, he takes ambien, dipenhydramine and amitriptyline at bedtime. Updated Dr. Yepez, and said \"that is fine. You can give hime all 3\".   "

## 2022-05-25 NOTE — CONSULTS
DATE OF SERVICE:  05/24/2022     OTOLARYNGOLOGY CONSULTATION     REQUESTING PHYSICIAN:  Larry Alicia MD     REASON FOR CONSULTATION:  Facial abscess.     HISTORY OF PRESENT ILLNESS:  The patient is a 57-year-old man who has multiple   medical problems including history of testicular cancer, status post   orchiectomy and chemotherapy, in remission since 10/2020, as well as a   recently diagnosed myelodysplastic syndrome.  He has a history of recurrent   DVT and PEs as well and is currently on Eliquis.  He does have a history of   CVA x2.  He developed pain approximately 1 week ago, which has gotten   progressively worse.  His upper lip felt initially numb and tingly and then   painful as well over the last couple days with a marked increase in pain 2   days ago.  He was initially prescribed clindamycin approximately 5-6 days ago   for treatment of MRSA cellulitis of the left leg 2 months ago.  He has had a   progression of the pain and swelling despite the clindamycin treatment.  He   did have some discharge from the area, which is described as purulent and has   been having difficulty swallowing along with a few days of diarrhea.  I was   asked to see the patient regarding possible abscess demonstrated by both CT   scan examination, but also with ultrasonography.     PAST MEDICAL HISTORY:  As noted above and documented in the chart.     PAST SURGICAL HISTORY:  Unremarkable for previous nasal surgery.     ALLERGIES:  REGLAN, SHELLFISH, AND IODINE.     OUTPATIENT MEDICATIONS:  Reviewed.  The patient is currently on Eliquis.  He   has been treated with Unasyn and doxycycline while hospitalized here now.     PHYSICAL EXAMINATION:  GENERAL:  The patient is awake and alert and responds appropriately to   questions.  HEENT:  He has marked swelling of the upper lip and premaxillary area   including the columella.  There is a purulent crusting present along the   columella as well.  Swelling and redness are present  extending into the nasal   vestibule on both sides inferiorly.  The area was quite tender and the patient   does not allow for adequate visualization of the nose or distinct palpation   along the upper lip.  The remainder of the face is without swelling or   redness.     DIAGNOSTIC STUDIES:  The patient's CT scan was reviewed.  There does appear to   be some indistinct lucencies present along the upper lip soft tissue at the   base of the columella consistent with possible cellulitis/phlegmon/or early   abscess.     IMPRESSION:  Facial cellulitis/abscess involving the premaxillary and nasal   tip areas.     RECOMMENDATION:  For incision and drainage given the patient's myelodysplastic   syndrome as well as the marked swelling present.  Because it is exquisitely   tender, this was best done under anesthesia and this will be scheduled   tomorrow.  He will need to discontinue the Eliquis and the procedure was   briefly explained and discussed with the patient.  Questions were answered.    The patient verbally gives consent for the incision and drainage.        ______________________________  MD MIKAEL PATEL/CHRIS    DD:  05/24/2022 17:06  DT:  05/24/2022 19:52    Job#:  [id number]

## 2022-05-25 NOTE — CARE PLAN
The patient is Stable - Low risk of patient condition declining or worsening    Shift Goals  Clinical Goals: Pain mgmt  Patient Goals: Comfort    Progress made toward(s) clinical / shift goals: Pain being managed appropriately, prep for surgery.    Patient is not progressing towards the following goals: N/A    Problem: Pain - Standard  Goal: Alleviation of pain or a reduction in pain to the patient’s comfort goal  Outcome: Progressing     Problem: Knowledge Deficit - Standard  Goal: Patient and family/care givers will demonstrate understanding of plan of care, disease process/condition, diagnostic tests and medications  Outcome: Progressing     Problem: Skin Integrity  Goal: Skin integrity is maintained or improved  Outcome: Progressing     Problem: Fall Risk  Goal: Patient will remain free from falls  Outcome: Progressing

## 2022-05-25 NOTE — CARE PLAN
The patient is Stable - Low risk of patient condition declining or worsening    Shift Goals  Clinical Goals: Pain mgmt  Patient Goals: Comfort    Progress made toward(s) clinical / shift goals: Managing pain.    Patient is not progressing towards the following goals: N/A    Problem: Pain - Standard  Goal: Alleviation of pain or a reduction in pain to the patient’s comfort goal  Outcome: Progressing     Problem: Knowledge Deficit - Standard  Goal: Patient and family/care givers will demonstrate understanding of plan of care, disease process/condition, diagnostic tests and medications  Outcome: Progressing     Problem: Skin Integrity  Goal: Skin integrity is maintained or improved  Outcome: Progressing     Problem: Fall Risk  Goal: Patient will remain free from falls  Outcome: Progressing

## 2022-05-26 LAB
GLUCOSE BLD STRIP.AUTO-MCNC: 151 MG/DL (ref 65–99)
GLUCOSE BLD STRIP.AUTO-MCNC: 182 MG/DL (ref 65–99)
GLUCOSE BLD STRIP.AUTO-MCNC: 199 MG/DL (ref 65–99)
GLUCOSE BLD STRIP.AUTO-MCNC: 206 MG/DL (ref 65–99)
GLUCOSE BLD STRIP.AUTO-MCNC: 255 MG/DL (ref 65–99)
GRAM STN SPEC: NORMAL
SIGNIFICANT IND 70042: NORMAL
SITE SITE: NORMAL
SOURCE SOURCE: NORMAL

## 2022-05-26 PROCEDURE — 770001 HCHG ROOM/CARE - MED/SURG/GYN PRIV*

## 2022-05-26 PROCEDURE — 700111 HCHG RX REV CODE 636 W/ 250 OVERRIDE (IP): Performed by: STUDENT IN AN ORGANIZED HEALTH CARE EDUCATION/TRAINING PROGRAM

## 2022-05-26 PROCEDURE — 700102 HCHG RX REV CODE 250 W/ 637 OVERRIDE(OP): Performed by: STUDENT IN AN ORGANIZED HEALTH CARE EDUCATION/TRAINING PROGRAM

## 2022-05-26 PROCEDURE — 99232 SBSQ HOSP IP/OBS MODERATE 35: CPT | Mod: GC | Performed by: FAMILY MEDICINE

## 2022-05-26 PROCEDURE — A9270 NON-COVERED ITEM OR SERVICE: HCPCS | Performed by: STUDENT IN AN ORGANIZED HEALTH CARE EDUCATION/TRAINING PROGRAM

## 2022-05-26 PROCEDURE — 82962 GLUCOSE BLOOD TEST: CPT

## 2022-05-26 RX ADMIN — SENNOSIDES AND DOCUSATE SODIUM 2 TABLET: 50; 8.6 TABLET ORAL at 17:58

## 2022-05-26 RX ADMIN — OXYCODONE HYDROCHLORIDE 10 MG: 10 TABLET ORAL at 12:30

## 2022-05-26 RX ADMIN — INSULIN LISPRO 2 UNITS: 100 INJECTION, SOLUTION INTRAVENOUS; SUBCUTANEOUS at 12:36

## 2022-05-26 RX ADMIN — INSULIN LISPRO 3 UNITS: 100 INJECTION, SOLUTION INTRAVENOUS; SUBCUTANEOUS at 09:30

## 2022-05-26 RX ADMIN — OXYCODONE HYDROCHLORIDE 10 MG: 10 TABLET ORAL at 05:56

## 2022-05-26 RX ADMIN — APIXABAN 5 MG: 5 TABLET, FILM COATED ORAL at 17:58

## 2022-05-26 RX ADMIN — MORPHINE SULFATE 3 MG: 4 INJECTION INTRAVENOUS at 20:16

## 2022-05-26 RX ADMIN — OMEPRAZOLE 20 MG: 20 CAPSULE, DELAYED RELEASE ORAL at 05:56

## 2022-05-26 RX ADMIN — INSULIN LISPRO 1 UNITS: 100 INJECTION, SOLUTION INTRAVENOUS; SUBCUTANEOUS at 17:53

## 2022-05-26 RX ADMIN — CLOTRIMAZOLE: 10 CREAM TOPICAL at 05:57

## 2022-05-26 RX ADMIN — OMEPRAZOLE 20 MG: 20 CAPSULE, DELAYED RELEASE ORAL at 17:58

## 2022-05-26 RX ADMIN — ACETAMINOPHEN 650 MG: 325 TABLET ORAL at 05:56

## 2022-05-26 RX ADMIN — SENNOSIDES AND DOCUSATE SODIUM 2 TABLET: 50; 8.6 TABLET ORAL at 05:56

## 2022-05-26 RX ADMIN — CLOTRIMAZOLE: 10 CREAM TOPICAL at 20:26

## 2022-05-26 RX ADMIN — ZOLPIDEM TARTRATE 10 MG: 5 TABLET ORAL at 23:27

## 2022-05-26 RX ADMIN — TIOTROPIUM BROMIDE INHALATION SPRAY 5 MCG: 3.12 SPRAY, METERED RESPIRATORY (INHALATION) at 05:54

## 2022-05-26 RX ADMIN — MORPHINE SULFATE 3 MG: 4 INJECTION INTRAVENOUS at 15:18

## 2022-05-26 RX ADMIN — ACETAMINOPHEN 650 MG: 325 TABLET ORAL at 12:31

## 2022-05-26 RX ADMIN — OXYCODONE 5 MG: 5 TABLET ORAL at 23:27

## 2022-05-26 RX ADMIN — DOXYCYCLINE 100 MG: 100 TABLET, FILM COATED ORAL at 17:58

## 2022-05-26 RX ADMIN — ALBUTEROL SULFATE 2 PUFF: 90 AEROSOL, METERED RESPIRATORY (INHALATION) at 05:55

## 2022-05-26 RX ADMIN — DOXYCYCLINE 100 MG: 100 TABLET, FILM COATED ORAL at 05:56

## 2022-05-26 RX ADMIN — OXYCODONE HYDROCHLORIDE 10 MG: 10 TABLET ORAL at 17:59

## 2022-05-26 RX ADMIN — INSULIN LISPRO 1 UNITS: 100 INJECTION, SOLUTION INTRAVENOUS; SUBCUTANEOUS at 20:21

## 2022-05-26 RX ADMIN — GABAPENTIN 800 MG: 400 CAPSULE ORAL at 05:56

## 2022-05-26 RX ADMIN — FLUTICASONE PROPIONATE 110 MCG: 110 AEROSOL, METERED RESPIRATORY (INHALATION) at 05:55

## 2022-05-26 RX ADMIN — ACETAMINOPHEN 650 MG: 325 TABLET ORAL at 17:58

## 2022-05-26 RX ADMIN — DIPHENHYDRAMINE HYDROCHLORIDE 50 MG: 25 TABLET ORAL at 23:27

## 2022-05-26 RX ADMIN — ACETAMINOPHEN 650 MG: 325 TABLET ORAL at 23:28

## 2022-05-26 RX ADMIN — APIXABAN 5 MG: 5 TABLET, FILM COATED ORAL at 05:56

## 2022-05-26 RX ADMIN — AMITRIPTYLINE HYDROCHLORIDE 150 MG: 100 TABLET, FILM COATED ORAL at 23:27

## 2022-05-26 RX ADMIN — MORPHINE SULFATE 3 MG: 4 INJECTION INTRAVENOUS at 09:45

## 2022-05-26 ASSESSMENT — PAIN DESCRIPTION - PAIN TYPE
TYPE: ACUTE PAIN

## 2022-05-26 NOTE — PROGRESS NOTES
Received report from NOC RN, pt care assumed. Pt is tachycardic (). Call light within reach. Pt is aaox4 and refuses bed-alarm. No signs of acute distress.

## 2022-05-26 NOTE — PROGRESS NOTES
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    SUBJECTIVE:   No acute events overnight, vital signs stable. Patient states that he is still having pain of the face.     OBJECTIVE:  Vitals:    05/25/22 2000 05/26/22 0351 05/26/22 0720 05/26/22 0945   BP:  (!) 153/90 (!) 150/80    Pulse: 98 95 (!) 114    Resp:  18 18 20   Temp:  36.3 °C (97.4 °F) 36.3 °C (97.3 °F)    TempSrc:  Temporal Temporal    SpO2:  92% 99%    Weight:       Height:           Intake/Output Summary (Last 24 hours) at 5/26/2022 0528  Last data filed at 5/26/2022 0100  Gross per 24 hour   Intake 250 ml   Output 650 ml   Net -400 ml       PHYSICAL EXAM:  General: No acute distress, afebrile, resting comfortably, conversational   HEENT: NC/AT. EOMI. MMM. Bandage above lip CDI.   Cardiovascular: RRR without murmurs   Respiratory: CTAB, no tachypnea or retractions   Abdomen: soft, nontender, nondistended, no masses, no organomegaly   EXT:  SARGENT, no edema  Skin: Well-healing area of previous wound on left lower leg   Neuro: Non-focal, alert and orientated       LABS:  Recent Labs     05/24/22  0336   WBC 5.5   RBC 5.75   HEMOGLOBIN 17.5   HEMATOCRIT 51.8   MCV 90.1   MCH 30.4   RDW 46.3   PLATELETCT 88*   MPV 9.1     Recent Labs     05/24/22  0709   SODIUM 135   POTASSIUM 4.0   CHLORIDE 103   CO2 22   BUN 13   CREATININE 1.05   CALCIUM 8.0*     Estimated GFR/CRCL = Estimated Creatinine Clearance: 105.8 mL/min (by C-G formula based on SCr of 1.05 mg/dL).  Recent Labs     05/24/22  0709   GLUCOSE 179*                  No results for input(s): INR, APTT, FIBRINOGEN in the last 72 hours.    Invalid input(s): DIMER    IMAGING:   US-SOFT TISSUES OF HEAD - NECK   Final Result      Likely cellulitis of the upper lip with complex hypoechoic area measuring 2.5 x 0.7 cm most compatible with abscess.      CT-MAXILLOFACIAL W/O   Final Result         1.  No acute traumatic facial bony injuries identified.   2.  Soft tissue swelling of the base of the nose involving the upper lip, could represent phlegmon or hematoma in the setting of trauma, noncontrast technique limits evaluation for abscess.   3.  Mildly prominent bilateral cervical chain lymph nodes, may be reactive      MR-ORBITS, FACE, NECK-WITH    (Results Pending)       CULTURES:   Results     Procedure Component Value Units Date/Time    Anaerobic Culture [[id number]] Collected: 05/25/22 1338    Order Status: Completed Specimen: Wound Updated: 05/26/22 0731     Significant Indicator NEG     Source WND     Site premaxilla fluid     Culture Result Culture in progress.    Narrative:      Previous comment was modified by JESSIE at 14:52 on 05/25/22.  afb unacceptable for eswabs  Surgery - swabs received    Fungal Culture [[id number]] Collected: 05/25/22 1338    Order Status: No result Specimen: Wound Updated: 05/26/22 0731     Significant Indicator NEG     Source WND     Site premaxilla fluid     Culture Result -     Fungal Smear Results No fungal elements seen.    Narrative:      Previous comment was modified by JESSIE at 14:52 on 05/25/22.  afb unacceptable for eswabs  Surgery - swabs received    CULTURE WOUND W/ GRAM STAIN [[id number]] Collected: 05/25/22 1338    Order Status: No result Specimen: Wound Updated: 05/26/22 0731     Significant Indicator NEG     Source WND     Site premaxilla fluid     Culture Result -     Gram Stain Result Few WBCs.  No organisms seen.      Narrative:      Previous comment was modified by JESSIE at 14:52 on 05/25/22.  afb unacceptable for eswabs  Surgery -  swabs received    Fungal Smear [[id number]] Collected: 05/25/22 1338    Order Status: Completed Specimen: Wound Updated: 05/26/22 0243     Significant Indicator NEG     Source WND     Site premaxilla fluid     Fungal Smear Results No fungal elements seen.    Narrative:      Previous comment was modified by JESSIE at 14:52 on 05/25/22.  afb unacceptable for eswabs  Surgery - swabs received    GRAM STAIN [[id number]] Collected: 05/25/22 1338    Order Status: Completed Specimen: Wound Updated: 05/26/22 0243     Significant Indicator .     Source WND     Site premaxilla fluid     Gram Stain Result Few WBCs.  No organisms seen.      Narrative:      Previous comment was modified by [email or web address] at 14:52 on 05/25/22.  afb unacceptable for eswabs  Surgery - swabs received    CULTURE WOUND W/ GRAM STAIN [[id number]]  (Abnormal)  (Susceptibility) Collected: 05/23/22 0000    Order Status: Completed Specimen: Wound from Abscess Updated: 05/25/22 0916     Significant Indicator POS     Source WND     Site upper lip     Culture Result Light growth usual skin kleber.     Gram Stain Result No organisms seen.     Culture Result Methicillin Resistant Staphylococcus aureus  Light growth      Susceptibility     Methicillin resistant staphylococcus aureus (1)     Antibiotic Interpretation Method Status    Azithromycin Resistant MATT Final    Clindamycin Resistant MATT Final    Cefazolin Resistant MATT Final    Cefepime Resistant MATT Final    Ceftaroline Sensitive MATT Final    Daptomycin Sensitive MATT Final    Ampicillin/sulbactam Resistant MATT Final    Erythromycin Resistant MATT Final    Vancomycin Sensitive MATT Final    Oxacillin Resistant MATT Final    Trimeth/Sulfa Sensitive MATT Final    Tetracycline Sensitive MATT Final                   GRAM STAIN [[id number]] Collected: 05/23/22 0000    Order Status: Completed Specimen: Wound Updated: 05/23/22 1127     Significant Indicator .     Source WND     Site upper lip     Gram Stain Result No organisms seen.  "   BLOOD CULTURE [[id number]] Collected: 05/22/22 2312    Order Status: Completed Specimen: Blood from Peripheral Updated: 05/23/22 0623     Significant Indicator NEG     Source BLD     Site PERIPHERAL     Culture Result No Growth  Note: Blood cultures are incubated for 5 days and  are monitored continuously.Positive blood cultures  are called to the RN and reported as soon as  they are identified.      Narrative:      Per Hospital Policy: Only change Specimen Src: to \"Line\" if  specified by physician order.  Left Hand    BLOOD CULTURE [[id number]] Collected: 05/22/22 2237    Order Status: Completed Specimen: Blood from Peripheral Updated: 05/23/22 0623     Significant Indicator NEG     Source BLD     Site PERIPHERAL     Culture Result No Growth  Note: Blood cultures are incubated for 5 days and  are monitored continuously.Positive blood cultures  are called to the RN and reported as soon as  they are identified.      Narrative:      Per Hospital Policy: Only change Specimen Src: to \"Line\" if  specified by physician order.  Left Hand          MEDS:  Current Facility-Administered Medications   Medication Last Admin   • morphine 4 MG/ML injection 2-3 mg 3 mg at 05/26/22 0945   • omeprazole (PRILOSEC) capsule 20 mg 20 mg at 05/26/22 0556   • amitriptyline (ELAVIL) tablet 150 mg 150 mg at 05/25/22 2346   • diphenhydrAMINE (BENADRYL) tablet/capsule 50 mg 50 mg at 05/25/22 2345   • apixaban (ELIQUIS) tablet 5 mg 5 mg at 05/26/22 0556   • fluticasone (FLOVENT HFA) 110 MCG/ACT inhaler 110 mcg 110 mcg at 05/26/22 0555   • gabapentin (NEURONTIN) capsule 800 mg 800 mg at 05/26/22 0556   • zolpidem (AMBIEN) tablet 10 mg 10 mg at 05/25/22 2345   • albuterol inhaler 1-2 Puff 2 Puff at 05/26/22 0555   • tiotropium (Spiriva Respimat) 2.5 mcg/Act inhalation spray 5 mcg 5 mcg at 05/26/22 0554   • senna-docusate (PERICOLACE or SENOKOT S) 8.6-50 MG per tablet 2 Tablet 2 Tablet at 05/26/22 0556    And   • polyethylene glycol/lytes " (MIRALAX) PACKET 1 Packet      And   • magnesium hydroxide (MILK OF MAGNESIA) suspension 30 mL      And   • bisacodyl (DULCOLAX) suppository 10 mg     • acetaminophen (Tylenol) tablet 650 mg 650 mg at 05/23/22 1514   • Pharmacy Consult Request ...Pain Management Review 1 Each     • oxyCODONE immediate-release (ROXICODONE) tablet 5 mg      Or   • oxyCODONE immediate release (ROXICODONE) tablet 10 mg 10 mg at 05/26/22 0556   • ondansetron (ZOFRAN) syringe/vial injection 4 mg 4 mg at 05/23/22 2039   • ondansetron (ZOFRAN ODT) dispertab 4 mg 4 mg at 05/25/22 0036   • promethazine (PHENERGAN) tablet 12.5-25 mg 25 mg at 05/23/22 0239   • promethazine (PHENERGAN) suppository 12.5-25 mg     • prochlorperazine (COMPAZINE) injection 5-10 mg     • hydrALAZINE (APRESOLINE) injection 10 mg     • clotrimazole (LOTRIMIN) 1 % cream Given at 05/26/22 0557   • doxycycline monohydrate (ADOXA) tablet 100 mg 100 mg at 05/26/22 0556   • acetaminophen (Tylenol) tablet 650 mg 650 mg at 05/26/22 0556   • insulin lispro (AdmeLOG,HumaLOG) injection 3 Units at 05/26/22 0930    And   • dextrose 50% (D50W) injection 25 g         ASSESSMENT/PLAN: 57 y.o. male admitted for:    * Facial abscess- (present on admission)  Assessment & Plan  Patient with 6 days of swelling and pain of the upper lip. It is now spontaneously draining purulent fluid. He was on clindamycin PO outpatient x6 days and the symptoms worsened. He denies fevers, but has had nausea, vomiting, and diarrhea. Patient with significant tenderness from his upper lip to his eyebrows L>R. History of MRSA cellulitis in 3/2022 on left leg. Wound culture collected in ED. CT maxilofacial without contrast (allergy to contrast) showed soft tissue swelling of the base of the nose involving the upper lip possibly representing phlegmon or hematoma but limited exam due to no contrast and mildly prominent bilateral cervical chain lymph nodes which may be reactive. U/S significant for upper lip  abscess 2x0.7cm.  -POD1 s/p I&D with Dr. Ortiz, ENT  -wound cultures negative growth from I&D however MRSA positive on admission  -Continue doxycycline; Unasyn DC'd 5/25 after positive wound culture for MRSA resulted  -MRI w/contrast ordered to further evaluate pending ENT recommendations   -follow-up blood cultures   -Pain control with tylenol and prn medications     Thrombocytopenia (HCC)  Assessment & Plan  Chronic. Patient with history of MDS with plan for chemotherapy but has not started. Platelets 107 on admission, now down to 88.  -CTM    Type 2 diabetes mellitus, without long-term current use of insulin (HCC)- (present on admission)  Assessment & Plan  Recent diagnosis within the last few months. Patient takes metformin 1000mg BID. A1c on admission was 8.6.  - ISS while inpatient, consider restarting metformin inpatient.  - continue metformin outpatient  - goal BS <180 for wound healing    Chronic pain of both knees- (present on admission)  Assessment & Plan  Continue gabapentin 800mg daily.     Insomnia- (present on admission)  Assessment & Plan  Patient takes benadryl, amitriptyline, and ambien every night for sleep.  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 Last chemotherapy was Oct 2020 for testicular cancer which is now in remission. The staff in the ER were unable to draw from the port and attempted heparin flushes with no success  - consider vascular surgery consult as unable to flush       Core Measures:   Fluids: None  Lines: PIV  Abx: Doxycycline   DVT prophylaxis: On eliquis   Code Status: DNAR/DNI    Disposition: Inpatient for MRI and pain control, pending ENT recommendations    Hanny Wills MD   PGY-1  Family Medicine Resident   [facility]

## 2022-05-26 NOTE — PROGRESS NOTES
ENT    Dressing changed and gauze packing removed.  Satisfactory appearance.  MRSA noted    please call if additional consultation required

## 2022-05-26 NOTE — CARE PLAN
The patient is Stable - Low risk of patient condition declining or worsening    Shift Goals  Clinical Goals: Pain mgmt  Patient Goals: Pt's pain will be managed and able to sleep comfortably    Progress made toward(s) clinical / shift goals:      Patient is not progressing towards the following goals:

## 2022-05-26 NOTE — PROGRESS NOTES
Pt is on high fall risk. Safety education provided Pt refused to have bed alarm but will comply to call if needing assistance. Reinforce the use of call light

## 2022-05-26 NOTE — CARE PLAN
The patient is Stable - Low risk of patient condition declining or worsening    Shift Goals  Clinical Goals: Pain mgmt  Patient Goals: pt will be able to manage pain and able to sleep comfortably    Progress made toward(s) clinical / shift goals:      Patient is not progressing towards the following goals:

## 2022-05-27 ENCOUNTER — APPOINTMENT (OUTPATIENT)
Dept: RADIOLOGY | Facility: MEDICAL CENTER | Age: 57
DRG: 603 | End: 2022-05-27
Attending: STUDENT IN AN ORGANIZED HEALTH CARE EDUCATION/TRAINING PROGRAM
Payer: MEDICAID

## 2022-05-27 ENCOUNTER — PHARMACY VISIT (OUTPATIENT)
Dept: PHARMACY | Facility: MEDICAL CENTER | Age: 57
End: 2022-05-27
Payer: COMMERCIAL

## 2022-05-27 VITALS
DIASTOLIC BLOOD PRESSURE: 82 MMHG | OXYGEN SATURATION: 98 % | HEIGHT: 71 IN | HEART RATE: 94 BPM | BODY MASS INDEX: 38.5 KG/M2 | RESPIRATION RATE: 16 BRPM | WEIGHT: 275 LBS | SYSTOLIC BLOOD PRESSURE: 130 MMHG | TEMPERATURE: 96.8 F

## 2022-05-27 LAB
GLUCOSE BLD STRIP.AUTO-MCNC: 160 MG/DL (ref 65–99)
GLUCOSE BLD STRIP.AUTO-MCNC: 320 MG/DL (ref 65–99)

## 2022-05-27 PROCEDURE — 82962 GLUCOSE BLOOD TEST: CPT | Mod: 91

## 2022-05-27 PROCEDURE — 700111 HCHG RX REV CODE 636 W/ 250 OVERRIDE (IP): Performed by: STUDENT IN AN ORGANIZED HEALTH CARE EDUCATION/TRAINING PROGRAM

## 2022-05-27 PROCEDURE — 700102 HCHG RX REV CODE 250 W/ 637 OVERRIDE(OP): Performed by: STUDENT IN AN ORGANIZED HEALTH CARE EDUCATION/TRAINING PROGRAM

## 2022-05-27 PROCEDURE — 99238 HOSP IP/OBS DSCHRG MGMT 30/<: CPT | Mod: GC | Performed by: FAMILY MEDICINE

## 2022-05-27 PROCEDURE — RXMED WILLOW AMBULATORY MEDICATION CHARGE: Performed by: STUDENT IN AN ORGANIZED HEALTH CARE EDUCATION/TRAINING PROGRAM

## 2022-05-27 PROCEDURE — A9270 NON-COVERED ITEM OR SERVICE: HCPCS | Performed by: STUDENT IN AN ORGANIZED HEALTH CARE EDUCATION/TRAINING PROGRAM

## 2022-05-27 RX ORDER — OXYCODONE AND ACETAMINOPHEN 10; 325 MG/1; MG/1
1 TABLET ORAL EVERY 4 HOURS PRN
Qty: 8 TABLET | Refills: 0 | Status: SHIPPED | OUTPATIENT
Start: 2022-05-27 | End: 2022-05-29

## 2022-05-27 RX ORDER — OXYCODONE HYDROCHLORIDE AND ACETAMINOPHEN 5; 325 MG/1; MG/1
1 TABLET ORAL EVERY 4 HOURS PRN
Qty: 15 TABLET | Refills: 0 | Status: SHIPPED | OUTPATIENT
Start: 2022-05-29 | End: 2022-06-03

## 2022-05-27 RX ORDER — HEPARIN SODIUM (PORCINE) LOCK FLUSH IV SOLN 100 UNIT/ML 100 UNIT/ML
300-500 SOLUTION INTRAVENOUS PRN
Status: DISCONTINUED | OUTPATIENT
Start: 2022-05-27 | End: 2022-05-27 | Stop reason: HOSPADM

## 2022-05-27 RX ORDER — LORAZEPAM 1 MG/1
1 TABLET ORAL
Status: COMPLETED | OUTPATIENT
Start: 2022-05-27 | End: 2022-05-27

## 2022-05-27 RX ORDER — LORAZEPAM 1 MG/1
1 TABLET ORAL ONCE
Status: COMPLETED | OUTPATIENT
Start: 2022-05-27 | End: 2022-05-27

## 2022-05-27 RX ORDER — DOXYCYCLINE 100 MG/1
100 CAPSULE ORAL EVERY 12 HOURS
Qty: 20 CAPSULE | Refills: 0 | Status: SHIPPED | OUTPATIENT
Start: 2022-05-27 | End: 2022-06-06

## 2022-05-27 RX ADMIN — APIXABAN 5 MG: 5 TABLET, FILM COATED ORAL at 05:30

## 2022-05-27 RX ADMIN — INSULIN LISPRO 4 UNITS: 100 INJECTION, SOLUTION INTRAVENOUS; SUBCUTANEOUS at 08:30

## 2022-05-27 RX ADMIN — CLOTRIMAZOLE: 10 CREAM TOPICAL at 05:31

## 2022-05-27 RX ADMIN — DOXYCYCLINE 100 MG: 100 TABLET, FILM COATED ORAL at 05:30

## 2022-05-27 RX ADMIN — SENNOSIDES AND DOCUSATE SODIUM 2 TABLET: 50; 8.6 TABLET ORAL at 05:30

## 2022-05-27 RX ADMIN — OXYCODONE HYDROCHLORIDE 10 MG: 10 TABLET ORAL at 05:30

## 2022-05-27 RX ADMIN — LORAZEPAM 1 MG: 1 TABLET ORAL at 10:04

## 2022-05-27 RX ADMIN — ACETAMINOPHEN 650 MG: 325 TABLET ORAL at 05:30

## 2022-05-27 RX ADMIN — GABAPENTIN 800 MG: 400 CAPSULE ORAL at 05:30

## 2022-05-27 RX ADMIN — ALBUTEROL SULFATE 2 PUFF: 90 AEROSOL, METERED RESPIRATORY (INHALATION) at 05:29

## 2022-05-27 RX ADMIN — Medication 500 UNITS: at 15:44

## 2022-05-27 RX ADMIN — MORPHINE SULFATE 2 MG: 4 INJECTION INTRAVENOUS at 00:45

## 2022-05-27 RX ADMIN — MORPHINE SULFATE 3 MG: 4 INJECTION INTRAVENOUS at 12:05

## 2022-05-27 RX ADMIN — INSULIN LISPRO 1 UNITS: 100 INJECTION, SOLUTION INTRAVENOUS; SUBCUTANEOUS at 12:10

## 2022-05-27 RX ADMIN — OMEPRAZOLE 20 MG: 20 CAPSULE, DELAYED RELEASE ORAL at 05:30

## 2022-05-27 RX ADMIN — OXYCODONE HYDROCHLORIDE 10 MG: 10 TABLET ORAL at 08:35

## 2022-05-27 RX ADMIN — TIOTROPIUM BROMIDE INHALATION SPRAY 5 MCG: 3.12 SPRAY, METERED RESPIRATORY (INHALATION) at 05:28

## 2022-05-27 RX ADMIN — FLUTICASONE PROPIONATE 110 MCG: 110 AEROSOL, METERED RESPIRATORY (INHALATION) at 05:28

## 2022-05-27 RX ADMIN — ACETAMINOPHEN 650 MG: 325 TABLET ORAL at 12:05

## 2022-05-27 ASSESSMENT — PAIN DESCRIPTION - PAIN TYPE
TYPE: ACUTE PAIN
TYPE: ACUTE PAIN

## 2022-05-27 NOTE — DISCHARGE PLANNING
Meds-to-Beds: Discharge prescription orders listed below delivered to patient's bedside. RN Gail notified. Patient counseled. Patient informed that the oxycodone-acetaminophen 5-325 mg can be filled at [facility] on 5/29/22.      Current Outpatient Medications   Medication Sig Dispense Refill   • doxycycline (MONODOX) 100 MG capsule Take 1 Capsule by mouth every 12 hours for 10 days. 20 Capsule 0   • oxyCODONE-acetaminophen (PERCOCET-10)  MG Tab Take 1 Tablet by mouth every four hours as needed for Severe Pain for up to 2 days. 8 Tablet 0      Lynda Ricks, PharmD

## 2022-05-27 NOTE — PROGRESS NOTES
Pt left unit to discharge lounge via wheelchair with transport. meds to beds delivered and sent with patient. All other personal belongings sent with patient as well.

## 2022-05-27 NOTE — DISCHARGE INSTRUCTIONS
Care After  An abscess (also called a boil or furuncle) is an infected area that contains a collection of pus. Signs and symptoms of an abscess include pain, tenderness, redness, or hardness, or you may feel a moveable soft area under your skin. An abscess can occur anywhere in the body. The infection may spread to surrounding tissues causing cellulitis. A cut (incision) by the surgeon was made over your abscess and the pus was drained out. Gauze may have been packed into the space to provide a drain that will allow the cavity to heal from the inside outwards. The boil may be painful for 5 to 7 days. Most people with a boil do not have high fevers. Your abscess, if seen early, may not have localized, and may not have been lanced. If not, another appointment may be required for this if it does not get better on its own or with medications.  HOME CARE INSTRUCTIONS   Only take over-the-counter or prescription medicines for pain, discomfort, or fever as directed by your caregiver.  When you bathe, soak and then remove gauze or iodoform packs at least daily or as directed by your caregiver. You may then wash the wound gently with mild soapy water. Repack with gauze or do as your caregiver directs.  SEEK IMMEDIATE MEDICAL CARE IF:   You develop increased pain, swelling, redness, drainage, or bleeding in the wound site.  You develop signs of generalized infection including muscle aches, chills, fever, or a general ill feeling.  An oral temperature above 102° F (38.9° C) develops, not controlled by medication.  See your caregiver for a recheck if you develop any of the symptoms described above. If medications (antibiotics) were prescribed, take them as directed.  Document Released: 07/06/2006 Document Revised: 03/11/2013 Document Reviewed: 03/02/2009  [employer]® Patient Information ©2014 [employer], [employer].  Discharge Instructions    Discharged to home by car with relative. Discharged via wheelchair, hospital escort:  Yes.  Special equipment needed: Not Applicable    Be sure to schedule a follow-up appointment with your primary care doctor or any specialists as instructed.     Discharge Plan:   Diet Plan: Discussed  Activity Level: Discussed  Confirmed Follow up Appointment: Patient to Call and Schedule Appointment  Confirmed Symptoms Management: Discussed  Medication Reconciliation Updated: Yes    I understand that a diet low in cholesterol, fat, and sodium is recommended for good health. Unless I have been given specific instructions below for another diet, I accept this instruction as my diet prescription.   Other diet: as tolerated    Special Instructions: None    Is patient discharged on Warfarin / Coumadin?   No     Depression / Suicide Risk    As you are discharged from this [facility] facility, it is important to learn how to keep safe from harming yourself.    Recognize the warning signs:  Abrupt changes in personality, positive or negative- including increase in energy   Giving away possessions  Change in eating patterns- significant weight changes-  positive or negative  Change in sleeping patterns- unable to sleep or sleeping all the time   Unwillingness or inability to communicate  Depression  Unusual sadness, discouragement and loneliness  Talk of wanting to die  Neglect of personal appearance   Rebelliousness- reckless behavior  Withdrawal from people/activities they love  Confusion- inability to concentrate     If you or a loved one observes any of these behaviors or has concerns about self-harm, here's what you can do:  Talk about it- your feelings and reasons for harming yourself  Remove any means that you might use to hurt yourself (examples: pills, rope, extension cords, firearm)  Get professional help from the community (Mental Health, Substance Abuse, psychological counseling)  Do not be alone:Call your Safe Contact- someone whom you trust who will be there for you.  Call your local CRISIS HOTLINE  413-7028 or 134-369-5920  Call your local Children's Mobile Crisis Response Team [place] (406) 060-2994 or www.[employer].Sazneo  Call the toll free National Suicide Prevention Hotlines   National Suicide Prevention Lifeline 447-986-BAXR (8118)  National [facility] Line Network 800-SUICIDE (413-4890)

## 2022-05-27 NOTE — DISCHARGE SUMMARY
[facility] DISCHARGE SUMMARY     PATIENT ID:  Name:             Benito Persaud   YOB: 1965  Age:                 57 y.o.  male   MRN:               [id number]  Address:         [place] [address]  Phone:            [phone] (home)    ADMISSION DATE:   5/22/2022    DISCHARGE DATE:   5/27/2022    DISCHARGE DIAGNOSES:   Problem Noted   Facial Abscess 5/23/2022   Thrombocytopenia (Hcc) 5/23/2022   Mild Persistent Asthma 5/4/2022   Peripheral Vascular Disease (Hcc) 5/4/2022   Type 2 Diabetes Mellitus, Without Long-Term Current Use of Insulin (Hcc) 3/28/2022   Screening for Deficiency Anemia 1/12/2022   Screening for Colon Cancer 1/12/2022   Chronic Pain of Both Knees 1/12/2022   Mild Intermittent Asthma Without Complication 11/9/2021   Restless Leg Syndrome 11/9/2021   Vision Impairment 11/9/2021   History of Testicular Cancer 11/9/2021   Myelodysplastic Syndrome (Hcc) 11/9/2021   History of Pulmonary Embolism 11/9/2021   Bates Esophagus 11/9/2021   Insomnia 11/9/2021   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 Ortiz, ENT    PROCEDURES:    Incision and drainage facial abscess    IMAGING:   US-SOFT TISSUES OF HEAD - NECK   Final Result      Likely cellulitis of the upper lip with complex hypoechoic area measuring 2.5 x 0.7 cm most compatible with abscess.      CT-MAXILLOFACIAL W/O   Final Result         1.  No acute traumatic facial bony injuries identified.   2.  Soft tissue swelling of the base of the nose involving the upper lip, could represent phlegmon or hematoma in the setting of trauma, noncontrast technique limits evaluation for abscess.   3.  Mildly prominent bilateral cervical chain lymph nodes, may be reactive      MR-ORBITS, FACE, NECK-WITH    (Results Pending)       LABS:  No results for input(s):  "WBC, RBC, HEMOGLOBIN, HEMATOCRIT, MCV, MCH, RDW, PLATELETCT, MPV, NEUTSPOLYS, LYMPHOCYTES, MONOCYTES, EOSINOPHILS, BASOPHILS, RBCMORPHOLO in the last 72 hours.  No results for input(s): SODIUM, POTASSIUM, CHLORIDE, CO2, GLUCOSE, BUN, CPKTOTAL in the last 72 hours.  No results found for: CHOLSTRLTOT, LDL, HDL, TRIGLYCERIDE    Lab Results   Component Value Date/Time    TROPONINI <0.01 06/26/2019 11:47 AM       Lab Results   Component Value Date/Time    TROPONINI <0.01 06/26/2019 11:47 AM            HOSPITAL COURSE:   Per H&P \"Benito Persaud is a 57 y.o. 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 He states he awoke on Tuesday and his upper lip felt strange and painful. Over the next few days it started to swell and the pain increased. He presented to the ER on Wednesday and was prescribed clindamycin as he has a hx of MRSA cellulitis of the left leg 2 months ago that has healed. He reports taking this medication, but his pain and swelling only worsened. The pain now extends from his upper lip to his eyebrows. He reports the pain is worse on the left side of his face and extends posteriorly to his ear. He woke up this morning and yellow-green-blood-tinged pus was on his shirt and pillow. He denies fevers or chills, but has been vomiting daily and is now having a few days of diarrhea. He reports he has not eaten since Tuesday and vomits after drinking most liquids. He also notice a rash of his right groin this morning.\"  ER Course: Patient was found to have an infection of the upper lip with a pustule that was previously draining. Was previously on clindamycin x6 days outpatient, and symptoms have been worsening. Lab results show decreased " platelet count (patient with MDS), low sodium of 132, elevated glucose of 240, and elevated Alk Phos of 129. He was treated with unasyn, morphine and NS.   He has a port in place as he was recently diagnosed with MDS and is planning on starting chemotherapy soon. The nursing staff was unable to draw from the port, and heparin flushes were unsuccessful.   Patient was started on Unasyn and doxycycline due to recent MRSA infection.  Ultrasound of the face showed an abscess and patient was taken to the OR by ENT for incision and drainage.  MRI was attempted however patient was unable to tolerate with Ativan given for claustrophobia. Unasyn was discontinued and he was continued on doxycycline with plan for total course of 14 days.  On day of discharge vital signs have remained stable and patient desired discharge home with plan to follow-up with primary care within one week.  Return precautions discussed with patient. Patient agreeable to plan.      DISCHARGE CONDITION:    Stable    DISPOSITION:   Home    DISCHARGE MEDICATIONS:      Medication List      START taking these medications      Instructions   doxycycline 100 MG capsule  Commonly known as: MONODOX   Take 1 Capsule by mouth every 12 hours for 10 days.  Dose: 100 mg     * oxyCODONE-acetaminophen  MG Tabs  Commonly known as: PERCOCET-10   Take 1 Tablet by mouth every four hours as needed for Severe Pain for up to 2 days.  Dose: 1 Tablet     * oxyCODONE-acetaminophen 5-325 MG Tabs  Start taking on: May 29, 2022  Commonly known as: PERCOCET   Doctor's comments: To be taken after the initial 2 days of Percocet 10mg  Take 1 Tablet by mouth every four hours as needed for Moderate Pain or Severe Pain for up to 3 days.  Dose: 1 Tablet         * This list has 2 medication(s) that are the same as other medications prescribed for you. Read the directions carefully, and ask your doctor or other care provider to review them with you.            CHANGE how you take these  medications      Instructions   Banophen 50 MG Caps  What changed:   · how much to take  · when to take this  · reasons to take this  Generic drug: diphenhydrAMINE   TAKE 1 CAPSULE BY MOUTH AT BEDTIME AS NEEDED FOR SLEEP     Eliquis 5mg Tabs  What changed: how much to take  Generic drug: apixaban   TAKE 1 TABLET BY MOUTH TWICE A DAY     gabapentin 800 MG tablet  What changed: when to take this  Commonly known as: NEURONTIN   TAKE 1 TABLET BY MOUTH THREE TIMES A DAY        CONTINUE taking these medications      Instructions   acetaminophen 500 MG Tabs  Commonly known as: TYLENOL   Take 500-1,000 mg by mouth every 6 hours as needed for Mild Pain.  Dose: 500-1,000 mg     amitriptyline 100 MG Tabs  Commonly known as: ELAVIL   TAKE 1.5 TABLETS BY MOUTH AT BEDTIME AS NEEDED FOR SLEEP.  Dose: 150 mg     Combivent Respimat  MCG/ACT Aers  Generic drug: ipratropium-albuterol   INHALE 1 PUFF BY MOUTH 4 TIMES A DAY     Flovent  MCG/ACT Aero  Generic drug: fluticasone   Inhale 1 Puff 2 times a day.  Dose: 1 Puff     metFORMIN 500 MG Tabs  Commonly known as: GLUCOPHAGE   Take 2 Tablets by mouth 2 times a day with meals for 30 days.  Dose: 1,000 mg     omeprazole 20 MG Tbec delayed-release tablet  Commonly known as: PRILOSEC   TAKE 2 TABLETS BY MOUTH EVERY DAY     zolpidem 10 MG Tabs  Commonly known as: AMBIEN   Take 1 Tablet by mouth at bedtime as needed for Sleep for up to 30 days.  Dose: 10 mg          ACTIVITY:   Normal Activity as Tolerated.    DIET:   Healthy    DISCHARGE INSTRUCTIONS AND FOLLOW UP:  Patient is medically stable for discharge and will be discharged to home    Follow Up: PCP in 4 days.  Hematology oncology 6/6.    Discharge Instructions:   Patient was instructed to return the ER in the event of worsening symptoms or any other major concerns. Patient understands that failure to do so may indicate worsening of hismedical condition(s) and result in adverse clinical outcomes including fatality. We  have counseled the patient on the importance of compliance and the patient has agreed to proceed with all medical recommendations and follow up plan indicated above. The patient understands that the failure to do so may result in result in adverse clinical outcomes including fatality.       CC:   Hanny Wills M.D.

## 2022-05-27 NOTE — PROGRESS NOTES
Pt is high fall risk. Safety education provided. Refused bed alarm. Reinforced the use of call light when needing assistance.

## 2022-05-27 NOTE — PROGRESS NOTES
Received report from NOC RN, pt care assumed. VS stable on RA. No signs of acute distress. Call light within reach. Pt is aaox4 and c/o pain at nose and lip area. Pt refuses bed-alarm.

## 2022-05-27 NOTE — PROGRESS NOTES
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    SUBJECTIVE:   No acute events overnight, vital signs stable. He is still having pain of the face and has some pain in his left lower extremity.     OBJECTIVE:  Vitals:    05/26/22 1545 05/26/22 1759 05/26/22 1856 05/27/22 0349   BP:   (!) 148/86 132/79   Pulse:   90 87   Resp: 17 17 17 18   Temp:   36.1 °C (97 °F) 36.2 °C (97.2 °F)   TempSrc:   Temporal Temporal   SpO2:   99% 94%   Weight:       Height:           Intake/Output Summary (Last 24 hours) at 5/27/2022 0528  Last data filed at 5/27/2022 0349  Gross per 24 hour   Intake 1430 ml   Output 900 ml   Net 530 ml       PHYSICAL EXAM:  General: No acute distress, afebrile, resting comfortably  HEENT: NC/AT. EOMI. Improved swelling of upper lip, small scab noted to just below nose, no surrounding erythema  Cardiovascular: RRR without murmurs  Respiratory: CTAB, no tachypnea or retractions   Abdomen: soft, nontender, nondistended  EXT:  SARGENT, well healing wound to left lower leg  Skin: No erythema/lesions   Neuro: Non-focal, alert and orientated       LABS:  No results for input(s): WBC, RBC, HEMOGLOBIN, HEMATOCRIT, MCV, MCH, RDW, PLATELETCT, MPV, NEUTSPOLYS, LYMPHOCYTES, MONOCYTES, EOSINOPHILS, BASOPHILS, RBCMORPHOLO in the last 72 hours.  Recent Labs     05/24/22  0709   SODIUM 135   POTASSIUM 4.0   CHLORIDE 103   CO2 22   BUN 13   CREATININE 1.05   CALCIUM 8.0*     Estimated GFR/CRCL = Estimated Creatinine Clearance: 105.8 mL/min (by C-G formula based on SCr of 1.05  mg/dL).  Recent Labs     05/24/22  0709   GLUCOSE 179*                 No results for input(s): INR, APTT, FIBRINOGEN in the last 72 hours.    Invalid input(s): DIMER    IMAGING:   US-SOFT TISSUES OF HEAD - NECK   Final Result      Likely cellulitis of the upper lip with complex hypoechoic area measuring 2.5 x 0.7 cm most compatible with abscess.      CT-MAXILLOFACIAL W/O   Final Result         1.  No acute traumatic facial bony injuries identified.   2.  Soft tissue swelling of the base of the nose involving the upper lip, could represent phlegmon or hematoma in the setting of trauma, noncontrast technique limits evaluation for abscess.   3.  Mildly prominent bilateral cervical chain lymph nodes, may be reactive      MR-ORBITS, FACE, NECK-WITH    (Results Pending)       CULTURES:   Results     Procedure Component Value Units Date/Time    CULTURE WOUND W/ GRAM STAIN [[id number]]  (Abnormal) Collected: 05/25/22 1338    Order Status: Completed Specimen: Wound Updated: 05/26/22 2052     Significant Indicator POS     Source WND     Site premaxilla fluid     Culture Result -     Gram Stain Result Few WBCs.  No organisms seen.       Culture Result Staphylococcus aureus  Light growth  Methicillin resistant by screening method.      Narrative:      Previous comment was modified by IBAGUILLERMO at 14:52 on 05/25/22.  afb unacceptable for eswabs  Surgery - swabs received    Anaerobic Culture [[id number]] Collected: 05/25/22 1338    Order Status: Completed Specimen: Wound Updated: 05/26/22 2052     Significant Indicator NEG     Source WND     Site premaxilla fluid     Culture Result Culture in progress.    Narrative:      Previous comment was modified by IBATE at 14:52 on 05/25/22.  afb unacceptable for eswabs  Surgery - swabs received    Fungal Culture [[id number]] Collected: 05/25/22 1338    Order Status: Completed Specimen: Wound Updated: 05/26/22 2052     Significant Indicator NEG     Source WND     Site premaxilla fluid     Culture  Result Culture in progress.     Fungal Smear Results No fungal elements seen.    Narrative:      Previous comment was modified by IBAGUILLERMO at 14:52 on 05/25/22.  afb unacceptable for eswabs  Surgery - swabs received    Fungal Smear [[id number]] Collected: 05/25/22 1338    Order Status: Completed Specimen: Wound Updated: 05/26/22 0243     Significant Indicator NEG     Source WND     Site premaxilla fluid     Fungal Smear Results No fungal elements seen.    Narrative:      Previous comment was modified by IBAGUILLERMO at 14:52 on 05/25/22.  afb unacceptable for eswabs  Surgery - swabs received    GRAM STAIN [[id number]] Collected: 05/25/22 1338    Order Status: Completed Specimen: Wound Updated: 05/26/22 0243     Significant Indicator .     Source WND     Site premaxilla fluid     Gram Stain Result Few WBCs.  No organisms seen.      Narrative:      Previous comment was modified by IBAGUILLERMO at 14:52 on 05/25/22.  afb unacceptable for eswabs  Surgery - swabs received    CULTURE WOUND W/ GRAM STAIN [[id number]]  (Abnormal)  (Susceptibility) Collected: 05/23/22 0000    Order Status: Completed Specimen: Wound from Abscess Updated: 05/25/22 0916     Significant Indicator POS     Source WND     Site upper lip     Culture Result Light growth usual skin kleber.     Gram Stain Result No organisms seen.     Culture Result Methicillin Resistant Staphylococcus aureus  Light growth      Susceptibility     Methicillin resistant staphylococcus aureus (1)     Antibiotic Interpretation Method Status    Azithromycin Resistant MATT Final    Clindamycin Resistant MATT Final    Cefazolin Resistant MATT Final    Cefepime Resistant MATT Final    Ceftaroline Sensitive MATT Final    Daptomycin Sensitive MATT Final    Ampicillin/sulbactam Resistant MATT Final    Erythromycin Resistant MATT Final    Vancomycin Sensitive MATT Final    Oxacillin Resistant MATT Final    Trimeth/Sulfa Sensitive MATT Final    Tetracycline Sensitive MATT Final                   GRAM STAIN  "[[id number]] Collected: 05/23/22 0000    Order Status: Completed Specimen: Wound Updated: 05/23/22 1127     Significant Indicator .     Source WND     Site upper lip     Gram Stain Result No organisms seen.    BLOOD CULTURE [[id number]] Collected: 05/22/22 2312    Order Status: Completed Specimen: Blood from Peripheral Updated: 05/23/22 0623     Significant Indicator NEG     Source BLD     Site PERIPHERAL     Culture Result No Growth  Note: Blood cultures are incubated for 5 days and  are monitored continuously.Positive blood cultures  are called to the RN and reported as soon as  they are identified.      Narrative:      Per Hospital Policy: Only change Specimen Src: to \"Line\" if  specified by physician order.  Left Hand    BLOOD CULTURE [[id number]] Collected: 05/22/22 2237    Order Status: Completed Specimen: Blood from Peripheral Updated: 05/23/22 0623     Significant Indicator NEG     Source BLD     Site PERIPHERAL     Culture Result No Growth  Note: Blood cultures are incubated for 5 days and  are monitored continuously.Positive blood cultures  are called to the RN and reported as soon as  they are identified.      Narrative:      Per Hospital Policy: Only change Specimen Src: to \"Line\" if  specified by physician order.  Left Hand          MEDS:  Current Facility-Administered Medications   Medication Last Admin   • morphine 4 MG/ML injection 2-3 mg 2 mg at 05/27/22 0045   • omeprazole (PRILOSEC) capsule 20 mg 20 mg at 05/26/22 1758   • amitriptyline (ELAVIL) tablet 150 mg 150 mg at 05/26/22 2327   • diphenhydrAMINE (BENADRYL) tablet/capsule 50 mg 50 mg at 05/26/22 2327   • apixaban (ELIQUIS) tablet 5 mg 5 mg at 05/26/22 1758   • fluticasone (FLOVENT HFA) 110 MCG/ACT inhaler 110 mcg 110 mcg at 05/26/22 0555   • gabapentin (NEURONTIN) capsule 800 mg 800 mg at 05/26/22 0556   • zolpidem (AMBIEN) tablet 10 mg 10 mg at 05/26/22 2327   • albuterol inhaler 1-2 Puff 2 Puff at 05/26/22 0555   • tiotropium (Spiriva " Respimat) 2.5 mcg/Act inhalation spray 5 mcg 5 mcg at 05/26/22 0554   • senna-docusate (PERICOLACE or SENOKOT S) 8.6-50 MG per tablet 2 Tablet 2 Tablet at 05/26/22 1758    And   • polyethylene glycol/lytes (MIRALAX) PACKET 1 Packet      And   • magnesium hydroxide (MILK OF MAGNESIA) suspension 30 mL      And   • bisacodyl (DULCOLAX) suppository 10 mg     • acetaminophen (Tylenol) tablet 650 mg 650 mg at 05/23/22 1514   • Pharmacy Consult Request ...Pain Management Review 1 Each     • oxyCODONE immediate-release (ROXICODONE) tablet 5 mg 5 mg at 05/26/22 2327    Or   • oxyCODONE immediate release (ROXICODONE) tablet 10 mg 10 mg at 05/26/22 1759   • ondansetron (ZOFRAN) syringe/vial injection 4 mg 4 mg at 05/23/22 2039   • ondansetron (ZOFRAN ODT) dispertab 4 mg 4 mg at 05/25/22 0036   • promethazine (PHENERGAN) tablet 12.5-25 mg 25 mg at 05/23/22 0239   • promethazine (PHENERGAN) suppository 12.5-25 mg     • prochlorperazine (COMPAZINE) injection 5-10 mg     • hydrALAZINE (APRESOLINE) injection 10 mg     • clotrimazole (LOTRIMIN) 1 % cream Given at 05/26/22 2026   • doxycycline monohydrate (ADOXA) tablet 100 mg 100 mg at 05/26/22 1758   • acetaminophen (Tylenol) tablet 650 mg 650 mg at 05/26/22 2328   • insulin lispro (AdmeLOG,HumaLOG) injection 1 Units at 05/26/22 2021    And   • dextrose 50% (D50W) injection 25 g         ASSESSMENT/PLAN: 57 y.o. male admitted for:    * Facial abscess- (present on admission)  Assessment & Plan  Patient with 6 days of swelling and pain of the upper lip. It is now spontaneously draining purulent fluid. He was on clindamycin PO outpatient x6 days and the symptoms worsened. He denies fevers, but has had nausea, vomiting, and diarrhea. Patient with significant tenderness from his upper lip to his eyebrows L>R. History of MRSA cellulitis in 3/2022 on left leg. Wound culture collected in ED. CT maxilofacial without contrast (allergy to contrast) showed soft tissue swelling of the base of  the nose involving the upper lip possibly representing phlegmon or hematoma but limited exam due to no contrast and mildly prominent bilateral cervical chain lymph nodes which may be reactive. U/S significant for upper lip abscess 2x0.7cm. Blood cultures NGTD.  -POD2 s/p I&D with Dr. Ortiz, ENT  -wound cultures positive for MRSA  -Continue doxycycline; Unasyn DC'd 5/25 after positive wound culture for MRSA resulted  -MRI w/contrast ordered to further evaluate   -Pain control with tylenol and prn medications     Thrombocytopenia (HCC)  Assessment & Plan  Chronic. Patient with history of MDS with plan for chemotherapy but has not started. Platelets 107 on admission, now down to 88.  -CTM    Type 2 diabetes mellitus, without long-term current use of insulin (HCC)- (present on admission)  Assessment & Plan  Recent diagnosis within the last few months. Patient takes metformin 1000mg BID. A1c on admission was 8.6.  - ISS while inpatient, consider restarting metformin inpatient.  - continue metformin outpatient  - goal BS <180 for wound healing    Chronic pain of both knees- (present on admission)  Assessment & Plan  Continue gabapentin 800mg daily.     Insomnia- (present on admission)  Assessment & Plan  Patient takes benadryl, amitriptyline, and ambien every night for sleep.  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 Last chemotherapy was Oct 2020 for testicular cancer which is now in remission. The staff in the ER were unable to draw from the port and attempted heparin flushes with no success  - consider vascular surgery consult as unable to flush       Core Measures:   Fluids: None  Lines: PIV  Abx: Doxycycline p.o.  DVT prophylaxis:  On Eliquis   Code Status: DNAR/DNI    Disposition: Inpatient pending MRI    Hanny Wills MD   PGY-1 Family Medicine Resident   [facility]

## 2022-05-27 NOTE — DISCHARGE PLANNING
Spoke to patient confirms that he spoke to someone at his facility who will meet him with his wheelchair upon his arrival. The address is [address] [place] [id number] [phone].  He has been given taxi voucher #[id number]

## 2022-05-27 NOTE — CARE PLAN
The patient is Stable - Low risk of patient condition declining or worsening    Shift Goals  Clinical Goals:  pain management  Patient Goals:  pt's pain will be managed and able to sleep    Progress made toward(s) clinical / shift goals:      Patient is not progressing towards the following goals:

## 2022-05-27 NOTE — PROGRESS NOTES
Patient discharged home. Patient AOX 4.  IV removed, discharge instructions provided to patient and reviewed with them. All questions answered, patient provided copy of discharge instructions and instructed on when to F/U with MD. Patient wheeled off unit. Patient discharged into the care of her friend.

## 2022-05-28 LAB
BACTERIA BLD CULT: NORMAL
BACTERIA BLD CULT: NORMAL
BACTERIA SPEC ANAEROBE CULT: NORMAL
BACTERIA WND AEROBE CULT: ABNORMAL
BACTERIA WND AEROBE CULT: ABNORMAL
GRAM STN SPEC: ABNORMAL
SIGNIFICANT IND 70042: ABNORMAL
SIGNIFICANT IND 70042: NORMAL
SITE SITE: ABNORMAL
SITE SITE: NORMAL
SOURCE SOURCE: ABNORMAL
SOURCE SOURCE: NORMAL

## 2022-05-31 ENCOUNTER — PHARMACY VISIT (OUTPATIENT)
Dept: PHARMACY | Facility: MEDICAL CENTER | Age: 57
End: 2022-05-31
Payer: COMMERCIAL

## 2022-05-31 PROCEDURE — RXMED WILLOW AMBULATORY MEDICATION CHARGE: Performed by: STUDENT IN AN ORGANIZED HEALTH CARE EDUCATION/TRAINING PROGRAM

## 2022-06-03 NOTE — OP REPORT
DATE OF SERVICE:  05/25/2022     SURGEON:  Adolph Ortiz MD     ASSISTANT:  None.     ANESTHESIA:  General given per laryngeal mask airway.     ANESTHESIOLOGIST:  Wolfgang Mahajan MD     PREOPERATIVE DIAGNOSIS:  Facial abscess.     POSTOPERATIVE DIAGNOSIS:  Facial abscess.     PROCEDURE PERFORMED:  Incision and drainage of facial abscess.     INDICATIONS:  The patient has a complicated medical history with a recently   diagnosed myelodysplastic syndrome.  He is also diabetic and has had CVAs in   the past.  He developed swelling and redness and tenderness along the   premaxillary and nasal tip area approximately 2-3 days ago.  This was becomes   significantly tender and swollen for the patient with pain at present.  A CT   scan suggested a small abscess present along the premaxillary area.  When I   saw him in consultation, there was marked tenseness of the upper lip in the   premaxillary area as well as nasal columella.  A small amount of crusting was   also present on the skin.  I and D of the facial abscess was scheduled.  The   patient reports there was some spontaneous drainage over the night.  Swelling   is slightly reduced prior to this procedure.     DESCRIPTION OF PROCEDURE:  The patient was brought to the operating room and   placed in supine position on the operating room table.  General anesthesia was   induced and laryngeal mask airway was inserted to maintain the airway.  Eyes   protected with taping and the table was turned 90 degrees.     Approximately 1-1.5 mL of 1% Xylocaine with epinephrine solution was used to   infiltrate the upper lip and nasal tip areas.  The patient's face was prepped   and draped.  Transverse incision was made at the base of the columella at the   junction of the upper lip measuring approximately 4-5 mm.  Blunt dissection   was then undertaken along the upper lip and premaxillary area.  A large   abscess pocket was not identified but a small amount of seropurulent  material   was noted to drain from the wound with expression.  A small amount of sterile   gauze was inserted into the I and D site, as the patient is ALLERGIC TO   IODINE, an iodoform gauze was aborted.  Drip pad was then applied.  The   patient was subsequently awakened from anesthesia and extubated without   difficulty.  He was taken from the operating room to the recovery area, awake,   breathing on his own in stable condition.        ______________________________  MD MIKAEL PATEL/MAREN    DD:  06/02/2022 19:53  DT:  06/02/2022 20:41    Job#:  [id number]

## 2022-06-06 ENCOUNTER — OFFICE VISIT (OUTPATIENT)
Dept: HEMATOLOGY ONCOLOGY | Facility: MEDICAL CENTER | Age: 57
End: 2022-06-06
Payer: MEDICAID

## 2022-06-06 VITALS
HEIGHT: 71 IN | DIASTOLIC BLOOD PRESSURE: 62 MMHG | RESPIRATION RATE: 16 BRPM | TEMPERATURE: 97.7 F | HEART RATE: 100 BPM | SYSTOLIC BLOOD PRESSURE: 104 MMHG | OXYGEN SATURATION: 97 % | BODY MASS INDEX: 35.73 KG/M2 | WEIGHT: 255.2 LBS

## 2022-06-06 DIAGNOSIS — Z85.47 HISTORY OF TESTICULAR CANCER: ICD-10-CM

## 2022-06-06 DIAGNOSIS — D46.9 MYELODYSPLASTIC SYNDROME (HCC): ICD-10-CM

## 2022-06-06 DIAGNOSIS — Z86.711 HISTORY OF PULMONARY EMBOLISM: ICD-10-CM

## 2022-06-06 PROCEDURE — 99203 OFFICE O/P NEW LOW 30 MIN: CPT | Performed by: STUDENT IN AN ORGANIZED HEALTH CARE EDUCATION/TRAINING PROGRAM

## 2022-06-06 ASSESSMENT — FIBROSIS 4 INDEX: FIB4 SCORE: 3.12

## 2022-06-06 ASSESSMENT — PAIN SCALES - GENERAL: PAINLEVEL: 8=MODERATE-SEVERE PAIN

## 2022-06-07 ASSESSMENT — ENCOUNTER SYMPTOMS
BACK PAIN: 1
HEARTBURN: 0
INSOMNIA: 0
DIARRHEA: 0
SHORTNESS OF BREATH: 0
CONSTIPATION: 0
COUGH: 0
TINGLING: 0
PALPITATIONS: 0
BLOOD IN STOOL: 0
WHEEZING: 0
BRUISES/BLEEDS EASILY: 1
VOMITING: 0
CHILLS: 0
ORTHOPNEA: 0
HEADACHES: 0
NAUSEA: 0
WEIGHT LOSS: 0
SINUS PAIN: 0
DIAPHORESIS: 0
SPUTUM PRODUCTION: 0
MYALGIAS: 1
WEAKNESS: 0
SORE THROAT: 0
DOUBLE VISION: 0
BLURRED VISION: 0
FEVER: 0
ABDOMINAL PAIN: 0
NERVOUS/ANXIOUS: 0
DIZZINESS: 1

## 2022-06-08 ENCOUNTER — APPOINTMENT (OUTPATIENT)
Dept: RADIOLOGY | Facility: MEDICAL CENTER | Age: 57
End: 2022-06-08
Attending: EMERGENCY MEDICINE
Payer: MEDICAID

## 2022-06-08 ENCOUNTER — HOSPITAL ENCOUNTER (EMERGENCY)
Facility: MEDICAL CENTER | Age: 57
End: 2022-06-08
Attending: EMERGENCY MEDICINE
Payer: MEDICAID

## 2022-06-08 VITALS
HEART RATE: 87 BPM | DIASTOLIC BLOOD PRESSURE: 82 MMHG | OXYGEN SATURATION: 91 % | HEIGHT: 71 IN | TEMPERATURE: 98.5 F | RESPIRATION RATE: 16 BRPM | BODY MASS INDEX: 36.12 KG/M2 | WEIGHT: 258 LBS | SYSTOLIC BLOOD PRESSURE: 147 MMHG

## 2022-06-08 DIAGNOSIS — R11.2 NAUSEA AND VOMITING, INTRACTABILITY OF VOMITING NOT SPECIFIED, UNSPECIFIED VOMITING TYPE: ICD-10-CM

## 2022-06-08 DIAGNOSIS — R73.9 HYPERGLYCEMIA: ICD-10-CM

## 2022-06-08 LAB
ALBUMIN SERPL BCP-MCNC: 3.9 G/DL (ref 3.2–4.9)
ALBUMIN/GLOB SERPL: 1 G/DL
ALP SERPL-CCNC: 175 U/L (ref 30–99)
ALT SERPL-CCNC: 22 U/L (ref 2–50)
ANION GAP SERPL CALC-SCNC: 16 MMOL/L (ref 7–16)
APPEARANCE UR: CLEAR
AST SERPL-CCNC: 29 U/L (ref 12–45)
B-OH-BUTYR SERPL-MCNC: 0.05 MMOL/L (ref 0.02–0.27)
BASOPHILS # BLD AUTO: 0.4 % (ref 0–1.8)
BASOPHILS # BLD: 0.03 K/UL (ref 0–0.12)
BILIRUB SERPL-MCNC: 0.4 MG/DL (ref 0.1–1.5)
BILIRUB UR QL STRIP.AUTO: NEGATIVE
BUN SERPL-MCNC: 15 MG/DL (ref 8–22)
CALCIUM SERPL-MCNC: 9.3 MG/DL (ref 8.5–10.5)
CHLORIDE SERPL-SCNC: 99 MMOL/L (ref 96–112)
CO2 SERPL-SCNC: 17 MMOL/L (ref 20–33)
COLOR UR: YELLOW
CREAT SERPL-MCNC: 1.13 MG/DL (ref 0.5–1.4)
EOSINOPHIL # BLD AUTO: 0.23 K/UL (ref 0–0.51)
EOSINOPHIL NFR BLD: 3.2 % (ref 0–6.9)
ERYTHROCYTE [DISTWIDTH] IN BLOOD BY AUTOMATED COUNT: 40.8 FL (ref 35.9–50)
GFR SERPLBLD CREATININE-BSD FMLA CKD-EPI: 76 ML/MIN/1.73 M 2
GLOBULIN SER CALC-MCNC: 3.8 G/DL (ref 1.9–3.5)
GLUCOSE BLD STRIP.AUTO-MCNC: 282 MG/DL (ref 65–99)
GLUCOSE BLD STRIP.AUTO-MCNC: 313 MG/DL (ref 65–99)
GLUCOSE BLD STRIP.AUTO-MCNC: 443 MG/DL (ref 65–99)
GLUCOSE SERPL-MCNC: 422 MG/DL (ref 65–99)
GLUCOSE UR STRIP.AUTO-MCNC: >=1000 MG/DL
HCT VFR BLD AUTO: 45.5 % (ref 42–52)
HGB BLD-MCNC: 16.2 G/DL (ref 14–18)
IMM GRANULOCYTES # BLD AUTO: 0.03 K/UL (ref 0–0.11)
IMM GRANULOCYTES NFR BLD AUTO: 0.4 % (ref 0–0.9)
INR PPP: 0.99 (ref 0.87–1.13)
KETONES UR STRIP.AUTO-MCNC: ABNORMAL MG/DL
LEUKOCYTE ESTERASE UR QL STRIP.AUTO: NEGATIVE
LYMPHOCYTES # BLD AUTO: 2.7 K/UL (ref 1–4.8)
LYMPHOCYTES NFR BLD: 37.9 % (ref 22–41)
MCH RBC QN AUTO: 30.3 PG (ref 27–33)
MCHC RBC AUTO-ENTMCNC: 35.6 G/DL (ref 33.7–35.3)
MCV RBC AUTO: 85 FL (ref 81.4–97.8)
MICRO URNS: ABNORMAL
MONOCYTES # BLD AUTO: 0.56 K/UL (ref 0–0.85)
MONOCYTES NFR BLD AUTO: 7.9 % (ref 0–13.4)
NEUTROPHILS # BLD AUTO: 3.57 K/UL (ref 1.82–7.42)
NEUTROPHILS NFR BLD: 50.2 % (ref 44–72)
NITRITE UR QL STRIP.AUTO: NEGATIVE
NRBC # BLD AUTO: 0 K/UL
NRBC BLD-RTO: 0 /100 WBC
PH UR STRIP.AUTO: 5 [PH] (ref 5–8)
PLATELET # BLD AUTO: 125 K/UL (ref 164–446)
PMV BLD AUTO: 8.8 FL (ref 9–12.9)
POTASSIUM SERPL-SCNC: 4.1 MMOL/L (ref 3.6–5.5)
PROT SERPL-MCNC: 7.7 G/DL (ref 6–8.2)
PROT UR QL STRIP: NEGATIVE MG/DL
PROTHROMBIN TIME: 12.8 SEC (ref 12–14.6)
RBC # BLD AUTO: 5.35 M/UL (ref 4.7–6.1)
RBC UR QL AUTO: NEGATIVE
SODIUM SERPL-SCNC: 132 MMOL/L (ref 135–145)
SP GR UR STRIP.AUTO: 1.04
UROBILINOGEN UR STRIP.AUTO-MCNC: 0.2 MG/DL
WBC # BLD AUTO: 7.1 K/UL (ref 4.8–10.8)

## 2022-06-08 PROCEDURE — 36415 COLL VENOUS BLD VENIPUNCTURE: CPT

## 2022-06-08 PROCEDURE — 72131 CT LUMBAR SPINE W/O DYE: CPT

## 2022-06-08 PROCEDURE — 85025 COMPLETE CBC W/AUTO DIFF WBC: CPT

## 2022-06-08 PROCEDURE — 700102 HCHG RX REV CODE 250 W/ 637 OVERRIDE(OP): Performed by: EMERGENCY MEDICINE

## 2022-06-08 PROCEDURE — 700105 HCHG RX REV CODE 258: Performed by: EMERGENCY MEDICINE

## 2022-06-08 PROCEDURE — 81003 URINALYSIS AUTO W/O SCOPE: CPT

## 2022-06-08 PROCEDURE — 82010 KETONE BODYS QUAN: CPT

## 2022-06-08 PROCEDURE — 700111 HCHG RX REV CODE 636 W/ 250 OVERRIDE (IP): Performed by: EMERGENCY MEDICINE

## 2022-06-08 PROCEDURE — 80053 COMPREHEN METABOLIC PANEL: CPT

## 2022-06-08 PROCEDURE — 96374 THER/PROPH/DIAG INJ IV PUSH: CPT

## 2022-06-08 PROCEDURE — 82962 GLUCOSE BLOOD TEST: CPT | Mod: 91

## 2022-06-08 PROCEDURE — 85610 PROTHROMBIN TIME: CPT

## 2022-06-08 PROCEDURE — 70450 CT HEAD/BRAIN W/O DYE: CPT

## 2022-06-08 PROCEDURE — A9270 NON-COVERED ITEM OR SERVICE: HCPCS | Performed by: EMERGENCY MEDICINE

## 2022-06-08 PROCEDURE — 99285 EMERGENCY DEPT VISIT HI MDM: CPT

## 2022-06-08 RX ORDER — ONDANSETRON 4 MG/1
4 TABLET, ORALLY DISINTEGRATING ORAL EVERY 8 HOURS PRN
Qty: 10 TABLET | Refills: 0 | Status: SHIPPED | OUTPATIENT
Start: 2022-06-08 | End: 2022-07-29

## 2022-06-08 RX ORDER — ONDANSETRON 2 MG/ML
4 INJECTION INTRAMUSCULAR; INTRAVENOUS ONCE
Status: COMPLETED | OUTPATIENT
Start: 2022-06-08 | End: 2022-06-08

## 2022-06-08 RX ORDER — ACETAMINOPHEN 325 MG/1
650 TABLET ORAL ONCE
Status: COMPLETED | OUTPATIENT
Start: 2022-06-08 | End: 2022-06-08

## 2022-06-08 RX ORDER — SODIUM CHLORIDE, SODIUM LACTATE, POTASSIUM CHLORIDE, CALCIUM CHLORIDE 600; 310; 30; 20 MG/100ML; MG/100ML; MG/100ML; MG/100ML
2000 INJECTION, SOLUTION INTRAVENOUS ONCE
Status: COMPLETED | OUTPATIENT
Start: 2022-06-08 | End: 2022-06-08

## 2022-06-08 RX ADMIN — ONDANSETRON 4 MG: 2 INJECTION INTRAMUSCULAR; INTRAVENOUS at 04:45

## 2022-06-08 RX ADMIN — SODIUM CHLORIDE, POTASSIUM CHLORIDE, SODIUM LACTATE AND CALCIUM CHLORIDE 2000 ML: 600; 310; 30; 20 INJECTION, SOLUTION INTRAVENOUS at 05:10

## 2022-06-08 RX ADMIN — ACETAMINOPHEN 650 MG: 325 TABLET, FILM COATED ORAL at 06:00

## 2022-06-08 ASSESSMENT — FIBROSIS 4 INDEX: FIB4 SCORE: 3.12

## 2022-06-08 ASSESSMENT — PAIN DESCRIPTION - PAIN TYPE: TYPE: ACUTE PAIN;CHRONIC PAIN

## 2022-06-08 NOTE — ED TRIAGE NOTES
"Benito Persaud  57 y.o. male  Chief Complaint   Patient presents with   • N/V     For three days.    • High Blood Sugar     Pt just found out that he has diabetes two months ago. FSBG 443 in triage. Machine was reading \"high\" at home so he took two metformin instead of one.        Vitals:    06/08/22 0025   BP: 136/89   Pulse: (!) 101   Resp: 17   Temp: 36.1 °C (96.9 °F)   SpO2: 99%       Triage process explained to patient, apologized for wait time, and returned to lobby.  Pt informed to notify staff of any change in condition.     "

## 2022-06-08 NOTE — ED NOTES
Urine collected and sent, LR still infusing  Pt resting in bed, NAD, denies any other needs at this time

## 2022-06-08 NOTE — ED NOTES
Pt provided with discharge instructions. Pt had no further questions. Pt wheelchaired self to lobby.

## 2022-06-08 NOTE — ED NOTES
Accessed port on L side w/ sterile technique. Access viable but does not draw blood; pt states this has happened before

## 2022-06-08 NOTE — ED PROVIDER NOTES
"ED Provider Note    CHIEF COMPLAINT  Chief Complaint   Patient presents with   • N/V     For three days.    • High Blood Sugar     Pt just found out that he has diabetes two months ago. FSBG 443 in triage. Machine was reading \"high\" at home so he took two metformin instead of one.        HPI  Benito Persaud is a 57 y.o. male who presents with elevated blood sugar.  Patient has had nausea for the last 3 days.  Nausea and vomiting is not uncommon for the patient he has Zofran as well as Phenergan at home.  He has tried taking these medications with minimal relief.  He but vomits multiple times every day.  Yesterday he had loose watery stool.  He has been checking his blood sugar at home.  His glucometer just read high today and therefore he comes to the ER.  He states he has been taking his medications as directed.  States he has been washing his diet.  He denies any fevers, abdominal pain.  No dysuria hematuria frequency.  Denies leg swelling or leg pain.  He states that he was moving and lost his balance fell to the ground hit his head.  He has a history of PE and DVT.  He is on Eliquis.  He denies headache but did note a bump on the top of his head.  Only other area of injury is low back.  He suffers from chronic low back pain and has lumbar compression fractures that are known.  This is in the same place as his previous pain.  He denies weakness or numbness in the extremities.  No bowel bladder dysfunction.  No saddle anesthesia.    REVIEW OF SYSTEMS  As per HPI, otherwise a 10 point review of systems is negative    PAST MEDICAL HISTORY  Myelodysplastic syndrome, PE, DVT, CVA, chronic vertigo, vision loss, lumbar compression fractures, chronic nausea, cellulitis, recent facial abscess status post I&D    SOCIAL HISTORY  Social History     Tobacco Use   • Smoking status: Never Smoker   • Smokeless tobacco: Current User     Types: Chew   Vaping Use   • Vaping Use: Never used   Substance Use Topics   • Alcohol " "use: Not Currently   • Drug use: Not Currently       SURGICAL HISTORY  Past Surgical History:   Procedure Laterality Date   • IRRIGATION & DEBRIDEMENT GENERAL N/A 5/25/2022    Procedure: IRRIGATION AND DEBRIDEMENT, WOUND-FACIAL ABSCESS;  Surgeon: Adolph Ortiz M.D.;  Location: SURGERY SAME DAY [facility];  Service: Ent   • CATH REMOVAL N/A 8/14/2019    Procedure: REMOVAL, CATHETER AND PLACEMENT OF POWER PORT;  Surgeon: Sonny Enamorado M.D.;  Location: SURGERY [facility];  Service: General       CURRENT MEDICATIONS  Home Medications     Reviewed by Rena Marquez R.N. (Registered Nurse) on 06/08/22 at 0034  Med List Status: Not Addressed   Medication Last Dose Status   acetaminophen (TYLENOL) 500 MG Tab  Active   amitriptyline (ELAVIL) 100 MG Tab  Active   BANOPHEN 50 MG Cap  Active   COMBIVENT RESPIMAT  MCG/ACT Aero Soln  Active   ELIQUIS 5 MG Tab  Active   fluticasone (FLOVENT HFA) 110 MCG/ACT Aerosol  Active   gabapentin (NEURONTIN) 800 MG tablet  Active   omeprazole (PRILOSEC) 20 MG Tablet Delayed Response delayed-release tablet  Active                ALLERGIES  Allergies   Allergen Reactions   • Green Beans Anaphylaxis   • Iodine Anaphylaxis   • Keflex Diarrhea and Vomiting     Vomiting & Diarrhea  Tolerates ceftriaxone   • Reglan [Metoclopramide] Nausea     Asthma     • Shellfish Allergy Anaphylaxis   • Toradol Hives       PHYSICAL EXAM  VITAL SIGNS: BP (!) 144/94   Pulse 92   Temp 36.1 °C (96.9 °F) (Temporal)   Resp 17   Ht 1.803 m (5' 11\")   Wt 117 kg (258 lb)   SpO2 91%   BMI 35.98 kg/m²    Constitutional: Awake and alert  HENT: Normal inspection  Eyes: Normal inspection  Neck: Grossly normal range of motion.  Cardiovascular: Normal heart rate, Normal rhythm.  Symmetric peripheral pulses.   Thorax & Lungs: No respiratory distress, No wheezing, No rales, No rhonchi, No chest tenderness.   Abdomen: Bowel sounds normal, soft, non-distended, nontender, no mass  Skin: No obvious rash.  Back: " Positive lumbar tenderness.  Extremities: No clubbing, cyanosis, edema, no Homans or cords.  Neurologic: Grossly normal   Psychiatric: Normal for situation    RADIOLOGY/PROCEDURES  CT-LSPINE W/O PLUS RECONS   Final Result      1.  Chronic T11 compression fracture, with up to 50% vertebral body height loss anteriorly. It was present on CT from 4/26/2022.   2.  Augmented L1 compression deformity.   3.  No acute lumbar compression fractures. No traumatic listhesis.   4.  Bone demineralization.      CT-HEAD W/O   Final Result      No CT evidence of acute infarct, hemorrhage or mass.              Imaging is interpreted by radiologist    Labs:  Results for orders placed or performed during the hospital encounter of 06/08/22   CBC WITH DIFFERENTIAL   Result Value Ref Range    WBC 7.1 4.8 - 10.8 K/uL    RBC 5.35 4.70 - 6.10 M/uL    Hemoglobin 16.2 14.0 - 18.0 g/dL    Hematocrit 45.5 42.0 - 52.0 %    MCV 85.0 81.4 - 97.8 fL    MCH 30.3 27.0 - 33.0 pg    MCHC 35.6 (H) 33.7 - 35.3 g/dL    RDW 40.8 35.9 - 50.0 fL    Platelet Count 125 (L) 164 - 446 K/uL    MPV 8.8 (L) 9.0 - 12.9 fL    Neutrophils-Polys 50.20 44.00 - 72.00 %    Lymphocytes 37.90 22.00 - 41.00 %    Monocytes 7.90 0.00 - 13.40 %    Eosinophils 3.20 0.00 - 6.90 %    Basophils 0.40 0.00 - 1.80 %    Immature Granulocytes 0.40 0.00 - 0.90 %    Nucleated RBC 0.00 /100 WBC    Neutrophils (Absolute) 3.57 1.82 - 7.42 K/uL    Lymphs (Absolute) 2.70 1.00 - 4.80 K/uL    Monos (Absolute) 0.56 0.00 - 0.85 K/uL    Eos (Absolute) 0.23 0.00 - 0.51 K/uL    Baso (Absolute) 0.03 0.00 - 0.12 K/uL    Immature Granulocytes (abs) 0.03 0.00 - 0.11 K/uL    NRBC (Absolute) 0.00 K/uL   COMP METABOLIC PANEL   Result Value Ref Range    Sodium 132 (L) 135 - 145 mmol/L    Potassium 4.1 3.6 - 5.5 mmol/L    Chloride 99 96 - 112 mmol/L    Co2 17 (L) 20 - 33 mmol/L    Anion Gap 16.0 7.0 - 16.0    Glucose 422 (H) 65 - 99 mg/dL    Bun 15 8 - 22 mg/dL    Creatinine 1.13 0.50 - 1.40 mg/dL    Calcium  9.3 8.5 - 10.5 mg/dL    AST(SGOT) 29 12 - 45 U/L    ALT(SGPT) 22 2 - 50 U/L    Alkaline Phosphatase 175 (H) 30 - 99 U/L    Total Bilirubin 0.4 0.1 - 1.5 mg/dL    Albumin 3.9 3.2 - 4.9 g/dL    Total Protein 7.7 6.0 - 8.2 g/dL    Globulin 3.8 (H) 1.9 - 3.5 g/dL    A-G Ratio 1.0 g/dL   ESTIMATED GFR   Result Value Ref Range    GFR (CKD-EPI) 76 >60 mL/min/1.73 m 2   BETA-HYDROXYBUTYRIC ACID   Result Value Ref Range    beta-Hydroxybutyric Acid 0.05 0.02 - 0.27 mmol/L   PT/INR   Result Value Ref Range    PT 12.8 12.0 - 14.6 sec    INR 0.99 0.87 - 1.13   URINALYSIS    Specimen: Urine   Result Value Ref Range    Color Yellow     Character Clear     Specific Gravity 1.045 <1.035    Ph 5.0 5.0 - 8.0    Glucose >=1000 (A) Negative mg/dL    Ketones Trace (A) Negative mg/dL    Protein Negative Negative mg/dL    Bilirubin Negative Negative    Urobilinogen, Urine 0.2 Negative    Nitrite Negative Negative    Leukocyte Esterase Negative Negative    Occult Blood Negative Negative    Micro Urine Req see below    POCT glucose device results   Result Value Ref Range    POC Glucose, Blood 313 (H) 65 - 99 mg/dL       Medications   lactated ringers (LR) bolus (2,000 mL Intravenous New Bag 6/8/22 0510)   acetaminophen (Tylenol) tablet 650 mg (650 mg Oral Given 6/8/22 0600)   ondansetron (ZOFRAN) syringe/vial injection 4 mg (4 mg Intravenous Given 6/8/22 6315)       Measures:  HYDRATION: Based on the patient's presentation of Hyperglycemia the patient was given IV fluids. IV Hydration was used because oral hydration was not adequate alone. Upon recheck following hydration, the patient was Improved.    COURSE & MEDICAL DECISION MAKING  Patient presents with nausea, vomiting diarrhea.  He has hyperglycemia.  IV was established.  Given 2 L of LR.  He describes having a fall out of his wheelchair.  He hit his head.  He is anticoagulated.  CT scan of the head was ordered.  He complained of low back pain.  Acute on chronic back pain with a known  history of compression fractures.  Order CT lumbar spine.  Laboratory data was collected.  Treat nausea with Zofran.    Patient improved with Zofran.  No further nausea or vomiting.  Laboratory data began to return.  Blood sugar 422.  Minimal metabolic acidosis but no anion gap.  No other evidence of DKA..  Continue with hydration.    CT imaging is negative for acute findings.  Previous compression fracture noted.    Reassessed patient tolerating orals without difficulty.  No further vomiting.  Benign abdominal exam.  Blood sugars improved.    At this point I suspect most likely viral illness resulting in nausea vomiting diarrhea in the setting of chronic nausea and vomiting.  I have given a prescription for Zofran.  Of advised plenty of fluids.  Advised blood sugar control with diet and metformin at home.  Tylenol as needed for back pain.  Patient should follow-up with primary provider for recheck.  Return to ER for uncontrolled pain, abdominal pain, fevers, uncontrolled sugar or concern.      FINAL IMPRESSION  1.  Nausea, vomiting, diarrhea-suspected viral illness  2.  Closed head injury on chronic anticoagulation  3.  Low back pain-chronic lumbar compression fracture  4.  Hyperglycemia       This dictation was created using voice recognition software. The accuracy of the dictation is limited to the abilities of the software.  The nursing notes were reviewed and certain aspects of this information were incorporated into this note.      Electronically signed by: Parag Juárez M.D., 6/8/2022 6:14 AM

## 2022-06-08 NOTE — PROGRESS NOTES
Consult:  Hematology/Oncology      Referring Physician: PCP  Primary Care:  Hanny Wills M.D.    Diagnosis: Reported MDS, hx of testicular cancer, hx PE    Chief Complaint:  Evaluation for systemic therapy    History of Presenting Illness:  Benito Persaud is a 57 y.o.  man who presents to the clinic today for evaluation for a reported history of myelodysplastic syndrome, history of testicular cancer, and pulmonary embolism.  The patient reports that he had testicular cancer in 2020, and was treated with an orchiectomy followed by chemotherapy for 3 cycles, presumed to be BEP chemotherapy.  During this time, the patient was noted to be thrombocytopenic and leukopenic, and apparently had a bone marrow biopsy done in [place] which revealed myelodysplastic syndrome.  These records are not available for my review.  He states that he had a bone marrow biopsy done a few times to confirm the disease process.  In November 2021, he was found to have a pulmonary embolism and was started on apixaban at therapeutic dosing.  He has not seen a hematologist oncologist for a few years, and reports that his care was mainly through the custodial system when he was incarcerated a few years ago.    Interval History:  Patient is here for consultation.  He was recently in the hospital for facial cellulitis thought to be related to a potential spider bite, and he is recovering from that.  He has chronic issues with dizziness and vertigo, and has chronic back issues, so he presents in a wheelchair.  However, he otherwise is feeling okay and has no new complaints.    Past Medical History:   Diagnosis Date   • Asthma    • Cancer (HCC) 11/01/2016   • MI (myocardial infarction) ([facility]) 2019       Past Surgical History:   Procedure Laterality Date   • IRRIGATION & DEBRIDEMENT GENERAL N/A 5/25/2022    Procedure: IRRIGATION AND DEBRIDEMENT, WOUND-FACIAL ABSCESS;  Surgeon: Adolph Ortiz M.D.;  Location: SURGERY SAME DAY  [facility];  Service: Ent   • CATH REMOVAL N/A 8/14/2019    Procedure: REMOVAL, CATHETER AND PLACEMENT OF POWER PORT;  Surgeon: Sonny Enamorado M.D.;  Location: SURGERY [facility];  Service: General       Social History     Socioeconomic History   • Marital status: Single     Spouse name: Not on file   • Number of children: Not on file   • Years of education: Not on file   • Highest education level: Not on file   Occupational History   • Not on file   Tobacco Use   • Smoking status: Never Smoker   • Smokeless tobacco: Current User     Types: Chew   Vaping Use   • Vaping Use: Never used   Substance and Sexual Activity   • Alcohol use: Not Currently   • Drug use: Not Currently   • Sexual activity: Not on file   Other Topics Concern   • Not on file   Social History Narrative    He works as an  for [employer] and also as a  in construction.      Social Determinants of Health     Financial Resource Strain: High Risk   • Difficulty of Paying Living Expenses: Very hard   Food Insecurity: Food Insecurity Present   • Worried About Running Out of Food in the Last Year: Sometimes true   • Ran Out of Food in the Last Year: Sometimes true   Transportation Needs: Unmet Transportation Needs   • Lack of Transportation (Medical): Yes   • Lack of Transportation (Non-Medical): No   Physical Activity: Not on file   Stress: Not on file   Social Connections: Not on file   Intimate Partner Violence: Not on file   Housing Stability: Not on file       Family History   Problem Relation Age of Onset   • Cancer Mother    • Psychiatric Illness Mother    • Diabetes Mother    • Hypertension Mother    • Hyperlipidemia Mother    • Arterial Aneurysm Father    • Heart Disease Maternal Grandmother        OB History   No obstetric history on file.       Allergies as of 06/06/2022 - Reviewed 06/06/2022   Allergen Reaction Noted   • Green beans Anaphylaxis 03/27/2022   • Iodine Anaphylaxis 04/05/2019   • Keflex Diarrhea and  Vomiting 04/05/2019   • Reglan [metoclopramide] Nausea 04/05/2019   • Shellfish allergy Anaphylaxis 04/05/2019   • Toradol Hives 04/05/2019         Current Outpatient Medications:   •  acetaminophen (TYLENOL) 500 MG Tab, Take 500-1,000 mg by mouth every 6 hours as needed for Mild Pain., Disp: , Rfl:   •  omeprazole (PRILOSEC) 20 MG Tablet Delayed Response delayed-release tablet, TAKE 2 TABLETS BY MOUTH EVERY DAY, Disp: 180 Tablet, Rfl: 3  •  fluticasone (FLOVENT HFA) 110 MCG/ACT Aerosol, Inhale 1 Puff 2 times a day., Disp: 1 Each, Rfl: 2  •  gabapentin (NEURONTIN) 800 MG tablet, TAKE 1 TABLET BY MOUTH THREE TIMES A DAY (Patient taking differently: Take 800 mg by mouth every day.), Disp: 90 Tablet, Rfl: 3  •  COMBIVENT RESPIMAT  MCG/ACT Aero Soln, INHALE 1 PUFF BY MOUTH 4 TIMES A DAY (Patient taking differently: Inhale 1 Puff 4 times a day.), Disp: 1 Each, Rfl: 5  •  amitriptyline (ELAVIL) 100 MG Tab, TAKE 1.5 TABLETS BY MOUTH AT BEDTIME AS NEEDED FOR SLEEP., Disp: 30 Tablet, Rfl: 3  •  BANOPHEN 50 MG Cap, TAKE 1 CAPSULE BY MOUTH AT BEDTIME AS NEEDED FOR SLEEP (Patient taking differently: Take 50 mg by mouth at bedtime as needed.), Disp: 30 Capsule, Rfl: 2  •  ELIQUIS 5 MG Tab, TAKE 1 TABLET BY MOUTH TWICE A DAY (Patient taking differently: Take 5 mg by mouth 2 times a day.), Disp: 60 Tablet, Rfl: 3    Review of Systems:  Review of Systems   Constitutional: Positive for malaise/fatigue. Negative for chills, diaphoresis, fever and weight loss.   HENT: Negative for hearing loss, nosebleeds, sinus pain and sore throat.    Eyes: Negative for blurred vision and double vision.   Respiratory: Negative for cough, sputum production, shortness of breath and wheezing.    Cardiovascular: Negative for chest pain, palpitations, orthopnea and leg swelling.   Gastrointestinal: Negative for abdominal pain, blood in stool, constipation, diarrhea, heartburn, melena, nausea and vomiting.   Genitourinary: Negative for dysuria,  "frequency, hematuria and urgency.   Musculoskeletal: Positive for back pain and myalgias. Negative for joint pain.   Skin: Negative for rash.   Neurological: Positive for dizziness. Negative for tingling, weakness and headaches.   Endo/Heme/Allergies: Bruises/bleeds easily.   Psychiatric/Behavioral: The patient is not nervous/anxious and does not have insomnia.           Physical Exam:  Vitals:    06/06/22 1445   BP: 104/62   BP Location: Left arm   Patient Position: Sitting   BP Cuff Size: Adult   Pulse: 100   Resp: 16   Temp: 36.5 °C (97.7 °F)   TempSrc: Temporal   SpO2: 97%   Weight: 116 kg (255 lb 3.2 oz)   Height: 1.803 m (5' 10.98\")       DESC; KARNOFSKY SCALE WITH ECOG EQUIVALENT: 70, Cares for self; unable to carry on normal activity or to do active work (ECOG equivalent 1)    DISTRESS LEVEL: no apparent distress    Physical Exam  Vitals and nursing note reviewed.   Constitutional:       General: He is awake. He is not in acute distress.     Appearance: Normal appearance. He is obese. He is not ill-appearing, toxic-appearing or diaphoretic.   HENT:      Head: Normocephalic and atraumatic.      Nose: Nose normal. No congestion.      Mouth/Throat:      Pharynx: Oropharynx is clear. No oropharyngeal exudate or posterior oropharyngeal erythema.   Eyes:      General: No scleral icterus.     Extraocular Movements: Extraocular movements intact.      Conjunctiva/sclera: Conjunctivae normal.      Pupils: Pupils are equal, round, and reactive to light.   Cardiovascular:      Rate and Rhythm: Normal rate and regular rhythm.      Pulses: Normal pulses.      Heart sounds: Normal heart sounds. No murmur heard.    No friction rub. No gallop.   Pulmonary:      Effort: Pulmonary effort is normal.      Breath sounds: Normal breath sounds. No decreased air movement. No wheezing, rhonchi or rales.   Chest:   Breasts:      Right: No axillary adenopathy.      Left: No axillary adenopathy.       Abdominal:      General: Bowel " sounds are normal. There is no distension.      Tenderness: There is no abdominal tenderness.   Musculoskeletal:         General: No deformity. Normal range of motion.      Cervical back: Normal range of motion and neck supple. No tenderness.      Right lower leg: No edema.      Left lower leg: No edema.   Lymphadenopathy:      Cervical: No cervical adenopathy.      Upper Body:      Right upper body: No axillary adenopathy.      Left upper body: No axillary adenopathy.      Lower Body: No right inguinal adenopathy. No left inguinal adenopathy.   Skin:     General: Skin is warm and dry.      Coloration: Skin is not jaundiced.      Findings: No erythema or rash.   Neurological:      General: No focal deficit present.      Mental Status: He is alert and oriented to person, place, and time.      Cranial Nerves: Cranial nerves are intact.      Sensory: Sensation is intact.      Motor: Motor function is intact. No weakness.      Gait: Gait abnormal (In motorized wheelchair).   Psychiatric:         Attention and Perception: Attention normal.         Mood and Affect: Mood normal.         Behavior: Behavior normal. Behavior is cooperative.         Thought Content: Thought content normal.         Judgment: Judgment normal.          Depression Screening    Little interest or pleasure in doing things?      Feeling down, depressed , or hopeless?     Trouble falling or staying asleep, or sleeping too much?      Feeling tired or having little energy?      Poor appetite or overeating?      Feeling bad about yourself - or that you are a failure or have let yourself or your family down?     Trouble concentrating on things, such as reading the newspaper or watching television?     Moving or speaking so slowly that other people could have noticed.  Or the opposite - being so fidgety or restless that you have been moving around a lot more than usual?      Thoughts that you would be better off dead, or of hurting yourself?      Patient  Health Questionnaire Score:         If depressive symptoms identified deferred to follow up visit unless specifically addressed in assesment and plan.    Interpretation of PHQ-9 Total Score   Score Severity   1-4 No Depression   5-9 Mild Depression   10-14 Moderate Depression   15-19 Moderately Severe Depression   20-27 Severe Depression    Labs:  No visits with results within 7 Day(s) from this visit.   Latest known visit with results is:   Admission on 05/22/2022, Discharged on 05/27/2022   Component Date Value Ref Range Status   • WBC 05/22/2022 6.2  4.8 - 10.8 K/uL Final   • RBC 05/22/2022 5.25  4.70 - 6.10 M/uL Final   • Hemoglobin 05/22/2022 15.7  14.0 - 18.0 g/dL Final   • Hematocrit 05/22/2022 45.0  42.0 - 52.0 % Final   • MCV 05/22/2022 85.7  81.4 - 97.8 fL Final   • MCH 05/22/2022 29.9  27.0 - 33.0 pg Final   • MCHC 05/22/2022 34.9  33.7 - 35.3 g/dL Final   • RDW 05/22/2022 43.2  35.9 - 50.0 fL Final   • Platelet Count 05/22/2022 107 (A) 164 - 446 K/uL Final   • MPV 05/22/2022 9.1  9.0 - 12.9 fL Final   • Neutrophils-Polys 05/22/2022 58.40  44.00 - 72.00 % Final   • Lymphocytes 05/22/2022 29.50  22.00 - 41.00 % Final   • Monocytes 05/22/2022 9.00  0.00 - 13.40 % Final   • Eosinophils 05/22/2022 2.60  0.00 - 6.90 % Final   • Basophils 05/22/2022 0.20  0.00 - 1.80 % Final   • Immature Granulocytes 05/22/2022 0.30  0.00 - 0.90 % Final   • Nucleated RBC 05/22/2022 0.00  /100 WBC Final   • Neutrophils (Absolute) 05/22/2022 3.64  1.82 - 7.42 K/uL Final    Includes immature neutrophils, if present.   • Lymphs (Absolute) 05/22/2022 1.84  1.00 - 4.80 K/uL Final   • Monos (Absolute) 05/22/2022 0.56  0.00 - 0.85 K/uL Final   • Eos (Absolute) 05/22/2022 0.16  0.00 - 0.51 K/uL Final   • Baso (Absolute) 05/22/2022 0.01  0.00 - 0.12 K/uL Final   • Immature Granulocytes (abs) 05/22/2022 0.02  0.00 - 0.11 K/uL Final   • NRBC (Absolute) 05/22/2022 0.00  K/uL Final   • Sodium 05/22/2022 132 (A) 135 - 145 mmol/L Final   •  Potassium 05/22/2022 3.8  3.6 - 5.5 mmol/L Final   • Chloride 05/22/2022 100  96 - 112 mmol/L Final   • Co2 05/22/2022 20  20 - 33 mmol/L Final   • Anion Gap 05/22/2022 12.0  7.0 - 16.0 Final   • Glucose 05/22/2022 240 (A) 65 - 99 mg/dL Final   • Bun 05/22/2022 15  8 - 22 mg/dL Final   • Creatinine 05/22/2022 1.06  0.50 - 1.40 mg/dL Final   • Calcium 05/22/2022 9.0  8.5 - 10.5 mg/dL Final   • AST(SGOT) 05/22/2022 18  12 - 45 U/L Final   • ALT(SGPT) 05/22/2022 14  2 - 50 U/L Final   • Alkaline Phosphatase 05/22/2022 129 (A) 30 - 99 U/L Final   • Total Bilirubin 05/22/2022 0.8  0.1 - 1.5 mg/dL Final   • Albumin 05/22/2022 3.5  3.2 - 4.9 g/dL Final   • Total Protein 05/22/2022 7.5  6.0 - 8.2 g/dL Final   • Globulin 05/22/2022 4.0 (A) 1.9 - 3.5 g/dL Final   • A-G Ratio 05/22/2022 0.9  g/dL Final   • Significant Indicator 05/22/2022 NEG   Final   • Source 05/22/2022 BLD   Final   • Site 05/22/2022 PERIPHERAL   Final   • Culture Result 05/22/2022 No growth after 5 days of incubation.   Final   • Significant Indicator 05/22/2022 NEG   Final   • Source 05/22/2022 BLD   Final   • Site 05/22/2022 PERIPHERAL   Final   • Culture Result 05/22/2022 No growth after 5 days of incubation.   Final   • Significant Indicator 05/23/2022 POS (A)  Final   • Source 05/23/2022 WND   Final   • Site 05/23/2022 upper lip   Final   • Culture Result 05/23/2022 Light growth usual skin kleber. (A)  Final   • Gram Stain Result 05/23/2022 No organisms seen.   Final   • Culture Result 05/23/2022  (A)  Final                    Value:Methicillin Resistant Staphylococcus aureus  Light growth     • GFR (CKD-EPI) 05/22/2022 82  >60 mL/min/1.73 m 2 Final    Comment: Effective 3/15/2022, estimated Glomerular Filtration Rate  is calculated using race neutral CKD-EPI 2021 equation  per NKF-ASN recommendations.     • WBC 05/23/2022 6.7  4.8 - 10.8 K/uL Final   • RBC 05/23/2022 5.06  4.70 - 6.10 M/uL Final   • Hemoglobin 05/23/2022 15.4  14.0 - 18.0 g/dL Final    • Hematocrit 05/23/2022 44.9  42.0 - 52.0 % Final   • MCV 05/23/2022 88.7  81.4 - 97.8 fL Final   • MCH 05/23/2022 30.4  27.0 - 33.0 pg Final   • MCHC 05/23/2022 34.3  33.7 - 35.3 g/dL Final   • RDW 05/23/2022 44.5  35.9 - 50.0 fL Final   • Platelet Count 05/23/2022 108 (A) 164 - 446 K/uL Final   • MPV 05/23/2022 8.5 (A) 9.0 - 12.9 fL Final   • Neutrophils-Polys 05/23/2022 61.70  44.00 - 72.00 % Final   • Lymphocytes 05/23/2022 24.90  22.00 - 41.00 % Final   • Monocytes 05/23/2022 9.40  0.00 - 13.40 % Final   • Eosinophils 05/23/2022 3.30  0.00 - 6.90 % Final   • Basophils 05/23/2022 0.30  0.00 - 1.80 % Final   • Immature Granulocytes 05/23/2022 0.40  0.00 - 0.90 % Final   • Nucleated RBC 05/23/2022 0.00  /100 WBC Final   • Neutrophils (Absolute) 05/23/2022 4.12  1.82 - 7.42 K/uL Final    Includes immature neutrophils, if present.   • Lymphs (Absolute) 05/23/2022 1.66  1.00 - 4.80 K/uL Final   • Monos (Absolute) 05/23/2022 0.63  0.00 - 0.85 K/uL Final   • Eos (Absolute) 05/23/2022 0.22  0.00 - 0.51 K/uL Final   • Baso (Absolute) 05/23/2022 0.02  0.00 - 0.12 K/uL Final   • Immature Granulocytes (abs) 05/23/2022 0.03  0.00 - 0.11 K/uL Final   • NRBC (Absolute) 05/23/2022 0.00  K/uL Final   • Sodium 05/23/2022 136  135 - 145 mmol/L Final   • Potassium 05/23/2022 3.6  3.6 - 5.5 mmol/L Final   • Chloride 05/23/2022 100  96 - 112 mmol/L Final   • Co2 05/23/2022 26  20 - 33 mmol/L Final   • Anion Gap 05/23/2022 10.0  7.0 - 16.0 Final   • Glucose 05/23/2022 197 (A) 65 - 99 mg/dL Final   • Bun 05/23/2022 15  8 - 22 mg/dL Final   • Creatinine 05/23/2022 1.14  0.50 - 1.40 mg/dL Final   • Calcium 05/23/2022 8.8  8.5 - 10.5 mg/dL Final   • AST(SGOT) 05/23/2022 19  12 - 45 U/L Final   • ALT(SGPT) 05/23/2022 15  2 - 50 U/L Final   • Alkaline Phosphatase 05/23/2022 125 (A) 30 - 99 U/L Final   • Total Bilirubin 05/23/2022 0.8  0.1 - 1.5 mg/dL Final   • Albumin 05/23/2022 3.7  3.2 - 4.9 g/dL Final   • Total Protein 05/23/2022 7.3   6.0 - 8.2 g/dL Final   • Globulin 05/23/2022 3.6 (A) 1.9 - 3.5 g/dL Final   • A-G Ratio 05/23/2022 1.0  g/dL Final   • Glycohemoglobin 05/23/2022 8.6 (A) 4.0 - 5.6 % Final    Comment: Increased risk for diabetes:  5.7 -6.4%  Diabetes:  >6.4%  Glycemic control for adults with diabetes:  <7.0%    The above interpretations are per ADA guidelines.  Diagnosis  of diabetes mellitus on the basis of elevated Hemoglobin A1c  should be confirmed by repeating the Hb A1c test.     • Est Avg Glucose 05/23/2022 200  mg/dL Final    Comment: The eAG calculation is based on the A1c-Derived Daily Glucose  (ADAG) study.  See the ADA's website for additional information.     • POC Glucose, Blood 05/23/2022 177 (A) 65 - 99 mg/dL Final    Followed orders   • GFR (CKD-EPI) 05/23/2022 75  >60 mL/min/1.73 m 2 Final    Comment: Effective 3/15/2022, estimated Glomerular Filtration Rate  is calculated using race neutral CKD-EPI 2021 equation  per NKF-ASN recommendations.     • POC Glucose, Blood 05/23/2022 189 (A) 65 - 99 mg/dL Final   • Significant Indicator 05/23/2022 .   Final   • Source 05/23/2022 WND   Final   • Site 05/23/2022 upper lip   Final   • Gram Stain Result 05/23/2022 No organisms seen.   Final   • POC Glucose, Blood 05/23/2022 182 (A) 65 - 99 mg/dL Final    Followed orders   • POC Glucose, Blood 05/23/2022 184 (A) 65 - 99 mg/dL Final   • POC Glucose, Blood 05/23/2022 139 (A) 65 - 99 mg/dL Final   • WBC 05/24/2022 5.5  4.8 - 10.8 K/uL Final   • RBC 05/24/2022 5.75  4.70 - 6.10 M/uL Final   • Hemoglobin 05/24/2022 17.5  14.0 - 18.0 g/dL Final   • Hematocrit 05/24/2022 51.8  42.0 - 52.0 % Final   • MCV 05/24/2022 90.1  81.4 - 97.8 fL Final   • MCH 05/24/2022 30.4  27.0 - 33.0 pg Final   • MCHC 05/24/2022 33.8  33.7 - 35.3 g/dL Final   • RDW 05/24/2022 46.3  35.9 - 50.0 fL Final   • Platelet Count 05/24/2022 88 (A) 164 - 446 K/uL Final   • MPV 05/24/2022 9.1  9.0 - 12.9 fL Final   • Sodium 05/24/2022 135  135 - 145 mmol/L Final   •  Potassium 05/24/2022 4.0  3.6 - 5.5 mmol/L Final   • Chloride 05/24/2022 103  96 - 112 mmol/L Final   • Co2 05/24/2022 22  20 - 33 mmol/L Final   • Glucose 05/24/2022 179 (A) 65 - 99 mg/dL Final   • Bun 05/24/2022 13  8 - 22 mg/dL Final   • Creatinine 05/24/2022 1.05  0.50 - 1.40 mg/dL Final   • Calcium 05/24/2022 8.0 (A) 8.5 - 10.5 mg/dL Final   • Anion Gap 05/24/2022 10.0  7.0 - 16.0 Final   • GFR (CKD-EPI) 05/24/2022 83  >60 mL/min/1.73 m 2 Final    Comment: Effective 3/15/2022, estimated Glomerular Filtration Rate  is calculated using race neutral CKD-EPI 2021 equation  per NKF-ASN recommendations.     • POC Glucose, Blood 05/24/2022 151 (A) 65 - 99 mg/dL Final   • POC Glucose, Blood 05/24/2022 173 (A) 65 - 99 mg/dL Final   • POC Glucose, Blood 05/24/2022 157 (A) 65 - 99 mg/dL Final   • POC Glucose, Blood 05/24/2022 161 (A) 65 - 99 mg/dL Final   • POC Glucose, Blood 05/25/2022 118 (A) 65 - 99 mg/dL Final   • Significant Indicator 05/25/2022 POS (A)  Final   • Source 05/25/2022 WND   Final   • Site 05/25/2022 premaxilla fluid   Final   • Culture Result 05/25/2022 - (A)  Final   • Gram Stain Result 05/25/2022    Final                    Value:Few WBCs.  No organisms seen.     • Culture Result 05/25/2022  (A)  Final                    Value:Methicillin Resistant Staphylococcus aureus  Light growth     • Significant Indicator 05/25/2022 NEG   Final   • Source 05/25/2022 WND   Final   • Site 05/25/2022 premaxilla fluid   Final   • Culture Result 05/25/2022 No Anaerobes isolated.   Final   • Significant Indicator 05/25/2022 NEG   Preliminary   • Source 05/25/2022 WND   Preliminary   • Site 05/25/2022 premaxilla fluid   Preliminary   • Culture Result 05/25/2022 No fungal growth to date.   Preliminary   • Fungal Smear Results 05/25/2022 No fungal elements seen.   Final   • Significant Indicator 05/25/2022 NEG   Final   • Source 05/25/2022 WND   Final   • Site 05/25/2022 premaxilla fluid   Final   • Fungal Smear Results  05/25/2022 No fungal elements seen.   Final   • POC Glucose, Blood 05/25/2022 273 (A) 65 - 99 mg/dL Final   • POC Glucose, Blood 05/25/2022 151 (A) 65 - 99 mg/dL Final   • Significant Indicator 05/25/2022 .   Final   • Source 05/25/2022 WND   Final   • Site 05/25/2022 premaxilla fluid   Final   • Gram Stain Result 05/25/2022    Final                    Value:Few WBCs.  No organisms seen.     • POC Glucose, Blood 05/26/2022 255 (A) 65 - 99 mg/dL Final   • POC Glucose, Blood 05/26/2022 206 (A) 65 - 99 mg/dL Final   • POC Glucose, Blood 05/26/2022 199 (A) 65 - 99 mg/dL Final   • POC Glucose, Blood 05/26/2022 182 (A) 65 - 99 mg/dL Final   • POC Glucose, Blood 05/27/2022 320 (A) 65 - 99 mg/dL Final   • POC Glucose, Blood 05/27/2022 160 (A) 65 - 99 mg/dL Final       Imaging:   All listed images below have been independently reviewed by me. I agree with the findings as summarized below:    CT-MAXILLOFACIAL W/O    Result Date: 5/23/2022 5/23/2022 1:05 AM HISTORY/REASON FOR EXAM: Maxillary/facial abscess TECHNIQUE/EXAM DESCRIPTION:  Transaxial scans of the face were obtained without IV contrast. Coronal and sagittal reformations are presented for interpretation. Low dose optimization technique was utilized for this CT exam including automated exposure control and adjustment of the mA and/or kV according to patient size. COMPARISON: None FINDINGS: The visualized portions of the brain appear within normal limits. The paranasal sinuses are well aerated bilaterally. The ostiomeatal units are patent bilaterally. No facial bone fractures are appreciated; the orbital floors are intact. Soft tissue swelling of the base of the nose is seen involving the upper lip. Mild the prominent bilateral cervical chain lymph nodes are seen. The globes and retrobulbar soft tissues appear within normal limits.     1.  No acute traumatic facial bony injuries identified. 2.  Soft tissue swelling of the base of the nose involving the upper lip,  could represent phlegmon or hematoma in the setting of trauma, noncontrast technique limits evaluation for abscess. 3.  Mildly prominent bilateral cervical chain lymph nodes, may be reactive    US-SOFT TISSUES OF HEAD - NECK    Result Date: 5/23/2022 5/23/2022 2:05 PM HISTORY/REASON FOR EXAM:  Swelling; cellulitis with swelling of upper lip/below nose concerning for abscess TECHNIQUE/EXAM DESCRIPTION: Ultrasound of the soft tissues of the head and neck. COMPARISON:  Same day CT face FINDINGS: Focused ultrasound of the area of concern demonstrates a complex hypoechoic area measuring 2.5 x 0.7 cm with surrounding soft tissue thickening and hypervascularity.     Likely cellulitis of the upper lip with complex hypoechoic area measuring 2.5 x 0.7 cm most compatible with abscess.       Pathology:  Not available for review    Assessment & Plan:  1. Myelodysplastic syndrome (HCC)     2. History of testicular cancer     3. History of pulmonary embolism         This is a 57 year old  man with history of testicular carcinoma (no records available to discern further information regarding pathology, etc.) reportedly treated with 3 cycles of chemotherapy (presume BEP), myelodysplastic syndrome (reportedly confirmed by bone marrow biopsy, including one done here at [facility]), and recent PE. He presents today for evaluation.     Current Diagnosis and Staging: If patient has MDS, it seems to be low-grade.     Treatment Plan: Need records to make full assessment of this case. Patient stated he will provide records. Likely had early stage testicular carcinoma, potentially with vlad involvement (thus the need for 3 cycles of chemo) and if he truly has MDS, it is likely low-grade at this time. Therefore, he may continue with surveillance.     Treatment Citation: NCCN    Plan of Care:    1. Primary Therapy: TBD (likely surveillance)  2. Supportive Therapy: Continue apixaban  3. Labs: CBC with diff, CMP, AFP, bHCG, LDH in 6  months  4. Imaging: CT abd/pel, CXR in 6 months for surveillance (2 year scans)  5. Treatment Planning: Will need treatment records to get full understanding of patient's prior management. He is less than 2 years removed from his testicular cancer so he will need continued monitoring. His MDS, if confirmed, can be monitored for now.   6. Consultations: NA  7. Code Status: Full  8. Miscellaneous: Need records. Patient said he will provide them.   9. Return for Follow Up: 6 months    Any questions and concerns raised by the patient were answered to the best of my ability. Thank you for allowing me to participate in the care for this patient. Please feel free to contact me for any questions or concerns.     Total time spent on chart review, clinic encounter, and documentation: 37 minutes.

## 2022-06-16 ENCOUNTER — HOSPITAL ENCOUNTER (OUTPATIENT)
Facility: MEDICAL CENTER | Age: 57
End: 2022-06-18
Attending: EMERGENCY MEDICINE | Admitting: FAMILY MEDICINE
Payer: MEDICAID

## 2022-06-16 ENCOUNTER — APPOINTMENT (OUTPATIENT)
Dept: RADIOLOGY | Facility: MEDICAL CENTER | Age: 57
End: 2022-06-16
Attending: EMERGENCY MEDICINE
Payer: MEDICAID

## 2022-06-16 DIAGNOSIS — S49.92XA INJURY OF LEFT SHOULDER, INITIAL ENCOUNTER: ICD-10-CM

## 2022-06-16 DIAGNOSIS — R55 SYNCOPE, UNSPECIFIED SYNCOPE TYPE: ICD-10-CM

## 2022-06-16 DIAGNOSIS — R07.89 OTHER CHEST PAIN: ICD-10-CM

## 2022-06-16 LAB
ALBUMIN SERPL BCP-MCNC: 4.2 G/DL (ref 3.2–4.9)
ALBUMIN/GLOB SERPL: 1.2 G/DL
ALP SERPL-CCNC: 150 U/L (ref 30–99)
ALT SERPL-CCNC: 23 U/L (ref 2–50)
ANION GAP SERPL CALC-SCNC: 16 MMOL/L (ref 7–16)
AST SERPL-CCNC: 25 U/L (ref 12–45)
B-OH-BUTYR SERPL-MCNC: 0.18 MMOL/L (ref 0.02–0.27)
BASOPHILS # BLD AUTO: 0.2 % (ref 0–1.8)
BASOPHILS # BLD: 0.01 K/UL (ref 0–0.12)
BILIRUB SERPL-MCNC: 0.6 MG/DL (ref 0.1–1.5)
BUN SERPL-MCNC: 14 MG/DL (ref 8–22)
CALCIUM SERPL-MCNC: 9.1 MG/DL (ref 8.5–10.5)
CHLORIDE SERPL-SCNC: 103 MMOL/L (ref 96–112)
CO2 SERPL-SCNC: 16 MMOL/L (ref 20–33)
CREAT SERPL-MCNC: 1.09 MG/DL (ref 0.5–1.4)
EKG IMPRESSION: NORMAL
EOSINOPHIL # BLD AUTO: 0.05 K/UL (ref 0–0.51)
EOSINOPHIL NFR BLD: 0.8 % (ref 0–6.9)
ERYTHROCYTE [DISTWIDTH] IN BLOOD BY AUTOMATED COUNT: 41.9 FL (ref 35.9–50)
FLUAV RNA SPEC QL NAA+PROBE: NEGATIVE
FLUBV RNA SPEC QL NAA+PROBE: NEGATIVE
GFR SERPLBLD CREATININE-BSD FMLA CKD-EPI: 80 ML/MIN/1.73 M 2
GLOBULIN SER CALC-MCNC: 3.5 G/DL (ref 1.9–3.5)
GLUCOSE BLD STRIP.AUTO-MCNC: 116 MG/DL (ref 65–99)
GLUCOSE BLD STRIP.AUTO-MCNC: 177 MG/DL (ref 65–99)
GLUCOSE SERPL-MCNC: 318 MG/DL (ref 65–99)
HCT VFR BLD AUTO: 47 % (ref 42–52)
HGB BLD-MCNC: 16.7 G/DL (ref 14–18)
IMM GRANULOCYTES # BLD AUTO: 0.04 K/UL (ref 0–0.11)
IMM GRANULOCYTES NFR BLD AUTO: 0.6 % (ref 0–0.9)
LYMPHOCYTES # BLD AUTO: 1.49 K/UL (ref 1–4.8)
LYMPHOCYTES NFR BLD: 24.1 % (ref 22–41)
MCH RBC QN AUTO: 30 PG (ref 27–33)
MCHC RBC AUTO-ENTMCNC: 35.5 G/DL (ref 33.7–35.3)
MCV RBC AUTO: 84.5 FL (ref 81.4–97.8)
MONOCYTES # BLD AUTO: 0.55 K/UL (ref 0–0.85)
MONOCYTES NFR BLD AUTO: 8.9 % (ref 0–13.4)
NEUTROPHILS # BLD AUTO: 4.04 K/UL (ref 1.82–7.42)
NEUTROPHILS NFR BLD: 65.4 % (ref 44–72)
NRBC # BLD AUTO: 0 K/UL
NRBC BLD-RTO: 0 /100 WBC
NT-PROBNP SERPL IA-MCNC: 46 PG/ML (ref 0–125)
PLATELET # BLD AUTO: 108 K/UL (ref 164–446)
PMV BLD AUTO: 9.2 FL (ref 9–12.9)
POTASSIUM SERPL-SCNC: 4 MMOL/L (ref 3.6–5.5)
PROT SERPL-MCNC: 7.7 G/DL (ref 6–8.2)
RBC # BLD AUTO: 5.56 M/UL (ref 4.7–6.1)
RSV RNA SPEC QL NAA+PROBE: NEGATIVE
SARS-COV-2 RNA RESP QL NAA+PROBE: NOTDETECTED
SODIUM SERPL-SCNC: 135 MMOL/L (ref 135–145)
SPECIMEN SOURCE: NORMAL
TROPONIN T SERPL-MCNC: 11 NG/L (ref 6–19)
WBC # BLD AUTO: 6.2 K/UL (ref 4.8–10.8)

## 2022-06-16 PROCEDURE — G0378 HOSPITAL OBSERVATION PER HR: HCPCS

## 2022-06-16 PROCEDURE — 700105 HCHG RX REV CODE 258: Performed by: EMERGENCY MEDICINE

## 2022-06-16 PROCEDURE — A9270 NON-COVERED ITEM OR SERVICE: HCPCS | Performed by: STUDENT IN AN ORGANIZED HEALTH CARE EDUCATION/TRAINING PROGRAM

## 2022-06-16 PROCEDURE — 93005 ELECTROCARDIOGRAM TRACING: CPT

## 2022-06-16 PROCEDURE — 73010 X-RAY EXAM OF SHOULDER BLADE: CPT | Mod: LT

## 2022-06-16 PROCEDURE — 71045 X-RAY EXAM CHEST 1 VIEW: CPT

## 2022-06-16 PROCEDURE — 96372 THER/PROPH/DIAG INJ SC/IM: CPT | Mod: XU

## 2022-06-16 PROCEDURE — 700105 HCHG RX REV CODE 258: Performed by: STUDENT IN AN ORGANIZED HEALTH CARE EDUCATION/TRAINING PROGRAM

## 2022-06-16 PROCEDURE — 70450 CT HEAD/BRAIN W/O DYE: CPT

## 2022-06-16 PROCEDURE — 83880 ASSAY OF NATRIURETIC PEPTIDE: CPT

## 2022-06-16 PROCEDURE — 96376 TX/PRO/DX INJ SAME DRUG ADON: CPT

## 2022-06-16 PROCEDURE — 93005 ELECTROCARDIOGRAM TRACING: CPT | Performed by: EMERGENCY MEDICINE

## 2022-06-16 PROCEDURE — 700102 HCHG RX REV CODE 250 W/ 637 OVERRIDE(OP): Performed by: STUDENT IN AN ORGANIZED HEALTH CARE EDUCATION/TRAINING PROGRAM

## 2022-06-16 PROCEDURE — 96374 THER/PROPH/DIAG INJ IV PUSH: CPT

## 2022-06-16 PROCEDURE — 700111 HCHG RX REV CODE 636 W/ 250 OVERRIDE (IP): Performed by: STUDENT IN AN ORGANIZED HEALTH CARE EDUCATION/TRAINING PROGRAM

## 2022-06-16 PROCEDURE — 36415 COLL VENOUS BLD VENIPUNCTURE: CPT

## 2022-06-16 PROCEDURE — 80053 COMPREHEN METABOLIC PANEL: CPT

## 2022-06-16 PROCEDURE — 99999 PR NO CHARGE: CPT | Performed by: FAMILY MEDICINE

## 2022-06-16 PROCEDURE — 0241U HCHG SARS-COV-2 COVID-19 NFCT DS RESP RNA 4 TRGT MIC: CPT

## 2022-06-16 PROCEDURE — 82962 GLUCOSE BLOOD TEST: CPT

## 2022-06-16 PROCEDURE — 85025 COMPLETE CBC W/AUTO DIFF WBC: CPT

## 2022-06-16 PROCEDURE — 73030 X-RAY EXAM OF SHOULDER: CPT | Mod: LT

## 2022-06-16 PROCEDURE — C9803 HOPD COVID-19 SPEC COLLECT: HCPCS | Performed by: EMERGENCY MEDICINE

## 2022-06-16 PROCEDURE — 82010 KETONE BODYS QUAN: CPT

## 2022-06-16 PROCEDURE — 99285 EMERGENCY DEPT VISIT HI MDM: CPT

## 2022-06-16 PROCEDURE — 84484 ASSAY OF TROPONIN QUANT: CPT

## 2022-06-16 PROCEDURE — 72125 CT NECK SPINE W/O DYE: CPT

## 2022-06-16 RX ORDER — ONDANSETRON 4 MG/1
4 TABLET, ORALLY DISINTEGRATING ORAL EVERY 4 HOURS PRN
Status: DISCONTINUED | OUTPATIENT
Start: 2022-06-16 | End: 2022-06-18 | Stop reason: HOSPADM

## 2022-06-16 RX ORDER — HYDRALAZINE HYDROCHLORIDE 20 MG/ML
10 INJECTION INTRAMUSCULAR; INTRAVENOUS EVERY 4 HOURS PRN
Status: DISCONTINUED | OUTPATIENT
Start: 2022-06-16 | End: 2022-06-18 | Stop reason: HOSPADM

## 2022-06-16 RX ORDER — PROCHLORPERAZINE EDISYLATE 5 MG/ML
5-10 INJECTION INTRAMUSCULAR; INTRAVENOUS EVERY 4 HOURS PRN
Status: DISCONTINUED | OUTPATIENT
Start: 2022-06-16 | End: 2022-06-18 | Stop reason: HOSPADM

## 2022-06-16 RX ORDER — DIPHENHYDRAMINE HCL 25 MG
50 TABLET ORAL
Status: DISCONTINUED | OUTPATIENT
Start: 2022-06-16 | End: 2022-06-18 | Stop reason: HOSPADM

## 2022-06-16 RX ORDER — ONDANSETRON 2 MG/ML
4 INJECTION INTRAMUSCULAR; INTRAVENOUS ONCE
Status: DISPENSED | OUTPATIENT
Start: 2022-06-16 | End: 2022-06-17

## 2022-06-16 RX ORDER — ACETAMINOPHEN 325 MG/1
650 TABLET ORAL EVERY 6 HOURS PRN
Status: DISCONTINUED | OUTPATIENT
Start: 2022-06-16 | End: 2022-06-18 | Stop reason: HOSPADM

## 2022-06-16 RX ORDER — ZOLPIDEM TARTRATE 10 MG/1
10 TABLET ORAL
Status: SHIPPED | COMMUNITY
End: 2022-08-22 | Stop reason: SDUPTHER

## 2022-06-16 RX ORDER — AMITRIPTYLINE HYDROCHLORIDE 75 MG/1
150 TABLET ORAL NIGHTLY PRN
Status: DISCONTINUED | OUTPATIENT
Start: 2022-06-16 | End: 2022-06-18 | Stop reason: HOSPADM

## 2022-06-16 RX ORDER — SODIUM CHLORIDE, SODIUM LACTATE, POTASSIUM CHLORIDE, CALCIUM CHLORIDE 600; 310; 30; 20 MG/100ML; MG/100ML; MG/100ML; MG/100ML
1000 INJECTION, SOLUTION INTRAVENOUS ONCE
Status: COMPLETED | OUTPATIENT
Start: 2022-06-16 | End: 2022-06-16

## 2022-06-16 RX ORDER — OXYCODONE HYDROCHLORIDE 10 MG/1
10 TABLET ORAL
Status: DISCONTINUED | OUTPATIENT
Start: 2022-06-16 | End: 2022-06-17

## 2022-06-16 RX ORDER — OXYCODONE HYDROCHLORIDE 5 MG/1
5 TABLET ORAL
Status: DISCONTINUED | OUTPATIENT
Start: 2022-06-16 | End: 2022-06-17

## 2022-06-16 RX ORDER — DOXYCYCLINE HYCLATE 100 MG
100 TABLET ORAL 2 TIMES DAILY
Status: ON HOLD | COMMUNITY
Start: 2022-05-27 | End: 2022-06-29

## 2022-06-16 RX ORDER — GABAPENTIN 400 MG/1
800 CAPSULE ORAL 3 TIMES DAILY
Status: DISCONTINUED | OUTPATIENT
Start: 2022-06-16 | End: 2022-06-18 | Stop reason: HOSPADM

## 2022-06-16 RX ORDER — DEXTROSE MONOHYDRATE 25 G/50ML
25 INJECTION, SOLUTION INTRAVENOUS
Status: DISCONTINUED | OUTPATIENT
Start: 2022-06-16 | End: 2022-06-18 | Stop reason: HOSPADM

## 2022-06-16 RX ORDER — INSULIN LISPRO 100 [IU]/ML
2-9 INJECTION, SOLUTION INTRAVENOUS; SUBCUTANEOUS
Status: DISCONTINUED | OUTPATIENT
Start: 2022-06-16 | End: 2022-06-18 | Stop reason: HOSPADM

## 2022-06-16 RX ORDER — POLYETHYLENE GLYCOL 3350 17 G/17G
1 POWDER, FOR SOLUTION ORAL
Status: DISCONTINUED | OUTPATIENT
Start: 2022-06-16 | End: 2022-06-18 | Stop reason: HOSPADM

## 2022-06-16 RX ORDER — BISACODYL 10 MG
10 SUPPOSITORY, RECTAL RECTAL
Status: DISCONTINUED | OUTPATIENT
Start: 2022-06-16 | End: 2022-06-18 | Stop reason: HOSPADM

## 2022-06-16 RX ORDER — FLUTICASONE PROPIONATE 110 UG/1
1 AEROSOL, METERED RESPIRATORY (INHALATION)
Status: DISCONTINUED | OUTPATIENT
Start: 2022-06-16 | End: 2022-06-18 | Stop reason: HOSPADM

## 2022-06-16 RX ORDER — PROMETHAZINE HYDROCHLORIDE 25 MG/1
12.5-25 TABLET ORAL EVERY 4 HOURS PRN
Status: DISCONTINUED | OUTPATIENT
Start: 2022-06-16 | End: 2022-06-18 | Stop reason: HOSPADM

## 2022-06-16 RX ORDER — ONDANSETRON 2 MG/ML
4 INJECTION INTRAMUSCULAR; INTRAVENOUS EVERY 4 HOURS PRN
Status: DISCONTINUED | OUTPATIENT
Start: 2022-06-16 | End: 2022-06-18 | Stop reason: HOSPADM

## 2022-06-16 RX ORDER — HYDROMORPHONE HYDROCHLORIDE 1 MG/ML
0.5 INJECTION, SOLUTION INTRAMUSCULAR; INTRAVENOUS; SUBCUTANEOUS
Status: DISCONTINUED | OUTPATIENT
Start: 2022-06-16 | End: 2022-06-17

## 2022-06-16 RX ORDER — ENOXAPARIN SODIUM 100 MG/ML
40 INJECTION SUBCUTANEOUS DAILY
Status: DISCONTINUED | OUTPATIENT
Start: 2022-06-16 | End: 2022-06-16

## 2022-06-16 RX ORDER — CLINDAMYCIN HYDROCHLORIDE 150 MG/1
300 CAPSULE ORAL 3 TIMES DAILY
Status: SHIPPED | COMMUNITY
Start: 2022-05-15 | End: 2022-06-28

## 2022-06-16 RX ORDER — SODIUM CHLORIDE 9 MG/ML
INJECTION, SOLUTION INTRAVENOUS CONTINUOUS
Status: DISCONTINUED | OUTPATIENT
Start: 2022-06-16 | End: 2022-06-17

## 2022-06-16 RX ORDER — PROMETHAZINE HYDROCHLORIDE 25 MG/1
12.5-25 SUPPOSITORY RECTAL EVERY 4 HOURS PRN
Status: DISCONTINUED | OUTPATIENT
Start: 2022-06-16 | End: 2022-06-18 | Stop reason: HOSPADM

## 2022-06-16 RX ORDER — INSULIN LISPRO 100 [IU]/ML
0.2 INJECTION, SOLUTION INTRAVENOUS; SUBCUTANEOUS
Status: DISCONTINUED | OUTPATIENT
Start: 2022-06-16 | End: 2022-06-18 | Stop reason: HOSPADM

## 2022-06-16 RX ORDER — ZOLPIDEM TARTRATE 5 MG/1
10 TABLET ORAL NIGHTLY PRN
Status: DISCONTINUED | OUTPATIENT
Start: 2022-06-16 | End: 2022-06-18 | Stop reason: HOSPADM

## 2022-06-16 RX ORDER — IPRATROPIUM BROMIDE AND ALBUTEROL SULFATE 2.5; .5 MG/3ML; MG/3ML
3 SOLUTION RESPIRATORY (INHALATION)
Status: DISCONTINUED | OUTPATIENT
Start: 2022-06-16 | End: 2022-06-18 | Stop reason: HOSPADM

## 2022-06-16 RX ORDER — AMOXICILLIN 250 MG
2 CAPSULE ORAL 2 TIMES DAILY
Status: DISCONTINUED | OUTPATIENT
Start: 2022-06-16 | End: 2022-06-18 | Stop reason: HOSPADM

## 2022-06-16 RX ADMIN — GABAPENTIN 800 MG: 400 CAPSULE ORAL at 17:56

## 2022-06-16 RX ADMIN — PROMETHAZINE HYDROCHLORIDE 25 MG: 25 TABLET ORAL at 21:45

## 2022-06-16 RX ADMIN — HYDROMORPHONE HYDROCHLORIDE 0.5 MG: 1 INJECTION, SOLUTION INTRAMUSCULAR; INTRAVENOUS; SUBCUTANEOUS at 23:05

## 2022-06-16 RX ADMIN — APIXABAN 5 MG: 5 TABLET, FILM COATED ORAL at 17:56

## 2022-06-16 RX ADMIN — SODIUM CHLORIDE, POTASSIUM CHLORIDE, SODIUM LACTATE AND CALCIUM CHLORIDE 1000 ML: 600; 310; 30; 20 INJECTION, SOLUTION INTRAVENOUS at 15:57

## 2022-06-16 RX ADMIN — SODIUM CHLORIDE: 9 INJECTION, SOLUTION INTRAVENOUS at 17:57

## 2022-06-16 RX ADMIN — OXYCODONE HYDROCHLORIDE 10 MG: 10 TABLET ORAL at 21:45

## 2022-06-16 RX ADMIN — INSULIN LISPRO 8 UNITS: 100 INJECTION, SOLUTION INTRAVENOUS; SUBCUTANEOUS at 17:53

## 2022-06-16 RX ADMIN — HYDROMORPHONE HYDROCHLORIDE 0.5 MG: 1 INJECTION, SOLUTION INTRAMUSCULAR; INTRAVENOUS; SUBCUTANEOUS at 19:39

## 2022-06-16 RX ADMIN — HYDROMORPHONE HYDROCHLORIDE 0.5 MG: 1 INJECTION, SOLUTION INTRAMUSCULAR; INTRAVENOUS; SUBCUTANEOUS at 16:06

## 2022-06-16 RX ADMIN — ONDANSETRON 4 MG: 4 TABLET, ORALLY DISINTEGRATING ORAL at 15:58

## 2022-06-16 RX ADMIN — INSULIN LISPRO 2 UNITS: 100 INJECTION, SOLUTION INTRAVENOUS; SUBCUTANEOUS at 17:54

## 2022-06-16 RX ADMIN — ACETAMINOPHEN 650 MG: 325 TABLET ORAL at 15:59

## 2022-06-16 ASSESSMENT — LIFESTYLE VARIABLES
HAVE YOU EVER FELT YOU SHOULD CUT DOWN ON YOUR DRINKING: NO
EVER FELT BAD OR GUILTY ABOUT YOUR DRINKING: NO
TOTAL SCORE: 0
TOTAL SCORE: 0
ON A TYPICAL DAY WHEN YOU DRINK ALCOHOL HOW MANY DRINKS DO YOU HAVE: 0
DO YOU DRINK ALCOHOL: YES
HAVE PEOPLE ANNOYED YOU BY CRITICIZING YOUR DRINKING: NO
HOW MANY TIMES IN THE PAST YEAR HAVE YOU HAD 5 OR MORE DRINKS IN A DAY: 0
AVERAGE NUMBER OF DAYS PER WEEK YOU HAVE A DRINK CONTAINING ALCOHOL: 0
CONSUMPTION TOTAL: INCOMPLETE
HAVE YOU EVER FELT YOU SHOULD CUT DOWN ON YOUR DRINKING: NO
EVER HAD A DRINK FIRST THING IN THE MORNING TO STEADY YOUR NERVES TO GET RID OF A HANGOVER: NO
EVER HAD A DRINK FIRST THING IN THE MORNING TO STEADY YOUR NERVES TO GET RID OF A HANGOVER: NO
TOTAL SCORE: 0
EVER FELT BAD OR GUILTY ABOUT YOUR DRINKING: NO
TOTAL SCORE: 0
HAVE PEOPLE ANNOYED YOU BY CRITICIZING YOUR DRINKING: NO
CONSUMPTION TOTAL: NEGATIVE
TOTAL SCORE: 0
ALCOHOL_USE: NO
TOTAL SCORE: 0

## 2022-06-16 ASSESSMENT — PATIENT HEALTH QUESTIONNAIRE - PHQ9
1. LITTLE INTEREST OR PLEASURE IN DOING THINGS: NOT AT ALL
2. FEELING DOWN, DEPRESSED, IRRITABLE, OR HOPELESS: NOT AT ALL
1. LITTLE INTEREST OR PLEASURE IN DOING THINGS: NOT AT ALL
SUM OF ALL RESPONSES TO PHQ9 QUESTIONS 1 AND 2: 0
2. FEELING DOWN, DEPRESSED, IRRITABLE, OR HOPELESS: NOT AT ALL
SUM OF ALL RESPONSES TO PHQ9 QUESTIONS 1 AND 2: 0

## 2022-06-16 ASSESSMENT — ENCOUNTER SYMPTOMS
MYALGIAS: 1
LOSS OF CONSCIOUSNESS: 1
NECK PAIN: 0
SHORTNESS OF BREATH: 1
NAUSEA: 1
FALLS: 1
BACK PAIN: 0

## 2022-06-16 ASSESSMENT — PAIN DESCRIPTION - PAIN TYPE: TYPE: ACUTE PAIN;CHRONIC PAIN

## 2022-06-16 ASSESSMENT — FIBROSIS 4 INDEX
FIB4 SCORE: 2.7
FIB4 SCORE: 2.77
FIB4 SCORE: 2.7

## 2022-06-16 NOTE — ED PROVIDER NOTES
"ED Provider Note    Scribed for Clayton Cardoza M.D. by Mau Manrique. 6/16/2022, 11:18 AM.    Primary care provider: Hanny Wills M.D.  Means of arrival: Walk-In  History obtained from: Patient  History limited by: None noted    CHIEF COMPLAINT  Chief Complaint   Patient presents with   • Syncope     Pt reports \"I fell, passed out, landed on my ear and head\", around 0500 this morning.  Pt takes Eliquis.   • Chest Pain     Pt reports onset chest pain last night, intermittent, L sided, lasting half a minute.   • Shoulder Pain     L sided shoulder pain s/p fall       HPI  Benito Persaud is a 57 y.o. male with a history of 2x Strokes, 2x PE, 6x DVT, and esophageal varices who presents to the Emergency Department via EMS as a code TBI for evaluation of chest pain onset yesterday. The patient endorses loss of consciousness, left shoulder pain, nausea, hoarse voice, and shortness of breath, but denies any head pain or spinal pain. No alleviating or exacerbating factors were noted. He describes standing up from his motorized wheelchair when he fell and hit his head on a metal bar. He notes that he is anticoagulated with Eliquis. He describes beginning his Eliquis due to 2 prior Pulmonary Embolisms and 6 prior DVTs. He states that he has recently been undergoing chemotherapy for testicular cancer. He states that he is being worked up for mild dysplastic syndrome and he has a filter in place following his DVTs.    REVIEW OF SYSTEMS  Review of Systems   Respiratory: Positive for shortness of breath.    Gastrointestinal: Positive for nausea.   Musculoskeletal: Positive for falls and myalgias. Negative for back pain and neck pain.   Neurological: Positive for loss of consciousness.   All other systems reviewed and are negative.      PAST MEDICAL HISTORY   has a past medical history of Asthma, Cancer ([facility]) (11/01/2016), and MI (myocardial infarction) ([facility]) (2019).    SURGICAL HISTORY   has a past surgical history " "that includes cath removal (N/A, 8/14/2019) and irrigation & debridement general (N/A, 5/25/2022).    SOCIAL HISTORY  Social History     Tobacco Use   • Smoking status: Never Smoker   • Smokeless tobacco: Current User     Types: Chew   Vaping Use   • Vaping Use: Never used   Substance Use Topics   • Alcohol use: Not Currently   • Drug use: Not Currently      Social History     Substance and Sexual Activity   Drug Use Not Currently       FAMILY HISTORY  Family History   Problem Relation Age of Onset   • Cancer Mother    • Psychiatric Illness Mother    • Diabetes Mother    • Hypertension Mother    • Hyperlipidemia Mother    • Arterial Aneurysm Father    • Heart Disease Maternal Grandmother        CURRENT MEDICATIONS  Current medications can be seen on the nurse's note.      ALLERGIES  Allergies   Allergen Reactions   • Green Beans Anaphylaxis   • Iodine Anaphylaxis   • Keflex Diarrhea and Vomiting     Vomiting & Diarrhea  Tolerates ceftriaxone   • Reglan [Metoclopramide] Nausea     Asthma     • Shellfish Allergy Anaphylaxis   • Toradol Hives       PHYSICAL EXAM  VITAL SIGNS: BP (!) 166/107   Pulse (!) 126   Temp 37.1 °C (98.8 °F) (Temporal)   Resp 16   Ht 1.803 m (5' 11\")   Wt 117 kg (258 lb)   SpO2 95%   BMI 35.98 kg/m²   Vitals reviewed.  Constitutional: Awake alert nontoxic mild distress.   HENT: Normocephalic, dried blood in the head, Bilateral external ears normal, Oropharynx moist, No oral exudates, Nose normal.   Eyes: PERRL, EOMI, Conjunctiva normal, No discharge.   Neck: Normal range of motion, No tenderness  Cardiovascular: Normal heart rate, Normal rhythm, No murmurs, No rubs, No gallops.   Thorax & Lungs: Normal breath sounds, No respiratory distress, No wheezing  Abdomen: Bowel sounds normal, Soft, No tenderness  Skin: Warm, Dry, No erythema, No rash.   Back: No tenderness, No CVA tenderness.   Musculoskeletal: Good range of motion in all major joints.  Tenderness of the left glenohumeral joint and " scapular area.  Neurologic: Alert, No focal deficits noted.   Psychiatric: Affect normal    LABS  Results for orders placed or performed during the hospital encounter of 06/16/22   CBC WITH DIFFERENTIAL   Result Value Ref Range    WBC 6.2 4.8 - 10.8 K/uL    RBC 5.56 4.70 - 6.10 M/uL    Hemoglobin 16.7 14.0 - 18.0 g/dL    Hematocrit 47.0 42.0 - 52.0 %    MCV 84.5 81.4 - 97.8 fL    MCH 30.0 27.0 - 33.0 pg    MCHC 35.5 (H) 33.7 - 35.3 g/dL    RDW 41.9 35.9 - 50.0 fL    Platelet Count 108 (L) 164 - 446 K/uL    MPV 9.2 9.0 - 12.9 fL    Neutrophils-Polys 65.40 44.00 - 72.00 %    Lymphocytes 24.10 22.00 - 41.00 %    Monocytes 8.90 0.00 - 13.40 %    Eosinophils 0.80 0.00 - 6.90 %    Basophils 0.20 0.00 - 1.80 %    Immature Granulocytes 0.60 0.00 - 0.90 %    Nucleated RBC 0.00 /100 WBC    Neutrophils (Absolute) 4.04 1.82 - 7.42 K/uL    Lymphs (Absolute) 1.49 1.00 - 4.80 K/uL    Monos (Absolute) 0.55 0.00 - 0.85 K/uL    Eos (Absolute) 0.05 0.00 - 0.51 K/uL    Baso (Absolute) 0.01 0.00 - 0.12 K/uL    Immature Granulocytes (abs) 0.04 0.00 - 0.11 K/uL    NRBC (Absolute) 0.00 K/uL   COMP METABOLIC PANEL   Result Value Ref Range    Sodium 135 135 - 145 mmol/L    Potassium 4.0 3.6 - 5.5 mmol/L    Chloride 103 96 - 112 mmol/L    Co2 16 (L) 20 - 33 mmol/L    Anion Gap 16.0 7.0 - 16.0    Glucose 318 (H) 65 - 99 mg/dL    Bun 14 8 - 22 mg/dL    Creatinine 1.09 0.50 - 1.40 mg/dL    Calcium 9.1 8.5 - 10.5 mg/dL    AST(SGOT) 25 12 - 45 U/L    ALT(SGPT) 23 2 - 50 U/L    Alkaline Phosphatase 150 (H) 30 - 99 U/L    Total Bilirubin 0.6 0.1 - 1.5 mg/dL    Albumin 4.2 3.2 - 4.9 g/dL    Total Protein 7.7 6.0 - 8.2 g/dL    Globulin 3.5 1.9 - 3.5 g/dL    A-G Ratio 1.2 g/dL   TROPONIN   Result Value Ref Range    Troponin T 11 6 - 19 ng/L   CoV-2, FLU A/B, and RSV by PCR (2-4 Hours CEPHEID) : Collect NP swab in VTM    Specimen: Respirate   Result Value Ref Range    Influenza virus A RNA Negative Negative    Influenza virus B, PCR Negative Negative     RSV, PCR Negative Negative    SARS-CoV-2 by PCR NotDetected     SARS-CoV-2 Source NP Swab    proBrain Natriuretic Peptide, NT   Result Value Ref Range    NT-proBNP 46 0 - 125 pg/mL   BETA-HYDROXYBUTYRIC ACID   Result Value Ref Range    beta-Hydroxybutyric Acid 0.18 0.02 - 0.27 mmol/L   ESTIMATED GFR   Result Value Ref Range    GFR (CKD-EPI) 80 >60 mL/min/1.73 m 2   EKG   Result Value Ref Range    Report       [facility].    Test Date:  2022  Pt Name:    ZINA SALGUERO                 Department: ER  MRN:        [id number]                      Room:  Gender:     Male                         Technician: [id number]  :        1965                   Requested By:ER TRIAGE PROTOCOL  Order #:    [id number]                    Reading MD:    Measurements  Intervals                                Axis  Rate:       127                          P:          48  TX:         172                          QRS:        63  QRSD:       74                           T:          13  QT:         292  QTc:        425    Interpretive Statements  SINUS TACHYCARDIA  Compared to ECG 2022 21:37:27  Sinus rhythm no longer present      All labs reviewed by me.    EKG Interpretation  Results for orders placed or performed during the hospital encounter of 22   EKG   Result Value Ref Range    Report       [facility] Emergency Dept.    Test Date:  2022  Pt Name:    ZINA SALGUERO                 Department: ER  MRN:        [id number]                      Room:       T204  Gender:     Male                         Technician: [id number]  :        1965                   Requested By:ER TRIAGE PROTOCOL  Order #:    [id number]                    Reading MD: JESUSITA MINER. AMD    Measurements  Intervals                                Axis  Rate:       127                          P:          48  TX:         172                          QRS:        63  QRSD:       74                            T:          13  QT:         292  QTc:        425    Interpretive Statements  SINUS TACHYCARDIA  Compared to ECG 04/26/2022 21:37:27  Sinus rhythm no longer present  Electronically Signed On 6- 17:14:04 PDT by JESUSITA MINER. [facility]        RADIOLOGY  DX-SCAPULA LEFT   Final Result      No evidence of acute fracture      DX-SHOULDER 2+ LEFT   Final Result      No radiographic evidence of acute traumatic injury.      DX-CHEST-PORTABLE (1 VIEW)   Final Result      Interstitial prominence likely reflects pulmonary edema. Atypical pneumonia might also have this appearance.      CT-HEAD W/O   Final Result      No acute intracranial abnormality.         CT-CSPINE WITHOUT PLUS RECONS   Final Result      CT of the cervical spine without contrast within normal limits. No acute findings.        The radiologist's interpretation of all radiological studies have been reviewed by me.    COURSE & MEDICAL DECISION MAKING  Pertinent Labs & Imaging studies reviewed. (See chart for details)    Chart is reviewed for baseline labs and previous work-up for comparison.  Including imaging, hospitalization records and labs.     11:18 AM Patient seen and examined at charge desk. The patient presents as a code TBI for evaluation of chest pain onset yesterday, and the differential diagnosis includes but is not limited to dehydration, arrhythmia, vasovagal syncope, hypoglycemia, symptomatic anemia, and ACS.. Ordered for CT-Head w/o, CT-CSpine w/o plus, CBC w/diff, CMP, Troponin, and EKG to evaluate.      Patient some chest pain judgment EKG is nonspecific and troponin is reassuring.  Chest x-ray shows a questionable inflammatory change but his BNP is fortunately normal.  The patient has hyperglycemia and a low CO2 but he has a normal beta hydroxybutyrate no signs of DKA.    The patient does not have signs of sepsis.  He has tenderness of the left shoulder and scapula where he fell but there is no evidence of fracture on his  imaging.    Viral testing for COVID and influenza are pending.    The patient is on chronic anticoagulation he did hit his head.  Head CT and neck CT are negative.  The patient requires further work-up and treatment for syncope.  This may be related to fluid losses related to hyperglycemia.  Discussed case with the hospitalist and patient be hospitalized for further work-up and treatment.    12:09 PM - Patient was reevaluated at bedside. Performed physical exam at this time. Ordered for DX-Scapula Left, DX-Chest, DX-Shoulder, and CoV-2 Flu A/B and RSV PCRto evaluate.    2:16 PM - Paged Hospitalist.    2:19 PM - I discussed the patient's case and the above findings with Dr. De La Torre (Hospitalist) who agrees to evaluate the patient for hospitalization. The patient will be medicated with LR infusion for his symptoms.           DISPOSITION:  Patient will be hospitalized by Dr. De La Torre (Hospitalist) in guarded condition.     FINAL IMPRESSION  1. Syncope, unspecified syncope type          I, Mau Manrique (Scribjonny), am scribing for, and in the presence of, Clayton Cardoza M.D..    Electronically signed by: Mau Manrique (Scribe), 6/16/2022    IClayton M.D. personally performed the services described in this documentation, as scribed by Mau Manrique in my presence, and it is both accurate and complete. C.    The note accurately reflects work and decisions made by me.  Clayton Cardoza M.D.  6/16/2022  5:15 PM

## 2022-06-16 NOTE — H&P
[facility] MEDICINE HISTORY AND PHYSICAL     PATIENT ID:  NAME:  Benito Persaud  MRN:               [id number]  YOB: 1965    Date of Admission:   6/16/2022     Attending:   Paz Cordova MD    Resident:   Lilo Gaspar MD    Primary Care Physician:    Hanny Wills MD    CC:    Syncopal fall    HPI:   Benito Persaud is a 57 y.o. 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 Patient is a poor historian and was also limited by his nausea. Patient described nausea and emesis over the last few days, which seems consistent with the story given during his 6/8 ER visit. Patient also stated he did not want to come to the ER, but his colleagues insisted. 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 Patient currently complains of severe pain in his left shoulder and is requesting pain meds.    ERCourse:  VS notable for tachycardia (confirmed on EKG as sinus tachycardia), and mild hypertension.   Labs show mild metabolic acidosis with bicarb of 16, and thrombocytopenia of 108. Labs otherwise normal.  CT head without acute abnormality  X-Ray imaging of left scapula ans houlder including without acute traumatic injury  CoV-2 Flu A/B, and RSV negative  1L LR bolus given    REVIEW OF SYSTEMS:   Ten systems reviewed and were negative except as noted in the HPI.                PAST MEDICAL HISTORY:  Past Medical History:   Diagnosis Date   • Asthma    • Cancer (HCC) 11/01/2016   • MI (myocardial infarction) (HCC) 2019       PAST SURGICAL HISTORY:  Past Surgical History:   Procedure Laterality Date   • IRRIGATION & DEBRIDEMENT GENERAL N/A 5/25/2022    Procedure: IRRIGATION AND DEBRIDEMENT, WOUND-FACIAL ABSCESS;  Surgeon: Adolph Ortiz M.D.;  Location:  SURGERY SAME DAY [facility];  Service: Ent   • CATH REMOVAL N/A 8/14/2019    Procedure: REMOVAL, CATHETER AND PLACEMENT OF POWER PORT;  Surgeon: Sonny Enamorado M.D.;  Location: SURGERY [facility];  Service: General       FAMILY HISTORY:  Family History   Problem Relation Age of Onset   • Cancer Mother    • Psychiatric Illness Mother    • Diabetes Mother    • Hypertension Mother    • Hyperlipidemia Mother    • Arterial Aneurysm Father    • Heart Disease Maternal Grandmother        SOCIAL HISTORY:   Living situation: Lives at [facility], like a group home. Uses a wheel chair due to extreme vertigo since CVA.   Tobacco: Daily chew tobacco, but has been cutting back significantly. Denies any cigarette use  Alcohol: Denies  Recreational drugs (illegal and prescription): Denies  Employment:   Primary Care Provider: ANDREA JACKSON    DIET:   Orders Placed This Encounter   Procedures   • Diet Order Diet: Consistent CHO (Diabetic)     Standing Status:   Standing     Number of Occurrences:   1     Order Specific Question:   Diet:     Answer:   Consistent CHO (Diabetic) [4]       ALLERGIES:  Allergies   Allergen Reactions   • Green Beans Anaphylaxis   • Iodine Anaphylaxis   • Keflex Diarrhea and Vomiting     Vomiting & Diarrhea  Tolerates ceftriaxone   • Reglan [Metoclopramide] Nausea     Asthma     • Shellfish Allergy Anaphylaxis   • Toradol Hives       OUTPATIENT MEDICATIONS:    Current Facility-Administered Medications:   •  ondansetron (ZOFRAN) syringe/vial injection 4 mg, 4 mg, Intravenous, Once, Clayton Cardoza M.D.  •  senna-docusate (PERICOLACE or SENOKOT S) 8.6-50 MG per tablet 2 Tablet, 2 Tablet, Oral, BID **AND** polyethylene glycol/lytes (MIRALAX) PACKET 1 Packet, 1 Packet, Oral, QDAY PRN **AND** magnesium hydroxide (MILK OF MAGNESIA) suspension 30 mL, 30 mL, Oral, QDAY PRN **AND** bisacodyl (DULCOLAX) suppository 10 mg, 10 mg, Rectal, QDAY PRN, Lilo Gaspar M.D.  •  NS infusion, , Intravenous,  Continuous, Lilo Gaspar M.D.  •  enoxaparin (Lovenox) inj 40 mg, 40 mg, Subcutaneous, DAILY AT 1800, Lilo Gaspar M.D.  •  acetaminophen (Tylenol) tablet 650 mg, 650 mg, Oral, Q6HRS PRN, Lilo Gaspar M.D., 650 mg at 06/16/22 1559  •  Notify provider if pain remains uncontrolled, , , CONTINUOUS **AND** Use the Numeric Rating Scale (NRS), Tai-Baker Faces (WBF), or FLACC on regular floors and Critical-Care Pain Observation Tool (CPOT) on ICUs/Trauma to assess pain, , , CONTINUOUS **AND** Pulse Ox, , , CONTINUOUS **AND** Pharmacy Consult Request ...Pain Management Review 1 Each, 1 Each, Other, PHARMACY TO DOSE **AND** If patient difficult to arouse and/or has respiratory depression (respiratory rate of 10 or less), stop any opiates that are currently infusing and call a Rapid Response., , , CONTINUOUS, Lilo Gaspar M.D.  •  oxyCODONE immediate-release (ROXICODONE) tablet 5 mg, 5 mg, Oral, Q3HRS PRN **OR** oxyCODONE immediate release (ROXICODONE) tablet 10 mg, 10 mg, Oral, Q3HRS PRN **OR** HYDROmorphone (Dilaudid) injection 0.5 mg, 0.5 mg, Intravenous, Q3HRS PRN, Lilo Gaspar M.D.  •  hydrALAZINE (APRESOLINE) injection 10 mg, 10 mg, Intravenous, Q4HRS PRN, Lilo Gaspar M.D.  •  ondansetron (ZOFRAN) syringe/vial injection 4 mg, 4 mg, Intravenous, Q4HRS PRN, Lilo Gaspar M.D.  •  ondansetron (ZOFRAN ODT) dispertab 4 mg, 4 mg, Oral, Q4HRS PRN, Lilo Gaspar M.D., 4 mg at 06/16/22 1558  •  promethazine (PHENERGAN) tablet 12.5-25 mg, 12.5-25 mg, Oral, Q4HRS PRN, Lilo Gaspar M.D.  •  promethazine (PHENERGAN) suppository 12.5-25 mg, 12.5-25 mg, Rectal, Q4HRS PRN, Lilo Gaspar M.D.  •  prochlorperazine (COMPAZINE) injection 5-10 mg, 5-10 mg, Intravenous, Q4HRS PRN, Lilo Gaspar M.D.  •  insulin GLARGINE (Lantus,Semglee) injection, 0.2 Units/kg/day, Subcutaneous, Q EVENING **AND** insulin lispro (AdmeLOG,HumaLOG)  injection, 0.2 Units/kg/day, Subcutaneous, TID AC **AND** insulin lispro (AdmeLOG,HumaLOG) injection, 2-9 Units, Subcutaneous, 4X/DAY ACHS **AND** POC blood glucose manual result, , , Q AC AND BEDTIME(S) **AND** NOTIFY MD and PharmD, , , Once **AND** Administer 20 grams of glucose (approximately 8 ounces of fruit juice) every 15 minutes PRN FSBG less than 70 mg/dL, , , PRN **AND** dextrose 50% (D50W) injection 25 g, 25 g, Intravenous, Q15 MIN PRN, Lilo Gaspar M.D.    Current Outpatient Medications:   •  omeprazole (PRILOSEC) 20 MG delayed-release capsule, Take 20 mg by mouth 2 times a day., Disp: , Rfl:   •  zolpidem (AMBIEN) 10 MG Tab, Take 10 mg by mouth at bedtime as needed for Sleep., Disp: , Rfl:   •  metFORMIN (GLUCOPHAGE) 500 MG Tab, Take 1,000 mg by mouth 2 times a day with meals., Disp: , Rfl:   •  clindamycin (CLEOCIN) 150 MG Cap, Take 300 mg by mouth 3 times a day., Disp: , Rfl:   •  doxycycline (VIBRAMYCIN) 100 MG Tab, Take 100 mg by mouth 2 times a day. 10 day course, Disp: , Rfl:   •  ondansetron (ZOFRAN ODT) 4 MG TABLET DISPERSIBLE, Take 1 Tablet by mouth every 8 hours as needed for Nausea., Disp: 10 Tablet, Rfl: 0  •  acetaminophen (TYLENOL) 500 MG Tab, Take 500-1,000 mg by mouth every 6 hours as needed for Mild Pain., Disp: , Rfl:   •  fluticasone (FLOVENT HFA) 110 MCG/ACT Aerosol, Inhale 1 Puff 2 times a day., Disp: 1 Each, Rfl: 2  •  gabapentin (NEURONTIN) 800 MG tablet, TAKE 1 TABLET BY MOUTH THREE TIMES A DAY (Patient taking differently: Take 800 mg by mouth 3 times a day.), Disp: 90 Tablet, Rfl: 3  •  COMBIVENT RESPIMAT  MCG/ACT Aero Soln, INHALE 1 PUFF BY MOUTH 4 TIMES A DAY (Patient taking differently: Inhale 1 Puff 4 times a day.), Disp: 1 Each, Rfl: 5  •  amitriptyline (ELAVIL) 100 MG Tab, TAKE 1.5 TABLETS BY MOUTH AT BEDTIME AS NEEDED FOR SLEEP., Disp: 30 Tablet, Rfl: 3  •  BANOPHEN 50 MG Cap, TAKE 1 CAPSULE BY MOUTH AT BEDTIME AS NEEDED FOR SLEEP (Patient taking  differently: Take 50 mg by mouth at bedtime as needed.), Disp: 30 Capsule, Rfl: 2  •  ELIQUIS 5 MG Tab, TAKE 1 TABLET BY MOUTH TWICE A DAY (Patient taking differently: Take 5 mg by mouth 2 times a day.), Disp: 60 Tablet, Rfl: 3    PHYSICAL EXAM:  Vitals:    22 1059 22 1200 22 1204 22 1443   BP:  (!) 157/84 (!) 123/90 (!) 144/91   Pulse:  (!) 105 (!) 102 99   Resp:  (!) 32  18   Temp:       TempSrc:       SpO2:  95% 97% 91%   Weight: 117 kg (258 lb)      Height:       , Temp (24hrs), Av.1 °C (98.8 °F), Min:37.1 °C (98.8 °F), Max:37.1 °C (98.8 °F)  , Pulse Oximetry: 91 %      General: Pt resting in NAD, in mild distress, labored breathing, appears nauseous  Skin:  Pink, warm and dry.  No rashes  HEENT: NC/AT. PERRL. EOMI. MMM. No nasal discharge. Oropharynx nonerythematous without exudate/plaques  Neck:  Supple without lymphadenopathy or rigidity. No JVD   Lungs:  Symmetrical.  CTAB with no W/R/R.  Good air movement   Cardiovascular:  Tachycardic, regular rhythm without M/R/G.  Abdomen:  Abdomen is soft NT/ND. +BS. No masses noted.  Extremities:  Full range of motion. No gross deformities noted. 2+ pulses in all extremities. No C/C/E         LAB TESTS:   Recent Labs     22  1151   WBC 6.2   RBC 5.56   HEMOGLOBIN 16.7   HEMATOCRIT 47.0   MCV 84.5   MCH 30.0   RDW 41.9   PLATELETCT 108*   MPV 9.2   NEUTSPOLYS 65.40   LYMPHOCYTES 24.10   MONOCYTES 8.90   EOSINOPHILS 0.80   BASOPHILS 0.20         Recent Labs     22  1151   SODIUM 135   POTASSIUM 4.0   CHLORIDE 103   CO2 16*   BUN 14   CREATININE 1.09   CALCIUM 9.1   ALBUMIN 4.2       CULTURES:   Results     Procedure Component Value Units Date/Time    CoV-2, FLU A/B, and RSV by PCR (2-4 Hours CEPHEID) : Collect NP swab in VTM [[id number]] Collected: 22 1255    Order Status: Completed Specimen: Respirate Updated: 22 1434     Influenza virus A RNA Negative     Influenza virus B, PCR Negative     RSV, PCR Negative      "SARS-CoV-2 by PCR NotDetected     Comment: PATIENTS: Important information regarding your results and instructions can  be found at [email or web address]   \"After your  Covid-19 Test\"    [facility] providers: PLEASE REFER TO DE-ESCALATION AND RETESTING PROTOCOL  on [email or web address]    **The [employer] GeneXpert Xpress SARS-CoV-2 RT-PCR Test has been made  available for use under the Emergency Use Authorization (EUA) only.          SARS-CoV-2 Source NP Swab          IMAGING:   DX-SCAPULA LEFT   Final Result      No evidence of acute fracture      DX-SHOULDER 2+ LEFT   Final Result      No radiographic evidence of acute traumatic injury.      DX-CHEST-PORTABLE (1 VIEW)   Final Result      Interstitial prominence likely reflects pulmonary edema. Atypical pneumonia might also have this appearance.      CT-HEAD W/O   Final Result      No acute intracranial abnormality.         CT-CSPINE WITHOUT PLUS RECONS   Final Result      CT of the cervical spine without contrast within normal limits. No acute findings.          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    # Syncope  # Head trauma  # Left shoulder trauma  - EKG wnl  - CT head wnl  - Shoulder pain, X-Ray scapula and shoulder wnl  - Pain meds ordered PRN  - Likely secondary to dehydration from viral illness with associated nausea, diarrhea  - Continue IVF  - Monitor overnight on obs with telemetry    # Metabolic acidosis, bicarb 16  - As above, continue IVF  - Secondary to likely viral illness. Covid, Flu, RSV neg  - PRN anti-emetics  - BMP tomorrow AM    # Thrombocytopenia  # Myelodysplastic syndrome  - Platelet count of 108, improved from 67 one month ago.  - " Consulted with Dr. Pearson earlier this month, who determined his MDS (if present) to be low-grade. Deferred treatment at this time.     # T2DM, last A1c 7.8  - Diabetic diet ordered  - SSI with hypoglycemic protocol    # Bates's Esophagus  - continue omeprazole BID    # Asthma   - Continue inhalers    # Hx of DVTs  - Continue Eliquis     # Insomnia  Patient takes benadryl, amitriptyline, and ambien every night for sleep.  - continue sleep aid medications      Core Measures:  Lines: PIV  Diet: Diabetic  Abx: None  DVT Ppx: Apixaban  GI Ppx: Prilosec  PCP: Dr. Wills  CODE STATUS: Full    Dispo: Admit to obs with telemetry for syncope.    Lilo Gaspar M.D.  PGY-2  [facility]

## 2022-06-16 NOTE — ED TRIAGE NOTES
"Benito Persaud 57 y.o. male to triage via personal motorized w/c for     Chief Complaint   Patient presents with   • Syncope     Pt reports \"I fell, passed out, landed on my ear and head\", around 0500 this morning.  Pt takes Eliquis.   • Chest Pain     Pt reports onset chest pain last night, intermittent, L sided, lasting half a minute.   • Shoulder Pain     L sided shoulder pain s/p fall     Pt to charge desk for TBI.  BP (!) 166/107   Pulse (!) 126   Temp 37.1 °C (98.8 °F) (Temporal)   Resp 16   Ht 1.803 m (5' 11\")   Wt 117 kg (258 lb)   SpO2 95%   BMI 35.98 kg/m²     "

## 2022-06-16 NOTE — ED NOTES
Med rec completed per pt   Allergies reviewed    Pt completed a course of Clindamycin and Doxycycline a few weeks ago

## 2022-06-17 PROBLEM — M25.50 ACUTE JOINT PAIN: Status: ACTIVE | Noted: 2022-06-17

## 2022-06-17 LAB
ANION GAP SERPL CALC-SCNC: 12 MMOL/L (ref 7–16)
BUN SERPL-MCNC: 10 MG/DL (ref 8–22)
CALCIUM SERPL-MCNC: 8.8 MG/DL (ref 8.5–10.5)
CHLORIDE SERPL-SCNC: 103 MMOL/L (ref 96–112)
CO2 SERPL-SCNC: 22 MMOL/L (ref 20–33)
CREAT SERPL-MCNC: 1.15 MG/DL (ref 0.5–1.4)
GFR SERPLBLD CREATININE-BSD FMLA CKD-EPI: 75 ML/MIN/1.73 M 2
GLUCOSE BLD STRIP.AUTO-MCNC: 129 MG/DL (ref 65–99)
GLUCOSE BLD STRIP.AUTO-MCNC: 195 MG/DL (ref 65–99)
GLUCOSE BLD STRIP.AUTO-MCNC: 215 MG/DL (ref 65–99)
GLUCOSE BLD STRIP.AUTO-MCNC: 220 MG/DL (ref 65–99)
GLUCOSE BLD STRIP.AUTO-MCNC: 244 MG/DL (ref 65–99)
GLUCOSE SERPL-MCNC: 252 MG/DL (ref 65–99)
POTASSIUM SERPL-SCNC: 3.8 MMOL/L (ref 3.6–5.5)
SODIUM SERPL-SCNC: 137 MMOL/L (ref 135–145)

## 2022-06-17 PROCEDURE — 700105 HCHG RX REV CODE 258: Performed by: STUDENT IN AN ORGANIZED HEALTH CARE EDUCATION/TRAINING PROGRAM

## 2022-06-17 PROCEDURE — A9270 NON-COVERED ITEM OR SERVICE: HCPCS | Performed by: HOSPITALIST

## 2022-06-17 PROCEDURE — 82962 GLUCOSE BLOOD TEST: CPT

## 2022-06-17 PROCEDURE — A9270 NON-COVERED ITEM OR SERVICE: HCPCS | Performed by: STUDENT IN AN ORGANIZED HEALTH CARE EDUCATION/TRAINING PROGRAM

## 2022-06-17 PROCEDURE — 96372 THER/PROPH/DIAG INJ SC/IM: CPT

## 2022-06-17 PROCEDURE — 94640 AIRWAY INHALATION TREATMENT: CPT

## 2022-06-17 PROCEDURE — 700102 HCHG RX REV CODE 250 W/ 637 OVERRIDE(OP): Performed by: HOSPITALIST

## 2022-06-17 PROCEDURE — 700102 HCHG RX REV CODE 250 W/ 637 OVERRIDE(OP): Performed by: STUDENT IN AN ORGANIZED HEALTH CARE EDUCATION/TRAINING PROGRAM

## 2022-06-17 PROCEDURE — 80048 BASIC METABOLIC PNL TOTAL CA: CPT

## 2022-06-17 PROCEDURE — G0378 HOSPITAL OBSERVATION PER HR: HCPCS

## 2022-06-17 PROCEDURE — 96375 TX/PRO/DX INJ NEW DRUG ADDON: CPT

## 2022-06-17 PROCEDURE — 99225 PR SUBSEQUENT OBSERVATION CARE,LEVEL II: CPT | Performed by: HOSPITALIST

## 2022-06-17 PROCEDURE — 96376 TX/PRO/DX INJ SAME DRUG ADON: CPT

## 2022-06-17 PROCEDURE — 94760 N-INVAS EAR/PLS OXIMETRY 1: CPT

## 2022-06-17 PROCEDURE — 700101 HCHG RX REV CODE 250: Performed by: HOSPITALIST

## 2022-06-17 PROCEDURE — 700111 HCHG RX REV CODE 636 W/ 250 OVERRIDE (IP): Performed by: STUDENT IN AN ORGANIZED HEALTH CARE EDUCATION/TRAINING PROGRAM

## 2022-06-17 PROCEDURE — 700101 HCHG RX REV CODE 250: Performed by: STUDENT IN AN ORGANIZED HEALTH CARE EDUCATION/TRAINING PROGRAM

## 2022-06-17 RX ORDER — LIDOCAINE 50 MG/G
1 PATCH TOPICAL EVERY 24 HOURS
Status: DISCONTINUED | OUTPATIENT
Start: 2022-06-17 | End: 2022-06-18 | Stop reason: HOSPADM

## 2022-06-17 RX ORDER — OXYCODONE HYDROCHLORIDE 5 MG/1
5 TABLET ORAL EVERY 4 HOURS PRN
Status: DISCONTINUED | OUTPATIENT
Start: 2022-06-17 | End: 2022-06-18 | Stop reason: HOSPADM

## 2022-06-17 RX ORDER — OMEPRAZOLE 20 MG/1
40 CAPSULE, DELAYED RELEASE ORAL EVERY EVENING
Status: DISCONTINUED | OUTPATIENT
Start: 2022-06-17 | End: 2022-06-18 | Stop reason: HOSPADM

## 2022-06-17 RX ORDER — OXYCODONE HYDROCHLORIDE 10 MG/1
10 TABLET ORAL EVERY 4 HOURS PRN
Status: DISCONTINUED | OUTPATIENT
Start: 2022-06-17 | End: 2022-06-18 | Stop reason: HOSPADM

## 2022-06-17 RX ADMIN — PROMETHAZINE HYDROCHLORIDE 25 MG: 25 TABLET ORAL at 15:23

## 2022-06-17 RX ADMIN — ZOLPIDEM TARTRATE 10 MG: 5 TABLET ORAL at 00:26

## 2022-06-17 RX ADMIN — ONDANSETRON HYDROCHLORIDE 4 MG: 2 SOLUTION INTRAMUSCULAR; INTRAVENOUS at 23:14

## 2022-06-17 RX ADMIN — INSULIN LISPRO 2 UNITS: 100 INJECTION, SOLUTION INTRAVENOUS; SUBCUTANEOUS at 22:54

## 2022-06-17 RX ADMIN — SODIUM CHLORIDE: 9 INJECTION, SOLUTION INTRAVENOUS at 09:02

## 2022-06-17 RX ADMIN — OMEPRAZOLE 40 MG: 20 CAPSULE, DELAYED RELEASE ORAL at 00:26

## 2022-06-17 RX ADMIN — DIPHENHYDRAMINE HYDROCHLORIDE 50 MG: 25 TABLET ORAL at 00:27

## 2022-06-17 RX ADMIN — IPRATROPIUM BROMIDE AND ALBUTEROL SULFATE 3 ML: 2.5; .5 SOLUTION RESPIRATORY (INHALATION) at 19:23

## 2022-06-17 RX ADMIN — FLUTICASONE PROPIONATE 110 MCG: 110 AEROSOL, METERED RESPIRATORY (INHALATION) at 19:24

## 2022-06-17 RX ADMIN — FLUTICASONE PROPIONATE 110 MCG: 110 AEROSOL, METERED RESPIRATORY (INHALATION) at 09:04

## 2022-06-17 RX ADMIN — IPRATROPIUM BROMIDE AND ALBUTEROL SULFATE 3 ML: 2.5; .5 SOLUTION RESPIRATORY (INHALATION) at 09:34

## 2022-06-17 RX ADMIN — ONDANSETRON HYDROCHLORIDE 4 MG: 2 SOLUTION INTRAMUSCULAR; INTRAVENOUS at 17:24

## 2022-06-17 RX ADMIN — GABAPENTIN 800 MG: 400 CAPSULE ORAL at 17:21

## 2022-06-17 RX ADMIN — OXYCODONE HYDROCHLORIDE 10 MG: 10 TABLET ORAL at 19:47

## 2022-06-17 RX ADMIN — APIXABAN 5 MG: 5 TABLET, FILM COATED ORAL at 17:21

## 2022-06-17 RX ADMIN — GABAPENTIN 800 MG: 400 CAPSULE ORAL at 12:30

## 2022-06-17 RX ADMIN — ONDANSETRON HYDROCHLORIDE 4 MG: 2 SOLUTION INTRAMUSCULAR; INTRAVENOUS at 09:00

## 2022-06-17 RX ADMIN — INSULIN LISPRO 8 UNITS: 100 INJECTION, SOLUTION INTRAVENOUS; SUBCUTANEOUS at 17:32

## 2022-06-17 RX ADMIN — ZOLPIDEM TARTRATE 10 MG: 5 TABLET ORAL at 22:54

## 2022-06-17 RX ADMIN — INSULIN LISPRO 8 UNITS: 100 INJECTION, SOLUTION INTRAVENOUS; SUBCUTANEOUS at 09:18

## 2022-06-17 RX ADMIN — LIDOCAINE 1 PATCH: 50 PATCH TOPICAL at 12:30

## 2022-06-17 RX ADMIN — INSULIN LISPRO 3 UNITS: 100 INJECTION, SOLUTION INTRAVENOUS; SUBCUTANEOUS at 17:26

## 2022-06-17 RX ADMIN — INSULIN LISPRO 3 UNITS: 100 INJECTION, SOLUTION INTRAVENOUS; SUBCUTANEOUS at 09:14

## 2022-06-17 RX ADMIN — AMITRIPTYLINE HYDROCHLORIDE 150 MG: 75 TABLET, FILM COATED ORAL at 00:27

## 2022-06-17 RX ADMIN — OXYCODONE HYDROCHLORIDE 10 MG: 10 TABLET ORAL at 09:13

## 2022-06-17 RX ADMIN — INSULIN LISPRO 8 UNITS: 100 INJECTION, SOLUTION INTRAVENOUS; SUBCUTANEOUS at 12:32

## 2022-06-17 RX ADMIN — OXYCODONE HYDROCHLORIDE 10 MG: 10 TABLET ORAL at 15:23

## 2022-06-17 RX ADMIN — OMEPRAZOLE 40 MG: 20 CAPSULE, DELAYED RELEASE ORAL at 17:33

## 2022-06-17 RX ADMIN — GABAPENTIN 800 MG: 400 CAPSULE ORAL at 09:12

## 2022-06-17 RX ADMIN — APIXABAN 5 MG: 5 TABLET, FILM COATED ORAL at 09:13

## 2022-06-17 ASSESSMENT — ENCOUNTER SYMPTOMS
GASTROINTESTINAL NEGATIVE: 1
RESPIRATORY NEGATIVE: 1
CARDIOVASCULAR NEGATIVE: 1
CHILLS: 0
FEVER: 0
PSYCHIATRIC NEGATIVE: 1
HEADACHES: 1
WEIGHT LOSS: 0
MYALGIAS: 1
EYES NEGATIVE: 1
WEAKNESS: 1

## 2022-06-17 ASSESSMENT — PAIN SCALES - PAIN ASSESSMENT IN ADVANCED DEMENTIA (PAINAD): BREATHING: NORMAL

## 2022-06-17 ASSESSMENT — PAIN DESCRIPTION - PAIN TYPE
TYPE: ACUTE PAIN
TYPE: OTHER (COMMENT)

## 2022-06-17 NOTE — PROGRESS NOTES
Assumed care of patient @ 1900.   Assessment completed. POC discussed with pt.   A/Ox4, VSS, room air.   Pt resting in bed with call light and belongings within reach. Bed locked and in lowest position.   Needs met at this time.    Which one seems to work best for her with her drug allergies?

## 2022-06-17 NOTE — PROGRESS NOTES
4 Eyes Skin Assessment Completed by CARLOS ALBERTO Beltran and CARLOS ALBERTO Castillo.    Head WDL  Ears WDL  Nose WDL  Mouth WDL  Neck WDL  Breast/Chest WDL  Shoulder Blades WDL  Spine WDL  (R) Arm/Elbow/Hand WDL  (L) Arm/Elbow/Hand WDL  Abdomen WDL  Groin WDL  Scrotum/Coccyx/Buttocks WDL  (R) Leg WDL  (L) Leg WDL  (R) Heel/Foot/Toe WDL  (L) Heel/Foot/Toe WDL          Devices In Places Tele Box      Interventions In Place N/A    Possible Skin Injury No    Pictures Uploaded Into Epic N/A  Wound Consult Placed N/A  RN Wound Prevention Protocol Ordered No

## 2022-06-17 NOTE — DISCHARGE PLANNING
Care Transition Team Assessment    Spoke with patient and friend Yamilka at bedside with verbal consent from patient. Lives alone in single story apartment on 3rd floor. Building has elevator. PCP Hanny Wills. Has [employer] insurance. Uses [facility] on [address]. Will take self home in W/C at D/C. Anticipate no needs @ presnt time.    Information Source  Orientation Level: Oriented X4  Information Given By: Patient    Readmission Evaluation  Is this a readmission?: No    Interdisciplinary Discharge Planning  Primary Care Physician: Hanny Wills  Lives with - Patient's Self Care Capacity: Alone and Able to Care For Self  Patient or legal guardian wants to designate a caregiver: No  Support Systems: Friends / Neighbors  Housing / Facility: 1 Story Apartment / Condo  Do You Take your Prescribed Medications Regularly: Yes  Able to Return to Previous ADL's: Yes  Mobility Issues: Yes  Prior Services: Home-Independent  Patient Prefers to be Discharged to:: Home  Assistance Needed: No  Durable Medical Equipment: Other - Specify (Motorized scooter/W/C)    Discharge Preparedness  What are your discharge supports?: Other (comment) (Friend Yamilka)  Prior Functional Level: Uses Wheelchair    Functional Assesment  Prior Functional Level: Uses Wheelchair    Finances  Prescription Coverage: Yes    Anticipated Discharge Information  Discharge Disposition: Discharged to home/self care (01)  Discharge Address: [address]  Discharge Contact Phone Number: [phone]

## 2022-06-17 NOTE — PROGRESS NOTES
Hospital Medicine Daily Progress Note    Date of Service  6/17/2022    Chief Complaint  Benito Persaud is a 57 y.o. male admitted 6/16/2022 with a fall    Hospital Course  No notes on file    Interval Problem Update  Patient still complaining of headache and left shoulder pain    Patient updated about negative CT of the head and negative imaging of left shoulder.    Patient feels that is unsafe for him to be at home at this time and his current state    I have discussed this patient's plan of care and discharge plan at IDT rounds today with Case Management, Nursing, Nursing leadership, and other members of the IDT team.    Consultants/Specialty      Code Status  Full Code    Disposition  Patient is not medically cleared for discharge.   Anticipate discharge to to home with close outpatient follow-up.  I have placed the appropriate orders for post-discharge needs.    Review of Systems  Review of Systems   Constitutional: Positive for malaise/fatigue. Negative for chills, fever and weight loss.   HENT: Negative.    Eyes: Negative.    Respiratory: Negative.    Cardiovascular: Negative.    Gastrointestinal: Negative.    Musculoskeletal: Positive for joint pain and myalgias.   Neurological: Positive for weakness and headaches.   Endo/Heme/Allergies: Negative.    Psychiatric/Behavioral: Negative.         Physical Exam  Temp:  [35.8 °C (96.4 °F)-36.8 °C (98.2 °F)] 36.3 °C (97.4 °F)  Pulse:  [75-99] 93  Resp:  [16-20] 16  BP: (123-171)/(70-97) 141/97  SpO2:  [91 %-97 %] 93 %    Physical Exam  Constitutional:       General: He is not in acute distress.     Appearance: He is obese. He is not toxic-appearing.   HENT:      Nose: Nose normal.   Eyes:      Extraocular Movements: Extraocular movements intact.      Pupils: Pupils are equal, round, and reactive to light.   Cardiovascular:      Rate and Rhythm: Normal rate and regular rhythm.      Pulses: Normal pulses.   Pulmonary:      Effort: No respiratory distress.       Breath sounds: No rales.   Abdominal:      General: There is distension.      Tenderness: There is no abdominal tenderness.   Musculoskeletal:         General: No swelling or deformity. Normal range of motion.      Cervical back: Normal range of motion.   Skin:     General: Skin is warm.      Coloration: Skin is not jaundiced.      Findings: No bruising.   Neurological:      Motor: Weakness present.   Psychiatric:         Mood and Affect: Mood normal.         Fluids  No intake or output data in the 24 hours ending 06/17/22 1242    Laboratory  Recent Labs     06/16/22  1151   WBC 6.2   RBC 5.56   HEMOGLOBIN 16.7   HEMATOCRIT 47.0   MCV 84.5   MCH 30.0   MCHC 35.5*   RDW 41.9   PLATELETCT 108*   MPV 9.2     Recent Labs     06/16/22  1151 06/17/22  0610   SODIUM 135 137   POTASSIUM 4.0 3.8   CHLORIDE 103 103   CO2 16* 22   GLUCOSE 318* 252*   BUN 14 10   CREATININE 1.09 1.15   CALCIUM 9.1 8.8                   Imaging  DX-SCAPULA LEFT   Final Result      No evidence of acute fracture      DX-SHOULDER 2+ LEFT   Final Result      No radiographic evidence of acute traumatic injury.      DX-CHEST-PORTABLE (1 VIEW)   Final Result      Interstitial prominence likely reflects pulmonary edema. Atypical pneumonia might also have this appearance.      CT-HEAD W/O   Final Result      No acute intracranial abnormality.         CT-CSPINE WITHOUT PLUS RECONS   Final Result      CT of the cervical spine without contrast within normal limits. No acute findings.           Assessment/Plan  * Syncope and collapse- (present on admission)  Assessment & Plan  Etiology unclear but work-up negative    Currently patient is complaining of headache and left shoulder pain related to the fall.    Continue pain management    Acute joint pain- (present on admission)  Assessment & Plan  Related to his recent fall    Would avoid all IV narcotics    Oral analgesia with oxycodone       VTE prophylaxis: SCDs/TEDs    I have performed a physical exam and  reviewed and updated ROS and Plan today (6/17/2022). In review of yesterday's note (6/16/2022), there are no changes except as documented above.

## 2022-06-17 NOTE — CARE PLAN
Problem: Bronchoconstriction  Goal: Improve in air movement and diminished wheezing  Description: Target End Date:  2 to 3 days    1.  Implement inhaled treatments  2.  Evaluate and manage medication effects  Flowsheets (Taken 6/17/2022 1520)  Bronchodilator Goals/Outcome: Patient at Stable Baseline  Bronchodilator Indications: History / Diagnosis       Respiratory Update    Treatment modality: svn  Frequency: q6    Pt tolerating current treatments well with no adverse reactions.

## 2022-06-17 NOTE — CARE PLAN
Problem: Pain - Standard  Goal: Alleviation of pain or a reduction in pain to the patient’s comfort goal  Outcome: Progressing     Problem: Knowledge Deficit - Standard  Goal: Patient and family/care givers will demonstrate understanding of plan of care, disease process/condition, diagnostic tests and medications  Outcome: Progressing     Problem: Skin Integrity  Goal: Skin integrity is maintained or improved  Outcome: Progressing     Problem: Fall Risk  Goal: Patient will remain free from falls  Outcome: Progressing   The patient is Watcher - Medium risk of patient condition declining or worsening         Progress made toward(s) clinical / shift goals:  Pt's pain is better controlled.    Patient is not progressing towards the following goals: N/A

## 2022-06-17 NOTE — PROGRESS NOTES
Assessment completed.  Pt A&Ox4.  Pt complains of 9/10 shoulder, back, and head pain, medicated per MAR. Pt medicated for nausea. Audible wheezing heard, RT notified. POC discussed; communication board updated.    Bed in locked, lowest position.  Call light and belongings within reach.  Needs met.

## 2022-06-17 NOTE — CARE PLAN
The patient is Stable - Low risk of patient condition declining or worsening    Shift Goals  Clinical Goals: pain management  Patient Goals: pain management    Progress made toward(s) clinical / shift goals:    Problem: Pain - Standard  Goal: Alleviation of pain or a reduction in pain to the patient’s comfort goal  Outcome: Progressing     Problem: Knowledge Deficit - Standard  Goal: Patient and family/care givers will demonstrate understanding of plan of care, disease process/condition, diagnostic tests and medications  Outcome: Progressing     Problem: Skin Integrity  Goal: Skin integrity is maintained or improved  Outcome: Progressing     Problem: Fall Risk  Goal: Patient will remain free from falls  Outcome: Progressing

## 2022-06-17 NOTE — CARE PLAN
Problem: Pain - Standard  Goal: Alleviation of pain or a reduction in pain to the patient’s comfort goal  Outcome: Progressing     Problem: Fall Risk  Goal: Patient will remain free from falls  Outcome: Progressing   The patient is Stable - Low risk of patient condition declining or worsening    Shift Goals  Clinical Goals: monitor chest pain, pain management  Patient Goals: pain control, rest    Progress made toward(s) clinical / shift goals:  Pain management, monitor chest pain    Patient is not progressing towards the following goals:

## 2022-06-17 NOTE — ASSESSMENT & PLAN NOTE
Etiology unclear but work-up negative    Currently patient is complaining of headache and left shoulder pain related to the fall.    Continue pain management

## 2022-06-18 ENCOUNTER — PHARMACY VISIT (OUTPATIENT)
Dept: PHARMACY | Facility: MEDICAL CENTER | Age: 57
End: 2022-06-18
Payer: COMMERCIAL

## 2022-06-18 VITALS
TEMPERATURE: 98.6 F | HEART RATE: 78 BPM | WEIGHT: 260.14 LBS | DIASTOLIC BLOOD PRESSURE: 76 MMHG | OXYGEN SATURATION: 92 % | HEIGHT: 71 IN | BODY MASS INDEX: 36.42 KG/M2 | SYSTOLIC BLOOD PRESSURE: 141 MMHG | RESPIRATION RATE: 18 BRPM

## 2022-06-18 PROBLEM — J45.20 MILD INTERMITTENT ASTHMA WITHOUT COMPLICATION: Status: RESOLVED | Noted: 2021-11-09 | Resolved: 2022-06-18

## 2022-06-18 LAB — GLUCOSE BLD STRIP.AUTO-MCNC: 247 MG/DL (ref 65–99)

## 2022-06-18 PROCEDURE — 700102 HCHG RX REV CODE 250 W/ 637 OVERRIDE(OP): Performed by: STUDENT IN AN ORGANIZED HEALTH CARE EDUCATION/TRAINING PROGRAM

## 2022-06-18 PROCEDURE — RXMED WILLOW AMBULATORY MEDICATION CHARGE: Performed by: HOSPITALIST

## 2022-06-18 PROCEDURE — 96372 THER/PROPH/DIAG INJ SC/IM: CPT

## 2022-06-18 PROCEDURE — A9270 NON-COVERED ITEM OR SERVICE: HCPCS | Performed by: HOSPITALIST

## 2022-06-18 PROCEDURE — 94760 N-INVAS EAR/PLS OXIMETRY 1: CPT

## 2022-06-18 PROCEDURE — 700102 HCHG RX REV CODE 250 W/ 637 OVERRIDE(OP): Performed by: HOSPITALIST

## 2022-06-18 PROCEDURE — 94640 AIRWAY INHALATION TREATMENT: CPT

## 2022-06-18 PROCEDURE — 99217 PR OBSERVATION CARE DISCHARGE: CPT | Performed by: HOSPITALIST

## 2022-06-18 PROCEDURE — 82962 GLUCOSE BLOOD TEST: CPT

## 2022-06-18 PROCEDURE — A9270 NON-COVERED ITEM OR SERVICE: HCPCS | Performed by: STUDENT IN AN ORGANIZED HEALTH CARE EDUCATION/TRAINING PROGRAM

## 2022-06-18 PROCEDURE — 700101 HCHG RX REV CODE 250: Performed by: STUDENT IN AN ORGANIZED HEALTH CARE EDUCATION/TRAINING PROGRAM

## 2022-06-18 PROCEDURE — G0378 HOSPITAL OBSERVATION PER HR: HCPCS

## 2022-06-18 PROCEDURE — 700111 HCHG RX REV CODE 636 W/ 250 OVERRIDE (IP): Performed by: HOSPITALIST

## 2022-06-18 RX ORDER — HEPARIN SODIUM (PORCINE) LOCK FLUSH IV SOLN 100 UNIT/ML 100 UNIT/ML
300-500 SOLUTION INTRAVENOUS PRN
Status: COMPLETED | OUTPATIENT
Start: 2022-06-18 | End: 2022-06-18

## 2022-06-18 RX ORDER — OXYCODONE HYDROCHLORIDE 5 MG/1
5 TABLET ORAL EVERY 6 HOURS PRN
Qty: 16 TABLET | Refills: 0 | Status: SHIPPED | OUTPATIENT
Start: 2022-06-18 | End: 2022-06-22

## 2022-06-18 RX ADMIN — HEPARIN 500 UNITS: 100 SYRINGE at 13:14

## 2022-06-18 RX ADMIN — INSULIN LISPRO 8 UNITS: 100 INJECTION, SOLUTION INTRAVENOUS; SUBCUTANEOUS at 07:18

## 2022-06-18 RX ADMIN — INSULIN LISPRO 3 UNITS: 100 INJECTION, SOLUTION INTRAVENOUS; SUBCUTANEOUS at 07:20

## 2022-06-18 RX ADMIN — OXYCODONE HYDROCHLORIDE 10 MG: 10 TABLET ORAL at 00:02

## 2022-06-18 RX ADMIN — ZOLPIDEM TARTRATE 10 MG: 5 TABLET ORAL at 00:02

## 2022-06-18 RX ADMIN — DIPHENHYDRAMINE HYDROCHLORIDE 50 MG: 25 TABLET ORAL at 00:01

## 2022-06-18 RX ADMIN — APIXABAN 5 MG: 5 TABLET, FILM COATED ORAL at 07:08

## 2022-06-18 RX ADMIN — GABAPENTIN 800 MG: 400 CAPSULE ORAL at 07:08

## 2022-06-18 RX ADMIN — IPRATROPIUM BROMIDE AND ALBUTEROL SULFATE 3 ML: 2.5; .5 SOLUTION RESPIRATORY (INHALATION) at 02:29

## 2022-06-18 RX ADMIN — OXYCODONE HYDROCHLORIDE 10 MG: 10 TABLET ORAL at 04:17

## 2022-06-18 RX ADMIN — AMITRIPTYLINE HYDROCHLORIDE 150 MG: 75 TABLET, FILM COATED ORAL at 00:02

## 2022-06-18 RX ADMIN — IPRATROPIUM BROMIDE AND ALBUTEROL SULFATE 3 ML: 2.5; .5 SOLUTION RESPIRATORY (INHALATION) at 06:32

## 2022-06-18 ASSESSMENT — PAIN SCALES - PAIN ASSESSMENT IN ADVANCED DEMENTIA (PAINAD)
BODYLANGUAGE: RELAXED
FACIALEXPRESSION: SMILING OR INEXPRESSIVE
TOTALSCORE: 0
CONSOLABILITY: NO NEED TO CONSOLE
BREATHING: NORMAL

## 2022-06-18 ASSESSMENT — PAIN DESCRIPTION - PAIN TYPE: TYPE: ACUTE PAIN

## 2022-06-18 NOTE — CARE PLAN
Problem: Pain - Standard  Goal: Alleviation of pain or a reduction in pain to the patient’s comfort goal  Outcome: Progressing     Problem: Skin Integrity  Goal: Skin integrity is maintained or improved  Outcome: Progressing     Problem: Fall Risk  Goal: Patient will remain free from falls  Outcome: Progressing     The patient is Stable - Low risk of patient condition declining or worsening    Shift Goals  Clinical Goals: pain management, d/c  Patient Goals: rest, pain management    Progress made toward(s) clinical / shift goals:  pain management, d/c    Patient is not progressing towards the following goals:

## 2022-06-18 NOTE — PROGRESS NOTES
Patient tolerating PO pain medication at this time. Pt experienced 1 episode of nausea, no vomiting, quickly relieved by prn medication. Patient states that he is ok aside from the pain.

## 2022-06-18 NOTE — PROGRESS NOTES
Assessment completed. Pt A&Ox4. Patient complaining of 10/10 head pain. Medicated per MAR. Spoke with patient regarding shift goals, especially pain management & use of PO medication vs IV medication. The patient expresses frustration toward not having IV pain medication available. Education provided regarding alternative interventions for pain management: Ice pack, positioning, quite environment.  Bed in locked, lowest position.  Call light and belongings within reach.  Needs met.

## 2022-06-18 NOTE — PROGRESS NOTES
Patient discharged. Patient education on discharge paperwork. Patient verbalizes understanding. Patient's heart monitor removed. Patient's left chest port de-accessed and heparin locked. Patient is AAO x4.Patient in no acute distress. Patient left hospital safely in own wheelchair.

## 2022-06-19 NOTE — DISCHARGE SUMMARY
"Discharge Summary    CHIEF COMPLAINT ON ADMISSION  Chief Complaint   Patient presents with   • Syncope     Pt reports \"I fell, passed out, landed on my ear and head\", around 0500 this morning.  Pt takes Eliquis.   • Chest Pain     Pt reports onset chest pain last night, intermittent, L sided, lasting half a minute.   • Shoulder Pain     L sided shoulder pain s/p fall       Reason for Admission  Chest Pain     Admission Date  6/16/2022    CODE STATUS  Prior    HPI & HOSPITAL COURSE  As per unr h+p    Benito Persaud is a 57 y.o. 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 Patient is a poor historian and was also limited by his nausea. Patient described nausea and emesis over the last few days, which seems consistent with the story given during his 6/8 ER visit. Patient also stated he did not want to come to the ER, but his colleagues insisted. 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 Patient currently complains of severe pain in his left shoulder and is requesting pain meds.    Patient was hospitalized and given pain management with oxycodone and IV morphine    His imaging did not show any evidence of any intracranial abnormality or left shoulder abnormality    .  Patient given referral protocol for his history of asthma.    Patient slowly improved and was stable for discharge on 6/18/2022  Therefore, he is discharged in good and stable condition to home with close outpatient follow-up.    The patient recovered much more quickly than anticipated on admission.    Discharge Date  6/18/2022    FOLLOW UP ITEMS POST DISCHARGE      DISCHARGE DIAGNOSES  Principal Problem:    Syncope and collapse POA: Yes  Active Problems:    History of testicular cancer POA: Yes    " Myelodysplastic syndrome (HCC) POA: Yes    History of pulmonary embolism POA: Yes    Type 2 diabetes mellitus, without long-term current use of insulin (HCC) POA: Yes    Peripheral vascular disease (HCC) POA: Yes    Syncope POA: Yes    Acute joint pain POA: Yes  Resolved Problems:    Mild intermittent asthma without complication POA: Yes      FOLLOW UP  Future Appointments   Date Time Provider Department Center   6/22/2022  2:00 PM Hanny Wills M.D. [facility]   7/25/2022  9:00 AM Astrida Portland, PT, DPT [address] [place] [address]   8/15/2022  9:00 AM Astrida Portland, PT, DPT [facility] [place] [address]   8/18/2022  9:00 AM Astrida Rivas, PT, DPT [address] [place] [address]   8/22/2022  9:00 AM Astrida Portland, PT, DPT [facility] [place] [address]   8/25/2022  9:00 AM Astrida Portland, PT, DPT [address] [place] [address]   8/29/2022  9:00 AM Astrida Rivas, PT, DPT [address] [place] [address]   9/1/2022  9:00 AM Astrida Rivas, PT, DPT [facility] [place] [address]   9/6/2022  9:00 AM Astrida Portland, PT, DPT [address] [place] [address]   9/8/2022  9:00 AM Astrida Rivas, PT, DPT [facility] [place] [address]   12/7/2022 10:00 AM [address]   12/7/2022 10:30 AM [address]   12/12/2022  8:00 AM HOMA Saavedra None     No follow-up provider specified.    MEDICATIONS ON DISCHARGE     Medication List      START taking these medications      Instructions   oxyCODONE immediate-release 5 MG Tabs  Commonly known as: ROXICODONE   Take 1 Tablet by mouth every 6 hours as needed for Severe Pain for up to 4 days.  Dose: 5 mg        CHANGE how you take these medications      Instructions   Banophen 50 MG Caps  What changed:   · how much to take  · when to take this  · reasons to take this  Generic drug: diphenhydrAMINE   TAKE 1 CAPSULE BY MOUTH AT BEDTIME AS NEEDED FOR SLEEP     Eliquis 5mg Tabs  What changed: how much to take  Generic drug: apixaban   TAKE 1 TABLET BY MOUTH TWICE A DAY     gabapentin 800 MG tablet  What changed: when to take  this  Commonly known as: NEURONTIN   TAKE 1 TABLET BY MOUTH THREE TIMES A DAY        CONTINUE taking these medications      Instructions   acetaminophen 500 MG Tabs  Commonly known as: TYLENOL   Take 500-1,000 mg by mouth every 6 hours as needed for Mild Pain.  Dose: 500-1,000 mg     amitriptyline 100 MG Tabs  Commonly known as: ELAVIL   TAKE 1.5 TABLETS BY MOUTH AT BEDTIME AS NEEDED FOR SLEEP.  Dose: 150 mg     clindamycin 150 MG Caps  Commonly known as: CLEOCIN   Take 300 mg by mouth 3 times a day.  Dose: 300 mg     Combivent Respimat  MCG/ACT Aers  Generic drug: ipratropium-albuterol   INHALE 1 PUFF BY MOUTH 4 TIMES A DAY     doxycycline 100 MG Tabs  Commonly known as: VIBRAMYCIN   Take 100 mg by mouth 2 times a day. 10 day course  Dose: 100 mg     Flovent  MCG/ACT Aero  Generic drug: fluticasone   Inhale 1 Puff 2 times a day.  Dose: 1 Puff     metFORMIN 500 MG Tabs  Commonly known as: GLUCOPHAGE   Take 1,000 mg by mouth 2 times a day with meals.  Dose: 1,000 mg     omeprazole 20 MG delayed-release capsule  Commonly known as: PRILOSEC   Take 20 mg by mouth 2 times a day.  Dose: 20 mg     ondansetron 4 MG Tbdp  Commonly known as: Zofran ODT   Take 1 Tablet by mouth every 8 hours as needed for Nausea.  Dose: 4 mg     zolpidem 10 MG Tabs  Commonly known as: AMBIEN   Take 10 mg by mouth at bedtime as needed for Sleep.  Dose: 10 mg            Allergies  Allergies   Allergen Reactions   • Green Beans Anaphylaxis   • Iodine Anaphylaxis   • Keflex Diarrhea and Vomiting     Vomiting & Diarrhea  Tolerates ceftriaxone   • Reglan [Metoclopramide] Nausea     Asthma     • Shellfish Allergy Anaphylaxis   • Toradol Hives       DIET  No orders of the defined types were placed in this encounter.      ACTIVITY  As tolerated.  Weight bearing as tolerated    CONSULTATIONS      PROCEDURES      LABORATORY  Lab Results   Component Value Date    SODIUM 137 06/17/2022    POTASSIUM 3.8 06/17/2022    CHLORIDE 103 06/17/2022     CO2 22 06/17/2022    GLUCOSE 252 (H) 06/17/2022    BUN 10 06/17/2022    CREATININE 1.15 06/17/2022        Lab Results   Component Value Date    WBC 6.2 06/16/2022    HEMOGLOBIN 16.7 06/16/2022    HEMATOCRIT 47.0 06/16/2022    PLATELETCT 108 (L) 06/16/2022        Total time of the discharge process exceeds 38  minutes.

## 2022-06-20 ENCOUNTER — PATIENT OUTREACH (OUTPATIENT)
Dept: HEALTH INFORMATION MANAGEMENT | Facility: OTHER | Age: 57
End: 2022-06-20
Payer: MEDICAID

## 2022-06-20 NOTE — PROGRESS NOTES
CHW Vinson called the patient to follow up after DC from the hospital. Pt reports that he has been in and out of the hospital quite frequently but is feeling a bit better.   Patient does not have any reported barriers to housing, transportation, or food. Pt is aware of his upcoming appointments and will be attending.    Plan: CHW will follow up next week.

## 2022-06-22 ENCOUNTER — OFFICE VISIT (OUTPATIENT)
Dept: MEDICAL GROUP | Facility: CLINIC | Age: 57
End: 2022-06-22
Payer: MEDICAID

## 2022-06-22 VITALS
DIASTOLIC BLOOD PRESSURE: 87 MMHG | SYSTOLIC BLOOD PRESSURE: 127 MMHG | HEIGHT: 71 IN | WEIGHT: 256 LBS | RESPIRATION RATE: 12 BRPM | BODY MASS INDEX: 35.84 KG/M2 | OXYGEN SATURATION: 93 % | HEART RATE: 114 BPM

## 2022-06-22 DIAGNOSIS — Z86.711 HISTORY OF PULMONARY EMBOLISM: ICD-10-CM

## 2022-06-22 DIAGNOSIS — R10.13 EPIGASTRIC PAIN: ICD-10-CM

## 2022-06-22 DIAGNOSIS — E86.0 DEHYDRATION: ICD-10-CM

## 2022-06-22 LAB
FUNGUS SPEC CULT: NORMAL
FUNGUS SPEC FUNGUS STN: NORMAL
SIGNIFICANT IND 70042: NORMAL
SITE SITE: NORMAL
SOURCE SOURCE: NORMAL

## 2022-06-22 PROCEDURE — 99213 OFFICE O/P EST LOW 20 MIN: CPT | Mod: GC | Performed by: STUDENT IN AN ORGANIZED HEALTH CARE EDUCATION/TRAINING PROGRAM

## 2022-06-22 ASSESSMENT — ENCOUNTER SYMPTOMS
ABDOMINAL PAIN: 1
COUGH: 1
DIARRHEA: 1
NAUSEA: 1
MEMORY LOSS: 1
VOMITING: 1
WEIGHT LOSS: 1
SHORTNESS OF BREATH: 0

## 2022-06-22 ASSESSMENT — FIBROSIS 4 INDEX: FIB4 SCORE: 2.7

## 2022-06-22 NOTE — ASSESSMENT & PLAN NOTE
Patient with complaints of approximately 20-day history of worsening epigastric pain now with associated nausea, vomiting, and diarrhea.  He has not been able to tolerate fluids for greater than 5 days.  He now has associated fatigue, dizziness, and weakness.  Concerns for pancreatitis as patient is tender to palpation in epigastric area with associated vomiting.  Discussed with patient about presenting to the ER for further evaluation and for IV hydration.  Patient agreeable to plan.  He states he will go home and get his other wheelchair and then head to the emergency department.

## 2022-06-22 NOTE — ASSESSMENT & PLAN NOTE
Patient presented today to clinic with complaints of abdominal pain, nausea, vomiting, and diarrhea.  He has not been able to tolerate fluids for greater than 5 days and now has developed dizziness and fatigue. On exam patient is tachycardic and mucous membranes are dry with tenderness to palpation in the epigastric area concerning for possible pancreatitis.  Discussed with patient about being evaluated in the ER for possible IV hydration. Patient agreeable to plan.  He will go home to grab his other wheelchair and then head to the emergency department.

## 2022-06-22 NOTE — PROGRESS NOTES
Subjective:     CC: abdominal pain, nausea, vomiting    HPI:   Benito presents today with complaints of epigastric pain as well as nausea, vomiting, and diarrhea for approximately the last 20 days however has worsened over the last 5 days and states that he is unable to tolerate any fluids.  He is also feeling fatigued and dizzy.  He states that anytime he tries to drink water he either vomits or has diarrhea although now he has not had a bowel movement in 2 days and has had decreased urinary output. He also reports that at work he has been getting frequent nosebleeds which take longer than 20 to 30 minutes to stop bleeding regardless of pressure.  He also states that when he fell last he hit his left knee and scraped it and this took a majority of the day to completely stop bleeding.  He reports multiple falls recently and states that he was seen in the ER after he went to go stand up and fell backwards over his electric wheelchair hitting his left shoulder.  He endorses weakness and dizziness and this is causing him to fall.    Problem   Epigastric Pain   Dehydration       Current Outpatient Medications Ordered in Epic   Medication Sig Dispense Refill   • omeprazole (PRILOSEC) 20 MG delayed-release capsule Take 20 mg by mouth 2 times a day.     • zolpidem (AMBIEN) 10 MG Tab Take 10 mg by mouth at bedtime as needed for Sleep.     • metFORMIN (GLUCOPHAGE) 500 MG Tab Take 1,000 mg by mouth 2 times a day with meals.     • clindamycin (CLEOCIN) 150 MG Cap Take 300 mg by mouth 3 times a day.     • doxycycline (VIBRAMYCIN) 100 MG Tab Take 100 mg by mouth 2 times a day. 10 day course     • ondansetron (ZOFRAN ODT) 4 MG TABLET DISPERSIBLE Take 1 Tablet by mouth every 8 hours as needed for Nausea. 10 Tablet 0   • acetaminophen (TYLENOL) 500 MG Tab Take 500-1,000 mg by mouth every 6 hours as needed for Mild Pain.     • fluticasone (FLOVENT HFA) 110 MCG/ACT Aerosol Inhale 1 Puff 2 times a day. 1 Each 2   • gabapentin  "(NEURONTIN) 800 MG tablet TAKE 1 TABLET BY MOUTH THREE TIMES A DAY (Patient taking differently: Take 800 mg by mouth 3 times a day.) 90 Tablet 3   • COMBIVENT RESPIMAT  MCG/ACT Aero Soln INHALE 1 PUFF BY MOUTH 4 TIMES A DAY (Patient taking differently: Inhale 1 Puff 4 times a day.) 1 Each 5   • amitriptyline (ELAVIL) 100 MG Tab TAKE 1.5 TABLETS BY MOUTH AT BEDTIME AS NEEDED FOR SLEEP. 30 Tablet 3   • BANOPHEN 50 MG Cap TAKE 1 CAPSULE BY MOUTH AT BEDTIME AS NEEDED FOR SLEEP (Patient taking differently: Take 50 mg by mouth at bedtime as needed.) 30 Capsule 2   • ELIQUIS 5 MG Tab TAKE 1 TABLET BY MOUTH TWICE A DAY (Patient taking differently: Take 5 mg by mouth 2 times a day.) 60 Tablet 3   • oxyCODONE immediate-release (ROXICODONE) 5 MG Tab Take 1 Tablet by mouth every 6 hours as needed for Severe Pain for up to 4 days. (Patient not taking: Reported on 6/22/2022) 16 Tablet 0     No current Epic-ordered facility-administered medications on file.       ROS:  Review of Systems   Constitutional: Positive for malaise/fatigue and weight loss.   Respiratory: Positive for cough (chronic). Negative for shortness of breath.    Cardiovascular: Negative for chest pain.   Gastrointestinal: Positive for abdominal pain, diarrhea, nausea and vomiting.   Neurological:        + vertigo   Psychiatric/Behavioral: Positive for memory loss.       Objective:     Exam:  /87 (BP Location: Right arm, Patient Position: Sitting, BP Cuff Size: Large adult)   Pulse (!) 114   Resp 12   Ht 1.803 m (5' 11\")   Wt 116 kg (256 lb) Comment: pt reported  SpO2 93%   BMI 35.70 kg/m²  Body mass index is 35.7 kg/m².    Physical Exam  HENT:      Head: Normocephalic and atraumatic.      Nose: Nose normal.      Mouth/Throat:      Mouth: Mucous membranes are dry.   Eyes:      Extraocular Movements: Extraocular movements intact.      Conjunctiva/sclera: Conjunctivae normal.   Cardiovascular:      Rate and Rhythm: Tachycardia present.      Heart " sounds: No murmur heard.  Pulmonary:      Effort: Pulmonary effort is normal. No respiratory distress.   Abdominal:      Comments: Obese, tenderness to palpation in epigastric area, hypoactive bowel sounds   Musculoskeletal:      Cervical back: Normal range of motion.   Neurological:      Mental Status: He is alert.           Assessment & Plan:     57 y.o. male with the following -     Problem List Items Addressed This Visit     History of pulmonary embolism    Relevant Orders    Referral to establish with [facility] PCP    Epigastric pain     Patient with complaints of approximately 20-day history of worsening epigastric pain now with associated nausea, vomiting, and diarrhea.  He has not been able to tolerate fluids for greater than 5 days.  He now has associated fatigue, dizziness, and weakness.  Concerns for pancreatitis as patient is tender to palpation in epigastric area with associated vomiting.  Discussed with patient about presenting to the ER for further evaluation and for IV hydration.  Patient agreeable to plan.  He states he will go home and get his other wheelchair and then head to the emergency department.           Dehydration     Patient presented today to clinic with complaints of abdominal pain, nausea, vomiting, and diarrhea.  He has not been able to tolerate fluids for greater than 5 days and now has developed dizziness and fatigue. On exam patient is tachycardic and mucous membranes are dry with tenderness to palpation in the epigastric area concerning for possible pancreatitis.  Discussed with patient about being evaluated in the ER for possible IV hydration. Patient agreeable to plan.  He will go home to grab his other wheelchair and then head to the emergency department.                 Discussed with patient about referral to internal medicine doctor due to complexity of multiple medical issues.  Patient agreeable to this plan.    I spent a total of 60 minutes with record review, exam,  communication with the patient, communication with other providers, and documentation of this encounter.      No follow-ups on file.    Please note that this dictation was created using voice recognition software. I have made every reasonable attempt to correct obvious errors, but I expect that there are errors of grammar and possibly content that I did not discover before finalizing the note.

## 2022-06-22 NOTE — LETTER
June 22, 2022       Patient: Benito Persaud   YOB: 1965   Date of Visit: 6/22/2022         To Whom It May Concern:    In my medical opinion, I recommend that Benito Persaud be evaluated in the emergency department for further evaluation of abdominal pain and unable to keep fluids down for >5 days.     If you have any questions or concerns, please don't hesitate to call [phone]          Sincerely,          Hanny Wills M.D.  Electronically Signed

## 2022-06-27 PROCEDURE — 99285 EMERGENCY DEPT VISIT HI MDM: CPT

## 2022-06-27 PROCEDURE — 36415 COLL VENOUS BLD VENIPUNCTURE: CPT

## 2022-06-27 ASSESSMENT — FIBROSIS 4 INDEX: FIB4 SCORE: 2.7

## 2022-06-28 ENCOUNTER — APPOINTMENT (OUTPATIENT)
Dept: CARDIOLOGY | Facility: MEDICAL CENTER | Age: 57
End: 2022-06-28
Attending: STUDENT IN AN ORGANIZED HEALTH CARE EDUCATION/TRAINING PROGRAM
Payer: MEDICAID

## 2022-06-28 ENCOUNTER — APPOINTMENT (OUTPATIENT)
Dept: RADIOLOGY | Facility: MEDICAL CENTER | Age: 57
End: 2022-06-28
Attending: EMERGENCY MEDICINE
Payer: MEDICAID

## 2022-06-28 ENCOUNTER — HOSPITAL ENCOUNTER (OUTPATIENT)
Facility: MEDICAL CENTER | Age: 57
End: 2022-06-29
Attending: EMERGENCY MEDICINE | Admitting: STUDENT IN AN ORGANIZED HEALTH CARE EDUCATION/TRAINING PROGRAM
Payer: MEDICAID

## 2022-06-28 DIAGNOSIS — R11.2 INTRACTABLE NAUSEA AND VOMITING: ICD-10-CM

## 2022-06-28 DIAGNOSIS — E86.0 DEHYDRATION: ICD-10-CM

## 2022-06-28 DIAGNOSIS — Z91.81 AT HIGH RISK FOR INJURY RELATED TO FALL: ICD-10-CM

## 2022-06-28 DIAGNOSIS — R53.1 WEAKNESS: ICD-10-CM

## 2022-06-28 DIAGNOSIS — R73.9 HYPERGLYCEMIA: ICD-10-CM

## 2022-06-28 DIAGNOSIS — R11.2 NAUSEA AND VOMITING, INTRACTABILITY OF VOMITING NOT SPECIFIED, UNSPECIFIED VOMITING TYPE: Primary | ICD-10-CM
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
## 2022-06-28 DIAGNOSIS — I10 HYPERTENSION, UNSPECIFIED TYPE: ICD-10-CM

## 2022-06-28 PROBLEM — Z71.89 ADVANCE CARE PLANNING: Status: ACTIVE | Noted: 2022-01-12

## 2022-06-28 PROBLEM — R19.7 DIARRHEA: Status: ACTIVE | Noted: 2022-06-28

## 2022-06-28 LAB
ABO + RH BLD: NORMAL
ABO GROUP BLD: NORMAL
ALBUMIN SERPL BCP-MCNC: 4.1 G/DL (ref 3.2–4.9)
ALBUMIN/GLOB SERPL: 1.3 G/DL
ALP SERPL-CCNC: 147 U/L (ref 30–99)
ALT SERPL-CCNC: 28 U/L (ref 2–50)
ANION GAP SERPL CALC-SCNC: 13 MMOL/L (ref 7–16)
APPEARANCE UR: CLEAR
APTT PPP: 25.1 SEC (ref 24.7–36)
AST SERPL-CCNC: 25 U/L (ref 12–45)
B-OH-BUTYR SERPL-MCNC: 0.05 MMOL/L (ref 0.02–0.27)
BASOPHILS # BLD AUTO: 0.3 % (ref 0–1.8)
BASOPHILS # BLD: 0.02 K/UL (ref 0–0.12)
BILIRUB SERPL-MCNC: 0.6 MG/DL (ref 0.1–1.5)
BILIRUB UR QL STRIP.AUTO: NEGATIVE
BLD GP AB SCN SERPL QL: NORMAL
BUN SERPL-MCNC: 15 MG/DL (ref 8–22)
CALCIUM SERPL-MCNC: 8.8 MG/DL (ref 8.5–10.5)
CHLORIDE SERPL-SCNC: 99 MMOL/L (ref 96–112)
CO2 SERPL-SCNC: 19 MMOL/L (ref 20–33)
COLOR UR: YELLOW
CREAT SERPL-MCNC: 1.17 MG/DL (ref 0.5–1.4)
EKG IMPRESSION: NORMAL
EOSINOPHIL # BLD AUTO: 0.18 K/UL (ref 0–0.51)
EOSINOPHIL NFR BLD: 3.1 % (ref 0–6.9)
ERYTHROCYTE [DISTWIDTH] IN BLOOD BY AUTOMATED COUNT: 43.9 FL (ref 35.9–50)
EST. AVERAGE GLUCOSE BLD GHB EST-MCNC: 223 MG/DL
GFR SERPLBLD CREATININE-BSD FMLA CKD-EPI: 73 ML/MIN/1.73 M 2
GLOBULIN SER CALC-MCNC: 3.2 G/DL (ref 1.9–3.5)
GLUCOSE BLD STRIP.AUTO-MCNC: 176 MG/DL (ref 65–99)
GLUCOSE BLD STRIP.AUTO-MCNC: 192 MG/DL (ref 65–99)
GLUCOSE BLD STRIP.AUTO-MCNC: 202 MG/DL (ref 65–99)
GLUCOSE BLD STRIP.AUTO-MCNC: 215 MG/DL (ref 65–99)
GLUCOSE BLD STRIP.AUTO-MCNC: 217 MG/DL (ref 65–99)
GLUCOSE SERPL-MCNC: 362 MG/DL (ref 65–99)
GLUCOSE UR STRIP.AUTO-MCNC: >=1000 MG/DL
HBA1C MFR BLD: 9.4 % (ref 4–5.6)
HCT VFR BLD AUTO: 45.6 % (ref 42–52)
HGB BLD-MCNC: 16.3 G/DL (ref 14–18)
IMM GRANULOCYTES # BLD AUTO: 0.03 K/UL (ref 0–0.11)
IMM GRANULOCYTES NFR BLD AUTO: 0.5 % (ref 0–0.9)
INR PPP: 1.07 (ref 0.87–1.13)
KETONES UR STRIP.AUTO-MCNC: NEGATIVE MG/DL
LEUKOCYTE ESTERASE UR QL STRIP.AUTO: NEGATIVE
LIPASE SERPL-CCNC: 29 U/L (ref 11–82)
LV EJECT FRACT  99904: 55
LV EJECT FRACT MOD 2C 99903: 56.8
LV EJECT FRACT MOD 4C 99902: 55.66
LV EJECT FRACT MOD BP 99901: 56.66
LYMPHOCYTES # BLD AUTO: 1.91 K/UL (ref 1–4.8)
LYMPHOCYTES NFR BLD: 33.2 % (ref 22–41)
MAGNESIUM SERPL-MCNC: 1.9 MG/DL (ref 1.5–2.5)
MCH RBC QN AUTO: 30.5 PG (ref 27–33)
MCHC RBC AUTO-ENTMCNC: 35.7 G/DL (ref 33.7–35.3)
MCV RBC AUTO: 85.4 FL (ref 81.4–97.8)
MICRO URNS: ABNORMAL
MONOCYTES # BLD AUTO: 0.49 K/UL (ref 0–0.85)
MONOCYTES NFR BLD AUTO: 8.5 % (ref 0–13.4)
NEUTROPHILS # BLD AUTO: 3.13 K/UL (ref 1.82–7.42)
NEUTROPHILS NFR BLD: 54.4 % (ref 44–72)
NITRITE UR QL STRIP.AUTO: NEGATIVE
NRBC # BLD AUTO: 0 K/UL
NRBC BLD-RTO: 0 /100 WBC
PH UR STRIP.AUTO: 5.5 [PH] (ref 5–8)
PHOSPHATE SERPL-MCNC: 2.5 MG/DL (ref 2.5–4.5)
PLATELET # BLD AUTO: 100 K/UL (ref 164–446)
PMV BLD AUTO: 9.1 FL (ref 9–12.9)
POTASSIUM SERPL-SCNC: 3.7 MMOL/L (ref 3.6–5.5)
PROT SERPL-MCNC: 7.3 G/DL (ref 6–8.2)
PROT UR QL STRIP: NEGATIVE MG/DL
PROTHROMBIN TIME: 13.6 SEC (ref 12–14.6)
RBC # BLD AUTO: 5.34 M/UL (ref 4.7–6.1)
RBC UR QL AUTO: NEGATIVE
RH BLD: NORMAL
SODIUM SERPL-SCNC: 131 MMOL/L (ref 135–145)
SP GR UR STRIP.AUTO: 1.04
TROPONIN T SERPL-MCNC: 11 NG/L (ref 6–19)
UROBILINOGEN UR STRIP.AUTO-MCNC: 0.2 MG/DL
VIT B12 SERPL-MCNC: 328 PG/ML (ref 211–911)
WBC # BLD AUTO: 5.8 K/UL (ref 4.8–10.8)

## 2022-06-28 PROCEDURE — G0378 HOSPITAL OBSERVATION PER HR: HCPCS

## 2022-06-28 PROCEDURE — 700105 HCHG RX REV CODE 258: Performed by: EMERGENCY MEDICINE

## 2022-06-28 PROCEDURE — 80053 COMPREHEN METABOLIC PANEL: CPT

## 2022-06-28 PROCEDURE — 86850 RBC ANTIBODY SCREEN: CPT

## 2022-06-28 PROCEDURE — 700111 HCHG RX REV CODE 636 W/ 250 OVERRIDE (IP): Performed by: EMERGENCY MEDICINE

## 2022-06-28 PROCEDURE — 84100 ASSAY OF PHOSPHORUS: CPT

## 2022-06-28 PROCEDURE — 93306 TTE W/DOPPLER COMPLETE: CPT | Mod: 26 | Performed by: INTERNAL MEDICINE

## 2022-06-28 PROCEDURE — 700111 HCHG RX REV CODE 636 W/ 250 OVERRIDE (IP): Performed by: STUDENT IN AN ORGANIZED HEALTH CARE EDUCATION/TRAINING PROGRAM

## 2022-06-28 PROCEDURE — A9270 NON-COVERED ITEM OR SERVICE: HCPCS

## 2022-06-28 PROCEDURE — 85025 COMPLETE CBC W/AUTO DIFF WBC: CPT

## 2022-06-28 PROCEDURE — 96372 THER/PROPH/DIAG INJ SC/IM: CPT | Mod: XU

## 2022-06-28 PROCEDURE — 96374 THER/PROPH/DIAG INJ IV PUSH: CPT | Mod: XU

## 2022-06-28 PROCEDURE — 700105 HCHG RX REV CODE 258: Performed by: STUDENT IN AN ORGANIZED HEALTH CARE EDUCATION/TRAINING PROGRAM

## 2022-06-28 PROCEDURE — 83690 ASSAY OF LIPASE: CPT

## 2022-06-28 PROCEDURE — 36415 COLL VENOUS BLD VENIPUNCTURE: CPT

## 2022-06-28 PROCEDURE — 93306 TTE W/DOPPLER COMPLETE: CPT

## 2022-06-28 PROCEDURE — 86901 BLOOD TYPING SEROLOGIC RH(D): CPT

## 2022-06-28 PROCEDURE — 72131 CT LUMBAR SPINE W/O DYE: CPT

## 2022-06-28 PROCEDURE — 99220 PR INITIAL OBSERVATION CARE,LEVL III: CPT | Performed by: STUDENT IN AN ORGANIZED HEALTH CARE EDUCATION/TRAINING PROGRAM

## 2022-06-28 PROCEDURE — 82010 KETONE BODYS QUAN: CPT

## 2022-06-28 PROCEDURE — 85730 THROMBOPLASTIN TIME PARTIAL: CPT

## 2022-06-28 PROCEDURE — 96375 TX/PRO/DX INJ NEW DRUG ADDON: CPT | Mod: XU

## 2022-06-28 PROCEDURE — 84484 ASSAY OF TROPONIN QUANT: CPT

## 2022-06-28 PROCEDURE — 700102 HCHG RX REV CODE 250 W/ 637 OVERRIDE(OP): Performed by: STUDENT IN AN ORGANIZED HEALTH CARE EDUCATION/TRAINING PROGRAM

## 2022-06-28 PROCEDURE — 74176 CT ABD & PELVIS W/O CONTRAST: CPT

## 2022-06-28 PROCEDURE — 83735 ASSAY OF MAGNESIUM: CPT

## 2022-06-28 PROCEDURE — 82962 GLUCOSE BLOOD TEST: CPT | Mod: 91

## 2022-06-28 PROCEDURE — A9270 NON-COVERED ITEM OR SERVICE: HCPCS | Performed by: STUDENT IN AN ORGANIZED HEALTH CARE EDUCATION/TRAINING PROGRAM

## 2022-06-28 PROCEDURE — 85610 PROTHROMBIN TIME: CPT

## 2022-06-28 PROCEDURE — 700102 HCHG RX REV CODE 250 W/ 637 OVERRIDE(OP)

## 2022-06-28 PROCEDURE — 71045 X-RAY EXAM CHEST 1 VIEW: CPT

## 2022-06-28 PROCEDURE — 93005 ELECTROCARDIOGRAM TRACING: CPT | Performed by: EMERGENCY MEDICINE

## 2022-06-28 PROCEDURE — 82607 VITAMIN B-12: CPT

## 2022-06-28 PROCEDURE — 86900 BLOOD TYPING SEROLOGIC ABO: CPT

## 2022-06-28 PROCEDURE — 83036 HEMOGLOBIN GLYCOSYLATED A1C: CPT

## 2022-06-28 PROCEDURE — 81003 URINALYSIS AUTO W/O SCOPE: CPT

## 2022-06-28 RX ORDER — PROMETHAZINE HYDROCHLORIDE 25 MG/1
12.5-25 TABLET ORAL EVERY 4 HOURS PRN
Status: DISCONTINUED | OUTPATIENT
Start: 2022-06-28 | End: 2022-06-29 | Stop reason: HOSPADM

## 2022-06-28 RX ORDER — ZOLPIDEM TARTRATE 5 MG/1
5 TABLET ORAL NIGHTLY PRN
Status: DISCONTINUED | OUTPATIENT
Start: 2022-06-28 | End: 2022-06-28

## 2022-06-28 RX ORDER — ONDANSETRON 2 MG/ML
4 INJECTION INTRAMUSCULAR; INTRAVENOUS ONCE
Status: COMPLETED | OUTPATIENT
Start: 2022-06-28 | End: 2022-06-28

## 2022-06-28 RX ORDER — ZOLPIDEM TARTRATE 5 MG/1
10 TABLET ORAL NIGHTLY PRN
Status: DISCONTINUED | OUTPATIENT
Start: 2022-06-28 | End: 2022-06-29 | Stop reason: HOSPADM

## 2022-06-28 RX ORDER — DIPHENHYDRAMINE HCL 25 MG
25 TABLET ORAL ONCE
Status: COMPLETED | OUTPATIENT
Start: 2022-06-28 | End: 2022-06-28

## 2022-06-28 RX ORDER — SODIUM CHLORIDE 9 MG/ML
1000 INJECTION, SOLUTION INTRAVENOUS ONCE
Status: COMPLETED | OUTPATIENT
Start: 2022-06-28 | End: 2022-06-28

## 2022-06-28 RX ORDER — ONDANSETRON 2 MG/ML
4 INJECTION INTRAMUSCULAR; INTRAVENOUS EVERY 4 HOURS PRN
Status: DISCONTINUED | OUTPATIENT
Start: 2022-06-28 | End: 2022-06-29 | Stop reason: HOSPADM

## 2022-06-28 RX ORDER — DIPHENHYDRAMINE HCL 25 MG
25 TABLET ORAL NIGHTLY PRN
Status: DISCONTINUED | OUTPATIENT
Start: 2022-06-28 | End: 2022-06-28

## 2022-06-28 RX ORDER — DIPHENHYDRAMINE HCL 50 MG
50 CAPSULE ORAL
Status: SHIPPED | COMMUNITY
End: 2022-08-31 | Stop reason: SDUPTHER

## 2022-06-28 RX ORDER — HYDROMORPHONE HYDROCHLORIDE 1 MG/ML
1 INJECTION, SOLUTION INTRAMUSCULAR; INTRAVENOUS; SUBCUTANEOUS ONCE
Status: COMPLETED | OUTPATIENT
Start: 2022-06-28 | End: 2022-06-28

## 2022-06-28 RX ORDER — ACETAMINOPHEN 325 MG/1
650 TABLET ORAL EVERY 6 HOURS PRN
Status: DISCONTINUED | OUTPATIENT
Start: 2022-06-28 | End: 2022-06-28

## 2022-06-28 RX ORDER — AMITRIPTYLINE HYDROCHLORIDE 100 MG/1
150 TABLET ORAL NIGHTLY
Status: SHIPPED | COMMUNITY
End: 2022-07-12 | Stop reason: SDUPTHER

## 2022-06-28 RX ORDER — ZOLPIDEM TARTRATE 5 MG/1
5 TABLET ORAL ONCE
Status: COMPLETED | OUTPATIENT
Start: 2022-06-28 | End: 2022-06-28

## 2022-06-28 RX ORDER — ONDANSETRON 4 MG/1
4 TABLET, ORALLY DISINTEGRATING ORAL EVERY 4 HOURS PRN
Status: DISCONTINUED | OUTPATIENT
Start: 2022-06-28 | End: 2022-06-29 | Stop reason: HOSPADM

## 2022-06-28 RX ORDER — SODIUM CHLORIDE, SODIUM LACTATE, POTASSIUM CHLORIDE, CALCIUM CHLORIDE 600; 310; 30; 20 MG/100ML; MG/100ML; MG/100ML; MG/100ML
INJECTION, SOLUTION INTRAVENOUS CONTINUOUS
Status: ACTIVE | OUTPATIENT
Start: 2022-06-28 | End: 2022-06-28

## 2022-06-28 RX ORDER — IPRATROPIUM BROMIDE AND ALBUTEROL SULFATE 2.5; .5 MG/3ML; MG/3ML
3 SOLUTION RESPIRATORY (INHALATION)
Refills: 5 | Status: DISCONTINUED | OUTPATIENT
Start: 2022-06-28 | End: 2022-06-28

## 2022-06-28 RX ORDER — HYDRALAZINE HYDROCHLORIDE 20 MG/ML
10 INJECTION INTRAMUSCULAR; INTRAVENOUS EVERY 4 HOURS PRN
Status: DISCONTINUED | OUTPATIENT
Start: 2022-06-28 | End: 2022-06-29 | Stop reason: HOSPADM

## 2022-06-28 RX ORDER — POLYETHYLENE GLYCOL 3350 17 G/17G
1 POWDER, FOR SOLUTION ORAL
Status: DISCONTINUED | OUTPATIENT
Start: 2022-06-28 | End: 2022-06-29 | Stop reason: HOSPADM

## 2022-06-28 RX ORDER — DEXTROSE MONOHYDRATE 25 G/50ML
25 INJECTION, SOLUTION INTRAVENOUS
Status: DISCONTINUED | OUTPATIENT
Start: 2022-06-28 | End: 2022-06-29 | Stop reason: HOSPADM

## 2022-06-28 RX ORDER — IPRATROPIUM BROMIDE AND ALBUTEROL SULFATE 2.5; .5 MG/3ML; MG/3ML
3 SOLUTION RESPIRATORY (INHALATION)
Status: DISCONTINUED | OUTPATIENT
Start: 2022-06-28 | End: 2022-06-29 | Stop reason: HOSPADM

## 2022-06-28 RX ORDER — FLUTICASONE PROPIONATE 110 UG/1
1 AEROSOL, METERED RESPIRATORY (INHALATION) 2 TIMES DAILY
Status: DISCONTINUED | OUTPATIENT
Start: 2022-06-28 | End: 2022-06-29 | Stop reason: HOSPADM

## 2022-06-28 RX ORDER — PROCHLORPERAZINE EDISYLATE 5 MG/ML
5-10 INJECTION INTRAMUSCULAR; INTRAVENOUS EVERY 4 HOURS PRN
Status: DISCONTINUED | OUTPATIENT
Start: 2022-06-28 | End: 2022-06-29 | Stop reason: HOSPADM

## 2022-06-28 RX ORDER — ACETAMINOPHEN 325 MG/1
650 TABLET ORAL EVERY 6 HOURS PRN
Status: DISCONTINUED | OUTPATIENT
Start: 2022-06-28 | End: 2022-06-29 | Stop reason: HOSPADM

## 2022-06-28 RX ORDER — OMEPRAZOLE 20 MG/1
20 CAPSULE, DELAYED RELEASE ORAL 2 TIMES DAILY
Status: DISCONTINUED | OUTPATIENT
Start: 2022-06-28 | End: 2022-06-29 | Stop reason: HOSPADM

## 2022-06-28 RX ORDER — BISACODYL 10 MG
10 SUPPOSITORY, RECTAL RECTAL
Status: DISCONTINUED | OUTPATIENT
Start: 2022-06-28 | End: 2022-06-29 | Stop reason: HOSPADM

## 2022-06-28 RX ORDER — OXYCODONE HYDROCHLORIDE 5 MG/1
5 TABLET ORAL EVERY 4 HOURS PRN
Status: SHIPPED | COMMUNITY
End: 2022-07-29

## 2022-06-28 RX ORDER — PROMETHAZINE HYDROCHLORIDE 25 MG/1
12.5-25 SUPPOSITORY RECTAL EVERY 4 HOURS PRN
Status: DISCONTINUED | OUTPATIENT
Start: 2022-06-28 | End: 2022-06-29 | Stop reason: HOSPADM

## 2022-06-28 RX ORDER — GABAPENTIN 400 MG/1
800 CAPSULE ORAL 3 TIMES DAILY
Refills: 3 | Status: DISCONTINUED | OUTPATIENT
Start: 2022-06-28 | End: 2022-06-29 | Stop reason: HOSPADM

## 2022-06-28 RX ORDER — AMOXICILLIN 250 MG
2 CAPSULE ORAL 2 TIMES DAILY
Status: DISCONTINUED | OUTPATIENT
Start: 2022-06-28 | End: 2022-06-29 | Stop reason: HOSPADM

## 2022-06-28 RX ORDER — HYDROCODONE BITARTRATE AND ACETAMINOPHEN 5; 325 MG/1; MG/1
1-2 TABLET ORAL EVERY 6 HOURS PRN
Status: DISCONTINUED | OUTPATIENT
Start: 2022-06-28 | End: 2022-06-29 | Stop reason: HOSPADM

## 2022-06-28 RX ORDER — AMITRIPTYLINE HYDROCHLORIDE 75 MG/1
150 TABLET ORAL
Status: DISCONTINUED | OUTPATIENT
Start: 2022-06-28 | End: 2022-06-29 | Stop reason: HOSPADM

## 2022-06-28 RX ORDER — DIPHENHYDRAMINE HCL 25 MG
50 TABLET ORAL NIGHTLY PRN
Status: DISCONTINUED | OUTPATIENT
Start: 2022-06-28 | End: 2022-06-29 | Stop reason: HOSPADM

## 2022-06-28 RX ADMIN — GABAPENTIN 800 MG: 400 CAPSULE ORAL at 17:21

## 2022-06-28 RX ADMIN — PROMETHAZINE HYDROCHLORIDE 25 MG: 25 TABLET ORAL at 16:50

## 2022-06-28 RX ADMIN — HYDROCODONE BITARTRATE AND ACETAMINOPHEN 1 TABLET: 5; 325 TABLET ORAL at 08:15

## 2022-06-28 RX ADMIN — SODIUM CHLORIDE 1000 ML: 9 INJECTION, SOLUTION INTRAVENOUS at 04:28

## 2022-06-28 RX ADMIN — FLUTICASONE PROPIONATE 110 MCG: 110 AEROSOL, METERED RESPIRATORY (INHALATION) at 17:25

## 2022-06-28 RX ADMIN — OMEPRAZOLE 20 MG: 20 CAPSULE, DELAYED RELEASE ORAL at 17:21

## 2022-06-28 RX ADMIN — HYDROCODONE BITARTRATE AND ACETAMINOPHEN 2 TABLET: 5; 325 TABLET ORAL at 14:15

## 2022-06-28 RX ADMIN — AMITRIPTYLINE HYDROCHLORIDE 150 MG: 75 TABLET, FILM COATED ORAL at 23:07

## 2022-06-28 RX ADMIN — INSULIN HUMAN 3 UNITS: 100 INJECTION, SOLUTION PARENTERAL at 17:22

## 2022-06-28 RX ADMIN — ZOLPIDEM TARTRATE 5 MG: 5 TABLET ORAL at 23:08

## 2022-06-28 RX ADMIN — SODIUM CHLORIDE, POTASSIUM CHLORIDE, SODIUM LACTATE AND CALCIUM CHLORIDE: 600; 310; 30; 20 INJECTION, SOLUTION INTRAVENOUS at 09:43

## 2022-06-28 RX ADMIN — INSULIN HUMAN 3 UNITS: 100 INJECTION, SOLUTION PARENTERAL at 14:10

## 2022-06-28 RX ADMIN — DIPHENHYDRAMINE HYDROCHLORIDE 25 MG: 25 TABLET ORAL at 23:40

## 2022-06-28 RX ADMIN — OMEPRAZOLE 20 MG: 20 CAPSULE, DELAYED RELEASE ORAL at 08:04

## 2022-06-28 RX ADMIN — HYDROMORPHONE HYDROCHLORIDE 1 MG: 1 INJECTION, SOLUTION INTRAMUSCULAR; INTRAVENOUS; SUBCUTANEOUS at 04:36

## 2022-06-28 RX ADMIN — GABAPENTIN 800 MG: 400 CAPSULE ORAL at 08:04

## 2022-06-28 RX ADMIN — FLUTICASONE PROPIONATE 110 MCG: 110 AEROSOL, METERED RESPIRATORY (INHALATION) at 09:42

## 2022-06-28 RX ADMIN — APIXABAN 5 MG: 5 TABLET, FILM COATED ORAL at 17:21

## 2022-06-28 RX ADMIN — APIXABAN 5 MG: 5 TABLET, FILM COATED ORAL at 08:03

## 2022-06-28 RX ADMIN — ONDANSETRON 4 MG: 2 INJECTION INTRAMUSCULAR; INTRAVENOUS at 12:41

## 2022-06-28 RX ADMIN — ONDANSETRON 4 MG: 2 INJECTION INTRAMUSCULAR; INTRAVENOUS at 04:36

## 2022-06-28 RX ADMIN — INSULIN HUMAN 4 UNITS: 100 INJECTION, SOLUTION PARENTERAL at 20:26

## 2022-06-28 RX ADMIN — DIPHENHYDRAMINE HYDROCHLORIDE 25 MG: 25 TABLET ORAL at 23:07

## 2022-06-28 RX ADMIN — HYDROCODONE BITARTRATE AND ACETAMINOPHEN 2 TABLET: 5; 325 TABLET ORAL at 19:59

## 2022-06-28 RX ADMIN — GABAPENTIN 800 MG: 400 CAPSULE ORAL at 14:14

## 2022-06-28 RX ADMIN — PROCHLORPERAZINE EDISYLATE 10 MG: 5 INJECTION INTRAMUSCULAR; INTRAVENOUS at 08:12

## 2022-06-28 RX ADMIN — ZOLPIDEM TARTRATE 5 MG: 5 TABLET ORAL at 23:40

## 2022-06-28 ASSESSMENT — LIFESTYLE VARIABLES
HOW MANY TIMES IN THE PAST YEAR HAVE YOU HAD 5 OR MORE DRINKS IN A DAY: 0
CONSUMPTION TOTAL: NEGATIVE
EVER HAD A DRINK FIRST THING IN THE MORNING TO STEADY YOUR NERVES TO GET RID OF A HANGOVER: NO
HAVE YOU EVER FELT YOU SHOULD CUT DOWN ON YOUR DRINKING: NO
TOTAL SCORE: 0
HAVE PEOPLE ANNOYED YOU BY CRITICIZING YOUR DRINKING: NO
AVERAGE NUMBER OF DAYS PER WEEK YOU HAVE A DRINK CONTAINING ALCOHOL: 0
SUBSTANCE_ABUSE: 0
TOTAL SCORE: 0
ON A TYPICAL DAY WHEN YOU DRINK ALCOHOL HOW MANY DRINKS DO YOU HAVE: 0
TOTAL SCORE: 0
ALCOHOL_USE: NO
EVER FELT BAD OR GUILTY ABOUT YOUR DRINKING: NO

## 2022-06-28 ASSESSMENT — PAIN DESCRIPTION - PAIN TYPE
TYPE: ACUTE PAIN
TYPE: ACUTE PAIN

## 2022-06-28 ASSESSMENT — ENCOUNTER SYMPTOMS
FEVER: 0
VOMITING: 1
FOCAL WEAKNESS: 1
FALLS: 1
CHILLS: 0
DIZZINESS: 0
BLURRED VISION: 0
BACK PAIN: 1
PHOTOPHOBIA: 0
SENSORY CHANGE: 0
NAUSEA: 1
WEAKNESS: 1
FOCAL WEAKNESS: 0
LOSS OF CONSCIOUSNESS: 0
SINUS PAIN: 0
NERVOUS/ANXIOUS: 0
SPEECH CHANGE: 0
COUGH: 0
FEVER: 1
CHILLS: 1
PALPITATIONS: 0
DIARRHEA: 1
DOUBLE VISION: 0
BLOOD IN STOOL: 0
ABDOMINAL PAIN: 1
SHORTNESS OF BREATH: 0
DIZZINESS: 1

## 2022-06-28 ASSESSMENT — PATIENT HEALTH QUESTIONNAIRE - PHQ9
2. FEELING DOWN, DEPRESSED, IRRITABLE, OR HOPELESS: NOT AT ALL
SUM OF ALL RESPONSES TO PHQ9 QUESTIONS 1 AND 2: 0
SUM OF ALL RESPONSES TO PHQ9 QUESTIONS 1 AND 2: 0
1. LITTLE INTEREST OR PLEASURE IN DOING THINGS: NOT AT ALL
1. LITTLE INTEREST OR PLEASURE IN DOING THINGS: NOT AT ALL
2. FEELING DOWN, DEPRESSED, IRRITABLE, OR HOPELESS: NOT AT ALL

## 2022-06-28 NOTE — ASSESSMENT & PLAN NOTE
I discussed advance care planning with the patient for at least 22 minutes, including diagnosis, prognosis, plan of care, risks and benefits of any therapies that could be offered, as well as alternatives including palliation and hospice, as appropriate.

## 2022-06-28 NOTE — PROGRESS NOTES
[facility] Medicine Daily Progress Note    Date of Service  6/28/2022    Chief Complaint  Benito Persaud is a 57 y.o. male admitted 6/28/2022 with abdominal pain, nausea, vomiting, weakness and ground-level fall.    Hospital Course  Benito Persaud is a 57 y.o. 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  Patient has had a few admissions to the hospital in the last couple of months, 1 back in April for chest pain/respiratory failure/URI and then in May was admitted for facial abscess requiring I&D.  Since discharge in May has been in the ED a couple times times for nausea vomiting, 1 for syncope.    In the emergency department and underwent extensive.  CBC remarkable only for platelet count of 100, however patient states that this is actually improved from his normal.  Chest x-ray showed left atelectasis without any infiltrate.  CT abdomen pelvis was completed which showed small hiatal hernia, diverticulosis, fat-containing umbilical hernia and CAD.  CT of L-spine showed indeterminate T11 compression fracture that was stable since prior study.    Patient continue to complain of persistent nausea however has not had any episodes of emesis since hospitalization.  Discussed possibility of advancing diet and continuing to monitor, treating with antiemetics.  Patient is primarily wheelchair-bound however did sustain a fall out of his wheelchair recently, PT/OT has been ordered to evaluate patient's needs.      Interval Problem Update  Patient is lying on a gurney, he is to  complain of mid upper gastric abdominal pain 8 out of 10.  States that every time he drinks or eats he has episode of nausea or vomiting, hesitant to try to eat anything.  Denies any recurrent episodes of emesis since hospitalization overnight.  -Discussed plan to attempt to advance diet  -Also discussed plan to work with PT/OT to evaluate weakness  -Discussed with case management, patient most likely will need to seek physical therapy outpatient as he may not qualify for home health  -Echocardiogram pending  -Recent diagnosis of leukemia, patient is following Dr. Pearson and will most likely be starting therapy in the next few weeks    I have discussed this patient's plan of care and discharge plan at IDT rounds today with Case Management, Nursing, Nursing leadership, and other members of the IDT team.    Consultants/Specialty  None    Code Status  DNAR/DNI    Disposition  Patient is not medically cleared for discharge.   Anticipate discharge to to home with close outpatient follow-up.  I have placed the appropriate orders for post-discharge needs.    Review of Systems  Review of Systems   Constitutional: Positive for chills. Negative for fever and malaise/fatigue.   Respiratory: Negative for cough and shortness of breath.    Cardiovascular: Positive for leg swelling. Negative for chest pain.   Gastrointestinal: Positive for abdominal pain, diarrhea, nausea and vomiting. Negative for blood in stool and melena.   Genitourinary: Negative for dysuria.   Musculoskeletal: Positive for back pain and falls.   Neurological: Positive for focal weakness. Negative for dizziness, speech change and loss of consciousness.        Physical Exam  Temp:  [36.3 °C (97.4 °F)-36.4 °C (97.6 °F)] 36.4 °C (97.5 °F)  Pulse:  [] 63  Resp:  [12-31] 16  BP: (108-187)/() 114/58  SpO2:  [90 %-96 %] 96 %    Physical Exam  Vitals and nursing note reviewed.   Constitutional:       General: He is not in acute distress.     Appearance: He  is obese. He is not toxic-appearing.   HENT:      Head: Normocephalic and atraumatic.      Nose: Nose normal. No rhinorrhea.      Mouth/Throat:      Mouth: Mucous membranes are dry.      Pharynx: Oropharynx is clear.   Eyes:      General: No scleral icterus.     Extraocular Movements: Extraocular movements intact.      Conjunctiva/sclera: Conjunctivae normal.      Pupils: Pupils are equal, round, and reactive to light.   Cardiovascular:      Rate and Rhythm: Normal rate and regular rhythm.      Heart sounds: No murmur heard.  Pulmonary:      Effort: Pulmonary effort is normal. No respiratory distress.      Breath sounds: Normal breath sounds. No wheezing, rhonchi or rales.   Abdominal:      General: Bowel sounds are normal.      Palpations: Abdomen is soft.      Tenderness: There is abdominal tenderness. There is no guarding or rebound.   Musculoskeletal:         General: Normal range of motion.      Cervical back: Normal range of motion and neck supple. No rigidity or tenderness.      Right lower leg: Edema present.      Left lower leg: Edema present.   Skin:     General: Skin is warm and dry.      Capillary Refill: Capillary refill takes less than 2 seconds.   Neurological:      General: No focal deficit present.      Mental Status: He is alert and oriented to person, place, and time.      Cranial Nerves: No cranial nerve deficit.      Sensory: No sensory deficit.      Motor: No weakness.      Coordination: Coordination normal.   Psychiatric:         Mood and Affect: Mood normal. Affect is flat.         Behavior: Behavior normal.         Thought Content: Thought content normal.         Fluids    Intake/Output Summary (Last 24 hours) at 6/28/2022 1521  Last data filed at 6/28/2022 0622  Gross per 24 hour   Intake 1000 ml   Output --   Net 1000 ml       Laboratory  Recent Labs     06/28/22  0009   WBC 5.8   RBC 5.34   HEMOGLOBIN 16.3   HEMATOCRIT 45.6   MCV 85.4   MCH 30.5   MCHC 35.7*   RDW 43.9   PLATELETCT 100*    MPV 9.1     Recent Labs     06/28/22  0009   SODIUM 131*   POTASSIUM 3.7   CHLORIDE 99   CO2 19*   GLUCOSE 362*   BUN 15   CREATININE 1.17   CALCIUM 8.8     Recent Labs     06/28/22  0009   APTT 25.1   INR 1.07               Imaging  EC-ECHOCARDIOGRAM COMPLETE W/O CONT   Final Result      CT-ABDOMEN-PELVIS W/O   Final Result         1.  Small hiatal hernia   2.  Diverticulosis   3.  Fat-containing umbilical hernia   4.  Atherosclerosis and atherosclerotic coronary artery disease      CT-LSPINE W/O PLUS RECONS   Final Result         1.  Age indeterminant T11 compression fracture, stable since prior study.   2.  Otherwise no acute traumatic bony injury of the lumbar spine is appreciated.      DX-CHEST-PORTABLE (1 VIEW)   Final Result         1.  Left basilar atelectasis, no focal infiltrate           Assessment/Plan  * Intractable nausea and vomiting- (present on admission)  Assessment & Plan  Antiemetics as needed.  Advance diet as tolerated to diabetic diet.    Diarrhea- (present on admission)  Assessment & Plan  Ongoing for the past 3 weeks, initially with soft than watery up to 5 times per day, improved with Imodium, not taking Imodium anymore with improvement of diarrhea and now more formed and less frequent.  Positive antibiotic exposure.  If he has watery bowel movement should check for C. Difficile, otherwise afebrile, no leukocytosis, nontoxic, no obvious indication for antibiotic therapy at this time.    Mild persistent asthma- (present on admission)  Assessment & Plan  Continue home Flovent and DuoNeb    Type 2 diabetes mellitus, without long-term current use of insulin (HCC)- (present on admission)  Assessment & Plan  Diabetic diet, insulin sliding scale, POC glucose checks.  Check beta hydroxybutyric acid    Advance care planning- (present on admission)  Assessment & Plan  I discussed advance care planning with the patient for at least 22 minutes, including diagnosis, prognosis, plan of care, risks and  benefits of any therapies that could be offered, as well as alternatives including palliation and hospice, as appropriate.      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  He reports plan for chemotherapy in the next few weeks.  Hemoglobin and RBCs are normal, platelets up to 100, unremarkable differential.  Continue to monitor.  Follows with Dr. Pearson with heme-onc    History of testicular cancer- (present on admission)  Assessment & Plan  History of testicular cancer treated with radiation and chemotherapy status postorchiectomy of left testicle in remission now.  Follows with oncology as outpatient.    Dizziness- (present on admission)  Assessment & Plan  With frequent falls, PT OT evaluation, this is a chronic problem along with weakness that has been progressive and exacerbated by recent hospitalizations.  Fall precautions  TTE pending, ongoing dizziness and recent syncope       VTE prophylaxis: therapeutic anticoagulation with Eliquis    I have performed a physical exam and reviewed and updated ROS and Plan today (6/28/2022). In review of yesterday's note (6/27/2022), there are no changes except as documented above.

## 2022-06-28 NOTE — ED TRIAGE NOTES
"Chief Complaint   Patient presents with   • Abdominal Pain     Began 6/20 with mid upper ABD sharp consistent 8/10 pain    • N/V     Vomiting up blood, color \"reddish/green\"   • GLF     States fell x3 days ago and this morning, +blood thinners, -LOC/trauma        WC to triage for above complaint. BM diarrhea. Hx of leukemia/testicular cancer, port on L chest. Take eloquis for hx of PE. Pt has letter on 6/22 from [facility] stating recommending pt to come to ER.    ABD pain and GI bleed protocol ordered. Pt brought to Phleb office for blood draw, given UA. Pt educated of triage process and informed to contact staff if situation changes.    BP (!) 164/116   Pulse (!) 104   Temp 36.4 °C (97.6 °F) (Temporal)   Resp (!) 22   Ht 1.803 m (5' 11\")   Wt 122 kg (268 lb 11.9 oz)   SpO2 95%   BMI 37.48 kg/m²       "

## 2022-06-28 NOTE — CARE PLAN
Assumed care of patient from ED.  Patient transported via hospital bed with this nurse and UC.  Patient AAO x 4, pleasant, on RA, c/o 6/10 pain in back and nausea...medicatted per MAR by ED RN prior to transport.  No further needs at this time.  Call bell and belongings within reach.  Will continue to monitor.          4 Eyes Skin Assessment Completed by Kristie, RN and Reshma RN.    Head WDL  Ears WDL  Nose WDL  Mouth WDL  Neck WDL  Breast/Chest WDL  Shoulder Blades WDL  Spine WDL  (R) Arm/Elbow/Hand WDL  (L) Arm/Elbow/Hand WDL  Abdomen WDL  Groin WDL  Scrotum/Coccyx/Buttocks WDL  (R) Leg WDL  (L) Leg WDL  (R) Heel/Foot/Toe WDL  (L) Heel/Foot/Toe WDL      Devices In Places Pulse Ox      Interventions In Place Pillows    Possible Skin Injury No    Pictures Uploaded Into Epic N/A  Wound Consult Placed N/A  RN Wound Prevention Protocol Ordered No            The patient is Stable - Low risk of patient condition declining or worsening    Shift Goals  Clinical Goals: N/V management, pain control  Patient Goals: Rest, pain control  Family Goals: N/A    Progress made toward(s) clinical / shift goals:    Problem: Pain - Standard  Goal: Alleviation of pain or a reduction in pain to the patient’s comfort goal  6/28/2022 1605 by Kristie Barnett R.N.  Outcome: Progressing  6/28/2022 1603 by Kristie Barnett R.N.  Outcome: Progressing     Problem: Knowledge Deficit - Standard  Goal: Patient and family/care givers will demonstrate understanding of plan of care, disease process/condition, diagnostic tests and medications  6/28/2022 1605 by Kristie Barnett R.N.  Outcome: Progressing  6/28/2022 1603 by Kristie Barnett R.N.  Outcome: Progressing     Problem: Fall Risk  Goal: Patient will remain free from falls  6/28/2022 1605 by Kristie Barnett R.N.  Outcome: Progressing  6/28/2022 1603 by Kristie Barnett R.N.  Outcome: Progressing     Problem: Fluid Volume  Goal: Fluid volume balance will be maintained  Outcome: Progressing     Problem: Mobility  Goal:  Patient's capacity to carry out activities will improve  Outcome: Progressing     Problem: Self Care  Goal: Patient will have the ability to perform ADLs independently or with assistance (bathe, groom, dress, toilet and feed)  Outcome: Progressing       Patient is not progressing towards the following goals:

## 2022-06-28 NOTE — H&P
"Hospital Medicine History & Physical Note    Date of Service  6/28/2022    Primary Care Physician  Hanny Wills M.D.    Consultants  None    Code Status  DNAR/DNI    Chief Complaint  Chief Complaint   Patient presents with   • Abdominal Pain     Began 6/20 with mid upper ABD sharp consistent 8/10 pain    • N/V     Vomiting up blood, color \"reddish/green\"   • GLF     States fell x3 days ago and this morning, +blood thinners, -LOC/trauma        History of Presenting Illness  Benito Persaud is a 57 y.o. 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  Patient has had a few admissions to the hospital in the last couple of months, 1 back in April for chest pain/respiratory failure/URI and then in May was admitted for facial abscess requiring I&D.  Since discharge in May has been in the ED a couple times times for nausea vomiting, 1 for syncope.    Unfortunately Benito has had continued nausea and vomiting, abdominal pain occasionally mild sometimes severe described as an aching in the epigastric region at times sharp that he was attributing to his repeated episodes of emesis and dry heaves, he has had continuation of his chronic vertigo, worsening of weakness resulting in multiple falls, inability to keep down p.o. intake.  Additionally he was having diarrhea that previously was watery up to 5 episodes per day without taking Imodium, improved when taking Imodium, no longer taking Imodium and now has decrease of watery diarrhea and is becoming more formed.  He did have a fever a " few days prior to presentation.  He was trying to push through with his symptoms from the day of admission and had another fall out of his wheelchair which is not uncommon for him however he has had worse back pain than normal.    In the ED he had an extensive work-up.  He is afebrile, pulse is ranged from , respiratory rate is normal, systolic blood pressures ranged from 140s to 180s, he had normal room air pulse oxygen saturation.  CBC with normal WBCs and hemoglobin, platelet count 100 unremarkable differential, CHEM panel 431 bicarb 19 glucose 362 normal renal function, UA with glucosuria otherwise unremarkable, chest x-ray shows left basilar atelectasis without focal infiltrate, EKG sinus rhythm without any ST T-segment elevation or depression, CT abdomen pelvis without contrast shows small hiatal hernia diverticulosis fat-containing umbilical hernia atherosclerosis and CAD, CT L-spine shows age-indeterminate T11 compression fracture stable since prior study no acute traumatic bony injury.  Patient subsequently referred to hospitalist for admission for progressive weakness resulting in multiple falls, intractable nausea and vomiting.    I discussed the plan of care with patient, bedside RN and pharmacy.    Review of Systems  Review of Systems   Constitutional: Positive for fever (1x a few days before admission) and malaise/fatigue. Negative for chills.   HENT: Negative for congestion and sinus pain.    Eyes: Negative for blurred vision, double vision and photophobia.   Respiratory: Negative for cough and shortness of breath.    Cardiovascular: Negative for chest pain and palpitations.   Gastrointestinal: Positive for abdominal pain, diarrhea, nausea and vomiting.   Genitourinary: Negative for dysuria and urgency.   Musculoskeletal: Positive for back pain and falls.   Neurological: Positive for dizziness and weakness. Negative for sensory change, speech change and focal weakness.   Psychiatric/Behavioral:  Negative for substance abuse. The patient is not nervous/anxious.        Past Medical History   has a past medical history of Asthma, Cancer ([facility]) (11/01/2016), and MI (myocardial infarction) ([facility]) (2019).    Surgical History   has a past surgical history that includes cath removal (N/A, 8/14/2019) and irrigation & debridement general (N/A, 5/25/2022).     Family History  family history includes Arterial Aneurysm in his father; Cancer in his mother; Diabetes in his mother; Heart Disease in his maternal grandmother; Hyperlipidemia in his mother; Hypertension in his mother; Psychiatric Illness in his mother.   Family history reviewed with patient. There is no family history that is pertinent to the chief complaint.     Social History   reports that he has never smoked. His smokeless tobacco use includes chew. He reports previous alcohol use. He reports previous drug use.    Allergies  Allergies   Allergen Reactions   • Green Beans Anaphylaxis   • Iodine Anaphylaxis   • Keflex Diarrhea and Vomiting     Vomiting & Diarrhea  Tolerates ceftriaxone   • Reglan [Metoclopramide] Nausea     Asthma     • Shellfish Allergy Anaphylaxis   • Toradol Hives       Medications  Prior to Admission Medications   Prescriptions Last Dose Informant Patient Reported? Taking?   BANOPHEN 50 MG Cap  Patient No No   Sig: TAKE 1 CAPSULE BY MOUTH AT BEDTIME AS NEEDED FOR SLEEP   Patient taking differently: Take 50 mg by mouth at bedtime as needed.   COMBIVENT RESPIMAT  MCG/ACT Aero Soln  Patient No No   Sig: INHALE 1 PUFF BY MOUTH 4 TIMES A DAY   Patient taking differently: Inhale 1 Puff 4 times a day.   ELIQUIS 5 MG Tab  Patient No No   Sig: TAKE 1 TABLET BY MOUTH TWICE A DAY   Patient taking differently: Take 5 mg by mouth 2 times a day.   acetaminophen (TYLENOL) 500 MG Tab  Patient Yes No   Sig: Take 500-1,000 mg by mouth every 6 hours as needed for Mild Pain.   amitriptyline (ELAVIL) 100 MG Tab  Patient No No   Sig: TAKE 1.5 TABLETS BY  MOUTH AT BEDTIME AS NEEDED FOR SLEEP.   clindamycin (CLEOCIN) 150 MG Cap  Patient Yes No   Sig: Take 300 mg by mouth 3 times a day.   doxycycline (VIBRAMYCIN) 100 MG Tab  Patient Yes No   Sig: Take 100 mg by mouth 2 times a day. 10 day course   fluticasone (FLOVENT HFA) 110 MCG/ACT Aerosol  Patient No No   Sig: Inhale 1 Puff 2 times a day.   gabapentin (NEURONTIN) 800 MG tablet   No No   Sig: TAKE 1 TABLET BY MOUTH THREE TIMES A DAY   Patient taking differently: Take 800 mg by mouth 3 times a day.   metFORMIN (GLUCOPHAGE) 500 MG Tab  Patient Yes No   Sig: Take 1,000 mg by mouth 2 times a day with meals.   omeprazole (PRILOSEC) 20 MG delayed-release capsule  Patient Yes No   Sig: Take 20 mg by mouth 2 times a day.   ondansetron (ZOFRAN ODT) 4 MG TABLET DISPERSIBLE  Patient No No   Sig: Take 1 Tablet by mouth every 8 hours as needed for Nausea.   zolpidem (AMBIEN) 10 MG Tab  Patient Yes No   Sig: Take 10 mg by mouth at bedtime as needed for Sleep.      Facility-Administered Medications: None       Physical Exam  Temp:  [36.3 °C (97.4 °F)-36.4 °C (97.6 °F)] 36.3 °C (97.4 °F)  Pulse:  [] 76  Resp:  [18-22] 18  BP: (141-187)/() 145/94  SpO2:  [93 %-95 %] 95 %  Blood Pressure: (!) 145/94   Temperature: 36.3 °C (97.4 °F)   Pulse: 76   Respiration: 18   Pulse Oximetry: 95 %       Physical Exam  Vitals and nursing note reviewed.   Constitutional:       General: He is not in acute distress.     Appearance: He is obese. He is not toxic-appearing.      Comments: 57-year-old male appears older than stated age, appears chronically ill, alert and conversant able to speak full sentences no acute distress nontoxic-appearing   HENT:      Head: Normocephalic and atraumatic.      Nose: Nose normal. No rhinorrhea.      Mouth/Throat:      Mouth: Mucous membranes are dry.      Pharynx: Oropharynx is clear.   Eyes:      General: No scleral icterus.     Extraocular Movements: Extraocular movements intact.       Conjunctiva/sclera: Conjunctivae normal.      Pupils: Pupils are equal, round, and reactive to light.   Cardiovascular:      Rate and Rhythm: Normal rate and regular rhythm.      Heart sounds: No murmur heard.  Pulmonary:      Effort: Pulmonary effort is normal. No respiratory distress.      Breath sounds: Normal breath sounds. No wheezing, rhonchi or rales.   Abdominal:      General: Bowel sounds are normal.      Palpations: Abdomen is soft.      Tenderness: There is abdominal tenderness (mild epigastric tenderness). There is no guarding or rebound.   Musculoskeletal:         General: Normal range of motion.      Cervical back: Normal range of motion and neck supple. No rigidity or tenderness.      Right lower leg: Edema present.      Left lower leg: Edema present.   Skin:     General: Skin is warm and dry.      Capillary Refill: Capillary refill takes less than 2 seconds.   Neurological:      General: No focal deficit present.      Mental Status: He is alert and oriented to person, place, and time. Mental status is at baseline.      Cranial Nerves: No cranial nerve deficit.      Sensory: No sensory deficit.      Motor: No weakness.      Coordination: Coordination normal.      Comments: Face symmetrical tongue midline moves all 4 extremities antigravity, no drift, no hemineglect or gaze preference, no abnormal movements noted   Psychiatric:         Mood and Affect: Mood normal.         Behavior: Behavior normal.         Thought Content: Thought content normal.         Judgment: Judgment normal.         Laboratory:  Recent Labs     06/28/22 0009   WBC 5.8   RBC 5.34   HEMOGLOBIN 16.3   HEMATOCRIT 45.6   MCV 85.4   MCH 30.5   MCHC 35.7*   RDW 43.9   PLATELETCT 100*   MPV 9.1     Recent Labs     06/28/22 0009   SODIUM 131*   POTASSIUM 3.7   CHLORIDE 99   CO2 19*   GLUCOSE 362*   BUN 15   CREATININE 1.17   CALCIUM 8.8     Recent Labs     06/28/22 0009   ALTSGPT 28   ASTSGOT 25   ALKPHOSPHAT 147*   TBILIRUBIN 0.6    LIPASE 29   GLUCOSE 362*     Recent Labs     06/28/22  0009   APTT 25.1   INR 1.07     No results for input(s): NTPROBNP in the last 72 hours.      No results for input(s): TROPONINT in the last 72 hours.    Imaging:  CT-ABDOMEN-PELVIS W/O   Final Result         1.  Small hiatal hernia   2.  Diverticulosis   3.  Fat-containing umbilical hernia   4.  Atherosclerosis and atherosclerotic coronary artery disease      CT-LSPINE W/O PLUS RECONS   Final Result         1.  Age indeterminant T11 compression fracture, stable since prior study.   2.  Otherwise no acute traumatic bony injury of the lumbar spine is appreciated.      DX-CHEST-PORTABLE (1 VIEW)   Final Result         1.  Left basilar atelectasis, no focal infiltrate      EC-ECHOCARDIOGRAM COMPLETE W/O CONT    (Results Pending)       X-Ray:  I have personally reviewed the images and compared with prior images. and My impression is: chest x-ray shows left basilar atelectasis without focal infiltrate  EKG:  I have personally reviewed the images and compared with prior images. and My impression is: EKG sinus rhythm without any ST T-segment elevation or depression,    Assessment/Plan:  Justification for Admission Status  I anticipate this patient is appropriate for observation status at this time because Admission for intractable nausea and vomiting, evaluation by PT OT    * Intractable nausea and vomiting- (present on admission)  Assessment & Plan  Antiemetics as needed.  Advance diet as tolerated to diabetic diet.    Diarrhea- (present on admission)  Assessment & Plan  Ongoing for the past 3 weeks, initially with soft than watery up to 5 times per day, improved with Imodium, not taking Imodium anymore with improvement of diarrhea and now more formed and less frequent.  Positive antibiotic exposure.  If he has watery bowel movement should check for C. Difficile, otherwise afebrile, no leukocytosis, nontoxic, no obvious indication for antibiotic therapy at this  time.    Mild persistent asthma- (present on admission)  Assessment & Plan  Continue home Flovent and DuoNeb    Type 2 diabetes mellitus, without long-term current use of insulin (HCC)- (present on admission)  Assessment & Plan  Diabetic diet, insulin sliding scale, POC glucose checks.  Check beta hydroxybutyric acid    Advance care planning- (present on admission)  Assessment & Plan  I discussed advance care planning with the patient for at least 22 minutes, including diagnosis, prognosis, plan of care, risks and benefits of any therapies that could be offered, as well as alternatives including palliation and hospice, as appropriate.      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  He reports plan for chemotherapy in the next few weeks.  Hemoglobin and RBCs are normal, platelets up to 100, unremarkable differential.  Continue to monitor.  Follows with Dr. Paerson with heme-onc    History of testicular cancer- (present on admission)  Assessment & Plan  History of testicular cancer treated with radiation and chemotherapy status postorchiectomy of left testicle in remission now.  Follows with oncology as outpatient.    Dizziness- (present on admission)  Assessment & Plan  With frequent falls, PT OT evaluation, this is a chronic problem along with weakness that has been progressive and exacerbated by recent hospitalizations.  Fall precautions  Check TTE given edema, ongoing dizziness and recent  syncope      VTE prophylaxis: therapeutic anticoagulation with Eliquis

## 2022-06-28 NOTE — ED NOTES
Rounded on pt, who is Oriented x3, and aware of situation.  Pt able to stand and bear weight on L ankle.  Abrasions noted to B knees and L arm.      Pt does report hitting back of head but no LOC during fall.

## 2022-06-28 NOTE — ASSESSMENT & PLAN NOTE
Follow-up with hematology oncology as outpatient, has chest wall Chemo-Port, not on chemotherapy for MDS as of now, patient also reports possible diagnosis of leukemia he is unsure of the details though.  He reports plan for chemotherapy in the next few weeks.  Hemoglobin and RBCs are normal, platelets up to 100, unremarkable differential.  Continue to monitor.  Follows with Dr. Pearson with heme-onc

## 2022-06-28 NOTE — ASSESSMENT & PLAN NOTE
Ongoing for the past 3 weeks, initially with soft than watery up to 5 times per day, improved with Imodium, not taking Imodium anymore with improvement of diarrhea and now more formed and less frequent.  Positive antibiotic exposure.  If he has watery bowel movement should check for C. Difficile, otherwise afebrile, no leukocytosis, nontoxic, no obvious indication for antibiotic therapy at this time.

## 2022-06-28 NOTE — ASSESSMENT & PLAN NOTE
With frequent falls, PT OT evaluation, this is a chronic problem along with weakness that has been progressive and exacerbated by recent hospitalizations.  Fall precautions  TTE pending, ongoing dizziness and recent syncope

## 2022-06-28 NOTE — ED NOTES
Med rec completed per pt   Allergies reviewed    Pt completed a 10 day course of Doxycycline about 1 month ago     Pt states that he only takes his Combivent twice a day but took it 4 times yesterday     Pt states that he only takes his Flovent twice a day but took it 3 times yesterday

## 2022-06-28 NOTE — ED PROVIDER NOTES
"ED Provider Note    Scribed for Wolfgang Keen by Ingrid Khan. 6/28/2022  3:33 AM    Primary care provider: Hanny Wills M.D.  Means of arrival: Walk-in  History obtained from: Patient  History limited by: None    CHIEF COMPLAINT  Chief Complaint   Patient presents with   • Abdominal Pain     Began 6/20 with mid upper ABD sharp consistent 8/10 pain    • N/V     Vomiting up blood, color \"reddish/green\"   • GLF     States fell x3 days ago and this morning, +blood thinners, -LOC/trauma      HPI  Benito Persaud is a 57 y.o. male who presents to the Emergency Department consistent upper abdominal pain onset 6/20/2022. He describes his abdominal pain as \"sharp\" and like \"someone is hitting me with a bat\". He then began to develop diarrhea, fever (tmax 104 °F), nausea, red-green colored vomiting. He is concerned for vomiting, because he is unable to keep down his medication regimen. Patient with an extensive medical history including: frequent falls, recurrent DVT and PE, Metformin controlled Diabetes, CAV's, and persistent contractible nausea and vomiting. Patient was most recently hospitalized on 6/16/2022 for a fall. He states that since then he has fallen multiple times, most notably 3 days ago and this morning. Patient is wheel chair bound at baseline secondary to multiple back fractures and history of strokes. He states that this morning he fell out of his wheel chair, which happens often, but this time he was in more pain than usual. He is on Eliquis daily due to frequent DVT/PE history. He is passing gas normally and denies constipation. He is vaccinated and boosted for Covid-19. Patient with a history of Testicular cancer that is in remission. He has now developed Dysplastic Syndrome Leukemia, and is still followed by Oncology.      Quality:sharp  Duration: 8 days  Severity: moderate  Associated sx: diarrhea, fever (tmax 104 °F), nausea, red-green colored vomiting    REVIEW OF SYSTEMS  As " above, all other systems reviewed and are negative.   See HPI for further details.     PAST MEDICAL HISTORY   has a past medical history of Asthma, Cancer (HCC) (11/01/2016), and MI (myocardial infarction) ([facility]) (2019).  SURGICAL HISTORY   has a past surgical history that includes cath removal (N/A, 8/14/2019) and irrigation & debridement general (N/A, 5/25/2022).  SOCIAL HISTORY  Social History     Tobacco Use   • Smoking status: Never Smoker   • Smokeless tobacco: Current User     Types: Chew   Vaping Use   • Vaping Use: Never used   Substance Use Topics   • Alcohol use: Not Currently   • Drug use: Not Currently      Social History     Substance and Sexual Activity   Drug Use Not Currently     FAMILY HISTORY  Family History   Problem Relation Age of Onset   • Cancer Mother    • Psychiatric Illness Mother    • Diabetes Mother    • Hypertension Mother    • Hyperlipidemia Mother    • Arterial Aneurysm Father    • Heart Disease Maternal Grandmother      CURRENT MEDICATIONS  Home Medications     Reviewed by Corrine Norris R.N. (Registered Nurse) on 06/27/22 at 2309  Med List Status: Partial   Medication Last Dose Status   acetaminophen (TYLENOL) 500 MG Tab  Active   amitriptyline (ELAVIL) 100 MG Tab  Active   BANOPHEN 50 MG Cap  Active   clindamycin (CLEOCIN) 150 MG Cap  Active   COMBIVENT RESPIMAT  MCG/ACT Aero Soln  Active   doxycycline (VIBRAMYCIN) 100 MG Tab  Active   ELIQUIS 5 MG Tab  Active   fluticasone (FLOVENT HFA) 110 MCG/ACT Aerosol  Active   gabapentin (NEURONTIN) 800 MG tablet  Active   metFORMIN (GLUCOPHAGE) 500 MG Tab  Active   omeprazole (PRILOSEC) 20 MG delayed-release capsule  Active   ondansetron (ZOFRAN ODT) 4 MG TABLET DISPERSIBLE  Active   zolpidem (AMBIEN) 10 MG Tab  Active              ALLERGIES  Allergies   Allergen Reactions   • Green Beans Anaphylaxis   • Iodine Anaphylaxis   • Keflex Diarrhea and Vomiting     Vomiting & Diarrhea  Tolerates ceftriaxone   • Reglan [Metoclopramide]  "Nausea     Asthma     • Shellfish Allergy Anaphylaxis   • Toradol Hives       PHYSICAL EXAM    VITAL SIGNS:   Vitals:    06/27/22 2256 06/27/22 2258 06/28/22 0153   BP: (!) 164/116  (!) 141/95   Pulse: (!) 104  90   Resp: (!) 22  18   Temp: 36.4 °C (97.6 °F)  36.3 °C (97.4 °F)   TempSrc: Temporal  Temporal   SpO2: 95%  95%   Weight:  122 kg (268 lb 11.9 oz)    Height: 1.803 m (5' 11\")       Vitals: My interpretation: hypertensive, tachycardic, afebrile, not hypoxic    Reinterpretation of vitals: Improving    PE:   Constitutional: Morbidly obese, generalized weakness, stigmata from prior stroke that is unchanged   HENT: Normocephalic, Atraumatic, Bilateral external ears normal, Oropharynx is clear mucous membranes are moist. No oral exudates or nasal discharge.   Eyes: Pupils are equal round and reactive, EOMI, Conjunctiva normal, No discharge.   Neck: Normal range of motion, No tenderness, Supple, No stridor. No meningismus.  Lymphatic: No lymphadenopathy noted.   Cardiovascular: hypertensive, tachycardic rate and regular rhythm without murmur rub or gallop.  Thorax & Lungs: Clear breath sounds bilaterally without wheezes, rhonchi or rales. There is no chest wall tenderness.   Abdomen: Soft non-tender non-distended. There is no rebound or guarding. No organomegaly is appreciated. Bowel sounds are normal.  Skin: Normal without rash.   Back: No CVA or spinal tenderness.   Extremities: Intact distal pulses, No edema, No tenderness, No cyanosis, No clubbing. Capillary refill is less than 2 seconds.  Musculoskeletal: Good range of motion in all major joints. No tenderness to palpation or major deformities noted.   Neurologic: Alert & oriented x 3, Normal motor function, Normal sensory function, No focal deficits noted. Reflexes are normal.  Psychiatric: Affect normal, Judgment normal, Mood normal. There is no suicidal ideation or patient reported hallucinations.     DIAGNOSTIC STUDIES / PROCEDURES    LABS  Results for " orders placed or performed during the hospital encounter of 06/28/22   COMP METABOLIC PANEL   Result Value Ref Range    Sodium 131 (L) 135 - 145 mmol/L    Potassium 3.7 3.6 - 5.5 mmol/L    Chloride 99 96 - 112 mmol/L    Co2 19 (L) 20 - 33 mmol/L    Anion Gap 13.0 7.0 - 16.0    Glucose 362 (H) 65 - 99 mg/dL    Bun 15 8 - 22 mg/dL    Creatinine 1.17 0.50 - 1.40 mg/dL    Calcium 8.8 8.5 - 10.5 mg/dL    AST(SGOT) 25 12 - 45 U/L    ALT(SGPT) 28 2 - 50 U/L    Alkaline Phosphatase 147 (H) 30 - 99 U/L    Total Bilirubin 0.6 0.1 - 1.5 mg/dL    Albumin 4.1 3.2 - 4.9 g/dL    Total Protein 7.3 6.0 - 8.2 g/dL    Globulin 3.2 1.9 - 3.5 g/dL    A-G Ratio 1.3 g/dL   LIPASE   Result Value Ref Range    Lipase 29 11 - 82 U/L   URINALYSIS    Specimen: Urine   Result Value Ref Range    Color Yellow     Character Clear     Specific Gravity 1.041 <1.035    Ph 5.5 5.0 - 8.0    Glucose >=1000 (A) Negative mg/dL    Ketones Negative Negative mg/dL    Protein Negative Negative mg/dL    Bilirubin Negative Negative    Urobilinogen, Urine 0.2 Negative    Nitrite Negative Negative    Leukocyte Esterase Negative Negative    Occult Blood Negative Negative    Micro Urine Req see below    COD (ADULT)   Result Value Ref Range    ABO Grouping Only O     Rh Grouping Only NEG     Antibody Screen-Cod NEG    CBC WITH DIFFERENTIAL   Result Value Ref Range    WBC 5.8 4.8 - 10.8 K/uL    RBC 5.34 4.70 - 6.10 M/uL    Hemoglobin 16.3 14.0 - 18.0 g/dL    Hematocrit 45.6 42.0 - 52.0 %    MCV 85.4 81.4 - 97.8 fL    MCH 30.5 27.0 - 33.0 pg    MCHC 35.7 (H) 33.7 - 35.3 g/dL    RDW 43.9 35.9 - 50.0 fL    Platelet Count 100 (L) 164 - 446 K/uL    MPV 9.1 9.0 - 12.9 fL    Neutrophils-Polys 54.40 44.00 - 72.00 %    Lymphocytes 33.20 22.00 - 41.00 %    Monocytes 8.50 0.00 - 13.40 %    Eosinophils 3.10 0.00 - 6.90 %    Basophils 0.30 0.00 - 1.80 %    Immature Granulocytes 0.50 0.00 - 0.90 %    Nucleated RBC 0.00 /100 WBC    Neutrophils (Absolute) 3.13 1.82 - 7.42 K/uL     Lymphs (Absolute) 1.91 1.00 - 4.80 K/uL    Monos (Absolute) 0.49 0.00 - 0.85 K/uL    Eos (Absolute) 0.18 0.00 - 0.51 K/uL    Baso (Absolute) 0.02 0.00 - 0.12 K/uL    Immature Granulocytes (abs) 0.03 0.00 - 0.11 K/uL    NRBC (Absolute) 0.00 K/uL   PROTHROMBIN TIME   Result Value Ref Range    PT 13.6 12.0 - 14.6 sec    INR 1.07 0.87 - 1.13   APTT   Result Value Ref Range    APTT 25.1 24.7 - 36.0 sec   ESTIMATED GFR   Result Value Ref Range    GFR (CKD-EPI) 73 >60 mL/min/1.73 m 2   EKG (NOW)   Result Value Ref Range    Report       [facility] Emergency Dept.    Test Date:  2022  Pt Name:    ZINA SALGUERO                 Department: ER  MRN:        [id number]                      Room:       BL 15  Gender:     Male                         Technician: [id number]  :        1965                   Requested By:BEREKET ALY  Order #:    [id number]                    Reading MD:    Measurements  Intervals                                Axis  Rate:       75                           P:          47  SD:         171                          QRS:        31  QRSD:       93                           T:          50  QT:         358  QTc:        399    Interpretive Statements  Sinus rhythm  Compared to ECG 2022 10:58:34  Sinus tachycardia no longer present        All labs reviewed by me. Significant for no leukocytosis, no anemia, normal electrolytes, normal renal function, normal liver enzymes, normal bilirubin, lipase normal, urinalysis negative for ketones or infection, PT/INR normal    RADIOLOGY  CT-ABDOMEN-PELVIS W/O   Final Result         1.  Small hiatal hernia   2.  Diverticulosis   3.  Fat-containing umbilical hernia   4.  Atherosclerosis and atherosclerotic coronary artery disease      CT-LSPINE W/O PLUS RECONS   Final Result         1.  Age indeterminant T11 compression fracture, stable since prior study.   2.  Otherwise no acute traumatic bony injury of the lumbar spine is  appreciated.      DX-CHEST-PORTABLE (1 VIEW)   Final Result         1.  Left basilar atelectasis, no focal infiltrate        The radiologist's interpretation of all radiological studies have been reviewed by me.    COURSE & MEDICAL DECISION MAKING  Nursing notes, VS, PMSFHx, labs, imaging, EKG reviewed in chart. Patient with prior medical history of recurrent DVT's and PE's, which he is on Eliquis daily for. 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 He has testicular cancer which is in remission. Patient with history of frequent falls, and was most recently admitted on 6/16/2022 for 2 nights following a fall. He has persistent contractible nausea and vomiting.    MDM: 3:33 AM Benito Persaud is a 57 y.o. male who presented with generalized decline, weakness, fatigue, frequent falls.  Patient has history of prior CVA with chronic vertigo and visual problems.  He has been admitted multiple times recently and had multiple ED visits for similar problems with intractable nausea and vomiting for the last month.  He arrives mildly dehydrated.  He is unable to take his metformin medication for his diabetes.  He is hyperglycemic to 350.  His labs are otherwise fairly reassuring and unremarkable, no signs of infection, no leukocytosis, he is afebrile.  He was borderline tachycardic on arrival treat with IV fluids but this resolved.  Urinalysis negative for ketones or infection.  CT abdomen was done as he has persistent nausea and vomiting but was negative for any acute findings.  CT L-spine was done as he was mildly tender over this area and had a recent fall but there is no acute fractures.  Chest x-ray is fairly reassuring.  I had a long discussion with the patient and expressed my concerns that he would benefit from physical and occupational therapy, management of his diabetes, hypertension and intractable N/V w/ inability to tolerate PO intake.  I expressed concern that he has had  multiple falls and readmissions for his weakness, dehydration nausea and vomiting.  He expressed interest in getting help with potential rehabilitation.  PT and OT evaluations were ordered.  The hospitalist will place orders for admission.     5:33 AM - I spoke with social work regarding the patient's case. They recommend placing the patient into ED observation until he can be evaluated by them.     5:42 AM I discussed the patient's case and the above findings with Dr. Lyons (hospitalist) who will consult on the patient and evaluate for hospitalization.    HYDRATION: Based on the patient's presentation of Acute Vomiting the patient was given IV fluids. IV Hydration was used because oral hydration was not adequate alone. Upon recheck following hydration, the patient was mildly improved.    DISPOSITION:  Admit    FINAL IMPRESSION  1. Nausea and vomiting, intractability of vomiting not specified, unspecified vomiting type Acute   2. Fall from ground level Acute   3. Dehydration Acute   4. Weakness Acute   5. At high risk for injury related to fall Acute   6. Hyperglycemia Acute   7. Hypertension, unspecified type Acute       IIngrid (Rajwinder), am scribing for, and in the presence of, Wolfgang Keen.    Electronically signed by: Ingrid Khan (Rajwinder), 6/28/2022    I, Wolfgang Keen personally performed the services described in this documentation, as scribed by Ingrid Khan in my presence, and it is both accurate and complete.    The note accurately reflects work and decisions made by me.  Wolfgang Keen  6/28/2022  5:55 AM

## 2022-06-28 NOTE — HOSPITAL COURSE
Mr. Benito Persaud is a 57 y.o. 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      Patient has had a few admissions to the hospital in the last couple of months, 1 back in April for chest pain/respiratory failure/URI and then in May was admitted for facial abscess requiring I&D.  Since discharge in May has been in the ED a couple times times for nausea vomiting, 1 for syncope.    In the emergency department and underwent extensive.  CBC remarkable only for platelet count of 100, however patient states that this is actually improved from his normal.  Chest x-ray showed left atelectasis without any infiltrate.  CT abdomen pelvis was completed which showed small hiatal hernia, diverticulosis, fat-containing umbilical hernia and CAD.  CT of L-spine showed indeterminate T11 compression fracture that was stable since prior study.    Patient continue to complain of persistent nausea however has not had any episodes of emesis since hospitalization.  Discussed possibility of advancing diet and continuing to monitor, treating with antiemetics.  Patient is primarily wheelchair-bound however did sustain a fall out of his wheelchair recently, PT/OT has been ordered to evaluate patient's needs.    Patient was observed overnight.  Patient able to tolerate food intake, but only has been requesting soda and ice cream despite being diabetic.  Patient claims that soda and ice cream although on things he could not keep down and has not tried intake on the other food from yesterday.  Patient was  also evaluated by PT/OT of which it was determined that he was back at his baseline.  Patient utilizes his wheelchair to ambulate around.  Patient has an established outpatient physical therapy.  Patient will also follow-up with oncologist, Dr. Pearson regarding starting up therapy in the next week.  Otherwise, patient is back to his baseline and recommended to resume all therapies, medication, and follow-up with established care.  All questions and concerns answered prior to being discharged.  Patient discharged home.

## 2022-06-28 NOTE — ASSESSMENT & PLAN NOTE
History of testicular cancer treated with radiation and chemotherapy status postorchiectomy of left testicle in remission now.  Follows with oncology as outpatient.

## 2022-06-29 ENCOUNTER — PHARMACY VISIT (OUTPATIENT)
Dept: PHARMACY | Facility: MEDICAL CENTER | Age: 57
End: 2022-06-29
Payer: COMMERCIAL

## 2022-06-29 VITALS
HEIGHT: 71 IN | BODY MASS INDEX: 37.62 KG/M2 | DIASTOLIC BLOOD PRESSURE: 73 MMHG | WEIGHT: 268.74 LBS | SYSTOLIC BLOOD PRESSURE: 129 MMHG | OXYGEN SATURATION: 97 % | HEART RATE: 68 BPM | TEMPERATURE: 97.3 F | RESPIRATION RATE: 17 BRPM

## 2022-06-29 PROBLEM — R42 DIZZINESS: Status: RESOLVED | Noted: 2019-04-06 | Resolved: 2022-06-29

## 2022-06-29 PROBLEM — R11.2 INTRACTABLE NAUSEA AND VOMITING: Status: RESOLVED | Noted: 2022-06-28 | Resolved: 2022-06-29

## 2022-06-29 PROBLEM — R19.7 DIARRHEA: Status: RESOLVED | Noted: 2022-06-28 | Resolved: 2022-06-29

## 2022-06-29 LAB
ANION GAP SERPL CALC-SCNC: 8 MMOL/L (ref 7–16)
BASOPHILS # BLD AUTO: 0.4 % (ref 0–1.8)
BASOPHILS # BLD: 0.02 K/UL (ref 0–0.12)
BUN SERPL-MCNC: 11 MG/DL (ref 8–22)
CALCIUM SERPL-MCNC: 8.4 MG/DL (ref 8.5–10.5)
CHLORIDE SERPL-SCNC: 103 MMOL/L (ref 96–112)
CO2 SERPL-SCNC: 23 MMOL/L (ref 20–33)
CREAT SERPL-MCNC: 1.01 MG/DL (ref 0.5–1.4)
EOSINOPHIL # BLD AUTO: 0.24 K/UL (ref 0–0.51)
EOSINOPHIL NFR BLD: 4.8 % (ref 0–6.9)
ERYTHROCYTE [DISTWIDTH] IN BLOOD BY AUTOMATED COUNT: 45.3 FL (ref 35.9–50)
GFR SERPLBLD CREATININE-BSD FMLA CKD-EPI: 87 ML/MIN/1.73 M 2
GLUCOSE BLD STRIP.AUTO-MCNC: 177 MG/DL (ref 65–99)
GLUCOSE BLD STRIP.AUTO-MCNC: 250 MG/DL (ref 65–99)
GLUCOSE SERPL-MCNC: 204 MG/DL (ref 65–99)
HCT VFR BLD AUTO: 43.7 % (ref 42–52)
HGB BLD-MCNC: 15.4 G/DL (ref 14–18)
IMM GRANULOCYTES # BLD AUTO: 0.02 K/UL (ref 0–0.11)
IMM GRANULOCYTES NFR BLD AUTO: 0.4 % (ref 0–0.9)
LYMPHOCYTES # BLD AUTO: 1.72 K/UL (ref 1–4.8)
LYMPHOCYTES NFR BLD: 34.7 % (ref 22–41)
MCH RBC QN AUTO: 31 PG (ref 27–33)
MCHC RBC AUTO-ENTMCNC: 35.2 G/DL (ref 33.7–35.3)
MCV RBC AUTO: 88.1 FL (ref 81.4–97.8)
MONOCYTES # BLD AUTO: 0.48 K/UL (ref 0–0.85)
MONOCYTES NFR BLD AUTO: 9.7 % (ref 0–13.4)
NEUTROPHILS # BLD AUTO: 2.47 K/UL (ref 1.82–7.42)
NEUTROPHILS NFR BLD: 50 % (ref 44–72)
NRBC # BLD AUTO: 0 K/UL
NRBC BLD-RTO: 0 /100 WBC
PLATELET # BLD AUTO: 86 K/UL (ref 164–446)
PMV BLD AUTO: 8.8 FL (ref 9–12.9)
POTASSIUM SERPL-SCNC: 4.3 MMOL/L (ref 3.6–5.5)
RBC # BLD AUTO: 4.96 M/UL (ref 4.7–6.1)
SODIUM SERPL-SCNC: 134 MMOL/L (ref 135–145)
WBC # BLD AUTO: 5 K/UL (ref 4.8–10.8)

## 2022-06-29 PROCEDURE — 85025 COMPLETE CBC W/AUTO DIFF WBC: CPT

## 2022-06-29 PROCEDURE — 97162 PT EVAL MOD COMPLEX 30 MIN: CPT

## 2022-06-29 PROCEDURE — 700102 HCHG RX REV CODE 250 W/ 637 OVERRIDE(OP): Performed by: STUDENT IN AN ORGANIZED HEALTH CARE EDUCATION/TRAINING PROGRAM

## 2022-06-29 PROCEDURE — A9270 NON-COVERED ITEM OR SERVICE: HCPCS | Performed by: STUDENT IN AN ORGANIZED HEALTH CARE EDUCATION/TRAINING PROGRAM

## 2022-06-29 PROCEDURE — 99217 PR OBSERVATION CARE DISCHARGE: CPT | Mod: FS | Performed by: NURSE PRACTITIONER

## 2022-06-29 PROCEDURE — 700111 HCHG RX REV CODE 636 W/ 250 OVERRIDE (IP): Performed by: NURSE PRACTITIONER

## 2022-06-29 PROCEDURE — RXMED WILLOW AMBULATORY MEDICATION CHARGE: Performed by: NURSE PRACTITIONER

## 2022-06-29 PROCEDURE — 36415 COLL VENOUS BLD VENIPUNCTURE: CPT

## 2022-06-29 PROCEDURE — 96372 THER/PROPH/DIAG INJ SC/IM: CPT

## 2022-06-29 PROCEDURE — G0378 HOSPITAL OBSERVATION PER HR: HCPCS

## 2022-06-29 PROCEDURE — 82962 GLUCOSE BLOOD TEST: CPT

## 2022-06-29 PROCEDURE — 80048 BASIC METABOLIC PNL TOTAL CA: CPT

## 2022-06-29 RX ORDER — PROMETHAZINE HYDROCHLORIDE 25 MG/1
25 TABLET ORAL EVERY 6 HOURS PRN
Qty: 12 TABLET | Refills: 0 | Status: SHIPPED | OUTPATIENT
Start: 2022-06-29 | End: 2022-07-02

## 2022-06-29 RX ORDER — DIAPER,BRIEF,INFANT-TODD,DISP
EACH MISCELLANEOUS 4 TIMES DAILY PRN
Status: DISCONTINUED | OUTPATIENT
Start: 2022-06-29 | End: 2022-06-29 | Stop reason: HOSPADM

## 2022-06-29 RX ORDER — HEPARIN SODIUM (PORCINE) LOCK FLUSH IV SOLN 100 UNIT/ML 100 UNIT/ML
300-500 SOLUTION INTRAVENOUS
Status: COMPLETED | OUTPATIENT
Start: 2022-06-29 | End: 2022-06-29

## 2022-06-29 RX ADMIN — PROMETHAZINE HYDROCHLORIDE 25 MG: 25 TABLET ORAL at 11:54

## 2022-06-29 RX ADMIN — INSULIN HUMAN 4 UNITS: 100 INJECTION, SOLUTION PARENTERAL at 06:33

## 2022-06-29 RX ADMIN — HEPARIN 400 UNITS: 100 SYRINGE at 15:02

## 2022-06-29 RX ADMIN — APIXABAN 5 MG: 5 TABLET, FILM COATED ORAL at 06:10

## 2022-06-29 RX ADMIN — GABAPENTIN 800 MG: 400 CAPSULE ORAL at 06:10

## 2022-06-29 RX ADMIN — GABAPENTIN 800 MG: 400 CAPSULE ORAL at 11:54

## 2022-06-29 RX ADMIN — INSULIN HUMAN 3 UNITS: 100 INJECTION, SOLUTION PARENTERAL at 11:50

## 2022-06-29 RX ADMIN — HYDROCODONE BITARTRATE AND ACETAMINOPHEN 2 TABLET: 5; 325 TABLET ORAL at 11:54

## 2022-06-29 RX ADMIN — FLUTICASONE PROPIONATE 110 MCG: 110 AEROSOL, METERED RESPIRATORY (INHALATION) at 06:09

## 2022-06-29 RX ADMIN — OMEPRAZOLE 20 MG: 20 CAPSULE, DELAYED RELEASE ORAL at 06:10

## 2022-06-29 ASSESSMENT — GAIT ASSESSMENTS: GAIT LEVEL OF ASSIST: UNABLE TO PARTICIPATE

## 2022-06-29 ASSESSMENT — COGNITIVE AND FUNCTIONAL STATUS - GENERAL
SUGGESTED CMS G CODE MODIFIER MOBILITY: CK
MOBILITY SCORE: 18
WALKING IN HOSPITAL ROOM: TOTAL
CLIMB 3 TO 5 STEPS WITH RAILING: TOTAL

## 2022-06-29 NOTE — CARE PLAN
Assumed care of patient at shift change.  Patient AAO x 4, pleasant, on RA, and resting comfortably in bed.  Awaiting PT/OT recommendations.  No needs verbalized at this time.  Call bell and belongings within reach.  Will continue to monitor.          The patient is Stable - Low risk of patient condition declining or worsening    Shift Goals  Clinical Goals: N/V/pain control  Patient Goals: Rest, pain control, eat  Family Goals: N/A    Progress made toward(s) clinical / shift goals:    Problem: Pain - Standard  Goal: Alleviation of pain or a reduction in pain to the patient’s comfort goal  Outcome: Progressing     Problem: Knowledge Deficit - Standard  Goal: Patient and family/care givers will demonstrate understanding of plan of care, disease process/condition, diagnostic tests and medications  Outcome: Progressing     Problem: Fall Risk  Goal: Patient will remain free from falls  Outcome: Progressing     Problem: Fluid Volume  Goal: Fluid volume balance will be maintained  Outcome: Progressing     Problem: Mobility  Goal: Patient's capacity to carry out activities will improve  Outcome: Progressing     Problem: Self Care  Goal: Patient will have the ability to perform ADLs independently or with assistance (bathe, groom, dress, toilet and feed)  Outcome: Progressing       Patient is not progressing towards the following goals:

## 2022-06-29 NOTE — CARE PLAN
The patient is Watcher - Medium risk of patient condition declining or worsening    Shift Goals  Clinical Goals: pain control  Patient Goals: rest, pain  Family Goals: N/A    Progress made toward(s) clinical / shift goals:    Problem: Pain - Standard  Goal: Alleviation of pain or a reduction in pain to the patient’s comfort goal  Outcome: Progressing       Patient is not progressing towards the following goals:

## 2022-06-29 NOTE — PROGRESS NOTES
1915 Report received from off-going Rn. Allergies, MAR, orders, results reviewed. Pt awake in bed. Snacks provided, no nausea at this time.  Talked with patient for significant amount of time. LR at    2000 Assessment completed, see flowsheet. Norco given for pain.  2100 Snacks provided, no n/v at this time.  2200 Snacks given, pt tolerated.   2300 Bedtime medications given, see MAR.   0050 Notfied MD of rash started on left side of neck, migrating posteriorly to middle of neck. Pt states it has a burning sensation. Benadryl given per order for sleep (also to help with reaction) and ice pack provided.  0300 Pt asleep in bed, call light within reach, bed locked, low position.

## 2022-06-29 NOTE — THERAPY
Physical Therapy   Initial Evaluation     Patient Name: Benito Persaud  Age:  57 y.o., Sex:  male  Medical Record #: [id number]  Today's Date: 6/29/2022          Assessment  Patient is 57 y.o. male admitted w/ c/o abdominal pain and vomitting.  CT - age indeterminate T11 stable compression fx.  Hx of DVT/PE, IVC filter, CVA x2, vertigo, left eye visual impairment, testicular cancer, scheduled to start chemo next week, chronic back pain, facial abscess, falls.  He lives in a half way house, single story.  He is w/c dependent.  Today, he is able to demonstrate a bed to/from w/c transfer w/o assist.  He appears to be at his PLOF.  No acute PT needs.  He apparently has been receiving out patient PT and would benefit from resuming at d/c.  Plan    Recommend Physical Therapy for Evaluation only    DC Equipment Recommendations: None  Discharge Recommendations:  (resume w/ his outpt PT)          Objective       06/29/22 0734   Prior Living Situation   Housing / Facility 1 Story Apartment / Condo   Steps Into Home 0   Steps In Home 0   Equipment Owned Wheelchair   Lives with - Patient's Self Care Capacity Alone and Able to Care For Self   Prior Level of Functional Mobility   Bed Mobility Independent   Transfer Status Independent   Ambulation   (non ambulatory PTA)   Assistive Devices Used Wheelchair   Wheelchair Independent   History of Falls   Date of Last Fall   (reports intermittent falls when transferring)   Strength Lower Body   Comments grossly 3+/5   Balance Assessment   Sitting Balance (Static) Fair +   Sitting Balance (Dynamic) Fair +   Standing Balance (Static) Fair   Standing Balance (Dynamic) Fair   Weight Shift Sitting Good   Weight Shift Standing Fair   Gait Analysis   Gait Level Of Assist Unable to Participate   Bed Mobility    Supine to Sit Supervised   Sit to Supine Supervised   Functional Mobility   Sit to Stand Supervised   Bed, Chair, Wheelchair Transfer Supervised   Anticipated Discharge Equipment and  Recommendations   DC Equipment Recommendations None   Discharge Recommendations   (resume w/ his outpt PT)

## 2022-06-29 NOTE — DISCHARGE INSTRUCTIONS
FOLLOW UP ITEMS POST DISCHARGE  Please call [phone] to schedule PCP appointment for patient.    Required specialty appointments include:       Discharge Instructions per Enrique Herrera A.P.R.N.    -Follow-up with PCP s/p hospitalization  -Follow-up with oncology, Dr. Pearson as originally planned to start therapy due to history of cancer  -Establish to physical therapy as an outpatient in place, patient recommended to follow-up and stay compliant  -Utilize antiemetics  -Avoid high sugar diet and eat higher protein content diet    DIET: Diabetic    ACTIVITY: As tolerated    DIAGNOSIS: Nausea vomiting, abdominal pain    Return to ER if symptoms persist, chest pain, palpitations, shortness of breath, numbness, tingling, weakness, and high fevers.

## 2022-06-29 NOTE — PROGRESS NOTES
"Patient okay to discharge home.    AVS, discharge instructions, and education reviewed with patient. All questions answered.    Chest port heparin locked and deaccessed.    AVS, discharge instructions, and belongings taken with patient.    Patient left via electric scooter/wheelchair and reported he will \"wheel himself home.\" Refused escort.  "

## 2022-06-29 NOTE — DISCHARGE SUMMARY
"Discharge Summary    CHIEF COMPLAINT ON ADMISSION  Chief Complaint   Patient presents with   • Abdominal Pain     Began 6/20 with mid upper ABD sharp consistent 8/10 pain    • N/V     Vomiting up blood, color \"reddish/green\"   • GLF     States fell x3 days ago and this morning, +blood thinners, -LOC/trauma        Reason for Admission  Sent by MD     Admission Date  6/28/2022    Mr. Benito Persaud is a 57 y.o. 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      Patient has had a few admissions to the hospital in the last couple of months, 1 back in April for chest pain/respiratory failure/URI and then in May was admitted for facial abscess requiring I&D.  Since discharge in May has been in the ED a couple times times for nausea vomiting, 1 for syncope.    In the emergency department and underwent extensive.  CBC remarkable only for platelet count of 100, however patient states that this is actually improved from his normal.  Chest x-ray showed left atelectasis without any infiltrate.  CT abdomen pelvis was completed which showed small hiatal hernia, diverticulosis, fat-containing umbilical hernia and CAD.  CT of L-spine showed indeterminate T11 compression fracture that was stable since prior study.    Patient continue to complain of persistent nausea however has not had any episodes of emesis since hospitalization.  Discussed possibility of advancing diet and continuing to monitor, treating with antiemetics.  Patient is primarily wheelchair-bound however did sustain a fall out of " his wheelchair recently, PT/OT has been ordered to evaluate patient's needs.    Patient was observed overnight.  Patient able to tolerate food intake, but only has been requesting soda and ice cream despite being diabetic.  Patient claims that soda and ice cream although on things he could not keep down and has not tried intake on the other food from yesterday.  Patient was also evaluated by PT/OT of which it was determined that he was back at his baseline.  Patient utilizes his wheelchair to ambulate around.  Patient has an established outpatient physical therapy.  Patient will also follow-up with oncologist, Dr. Pearson regarding starting up therapy in the next week.  Otherwise, patient is back to his baseline and recommended to resume all therapies, medication, and follow-up with established care.  All questions and concerns answered prior to being discharged.  Patient discharged home.      Therefore, he is discharged in good and stable condition to home with close outpatient follow-up.    The patient recovered much more quickly than anticipated on admission.    Discharge Date  06/29/22      FOLLOW UP ITEMS POST DISCHARGE  Please call [phone] to schedule PCP appointment for patient.    Required specialty appointments include:       Discharge Instructions per Enrique Herrera A.P.R.NMiranda    -Follow-up with PCP s/p hospitalization  -Follow-up with oncology, Dr. Pearson as originally planned to start therapy due to history of cancer  -Establish to physical therapy as an outpatient in place, patient recommended to follow-up and stay compliant  -Utilize antiemetics  -Avoid high sugar diet and eat higher protein content diet    DIET: Diabetic    ACTIVITY: As tolerated    DIAGNOSIS: Nausea vomiting, abdominal pain    Return to ER if symptoms persist, chest pain, palpitations, shortness of breath, numbness, tingling, weakness, and high fevers.      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) POA: Yes    Advance care planning POA: Yes    Type 2 diabetes mellitus, without long-term current use of insulin (HCC) POA: Yes    Mild persistent asthma POA: Yes  Resolved Problems:    Dizziness POA: Yes    Diarrhea POA: Yes      FOLLOW UP  Future Appointments   Date Time Provider Department Center   7/25/2022  9:00 AM Astrida Grenville, PT, DPT [facility] [place] [address]   8/15/2022  9:00 AM Astrida Grenville, PT, DPT [facility] [place] [address]   8/18/2022  9:00 AM Astrida Grenville, PT, DPT [facility] [place] [address]   8/22/2022  9:00 AM Astrida Rivas, PT, DPT [facility] [place] [address]   8/25/2022  9:00 AM Astrida Rivas, PT, DPT [facility] [place] [address]   8/29/2022  9:00 AM Astrida Rivas, PT, DPT [facility] [place] [address]   9/1/2022  9:00 AM Astrida Grenville, PT, DPT [facility] [place] [address]   9/6/2022  9:00 AM Astrida Grenville, PT, DPT [facility] [place] [address]   9/8/2022  9:00 AM Astrida Grenville, PT, DPT [facility] [place] [address]   12/7/2022 10:00 AM [address]   12/7/2022 10:30 AM [address]   12/12/2022  8:00 AM HOMA Saavedra None     Hanny Wills M.D.  [address]  [place] [address]  [phone]    Schedule an appointment as soon as possible for a visit in 1 week(s)        MEDICATIONS ON DISCHARGE     Medication List      START taking these medications      Instructions   promethazine 25 MG Tabs  Commonly known as: PHENERGAN   Take 1 Tablet by mouth every 6 hours as needed for Nausea/Vomiting for up to 3 days.  Dose: 25 mg        CHANGE how you take these medications      Instructions   Eliquis 5mg Tabs  What changed: how much to take  Generic drug: apixaban   TAKE 1 TABLET BY MOUTH TWICE A DAY     gabapentin 800 MG tablet  What changed: when  to take this  Commonly known as: NEURONTIN   TAKE 1 TABLET BY MOUTH THREE TIMES A DAY        CONTINUE taking these medications      Instructions   acetaminophen 500 MG Tabs  Commonly known as: TYLENOL   Take 500-1,000 mg by mouth every 6 hours as needed for Mild Pain.  Dose: 500-1,000 mg     amitriptyline 100 MG Tabs  Commonly known as: ELAVIL   Take 150 mg by mouth every evening.  Dose: 150 mg     diphenhydrAMINE 50 MG Caps  Commonly known as: BENADRYL   Take 50 mg by mouth at bedtime.  Dose: 50 mg     Flovent  MCG/ACT Aero  Generic drug: fluticasone   Inhale 1 Puff 2 times a day.  Dose: 1 Puff     ipratropium-albuterol  MCG/ACT Aers  Commonly known as: COMBIVENT RESPIMAT   Inhale 1 Puff 2 times a day.  Dose: 1 Puff     metFORMIN 500 MG Tabs  Commonly known as: GLUCOPHAGE   Take 500 mg by mouth 2 times a day with meals.  Dose: 500 mg     Omeprazole 20 MG Tbdd   Take 20 mg by mouth 2 times a day.  Dose: 20 mg     ondansetron 4 MG Tbdp  Commonly known as: Zofran ODT   Take 1 Tablet by mouth every 8 hours as needed for Nausea.  Dose: 4 mg     oxyCODONE immediate-release 5 MG Tabs  Commonly known as: ROXICODONE   Take 5 mg by mouth every four hours as needed for Severe Pain. Short supply  Dose: 5 mg     zolpidem 10 MG Tabs  Commonly known as: AMBIEN   Take 10 mg by mouth at bedtime.  Dose: 10 mg        STOP taking these medications    doxycycline 100 MG Tabs  Commonly known as: VIBRAMYCIN            Allergies  Allergies   Allergen Reactions   • Green Beans Anaphylaxis   • Iodine Anaphylaxis   • Shellfish Allergy Anaphylaxis   • Keflex Diarrhea and Vomiting     Vomiting & Diarrhea  Tolerates ceftriaxone   • Reglan [Metoclopramide] Anxiety     Fast heart rate    • Toradol Hives, Vomiting and Nausea       DIET  Orders Placed This Encounter   Procedures   • Diet Order Diet: Consistent CHO (Diabetic)     Standing Status:   Standing     Number of Occurrences:   1     Order Specific Question:   Diet:     Answer:    Consistent CHO (Diabetic) [4]       ACTIVITY  As tolerated.  Weight bearing as tolerated    CONSULTATIONS  NONE    PROCEDURES  NONE    IMAGING    EC-ECHOCARDIOGRAM COMPLETE W/O CONT   Final Result      CT-ABDOMEN-PELVIS W/O   Final Result         1.  Small hiatal hernia   2.  Diverticulosis   3.  Fat-containing umbilical hernia   4.  Atherosclerosis and atherosclerotic coronary artery disease      CT-LSPINE W/O PLUS RECONS   Final Result         1.  Age indeterminant T11 compression fracture, stable since prior study.   2.  Otherwise no acute traumatic bony injury of the lumbar spine is appreciated.      DX-CHEST-PORTABLE (1 VIEW)   Final Result         1.  Left basilar atelectasis, no focal infiltrate            LABORATORY  Lab Results   Component Value Date    SODIUM 134 (L) 06/29/2022    POTASSIUM 4.3 06/29/2022    CHLORIDE 103 06/29/2022    CO2 23 06/29/2022    GLUCOSE 204 (H) 06/29/2022    BUN 11 06/29/2022    CREATININE 1.01 06/29/2022        Lab Results   Component Value Date    WBC 5.0 06/29/2022    HEMOGLOBIN 15.4 06/29/2022    HEMATOCRIT 43.7 06/29/2022    PLATELETCT 86 (L) 06/29/2022        Total time of the discharge process exceeds 36 minutes.    ==========================================================================================================  Please note that this dictation was created using voice recognition software. I have made every reasonable attempt to correct obvious errors, but there may be errors of grammar and possibly content that I did not discover before finalizing the note.    Electronically signed by:  MICHAEL Herrera, MSN, APRN, FNP-C  Hospitalist Services  [facility]  [phone]  [email or web address]  06/29/22                   [phone]

## 2022-06-30 ENCOUNTER — PATIENT OUTREACH (OUTPATIENT)
Dept: HEALTH INFORMATION MANAGEMENT | Facility: OTHER | Age: 57
End: 2022-06-30

## 2022-06-30 NOTE — PROGRESS NOTES
Community Health Worker Wilber attempted to reach the patient after discharge from the hospital to follow up. Patient did not answer and CHW left a detailed voicemail requesting a call back.     [facility] Margoth Merino will attempt again at a later date.

## 2022-07-07 NOTE — PROGRESS NOTES
GARY Garza will discharge patient from CCM services due to lack of contact after x3 TC attempts 07/07/22 .

## 2022-07-12 ENCOUNTER — TELEPHONE (OUTPATIENT)
Dept: MEDICAL GROUP | Facility: CLINIC | Age: 57
End: 2022-07-12
Payer: MEDICAID

## 2022-07-12 RX ORDER — AMITRIPTYLINE HYDROCHLORIDE 100 MG/1
150 TABLET ORAL NIGHTLY
Qty: 30 TABLET | Refills: 1 | Status: SHIPPED | OUTPATIENT
Start: 2022-07-12 | End: 2022-08-31 | Stop reason: SDUPTHER

## 2022-07-12 NOTE — TELEPHONE ENCOUNTER
Hi,   I am not sure why these were cancelled, they are still showing active on my end. Is he out of these medications and needs to refill them now? I had also sent in a referral for internal medicine to take over primary care to better manage his multiple issues. Has he heard from them about scheduling an appointment?   Thank you,   Hanny Wills M.D.   Message text

## 2022-07-12 NOTE — TELEPHONE ENCOUNTER
Yes, he needs new scripts sent into the pharmacy for these three medications. I am not sure about internal med, as I tried to call both numbers he provided but neither of them worked.

## 2022-07-12 NOTE — TELEPHONE ENCOUNTER
Pt called and left message stating three of his medications were canceled at the pharmacy that he has been taking for a long time. He was not aware of why these were canceled and he is upset about it.   I am assuming this was maybe a mistake? Medications are: amitriptyline, Omeprazole, and ipratropium-albuterol. If this was a mistake, could you please re-send the prescriptions over?  Tried to call pt back, number that was given was not in service and number on the chart also does not work.

## 2022-07-13 NOTE — TELEPHONE ENCOUNTER
Refills sent to pharmacy for these medications. Please let me know if he continues to have issues. Thank you,   Hanny Wills M.D.

## 2022-07-20 ENCOUNTER — APPOINTMENT (OUTPATIENT)
Dept: RADIOLOGY | Facility: MEDICAL CENTER | Age: 57
End: 2022-07-20
Attending: EMERGENCY MEDICINE
Payer: MEDICAID

## 2022-07-20 ENCOUNTER — HOSPITAL ENCOUNTER (EMERGENCY)
Facility: MEDICAL CENTER | Age: 57
End: 2022-07-20
Attending: EMERGENCY MEDICINE
Payer: MEDICAID

## 2022-07-20 ENCOUNTER — PHARMACY VISIT (OUTPATIENT)
Dept: PHARMACY | Facility: MEDICAL CENTER | Age: 57
End: 2022-07-20
Payer: COMMERCIAL

## 2022-07-20 VITALS
HEART RATE: 88 BPM | SYSTOLIC BLOOD PRESSURE: 153 MMHG | RESPIRATION RATE: 16 BRPM | TEMPERATURE: 96.8 F | BODY MASS INDEX: 36.94 KG/M2 | OXYGEN SATURATION: 98 % | WEIGHT: 263.89 LBS | DIASTOLIC BLOOD PRESSURE: 90 MMHG | HEIGHT: 71 IN

## 2022-07-20 DIAGNOSIS — L03.811 CELLULITIS OF HEAD EXCEPT FACE: ICD-10-CM

## 2022-07-20 DIAGNOSIS — L73.9 FOLLICULITIS: ICD-10-CM

## 2022-07-20 LAB
GRAM STN SPEC: NORMAL
SIGNIFICANT IND 70042: NORMAL
SITE SITE: NORMAL
SOURCE SOURCE: NORMAL

## 2022-07-20 PROCEDURE — 87205 SMEAR GRAM STAIN: CPT

## 2022-07-20 PROCEDURE — 700102 HCHG RX REV CODE 250 W/ 637 OVERRIDE(OP): Performed by: EMERGENCY MEDICINE

## 2022-07-20 PROCEDURE — A9270 NON-COVERED ITEM OR SERVICE: HCPCS | Performed by: EMERGENCY MEDICINE

## 2022-07-20 PROCEDURE — 87186 SC STD MICRODIL/AGAR DIL: CPT

## 2022-07-20 PROCEDURE — 87077 CULTURE AEROBIC IDENTIFY: CPT

## 2022-07-20 PROCEDURE — 99284 EMERGENCY DEPT VISIT MOD MDM: CPT

## 2022-07-20 PROCEDURE — 87070 CULTURE OTHR SPECIMN AEROBIC: CPT

## 2022-07-20 PROCEDURE — 70450 CT HEAD/BRAIN W/O DYE: CPT

## 2022-07-20 PROCEDURE — RXMED WILLOW AMBULATORY MEDICATION CHARGE: Performed by: EMERGENCY MEDICINE

## 2022-07-20 PROCEDURE — 303977 HCHG I & D

## 2022-07-20 PROCEDURE — 700111 HCHG RX REV CODE 636 W/ 250 OVERRIDE (IP): Performed by: EMERGENCY MEDICINE

## 2022-07-20 RX ORDER — SULFAMETHOXAZOLE AND TRIMETHOPRIM 800; 160 MG/1; MG/1
1 TABLET ORAL 2 TIMES DAILY
Qty: 14 TABLET | Refills: 0 | Status: SHIPPED | OUTPATIENT
Start: 2022-07-20 | End: 2022-07-20 | Stop reason: SDUPTHER

## 2022-07-20 RX ORDER — CEPHALEXIN 500 MG/1
500 CAPSULE ORAL ONCE
Status: COMPLETED | OUTPATIENT
Start: 2022-07-20 | End: 2022-07-20

## 2022-07-20 RX ORDER — SULFAMETHOXAZOLE AND TRIMETHOPRIM 800; 160 MG/1; MG/1
1 TABLET ORAL 2 TIMES DAILY
Qty: 14 TABLET | Refills: 0 | Status: SHIPPED | OUTPATIENT
Start: 2022-07-20 | End: 2022-07-27 | Stop reason: SDUPTHER

## 2022-07-20 RX ORDER — SULFAMETHOXAZOLE AND TRIMETHOPRIM 800; 160 MG/1; MG/1
1 TABLET ORAL ONCE
Status: COMPLETED | OUTPATIENT
Start: 2022-07-20 | End: 2022-07-20

## 2022-07-20 RX ORDER — CEPHALEXIN 500 MG/1
500 CAPSULE ORAL 4 TIMES DAILY
Qty: 28 CAPSULE | Refills: 0 | Status: SHIPPED | OUTPATIENT
Start: 2022-07-20 | End: 2022-07-27

## 2022-07-20 RX ORDER — ONDANSETRON 4 MG/1
4 TABLET, ORALLY DISINTEGRATING ORAL EVERY 8 HOURS PRN
Qty: 10 TABLET | Refills: 0 | Status: SHIPPED | OUTPATIENT
Start: 2022-07-20 | End: 2022-09-09 | Stop reason: SDUPTHER

## 2022-07-20 RX ORDER — ONDANSETRON 4 MG/1
4 TABLET, ORALLY DISINTEGRATING ORAL ONCE
Status: COMPLETED | OUTPATIENT
Start: 2022-07-20 | End: 2022-07-20

## 2022-07-20 RX ORDER — ONDANSETRON 4 MG/1
4 TABLET, ORALLY DISINTEGRATING ORAL EVERY 8 HOURS PRN
Qty: 10 TABLET | Refills: 0 | Status: SHIPPED | OUTPATIENT
Start: 2022-07-20 | End: 2022-07-20 | Stop reason: SDUPTHER

## 2022-07-20 RX ORDER — CEPHALEXIN 500 MG/1
500 CAPSULE ORAL 4 TIMES DAILY
Qty: 28 CAPSULE | Refills: 0 | Status: SHIPPED | OUTPATIENT
Start: 2022-07-20 | End: 2022-07-20 | Stop reason: SDUPTHER

## 2022-07-20 RX ADMIN — CEPHALEXIN 500 MG: 500 CAPSULE ORAL at 11:38

## 2022-07-20 RX ADMIN — ONDANSETRON 4 MG: 4 TABLET, ORALLY DISINTEGRATING ORAL at 11:38

## 2022-07-20 RX ADMIN — SULFAMETHOXAZOLE AND TRIMETHOPRIM 1 TABLET: 800; 160 TABLET ORAL at 11:30

## 2022-07-20 ASSESSMENT — FIBROSIS 4 INDEX: FIB4 SCORE: 3.08

## 2022-07-20 NOTE — LETTER
7/23/2022               Benito Hernandes [address]  [facility] [address]        Dear Benito (MR#[id number])    This letter is sent in regards to your recent visit to the [facility] Emergency Department on 7/20/2022. During the visit, tests were performed to assist the physician in your medical diagnosis. A review of your tests requires that we notify you of the following:    Your wound culture was POSITIVE for a bacteria called Methicillin Resistant Staphylococcus aureus (MRSA). The antibiotic prescribed for you (sulfamethoxazole-trimethoprim) should be active to treat this bacteria. It is important that you continue taking your antibiotic until it is finished.     Please feel free to contact me at the number below if you have any questions or concerns. Thank you for your cooperation in the matter.    Sincerely,  ED Culture Follow-Up Staff  Merari Marie, PharmD    [facility], Emergency Department  [address] [place] [id number] [phone] (ED Culture Line)

## 2022-07-20 NOTE — ED PROVIDER NOTES
ED Provider Note    CHIEF COMPLAINT  Chief Complaint   Patient presents with   • T-5000 FALL     Fall on Saturday hitting the back head, on eliquis and stopped taking the medication after the fall.  Pain in left side of head into jaw.  No LOC, hit head on a clamp.       HPI  Benito Persaud is a 57 y.o. male who presents with a tender swollen area on the back left side of his head.  Patient states that he fell 4 days ago.  He hit the back of his head actually had a clamp that was stuck in the back of his head that he pulled off.  Since then he has had swelling and discomfort in the back of the head.  Seems to be worsening and pain radiates into the left neck.  He has not had fever.  He states he cleans his helmet every day.  He shows me his helmet and the strap seems to go where he points to the area of swelling.  Because of ongoing discomfort and swelling he comes to the ER.    Patient is anticoagulated with Eliquis.    REVIEW OF SYSTEMS  As per HPI, otherwise a 10 point review of systems is negative    PAST MEDICAL HISTORY  MI, PE, myelodysplastic syndrome, chronic vertigo, lumbar compression fractures, cellulitis, facial cellulitis/abscess    SOCIAL HISTORY  Social History     Tobacco Use   • Smoking status: Never Smoker   • Smokeless tobacco: Current User     Types: Chew   Vaping Use   • Vaping Use: Never used   Substance Use Topics   • Alcohol use: Not Currently   • Drug use: Not Currently       SURGICAL HISTORY  Past Surgical History:   Procedure Laterality Date   • IRRIGATION & DEBRIDEMENT GENERAL N/A 5/25/2022    Procedure: IRRIGATION AND DEBRIDEMENT, WOUND-FACIAL ABSCESS;  Surgeon: Adolph Ortiz M.D.;  Location: SURGERY SAME DAY [facility];  Service: Ent   • CATH REMOVAL N/A 8/14/2019    Procedure: REMOVAL, CATHETER AND PLACEMENT OF POWER PORT;  Surgeon: Sonny Enamorado M.D.;  Location: SURGERY [facility];  Service: General       CURRENT MEDICATIONS  Home Medications     Reviewed by Esthela ZAMARRIPA  "TABITHA Metcalf (Registered Nurse) on 07/20/22 at 0911  Med List Status: Partial   Medication Last Dose Status   acetaminophen (TYLENOL) 500 MG Tab  Active   amitriptyline (ELAVIL) 100 MG Tab  Active   diphenhydrAMINE (BENADRYL) 50 MG Cap  Active   ELIQUIS 5 MG Tab  Active   fluticasone (FLOVENT HFA) 110 MCG/ACT Aerosol  Active   gabapentin (NEURONTIN) 800 MG tablet  Active   ipratropium-albuterol (COMBIVENT RESPIMAT)  MCG/ACT Aero Soln  Active   metFORMIN (GLUCOPHAGE) 500 MG Tab  Active   Omeprazole 20 MG Tablet Delayed Release Dispersible  Active   ondansetron (ZOFRAN ODT) 4 MG TABLET DISPERSIBLE  Active   oxyCODONE immediate-release (ROXICODONE) 5 MG Tab  Active   zolpidem (AMBIEN) 10 MG Tab  Active                ALLERGIES  Allergies   Allergen Reactions   • Green Beans Anaphylaxis   • Iodine Anaphylaxis   • Shellfish Allergy Anaphylaxis   • Keflex Diarrhea and Vomiting     Vomiting & Diarrhea  Tolerates ceftriaxone   • Reglan [Metoclopramide] Anxiety     Fast heart rate    • Toradol Hives, Vomiting and Nausea       PHYSICAL EXAM  VITAL SIGNS: BP (!) 168/130   Pulse (!) 108   Temp 36 °C (96.8 °F) (Temporal)   Resp 16   Ht 1.803 m (5' 11\")   Wt 120 kg (263 lb 14.3 oz)   SpO2 97%   BMI 36.81 kg/m²    Constitutional: Awake and alert  HENT: Swelling left occiput with a central irritated hair follicle/pustule.  Mild overlying redness.  There is a small superficial linear abrasion medial to this.  There is no step-off or deformity.  His tympanic membrane's are clear.  Oropharynx is normal  Eyes: Normal inspection  Neck: Normal range of motion.  No midline tenderness.  There is tenderness over the left occiput and the musculature of the cephalad cervical spine on the left.  Cardiovascular: Normal heart rate  Skin: As described above  Extremities: No clubbing, cyanosis, edema, no Homans or cords.  Neurologic: Grossly normal   Psychiatric: Normal for situation    RADIOLOGY/PROCEDURES  CT-HEAD W/O   Final " Result      No acute intracranial abnormality.              Imaging is interpreted by radiologist    Incision and Drainage Procedure Note    Indication: Abscess    Procedure: The patient was positioned appropriately and the skin over the incision site was prepped with chlorhexidine. Local anesthesia was not performed at the patient's request.  Stab incision overlying pustule.  Small amount of ashley exudate was released    Complications: None        Medications   sulfamethoxazole-trimethoprim (BACTRIM DS) 800-160 MG tablet 1 Tablet (has no administration in time range)   cephALEXin (KEFLEX) capsule 500 mg (has no administration in time range)   ondansetron (ZOFRAN ODT) dispertab 4 mg (has no administration in time range)       COURSE & MEDICAL DECISION MAKING  Patient presents to the ER reporting head trauma but he appears to have a furuncle left posterior scalp.  There is a shallow pustule that was incised as described above.  A small amount of exudate was released.  There is no large cavity to pack.  He does have associated cellulitis will be treated with antibiotics.  Antibiotics tend to make him nauseous and he was therefore given Zofran with this.  Because of head trauma and anticoagulation CT scan of the head was obtained.  The study was negative.  Patient is appropriate for outpatient management.  I sent a wound culture.  He is prescribed Bactrim and Keflex.  Advised warm compress.  Advised alcohol swab helmet and scalp twice daily he was advised return to the ER for worsening swelling, not improving, fevers or concern.      FINAL IMPRESSION  1.  Folliculitis/abscess, simple  2.  Scalp cellulitis  3.  Closed head injury  4.  Chronic anticoagulation      This dictation was created using voice recognition software. The accuracy of the dictation is limited to the abilities of the software.  The nursing notes were reviewed and certain aspects of this information were incorporated into this  note.      Electronically signed by: Parag Juárez M.D., 7/20/2022 11:09 AM

## 2022-07-20 NOTE — ED TRIAGE NOTES
Pt  to triage with   Chief Complaint   Patient presents with   • T-5000 FALL     Fall on Saturday hitting the back head, on eliquis and stopped taking the medication after the fall.  Pain in left side of head into jaw.  No LOC, hit head on a clamp.     Pt Informed regarding triage process and verbalized understanding to inform triage tech or RN for any changes in condition. Placed in lobby.

## 2022-07-22 NOTE — ED NOTES
ED Positive Culture Follow-up/Notification Note:    Date: 7/22/22     Patient seen in the ED on 7/20/2022 for head and jaw pain after hitting his head after a fall 4 days prior to presentation.  Patient is on Eliquis.  Patient states pain has worsened since fall.  CT of patient's head was negative.  On examination, patient does have a pustule on his scalp with small amount of exudate.  Swab was taken and cultured.  Patient was discharged on Bactrim and Keflex, 7 days each, with instructions to return in the event of any worsening symptoms.  1. Folliculitis    2. Cellulitis of head except face       Discharge Medication List as of 7/20/2022  1:08 PM          Allergies: Green beans, Iodine, Shellfish allergy, Keflex, Reglan [metoclopramide], and Toradol     Vitals:    07/20/22 1100 07/20/22 1200 07/20/22 1230 07/20/22 1300   BP: (!) 154/102 (!) 142/96 (!) 153/90    Pulse: 97 90 88    Resp: 16 16 16    Temp:    36 °C (96.8 °F)   TempSrc:    Temporal   SpO2: 99% 94% 98%    Weight:       Height:           Final cultures:   Results     Procedure Component Value Units Date/Time    CULTURE WOUND W/ GRAM STAIN [[id number]]  (Abnormal)  (Susceptibility) Collected: 07/20/22 1107    Order Status: Completed Specimen: Wound from Abscess Updated: 07/22/22 1021     Significant Indicator POS     Source WND     Site Scalp Abscess     Culture Result -     Gram Stain Result Rare WBCs.  Rare Gram positive cocci.       Culture Result Methicillin Resistant Staphylococcus aureus  Moderate growth      Susceptibility     Methicillin resistant staphylococcus aureus (1)     Antibiotic Interpretation Microscan   Method Status    Ampicillin  [*]  Resistant >8 mcg/mL MATT Final    Amoxicillin/CA  [*]  Resistant <=4/2 mcg/mL MATT Final    Azithromycin Resistant >4 mcg/mL MATT Preliminary    Ceftriaxone  [*]  Resistant 8 mcg/mL MATT Final    Clindamycin Resistant >4 mcg/mL MATT Preliminary    Cephalothin  [*]  Resistant <=8 mcg/mL MATT Final    Cefoxitin  Screen  [*]  Positive >4 mcg/mL MATT Final    Cefazolin Resistant <=8 mcg/mL MATT Preliminary    Ciprofloxacin  [*]  Sensitive <=1 mcg/mL MATT Final    Cefepime Resistant 8 mcg/mL MATT Preliminary    Ceftaroline Sensitive <=0.5 mcg/mL MATT Preliminary    Daptomycin Sensitive <=0.5 mcg/mL MATT Preliminary    Ampicillin/sulbactam Resistant <=8/4 mcg/mL MATT Preliminary    Erythromycin Resistant >4 mcg/mL MATT Preliminary    Vancomycin Sensitive 1 mcg/mL MATT Preliminary    Gentamicin  [*]  Sensitive <=4 mcg/mL MATT Final    Imipenem  [*]  Resistant <=4 mcg/mL MATT Final    Levofloxacin  [*]  Sensitive <=1 mcg/mL MATT Final    Linezolid  [*]  Sensitive 2 mcg/mL MATT Final    Meropenem  [*]  Resistant <=2 mcg/mL MATT Final    Oxacillin Resistant >2 mcg/mL MATT Preliminary    Rifampin  [*]  Sensitive <=1 mcg/mL MATT Final    Synercid  [*]  Sensitive <=0.5 mcg/mL MATT Final    Trimeth/Sulfa Sensitive <=0.5/9.5 mcg/mL MATT Preliminary    Tetracycline Sensitive <=4 mcg/mL MATT Preliminary    Tigecycline  [*]  Sensitive <=0.25 mcg/mL MATT Final           [*]  Suppressed Antibiotic                 GRAM STAIN [[id number]] Collected: 07/20/22 1107    Order Status: Completed Specimen: Wound Updated: 07/20/22 1623     Significant Indicator .     Source WND     Site Scalp Abscess     Gram Stain Result Rare WBCs.  Rare Gram positive cocci.            Plan:   Wound culture grew MRSA, sensitive to Bactrim.  Bactrim is appropriate, and the patient can stop taking Keflex.  Phone call was placed to only number in patient's chart, with no answer.  A brief voice mail was left asking for a return call.    Juan Tripathi, PharmD

## 2022-07-23 LAB
BACTERIA WND AEROBE CULT: ABNORMAL
BACTERIA WND AEROBE CULT: ABNORMAL
GRAM STN SPEC: ABNORMAL
SIGNIFICANT IND 70042: ABNORMAL
SITE SITE: ABNORMAL
SOURCE SOURCE: ABNORMAL

## 2022-07-25 ENCOUNTER — APPOINTMENT (OUTPATIENT)
Dept: INTERNAL MEDICINE | Facility: OTHER | Age: 57
End: 2022-07-25
Payer: MEDICAID

## 2022-07-25 ENCOUNTER — PHYSICAL THERAPY (OUTPATIENT)
Dept: PHYSICAL THERAPY | Facility: REHABILITATION | Age: 57
End: 2022-07-25
Attending: FAMILY MEDICINE
Payer: MEDICAID

## 2022-07-25 DIAGNOSIS — M25.562 CHRONIC PAIN OF BOTH KNEES: ICD-10-CM

## 2022-07-25 DIAGNOSIS — R26.2 DIFFICULTY WALKING: ICD-10-CM

## 2022-07-25 DIAGNOSIS — M25.561 CHRONIC PAIN OF BOTH KNEES: ICD-10-CM

## 2022-07-25 DIAGNOSIS — G89.29 CHRONIC PAIN OF BOTH KNEES: ICD-10-CM

## 2022-07-25 PROCEDURE — 97162 PT EVAL MOD COMPLEX 30 MIN: CPT

## 2022-07-25 ASSESSMENT — ENCOUNTER SYMPTOMS
PAIN SCALE AT LOWEST: 5
EXACERBATED BY: STANDING
EXACERBATED BY: SITTING
ALLEVIATING FACTORS: BRACE
PAIN SCALE AT HIGHEST: 10
PAIN SCALE: 8

## 2022-07-25 ASSESSMENT — ACTIVITIES OF DAILY LIVING (ADL): POOR_BALANCE: 1

## 2022-07-25 NOTE — OP THERAPY EVALUATION
"  Outpatient Physical Therapy  INITIAL EVALUATION    [facility] Physical Therapy [place] [address] [facility].  [address]  [place] [address]  Phone:  [phone]  Fax:  [phone]    Date of Evaluation: 07/25/2022    Patient: Benito Persaud  YOB: 1965  MRN: [id number]     Referring Provider: Filiberto Camacho M.D.  [address] [facility]  [place] [address]   Referring Diagnosis Chronic pain of both knees [M25.561, M25.562, G89.29]     Time Calculation  Start time: 0900  Stop time: 0945 Time Calculation (min): 45 minutes         Chief Complaint: Difficulty Walking, Loss Of Balance, and Knee Problem    Visit Diagnoses     ICD-10-CM   1. Chronic pain of both knees  M25.561    M25.562    G89.29   2. Difficulty walking  R26.2       Date of onset of impairment: 11/9/2019    Subjective:   History of Present Illness:     Mechanism of injury:  Pt is a 56 yo male presenting to PT with c/o chronic bilateral knee pain. Reports having hx of L side meniscus tear in 2019, R side patellar fx and meniscus tear 2020. States L knee pops and locks. Uses scooter chair for all community mobility, has standard WC for household mobility. States having current fx of L1 and disintegration of L2. 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 Also has diplopia. Reports having 4 falls in the last 6 months.  Currently works full time as , uses rope across the room to help with balance. Was also working with heavy machinery as  . 4 falls occurred at at work. One fall caused fx on L humerus. Also states having 1 fall during transfer from scooter to bed. States having difficulty with bed mobility (supine to sit), but can manage most transfers. Has to stand to cook at home. Has had near falls while cooking. Knee pain increases with standing long periods of time at work, but is able to stand throughout work shift. Pain also increases with " sitting long periods of time, ~1 hour. States he does not walk d/t balance problems.    Has recently been diagnosed with testicular cancer and leukemia, has not yet started chemo/radiation. Hx of DVTs and pulmonary embolisms. Is currently being medically managed and is overseen by vascular physician per pt. Was recently being seen at wound care for wound on L shin, but is now healed. No other wounds stated.  Sleep disturbance:  Difficulty falling asleep  Pain:     Current pain ratin    At best pain ratin    At worst pain rating:  10    Relieving factors:  Brace    Aggravating factors:  Sitting and standing  Social Support:     Lives in:  Apartment (3rd story, uses elevator)    Lives with:  Alone  Activities of Daily Living:     Patient reported ADL status: Uses shower chair: has difficulty transferring to shower chair, but does have bars.  Independent with dressing and cleaning.  Patient Goals:     Patient goals for therapy:  Decreased pain    Other patient goals:  Increase walking      Past Medical History:   Diagnosis Date   • Asthma    • Cancer (HCC) 2016   • MI (myocardial infarction) ([facility])      Past Surgical History:   Procedure Laterality Date   • IRRIGATION & DEBRIDEMENT GENERAL N/A 2022    Procedure: IRRIGATION AND DEBRIDEMENT, WOUND-FACIAL ABSCESS;  Surgeon: Adolph Ortiz M.D.;  Location: SURGERY SAME DAY [facility];  Service: Ent   • CATH REMOVAL N/A 2019    Procedure: REMOVAL, CATHETER AND PLACEMENT OF POWER PORT;  Surgeon: Sonny Enamorado M.D.;  Location: SURGERY [facility];  Service: General     Social History     Tobacco Use   • Smoking status: Never Smoker   • Smokeless tobacco: Current User     Types: Chew   Substance Use Topics   • Alcohol use: Not Currently     Family and Occupational History     Socioeconomic History   • Marital status: Single     Spouse name: Not on file   • Number of children: Not on file   • Years of education: Not on file   • Highest education  level: Not on file   Occupational History   • Not on file       Objective     Observations   Left Knee   Positive for edema.     Right Knee   Positive for edema.     Additional Observation Details  2+ pitting edema BLEs    Neurological Testing     Sensation     Knee   Left Knee   Hyposensation: light touch    Right Knee   Hyposensation: light touch    Reflexes   Left   Patellar (L4): absent (0)    Right   Patellar (L4): absent (0)    Palpation     Additional Palpation Details  TTP to R side posterior knee    Active Range of Motion     Additional Active Range of Motion Details  Unable to reach full extension during LAQ (bilaterally)    Strength:      Left Hip   Planes of Motion   Flexion: 4    Right Hip   Planes of Motion   Flexion: 4+    Left Knee   Flexion: 4+  Extension: 5    Right Knee   Flexion: 4+  Extension: 5    Left Ankle/Foot   Dorsiflexion: 4+    Right Ankle/Foot   Dorsiflexion: 5    Additional Strength Details  PF tested in seated position: able to hold against mod resistance  Hip abd/add tested in seated:  Abd: 4+/5 bilaterally  Add: 4+/5 bilaterally        Therapeutic Exercises (CPT 12024):       Therapeutic Exercise Summary: HEP: 1x/daily  LAQs      Time-based treatments/modalities:           Assessment, Response and Plan:   Impairments: activity intolerance, impaired functional mobility, impaired balance, impaired physical strength, lacks appropriate home exercise program, limited ADL's, limited mobility and safety issue    Assessment details:  Pt is a 56 yo male presenting to PT w/ c/o bilateral knee pain, demo mobility and force production deficits. Pertinent clinical findings include LE weakness and decreased ROM. Standing tolerance and transfers not assessed d/t time constraints and long subjective d/t long medical hx and comorbidities. Impairments are associated w/ difficulty walking and transferring. Pt is at high risk of falls, will facilitate balance assessment next visit. The patient would  benefit from skilled PT to address functional mobility deficits in order to improve independence and decrease risk of falls. Prognosis may be limited d/t high complexity of comorbidities. The patient states he understands and agrees with the plan of care. HEP w/ handout was reviewed and provided to the pt.  Barriers to therapy:  Comorbidities  Prognosis: fair    Goals:   Short Term Goals:   1. Pt will complete most appropriate balance assessment  2. Pt will complete most appropriate functional mobility assessment  3. Pt will demo independent squat pivot transfer with proper mechanics and sequencing for max safety  Short term goal time span:  4-6 weeks      Long Term Goals:    1. Pt will demo LE strength 5/5 for improved stability  2. Pt will demo ability to walk 10 meters w/ LRAD w/ supervision in order to improve functional mobility  3. Pt will score low to medium fall risk per most appropriate balance assessment.  4. Pt will report knee pain no greater than 3/10 after completing full work shift  5. Pt will be able to tolerate sitting for 30 min with knee pain no greater than 3/10 in order to improve QoL  Long term goal time span:  6-8 weeks    Plan:   Therapy options:  Physical therapy treatment to continue  Planned therapy interventions:  Therapeutic Exercise (CPT 43294) and Therapeutic Activities (CPT 27415)  Other planned therapy interventions:  3 units 96690, 1 unit 22269  Frequency:  2x week  Duration in visits:  16  Discussed with:  Patient      Functional Assessment Used  WOMAC Grand Total: 62.5     Referring provider co-signature:  I have reviewed this plan of care and my co-signature certifies the need for services.    Certification Period: 07/25/2022 to  10/23/22    Physician Signature: ________________________________ Date: ______________

## 2022-07-26 PROCEDURE — 99284 EMERGENCY DEPT VISIT MOD MDM: CPT

## 2022-07-26 PROCEDURE — 82962 GLUCOSE BLOOD TEST: CPT

## 2022-07-26 ASSESSMENT — FIBROSIS 4 INDEX: FIB4 SCORE: 3.08

## 2022-07-27 ENCOUNTER — HOSPITAL ENCOUNTER (EMERGENCY)
Facility: MEDICAL CENTER | Age: 57
End: 2022-07-27
Attending: EMERGENCY MEDICINE
Payer: MEDICAID

## 2022-07-27 ENCOUNTER — APPOINTMENT (OUTPATIENT)
Dept: RADIOLOGY | Facility: MEDICAL CENTER | Age: 57
End: 2022-07-27
Attending: EMERGENCY MEDICINE
Payer: MEDICAID

## 2022-07-27 VITALS
TEMPERATURE: 96.9 F | HEART RATE: 81 BPM | DIASTOLIC BLOOD PRESSURE: 87 MMHG | RESPIRATION RATE: 20 BRPM | OXYGEN SATURATION: 96 % | WEIGHT: 263 LBS | BODY MASS INDEX: 36.82 KG/M2 | HEIGHT: 71 IN | SYSTOLIC BLOOD PRESSURE: 145 MMHG

## 2022-07-27 DIAGNOSIS — L02.91 ABSCESS: ICD-10-CM

## 2022-07-27 DIAGNOSIS — L03.811 CELLULITIS OF HEAD EXCEPT FACE: ICD-10-CM

## 2022-07-27 DIAGNOSIS — A49.02 MRSA INFECTION: ICD-10-CM

## 2022-07-27 LAB
GLUCOSE BLD STRIP.AUTO-MCNC: 298 MG/DL (ref 65–99)
GLUCOSE BLD STRIP.AUTO-MCNC: 311 MG/DL (ref 65–99)

## 2022-07-27 PROCEDURE — 700102 HCHG RX REV CODE 250 W/ 637 OVERRIDE(OP): Performed by: EMERGENCY MEDICINE

## 2022-07-27 PROCEDURE — 303977 HCHG I & D

## 2022-07-27 PROCEDURE — 73564 X-RAY EXAM KNEE 4 OR MORE: CPT | Mod: LT

## 2022-07-27 PROCEDURE — 700101 HCHG RX REV CODE 250: Performed by: EMERGENCY MEDICINE

## 2022-07-27 PROCEDURE — 82962 GLUCOSE BLOOD TEST: CPT

## 2022-07-27 PROCEDURE — A9270 NON-COVERED ITEM OR SERVICE: HCPCS | Performed by: EMERGENCY MEDICINE

## 2022-07-27 RX ORDER — HYDROCODONE BITARTRATE AND ACETAMINOPHEN 5; 325 MG/1; MG/1
1 TABLET ORAL ONCE
Status: COMPLETED | OUTPATIENT
Start: 2022-07-27 | End: 2022-07-27

## 2022-07-27 RX ORDER — LIDOCAINE HYDROCHLORIDE AND EPINEPHRINE 10; 10 MG/ML; UG/ML
5 INJECTION, SOLUTION INFILTRATION; PERINEURAL ONCE
Status: COMPLETED | OUTPATIENT
Start: 2022-07-27 | End: 2022-07-27

## 2022-07-27 RX ORDER — SULFAMETHOXAZOLE AND TRIMETHOPRIM 800; 160 MG/1; MG/1
1 TABLET ORAL 2 TIMES DAILY
Qty: 10 TABLET | Refills: 0 | Status: ON HOLD | OUTPATIENT
Start: 2022-07-27 | End: 2022-08-01

## 2022-07-27 RX ADMIN — LIDOCAINE HYDROCHLORIDE,EPINEPHRINE BITARTRATE 5 ML: 10; .01 INJECTION, SOLUTION INFILTRATION; PERINEURAL at 02:32

## 2022-07-27 RX ADMIN — HYDROCODONE BITARTRATE AND ACETAMINOPHEN 1 TABLET: 5; 325 TABLET ORAL at 01:35

## 2022-07-27 ASSESSMENT — ENCOUNTER SYMPTOMS
ABDOMINAL PAIN: 0
DIZZINESS: 0
BACK PAIN: 0
NECK PAIN: 0
HEADACHES: 0
SHORTNESS OF BREATH: 0
COUGH: 0
WEAKNESS: 1
FEVER: 0
SORE THROAT: 0
VOMITING: 0
LOSS OF CONSCIOUSNESS: 0
NAUSEA: 0

## 2022-07-27 ASSESSMENT — LIFESTYLE VARIABLES: SUBSTANCE_ABUSE: 0

## 2022-07-27 NOTE — ED TRIAGE NOTES
"Chief Complaint   Patient presents with   • GLF     Pt had GLF today while trying to get from shower to chair, pt denies hitting his head. +blood thinners -LOC.  Pt states he fell down on his left knee.     • Wound Infection     Pt received a call from [facility] on 7/22 regarding a +MRSA culture on his scalp.        Pt wheeled to triage for above complaints. Pt also states his blood sugar has been high and he has been unable to eat.  in triage.    BP (!) 157/101   Pulse 90   Temp 36 °C (96.8 °F) (Temporal)   Resp 18   Ht 1.803 m (5' 11\")   Wt 119 kg (263 lb)   SpO2 92% Comment: Room air  BMI 36.68 kg/m²     "

## 2022-07-27 NOTE — ED NOTES
Dressing applied to wound on right chin and left posterior head area; wrapped with coband as per ERP's instruction. Bleeding controlled at this time.

## 2022-07-27 NOTE — ED PROVIDER NOTES
ED Provider Note     7/27/2022  12:49 AM    Means of Arrival: Walk In  History obtained by: patient  Limitations: None  PCP: Jacobo Eddy  CODE STATUS: Full    CHIEF COMPLAINT  Chief Complaint   Patient presents with   • GLF     Pt had GLF today while trying to get from shower to chair, pt denies hitting his head. +blood thinners -LOC.  Pt states he fell down on his left knee.     • Wound Infection     Pt received a call from [facility] on 7/22 regarding a +MRSA culture on his scalp.        HPI  Benito Persaud is a 57 y.o. male with a complex medical history that includes thromboembolic disease, myelodysplastic malignancy, chronic balance problems, frequent falls, asthma, MI, anticoagulated with Eliquis who presents with concerns of left knee pain.  He was moving from shower to his chair when he lost balance.  He fell down onto his left knee.  He says he already has bilateral ligamentous injuries in his knees.  He also has concerns about persistent infections at his scalp and face.    He was last seen in our emergency department on July 20, 2022 with an for an uncle left posterior scalp.  There is associated cellulitis.  I&D did release purulent exudate.  He was prescribed Bactrim and Keflex.  Cultures later returned positive for MRSA.  Fortunately Bactrim should cover that.  There is no follow-up that I can see in the chart.    He has 1 day remaining of antibiotics.  He has been applying warm compresses to the scalp region where I&D was done.  He also has a similar region about 1 cm in diameter firm on the mandible.    REVIEW OF SYSTEMS  Review of Systems   Constitutional: Negative for fever.   HENT: Negative for nosebleeds and sore throat.    Respiratory: Negative for cough and shortness of breath.    Cardiovascular: Positive for leg swelling (chronic).   Gastrointestinal: Negative for abdominal pain, nausea and vomiting.   Musculoskeletal: Positive for joint pain. Negative for back pain and neck pain.  "  Neurological: Positive for weakness. Negative for dizziness, loss of consciousness and headaches.   Psychiatric/Behavioral: Negative for substance abuse.   All other systems reviewed and are negative.    See HPI for further details.    PAST MEDICAL HISTORY   has a past medical history of Asthma, Cancer ([facility]) (11/01/2016), and MI (myocardial infarction) ([facility]) (2019).    FAMILY HISTORY  Family History   Problem Relation Age of Onset   • Cancer Mother    • Psychiatric Illness Mother    • Diabetes Mother    • Hypertension Mother    • Hyperlipidemia Mother    • Arterial Aneurysm Father    • Heart Disease Maternal Grandmother        SOCIAL HISTORY  Social History     Tobacco Use   • Smoking status: Never Smoker   • Smokeless tobacco: Current User     Types: Chew   Vaping Use   • Vaping Use: Never used   Substance and Sexual Activity   • Alcohol use: Not Currently   • Drug use: Not Currently   • Sexual activity: Not on file   Works as an .     SURGICAL HISTORY   has a past surgical history that includes cath removal (N/A, 8/14/2019) and irrigation & debridement general (N/A, 5/25/2022).    CURRENT MEDICATIONS  Home Medications    **Home medications have not yet been reviewed for this encounter**         ALLERGIES  Allergies   Allergen Reactions   • Green Beans Anaphylaxis   • Iodine Anaphylaxis   • Shellfish Allergy Anaphylaxis   • Keflex Diarrhea and Vomiting     Vomiting & Diarrhea  Tolerates ceftriaxone   • Reglan [Metoclopramide] Anxiety     Fast heart rate    • Toradol Hives, Vomiting and Nausea       PHYSICAL EXAM  VITAL SIGNS: BP (!) 140/95   Pulse 76   Temp 36.1 °C (96.9 °F) (Temporal)   Resp 20   Ht 1.803 m (5' 11\")   Wt 119 kg (263 lb)   SpO2 95%   BMI 36.68 kg/m²     Pulse ox interpretation: I interpret this pulse ox as normal.  Constitutional: Alert in no apparent distress.  HENT: No signs of trauma, Bilateral external ears normal, Nose normal.   Eyes: Pupils are equal, Conjunctiva " normal, Non-icteric.   Neck: Normal range of motion, No tenderness, Supple, No stridor.   Lymphatic: No lymphadenopathy noted.   Cardiovascular: Regular rate and rhythm, no murmurs. Symmetric distal pulses. No cyanosis of extremities. Edema in bilateral lower extremities.   Thorax & Lungs: Normal breath sounds, No respiratory distress, No wheezing, No chest tenderness.   Abdomen: Soft, No tenderness  Skin: Warm, Dry, furnucle at left posterior scalp and right mandible region. Indurated not fluctuant.  Back: No midline bony tenderness, No CVA tenderness.   Musculoskeletal: Good range of motion in all major joints. No tenderness to palpation or major deformities noted.   Neurologic: Alert , Normal motor function, Normal sensory function, No focal deficits noted.   Psychiatric: Affect normal, Judgment normal, Mood normal.   Physical Exam      DIAGNOSTIC STUDIES / PROCEDURES      LABS  Pertinent Labs & Imaging studies reviewed. (See chart for details)    RADIOLOGY  Pertinent Labs & Imaging studies reviewed. (See chart for details)    COURSE & MEDICAL DECISION MAKING  Pertinent Labs & Imaging studies reviewed. (See chart for details)    12:49 AM This is an emergent evaluation of a very pleasant 57 y.o. male who presents with concerns for worsening abscesses at left posterior scalp and right mandible. Recently treated with bactrim and keflex. Cultures grew MRSA. He also has left knee pain after falling in bathtub earlier. No head trauma.  Plan for XR of left knee. Will I&D lesions on left posterior scalp and right mandible region.     2:41 AM  I&D done. No drainage. Likely resolving abscesses. , so will extend bactrim for another 5 days.  XR knee without signs of fracture or dislocation. He had concerns about having a high glucose level at home. He does not take insulin. He said glucose at home earlier was 400s then 330 just prior to checking in, and we rechecked here and it is 290.  I do not have suspicion  for DKA. Reassuring that glucose coming down. I have asked him to contact his primary provider for further evaluation of poorly controlled glucose levels. He is agreeable with plan for discharge.     Incision and Drainage Procedure Note    Indication: Abscess    Procedure: The patient was positioned appropriately and the skin over the incision site at left posterior scalp and right mandible region were prepped with chlorhexidine. Local anesthesia was obtained by infiltration using 1% Lidocaine with epinephrine.  An incision was then made over the apex of the lesions and approximately 0 cc of  material was expressed. Loculations were not present. The drainage cavity was then irrigated. The patient’s tetanus status was up to date and did not require a booster dose.    The patient tolerated the procedure well.    Complications: None     The patient will return for worsening symptoms and is stable at the time of discharge. The patient verbalizes understanding. Guidance was provided on appropriate use of medications including driving under the influence, overdose, and side effects.         FINAL IMPRESSION    ICD-10-CM   1. Abscess  L02.91   2. MRSA infection  A49.02   3. Cellulitis of head except face  L03.811            This dictation was created using voice recognition software. The accuracy of the dictation is limited to the abilities of the software. I expect there may be some errors of grammar and possibly content. The nursing notes were reviewed and certain aspects of this information were incorporated into this note.    Electronically signed by: Maxi Nava II, M.D., 7/27/2022 12:49 AM

## 2022-07-27 NOTE — ED NOTES
Pt cleared for discharge by ERP.    Pt discharged in stable condition. Discharge instructions given and explained to pt and verbalized understanding. Pt on electric wheelchair in no apparent distress out of the ER.

## 2022-07-27 NOTE — ED NOTES
Pt on personal electric wheelchair to assigned room. Pt now resting quietly in [facility]. No apparent distress noted and breathing is non-labored with equal rise and fall of chest wall. Pt denies any new complaints at this time.

## 2022-07-29 ENCOUNTER — APPOINTMENT (OUTPATIENT)
Dept: RADIOLOGY | Facility: MEDICAL CENTER | Age: 57
End: 2022-07-29
Attending: EMERGENCY MEDICINE
Payer: MEDICAID

## 2022-07-29 ENCOUNTER — HOSPITAL ENCOUNTER (OUTPATIENT)
Facility: MEDICAL CENTER | Age: 57
End: 2022-08-01
Attending: EMERGENCY MEDICINE | Admitting: STUDENT IN AN ORGANIZED HEALTH CARE EDUCATION/TRAINING PROGRAM
Payer: MEDICAID

## 2022-07-29 DIAGNOSIS — E11.10 DKA, TYPE 2, NOT AT GOAL (HCC): ICD-10-CM

## 2022-07-29 DIAGNOSIS — R73.9 HYPERGLYCEMIA: ICD-10-CM

## 2022-07-29 DIAGNOSIS — S32.011D CLOSED STABLE BURST FRACTURE OF FIRST LUMBAR VERTEBRA WITH ROUTINE HEALING: ICD-10-CM

## 2022-07-29 DIAGNOSIS — R29.6 MULTIPLE FALLS: ICD-10-CM

## 2022-07-29 DIAGNOSIS — Z79.01 ANTICOAGULATED: ICD-10-CM

## 2022-07-29 DIAGNOSIS — R11.2 INTRACTABLE NAUSEA AND VOMITING: Primary | ICD-10-CM
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
## 2022-07-29 PROBLEM — R42 CHRONIC VERTIGO: Status: ACTIVE | Noted: 2022-07-29

## 2022-07-29 LAB
ALBUMIN SERPL BCP-MCNC: 3.8 G/DL (ref 3.2–4.9)
ALBUMIN/GLOB SERPL: 1.2 G/DL
ALP SERPL-CCNC: 128 U/L (ref 30–99)
ALT SERPL-CCNC: 18 U/L (ref 2–50)
ANION GAP SERPL CALC-SCNC: 10 MMOL/L (ref 7–16)
AST SERPL-CCNC: 27 U/L (ref 12–45)
BASOPHILS # BLD AUTO: 0.4 % (ref 0–1.8)
BASOPHILS # BLD: 0.02 K/UL (ref 0–0.12)
BILIRUB SERPL-MCNC: 0.5 MG/DL (ref 0.1–1.5)
BUN SERPL-MCNC: 9 MG/DL (ref 8–22)
CALCIUM SERPL-MCNC: 9.2 MG/DL (ref 8.5–10.5)
CHLORIDE SERPL-SCNC: 101 MMOL/L (ref 96–112)
CO2 SERPL-SCNC: 22 MMOL/L (ref 20–33)
CREAT SERPL-MCNC: 1 MG/DL (ref 0.5–1.4)
CRP SERPL HS-MCNC: 0.98 MG/DL (ref 0–0.75)
EKG IMPRESSION: NORMAL
EOSINOPHIL # BLD AUTO: 0.17 K/UL (ref 0–0.51)
EOSINOPHIL NFR BLD: 3 % (ref 0–6.9)
ERYTHROCYTE [DISTWIDTH] IN BLOOD BY AUTOMATED COUNT: 45.1 FL (ref 35.9–50)
ERYTHROCYTE [SEDIMENTATION RATE] IN BLOOD BY WESTERGREN METHOD: 5 MM/HOUR (ref 0–20)
GFR SERPLBLD CREATININE-BSD FMLA CKD-EPI: 88 ML/MIN/1.73 M 2
GLOBULIN SER CALC-MCNC: 3.1 G/DL (ref 1.9–3.5)
GLUCOSE BLD STRIP.AUTO-MCNC: 158 MG/DL (ref 65–99)
GLUCOSE BLD STRIP.AUTO-MCNC: 163 MG/DL (ref 65–99)
GLUCOSE BLD STRIP.AUTO-MCNC: 173 MG/DL (ref 65–99)
GLUCOSE SERPL-MCNC: 232 MG/DL (ref 65–99)
HCT VFR BLD AUTO: 45.7 % (ref 42–52)
HGB BLD-MCNC: 16.1 G/DL (ref 14–18)
IMM GRANULOCYTES # BLD AUTO: 0.02 K/UL (ref 0–0.11)
IMM GRANULOCYTES NFR BLD AUTO: 0.4 % (ref 0–0.9)
LACTATE SERPL-SCNC: 2.2 MMOL/L (ref 0.5–2)
LYMPHOCYTES # BLD AUTO: 2.11 K/UL (ref 1–4.8)
LYMPHOCYTES NFR BLD: 37.7 % (ref 22–41)
MAGNESIUM SERPL-MCNC: 2 MG/DL (ref 1.5–2.5)
MCH RBC QN AUTO: 30.6 PG (ref 27–33)
MCHC RBC AUTO-ENTMCNC: 35.2 G/DL (ref 33.7–35.3)
MCV RBC AUTO: 86.9 FL (ref 81.4–97.8)
MONOCYTES # BLD AUTO: 0.51 K/UL (ref 0–0.85)
MONOCYTES NFR BLD AUTO: 9.1 % (ref 0–13.4)
NEUTROPHILS # BLD AUTO: 2.76 K/UL (ref 1.82–7.42)
NEUTROPHILS NFR BLD: 49.4 % (ref 44–72)
NRBC # BLD AUTO: 0 K/UL
NRBC BLD-RTO: 0 /100 WBC
PLATELET # BLD AUTO: 97 K/UL (ref 164–446)
PMV BLD AUTO: 8.8 FL (ref 9–12.9)
POTASSIUM SERPL-SCNC: 3.8 MMOL/L (ref 3.6–5.5)
PROT SERPL-MCNC: 6.9 G/DL (ref 6–8.2)
RBC # BLD AUTO: 5.26 M/UL (ref 4.7–6.1)
SODIUM SERPL-SCNC: 133 MMOL/L (ref 135–145)
T4 FREE SERPL-MCNC: 1.06 NG/DL (ref 0.93–1.7)
TROPONIN T SERPL-MCNC: 12 NG/L (ref 6–19)
TSH SERPL DL<=0.005 MIU/L-ACNC: 2.21 UIU/ML (ref 0.38–5.33)
VIT B12 SERPL-MCNC: 250 PG/ML (ref 211–911)
WBC # BLD AUTO: 5.6 K/UL (ref 4.8–10.8)

## 2022-07-29 PROCEDURE — 700101 HCHG RX REV CODE 250: Performed by: INTERNAL MEDICINE

## 2022-07-29 PROCEDURE — 97163 PT EVAL HIGH COMPLEX 45 MIN: CPT

## 2022-07-29 PROCEDURE — A9270 NON-COVERED ITEM OR SERVICE: HCPCS

## 2022-07-29 PROCEDURE — 86140 C-REACTIVE PROTEIN: CPT

## 2022-07-29 PROCEDURE — 96376 TX/PRO/DX INJ SAME DRUG ADON: CPT

## 2022-07-29 PROCEDURE — 99219 PR INITIAL OBSERVATION CARE,LEVL II: CPT | Performed by: STUDENT IN AN ORGANIZED HEALTH CARE EDUCATION/TRAINING PROGRAM

## 2022-07-29 PROCEDURE — 700105 HCHG RX REV CODE 258: Performed by: STUDENT IN AN ORGANIZED HEALTH CARE EDUCATION/TRAINING PROGRAM

## 2022-07-29 PROCEDURE — A9270 NON-COVERED ITEM OR SERVICE: HCPCS | Performed by: EMERGENCY MEDICINE

## 2022-07-29 PROCEDURE — 84443 ASSAY THYROID STIM HORMONE: CPT

## 2022-07-29 PROCEDURE — 85652 RBC SED RATE AUTOMATED: CPT

## 2022-07-29 PROCEDURE — 700105 HCHG RX REV CODE 258: Performed by: EMERGENCY MEDICINE

## 2022-07-29 PROCEDURE — 93005 ELECTROCARDIOGRAM TRACING: CPT | Performed by: EMERGENCY MEDICINE

## 2022-07-29 PROCEDURE — 36415 COLL VENOUS BLD VENIPUNCTURE: CPT

## 2022-07-29 PROCEDURE — 96372 THER/PROPH/DIAG INJ SC/IM: CPT | Mod: XU

## 2022-07-29 PROCEDURE — 700102 HCHG RX REV CODE 250 W/ 637 OVERRIDE(OP): Performed by: EMERGENCY MEDICINE

## 2022-07-29 PROCEDURE — 72131 CT LUMBAR SPINE W/O DYE: CPT

## 2022-07-29 PROCEDURE — 82607 VITAMIN B-12: CPT

## 2022-07-29 PROCEDURE — 99285 EMERGENCY DEPT VISIT HI MDM: CPT

## 2022-07-29 PROCEDURE — 83735 ASSAY OF MAGNESIUM: CPT

## 2022-07-29 PROCEDURE — 71045 X-RAY EXAM CHEST 1 VIEW: CPT

## 2022-07-29 PROCEDURE — 99205 OFFICE O/P NEW HI 60 MIN: CPT | Performed by: PHYSICAL MEDICINE & REHABILITATION

## 2022-07-29 PROCEDURE — 94640 AIRWAY INHALATION TREATMENT: CPT

## 2022-07-29 PROCEDURE — 84484 ASSAY OF TROPONIN QUANT: CPT

## 2022-07-29 PROCEDURE — 700102 HCHG RX REV CODE 250 W/ 637 OVERRIDE(OP): Performed by: INTERNAL MEDICINE

## 2022-07-29 PROCEDURE — A9270 NON-COVERED ITEM OR SERVICE: HCPCS | Performed by: STUDENT IN AN ORGANIZED HEALTH CARE EDUCATION/TRAINING PROGRAM

## 2022-07-29 PROCEDURE — 94760 N-INVAS EAR/PLS OXIMETRY 1: CPT

## 2022-07-29 PROCEDURE — 97166 OT EVAL MOD COMPLEX 45 MIN: CPT

## 2022-07-29 PROCEDURE — 70450 CT HEAD/BRAIN W/O DYE: CPT

## 2022-07-29 PROCEDURE — 700111 HCHG RX REV CODE 636 W/ 250 OVERRIDE (IP): Performed by: STUDENT IN AN ORGANIZED HEALTH CARE EDUCATION/TRAINING PROGRAM

## 2022-07-29 PROCEDURE — 700111 HCHG RX REV CODE 636 W/ 250 OVERRIDE (IP): Performed by: EMERGENCY MEDICINE

## 2022-07-29 PROCEDURE — A9270 NON-COVERED ITEM OR SERVICE: HCPCS | Performed by: INTERNAL MEDICINE

## 2022-07-29 PROCEDURE — 700102 HCHG RX REV CODE 250 W/ 637 OVERRIDE(OP): Performed by: STUDENT IN AN ORGANIZED HEALTH CARE EDUCATION/TRAINING PROGRAM

## 2022-07-29 PROCEDURE — 82962 GLUCOSE BLOOD TEST: CPT | Mod: 91

## 2022-07-29 PROCEDURE — G0378 HOSPITAL OBSERVATION PER HR: HCPCS

## 2022-07-29 PROCEDURE — 93005 ELECTROCARDIOGRAM TRACING: CPT

## 2022-07-29 PROCEDURE — 96374 THER/PROPH/DIAG INJ IV PUSH: CPT

## 2022-07-29 PROCEDURE — 85025 COMPLETE CBC W/AUTO DIFF WBC: CPT

## 2022-07-29 PROCEDURE — 84439 ASSAY OF FREE THYROXINE: CPT

## 2022-07-29 PROCEDURE — 80053 COMPREHEN METABOLIC PANEL: CPT

## 2022-07-29 PROCEDURE — 700102 HCHG RX REV CODE 250 W/ 637 OVERRIDE(OP)

## 2022-07-29 PROCEDURE — 83605 ASSAY OF LACTIC ACID: CPT

## 2022-07-29 PROCEDURE — 96375 TX/PRO/DX INJ NEW DRUG ADDON: CPT

## 2022-07-29 RX ORDER — OXYCODONE HYDROCHLORIDE 5 MG/1
5 TABLET ORAL EVERY 6 HOURS PRN
Status: DISCONTINUED | OUTPATIENT
Start: 2022-07-29 | End: 2022-07-31

## 2022-07-29 RX ORDER — CHOLECALCIFEROL (VITAMIN D3) 125 MCG
5 CAPSULE ORAL NIGHTLY PRN
Status: DISCONTINUED | OUTPATIENT
Start: 2022-07-29 | End: 2022-08-01 | Stop reason: HOSPADM

## 2022-07-29 RX ORDER — SODIUM CHLORIDE, SODIUM LACTATE, POTASSIUM CHLORIDE, CALCIUM CHLORIDE 600; 310; 30; 20 MG/100ML; MG/100ML; MG/100ML; MG/100ML
INJECTION, SOLUTION INTRAVENOUS CONTINUOUS
Status: ACTIVE | OUTPATIENT
Start: 2022-07-29 | End: 2022-07-30

## 2022-07-29 RX ORDER — DEXTROSE MONOHYDRATE 25 G/50ML
25 INJECTION, SOLUTION INTRAVENOUS
Status: DISCONTINUED | OUTPATIENT
Start: 2022-07-29 | End: 2022-07-31

## 2022-07-29 RX ORDER — DIAZEPAM 5 MG/ML
2.5 INJECTION, SOLUTION INTRAMUSCULAR; INTRAVENOUS ONCE
Status: DISCONTINUED | OUTPATIENT
Start: 2022-07-29 | End: 2022-07-29

## 2022-07-29 RX ORDER — OMEPRAZOLE 20 MG/1
20 CAPSULE, DELAYED RELEASE ORAL
Status: DISCONTINUED | OUTPATIENT
Start: 2022-07-29 | End: 2022-08-01 | Stop reason: HOSPADM

## 2022-07-29 RX ORDER — DIAZEPAM 2 MG/1
2 TABLET ORAL ONCE
Status: COMPLETED | OUTPATIENT
Start: 2022-07-29 | End: 2022-07-29

## 2022-07-29 RX ORDER — PROMETHAZINE HYDROCHLORIDE 25 MG/1
25 TABLET ORAL ONCE
Status: COMPLETED | OUTPATIENT
Start: 2022-07-29 | End: 2022-07-29

## 2022-07-29 RX ORDER — PROMETHAZINE HYDROCHLORIDE 25 MG/1
12.5-25 TABLET ORAL EVERY 4 HOURS PRN
Status: DISCONTINUED | OUTPATIENT
Start: 2022-07-29 | End: 2022-08-01 | Stop reason: HOSPADM

## 2022-07-29 RX ORDER — GABAPENTIN 400 MG/1
800 CAPSULE ORAL 3 TIMES DAILY
Status: DISCONTINUED | OUTPATIENT
Start: 2022-07-29 | End: 2022-08-01 | Stop reason: HOSPADM

## 2022-07-29 RX ORDER — IBUPROFEN 600 MG/1
600 TABLET ORAL EVERY 6 HOURS PRN
Status: DISCONTINUED | OUTPATIENT
Start: 2022-07-29 | End: 2022-07-31

## 2022-07-29 RX ORDER — DIPHENHYDRAMINE HCL 25 MG
25 TABLET ORAL NIGHTLY PRN
Status: DISCONTINUED | OUTPATIENT
Start: 2022-07-29 | End: 2022-08-01 | Stop reason: HOSPADM

## 2022-07-29 RX ORDER — ONDANSETRON 2 MG/ML
4 INJECTION INTRAMUSCULAR; INTRAVENOUS EVERY 4 HOURS PRN
Status: DISCONTINUED | OUTPATIENT
Start: 2022-07-29 | End: 2022-08-01 | Stop reason: HOSPADM

## 2022-07-29 RX ORDER — MORPHINE SULFATE 4 MG/ML
4 INJECTION INTRAVENOUS ONCE
Status: COMPLETED | OUTPATIENT
Start: 2022-07-29 | End: 2022-07-29

## 2022-07-29 RX ORDER — ALBUTEROL SULFATE 90 UG/1
1-2 AEROSOL, METERED RESPIRATORY (INHALATION)
Status: DISCONTINUED | OUTPATIENT
Start: 2022-07-29 | End: 2022-08-01 | Stop reason: HOSPADM

## 2022-07-29 RX ORDER — ONDANSETRON 4 MG/1
4 TABLET, ORALLY DISINTEGRATING ORAL EVERY 4 HOURS PRN
Status: DISCONTINUED | OUTPATIENT
Start: 2022-07-29 | End: 2022-08-01 | Stop reason: HOSPADM

## 2022-07-29 RX ORDER — PROCHLORPERAZINE EDISYLATE 5 MG/ML
5-10 INJECTION INTRAMUSCULAR; INTRAVENOUS EVERY 4 HOURS PRN
Status: DISCONTINUED | OUTPATIENT
Start: 2022-07-29 | End: 2022-08-01 | Stop reason: HOSPADM

## 2022-07-29 RX ORDER — TIZANIDINE 4 MG/1
4 TABLET ORAL EVERY 6 HOURS PRN
Status: DISCONTINUED | OUTPATIENT
Start: 2022-07-29 | End: 2022-08-01 | Stop reason: HOSPADM

## 2022-07-29 RX ORDER — ONDANSETRON 2 MG/ML
4 INJECTION INTRAMUSCULAR; INTRAVENOUS ONCE
Status: COMPLETED | OUTPATIENT
Start: 2022-07-29 | End: 2022-07-29

## 2022-07-29 RX ORDER — LIDOCAINE 50 MG/G
1 PATCH TOPICAL EVERY 24 HOURS
Status: DISCONTINUED | OUTPATIENT
Start: 2022-07-29 | End: 2022-08-01 | Stop reason: HOSPADM

## 2022-07-29 RX ORDER — INSULIN LISPRO 100 [IU]/ML
1-6 INJECTION, SOLUTION INTRAVENOUS; SUBCUTANEOUS EVERY 6 HOURS
Status: DISCONTINUED | OUTPATIENT
Start: 2022-07-29 | End: 2022-07-31

## 2022-07-29 RX ORDER — SODIUM CHLORIDE 9 MG/ML
1000 INJECTION, SOLUTION INTRAVENOUS ONCE
Status: COMPLETED | OUTPATIENT
Start: 2022-07-29 | End: 2022-07-29

## 2022-07-29 RX ORDER — PROMETHAZINE HYDROCHLORIDE 25 MG/1
12.5-25 SUPPOSITORY RECTAL EVERY 4 HOURS PRN
Status: DISCONTINUED | OUTPATIENT
Start: 2022-07-29 | End: 2022-08-01 | Stop reason: HOSPADM

## 2022-07-29 RX ADMIN — ONDANSETRON HYDROCHLORIDE 4 MG: 2 SOLUTION INTRAMUSCULAR; INTRAVENOUS at 20:45

## 2022-07-29 RX ADMIN — INSULIN LISPRO 1 UNITS: 100 INJECTION, SOLUTION INTRAVENOUS; SUBCUTANEOUS at 18:40

## 2022-07-29 RX ADMIN — LIDOCAINE 1 PATCH: 50 PATCH TOPICAL at 08:27

## 2022-07-29 RX ADMIN — ALBUTEROL SULFATE 1 PUFF: 90 AEROSOL, METERED RESPIRATORY (INHALATION) at 10:22

## 2022-07-29 RX ADMIN — PROMETHAZINE HYDROCHLORIDE 25 MG: 25 TABLET ORAL at 10:26

## 2022-07-29 RX ADMIN — ONDANSETRON HYDROCHLORIDE 4 MG: 2 SOLUTION INTRAMUSCULAR; INTRAVENOUS at 04:04

## 2022-07-29 RX ADMIN — SODIUM CHLORIDE, POTASSIUM CHLORIDE, SODIUM LACTATE AND CALCIUM CHLORIDE: 600; 310; 30; 20 INJECTION, SOLUTION INTRAVENOUS at 09:26

## 2022-07-29 RX ADMIN — TIZANIDINE 4 MG: 4 TABLET ORAL at 20:45

## 2022-07-29 RX ADMIN — SODIUM CHLORIDE 1000 ML: 9 INJECTION, SOLUTION INTRAVENOUS at 05:45

## 2022-07-29 RX ADMIN — APIXABAN 5 MG: 5 TABLET, FILM COATED ORAL at 18:49

## 2022-07-29 RX ADMIN — GABAPENTIN 800 MG: 400 CAPSULE ORAL at 18:39

## 2022-07-29 RX ADMIN — IBUPROFEN 600 MG: 600 TABLET ORAL at 08:28

## 2022-07-29 RX ADMIN — DIAZEPAM 2 MG: 2 TABLET ORAL at 06:19

## 2022-07-29 RX ADMIN — MORPHINE SULFATE 4 MG: 4 INJECTION INTRAVENOUS at 04:44

## 2022-07-29 RX ADMIN — INSULIN GLARGINE-YFGN 10 UNITS: 100 INJECTION, SOLUTION SUBCUTANEOUS at 18:39

## 2022-07-29 RX ADMIN — OMEPRAZOLE 20 MG: 20 CAPSULE, DELAYED RELEASE ORAL at 18:49

## 2022-07-29 RX ADMIN — ALBUTEROL SULFATE 2 PUFF: 90 AEROSOL, METERED RESPIRATORY (INHALATION) at 13:41

## 2022-07-29 RX ADMIN — GABAPENTIN 800 MG: 400 CAPSULE ORAL at 13:41

## 2022-07-29 RX ADMIN — DIPHENHYDRAMINE HYDROCHLORIDE 25 MG: 25 TABLET ORAL at 22:14

## 2022-07-29 RX ADMIN — GABAPENTIN 800 MG: 400 CAPSULE ORAL at 08:28

## 2022-07-29 RX ADMIN — IBUPROFEN 600 MG: 600 TABLET ORAL at 18:39

## 2022-07-29 RX ADMIN — APIXABAN 5 MG: 5 TABLET, FILM COATED ORAL at 08:28

## 2022-07-29 RX ADMIN — ALBUTEROL SULFATE 2 PUFF: 90 AEROSOL, METERED RESPIRATORY (INHALATION) at 20:16

## 2022-07-29 RX ADMIN — OMEPRAZOLE 20 MG: 20 CAPSULE, DELAYED RELEASE ORAL at 09:28

## 2022-07-29 RX ADMIN — OXYCODONE 5 MG: 5 TABLET ORAL at 22:14

## 2022-07-29 RX ADMIN — INSULIN LISPRO 1 UNITS: 100 INJECTION, SOLUTION INTRAVENOUS; SUBCUTANEOUS at 13:51

## 2022-07-29 RX ADMIN — PROMETHAZINE HYDROCHLORIDE 25 MG: 25 TABLET ORAL at 04:04

## 2022-07-29 ASSESSMENT — ENCOUNTER SYMPTOMS
BLURRED VISION: 0
HEMOPTYSIS: 0
FEVER: 0
DIZZINESS: 1
DOUBLE VISION: 0
HEADACHES: 0
FOCAL WEAKNESS: 0
DIAPHORESIS: 0
LOSS OF CONSCIOUSNESS: 0
BACK PAIN: 1
NECK PAIN: 0
SHORTNESS OF BREATH: 0
MYALGIAS: 1
COUGH: 0
FALLS: 1
FLANK PAIN: 0
ABDOMINAL PAIN: 0
HEADACHES: 1
CONSTIPATION: 0
SPEECH CHANGE: 0
WEAKNESS: 1
DEPRESSION: 0
MEMORY LOSS: 0
NERVOUS/ANXIOUS: 0
ORTHOPNEA: 0
VOMITING: 1
NAUSEA: 1
HEARTBURN: 0
SINUS PAIN: 0
CHILLS: 0
SENSORY CHANGE: 0
PALPITATIONS: 0
SHORTNESS OF BREATH: 1
DIARRHEA: 0

## 2022-07-29 ASSESSMENT — COGNITIVE AND FUNCTIONAL STATUS - GENERAL
HELP NEEDED FOR BATHING: A LITTLE
CLIMB 3 TO 5 STEPS WITH RAILING: TOTAL
DAILY ACTIVITIY SCORE: 21
MOBILITY SCORE: 14
SUGGESTED CMS G CODE MODIFIER MOBILITY: CL
MOVING TO AND FROM BED TO CHAIR: A LITTLE
TOILETING: A LITTLE
SUGGESTED CMS G CODE MODIFIER DAILY ACTIVITY: CJ
DRESSING REGULAR LOWER BODY CLOTHING: A LITTLE
WALKING IN HOSPITAL ROOM: TOTAL
MOVING FROM LYING ON BACK TO SITTING ON SIDE OF FLAT BED: A LOT
STANDING UP FROM CHAIR USING ARMS: A LITTLE

## 2022-07-29 ASSESSMENT — PATIENT HEALTH QUESTIONNAIRE - PHQ9
7. TROUBLE CONCENTRATING ON THINGS, SUCH AS READING THE NEWSPAPER OR WATCHING TELEVISION: NOT AT ALL
6. FEELING BAD ABOUT YOURSELF - OR THAT YOU ARE A FAILURE OR HAVE LET YOURSELF OR YOUR FAMILY DOWN: NOT AL ALL
1. LITTLE INTEREST OR PLEASURE IN DOING THINGS: SEVERAL DAYS
2. FEELING DOWN, DEPRESSED, IRRITABLE, OR HOPELESS: NOT AT ALL
9. THOUGHTS THAT YOU WOULD BE BETTER OFF DEAD, OR OF HURTING YOURSELF: NOT AT ALL
SUM OF ALL RESPONSES TO PHQ QUESTIONS 1-9: 4
SUM OF ALL RESPONSES TO PHQ9 QUESTIONS 1 AND 2: 1
3. TROUBLE FALLING OR STAYING ASLEEP OR SLEEPING TOO MUCH: SEVERAL DAYS
5. POOR APPETITE OR OVEREATING: SEVERAL DAYS
4. FEELING TIRED OR HAVING LITTLE ENERGY: SEVERAL DAYS
8. MOVING OR SPEAKING SO SLOWLY THAT OTHER PEOPLE COULD HAVE NOTICED. OR THE OPPOSITE, BEING SO FIGETY OR RESTLESS THAT YOU HAVE BEEN MOVING AROUND A LOT MORE THAN USUAL: NOT AT ALL

## 2022-07-29 ASSESSMENT — COPD QUESTIONNAIRES
COPD SCREENING SCORE: 1
DURING THE PAST 4 WEEKS HOW MUCH DID YOU FEEL SHORT OF BREATH: NONE/LITTLE OF THE TIME
DO YOU EVER COUGH UP ANY MUCUS OR PHLEGM?: NO/ONLY WITH OCCASIONAL COLDS OR INFECTIONS
HAVE YOU SMOKED AT LEAST 100 CIGARETTES IN YOUR ENTIRE LIFE: NO/DON'T KNOW

## 2022-07-29 ASSESSMENT — PAIN DESCRIPTION - PAIN TYPE
TYPE: ACUTE PAIN

## 2022-07-29 ASSESSMENT — ACTIVITIES OF DAILY LIVING (ADL): TOILETING: INDEPENDENT

## 2022-07-29 ASSESSMENT — LIFESTYLE VARIABLES: ALCOHOL_USE: NO

## 2022-07-29 ASSESSMENT — FIBROSIS 4 INDEX
FIB4 SCORE: 3.67
FIB4 SCORE: 3.08

## 2022-07-29 NOTE — THERAPY
Physical Therapy   Initial Evaluation     Patient Name: Benito Persaud  Age:  57 y.o., Sex:  male  Medical Record #: [id number]  Today's Date: 7/29/2022     Precautions  Precautions: Fall Risk    Assessment  Pt is a 57 year old male who presented to the ED with nausea, vomitting, and falls. Pt has a complex medical history including MDS, chronic balance/vertigo deficits, testicular cancer, MRSA, chronic back pain, CAD, asthma, and DVT. Per chart pt is wheelchair bound, but is able to transfer at baseline. 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 Pt is very strong and independent at baseline. Pt lives alone, but has strong friend support and support from his employers. At baseline pt works two full time jobs and is incredibly active. Pt is extremely motivated and would benefit from intensive rehab to return to baseline. Currently recommending post acute placement.     Pt was able to mobilize as detailed below. Pt was able to transfer to and from chair but required increased assistance level and presents below his functional mobility baseline status. Pt will benefit from skilled physical therapy services. Will follow.     Plan    Recommend Physical Therapy 3 times per week until therapy goals are met for the following treatments:  Bed Mobility, Equipment, Gait Training, Manual Therapy, Neuro Re-Education / Balance, Self Care/Home Evaluation, Sensory Integration Techniques, Stair Training, Therapeutic Activities, and Therapeutic Exercises    DC Equipment Recommendations: Unable to determine at this time  Discharge Recommendations: Recommend post-acute placement for additional physical therapy services prior to discharge home        Objective     07/29/22 1057   Initial Contact Note    Initial Contact Note Order Received and Verified, Physical Therapy Evaluation in Progress with Full Report to Follow.   Precautions    Precautions Fall Risk   Pain 0 - 10 Group   Therapist Pain Assessment Post Activity Pain Same as Prior to Activity;Nurse Notified  (headache)   Prior Living Situation   Prior Services None;Home-Independent   Housing / Facility 1 Story House   Steps Into Home 0   Steps In Home 0   Equipment Owned Power Wheel Chair;Wheelchair   Lives with - Patient's Self Care Capacity Alone and Able to Care For Self   Prior Level of Functional Mobility   Bed Mobility Independent   Transfer Status Independent   Ambulation   (Pt does not ambulate at baseline due to vertigo and balance deficits from vertigo)   Assistive Devices Used Power Wheel Chair   Wheelchair Independent   History of Falls   History of Falls Yes   Cognition    Level of Consciousness Alert   Passive ROM Lower Body   Passive ROM Lower Body WDL   Active ROM Lower Body    Active ROM Lower Body  WDL   Strength Lower Body   Lower Body Strength  X   Gross Strength Generalized Weakness, Equal Bilaterally   Comments B LE 3+/5   Vision   Vision Comments Pt states symtpoms similar to L hemianopsia from previous stroke   Balance Assessment   Sitting Balance (Static) Fair   Sitting Balance (Dynamic) Fair -   Standing Balance (Static) Poor   Standing Balance (Dynamic) Poor -   Weight Shift Sitting Fair   Weight Shift Standing Poor   Gait Analysis   Weight Bearing Status no restrictions   Bed Mobility    Supine to Sit Standby Assist   Sit to Supine Standby Assist   Scooting Standby Assist   Functional Mobility   Sit to Stand Contact Guard Assist   Bed, Chair, Wheelchair Transfer Contact Guard Assist   Mobility sup to sit, bed<>chair, sit to sup   How much difficulty does the patient currently have...   Turning over in bed (including adjusting bedclothes, sheets and blankets)? 4   Sitting down on and standing up from a chair with arms (e.g., wheelchair, bedside commode, etc.) 2   Moving from lying on back to sitting on the side of the bed? 3   How much help from another person  does the patient currently need...   Moving to and from a bed to a chair (including a wheelchair)? 3   Need to walk in a hospital room? 1   Climbing 3-5 steps with a railing? 1   6 clicks Mobility Score 14   Activity Tolerance   Sitting in Chair 5 min   Sitting Edge of Bed 5 min   Standing >1 min  (unable to come fully upright)   Patient / Family Goals    Patient / Family Goal #1 Get stronger and be independent   Short Term Goals    Short Term Goal # 1 Pt will complete transfer with SPV within 6 visits to improve mobility   Short Term Goal # 2 Pt will complete STS with LRAD with SBA within 6 visits to improve mobility   Short Term Goal # 3 Pt will complete bed mobility without bed features with SPV within 6 visits   Education Group   Education Provided Role of Physical Therapist   Role of Physical Therapist Patient Response Patient;Explanation;Verbal Demonstration;Acceptance   Problem List    Problems Impaired Bed Mobility;Impaired Transfers;Impaired Ambulation;Functional Strength Deficit;Impaired Balance;Impaired Coordination;Impaired Vision;Decreased Activity Tolerance;Motor Planning / Sequencing   Anticipated Discharge Equipment and Recommendations   DC Equipment Recommendations Unable to determine at this time   Discharge Recommendations Recommend post-acute placement for additional physical therapy services prior to discharge home   Interdisciplinary Plan of Care Collaboration   IDT Collaboration with  ;Nursing;Occupational Therapist   Patient Position at End of Therapy In Bed;Call Light within Reach;Tray Table within Reach   Collaboration Comments Pt findings and recs   Session Information   Date / Session Number  7/29-1(1/3,8/4)

## 2022-07-29 NOTE — DISCHARGE PLANNING
[facility] Rehabilitation Transitional Care Coordination    Referral from: Dr Hui  Insurance Provider on Facesheet: [place] [employer]  Potential Rehab Diagnosis: Debility    Chart review indicates patient may need on going medical management and may have therapy needs to possibly meet inpatient rehab facility criteria with the goal of returning to community.    D/C support: Lives alone - reports strong friend support.     Physiatry consultation forwarded per protocol.     Pt is wheelchair bound, able to transfer and work at baseline. Physiatry to consult, will need to submit to insurance if appropriate. [facility] will follow.     Thank you for the referral.

## 2022-07-29 NOTE — DISCHARGE PLANNING
Per physiatry patient is not a candidate for IPR, recommends vocational rehabilitation to return to work and outpatient follow-up. [facility] will no longer follow.

## 2022-07-29 NOTE — PROGRESS NOTES
Pt brought in From ED in [facility],awake,a&ox4,no c/o nausea now,wants to drink,swallow eval done with no swallowing issue,poc explained.

## 2022-07-29 NOTE — ED NOTES
"Pt L chest port accessed, no blood return. Pt states this has been happening often and is worried that it is \"going out\" like his last one did. Access flushed with no resistance. Phlebotomy texted for labs.   "

## 2022-07-29 NOTE — ASSESSMENT & PLAN NOTE
Continue Lantus, sliding scale insulin  A1c of 9.4  -8/1 increased Lantus to 25 units, blood sugar still in the high 200s low 300s overnight

## 2022-07-29 NOTE — CARE PLAN
The patient is      Progress made toward(s) clinical / shift goals:    Problem: Pain - Standard  Goal: Alleviation of pain or a reduction in pain to the patient’s comfort goal  Outcome: Progressing     Problem: Knowledge Deficit - Standard  Goal: Patient and family/care givers will demonstrate understanding of plan of care, disease process/condition, diagnostic tests and medications  Outcome: Progressing     Problem: Skin Integrity  Goal: Skin integrity is maintained or improved  Outcome: Progressing       Patient is not progressing towards the following goals:      Problem: Pain - Standard  Goal: Alleviation of pain or a reduction in pain to the patient’s comfort goal  Outcome: Progressing

## 2022-07-29 NOTE — CONSULTS
Physical Medicine and Rehabilitation Consultation              Date of initial consultation: 7/29/2022  Requesting provider: Rose Mary Hui DO   Consulting provider: Caity Cummins D.O.  Reason for consultation: assess for acute inpatient rehab appropriateness  LOS: 0 Day(s)    Chief complaint: N/V     HPI: The patient is a 57 y.o.  male with a past medical history of myelodysplastic syndrome,  impaired balance/vertigo (due to history of CVA, medical records unable to confirm, patient's medical care done at the [facility]), hx of testicular cancer s/p orchiectomy and chemo in remission, CAD, and history DVT ;  who presented on 7/29/2022  2:34 AM with N/V. Per documentation, patient has had worsening N/V and has been unable to keep down any fluids. For several days. He reports increasing frequency in falls. At baseline, patient is WC user due to impaired balance secondary to prior CVAs. CT Head was obtained in the ED which showed no acute intracranial abnormality. CT L spine obtained that showed no acute process, but evidence of old T11 compression injury. Patient was admitted for intractable n/v. Of note, patient has had multiple admissions with similar presentations. Patient is followed by Dr. Pearson for MDS and planning to start treatment in next few weeks for recent diagnosis of leukemia. 7/29 plts 97.     ROS:  Patient reports abdominal pain,  nausea, dizziness, spinning, and weakness. Denies SOB, CP, or any changes in numbness/tingling.     Social Hx:  Patient lives alone in an apartment with elevator access, uses a manual WC at home and power WC in the community   0 BOBBY  At prior level of function patient was independent with PWC use in the community and MWC at home  - in regards to prior level of function at work patient tells me very unique details about functional transfers that are inconsistent with being impaired by chronic vertigo or balance impairments:  1. Patient tells me he transfers  independently  from his PWC to a ladder to climb up into a the cab to operate heavy equipment, (this requires significant amount of balance as well at LE and UE strength)  2. Patient tells me he is set up at the kitchen where he is an  and he has a rope that holds him up in order to stand at the kitchen and lift pots/ pans to make seabass and other seafood dishes.  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 , goes into detail about being tied to a rope system in an non ADA set up kitchen to cook seabass.   Tobacco: denies  Alcohol: denies   Drugs: denies     THERAPY:  Restrictions: Fall Risk   PT: Functional mobility     SBA for bed mobility, CGA for sit to stand     OT: ADLs    SBA bed mobility, CGA transfer, supervision lower body dressing     SLP:   No SLP note     IMAGIN/29 CT Head   FINDINGS:     The brain appears normal in volume and morphology. The ventricles are normal in caliber and configuration. No space occupying lesions or areas of acute vascular territory infarctions are identified. There are no abnormal extra axial fluid collections or   extra axial hemorrhage identified.     The visualized paranasal sinuses and mastoid air cells are well aerated bilaterally. No depressed calvarial fractures are identified. The visualized globes and retrobulbar soft tissues appear within normal limits.  Atherosclerotic intracranial   calcifications are seen.    PROCEDURES:  None     PMH:  Past Medical History:   Diagnosis Date   • Asthma    • Cancer (HCC) 2016   • MI (myocardial infarction) ([facility])        PSH:  Past Surgical History:   Procedure Laterality Date   • IRRIGATION & DEBRIDEMENT  GENERAL N/A 5/25/2022    Procedure: IRRIGATION AND DEBRIDEMENT, WOUND-FACIAL ABSCESS;  Surgeon: Adolph Ortiz M.D.;  Location: SURGERY SAME DAY [facility];  Service: Ent   • CATH REMOVAL N/A 8/14/2019    Procedure: REMOVAL, CATHETER AND PLACEMENT OF POWER PORT;  Surgeon: Sonny Enamorado M.D.;  Location: SURGERY [facility];  Service: General       FHX:  Family History   Problem Relation Age of Onset   • Cancer Mother    • Psychiatric Illness Mother    • Diabetes Mother    • Hypertension Mother    • Hyperlipidemia Mother    • Arterial Aneurysm Father    • Heart Disease Maternal Grandmother        Medications:  Current Facility-Administered Medications   Medication Dose   • lactated ringers infusion     • ondansetron (ZOFRAN) syringe/vial injection 4 mg  4 mg   • ondansetron (ZOFRAN ODT) dispertab 4 mg  4 mg   • promethazine (PHENERGAN) tablet 12.5-25 mg  12.5-25 mg   • promethazine (PHENERGAN) suppository 12.5-25 mg  12.5-25 mg   • prochlorperazine (COMPAZINE) injection 5-10 mg  5-10 mg   • insulin GLARGINE (Lantus,Semglee) injection  10 Units    And   • insulin lispro (AdmeLOG,HumaLOG) injection  1-6 Units    And   • dextrose 50% (D50W) injection 25 g  25 g   • omeprazole (PRILOSEC) capsule 20 mg  20 mg   • gabapentin (NEURONTIN) capsule 800 mg  800 mg   • apixaban (ELIQUIS) tablet 5 mg  5 mg   • tizanidine (ZANAFLEX) tablet 4 mg  4 mg   • ibuprofen (MOTRIN) tablet 600 mg  600 mg   • lidocaine (LIDODERM) 5 % 1 Patch  1 Patch   • tiotropium (Spiriva Respimat) 2.5 mcg/Act inhalation spray 5 mcg  5 mcg   • albuterol inhaler 1-2 Puff  1-2 Puff   • Respiratory Therapy Consult         Allergies:  Allergies   Allergen Reactions   • Green Beans Anaphylaxis   • Iodine Anaphylaxis   • Shellfish Allergy Anaphylaxis   • Keflex Diarrhea and Vomiting     Vomiting & Diarrhea  Tolerates ceftriaxone   • Reglan [Metoclopramide] Anxiety     Fast heart rate    • Toradol Hives, Vomiting and Nausea       Physical Exam:  Vitals: BP (!)  "153/65   Pulse 65   Temp 36.3 °C (97.4 °F) (Temporal)   Resp 20   Ht 1.803 m (5' 11\")   Wt 119 kg (263 lb)   SpO2 94%   Gen: NAD, laying comfortably in bed   Head:  NC/AT  Eyes/ Nose/ Mouth: PERRLA, moist mucous membranes , no nystagmus with tracking   Cardio: RRR, good distal perfusion, warm extremities  Pulm: normal respiratory effort, no cyanosis   Abd: Soft NTND, negative borborygmi   Ext: No peripheral edema. No calf tenderness. No clubbing.    Mental status:  A&Ox4 (person, place, date, situation) answers questions appropriately follows commands, ?potentially confabulating about jobs and level of function at home and in the community   Speech: fluent, no aphasia or dysarthria    CRANIAL NERVES:  2,3: visual acuity grossly intact, PERRL  3,4,6: EOMI bilaterally, no nystagmus or diplopia with testing patient without telling him what I am checking for; when telling patient I checking his eyes, he stop tracking mid movement   5: sensation intact to light touch bilaterally and symmetric  7: no facial asymmetry  8: hearing grossly intact      Motor: limited ROM at R KE due to hamstring tightness, and discomfort, is not velocity dependent       Upper Extremity  Myotome R L   Shoulder flexion C5 5/5 5/5   Elbow flexion C5 5/5 5/5   Wrist extension C6 5/5 5/5   Elbow extension C7 5/5 5/5   Finger flexion C8 5/5 5/5   Finger abduction T1 5/5 5/5     Lower Extremity Myotome R L   Hip flexion L2 5/5 5/5   Knee extension L3 5/5 5/5   Ankle dorsiflexion L4 5/5 5/5   Toe extension L5 5/5 5/5   Ankle plantarflexion S1 5/5 5/5       Negative Pronator drift bilaterally     Sensory:   intact to light touch through out b/l upper and lower extremities     DTRs: 2+ in bilateral  biceps, 2+ in bilateral patellar tendons  No clonus at bilateral ankles  Negative babinski b/l  Negative Ochoa b/l     Tone: no spasticity noted, no cogwheeling noted    Coordination:   intact finger to nose bilaterally  intact fine motor with " fingers bilaterally  intact heel to shin bilaterally      Labs: Reviewed and significant for   Recent Labs     22  0327   RBC 5.26   HEMOGLOBIN 16.1   HEMATOCRIT 45.7   PLATELETCT 97*     Recent Labs     22  0327   SODIUM 133*   POTASSIUM 3.8   CHLORIDE 101   CO2 22   GLUCOSE 232*   BUN 9   CREATININE 1.00   CALCIUM 9.2     Recent Results (from the past 24 hour(s))   EKG    Collection Time: 22  2:17 AM   Result Value Ref Range    Report       [facility]    Test Date:  2022  Pt Name:    ZINA SALGUERO                 Department: ER  MRN:        [id number]                      Room:  Gender:     Male                         Technician: [id number]  :        1965                   Requested By:ER TRIAGE PROTOCOL  Order #:    [id number]                    Reading MD: Maxi Nava II, MD    Measurements  Intervals                                Axis  Rate:       84                           P:          44  VA:         180                          QRS:        15  QRSD:       84                           T:          51  QT:         336  QTc:        398    Interpretive Statements  SINUS RHYTHM  rate 84  normal intervals  no st elevation or depression. normal twaves  impression: normal sinus rhythm ekg  Compared to ECG 2022 05:58:18  No significant changes  Electronically Signed On 2022 4:02:03 PDT by Maxi Nava II, MD     CBC WITH DIFFERENTIAL    Collection Time: 22  3:27 AM   Result Value Ref Range    WBC 5.6 4.8 - 10.8 K/uL    RBC 5.26 4.70 - 6.10 M/uL    Hemoglobin 16.1 14.0 - 18.0 g/dL    Hematocrit 45.7 42.0 - 52.0 %    MCV 86.9 81.4 - 97.8 fL    MCH 30.6 27.0 - 33.0 pg    MCHC 35.2 33.7 - 35.3 g/dL    RDW 45.1 35.9 - 50.0 fL    Platelet Count 97 (L) 164 - 446 K/uL    MPV 8.8 (L) 9.0 - 12.9 fL    Neutrophils-Polys 49.40 44.00 - 72.00 %    Lymphocytes 37.70 22.00 - 41.00 %    Monocytes 9.10 0.00 - 13.40 %    Eosinophils 3.00 0.00 -  6.90 %    Basophils 0.40 0.00 - 1.80 %    Immature Granulocytes 0.40 0.00 - 0.90 %    Nucleated RBC 0.00 /100 WBC    Neutrophils (Absolute) 2.76 1.82 - 7.42 K/uL    Lymphs (Absolute) 2.11 1.00 - 4.80 K/uL    Monos (Absolute) 0.51 0.00 - 0.85 K/uL    Eos (Absolute) 0.17 0.00 - 0.51 K/uL    Baso (Absolute) 0.02 0.00 - 0.12 K/uL    Immature Granulocytes (abs) 0.02 0.00 - 0.11 K/uL    NRBC (Absolute) 0.00 K/uL   COMP METABOLIC PANEL    Collection Time: 07/29/22  3:27 AM   Result Value Ref Range    Sodium 133 (L) 135 - 145 mmol/L    Potassium 3.8 3.6 - 5.5 mmol/L    Chloride 101 96 - 112 mmol/L    Co2 22 20 - 33 mmol/L    Anion Gap 10.0 7.0 - 16.0    Glucose 232 (H) 65 - 99 mg/dL    Bun 9 8 - 22 mg/dL    Creatinine 1.00 0.50 - 1.40 mg/dL    Calcium 9.2 8.5 - 10.5 mg/dL    AST(SGOT) 27 12 - 45 U/L    ALT(SGPT) 18 2 - 50 U/L    Alkaline Phosphatase 128 (H) 30 - 99 U/L    Total Bilirubin 0.5 0.1 - 1.5 mg/dL    Albumin 3.8 3.2 - 4.9 g/dL    Total Protein 6.9 6.0 - 8.2 g/dL    Globulin 3.1 1.9 - 3.5 g/dL    A-G Ratio 1.2 g/dL   LACTIC ACID    Collection Time: 07/29/22  3:27 AM   Result Value Ref Range    Lactic Acid 2.2 (H) 0.5 - 2.0 mmol/L   ESTIMATED GFR    Collection Time: 07/29/22  3:27 AM   Result Value Ref Range    GFR (CKD-EPI) 88 >60 mL/min/1.73 m 2   TROPONIN    Collection Time: 07/29/22  3:27 AM   Result Value Ref Range    Troponin T 12 6 - 19 ng/L   Magnesium    Collection Time: 07/29/22  3:27 AM   Result Value Ref Range    Magnesium 2.0 1.5 - 2.5 mg/dL   Sed Rate    Collection Time: 07/29/22  3:27 AM   Result Value Ref Range    Sed Rate Westergren 5 0 - 20 mm/hour   CRP QUANTITIVE (NON-CARDIAC)    Collection Time: 07/29/22  3:27 AM   Result Value Ref Range    Stat C-Reactive Protein 0.98 (H) 0.00 - 0.75 mg/dL   POCT glucose device results    Collection Time: 07/29/22  9:30 AM   Result Value Ref Range    POC Glucose, Blood 173 (H) 65 - 99 mg/dL         ASSESSMENT:  Patient is a 57 y.o. male admitted with  intractable N/V     Rehabilitation: Impaired ADLs and mobility  Patient is a poor  candidate for inpatient rehab based on limited needs for PT, OT, see dispo details below.     Barriers to transfer include: Insurance authorization, TCCs to verify disposition, medical clearance and bed availability     Additional Recommendations:  Debility due to N/V  - frequent recurrence of N/V in the last year   - currently being treated with Zofran and compazine, still having nausea, vomiting frequency slowing down   - is currently functioning at  SBA/CGA level for tranfers, which is mildly below his level of baseline function     Chronic Vertigo   - recommend trial of scopolamine patch PRN for symptom relief   - impaired balance and dizziness reportedly due to prior CVA while living in [place]  - has no physical exam findings that confirm impaired cerebellar function, had no impaired balance or dizziness with transfers during PT/OT     Dispo:   -patient is functioning below he level or reported baseline function, recommend on going therapy.   - at this point in time patient is functioning a level too high for inpatient rehab and already has necessary equipment  - recommend home with home health services to confirm safe set up at home   - patient's greatest decline in function is for his ability to transfer at work; recommend referal to vocational health through the [employer] : [phone] or [phone]  - also recommend outpatient follow up PM&R with Dr. Scherer, referral ordered.       Medical Complexity:  Chronic vertigo, due to prior CVA?  Frequent falls   Intractable N/V  Myelodysplastic syndrome   Impaired mobility and ADLs       DVT PPX: Eliquis       Thank you for allowing us to participate in the care of this patient.     Patient was seen for 111 minutes on unit/floor of which > 50% of time was spent on counseling and coordination of care regarding the above, including prognosis, risk reduction, benefits of  treatment, and options for next stage of care and reviewing specific details for patient's level of function with patient, therapy, and PM&R team.     Caity Cummins D.O.   Physical Medicine and Rehabilitation     Please note that this dictation was created using voice recognition software. I have made every reasonable attempt to correct obvious errors, but there may be errors of grammar and possibly content that I did not discover before finalizing the note.

## 2022-07-29 NOTE — DISCHARGE PLANNING
Note:  Per PT, they recommend post acute placement, preferably acute rehab. Voalte message sent to Hospitalist for PMR referral. Hospitalist to place order.

## 2022-07-29 NOTE — H&P
Internal Medicine History & Physical Note    Date of Service  7/29/2022    Primary Care Physician  Jacobo Eddy D.O.      Code Status  DNAR, I OK    Chief Complaint  Chief Complaint   Patient presents with   • Shoulder Pain   • Back Pain   • Fall       History of Presenting Illness  Benito Persaud is a 57 y.o. male who presented 7/29/2022 with nausea and vomiting and increased frequency of falls.  Patient has a complex medical history including MDS, chronic balance problems/vertigo, testicular cancer status postorchiectomy and chemotherapy and has been in remission, history of MRSA infection, chronic back pain from her multiple vertebral fractures asthma, CAD, history of DVT.  He presents due to increased nausea and vomiting, reports he has not been able to keep anything down for several days and has been dry heaving since then.  He also reports increased frequency of falls.  He has had multiple visits to the hospital for similar presentations.  He reports that he is wheelchair-bound due to balance problems and chronic pain, is usually able to transfer from bed to chair etc. however has been having increasing falls.  He is not report any changes in his dizziness/vertigo and is unsure of why he is having increasing falls,, denies lightheadedness, denies palpitations.    I discussed the plan of care with patient.    Review of Systems  Review of Systems   Constitutional: Positive for malaise/fatigue. Negative for chills and fever.   HENT: Negative for congestion and sinus pain.    Eyes: Negative for blurred vision and double vision.   Respiratory: Negative for cough, hemoptysis and shortness of breath.    Cardiovascular: Negative for chest pain, palpitations and orthopnea.   Gastrointestinal: Positive for nausea and vomiting. Negative for abdominal pain and diarrhea.   Genitourinary: Negative for dysuria and urgency.   Musculoskeletal: Positive for back pain and falls.   Skin: Negative for itching and rash.    Neurological: Positive for dizziness and weakness. Negative for sensory change and headaches.       Past Medical History   has a past medical history of Asthma, Cancer ([facility]) (11/01/2016), and MI (myocardial infarction) ([facility]) (2019).    Surgical History   has a past surgical history that includes cath removal (N/A, 8/14/2019) and irrigation & debridement general (N/A, 5/25/2022).     Family History  family history includes Arterial Aneurysm in his father; Cancer in his mother; Diabetes in his mother; Heart Disease in his maternal grandmother; Hyperlipidemia in his mother; Hypertension in his mother; Psychiatric Illness in his mother.   Family history reviewed with patient. There is family history that is pertinent to the chief complaint.     Social History   reports that he has never smoked. His smokeless tobacco use includes chew. He reports previous alcohol use. He reports previous drug use.    Allergies  Allergies   Allergen Reactions   • Green Beans Anaphylaxis   • Iodine Anaphylaxis   • Shellfish Allergy Anaphylaxis   • Keflex Diarrhea and Vomiting     Vomiting & Diarrhea  Tolerates ceftriaxone   • Reglan [Metoclopramide] Anxiety     Fast heart rate    • Toradol Hives, Vomiting and Nausea       Medications  Prior to Admission Medications   Prescriptions Last Dose Informant Patient Reported? Taking?   ELIQUIS 5 MG Tab 7/27/2022 at PM Patient No No   Sig: TAKE 1 TABLET BY MOUTH TWICE A DAY   Patient taking differently: Take 5 mg by mouth 2 times a day.   Omeprazole 20 MG Tablet Delayed Release Dispersible 7/27/2022 at PM Patient No No   Sig: Take 20 mg by mouth 2 times a day.   acetaminophen (TYLENOL) 500 MG Tab PRN at PRN Patient Yes No   Sig: Take 500-1,000 mg by mouth every 6 hours as needed for Mild Pain.   amitriptyline (ELAVIL) 100 MG Tab 7/27/2022 at PM Patient No No   Sig: Take 1.5 Tablets by mouth every evening.   diphenhydrAMINE (BENADRYL) 50 MG Cap 7/27/2022 at PM Patient Yes No   Sig: Take 50 mg by  mouth at bedtime.   fluticasone (FLOVENT HFA) 110 MCG/ACT Aerosol 7/29/2022 at 0030 Patient No No   Sig: Inhale 1 Puff 2 times a day.   gabapentin (NEURONTIN) 800 MG tablet 7/27/2022 at PM Patient No No   Sig: TAKE 1 TABLET BY MOUTH THREE TIMES A DAY   Patient taking differently: Take 800 mg by mouth 3 times a day.   ipratropium-albuterol (COMBIVENT RESPIMAT)  MCG/ACT Aero Soln 7/27/2022 at PM Patient No No   Sig: Inhale 1 Puff 2 times a day.   metFORMIN (GLUCOPHAGE) 500 MG Tab 7/27/2022 at PM Patient Yes No   Sig: Take 500 mg by mouth 2 times a day with meals.   ondansetron (ZOFRAN ODT) 4 MG TABLET DISPERSIBLE 7/28/2022 at Noon Patient No No   Sig: Take 1 Tablet by mouth every 8 hours as needed for Nausea.   sulfamethoxazole-trimethoprim (BACTRIM DS) 800-160 MG tablet 7/28/2022 at AM Patient No No   Sig: Take 1 Tablet by mouth 2 times a day for 5 days.   zolpidem (AMBIEN) 10 MG Tab 7/27/2022 at PM Patient Yes No   Sig: Take 10 mg by mouth at bedtime.      Facility-Administered Medications: None       Physical Exam  Temp:  [36.3 °C (97.4 °F)] 36.3 °C (97.4 °F)  Pulse:  [77-87] 79  Resp:  [13-20] 16  BP: (129-160)/(68-99) 140/68  SpO2:  [93 %-97 %] 93 %  Blood Pressure: (!) 140/68   Temperature: 36.3 °C (97.4 °F)   Pulse: 79   Respiration: 16   Pulse Oximetry: 93 %       Physical Exam  Constitutional:       Comments: Appears chronically ill   HENT:      Head: Normocephalic and atraumatic.      Nose: Nose normal.      Mouth/Throat:      Mouth: Mucous membranes are dry.      Pharynx: Oropharynx is clear.   Eyes:      Extraocular Movements: Extraocular movements intact.      Conjunctiva/sclera: Conjunctivae normal.   Cardiovascular:      Rate and Rhythm: Normal rate and regular rhythm.      Pulses: Normal pulses.      Heart sounds: Normal heart sounds.   Pulmonary:      Effort: Pulmonary effort is normal.      Breath sounds: Normal breath sounds.   Abdominal:      General: Bowel sounds are normal.      Palpations:  Abdomen is soft.      Tenderness: There is no abdominal tenderness. There is no guarding.   Musculoskeletal:      Cervical back: Normal range of motion and neck supple.      Comments: 3/5 strength bilaterally lower extremities, some diminished  strength   Skin:     General: Skin is warm and dry.   Neurological:      Mental Status: He is alert and oriented to person, place, and time.      Motor: Weakness present.         Laboratory:  Recent Labs     07/29/22 0327   WBC 5.6   RBC 5.26   HEMOGLOBIN 16.1   HEMATOCRIT 45.7   MCV 86.9   MCH 30.6   MCHC 35.2   RDW 45.1   PLATELETCT 97*   MPV 8.8*     Recent Labs     07/29/22 0327   SODIUM 133*   POTASSIUM 3.8   CHLORIDE 101   CO2 22   GLUCOSE 232*   BUN 9   CREATININE 1.00   CALCIUM 9.2     Recent Labs     07/29/22 0327   ALTSGPT 18   ASTSGOT 27   ALKPHOSPHAT 128*   TBILIRUBIN 0.5   GLUCOSE 232*         No results for input(s): NTPROBNP in the last 72 hours.      Recent Labs     07/29/22 0327   TROPONINT 12       Imaging:  DX-CHEST-PORTABLE (1 VIEW)   Final Result         1.  No acute cardiopulmonary disease.   2.  Cardiomegaly      CT-LSPINE W/O PLUS RECONS   Final Result         1.  No new acute traumatic bony injury of the lumbar spine.   2.  Anterior wedge deformity fracture of T11, compatible with age indeterminate compression injury but is stable since prior study.      CT-HEAD W/O   Final Result         1.  No acute intracranial abnormality.   2.  Atherosclerosis.               Assessment/Plan:  Justification for Admission Status  I anticipate this patient is appropriate for observation status at this time because PT/OT assessment, control of nausea and vomiting      * Intractable nausea and vomiting- (present on admission)  Assessment & Plan  Antiemetics  IV fluids  Optimize electrolytes  Consider gastric emptying study for possible gastroparesis  Restarting home meds however patient may not tolerate pill burden, would proceed with Eliquis and discontinue  others if difficulty with tolerating p.o. meds.  N.p.o., advance diet to clear liquids as tolerated and advance further as tolerated    Falls  Assessment & Plan  Has had increased number of falls    Chronic vertigo  Assessment & Plan  Patient reports has chronic vertigo, not on any medication.  Alluded to a stroke though do not see a record of this.  Reports this is contributing to his wheelchair-bound status, is now having increased difficulty transferring though has not noticed any change in the vertigo.  -PT/OT, consider referral for vestibular rehab, consider trial of meclizine    Type 2 diabetes mellitus, without long-term current use of insulin (HCC)- (present on admission)  Assessment & Plan  Last A1c in June 9.4  Consider gastric emptying study in setting of intractable nausea and vomiting      VTE prophylaxis: therapeutic anticoagulation with Apixaban

## 2022-07-29 NOTE — ASSESSMENT & PLAN NOTE
Antiemetics  IV fluids  Optimize electrolytes  Consider gastric emptying study for possible gastroparesis  Restarting home meds however patient may not tolerate pill burden, would proceed with Eliquis and discontinue others if difficulty with tolerating p.o. meds.  N.p.o., advance diet to clear liquids as tolerated and advance further as tolerated

## 2022-07-29 NOTE — THERAPY
"Occupational Therapy   Initial Evaluation     Patient Name: Benito Persaud  Age:  57 y.o., Sex:  male  Medical Record #: [id number]  Today's Date: 7/29/2022     Precautions  Precautions: Fall Risk    Assessment  Patient is a 57 y.o. male who presented to the hospital with nausea, vomiting and falls. Pt with h/o thromboembolic disease, myelodysplastic malignancy, chronic balance problems/vertigo, testicular cancer, MI, and DVT. Pt is Justin at baseline and uses a w/c for mobility. He lives alone and works two full time jobs. He reports that he has good support from friends. Pt typically stands to complete transfers. During OT eval today, pt unable to stand and had to complete a quick squat pivot. Pt is limited by weakness, vertigo, impaired balance and decreased activity tolerance impacting his safety and independence with ADLs and ADL transfers. Pt would benefit from OT services to address his functional deficits.     Plan    Recommend Occupational Therapy 3 times per week until therapy goals are met for the following treatments:  Adaptive Equipment, Self Care/Activities of Daily Living, Therapeutic Activities, and Therapeutic Exercises.    DC Equipment Recommendations: Unable to determine at this time  Discharge Recommendations: Recommend post-acute placement for additional occupational therapy services prior to discharge home     Subjective    \"I'll work as hard as I can.\"     Objective       07/29/22 1059   Prior Living Situation   Prior Services None;Home-Independent   Housing / Facility 1 Story Apartment / Condo   Steps Into Home 0   Steps In Home 0   Bathroom Set up Bathtub / Shower Combination;Grab Bars;Tub Transfer Bench   Equipment Owned Wheelchair;Power Wheel Chair;Tub Transfer Bench   Lives with - Patient's Self Care Capacity Alone and Able to Care For Self   Prior Level of ADL Function   Self Feeding Independent   Grooming / Hygiene Independent   Bathing Independent   Dressing Independent   Toileting " Independent   Prior Level of IADL Function   Medication Management Independent   Laundry Independent   Kitchen Mobility Independent   Finances Independent   Home Management Independent   Shopping Independent   Prior Level Of Mobility Uses Wheel Chair for Community Mobility;Uses Wheel Chair for in Home Mobility   Driving / Transportation Utilizes Public Transportation   Occupation (Pre-Hospital Vocational) Employed Full Time  (pt works two full time jobs)   Comments Pt gets around town using his motorized wheelchair or by taking the bus   History of Falls   History of Falls Yes   Date of Last Fall 07/28/22   Precautions   Precautions Fall Risk   Vitals   O2 Delivery Device None - Room Air   Pain 0 - 10 Group   Therapist Pain Assessment During Activity;Nurse Notified  (left sided back and flank pain)   Cognition    Cognition / Consciousness WDL   Level of Consciousness Alert   Comments pleasant and cooperative   Active ROM Upper Body   Active ROM Upper Body  WDL   Dominant Hand Right   Strength Upper Body   Upper Body Strength  WDL   Upper Body Muscle Tone   Upper Body Muscle Tone  WDL   Coordination Upper Body   Coordination WDL   Balance Assessment   Sitting Balance (Static) Fair   Sitting Balance (Dynamic) Fair -   Standing Balance (Static) Poor   Standing Balance (Dynamic) Poor -   Weight Shift Sitting Fair   Weight Shift Standing Poor   Bed Mobility    Supine to Sit Standby Assist   Sit to Supine Standby Assist   Scooting Standby Assist   ADL Assessment   Grooming Seated;Supervision   Lower Body Dressing Supervision  (shoes only)   6 Clicks Daily Activity Score 21   Functional Mobility   Sit to Stand   (unable to achieve a full stand)   Bed, Chair, Wheelchair Transfer Contact Guard Assist   Transfer Method Squat Pivot   Comments Pt typically completes a stand pviot transfer but today was unable to achieve a full stand and completed a squat pivot   Visual Perception   Visual Perception  X   Comments reports left  visual field loss after two strokes   Patient / Family Goals   Patient / Family Goal #1 to get stronger and return to work   Short Term Goals   Short Term Goal # 1 Pt will complete toilet transfers with supervision   Short Term Goal # 2 Pt will don underpants and shorts with supervision   Short Term Goal # 3 Pt will shower seated with supervision   Education Group   Education Provided Role of Occupational Therapist   Role of Occupational Therapist Patient Response Patient;Acceptance;Explanation;Verbal Demonstration   Problem List   Problem List Decreased Active Daily Living Skills;Decreased Homemaking Skills;Decreased Functional Mobility;Decreased Activity Tolerance;Impaired Postural Control / Balance  (pain, decreased LE strength)

## 2022-07-29 NOTE — ASSESSMENT & PLAN NOTE
Patient reports has chronic vertigo, not on any medication.  Alluded to a stroke though do not see a record of this.  Reports this is contributing to his wheelchair-bound status, is now having increased difficulty transferring though has not noticed any change in the vertigo.  -PT/OT, consider referral for vestibular rehab, consider trial of meclizine

## 2022-07-29 NOTE — PROGRESS NOTES
Hospital Medicine Daily Progress Note    Date of Service  7/29/2022    Chief Complaint  Benito Persaud is a 57 y.o. male admitted 7/29/2022 with chronic vertigo with falls and low back pain.    Hospital Course  Patient admitted for intractable nausea and vomiting due to chronic vertigo and falls with resultant back pain.  He has been placed on IV fluid hydration and has been seen by therapy.    Interval Problem Update  Ongoing dizziness and headache with associated nausea, continue pain control  Reports back pain with bilateral lower extremity weakness, no complaints of bowel or bladder incontinence  Seen by PT with recommendation for rehab therapy  Denies any recent illness, including fever or chills    I have discussed this patient's plan of care and discharge plan at IDT rounds today with Case Management, Nursing, Nursing leadership, and other members of the IDT team.    Consultants/Specialty  physiatry    Code Status  DNAR, I OK    Disposition  Patient is not medically cleared for discharge.   Anticipate discharge to to an inpatient rehabilitation hospital.  I have placed the appropriate orders for post-discharge needs.    Review of Systems  Review of Systems   Constitutional: Negative for chills, diaphoresis, fever and malaise/fatigue.   HENT: Negative for congestion and hearing loss.    Eyes: Negative for blurred vision and double vision.   Respiratory: Positive for shortness of breath. Negative for cough.    Cardiovascular: Negative for chest pain, palpitations and leg swelling.   Gastrointestinal: Positive for nausea and vomiting. Negative for abdominal pain, constipation, diarrhea and heartburn.   Genitourinary: Negative for dysuria and flank pain.   Musculoskeletal: Positive for back pain and myalgias. Negative for joint pain.   Neurological: Positive for dizziness, weakness and headaches. Negative for sensory change, speech change and focal weakness.   Psychiatric/Behavioral: Negative for depression  and memory loss. The patient is not nervous/anxious.         Physical Exam  Temp:  [36.3 °C (97.4 °F)] 36.3 °C (97.4 °F)  Pulse:  [63-87] 63  Resp:  [12-23] 12  BP: (106-160)/(60-99) 154/93  SpO2:  [91 %-97 %] 91 %    Physical Exam  Vitals and nursing note reviewed.   Constitutional:       General: He is not in acute distress.     Appearance: He is ill-appearing. He is not toxic-appearing or diaphoretic.   HENT:      Head: Normocephalic and atraumatic.      Nose: Nose normal.   Eyes:      General:         Right eye: No discharge.         Left eye: No discharge.      Extraocular Movements: Extraocular movements intact.      Pupils: Pupils are equal, round, and reactive to light.   Neck:      Thyroid: No thyromegaly.      Vascular: No JVD.   Cardiovascular:      Rate and Rhythm: Normal rate.      Heart sounds: No murmur heard.  Pulmonary:      Effort: Pulmonary effort is normal. No respiratory distress.      Breath sounds: Normal breath sounds. No wheezing.      Comments: Upper airway wheeze  Abdominal:      General: Bowel sounds are normal. There is no distension.      Palpations: Abdomen is soft.      Tenderness: There is no abdominal tenderness.   Musculoskeletal:         General: No swelling or tenderness.      Cervical back: Neck supple.   Skin:     General: Skin is warm and dry.      Findings: No erythema or rash.   Neurological:      Mental Status: He is alert and oriented to person, place, and time.      Cranial Nerves: No cranial nerve deficit.      Sensory: No sensory deficit.      Motor: Weakness present.      Coordination: Coordination normal.   Psychiatric:         Behavior: Behavior normal.         Thought Content: Thought content normal.         Fluids  No intake or output data in the 24 hours ending 07/29/22 1121    Laboratory  Recent Labs     07/29/22  0327   WBC 5.6   RBC 5.26   HEMOGLOBIN 16.1   HEMATOCRIT 45.7   MCV 86.9   MCH 30.6   MCHC 35.2   RDW 45.1   PLATELETCT 97*   MPV 8.8*     Recent Labs      07/29/22  0327   SODIUM 133*   POTASSIUM 3.8   CHLORIDE 101   CO2 22   GLUCOSE 232*   BUN 9   CREATININE 1.00   CALCIUM 9.2                   Imaging  DX-CHEST-PORTABLE (1 VIEW)   Final Result         1.  No acute cardiopulmonary disease.   2.  Cardiomegaly      CT-LSPINE W/O PLUS RECONS   Final Result         1.  No new acute traumatic bony injury of the lumbar spine.   2.  Anterior wedge deformity fracture of T11, compatible with age indeterminate compression injury but is stable since prior study.      CT-HEAD W/O   Final Result         1.  No acute intracranial abnormality.   2.  Atherosclerosis.              Assessment/Plan  * Intractable nausea and vomiting- (present on admission)  Assessment & Plan  Zofran as needed  Add Compazine  Consider scheduling antiemetics if ongoing symptoms  Continue IV fluid hydration    Falls  Assessment & Plan  Secondary to above  Now with back pain, pain control    Chronic vertigo  Assessment & Plan  History of  As needed  PT OT evaluation  Physiatry evaluation for acute rehab placement  Orthostatic vitals    Type 2 diabetes mellitus, without long-term current use of insulin (HCC)- (present on admission)  Assessment & Plan  Continue Lantus, sliding scale insulin  A1c of 9.4       VTE prophylaxis: SCDs/TEDs and therapeutic anticoagulation with Eliquis    I have performed a physical exam and reviewed and updated ROS and Plan today (7/29/2022). In review of yesterday's note (7/28/2022), there are no changes except as documented above.

## 2022-07-29 NOTE — ED TRIAGE NOTES
Benito Persaud  57 y.o. male    Chief Complaint   Patient presents with   • Shoulder Pain   • Back Pain   • Fall     Pt arrives via EMS with complaints of L shoulder pain and mid back pain that began this evening after pt had fall out of bed. Pt states he fell due to dizziness. Endorses dizziness on triage. Pt states he has had many falls recently.     Pt DENIES hitting head. Pt is on blood thinners.     Vitals:    07/29/22 0130   BP: (!) 160/91   Pulse: 87   Resp: 16   Temp: 36.3 °C (97.4 °F)   SpO2: 94%       Triage process explained to patient, apologized for wait time, and returned to [facility].  Pt informed to notify staff of any change in condition.

## 2022-07-29 NOTE — ED NOTES
Patient remains comfortable on gurney, no identifiable needs at this time. Equal chest rise and fall bilaterally, pt connected to cardiac monitor. Safety measures in place, call light within reach.

## 2022-07-29 NOTE — ED NOTES
Report given to Krystle CARCAMO. Safety measures in place. Call light within reach. Care relinquished.

## 2022-07-29 NOTE — ED PROVIDER NOTES
ED Provider Note    Scribed for CORNELL Fuentes II* by Nicolle Pascual. 7/29/2022  2:55 AM    Means of Arrival: EMS  History obtained by: patient  Limitations: none    CHIEF COMPLAINT  Chief Complaint   Patient presents with   • Shoulder Pain   • Back Pain   • Fall       HPI  Benito Persaud is a 57 y.o. male who has a history of thromboembolic disease, myelodysplastic malignancy, chronic balance problems, frequent falls, asthma, MI, presents to the Emergency Department for evaluation injuries following a ground level fall onset tonight. Tonight, Benito was getting out of his wheel chair when he felt moderately dizzy and he fell. He has a moderate amount of lower back pain and left shoulder pain. He did hit his head but did not have loss of consciousness at the time. Since the fall he has been moderately nauseous and has vomited. He has been taking Zofran with little to no alleviation. Right now he has a headache. He denies any recent fever or chills. He does not have any neck pain. No alleviating or exacerbating factors were reported. He is anticoagulated on Eliquis. Benito states he is concerned because he has had several falls in the past three months.     REVIEW OF SYSTEMS  Review of Systems   Constitutional: Negative for chills and fever.   Gastrointestinal: Positive for nausea and vomiting.   Musculoskeletal: Positive for back pain and falls. Negative for neck pain.   Neurological: Positive for headaches. Negative for loss of consciousness.   All other systems reviewed and are negative.    See HPI for further details.    PAST MEDICAL HISTORY   has a past medical history of Asthma, Cancer ([facility]) (11/01/2016), and MI (myocardial infarction) ([facility]) (2019).    SOCIAL HISTORY  Social History     Tobacco Use   • Smoking status: Never Smoker   • Smokeless tobacco: Current User     Types: Chew   Vaping Use   • Vaping Use: Never used   Substance and Sexual Activity   • Alcohol use: Not Currently   • Drug  "use: Not Currently   • Sexual activity: Not reported       SURGICAL HISTORY   has a past surgical history that includes cath removal (N/A, 8/14/2019) and irrigation & debridement general (N/A, 5/25/2022).    CURRENT MEDICATIONS  Home Medications     Reviewed by Zeenat Lal (Pharmacy Tech) on 07/29/22 at 0610  Med List Status: Complete   Medication Last Dose Status   acetaminophen (TYLENOL) 500 MG Tab PRN Active   amitriptyline (ELAVIL) 100 MG Tab 7/27/2022 Active   diphenhydrAMINE (BENADRYL) 50 MG Cap 7/27/2022 Active   ELIQUIS 5 MG Tab 7/27/2022 Active   fluticasone (FLOVENT HFA) 110 MCG/ACT Aerosol 7/29/2022 Active   gabapentin (NEURONTIN) 800 MG tablet 7/27/2022 Active   ipratropium-albuterol (COMBIVENT RESPIMAT)  MCG/ACT Aero Soln 7/27/2022 Active   metFORMIN (GLUCOPHAGE) 500 MG Tab 7/27/2022 Active   Omeprazole 20 MG Tablet Delayed Release Dispersible 7/27/2022 Active   ondansetron (ZOFRAN ODT) 4 MG TABLET DISPERSIBLE 7/28/2022 Active   sulfamethoxazole-trimethoprim (BACTRIM DS) 800-160 MG tablet 7/28/2022 Active   zolpidem (AMBIEN) 10 MG Tab 7/27/2022 Active                ALLERGIES  Allergies   Allergen Reactions   • Green Beans Anaphylaxis   • Iodine Anaphylaxis   • Shellfish Allergy Anaphylaxis   • Keflex Diarrhea and Vomiting     Vomiting & Diarrhea  Tolerates ceftriaxone   • Reglan [Metoclopramide] Anxiety     Fast heart rate    • Toradol Hives, Vomiting and Nausea       PHYSICAL EXAM  VITAL SIGNS: BP (!) 143/99   Pulse 83   Temp 36.3 °C (97.4 °F) (Temporal)   Resp 13   Ht 1.803 m (5' 11\")   Wt 119 kg (263 lb)   SpO2 97%   BMI 36.68 kg/m²     Pulse ox interpretation: I interpret this pulse ox as normal.  Constitutional: Alert, Pleasant 57 y.o. male laying in bed in no apparent distress.  Holding emesis bag.  HENT: No signs of trauma, Bilateral external ears normal, Nose normal. Resolving abscess at the left occipital scalp  Eyes: Pupils are equal, Conjunctiva normal, Non-icteric.  " No nystagmus.  Normal eye movements.  Neck: Normal range of motion, No tenderness, Supple, No stridor.   Cardiovascular: Regular rate and rhythm, no murmurs. Symmetric distal pulses. No cyanosis of extremities. No peripheral edema of extremities.  Thorax & Lungs: Normal breath sounds, No respiratory distress, No wheezing, No chest tenderness.   Abdomen:  Soft, No tenderness, No masses, No pulsatile masses. No peritoneal signs.  Skin: Warm, Dry, No erythema, No rash.   Back: Midline lumbar tenderness and right para lumbar tenderness, No CVA tenderness.   Musculoskeletal: Good range of motion in all major joints. No tenderness to palpation or major deformities noted.   Neurologic: Alert and oriented to person place time situation.  Clear speech.  No facial droop.  4-5 strength bilateral lower extremities.  No obvious ataxia.  No aphasia.  Psychiatric: Affect normal, Judgment normal, Mood normal.     DIAGNOSTIC STUDIES / PROCEDURES    EKG Interpretation:  Interpreted by me  Results for orders placed or performed during the hospital encounter of 22   EKG   Result Value Ref Range    Report       [facility] Emergency Dept.    Test Date:  2022  Pt Name:    ZINA SALGUERO                 Department: ER  MRN:        [id number]                      Room:  Gender:     Male                         Technician: [id number]  :        1965                   Requested By:ER TRIAGE PROTOCOL  Order #:    [id number]                    Reading MD: Maxi Nava II, MD    Measurements  Intervals                                Axis  Rate:       84                           P:          44  NE:         180                          QRS:        15  QRSD:       84                           T:          51  QT:         336  QTc:        398    Interpretive Statements  SINUS RHYTHM  rate 84  normal intervals  no st elevation or depression. normal twaves  impression: normal sinus rhythm ekg  Compared to ECG  06/28/2022 05:58:18  No significant changes  Electronically Signed On 7- 4:02:03 PDT by Maxi Nava II, MD         LABS  Pertinent Labs & Imaging studies reviewed. (See chart for details)  Labs Reviewed   CBC WITH DIFFERENTIAL - Abnormal; Notable for the following components:       Result Value    Platelet Count 97 (*)     MPV 8.8 (*)     All other components within normal limits   COMP METABOLIC PANEL - Abnormal; Notable for the following components:    Sodium 133 (*)     Glucose 232 (*)     Alkaline Phosphatase 128 (*)     All other components within normal limits   LACTIC ACID - Abnormal; Notable for the following components:    Lactic Acid 2.2 (*)     All other components within normal limits   ESTIMATED GFR   TROPONIN   MAGNESIUM   VITAMIN B12   TSH   FREE THYROXINE     RADIOLOGY  DX-CHEST-PORTABLE (1 VIEW)   Final Result         1.  No acute cardiopulmonary disease.   2.  Cardiomegaly      CT-LSPINE W/O PLUS RECONS   Final Result         1.  No new acute traumatic bony injury of the lumbar spine.   2.  Anterior wedge deformity fracture of T11, compatible with age indeterminate compression injury but is stable since prior study.      CT-HEAD W/O   Final Result         1.  No acute intracranial abnormality.   2.  Atherosclerosis.           Pertinent Labs & Imaging studies reviewed. (See chart for details)    COURSE & MEDICAL DECISION MAKING  Pertinent Labs & Imaging studies reviewed. (See chart for details)    2:55 AM This is a 57 y.o. male who presents with back pain, shoulder pain, and a headache following a fall and the differential diagnosis includes but is not limited to hyperglycemia, DKA, dehydration, electrolyte abnormalities, intracranial injury, spinal injury, vertigo, MI. Ordered for an EKG, CT-Head, CT-Lspine, Lactic Acid, CMP, and CBC with diff to evaluate.     4:01 AM has had multiple episodes of vomiting since arrival.  CT-Head shows no intracranial abnormality. CT-L spine shows no  new acute bony injury of the lumbar spine. Lactic Acid is 2.2. Blood counts are within acceptable limits. Glucose is 232. Sodium is 133 and Alkaline phosphatase is 128. Ordered for Troponin and DX-Chest. He will be treated with phenergan 25 mg and Zofran 4 mg for his nausea. He will be given morphine 4 mg for his pain.     5:10 AM DX-Chest shows no cardiopulmonary abnormality. Troponin is 12.  Heart score is 3.  Patient will give given fluids for hyperglycemia.     5:26 AM Patient was reevaluated at bedside. He states he has still been nauseous. Paged Hospitalist for intractable vomiting and nausea, unsteady gait, frequent falls while anticoagulanted on Eliquis.     5:40 AM I discussed the patient's case and the above findings with Dr. Nuno ([facility]) who agrees to admit the patient.     DISPOSITION:  Patient will be hospitalized by Dr. Valdivia in guarded condition.    FINAL IMPRESSION  1. Intractable nausea and vomiting Active   2. Multiple falls    3. Anticoagulated    4. Hyperglycemia          Nicolle JOHNSON (Rajwinder), am scribing for, and in the presence of, CJ Fuentes II.    Electronically signed by: Nicolle Pascual (Rajwinder), 7/29/2022    Maxi JOHNSON II, M* personally performed the services described in this documentation, as scribed by Nicolle Pascual in my presence, and it is both accurate and complete.    The note accurately reflects work and decisions made by me.  Maxi Nava II, M.D.  7/29/2022  7:31 AM

## 2022-07-29 NOTE — ASSESSMENT & PLAN NOTE
Zofran as needed  Add Compazine  Consider scheduling antiemetics if ongoing symptoms  8/1 improved

## 2022-07-29 NOTE — ASSESSMENT & PLAN NOTE
Last A1c in June 9.4  Consider gastric emptying study in setting of intractable nausea and vomiting

## 2022-07-29 NOTE — ED NOTES
Pt wheeled to blue 16 from [place] with this RN. Pt wheelchair bound at baseline. Pt reports he was getting up this morning when he got dizzy and fell. Pt denies hitting his head. Pt complaints of L shoulder pain, able to use L arm. Pt reports hx of vertigo. Pt changed into gown, port pending access for labs. Chart up for ERP.

## 2022-07-30 LAB
ANION GAP SERPL CALC-SCNC: 9 MMOL/L (ref 7–16)
BUN SERPL-MCNC: 10 MG/DL (ref 8–22)
CALCIUM SERPL-MCNC: 8.6 MG/DL (ref 8.5–10.5)
CHLORIDE SERPL-SCNC: 103 MMOL/L (ref 96–112)
CO2 SERPL-SCNC: 22 MMOL/L (ref 20–33)
CREAT SERPL-MCNC: 1.07 MG/DL (ref 0.5–1.4)
GFR SERPLBLD CREATININE-BSD FMLA CKD-EPI: 81 ML/MIN/1.73 M 2
GLUCOSE BLD STRIP.AUTO-MCNC: 222 MG/DL (ref 65–99)
GLUCOSE BLD STRIP.AUTO-MCNC: 223 MG/DL (ref 65–99)
GLUCOSE BLD STRIP.AUTO-MCNC: 242 MG/DL (ref 65–99)
GLUCOSE BLD STRIP.AUTO-MCNC: 259 MG/DL (ref 65–99)
GLUCOSE BLD STRIP.AUTO-MCNC: 269 MG/DL (ref 65–99)
GLUCOSE SERPL-MCNC: 311 MG/DL (ref 65–99)
POTASSIUM SERPL-SCNC: 3.9 MMOL/L (ref 3.6–5.5)
SODIUM SERPL-SCNC: 134 MMOL/L (ref 135–145)

## 2022-07-30 PROCEDURE — 94760 N-INVAS EAR/PLS OXIMETRY 1: CPT

## 2022-07-30 PROCEDURE — 96375 TX/PRO/DX INJ NEW DRUG ADDON: CPT

## 2022-07-30 PROCEDURE — A9270 NON-COVERED ITEM OR SERVICE: HCPCS

## 2022-07-30 PROCEDURE — 700102 HCHG RX REV CODE 250 W/ 637 OVERRIDE(OP): Performed by: STUDENT IN AN ORGANIZED HEALTH CARE EDUCATION/TRAINING PROGRAM

## 2022-07-30 PROCEDURE — 97535 SELF CARE MNGMENT TRAINING: CPT

## 2022-07-30 PROCEDURE — A9270 NON-COVERED ITEM OR SERVICE: HCPCS | Performed by: STUDENT IN AN ORGANIZED HEALTH CARE EDUCATION/TRAINING PROGRAM

## 2022-07-30 PROCEDURE — 700111 HCHG RX REV CODE 636 W/ 250 OVERRIDE (IP): Performed by: STUDENT IN AN ORGANIZED HEALTH CARE EDUCATION/TRAINING PROGRAM

## 2022-07-30 PROCEDURE — 700101 HCHG RX REV CODE 250: Performed by: INTERNAL MEDICINE

## 2022-07-30 PROCEDURE — A9270 NON-COVERED ITEM OR SERVICE: HCPCS | Performed by: INTERNAL MEDICINE

## 2022-07-30 PROCEDURE — G0378 HOSPITAL OBSERVATION PER HR: HCPCS

## 2022-07-30 PROCEDURE — 700102 HCHG RX REV CODE 250 W/ 637 OVERRIDE(OP): Performed by: HOSPITALIST

## 2022-07-30 PROCEDURE — 700102 HCHG RX REV CODE 250 W/ 637 OVERRIDE(OP)

## 2022-07-30 PROCEDURE — 700101 HCHG RX REV CODE 250: Performed by: HOSPITALIST

## 2022-07-30 PROCEDURE — 99225 PR SUBSEQUENT OBSERVATION CARE,LEVEL II: CPT | Performed by: HOSPITALIST

## 2022-07-30 PROCEDURE — 700102 HCHG RX REV CODE 250 W/ 637 OVERRIDE(OP): Performed by: INTERNAL MEDICINE

## 2022-07-30 PROCEDURE — 94640 AIRWAY INHALATION TREATMENT: CPT

## 2022-07-30 PROCEDURE — 96372 THER/PROPH/DIAG INJ SC/IM: CPT

## 2022-07-30 PROCEDURE — A9270 NON-COVERED ITEM OR SERVICE: HCPCS | Performed by: HOSPITALIST

## 2022-07-30 PROCEDURE — 82962 GLUCOSE BLOOD TEST: CPT | Mod: 91

## 2022-07-30 PROCEDURE — 80048 BASIC METABOLIC PNL TOTAL CA: CPT

## 2022-07-30 RX ORDER — ZOLPIDEM TARTRATE 5 MG/1
10 TABLET ORAL
Status: DISCONTINUED | OUTPATIENT
Start: 2022-07-30 | End: 2022-08-01 | Stop reason: HOSPADM

## 2022-07-30 RX ORDER — ZOLPIDEM TARTRATE 5 MG/1
5 TABLET ORAL NIGHTLY PRN
Status: DISCONTINUED | OUTPATIENT
Start: 2022-07-30 | End: 2022-08-01 | Stop reason: HOSPADM

## 2022-07-30 RX ORDER — MUPIROCIN CALCIUM 20 MG/G
CREAM TOPICAL 3 TIMES DAILY
Status: COMPLETED | OUTPATIENT
Start: 2022-07-30 | End: 2022-07-31

## 2022-07-30 RX ADMIN — INSULIN LISPRO 2 UNITS: 100 INJECTION, SOLUTION INTRAVENOUS; SUBCUTANEOUS at 06:17

## 2022-07-30 RX ADMIN — MUPIROCIN: 2 CREAM TOPICAL at 17:53

## 2022-07-30 RX ADMIN — GABAPENTIN 800 MG: 400 CAPSULE ORAL at 06:10

## 2022-07-30 RX ADMIN — TIZANIDINE 4 MG: 4 TABLET ORAL at 21:57

## 2022-07-30 RX ADMIN — OMEPRAZOLE 20 MG: 20 CAPSULE, DELAYED RELEASE ORAL at 17:52

## 2022-07-30 RX ADMIN — PROCHLORPERAZINE EDISYLATE 10 MG: 5 INJECTION INTRAMUSCULAR; INTRAVENOUS at 01:05

## 2022-07-30 RX ADMIN — ZOLPIDEM TARTRATE 10 MG: 5 TABLET ORAL at 21:47

## 2022-07-30 RX ADMIN — APIXABAN 5 MG: 5 TABLET, FILM COATED ORAL at 17:52

## 2022-07-30 RX ADMIN — APIXABAN 5 MG: 5 TABLET, FILM COATED ORAL at 06:10

## 2022-07-30 RX ADMIN — ALBUTEROL SULFATE 2 PUFF: 90 AEROSOL, METERED RESPIRATORY (INHALATION) at 03:03

## 2022-07-30 RX ADMIN — ALBUTEROL SULFATE 1 PUFF: 90 AEROSOL, METERED RESPIRATORY (INHALATION) at 08:57

## 2022-07-30 RX ADMIN — INSULIN LISPRO 2 UNITS: 100 INJECTION, SOLUTION INTRAVENOUS; SUBCUTANEOUS at 01:08

## 2022-07-30 RX ADMIN — INSULIN LISPRO 3 UNITS: 100 INJECTION, SOLUTION INTRAVENOUS; SUBCUTANEOUS at 13:06

## 2022-07-30 RX ADMIN — ZOLPIDEM TARTRATE 5 MG: 5 TABLET ORAL at 01:05

## 2022-07-30 RX ADMIN — OXYCODONE 5 MG: 5 TABLET ORAL at 21:48

## 2022-07-30 RX ADMIN — INSULIN LISPRO 2 UNITS: 100 INJECTION, SOLUTION INTRAVENOUS; SUBCUTANEOUS at 17:44

## 2022-07-30 RX ADMIN — INSULIN GLARGINE-YFGN 10 UNITS: 100 INJECTION, SOLUTION SUBCUTANEOUS at 17:44

## 2022-07-30 RX ADMIN — OXYCODONE 5 MG: 5 TABLET ORAL at 06:10

## 2022-07-30 RX ADMIN — GABAPENTIN 800 MG: 400 CAPSULE ORAL at 17:52

## 2022-07-30 RX ADMIN — OMEPRAZOLE 20 MG: 20 CAPSULE, DELAYED RELEASE ORAL at 06:10

## 2022-07-30 RX ADMIN — GABAPENTIN 800 MG: 400 CAPSULE ORAL at 13:11

## 2022-07-30 RX ADMIN — TIOTROPIUM BROMIDE INHALATION SPRAY 5 MCG: 3.12 SPRAY, METERED RESPIRATORY (INHALATION) at 08:51

## 2022-07-30 RX ADMIN — MUPIROCIN: 2 CREAM TOPICAL at 14:11

## 2022-07-30 RX ADMIN — LIDOCAINE 1 PATCH: 50 PATCH TOPICAL at 08:39

## 2022-07-30 ASSESSMENT — COGNITIVE AND FUNCTIONAL STATUS - GENERAL
TOILETING: A LITTLE
SUGGESTED CMS G CODE MODIFIER DAILY ACTIVITY: CJ
HELP NEEDED FOR BATHING: A LITTLE
DAILY ACTIVITIY SCORE: 21
DRESSING REGULAR LOWER BODY CLOTHING: A LITTLE

## 2022-07-30 ASSESSMENT — PAIN SCALES - WONG BAKER: WONGBAKER_NUMERICALRESPONSE: HURTS A WHOLE LOT

## 2022-07-30 ASSESSMENT — ENCOUNTER SYMPTOMS
BLURRED VISION: 0
DIARRHEA: 0
HEARTBURN: 0
PALPITATIONS: 0
FEVER: 0
DEPRESSION: 0
SHORTNESS OF BREATH: 1
DIZZINESS: 1
DIAPHORESIS: 0
WEAKNESS: 1
SPEECH CHANGE: 0
CHILLS: 0
COUGH: 0
HEADACHES: 1
CONSTIPATION: 0
MYALGIAS: 1
FLANK PAIN: 0
NERVOUS/ANXIOUS: 0
ABDOMINAL PAIN: 0
SENSORY CHANGE: 0
VOMITING: 1
NAUSEA: 1
DOUBLE VISION: 0
MEMORY LOSS: 0
FOCAL WEAKNESS: 0
BACK PAIN: 1

## 2022-07-30 ASSESSMENT — LIFESTYLE VARIABLES
AVERAGE NUMBER OF DAYS PER WEEK YOU HAVE A DRINK CONTAINING ALCOHOL: 0
TOTAL SCORE: 0
EVER HAD A DRINK FIRST THING IN THE MORNING TO STEADY YOUR NERVES TO GET RID OF A HANGOVER: NO
ON A TYPICAL DAY WHEN YOU DRINK ALCOHOL HOW MANY DRINKS DO YOU HAVE: 0
TOTAL SCORE: 0
HAVE PEOPLE ANNOYED YOU BY CRITICIZING YOUR DRINKING: NO
REASON UNABLE TO ASSESS: N
DOES PATIENT WANT TO STOP DRINKING: NO
HOW MANY TIMES IN THE PAST YEAR HAVE YOU HAD 5 OR MORE DRINKS IN A DAY: 0
HAVE YOU EVER FELT YOU SHOULD CUT DOWN ON YOUR DRINKING: NO
ALCOHOL_USE: NO
TOTAL SCORE: 0
EVER FELT BAD OR GUILTY ABOUT YOUR DRINKING: NO
CONSUMPTION TOTAL: NEGATIVE

## 2022-07-30 NOTE — THERAPY
Occupational Therapy  Daily Treatment     Patient Name: Benito Persaud  Age:  57 y.o., Sex:  male  Medical Record #: [id number]  Today's Date: 7/30/2022     Precautions  Precautions: Fall Risk    Assessment    Pt seen for OT session. Pt progressing with ADL txfs, but fatigues quickly. Pt is very motivated to participate. Educated pt on difference between SNF, OTH[facility], and outpatient OT. Pt reports he feels that he is not yet strong enough with txfs, and would still like to try to get post acute placement. However, he understands that if they are unable to accept him, he will d/c home. Pt reports concerns that facility he lives in will not allow HH to see him in his apt, but educated pt on calling management to see if facility will make an exception if medically necessary; reported will call today to ask. If he is unable to get HH, reports he would like to get OP OT to continue progressing and strengthening. Encouraged pt to sit EOB and continue to complete LB exercises provided by PT, but not to stand/txf w/o nsg assist. Continues to be limited by decreased functional mobility, activity tolerance, strength, balance, adherence to precautions, and pain which are currently affecting pt's ability to complete ADLs/IADLs at baseline. Will continue to follow.     Plan    Treatment plan modified to 4 times per week until therapy goals are met for the following treatments:  Adaptive Equipment, Neuro Re-Education / Balance, Self Care/Activities of Daily Living, Therapeutic Activities, and Therapeutic Exercises.    DC Equipment Recommendations: Unable to determine at this time (likely will req no equipment; has adequate DME/AD at home)  Discharge Recommendations: Recommend post-acute placement for additional occupational therapy services prior to discharge home (However, pt reports if unable to get into SNF he is willing to return home with [facility] (needs to clear with  that HH are able to access his  "room/home))    Subjective    \"I will call today and see if I can talk to them about allowing [OTHH] into my apartment.\"      Objective     07/30/22 1148   Precautions   Precautions Fall Risk   Vitals   O2 Delivery Device None - Room Air   Pain 0 - 10 Group   Location Back;Knee   Location Orientation Left   Therapist Pain Assessment Post Activity;During Activity;Nurse Notified  (not quantified)   Cognition    Cognition / Consciousness WDL   Level of Consciousness Alert   Comments very pleasant and cooperative; receptive to education   Passive ROM Upper Body   Passive ROM Upper Body WDL   Active ROM Upper Body   Active ROM Upper Body  WDL   Strength Upper Body   Upper Body Strength  WDL   Other Treatments   Other Treatments Provided Educated pt on difference between SNF, OTH[facility], and outpatient OT. Pt reports he feels that he is not yet strong enough with txfs, and would still like to try to get post acute placement. However, he understands that if they are unable to accept him, he will d/c home. Pt reports concerns that facility he lives in will not allow HH to see him in his apt, but educated pt on calling to see if facility will make an exception if medically necessary; reported will call today to ask. If he is unable to get HH, reports he would like to get OP OT to continue progressing and strengthening. Encouraged pt to sit EOB and continue to complete LB exercises   Balance   Sitting Balance (Static) Fair   Sitting Balance (Dynamic) Fair   Standing Balance (Static) Poor +   Standing Balance (Dynamic) Poor   Weight Shift Sitting Fair   Weight Shift Standing Poor   Skilled Intervention Verbal Cuing;Tactile Cuing;Compensatory Strategies;Facilitation;Sequencing   Comments w/ multiple hand holds; reports uses multiple hand holds/arms at home   Bed Mobility    Supine to Sit Supervised   Sit to Supine Supervised   Scooting Supervised   Comments HOB elevated   Activities of Daily Living   Grooming " Supervision;Standing  (wiping face)   Lower Body Dressing Supervision  (shoes)   Toileting   (declined need; seated urinal at EOB)   Skilled Intervention Verbal Cuing;Tactile Cuing;Compensatory Strategies   Functional Mobility   Sit to Stand Contact Guard Assist   Bed, Chair, Wheelchair Transfer Contact Guard Assist  (to L and R; req v/cs to stand full with Txf to L)   Transfer Method Stand Pivot   Mobility w/HHA/CGA sup>sit>txf to R to chair>EOB>txf to L to chair>BTB   Skilled Intervention Verbal Cuing;Tactile Cuing;Compensatory Strategies;Facilitation   Comments Reports his legs feel that they are too weak to safely complete txfs w/o someone close to make sure he doesn't fall. encouraged to continue seated LB exercises given by PT   Visual Perception   Visual Perception  X   Comments reports left visual field loss after two strokes   Activity Tolerance   Comments functional sitting activity tolerance. <1 min in standing total   Patient / Family Goals   Patient / Family Goal #1 to get stronger and return to work   Short Term Goals   Short Term Goal # 1 Pt will complete toilet transfers with supervision   Goal Outcome # 1 Progressing as expected   Short Term Goal # 2 Pt will don underpants and shorts with supervision   Goal Outcome # 2 Progressing as expected   Short Term Goal # 3 Pt will shower seated with supervision   Goal Outcome # 3 Goal not met   Education Group   Education Provided Transfers;Home Safety;Pathology of bedrest   Role of Occupational Therapist Patient Response Patient;Acceptance;Explanation;Verbal Demonstration;Reinforcement Needed   Home Safety Patient Response Patient;Acceptance;Explanation;Verbal Demonstration;Reinforcement Needed   Transfers Patient Response Patient;Acceptance;Explanation;Reinforcement Needed;Action Demonstration;Demonstration   Pathology of Bedrest Patient Response Patient;Acceptance;Explanation;Verbal Demonstration;Reinforcement Needed

## 2022-07-30 NOTE — PROGRESS NOTES
Patient still complaining of 9/10 pain to neck and back. Patient also requesting home sleeping medications. On call CHUCK Mattson notified. New orders received.

## 2022-07-30 NOTE — PROGRESS NOTES
Patient still reporting difficulty sleeping and requesting additional sleeping aids. On call CHUCK Tafoya notified. New orders received.

## 2022-07-30 NOTE — DISCHARGE PLANNING
Received Choice form at [id number]  Agency/Facility Name: [facility]   Referral sent per Choice form @ [phone]

## 2022-07-30 NOTE — CARE PLAN
The patient is Stable - Low risk of patient condition declining or worsening    Shift Goals  Clinical Goals: IV fluids, pain and nausea control  Patient Goals: rest    Progress made toward(s) clinical / shift goals:      Problem: Knowledge Deficit - Standard  Goal: Patient and family/care givers will demonstrate understanding of plan of care, disease process/condition, diagnostic tests and medications  Outcome: Progressing     Problem: Fall Risk  Goal: Patient will remain free from falls  Outcome: Progressing     Patient is not progressing towards the following goals:    Problem: Pain - Standard  Goal: Alleviation of pain or a reduction in pain to the patient’s comfort goal  Outcome: Not Progressing

## 2022-07-30 NOTE — PROGRESS NOTES
Pt in bed awake,a&ox4,able to sit on side of the bed,diet advanced and tolerated,for PT,OT eval,oc explained to pt.

## 2022-07-30 NOTE — PROGRESS NOTES
Assessment completed. Patient is aox4, vital signs stable. Patient reported 9/10 pain to neck and back. Patient medicated for pain per MAR. Patient updated on plan of care, all needs met at this time. Threaded socks and bed alarm in place. Bed locked and in lowest position. Call light and personal belongings within reach.

## 2022-07-30 NOTE — PROGRESS NOTES
4 Eyes Skin Assessment Completed by CARLOS ALBERTO Stack and CARLOS ALBERTO Koo.    Head abrasion behind left ear  Ears WDL  Nose WDL  Mouth abrasion to right chin  Neck WDL  Breast/Chest WDL  Shoulder Blades WDL  Spine WDL  (R) Arm/Elbow/Hand WDL  (L) Arm/Elbow/Hand WDL  Abdomen WDL  Groin WDL  Scrotum/Coccyx/Buttocks WDL  (R) Leg WDL  (L) Leg WDL  (R) Heel/Foot/Toe WDL  (L) Heel/Foot/Toe WDL          Devices In Places Blood Pressure Cuff and Pulse Ox      Interventions In Place Pillows and Pressure Redistribution Mattress    Possible Skin Injury No    Pictures Uploaded Into Epic Yes  Wound Consult Placed N/A  RN Wound Prevention Protocol Ordered No

## 2022-07-30 NOTE — PROGRESS NOTES
Hospital Medicine Daily Progress Note    Date of Service  7/30/2022    Chief Complaint  Benito Persaud is a 57 y.o. male admitted 7/29/2022 with chronic vertigo with falls and low back pain.    Hospital Course  Patient admitted for intractable nausea and vomiting due to chronic vertigo and falls with resultant back pain.  He has been placed on IV fluid hydration and has been seen by therapy.    Interval Problem Update  Patient is nausea has improved.  Patient is able to tolerate half of his breakfast.  No vomiting seen yet    Patient feels like he is too weak to go home    Patient seen by rehab and apparently not a rehab candidate    PT OT from yesterday recommended postacute therapy.    I have asked for PT OT to reevaluate patient    I have discussed this patient's plan of care and discharge plan at IDT rounds today with Case Management, Nursing, Nursing leadership, and other members of the IDT team.    Consultants/Specialty  physiatry    Code Status  DNAR, I OK    Disposition  Patient is not medically cleared for discharge.   Anticipate discharge to to an inpatient rehabilitation hospital.  I have placed the appropriate orders for post-discharge needs.    Review of Systems  Review of Systems   Constitutional: Negative for chills, diaphoresis, fever and malaise/fatigue.   HENT: Negative for congestion and hearing loss.    Eyes: Negative for blurred vision and double vision.   Respiratory: Positive for shortness of breath. Negative for cough.    Cardiovascular: Negative for chest pain, palpitations and leg swelling.   Gastrointestinal: Positive for nausea and vomiting. Negative for abdominal pain, constipation, diarrhea and heartburn.   Genitourinary: Negative for dysuria and flank pain.   Musculoskeletal: Positive for back pain and myalgias. Negative for joint pain.   Neurological: Positive for dizziness, weakness and headaches. Negative for sensory change, speech change and focal weakness.    Psychiatric/Behavioral: Negative for depression and memory loss. The patient is not nervous/anxious.         Physical Exam  Temp:  [35.8 °C (96.5 °F)-36.4 °C (97.6 °F)] 36.1 °C (96.9 °F)  Pulse:  [] 82  Resp:  [16-24] 16  BP: (116-153)/(60-94) 144/84  SpO2:  [93 %-98 %] 97 %    Physical Exam  Vitals and nursing note reviewed.   Constitutional:       General: He is not in acute distress.     Appearance: He is ill-appearing. He is not toxic-appearing or diaphoretic.   HENT:      Head: Normocephalic and atraumatic.      Nose: Nose normal.   Eyes:      General:         Right eye: No discharge.         Left eye: No discharge.      Extraocular Movements: Extraocular movements intact.      Pupils: Pupils are equal, round, and reactive to light.   Neck:      Thyroid: No thyromegaly.      Vascular: No JVD.   Cardiovascular:      Rate and Rhythm: Normal rate.      Heart sounds: No murmur heard.  Pulmonary:      Effort: Pulmonary effort is normal. No respiratory distress.      Breath sounds: Normal breath sounds. No wheezing.      Comments: Upper airway wheeze  Abdominal:      General: Bowel sounds are normal. There is no distension.      Palpations: Abdomen is soft.      Tenderness: There is no abdominal tenderness.   Musculoskeletal:         General: No swelling or tenderness.      Cervical back: Neck supple.   Skin:     General: Skin is warm and dry.      Findings: No erythema or rash.   Neurological:      Mental Status: He is alert and oriented to person, place, and time.      Cranial Nerves: No cranial nerve deficit.      Sensory: No sensory deficit.      Motor: Weakness present.      Coordination: Coordination normal.   Psychiatric:         Behavior: Behavior normal.         Thought Content: Thought content normal.         Fluids    Intake/Output Summary (Last 24 hours) at 7/30/2022 1117  Last data filed at 7/30/2022 0900  Gross per 24 hour   Intake --   Output 600 ml   Net -600 ml       Laboratory  Recent Labs      07/29/22 0327   WBC 5.6   RBC 5.26   HEMOGLOBIN 16.1   HEMATOCRIT 45.7   MCV 86.9   MCH 30.6   MCHC 35.2   RDW 45.1   PLATELETCT 97*   MPV 8.8*     Recent Labs     07/29/22 0327 07/30/22  0103   SODIUM 133* 134*   POTASSIUM 3.8 3.9   CHLORIDE 101 103   CO2 22 22   GLUCOSE 232* 311*   BUN 9 10   CREATININE 1.00 1.07   CALCIUM 9.2 8.6                   Imaging  DX-CHEST-PORTABLE (1 VIEW)   Final Result         1.  No acute cardiopulmonary disease.   2.  Cardiomegaly      CT-LSPINE W/O PLUS RECONS   Final Result         1.  No new acute traumatic bony injury of the lumbar spine.   2.  Anterior wedge deformity fracture of T11, compatible with age indeterminate compression injury but is stable since prior study.      CT-HEAD W/O   Final Result         1.  No acute intracranial abnormality.   2.  Atherosclerosis.              Assessment/Plan  * Intractable nausea and vomiting- (present on admission)  Assessment & Plan  Zofran as needed  Add Compazine  Consider scheduling antiemetics if ongoing symptoms  Continue IV fluid hydration    Falls- (present on admission)  Assessment & Plan  Secondary to above  Now with back pain, pain control    Chronic vertigo- (present on admission)  Assessment & Plan  History of  As needed  PT OT to reeval    Orthostatic vitals    Type 2 diabetes mellitus, without long-term current use of insulin (HCC)- (present on admission)  Assessment & Plan  Continue Lantus, sliding scale insulin  A1c of 9.4       VTE prophylaxis: SCDs/TEDs and therapeutic anticoagulation with Eliquis    I have performed a physical exam and reviewed and updated ROS and Plan today (7/30/2022). In review of yesterday's note (7/29/2022), there are no changes except as documented above.

## 2022-07-31 LAB
GLUCOSE BLD STRIP.AUTO-MCNC: 243 MG/DL (ref 65–99)
GLUCOSE BLD STRIP.AUTO-MCNC: 265 MG/DL (ref 65–99)
GLUCOSE BLD STRIP.AUTO-MCNC: 286 MG/DL (ref 65–99)
GLUCOSE BLD STRIP.AUTO-MCNC: 306 MG/DL (ref 65–99)

## 2022-07-31 PROCEDURE — 700101 HCHG RX REV CODE 250: Performed by: INTERNAL MEDICINE

## 2022-07-31 PROCEDURE — 700102 HCHG RX REV CODE 250 W/ 637 OVERRIDE(OP)

## 2022-07-31 PROCEDURE — A9270 NON-COVERED ITEM OR SERVICE: HCPCS

## 2022-07-31 PROCEDURE — A9270 NON-COVERED ITEM OR SERVICE: HCPCS | Performed by: STUDENT IN AN ORGANIZED HEALTH CARE EDUCATION/TRAINING PROGRAM

## 2022-07-31 PROCEDURE — G0378 HOSPITAL OBSERVATION PER HR: HCPCS

## 2022-07-31 PROCEDURE — 99225 PR SUBSEQUENT OBSERVATION CARE,LEVEL II: CPT | Performed by: HOSPITALIST

## 2022-07-31 PROCEDURE — 94640 AIRWAY INHALATION TREATMENT: CPT

## 2022-07-31 PROCEDURE — A9270 NON-COVERED ITEM OR SERVICE: HCPCS | Performed by: HOSPITALIST

## 2022-07-31 PROCEDURE — 96372 THER/PROPH/DIAG INJ SC/IM: CPT

## 2022-07-31 PROCEDURE — 700102 HCHG RX REV CODE 250 W/ 637 OVERRIDE(OP): Performed by: HOSPITALIST

## 2022-07-31 PROCEDURE — A9270 NON-COVERED ITEM OR SERVICE: HCPCS | Performed by: INTERNAL MEDICINE

## 2022-07-31 PROCEDURE — 700102 HCHG RX REV CODE 250 W/ 637 OVERRIDE(OP): Performed by: INTERNAL MEDICINE

## 2022-07-31 PROCEDURE — 94760 N-INVAS EAR/PLS OXIMETRY 1: CPT

## 2022-07-31 PROCEDURE — 82962 GLUCOSE BLOOD TEST: CPT | Mod: 91

## 2022-07-31 PROCEDURE — 700102 HCHG RX REV CODE 250 W/ 637 OVERRIDE(OP): Performed by: STUDENT IN AN ORGANIZED HEALTH CARE EDUCATION/TRAINING PROGRAM

## 2022-07-31 RX ORDER — OXYCODONE HYDROCHLORIDE 5 MG/1
5 TABLET ORAL EVERY 6 HOURS PRN
Status: DISCONTINUED | OUTPATIENT
Start: 2022-07-31 | End: 2022-08-01 | Stop reason: HOSPADM

## 2022-07-31 RX ORDER — DEXTROSE MONOHYDRATE 25 G/50ML
25 INJECTION, SOLUTION INTRAVENOUS
Status: DISCONTINUED | OUTPATIENT
Start: 2022-07-31 | End: 2022-07-31

## 2022-07-31 RX ORDER — DEXTROSE MONOHYDRATE 25 G/50ML
25 INJECTION, SOLUTION INTRAVENOUS
Status: DISCONTINUED | OUTPATIENT
Start: 2022-07-31 | End: 2022-08-01

## 2022-07-31 RX ORDER — AMITRIPTYLINE HYDROCHLORIDE 75 MG/1
150 TABLET ORAL NIGHTLY
Status: DISCONTINUED | OUTPATIENT
Start: 2022-07-31 | End: 2022-08-01 | Stop reason: HOSPADM

## 2022-07-31 RX ORDER — ACETAMINOPHEN 325 MG/1
650 TABLET ORAL EVERY 4 HOURS PRN
Status: DISCONTINUED | OUTPATIENT
Start: 2022-07-31 | End: 2022-08-01

## 2022-07-31 RX ORDER — INSULIN LISPRO 100 [IU]/ML
1-6 INJECTION, SOLUTION INTRAVENOUS; SUBCUTANEOUS
Status: DISCONTINUED | OUTPATIENT
Start: 2022-07-31 | End: 2022-07-31

## 2022-07-31 RX ORDER — IBUPROFEN 600 MG/1
600 TABLET ORAL EVERY 6 HOURS PRN
Status: DISCONTINUED | OUTPATIENT
Start: 2022-07-31 | End: 2022-08-01 | Stop reason: HOSPADM

## 2022-07-31 RX ORDER — INSULIN LISPRO 100 [IU]/ML
1-6 INJECTION, SOLUTION INTRAVENOUS; SUBCUTANEOUS
Status: DISCONTINUED | OUTPATIENT
Start: 2022-07-31 | End: 2022-08-01

## 2022-07-31 RX ADMIN — LIDOCAINE 1 PATCH: 50 PATCH TOPICAL at 08:48

## 2022-07-31 RX ADMIN — MUPIROCIN: 2 CREAM TOPICAL at 06:02

## 2022-07-31 RX ADMIN — INSULIN LISPRO 2 UNITS: 100 INJECTION, SOLUTION INTRAVENOUS; SUBCUTANEOUS at 17:17

## 2022-07-31 RX ADMIN — OMEPRAZOLE 20 MG: 20 CAPSULE, DELAYED RELEASE ORAL at 06:01

## 2022-07-31 RX ADMIN — OMEPRAZOLE 20 MG: 20 CAPSULE, DELAYED RELEASE ORAL at 17:19

## 2022-07-31 RX ADMIN — IBUPROFEN 600 MG: 600 TABLET ORAL at 01:56

## 2022-07-31 RX ADMIN — OXYCODONE 5 MG: 5 TABLET ORAL at 15:58

## 2022-07-31 RX ADMIN — ALBUTEROL SULFATE 2 PUFF: 90 AEROSOL, METERED RESPIRATORY (INHALATION) at 08:41

## 2022-07-31 RX ADMIN — INSULIN LISPRO 4 UNITS: 100 INJECTION, SOLUTION INTRAVENOUS; SUBCUTANEOUS at 06:02

## 2022-07-31 RX ADMIN — OXYCODONE 5 MG: 5 TABLET ORAL at 01:54

## 2022-07-31 RX ADMIN — INSULIN LISPRO 3 UNITS: 100 INJECTION, SOLUTION INTRAVENOUS; SUBCUTANEOUS at 20:29

## 2022-07-31 RX ADMIN — DIPHENHYDRAMINE HYDROCHLORIDE 25 MG: 25 TABLET ORAL at 02:03

## 2022-07-31 RX ADMIN — TIZANIDINE 4 MG: 4 TABLET ORAL at 01:56

## 2022-07-31 RX ADMIN — GABAPENTIN 800 MG: 400 CAPSULE ORAL at 11:50

## 2022-07-31 RX ADMIN — TIZANIDINE 4 MG: 4 TABLET ORAL at 19:11

## 2022-07-31 RX ADMIN — ZOLPIDEM TARTRATE 10 MG: 5 TABLET ORAL at 20:13

## 2022-07-31 RX ADMIN — MUPIROCIN: 2 CREAM TOPICAL at 11:51

## 2022-07-31 RX ADMIN — GABAPENTIN 800 MG: 400 CAPSULE ORAL at 06:02

## 2022-07-31 RX ADMIN — ALBUTEROL SULFATE 2 PUFF: 90 AEROSOL, METERED RESPIRATORY (INHALATION) at 14:16

## 2022-07-31 RX ADMIN — INSULIN LISPRO 3 UNITS: 100 INJECTION, SOLUTION INTRAVENOUS; SUBCUTANEOUS at 11:53

## 2022-07-31 RX ADMIN — OXYCODONE 5 MG: 5 TABLET ORAL at 08:47

## 2022-07-31 RX ADMIN — GABAPENTIN 800 MG: 400 CAPSULE ORAL at 17:19

## 2022-07-31 RX ADMIN — TIZANIDINE 4 MG: 4 TABLET ORAL at 08:47

## 2022-07-31 RX ADMIN — MUPIROCIN: 2 CREAM TOPICAL at 17:16

## 2022-07-31 RX ADMIN — APIXABAN 5 MG: 5 TABLET, FILM COATED ORAL at 17:20

## 2022-07-31 RX ADMIN — AMITRIPTYLINE HYDROCHLORIDE 150 MG: 75 TABLET, FILM COATED ORAL at 20:13

## 2022-07-31 RX ADMIN — APIXABAN 5 MG: 5 TABLET, FILM COATED ORAL at 06:01

## 2022-07-31 ASSESSMENT — ENCOUNTER SYMPTOMS
MYALGIAS: 1
FEVER: 0
WEAKNESS: 1
PALPITATIONS: 0
BLURRED VISION: 0
DIZZINESS: 1
VOMITING: 1
MEMORY LOSS: 0
HEARTBURN: 0
ABDOMINAL PAIN: 0
DIAPHORESIS: 0
HEADACHES: 1
CHILLS: 0
CONSTIPATION: 0
FLANK PAIN: 0
COUGH: 0
DIARRHEA: 0
DEPRESSION: 0
NERVOUS/ANXIOUS: 0
FOCAL WEAKNESS: 0
SHORTNESS OF BREATH: 1
SENSORY CHANGE: 0
SPEECH CHANGE: 0
BACK PAIN: 1
NAUSEA: 1
DOUBLE VISION: 0

## 2022-07-31 NOTE — DISCHARGE PLANNING
Case Management Discharge Planning    Admission Date: 7/29/2022  GMLOS: 2.8 (Value from CMS.gov Table.)  ALOS: 0    6-Clicks ADL Score: 21  6-Clicks Mobility Score: 14  PT and/or OT Eval ordered: Yes  Post-acute Referrals Ordered: Yes  Post-acute Choice Obtained: Yes  Has referral(s) been sent to post-acute provider:  Yes    Anticipated Discharge Dispo: Discharge Disposition: Discharge to SNF    DME Needed: No    Action(s) Taken: DC Assessment Complete (See below), Choice obtained and Referral(s) sent     Patient lives alone, and is independent with ADLs at baseline. PT/OT recommendations noted for post acute rehab. Patient is agreeable with this plan & choice form obtained.     Next Steps: Follow up on referrals    Barriers to Discharge: Pending Placement

## 2022-07-31 NOTE — PROGRESS NOTES
Hospital Medicine Daily Progress Note    Date of Service  7/31/2022    Chief Complaint  Benito Persaud is a 57 y.o. male admitted 7/29/2022 with chronic vertigo with falls and low back pain.    Hospital Course  Patient admitted for intractable nausea and vomiting due to chronic vertigo and falls with resultant back pain.  He has been placed on IV fluid hydration and has been seen by therapy.    Interval Problem Update  Patient complaining of insomnia and requesting that his amitriptyline this was started-which was    Patient complaining of upper back pain that he feels is related to his fall.  I am not comfortable adding any more narcotics to this patient's regimen-we will attempt a nonnarcotic route     looking at SNF possibilities but it does not appear to be a great option due to patient's insurance    Patient seen by rehab and apparently not a rehab candidate    I have discussed this patient's plan of care and discharge plan at IDT rounds today with Case Management, Nursing, Nursing leadership, and other members of the IDT team.    Consultants/Specialty  physiatry    Code Status  DNAR, I OK    Disposition  Patient is not medically cleared for discharge.   Anticipate discharge to to an inpatient rehabilitation hospital.  I have placed the appropriate orders for post-discharge needs.    Review of Systems  Review of Systems   Constitutional: Negative for chills, diaphoresis, fever and malaise/fatigue.   HENT: Negative for congestion and hearing loss.    Eyes: Negative for blurred vision and double vision.   Respiratory: Positive for shortness of breath. Negative for cough.    Cardiovascular: Negative for chest pain, palpitations and leg swelling.   Gastrointestinal: Positive for nausea and vomiting. Negative for abdominal pain, constipation, diarrhea and heartburn.   Genitourinary: Negative for dysuria and flank pain.   Musculoskeletal: Positive for back pain and myalgias. Negative for joint pain.    Neurological: Positive for dizziness, weakness and headaches. Negative for sensory change, speech change and focal weakness.   Psychiatric/Behavioral: Negative for depression and memory loss. The patient is not nervous/anxious.         Physical Exam  Temp:  [36.2 °C (97.2 °F)-36.4 °C (97.6 °F)] 36.4 °C (97.6 °F)  Pulse:  [] 78  Resp:  [18-20] 18  BP: (133-164)/(76-85) 150/79  SpO2:  [95 %-98 %] 95 %    Physical Exam  Vitals and nursing note reviewed.   Constitutional:       General: He is not in acute distress.     Appearance: He is not ill-appearing, toxic-appearing or diaphoretic.   HENT:      Head: Normocephalic and atraumatic.      Nose: Nose normal.   Eyes:      General:         Right eye: No discharge.         Left eye: No discharge.      Extraocular Movements: Extraocular movements intact.      Pupils: Pupils are equal, round, and reactive to light.   Neck:      Thyroid: No thyromegaly.      Vascular: No JVD.   Cardiovascular:      Rate and Rhythm: Normal rate.      Heart sounds: No murmur heard.  Pulmonary:      Effort: Pulmonary effort is normal. No respiratory distress.      Breath sounds: Normal breath sounds. No wheezing.   Abdominal:      General: Bowel sounds are normal. There is no distension.      Palpations: Abdomen is soft.      Tenderness: There is no abdominal tenderness.   Musculoskeletal:         General: No swelling or tenderness.      Cervical back: Neck supple.   Skin:     General: Skin is warm and dry.      Findings: No erythema or rash.   Neurological:      Mental Status: He is alert and oriented to person, place, and time.      Cranial Nerves: No cranial nerve deficit.      Sensory: No sensory deficit.      Motor: Weakness present.      Coordination: Coordination normal.   Psychiatric:         Behavior: Behavior normal.         Thought Content: Thought content normal.         Fluids    Intake/Output Summary (Last 24 hours) at 7/31/2022 1352  Last data filed at 7/31/2022  0854  Gross per 24 hour   Intake 270 ml   Output 2250 ml   Net -1980 ml       Laboratory  Recent Labs     07/29/22  0327   WBC 5.6   RBC 5.26   HEMOGLOBIN 16.1   HEMATOCRIT 45.7   MCV 86.9   MCH 30.6   MCHC 35.2   RDW 45.1   PLATELETCT 97*   MPV 8.8*     Recent Labs     07/29/22  0327 07/30/22  0103   SODIUM 133* 134*   POTASSIUM 3.8 3.9   CHLORIDE 101 103   CO2 22 22   GLUCOSE 232* 311*   BUN 9 10   CREATININE 1.00 1.07   CALCIUM 9.2 8.6                   Imaging  DX-CHEST-PORTABLE (1 VIEW)   Final Result         1.  No acute cardiopulmonary disease.   2.  Cardiomegaly      CT-LSPINE W/O PLUS RECONS   Final Result         1.  No new acute traumatic bony injury of the lumbar spine.   2.  Anterior wedge deformity fracture of T11, compatible with age indeterminate compression injury but is stable since prior study.      CT-HEAD W/O   Final Result         1.  No acute intracranial abnormality.   2.  Atherosclerosis.              Assessment/Plan  * Intractable nausea and vomiting- (present on admission)  Assessment & Plan  Zofran as needed  Add Compazine  Consider scheduling antiemetics if ongoing symptoms      Falls- (present on admission)  Assessment & Plan  Secondary to above  Now with back pain, pain control    Chronic vertigo- (present on admission)  Assessment & Plan  History of  As needed  PT OT to reeval    Orthostatic vitals    Type 2 diabetes mellitus, without long-term current use of insulin (HCC)- (present on admission)  Assessment & Plan  Continue Lantus, sliding scale insulin  A1c of 9.4       VTE prophylaxis: SCDs/TEDs and therapeutic anticoagulation with Eliquis    I have performed a physical exam and reviewed and updated ROS and Plan today (7/31/2022). In review of yesterday's note (7/30/2022), there are no changes except as documented above.

## 2022-07-31 NOTE — CARE PLAN
The patient is Stable - Low risk of patient condition declining or worsening    Shift Goals  Clinical Goals: placement  Patient Goals: pain control    Progress made toward(s) clinical / shift goals:  receives medications for pain control. See MAR.       Problem: Pain - Standard  Goal: Alleviation of pain or a reduction in pain to the patient’s comfort goal  Outcome: Progressing     Problem: Knowledge Deficit - Standard  Goal: Patient and family/care givers will demonstrate understanding of plan of care, disease process/condition, diagnostic tests and medications  Outcome: Progressing     Problem: Skin Integrity  Goal: Skin integrity is maintained or improved  Outcome: Progressing     Problem: Fall Risk  Goal: Patient will remain free from falls  Outcome: Progressing       Patient is not progressing towards the following goals: no placement today. Discussed pt's care with Hospitalist.

## 2022-07-31 NOTE — PROGRESS NOTES
Pt in bed working on phone. No request at this time. Pt reports a small improvement in pain from earlier. Pt continues to voice concern regarding his recently d/c'd home medication.

## 2022-08-01 VITALS
HEART RATE: 105 BPM | HEIGHT: 71 IN | TEMPERATURE: 97.7 F | OXYGEN SATURATION: 92 % | RESPIRATION RATE: 16 BRPM | BODY MASS INDEX: 38.58 KG/M2 | DIASTOLIC BLOOD PRESSURE: 97 MMHG | WEIGHT: 275.57 LBS | SYSTOLIC BLOOD PRESSURE: 159 MMHG

## 2022-08-01 LAB
GLUCOSE BLD STRIP.AUTO-MCNC: 215 MG/DL (ref 65–99)
GLUCOSE BLD STRIP.AUTO-MCNC: 316 MG/DL (ref 65–99)
SARS-COV+SARS-COV-2 AG RESP QL IA.RAPID: NOTDETECTED
SPECIMEN SOURCE: NORMAL

## 2022-08-01 PROCEDURE — A9270 NON-COVERED ITEM OR SERVICE: HCPCS | Performed by: INTERNAL MEDICINE

## 2022-08-01 PROCEDURE — 700102 HCHG RX REV CODE 250 W/ 637 OVERRIDE(OP): Performed by: STUDENT IN AN ORGANIZED HEALTH CARE EDUCATION/TRAINING PROGRAM

## 2022-08-01 PROCEDURE — G0378 HOSPITAL OBSERVATION PER HR: HCPCS

## 2022-08-01 PROCEDURE — 87426 SARSCOV CORONAVIRUS AG IA: CPT

## 2022-08-01 PROCEDURE — 700101 HCHG RX REV CODE 250: Performed by: INTERNAL MEDICINE

## 2022-08-01 PROCEDURE — 700111 HCHG RX REV CODE 636 W/ 250 OVERRIDE (IP): Performed by: STUDENT IN AN ORGANIZED HEALTH CARE EDUCATION/TRAINING PROGRAM

## 2022-08-01 PROCEDURE — A9270 NON-COVERED ITEM OR SERVICE: HCPCS | Performed by: STUDENT IN AN ORGANIZED HEALTH CARE EDUCATION/TRAINING PROGRAM

## 2022-08-01 PROCEDURE — 99217 PR OBSERVATION CARE DISCHARGE: CPT

## 2022-08-01 PROCEDURE — 96372 THER/PROPH/DIAG INJ SC/IM: CPT

## 2022-08-01 PROCEDURE — 82962 GLUCOSE BLOOD TEST: CPT

## 2022-08-01 PROCEDURE — 700102 HCHG RX REV CODE 250 W/ 637 OVERRIDE(OP): Performed by: INTERNAL MEDICINE

## 2022-08-01 RX ORDER — DEXTROSE MONOHYDRATE 25 G/50ML
25 INJECTION, SOLUTION INTRAVENOUS PRN
Qty: 50 ML | Refills: 0 | Status: SHIPPED
Start: 2022-08-01

## 2022-08-01 RX ORDER — INSULIN LISPRO 100 [IU]/ML
INJECTION, SOLUTION INTRAVENOUS; SUBCUTANEOUS
Qty: 3 ML | Refills: 0 | Status: SHIPPED
Start: 2022-08-01 | End: 2022-08-22 | Stop reason: SDUPTHER

## 2022-08-01 RX ORDER — DEXTROSE MONOHYDRATE 25 G/50ML
25 INJECTION, SOLUTION INTRAVENOUS
Status: DISCONTINUED | OUTPATIENT
Start: 2022-08-01 | End: 2022-08-01 | Stop reason: HOSPADM

## 2022-08-01 RX ORDER — INSULIN LISPRO 100 [IU]/ML
1-6 INJECTION, SOLUTION INTRAVENOUS; SUBCUTANEOUS
Status: DISCONTINUED | OUTPATIENT
Start: 2022-08-01 | End: 2022-08-01 | Stop reason: HOSPADM

## 2022-08-01 RX ORDER — OXYCODONE HYDROCHLORIDE 5 MG/1
5 TABLET ORAL EVERY 6 HOURS PRN
Qty: 20 TABLET | Refills: 0 | Status: SHIPPED | OUTPATIENT
Start: 2022-08-01 | End: 2022-08-06

## 2022-08-01 RX ORDER — ACETAMINOPHEN 325 MG/1
650 TABLET ORAL EVERY 4 HOURS PRN
Status: DISCONTINUED | OUTPATIENT
Start: 2022-08-01 | End: 2022-08-01 | Stop reason: HOSPADM

## 2022-08-01 RX ORDER — INSULIN GLARGINE 100 [IU]/ML
25 INJECTION, SOLUTION SUBCUTANEOUS EVERY EVENING
Status: SHIPPED
Start: 2022-08-01 | End: 2022-08-22 | Stop reason: SDUPTHER

## 2022-08-01 RX ORDER — HEPARIN SODIUM (PORCINE) LOCK FLUSH IV SOLN 100 UNIT/ML 100 UNIT/ML
300-500 SOLUTION INTRAVENOUS PRN
Status: DISCONTINUED | OUTPATIENT
Start: 2022-08-01 | End: 2022-08-01 | Stop reason: HOSPADM

## 2022-08-01 RX ADMIN — OXYCODONE 5 MG: 5 TABLET ORAL at 12:38

## 2022-08-01 RX ADMIN — GABAPENTIN 800 MG: 400 CAPSULE ORAL at 12:38

## 2022-08-01 RX ADMIN — OXYCODONE 5 MG: 5 TABLET ORAL at 02:17

## 2022-08-01 RX ADMIN — LIDOCAINE 1 PATCH: 50 PATCH TOPICAL at 10:35

## 2022-08-01 RX ADMIN — INSULIN LISPRO 3 UNITS: 100 INJECTION, SOLUTION INTRAVENOUS; SUBCUTANEOUS at 08:35

## 2022-08-01 RX ADMIN — APIXABAN 5 MG: 5 TABLET, FILM COATED ORAL at 05:22

## 2022-08-01 RX ADMIN — OMEPRAZOLE 20 MG: 20 CAPSULE, DELAYED RELEASE ORAL at 08:40

## 2022-08-01 RX ADMIN — ONDANSETRON 4 MG: 4 TABLET, ORALLY DISINTEGRATING ORAL at 12:38

## 2022-08-01 RX ADMIN — GABAPENTIN 800 MG: 400 CAPSULE ORAL at 05:22

## 2022-08-01 ASSESSMENT — ENCOUNTER SYMPTOMS
HEARTBURN: 0
HEADACHES: 0
BACK PAIN: 1
FEVER: 0
COUGH: 0
VOMITING: 1
CHILLS: 0
SHORTNESS OF BREATH: 0
DIZZINESS: 1
PALPITATIONS: 0
ABDOMINAL PAIN: 0
NAUSEA: 1
WEAKNESS: 1
DIARRHEA: 0

## 2022-08-01 ASSESSMENT — PAIN DESCRIPTION - PAIN TYPE
TYPE: ACUTE PAIN

## 2022-08-01 NOTE — DISCHARGE PLANNING
Agency/Facility Name: [facility]  Spoke To: Mary Anne  Outcome: [facility] has a bed available today.    MAYRA sent a Teams message to CARLOS ALBERTO Li to see if the pt would like to go to [facility].    @1218  Agency/Facility Name: [facility]  Spoke To: Mary Anne  Outcome: MAYRA called stating the pt would like to go to [facility].  Mary Anne will e-mail the pt's insurance and they will set up transport.  Mary Anne will notify this DPA of the transport time.      @1238  Agency/Facility Name: [facility]  Spoke To: Mary Anne  Outcome: Mary Anne notified this DPA that the pt's insurance cannot transport until tomorrow.  Mary Anne asked if [facility] could transport.  MAYRA sent CARLOS ALBERTO Li a teams message requesting [facility] set up transport for today.  MAYRA is waiting for transport time and will notify Mary Anne at [facility].

## 2022-08-01 NOTE — DISCHARGE PLANNING
Agency/Facility Name: [facility]  Spoke To: Mary Anne  Outcome: Pt can transport at 1730 providing the [facility] portal comes back up and pt has a [facility].    @[phone]  Agency/Facility Name: [facility]  Spoke To: Mary Anne  Outcome: DPA notified Mary Anne that the pt will transport at [phone] via [facility].

## 2022-08-01 NOTE — DISCHARGE SUMMARY
Discharge Summary    CHIEF COMPLAINT ON ADMISSION  Chief Complaint   Patient presents with   • Shoulder Pain   • Back Pain   • Fall       Reason for Admission  Shoulder Pain, Dizziness.     Admission Date  7/29/2022     CODE STATUS  DNAR, I OK    HPI & HOSPITAL COURSE  This is a 57 y.o. male here with intractable nausea and vomiting, and frequent falls.  Benito Persaud is a 57 y.o. male who presented 7/29/2022 with nausea and vomiting and increased frequency of falls.  Patient has a complex medical history including MDS, chronic balance problems/vertigo, testicular cancer status postorchiectomy and chemotherapy and has been in remission, history of MRSA infection, chronic back pain from her multiple vertebral fractures asthma, CAD, history of DVT.  He presents due to increased nausea and vomiting, reports he has not been able to keep anything down for several days and has been dry heaving since then.  He also reports increased frequency of falls.  He has had multiple visits to the hospital for similar presentations.  He reports that he is wheelchair-bound due to balance problems and chronic pain, is usually able to transfer from bed to chair etc. however has been having increasing falls.  He is not report any changes in his dizziness/vertigo and is unsure of why he is having increasing falls,, denies lightheadedness, denies palpitations.  Therapies evaluated patient and recommended postacute placement.  However he was a difficult placement and took several days to find accepting skilled nursing facility.  On the day of discharge there is an accepting facility and an open bed at [facility].  Patient will be transferred this afternoon.        Therefore, he is discharged in good and stable condition to skilled nursing facility.    The patient met 2-midnight criteria for an inpatient stay at the time of discharge.      FOLLOW UP ITEMS POST DISCHARGE  pcp    DISCHARGE DIAGNOSES  Principal Problem:    Intractable  nausea and vomiting POA: Yes  Active Problems:    Type 2 diabetes mellitus, without long-term current use of insulin (HCC) POA: Yes    Chronic vertigo POA: Yes    Falls POA: Yes  Resolved Problems:    * No resolved hospital problems. *      FOLLOW UP  Future Appointments   Date Time Provider Department Center   8/2/2022  2:30 PM Katie Leone M.D. [facility]   8/15/2022  9:00 AM Astrida Haleiwa, PT, DPT [address] [place] [address]   8/18/2022  9:00 AM Astrida Rivas, PT, DPT [facility] [place] [address]   8/22/2022  9:00 AM Astrida Rivas, PT, DPT [facility] [place] [address]   8/25/2022  9:00 AM Astrida Haleiwa, PT, DPT [address] [place] [address]   8/29/2022  9:00 AM Astrida Rivas, PT, DPT [facility] [place] [address]   9/1/2022  9:00 AM Astrida Rivas, PT, DPT [address] [place] [address]   9/6/2022  9:00 AM Astrida Haleiwa, PT, DPT [facility] [place] [address]   9/8/2022  9:00 AM Astrida Rivas, PT, DPT [facility] [place] [address]   12/7/2022 10:00 AM [address]   12/7/2022 10:30 AM [address]   12/12/2022  8:00 AM Terrell Pearson D.O. [facility] None     Jacobo Eddy D.O.  [address] [facility] [address]  [phone]    Follow up        MEDICATIONS ON DISCHARGE     Medication List      START taking these medications      Instructions   dextrose 50% 50 % Soln  Commonly known as: D50W   Infuse 50 mL into a venous catheter as needed (If FSBG is less than or equal to 70 mg/dL and If patient is NPO or unable to take oral).  Dose: 25 g     insulin lispro 100 UNIT/ML Sopn injection PEN  Commonly known as: HumaLOG,AdmeLOG   As above     Lantus SoloStar 100 UNIT/ML Sopn injection  Generic drug: insulin glargine   Inject 25 Units under the skin every evening.  Dose: 25 Units     oxyCODONE immediate-release 5 MG Tabs  Commonly known as: ROXICODONE   Take 1 Tablet by mouth every 6 hours as needed for Severe Pain for up to 5 days.  Dose: 5 mg        CHANGE how you take these medications      Instructions   Eliquis 5mg Tabs  What changed:  how much to take  Generic drug: apixaban   TAKE 1 TABLET BY MOUTH TWICE A DAY     gabapentin 800 MG tablet  What changed: when to take this  Commonly known as: NEURONTIN   TAKE 1 TABLET BY MOUTH THREE TIMES A DAY        CONTINUE taking these medications      Instructions   acetaminophen 500 MG Tabs  Commonly known as: TYLENOL   Take 500-1,000 mg by mouth every 6 hours as needed for Mild Pain.  Dose: 500-1,000 mg     amitriptyline 100 MG Tabs  Commonly known as: ELAVIL   Take 1.5 Tablets by mouth every evening.  Dose: 150 mg     diphenhydrAMINE 50 MG Caps  Commonly known as: BENADRYL   Take 50 mg by mouth at bedtime.  Dose: 50 mg     Flovent  MCG/ACT Aero  Generic drug: fluticasone   Inhale 1 Puff 2 times a day.  Dose: 1 Puff     ipratropium-albuterol  MCG/ACT Aers  Commonly known as: COMBIVENT RESPIMAT   Inhale 1 Puff 2 times a day.  Dose: 1 Puff     metFORMIN 500 MG Tabs  Commonly known as: GLUCOPHAGE   Take 500 mg by mouth 2 times a day with meals.  Dose: 500 mg     Omeprazole 20 MG Tbdd   Take 20 mg by mouth 2 times a day.  Dose: 20 mg     ondansetron 4 MG Tbdp  Commonly known as: Zofran ODT   Take 1 Tablet by mouth every 8 hours as needed for Nausea.  Dose: 4 mg     zolpidem 10 MG Tabs  Commonly known as: AMBIEN   Take 10 mg by mouth at bedtime.  Dose: 10 mg        STOP taking these medications    sulfamethoxazole-trimethoprim 800-160 MG tablet  Commonly known as: Bactrim DS            Allergies  Allergies   Allergen Reactions   • Green Beans Anaphylaxis   • Iodine Anaphylaxis   • Shellfish Allergy Anaphylaxis   • Keflex Diarrhea and Vomiting     Vomiting & Diarrhea  Tolerates ceftriaxone   • Reglan [Metoclopramide] Anxiety     Fast heart rate    • Toradol Hives, Vomiting and Nausea       DIET  Orders Placed This Encounter   Procedures   • Diet Order Diet: Consistent CHO (Diabetic)     Standing Status:   Standing     Number of Occurrences:   1     Order Specific Question:   Diet:     Answer:    Consistent CHO (Diabetic) [4]       ACTIVITY  As tolerated.  Weight bearing as tolerated    LINES, DRAINS, AND WOUNDS  This is an automated list. Peripheral IVs will be removed prior to discharge.  Single Lumen Implantable Port 06/28/22 Left Chest (Active)   Access Date 06/28/22 06/28/22 0424   Access Time 0424 06/28/22 0424   Accessed In ER 07/29/22 0315   Needle Size 20 G 07/29/22 0315   Site Assessment Clean;Dry;Intact 07/31/22 2000   Line Status Saline locked 08/01/22 0900   Dressing Type Biopatch;Transparent Film 07/31/22 2000   Dressing Status Clean;Dry;Intact 07/31/22 2000   Dressing Intervention N/A 07/31/22 2000   De-Accessed Date 06/29/22 06/29/22 1516   De-Accessed Time 1515 06/29/22 1516       Wound 03/26/22 Other (comment) Pretibial (Active)       Wound 05/23/22 Partial Thickness Wound Lip;Nose Mid Cellulitis (Active)       Wound 05/25/22 Incision Face Nasal Packing, 2 x 2,  (Active)                  MENTAL STATUS ON TRANSFER   positive          CONSULTATIONS  none    PROCEDURES  none    LABORATORY  Lab Results   Component Value Date    SODIUM 134 (L) 07/30/2022    POTASSIUM 3.9 07/30/2022    CHLORIDE 103 07/30/2022    CO2 22 07/30/2022    GLUCOSE 311 (H) 07/30/2022    BUN 10 07/30/2022    CREATININE 1.07 07/30/2022        Lab Results   Component Value Date    WBC 5.6 07/29/2022    HEMOGLOBIN 16.1 07/29/2022    HEMATOCRIT 45.7 07/29/2022    PLATELETCT 97 (L) 07/29/2022        Total time of the discharge process  35 minutes.

## 2022-08-01 NOTE — PROGRESS NOTES
[facility] Medicine Daily Progress Note    Date of Service  8/1/2022    Chief Complaint  Benito Persaud is a 57 y.o. male admitted 7/29/2022 with nausea/vomiting and increased frequency of falls.    Hospital Course  Benito Persaud is a 57 y.o. male who presented 7/29/2022 with nausea and vomiting and increased frequency of falls.  Patient has a complex medical history including MDS, chronic balance problems/vertigo, testicular cancer status postorchiectomy and chemotherapy and has been in remission, history of MRSA infection, chronic back pain from her multiple vertebral fractures asthma, CAD, history of DVT.  He presents due to increased nausea and vomiting, reports he has not been able to keep anything down for several days and has been dry heaving since then.  He also reports increased frequency of falls.  He has had multiple visits to the hospital for similar presentations.  He reports that he is wheelchair-bound due to balance problems and chronic pain, is usually able to transfer from bed to chair etc. however has been having increasing falls.  He is not report any changes in his dizziness/vertigo and is unsure of why he is having increasing falls,, denies lightheadedness, denies palpitations.  Therapies evaluated patient and recommended postacute placement.  However he was a difficult placement and took several days to find accepting skilled nursing facility.        Interval Problem Update  8/1 afebrile, vitals stable, on room air.  Blood sugars elevated into high 200s, low 300s over past 24 hours, increased Lantus to 25 units.  Patient states nausea and vomiting are improved, however dizziness persists.  Has accepting SNF facility, awaiting insurance authorization.    I have discussed this patient's plan of care and discharge plan at IDT rounds today with Case Management, Nursing, Nursing leadership, and other members of the IDT team.    Consultants/Specialty  Physiatry    Code Status  DNAR, I  OK    Disposition  Patient is medically cleared for discharge.   Anticipate discharge to to skilled nursing facility.  I have placed the appropriate orders for post-discharge needs.    Review of Systems  Review of Systems   Constitutional: Negative for chills, fever and malaise/fatigue.   Respiratory: Negative for cough and shortness of breath.    Cardiovascular: Negative for chest pain, palpitations and leg swelling.   Gastrointestinal: Positive for nausea (Improved) and vomiting (Improved). Negative for abdominal pain, diarrhea and heartburn.   Genitourinary: Negative for dysuria, frequency and urgency.   Musculoskeletal: Positive for back pain.   Neurological: Positive for dizziness and weakness. Negative for headaches.   All other systems reviewed and are negative.       Physical Exam  Temp:  [36.1 °C (97 °F)-37.1 °C (98.7 °F)] 36.6 °C (97.8 °F)  Pulse:  [75-78] 75  Resp:  [16-18] 16  BP: (133-151)/(65-96) 133/65  SpO2:  [93 %-98 %] 93 %    Physical Exam  Vitals and nursing note reviewed.   Constitutional:       General: He is awake.      Appearance: Normal appearance. He is obese. He is not ill-appearing.   HENT:      Head: Normocephalic and atraumatic.      Jaw: There is normal jaw occlusion.      Right Ear: Hearing normal.      Left Ear: Hearing normal.      Nose: Nose normal.      Mouth/Throat:      Lips: Pink.      Mouth: Mucous membranes are moist.   Eyes:      Extraocular Movements: Extraocular movements intact.      Conjunctiva/sclera: Conjunctivae normal.      Pupils: Pupils are equal, round, and reactive to light.   Neck:      Vascular: No carotid bruit.   Cardiovascular:      Rate and Rhythm: Normal rate and regular rhythm.      Pulses: Normal pulses.      Heart sounds: Normal heart sounds, S1 normal and S2 normal.   Pulmonary:      Effort: Pulmonary effort is normal.      Breath sounds: Normal breath sounds and air entry. No stridor or decreased air movement. No decreased breath sounds.    Musculoskeletal:      Cervical back: Normal range of motion and neck supple.      Right lower leg: No edema.      Left lower leg: No edema.   Skin:     General: Skin is warm and dry.      Capillary Refill: Capillary refill takes less than 2 seconds.   Neurological:      General: No focal deficit present.      Mental Status: He is alert and oriented to person, place, and time. Mental status is at baseline.      Sensory: Sensation is intact.      Motor: Weakness present.      Comments: Uses wheelchair at baseline due to chronic dizziness   Psychiatric:         Attention and Perception: Attention and perception normal.         Mood and Affect: Mood and affect normal.         Speech: Speech normal.         Behavior: Behavior normal. Behavior is cooperative.         Fluids    Intake/Output Summary (Last 24 hours) at 8/1/2022 1112  Last data filed at 8/1/2022 0159  Gross per 24 hour   Intake --   Output 1450 ml   Net -1450 ml       Laboratory      Recent Labs     07/30/22  0103   SODIUM 134*   POTASSIUM 3.9   CHLORIDE 103   CO2 22   GLUCOSE 311*   BUN 10   CREATININE 1.07   CALCIUM 8.6                   Imaging  DX-CHEST-PORTABLE (1 VIEW)   Final Result         1.  No acute cardiopulmonary disease.   2.  Cardiomegaly      CT-LSPINE W/O PLUS RECONS   Final Result         1.  No new acute traumatic bony injury of the lumbar spine.   2.  Anterior wedge deformity fracture of T11, compatible with age indeterminate compression injury but is stable since prior study.      CT-HEAD W/O   Final Result         1.  No acute intracranial abnormality.   2.  Atherosclerosis.              Assessment/Plan  * Intractable nausea and vomiting- (present on admission)  Assessment & Plan  Zofran as needed  Add Compazine  Consider scheduling antiemetics if ongoing symptoms      Falls- (present on admission)  Assessment & Plan  Secondary to above  Now with back pain, pain control    Chronic vertigo- (present on admission)  Assessment &  Plan  History of  As needed  PT OT to reeval    Orthostatic vitals    Type 2 diabetes mellitus, without long-term current use of insulin (HCC)- (present on admission)  Assessment & Plan  Continue Lantus, sliding scale insulin  A1c of 9.4         VTE prophylaxis: therapeutic anticoagulation with Eliquis    I have performed a physical exam and reviewed and updated ROS and Plan today (8/1/2022). In review of yesterday's note (7/31/2022), there are no changes except as documented above.

## 2022-08-01 NOTE — CARE PLAN
The patient is Stable - Low risk of patient condition declining or worsening    Shift Goals  Clinical Goals: pain control, DC  Patient Goals: pain control  Family Goals: DC    Progress made toward(s) clinical / shift goals:    Problem: Pain - Standard  Goal: Alleviation of pain or a reduction in pain to the patient’s comfort goal  Outcome: Progressing       Patient is not progressing towards the following goals:

## 2022-08-01 NOTE — PROGRESS NOTES
Received report from day shift RN. Pt met sitting up in bed awake and calm on taking over the shift. A/O X4, on R/A, breathing unlabored and pt in no acute respiratory distress. R chest port noted, CDI. Pt denies pain, call light within reach and safety precautions maintained.

## 2022-08-01 NOTE — CARE PLAN
The patient is Stable - Low risk of patient condition declining or worsening    Problem: Pain - Standard  Goal: Alleviation of pain or a reduction in pain to the patient’s comfort goal  Outcome: Progressing     Problem: Knowledge Deficit - Standard  Goal: Patient and family/care givers will demonstrate understanding of plan of care, disease process/condition, diagnostic tests and medications  Outcome: Progressing     Problem: Skin Integrity  Goal: Skin integrity is maintained or improved  Outcome: Progressing     Problem: Fall Risk  Goal: Patient will remain free from falls  Outcome: Progressing     Shift Goals  Clinical Goals: pain control, BS monitor,fall prevention  Patient Goals: pain control, rest and sleep    Progress made toward(s) clinical / shift goals:  Pt medicated per MAR and well tolerated. Pt remained free of fall and skin integrity maintained.

## 2022-08-01 NOTE — PROGRESS NOTES
"Pt sleeping comfortably but easily arousable. Due meds were given and well tolerated. Pt non-compliant with DM diet, Pt verbalized \"I'm dying anyway, I can as well eat whatever I want\", education provided. Call light within reach and safety precautions maintained. Report given to day shift RN.  "

## 2022-08-01 NOTE — DISCHARGE PLANNING
DC Transport Scheduled    Received request at: 1331    Transport Company Scheduled:  [employer]  Spoke with Paz at [facility] to schedule transport.  [facility] Trip #: [id number]     Scheduled Date: 08/01/2022  Scheduled Time: [id number]    Destination: [facility]     Notified care team of scheduled transport via [employer].     If there are any changes needed to the DC transportation scheduled, please contact [facility] at ext. [phone] between the hours of 2092-3252 Mon-Fri. If outside those hours, contact the ED Case Manager at ext. [phone].

## 2022-08-01 NOTE — DISCHARGE PLANNING
Case Management Discharge Planning    Admission Date: 7/29/2022  GMLOS: 2.8 (Value from CMS.gov Table.)  ALOS: 0    6-Clicks ADL Score: 21  6-Clicks Mobility Score: 14  PT and/or OT Eval ordered: yes  Post-acute Referrals Ordered: yes  Post-acute Choice Obtained: yes  Has referral(s) been sent to post-acute provider:  yes      Anticipated Discharge Dispo: Discharge Disposition: Discharged to home/self care (01) [facility] accepted pt and per Mary Anne she is submitting auth    DME Needed: n/a    Action(s) Taken: Discussed during IDT rounds. CM called Mary Anne at [facility] who confirmed that they are accepting pt and she will submit auth today. CM also called Kenyatta at [facility] who confirmed that they have accepted but they do not have a bed available.     Escalations Completed: n/a    Medically Clear: yes    Next Steps: yes    Barriers to Discharge: insurance    Is the patient up for discharge tomorrow: yes

## 2022-08-01 NOTE — DISCHARGE PLANNING
Case Management Discharge Planning    Admission Date: 7/29/2022  GMLOS: 2.8 (Value from CMS.gov Table.)  ALOS: 0    6-Clicks ADL Score: 21  6-Clicks Mobility Score: 14  PT and/or OT Eval ordered: yes  Post-acute Referrals Ordered: yes  Post-acute Choice Obtained: yes  Has referral(s) been sent to post-acute provider:  yes      Anticipated Discharge Dispo: Discharge Disposition: Discharged to home/self care (01) Accepted by SNF at [facility]    DME Needed: N/A    Action(s) Taken: Received a message from Shwetha NUNEZ that pt has a bed for [facility] today. Doreen was able to set up with T for [phone] . Oxycodone prescription included in packet.    Escalations Completed: n/a    Medically Clear: yes    Next Steps: none    Barriers to Discharge: none    Is the patient up for discharge tomorrow: today

## 2022-08-01 NOTE — HOSPITAL COURSE
Benito Persaud is a 57 y.o. male who presented 7/29/2022 with nausea and vomiting and increased frequency of falls.  Patient has a complex medical history including MDS, chronic balance problems/vertigo, testicular cancer status postorchiectomy and chemotherapy and has been in remission, history of MRSA infection, chronic back pain from her multiple vertebral fractures asthma, CAD, history of DVT.  He presents due to increased nausea and vomiting, reports he has not been able to keep anything down for several days and has been dry heaving since then.  He also reports increased frequency of falls.  He has had multiple visits to the hospital for similar presentations.  He reports that he is wheelchair-bound due to balance problems and chronic pain, is usually able to transfer from bed to chair etc. however has been having increasing falls.  He is not report any changes in his dizziness/vertigo and is unsure of why he is having increasing falls,, denies lightheadedness, denies palpitations.  Therapies evaluated patient and recommended postacute placement.  However he was a difficult placement and took several days to find accepting skilled nursing facility.  On the day of discharge there is an accepting facility and an open bed at [facility].  Patient will be transferred this afternoon.

## 2022-08-01 NOTE — DISCHARGE INSTRUCTIONS
-follow up with PCP    Discharge Instructions    Discharged to other by medical transportation with escort. Discharged via wheelchair, hospital escort: Yes.  Special equipment needed: Not Applicable    Be sure to schedule a follow-up appointment with your primary care doctor or any specialists as instructed.     Discharge Plan:   Diet Plan: Discussed  Activity Level: Discussed  Confirmed Follow up Appointment: Patient to Call and Schedule Appointment  Confirmed Symptoms Management: Discussed  Medication Reconciliation Updated: Yes    I understand that a diet low in cholesterol, fat, and sodium is recommended for good health. Unless I have been given specific instructions below for another diet, I accept this instruction as my diet prescription.   Other diet: heart healthy     Special Instructions: None    -Is this patient being discharged with medication to prevent blood clots?  No    Is patient discharged on Warfarin / Coumadin?   No

## 2022-08-02 NOTE — PROGRESS NOTES
Discharging patient to [facility]. Verbalized understanding of discharge instructions, follow up appointments, and home care. All questions answered.  Belongings with patient at time of discharge.  Vitals signs WNL. Pt given discharge information. Discharge assessment completed.

## 2022-08-15 ENCOUNTER — PHYSICAL THERAPY (OUTPATIENT)
Dept: PHYSICAL THERAPY | Facility: REHABILITATION | Age: 57
End: 2022-08-15
Attending: FAMILY MEDICINE
Payer: MEDICAID

## 2022-08-15 DIAGNOSIS — M25.561 CHRONIC PAIN OF BOTH KNEES: ICD-10-CM

## 2022-08-15 DIAGNOSIS — G89.29 CHRONIC PAIN OF BOTH KNEES: ICD-10-CM

## 2022-08-15 DIAGNOSIS — M25.562 CHRONIC PAIN OF BOTH KNEES: ICD-10-CM

## 2022-08-15 PROCEDURE — 97110 THERAPEUTIC EXERCISES: CPT

## 2022-08-15 NOTE — OP THERAPY DAILY TREATMENT
"  Outpatient Physical Therapy  DAILY TREATMENT     [facility] [address].  [address]  [place] [address]  Phone:  [phone]  Fax:  [phone]    Date: 08/15/2022    Patient: Benito Persaud  YOB: 1965  MRN: [id number]     Time Calculation    Start time: 0903  Stop time: 0945 Time Calculation (min): 42 minutes         Chief Complaint: Knee Problem    Visit #: 1    SUBJECTIVE:  Pt reports having a recent fall and hospital admission. Fall occurred at home after stranding up from transport WC. Chair moved out from underneath him. States transport chair does not have brakes.    OBJECTIVE:  Current objective measures:           Therapeutic Exercises (CPT 20542):     1. Seated clams w/ purple, x10, Added to HEP    2. Seated adductor squeeze, 10x5\" hold, Added to HEP    3. Seated bilat IR with ball - OTB, x10, Added to HEP    4. LAQ, 10x5\" hold    5. Seated HS stretch, 2x30\" ea, Added to HEP    6. Seated HS curl w/ ball, 10x5\" hold, Added to HEP      Therapeutic Exercise Summary: HEP: 1x/daily  LAQs, seated clams/reverse clams, ball squeeze, HS stretch, seated HS curl    Time-based treatments/modalities:    Physical Therapy Timed Treatment Charges  Therapeutic exercise minutes (CPT 76736): 42 minutes        ASSESSMENT:   Response to treatment: Progressed seated LE/proximal hip strengthening exercises for carryover to functional mobility. Pt tolerated exercises well. Demo decreased endurance, requiring multiple rest breaks, decreased eccentric control with banded exercises. HEP progressed to reflect new exercises, handout provided.     PLAN/RECOMMENDATIONS:   Plan for treatment: therapy treatment to continue next visit.  Planned interventions for next visit: continue with current treatment.         "

## 2022-08-18 ENCOUNTER — PHYSICAL THERAPY (OUTPATIENT)
Dept: PHYSICAL THERAPY | Facility: REHABILITATION | Age: 57
End: 2022-08-18
Attending: FAMILY MEDICINE
Payer: MEDICAID

## 2022-08-18 DIAGNOSIS — M25.562 CHRONIC PAIN OF BOTH KNEES: ICD-10-CM

## 2022-08-18 DIAGNOSIS — R26.2 DIFFICULTY WALKING: ICD-10-CM

## 2022-08-18 DIAGNOSIS — M25.561 CHRONIC PAIN OF BOTH KNEES: ICD-10-CM

## 2022-08-18 DIAGNOSIS — G89.29 CHRONIC PAIN OF BOTH KNEES: ICD-10-CM

## 2022-08-18 PROCEDURE — 97110 THERAPEUTIC EXERCISES: CPT

## 2022-08-18 PROCEDURE — 97530 THERAPEUTIC ACTIVITIES: CPT

## 2022-08-18 NOTE — OP THERAPY DAILY TREATMENT
Outpatient Physical Therapy  DAILY TREATMENT     [facility] Physical [address]  [address]  [place] [address]  Phone:  [phone]  Fax:  [phone]    Date: 08/18/2022    Patient: Benito Persaud  YOB: 1965  MRN: [id number]     Time Calculation    Start time: 0900  Stop time: 0942 Time Calculation (min): 42 minutes         Chief Complaint: Knee Problem and Difficulty Walking    Visit #: 2    SUBJECTIVE:  Patient reports no new complaints since last treatment session. HEP has been going well, but reverse clams is the hardest (some pain). Has had some increased hamstring cramping; increased water intake. Patient elaborated on work environment (as a ), episodes of dizziness for new therapist.     OBJECTIVE:  Current objective measures:   Wheelchair <> standing with parallel bars: Nitesh     Static standing without UE support, wide JOHN, and slightly staggered feet x 12 seconds.           Therapeutic Exercises (CPT 02555):     1. Standing marching, 2 x 15, Added to HEP    2. Standing hip abduction, 2 x 15, 3 second holds, Added to HEP    3. Standing heel raises, Unable to perform    4. Standing hip extension, x 10, Increased dizziness and discontinued    5. Static standing, See above      Therapeutic Exercise Summary: HEP: 1x/daily  LAQs, seated clams/reverse clams, ball squeeze, HS stretch, seated HS curl    Added 8/18: standing marching, standing hip abduction     Therapeutic Treatments and Modalities:     1. Therapeutic Activities (CPT 25957), Transfer wc<>standing, See above, Walking along parallel bars with single UE support, 2 x 10 feet, cues for weight shifting. CGA throughtout but no LOB observed.    Time-based treatments/modalities:    Physical Therapy Timed Treatment Charges  Therapeutic activity minutes (CPT 06993): 12 minutes  Therapeutic exercise minutes (CPT 57657): 30 minutes      ASSESSMENT:   Response to treatment: Patient tolerated all standing  activities without increase in pain. Unable to perform heel raises or hip extension at this time due to dizzinesss/balance vs. Knee pain. Patient would benefit from progressive functional strengthening and balance work to decrease risk for falls and further injury.     PLAN/RECOMMENDATIONS:   Plan for treatment: therapy treatment to continue next visit.  Planned interventions for next visit: continue with current treatment.

## 2022-08-22 ENCOUNTER — PHYSICAL THERAPY (OUTPATIENT)
Dept: PHYSICAL THERAPY | Facility: REHABILITATION | Age: 57
End: 2022-08-22
Attending: FAMILY MEDICINE
Payer: MEDICAID

## 2022-08-22 DIAGNOSIS — E11.10 DKA, TYPE 2, NOT AT GOAL (HCC): ICD-10-CM

## 2022-08-22 DIAGNOSIS — G47.00 INSOMNIA, UNSPECIFIED TYPE: ICD-10-CM

## 2022-08-22 DIAGNOSIS — M25.562 CHRONIC PAIN OF BOTH KNEES: ICD-10-CM

## 2022-08-22 DIAGNOSIS — M25.561 CHRONIC PAIN OF BOTH KNEES: ICD-10-CM

## 2022-08-22 DIAGNOSIS — G89.29 CHRONIC PAIN OF BOTH KNEES: ICD-10-CM

## 2022-08-22 DIAGNOSIS — R26.2 DIFFICULTY WALKING: ICD-10-CM

## 2022-08-22 PROCEDURE — 97530 THERAPEUTIC ACTIVITIES: CPT

## 2022-08-22 PROCEDURE — 97110 THERAPEUTIC EXERCISES: CPT

## 2022-08-22 RX ORDER — ZOLPIDEM TARTRATE 10 MG/1
10 TABLET ORAL
Qty: 30 TABLET | Refills: 0 | Status: SHIPPED | OUTPATIENT
Start: 2022-08-22 | End: 2022-08-31 | Stop reason: SDUPTHER

## 2022-08-22 RX ORDER — FLUTICASONE PROPIONATE 110 UG/1
1 AEROSOL, METERED RESPIRATORY (INHALATION) 2 TIMES DAILY
Qty: 1 EACH | Refills: 2 | Status: SHIPPED | OUTPATIENT
Start: 2022-08-22 | End: 2022-08-31 | Stop reason: SDUPTHER

## 2022-08-22 RX ORDER — INSULIN LISPRO 100 [IU]/ML
INJECTION, SOLUTION INTRAVENOUS; SUBCUTANEOUS
Qty: 3 ML | Refills: 0 | Status: SHIPPED | OUTPATIENT
Start: 2022-08-22 | End: 2022-08-31 | Stop reason: SDUPTHER

## 2022-08-22 RX ORDER — INSULIN GLARGINE 100 [IU]/ML
25 INJECTION, SOLUTION SUBCUTANEOUS EVERY EVENING
Qty: 3 EACH | Refills: 3 | Status: SHIPPED | OUTPATIENT
Start: 2022-08-22 | End: 2022-08-22

## 2022-08-22 NOTE — OP THERAPY DAILY TREATMENT
"  Outpatient Physical Therapy  DAILY TREATMENT     [facility] Physical [address]  [address]  [place] [address]  Phone:  [phone]  Fax:  [phone]    Date: 08/22/2022    Patient: Benito Persaud  YOB: 1965  MRN: [id number]     Time Calculation    Start time: 0856  Stop time: 0930 Time Calculation (min): 34 minutes         Chief Complaint: Knee Problem and Difficulty Walking    Visit #: 3    SUBJECTIVE:  Pt reports increased back down his back this morning.  No precipitating factor, patient states it's just like that sometimes.  Worked up to 12 hour shifts the last three nights, on his feet at work.  Pt states he has been doing HEP, maybe overdoing it by doing multiple sets.  States ozzie seated reverse clams caused increased knee pain.    OBJECTIVE:        Therapeutic Exercises (CPT 21547):     1. NuStep, x5 min, level 0    2. Standing hip abduction, x8 alt R/L, with BUE support at //bars, stopped due to L knee pain and fear of \"giving out\"    3. standing march, x10 with BUE support at //bars    4. standing AP weight shift in stride stance, x10B with BUE support    5. weight shift with heel unloading, x10 with BUE support, difficulty with coordination of weight shifting to extended leg    6. seated isometric leg press into dynadisc, x10B    7. seated ankle PF/DF into dyandisc, x10B    8. seated knee AROM with ball roll, x10B    9. seated HS isometric ball press, x10B    10. seated OH press with 4# med ball, x10      Therapeutic Exercise Summary: HEP: 1x/daily  LAQs, seated clams/reverse clams, ball squeeze, HS stretch, seated HS curl    Added 8/18: standing marching, standing hip abduction     Therapeutic Treatments and Modalities:     1. Therapeutic Activities (CPT 22820), Transfer wc<>standing at //bars mod I pulling up with BUEs, w/c <-> NuStep SPT with SBA and BUE support, Walking forward and backward with BUE support along parallel bars, 2 x 10 feet, cues for " "weight shifting. CGA for backward walking, but no LOB., static standing with one foot forward on 2\" step, with BUE -> single UE support with light CGA x15 secB  Time-based treatments/modalities:    Physical Therapy Timed Treatment Charges  Therapeutic activity minutes (CPT 36384): 12 minutes  Therapeutic exercise minutes (CPT 98125): 22 minutes      Pain rating (1-10) before treatment:  9  Pain rating (1-10) after treatment:  9    ASSESSMENT:   Response to treatment: Pt demo difficulty with standing weight shifting and weight bearing due to knee pain and back pain, in addition to weakness and fall risk.  Pt will benefit from continued progressive resistance training to improve safety and independence with standing tasks and functional mobility.    PLAN/RECOMMENDATIONS:   Plan for treatment: therapy treatment to continue next visit.  Planned interventions for next visit: continue with current treatment.         "

## 2022-08-22 NOTE — PROGRESS NOTES
Patient requesting refills of medications until able to make an appointment. Refills for insulin, metformin, zolpidem, inhaler, omeprazole sent to pharmacy. Dosage and instructions taken from last hospital medication reconciliation as insulin and metformin were prescribed by different provider. Instructed patient to make appointment for additional refills.   Hanny Wills M.D.

## 2022-08-23 ENCOUNTER — TELEPHONE (OUTPATIENT)
Dept: MEDICAL GROUP | Facility: CLINIC | Age: 57
End: 2022-08-23
Payer: MEDICAID

## 2022-08-25 ENCOUNTER — PHYSICAL THERAPY (OUTPATIENT)
Dept: PHYSICAL THERAPY | Facility: REHABILITATION | Age: 57
End: 2022-08-25
Attending: FAMILY MEDICINE
Payer: MEDICAID

## 2022-08-25 DIAGNOSIS — M25.562 CHRONIC PAIN OF BOTH KNEES: ICD-10-CM

## 2022-08-25 DIAGNOSIS — G89.29 CHRONIC PAIN OF BOTH KNEES: ICD-10-CM

## 2022-08-25 DIAGNOSIS — M25.561 CHRONIC PAIN OF BOTH KNEES: ICD-10-CM

## 2022-08-25 PROCEDURE — 97110 THERAPEUTIC EXERCISES: CPT

## 2022-08-25 PROCEDURE — 97530 THERAPEUTIC ACTIVITIES: CPT
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
## 2022-08-29 ENCOUNTER — PHYSICAL THERAPY (OUTPATIENT)
Dept: PHYSICAL THERAPY | Facility: REHABILITATION | Age: 57
End: 2022-08-29
Attending: FAMILY MEDICINE
Payer: MEDICAID

## 2022-08-29 DIAGNOSIS — G89.29 CHRONIC PAIN OF BOTH KNEES: ICD-10-CM

## 2022-08-29 DIAGNOSIS — M25.561 CHRONIC PAIN OF BOTH KNEES: ICD-10-CM

## 2022-08-29 DIAGNOSIS — M25.562 CHRONIC PAIN OF BOTH KNEES: ICD-10-CM

## 2022-08-29 PROCEDURE — 97110 THERAPEUTIC EXERCISES: CPT

## 2022-08-29 PROCEDURE — 97530 THERAPEUTIC ACTIVITIES: CPT

## 2022-08-29 NOTE — OP THERAPY DAILY TREATMENT
Outpatient Physical Therapy  DAILY TREATMENT     [facility] Physical [address]  [address]  [place] [address]  Phone:  [phone]  Fax:  [phone]    Date: 08/29/2022    Patient: Benito Persaud  YOB: 1965  MRN: [id number]     Time Calculation    Start time: 0901  Stop time: 0944 Time Calculation (min): 43 minutes         Chief Complaint: Knee Problem    Visit #: 5    SUBJECTIVE:  Pt reports no new changes, no falls since last visit. Had decreased knee discomfort after previous visit. Continues to c/o chronic dizziness.    OBJECTIVE:  Current objective measures:   BP  100/80 mmHg - seated            Therapeutic Exercises (CPT 51025):     1. NuStep L1, Not today    2. Standing hip ext, x10 ea, Added to HEP    3. Standing hip abduction, x10 ea    4. Standing knee flexion, x10 ea, Added to HEP    5. Side stepping, x3 laps // bars      Therapeutic Exercise Summary: HEP: 1x/daily  LAQs, seated clams/reverse clams, ball squeeze, HS stretch, seated HS curl    Added 8/18: standing marching, standing hip abd/ext, knee flexion    Therapeutic Treatments and Modalities:     1. Therapeutic Activities (CPT 47867)    Therapeutic Treatment and Modalities Summary: Ther Act:  -Squat-pivot transfer WC<>mat table, Independent  -Mod sit<>stands, with focus on mechanics and sequencing, x15 min w/ CGA  Time-based treatments/modalities:    Physical Therapy Timed Treatment Charges  Therapeutic activity minutes (CPT 40250): 23 minutes  Therapeutic exercise minutes (CPT 19769): 20 minutes        ASSESSMENT:   Response to treatment: Facilitated modified sit<>stand progressions, with focus on body mechanics and sequencing for max force production. Pt demo quad avoidant strategy and LE support on back of chair, c/o imbalance when standing, requiring CGA. Pt demo decreased endurance and standing tolerance today, requiring multiple rest breaks. Progressed HEP to incorporate standing hip  strengthening exercises, handout provided.    PLAN/RECOMMENDATIONS:   Plan for treatment: therapy treatment to continue next visit.  Planned interventions for next visit: continue with current treatment.

## 2022-08-31 ENCOUNTER — APPOINTMENT (OUTPATIENT)
Dept: RADIOLOGY | Facility: MEDICAL CENTER | Age: 57
End: 2022-08-31
Attending: EMERGENCY MEDICINE
Payer: MEDICAID

## 2022-08-31 ENCOUNTER — OFFICE VISIT (OUTPATIENT)
Dept: INTERNAL MEDICINE | Facility: OTHER | Age: 57
End: 2022-08-31
Payer: MEDICAID

## 2022-08-31 ENCOUNTER — HOSPITAL ENCOUNTER (EMERGENCY)
Facility: MEDICAL CENTER | Age: 57
End: 2022-09-01
Attending: EMERGENCY MEDICINE
Payer: MEDICAID

## 2022-08-31 VITALS
BODY MASS INDEX: 37.52 KG/M2 | TEMPERATURE: 98.2 F | OXYGEN SATURATION: 95 % | WEIGHT: 268 LBS | DIASTOLIC BLOOD PRESSURE: 70 MMHG | SYSTOLIC BLOOD PRESSURE: 137 MMHG | HEART RATE: 96 BPM | HEIGHT: 71 IN

## 2022-08-31 DIAGNOSIS — E11.69 TYPE 2 DIABETES MELLITUS WITH OTHER SPECIFIED COMPLICATION, WITHOUT LONG-TERM CURRENT USE OF INSULIN (HCC): ICD-10-CM

## 2022-08-31 DIAGNOSIS — K92.1 HEMATOCHEZIA: ICD-10-CM

## 2022-08-31 DIAGNOSIS — L03.113 CELLULITIS OF RIGHT UPPER EXTREMITY: ICD-10-CM

## 2022-08-31 DIAGNOSIS — I10 HYPERTENSION, UNSPECIFIED TYPE: ICD-10-CM

## 2022-08-31 DIAGNOSIS — J81.0 ACUTE PULMONARY EDEMA (HCC): ICD-10-CM

## 2022-08-31 DIAGNOSIS — G47.00 INSOMNIA, UNSPECIFIED TYPE: ICD-10-CM

## 2022-08-31 DIAGNOSIS — R73.9 HYPERGLYCEMIA: Primary | ICD-10-CM

## 2022-08-31 LAB
ABO GROUP BLD: NORMAL
ALBUMIN SERPL BCP-MCNC: 3.9 G/DL (ref 3.2–4.9)
ALBUMIN/GLOB SERPL: 1.2 G/DL
ALP SERPL-CCNC: 189 U/L (ref 30–99)
ALT SERPL-CCNC: 22 U/L (ref 2–50)
ANION GAP SERPL CALC-SCNC: 12 MMOL/L (ref 7–16)
APTT PPP: 26.5 SEC (ref 24.7–36)
AST SERPL-CCNC: 20 U/L (ref 12–45)
BASOPHILS # BLD AUTO: 0.2 % (ref 0–1.8)
BASOPHILS # BLD: 0.01 K/UL (ref 0–0.12)
BILIRUB SERPL-MCNC: 0.5 MG/DL (ref 0.1–1.5)
BLD GP AB SCN SERPL QL: NORMAL
BUN SERPL-MCNC: 14 MG/DL (ref 8–22)
CALCIUM SERPL-MCNC: 9 MG/DL (ref 8.5–10.5)
CHLORIDE SERPL-SCNC: 94 MMOL/L (ref 96–112)
CO2 SERPL-SCNC: 20 MMOL/L (ref 20–33)
CREAT SERPL-MCNC: 1.12 MG/DL (ref 0.5–1.4)
EOSINOPHIL # BLD AUTO: 0.18 K/UL (ref 0–0.51)
EOSINOPHIL NFR BLD: 3.4 % (ref 0–6.9)
ERYTHROCYTE [DISTWIDTH] IN BLOOD BY AUTOMATED COUNT: 44 FL (ref 35.9–50)
EST. AVERAGE GLUCOSE BLD GHB EST-MCNC: 252 MG/DL
GFR SERPLBLD CREATININE-BSD FMLA CKD-EPI: 77 ML/MIN/1.73 M 2
GLOBULIN SER CALC-MCNC: 3.2 G/DL (ref 1.9–3.5)
GLUCOSE BLD STRIP.AUTO-MCNC: 439 MG/DL (ref 65–99)
GLUCOSE BLD-MCNC: 417 MG/DL (ref 70–100)
GLUCOSE SERPL-MCNC: 558 MG/DL (ref 65–99)
HBA1C MFR BLD: 10.4 % (ref 4–5.6)
HCT VFR BLD AUTO: 45.5 % (ref 42–52)
HGB BLD-MCNC: 16.3 G/DL (ref 14–18)
IMM GRANULOCYTES # BLD AUTO: 0.02 K/UL (ref 0–0.11)
IMM GRANULOCYTES NFR BLD AUTO: 0.4 % (ref 0–0.9)
INR PPP: 0.99 (ref 0.87–1.13)
LIPASE SERPL-CCNC: 40 U/L (ref 11–82)
LYMPHOCYTES # BLD AUTO: 1.67 K/UL (ref 1–4.8)
LYMPHOCYTES NFR BLD: 31.4 % (ref 22–41)
MCH RBC QN AUTO: 31 PG (ref 27–33)
MCHC RBC AUTO-ENTMCNC: 35.8 G/DL (ref 33.7–35.3)
MCV RBC AUTO: 86.7 FL (ref 81.4–97.8)
MONOCYTES # BLD AUTO: 0.52 K/UL (ref 0–0.85)
MONOCYTES NFR BLD AUTO: 9.8 % (ref 0–13.4)
NEUTROPHILS # BLD AUTO: 2.92 K/UL (ref 1.82–7.42)
NEUTROPHILS NFR BLD: 54.8 % (ref 44–72)
NRBC # BLD AUTO: 0 K/UL
NRBC BLD-RTO: 0 /100 WBC
PLATELET # BLD AUTO: 87 K/UL (ref 164–446)
PMV BLD AUTO: 9.2 FL (ref 9–12.9)
POTASSIUM SERPL-SCNC: 4.4 MMOL/L (ref 3.6–5.5)
PROT SERPL-MCNC: 7.1 G/DL (ref 6–8.2)
PROTHROMBIN TIME: 13 SEC (ref 12–14.6)
RBC # BLD AUTO: 5.25 M/UL (ref 4.7–6.1)
RH BLD: NORMAL
SODIUM SERPL-SCNC: 126 MMOL/L (ref 135–145)
WBC # BLD AUTO: 5.3 K/UL (ref 4.8–10.8)

## 2022-08-31 PROCEDURE — 99214 OFFICE O/P EST MOD 30 MIN: CPT | Mod: GC

## 2022-08-31 PROCEDURE — 83036 HEMOGLOBIN GLYCOSYLATED A1C: CPT

## 2022-08-31 PROCEDURE — 36415 COLL VENOUS BLD VENIPUNCTURE: CPT

## 2022-08-31 PROCEDURE — 83690 ASSAY OF LIPASE: CPT

## 2022-08-31 PROCEDURE — 80053 COMPREHEN METABOLIC PANEL: CPT

## 2022-08-31 PROCEDURE — 85025 COMPLETE CBC W/AUTO DIFF WBC: CPT

## 2022-08-31 PROCEDURE — 86900 BLOOD TYPING SEROLOGIC ABO: CPT

## 2022-08-31 PROCEDURE — 85730 THROMBOPLASTIN TIME PARTIAL: CPT

## 2022-08-31 PROCEDURE — 86901 BLOOD TYPING SEROLOGIC RH(D): CPT

## 2022-08-31 PROCEDURE — 82962 GLUCOSE BLOOD TEST: CPT

## 2022-08-31 PROCEDURE — 86850 RBC ANTIBODY SCREEN: CPT

## 2022-08-31 PROCEDURE — 85610 PROTHROMBIN TIME: CPT

## 2022-08-31 PROCEDURE — 71045 X-RAY EXAM CHEST 1 VIEW: CPT

## 2022-08-31 PROCEDURE — 99285 EMERGENCY DEPT VISIT HI MDM: CPT

## 2022-08-31 RX ORDER — INSULIN GLARGINE 100 [IU]/ML
20 INJECTION, SOLUTION SUBCUTANEOUS EVERY EVENING
Qty: 6 EACH | Refills: 3 | Status: CANCELLED | OUTPATIENT
Start: 2022-08-31

## 2022-08-31 RX ORDER — MUPIROCIN CALCIUM 20 MG/G
1 CREAM TOPICAL 2 TIMES DAILY
COMMUNITY

## 2022-08-31 RX ORDER — DIPHENHYDRAMINE HCL 50 MG
50 CAPSULE ORAL
Qty: 30 CAPSULE | Refills: 3 | Status: SHIPPED | OUTPATIENT
Start: 2022-08-31 | End: 2022-09-01

## 2022-08-31 RX ORDER — INSULIN LISPRO 100 [IU]/ML
INJECTION, SOLUTION INTRAVENOUS; SUBCUTANEOUS
Qty: 3 ML | Refills: 0 | Status: SHIPPED | OUTPATIENT
Start: 2022-08-31 | End: 2022-09-02 | Stop reason: SDUPTHER

## 2022-08-31 RX ORDER — SODIUM CHLORIDE AND POTASSIUM CHLORIDE 150; 900 MG/100ML; MG/100ML
INJECTION, SOLUTION INTRAVENOUS CONTINUOUS
Status: DISCONTINUED | OUTPATIENT
Start: 2022-08-31 | End: 2022-09-01 | Stop reason: HOSPADM

## 2022-08-31 RX ORDER — INSULIN LISPRO 100 [IU]/ML
INJECTION, SOLUTION INTRAVENOUS; SUBCUTANEOUS
Qty: 3 ML | Refills: 0 | Status: CANCELLED | OUTPATIENT
Start: 2022-08-31

## 2022-08-31 RX ORDER — PROMETHAZINE HYDROCHLORIDE 25 MG/1
25 TABLET ORAL EVERY 6 HOURS PRN
Qty: 30 TABLET | Refills: 0 | Status: SHIPPED | OUTPATIENT
Start: 2022-08-31 | End: 2022-09-01

## 2022-08-31 RX ORDER — GABAPENTIN 800 MG/1
800 TABLET ORAL 3 TIMES DAILY
Qty: 90 TABLET | Refills: 3 | Status: SHIPPED | OUTPATIENT
Start: 2022-08-31 | End: 2022-09-09 | Stop reason: SDUPTHER

## 2022-08-31 RX ORDER — AMITRIPTYLINE HYDROCHLORIDE 100 MG/1
150 TABLET ORAL NIGHTLY
Qty: 30 TABLET | Refills: 1 | Status: SHIPPED | OUTPATIENT
Start: 2022-08-31 | End: 2022-09-09 | Stop reason: SDUPTHER

## 2022-08-31 RX ORDER — INSULIN GLARGINE 100 [IU]/ML
20 INJECTION, SOLUTION SUBCUTANEOUS EVERY EVENING
Qty: 6 EACH | Refills: 3 | Status: SHIPPED | OUTPATIENT
Start: 2022-08-31 | End: 2022-09-02 | Stop reason: SDUPTHER

## 2022-08-31 RX ORDER — SODIUM CHLORIDE 9 MG/ML
1000 INJECTION, SOLUTION INTRAVENOUS ONCE
Status: COMPLETED | OUTPATIENT
Start: 2022-08-31 | End: 2022-09-01

## 2022-08-31 RX ORDER — ZOLPIDEM TARTRATE 10 MG/1
10 TABLET ORAL
Qty: 30 TABLET | Refills: 0 | Status: SHIPPED | OUTPATIENT
Start: 2022-08-31 | End: 2022-09-09 | Stop reason: SDUPTHER

## 2022-08-31 RX ORDER — FLUTICASONE PROPIONATE 110 UG/1
1 AEROSOL, METERED RESPIRATORY (INHALATION) 2 TIMES DAILY
Qty: 1 EACH | Refills: 2 | Status: SHIPPED | OUTPATIENT
Start: 2022-08-31 | End: 2022-09-09 | Stop reason: SDUPTHER

## 2022-08-31 ASSESSMENT — FIBROSIS 4 INDEX
FIB4 SCORE: 3.67
FIB4 SCORE: 3.67

## 2022-08-31 NOTE — PROGRESS NOTES
Teaching Physician Attestation      Level of Participation    I have personally interviewed and examined the patient.  In addition, I discussed with the resident physician the patient's history, exam, assessment and plan in detail.  Topics listed in my addendum were the focus of the visit.  Healthcare maintenance was not addressed this visit unless listed as a topic in my addendum.  I agree with the plan as written along with the following additions/modifications:    Establishing Care      PMH:MDS followed by shwetha, testicular cancer s/p orchiectomy, mult dvt and PE on eliquis, 2x stroke (believed ischemic), multiple spine fractures, recurrent skin infections, IDDM2, morbid obesity, possibly asthma, ? CAD vs PVD per chart, insomina per chart, ? gerd      Concern for active GI bleed  -Patient reports multiple episodes of diarrhea daily which have had concurrent black tarry stools and recently frankly bloody stools in the bowl, very concerning picture for active GI bleed in the setting of active malignancy, patient was counseled to go to the emergency room directly, patient prefers to go home for 10 minutes to turn off of his crockpot, patient instructed to immediately proceed to the emergency room after is able to turn off his crockpot and get his affairs briefly in order at his house.  Would recommend GI consult for possible endoscopy/colonoscopy, appreciate emergency department and hospital support.    Hyperglycemia  -Patient reports undetectably high blood sugar glucometer readings at home, patient gave himself short acting insulin before coming to the clinic and in clinic his reading is 417.  Seems less likely to be in DKA given absence of acute symptoms, although he is clearly uncontrolled.  Uncontrolled perhaps could be related to acute illness.  He is also tachycardic with a regular rhythm, reports significant polyuria in addition to the diarrhea, thus he is likely significantly dehydrated.  Would recommend IV  hydration and assessment for possible DKA (less likely) along with active insulin titration, appreciate emergency department, appreciate support.  Direct signout given to transfer center, appreciate support.      Pulmonary edema, etiology unclear  -Patient is in no respiratory distress and saturating normally on room air, however he has diffuse bilateral inspiratory and expiratory rhonchi with possible wheezing, given his immunocompromise status this could represent pneumonia given he also reports a productive cough, however this could also be pulmonary edema.  Would recommend chest x-ray and further lung evaluation is indicated, appreciate ED and hospital staff support.      Passive SI  -Not fully assessed, but per report denies active SI.  If no active issue discovered and patient we will follow-up with this outpatient.      Severe weakness with 8x falls in last 2 months, multifactorial in the setting of active malignancy, dehydration due to hyperglycemia, active likely GI bleed, deconditioned status  -Patient lives alone, after patient is clinically stable and fluid resuscitated, would benefit from discussion of possible assisted living or skilled nursing facility given significant medical comorbidities and fall potential      Vision issues status post stroke, chronic  -Not addressed today, defer to future visit    Chronic medication refilled to bridge to further discussion/evaluation at follow up based on combination of the last PCP note and collective review of subsequent hospitalizations    Return to clinic in 1 to 2 weeks after hospital discharge.
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
## 2022-09-01 ENCOUNTER — APPOINTMENT (OUTPATIENT)
Dept: PHYSICAL THERAPY | Facility: REHABILITATION | Age: 57
End: 2022-09-01
Attending: FAMILY MEDICINE

## 2022-09-01 VITALS
HEIGHT: 71 IN | SYSTOLIC BLOOD PRESSURE: 122 MMHG | OXYGEN SATURATION: 96 % | HEART RATE: 85 BPM | BODY MASS INDEX: 37.52 KG/M2 | TEMPERATURE: 97.8 F | DIASTOLIC BLOOD PRESSURE: 70 MMHG | RESPIRATION RATE: 16 BRPM | WEIGHT: 268 LBS

## 2022-09-01 LAB
GLUCOSE BLD STRIP.AUTO-MCNC: 259 MG/DL (ref 65–99)
GLUCOSE BLD STRIP.AUTO-MCNC: 263 MG/DL (ref 65–99)
GLUCOSE BLD STRIP.AUTO-MCNC: 333 MG/DL (ref 65–99)

## 2022-09-01 PROCEDURE — 700111 HCHG RX REV CODE 636 W/ 250 OVERRIDE (IP): Performed by: EMERGENCY MEDICINE

## 2022-09-01 PROCEDURE — 700101 HCHG RX REV CODE 250: Performed by: EMERGENCY MEDICINE

## 2022-09-01 PROCEDURE — 700102 HCHG RX REV CODE 250 W/ 637 OVERRIDE(OP): Performed by: EMERGENCY MEDICINE

## 2022-09-01 PROCEDURE — 96374 THER/PROPH/DIAG INJ IV PUSH: CPT

## 2022-09-01 PROCEDURE — 82962 GLUCOSE BLOOD TEST: CPT

## 2022-09-01 PROCEDURE — 700105 HCHG RX REV CODE 258: Performed by: EMERGENCY MEDICINE

## 2022-09-01 PROCEDURE — A9270 NON-COVERED ITEM OR SERVICE: HCPCS | Performed by: EMERGENCY MEDICINE

## 2022-09-01 RX ORDER — HYDROCODONE BITARTRATE AND ACETAMINOPHEN 5; 325 MG/1; MG/1
1 TABLET ORAL ONCE
Status: COMPLETED | OUTPATIENT
Start: 2022-09-01 | End: 2022-09-01

## 2022-09-01 RX ORDER — HEPARIN SODIUM (PORCINE) LOCK FLUSH IV SOLN 100 UNIT/ML 100 UNIT/ML
300-500 SOLUTION INTRAVENOUS PRN
Status: DISCONTINUED | OUTPATIENT
Start: 2022-09-01 | End: 2022-09-01 | Stop reason: HOSPADM

## 2022-09-01 RX ORDER — DOXYCYCLINE 100 MG/1
100 CAPSULE ORAL 2 TIMES DAILY
Qty: 14 CAPSULE | Refills: 0 | Status: SHIPPED | OUTPATIENT
Start: 2022-09-01 | End: 2022-09-09

## 2022-09-01 RX ADMIN — INSULIN HUMAN 10 UNITS: 100 INJECTION, SOLUTION PARENTERAL at 00:29

## 2022-09-01 RX ADMIN — SODIUM CHLORIDE 1000 ML: 9 INJECTION, SOLUTION INTRAVENOUS at 00:35

## 2022-09-01 RX ADMIN — HYDROCODONE BITARTRATE AND ACETAMINOPHEN 1 TABLET: 5; 325 TABLET ORAL at 01:05

## 2022-09-01 RX ADMIN — HEPARIN 300 UNITS: 100 SYRINGE at 03:35

## 2022-09-01 RX ADMIN — POTASSIUM CHLORIDE AND SODIUM CHLORIDE: 900; 150 INJECTION, SOLUTION INTRAVENOUS at 00:35

## 2022-09-01 NOTE — ED PROVIDER NOTES
"ED Provider Note    Scribed for Wolfgang Keen by Ingrid Khan. 8/31/2022  10:28 PM    Primary care provider: Jacobo Eddy D.O.  Means of arrival: Walk-In  History obtained from: Patient  History limited by: None    CHIEF COMPLAINT  Chief Complaint   Patient presents with    Sent by MD     Pt states he was seen by his PCP today who recommended he come to the ER immediately for evaluation of htn, high blood sugar, weakness and blood in his stool.      HPI  Benito Persaud is a 57 y.o. male, with a recent diagnosis of Diabetes, who presents to the Emergency Department from his PCP for evaluation of hypertension and hyperglycemia. The patient is on sliding scale insulin, and has had a recent increase in his insulin. While at his PCP today his Blood pressure was 166/104 and his blood sugar was reading \"high\". His blood sugar has been reading high since Monday morning. He is experiencing vision loss, black, tarry diarrhea, productive green cough, and mild abdominal pain. The patient also notes that he has had two falls at work since last Thursday. Patient has testicular cancer, chronic back pain, recurrent DVT/PE, and experiences frequent falls. Patient takes Eliquis daily. He denies any shortness of breath, chest pain, or fever.   Quality: hypertension and hyperglycemia  Duration: one day  Severity: mild to moderate  Associated sx: vision loss, black, tarry diarrhea, productive green cough, and mild abdominal pain    REVIEW OF SYSTEMS  As above, all other systems reviewed and are negative.   See HPI for further details.     PAST MEDICAL HISTORY   has a past medical history of Asthma, Cancer ([facility]) (11/01/2016), and MI (myocardial infarction) ([facility]) (2019).  Diabetes  SURGICAL HISTORY   has a past surgical history that includes cath removal (N/A, 8/14/2019) and irrigation & debridement general (N/A, 5/25/2022).  SOCIAL HISTORY  Social History     Tobacco Use    Smoking status: Never    Smokeless tobacco: " Current     Types: Chew   Vaping Use    Vaping Use: Never used   Substance Use Topics    Alcohol use: Not Currently    Drug use: Not Currently      Social History     Substance and Sexual Activity   Drug Use Not Currently     FAMILY HISTORY  Family History   Problem Relation Age of Onset    Cancer Mother     Psychiatric Illness Mother     Diabetes Mother     Hypertension Mother     Hyperlipidemia Mother     Arterial Aneurysm Father     Heart Disease Maternal Grandmother      CURRENT MEDICATIONS  Current Outpatient Medications   Medication Instructions    acetaminophen (TYLENOL) 500-1,000 mg, Oral, EVERY 6 HOURS PRN    amitriptyline (ELAVIL) 150 mg, Oral, NIGHTLY    dextrose 50% (D50W) 25 g, Intravenous, PRN    diphenhydrAMINE (BENADRYL) 50 mg, Oral, EVERY BEDTIME    ELIQUIS 5 MG Tab TAKE 1 TABLET BY MOUTH TWICE A DAY    fluticasone (FLOVENT HFA) 110 mcg, Inhalation, 2 TIMES DAILY    gabapentin (NEURONTIN) 800 mg, Oral, 3 TIMES DAILY    insulin lispro (HUMALOG,ADMELOG) 100 UNIT/ML Solution Pen-injector injection PEN As above    ipratropium-albuterol (COMBIVENT RESPIMAT)  MCG/ACT Aero Soln 1 Puff, Inhalation, 2 TIMES DAILY    Lantus SoloStar 20 Units, Subcutaneous, EVERY EVENING    metFORMIN (GLUCOPHAGE) 500 mg, Oral, 2 TIMES DAILY WITH MEALS    mupirocin calcium (BACTROBAN) 2 % Cream 1 Application, Topical, 2 TIMES DAILY    Omeprazole 20 mg, Oral, 2 TIMES DAILY    ondansetron (ZOFRAN ODT) 4 mg, Oral, EVERY 8 HOURS PRN    promethazine (PHENERGAN) 25 mg, Oral, EVERY 6 HOURS PRN    zolpidem (AMBIEN) 10 mg, Oral, EVERY BEDTIME     ALLERGIES  Allergies   Allergen Reactions    Green Beans Anaphylaxis    Iodine Anaphylaxis    Shellfish Allergy Anaphylaxis    Keflex Diarrhea and Vomiting     Vomiting & Diarrhea  Tolerates ceftriaxone    Reglan [Metoclopramide] Anxiety     Fast heart rate     Toradol Hives, Vomiting and Nausea       PHYSICAL EXAM    VITAL SIGNS:   Vitals:    08/31/22 2003 08/31/22 2007 08/31/22 2210  "  BP: (!) 149/105  (!) 163/100   Pulse: 98  94   Resp: 18     Temp: 36 °C (96.8 °F)     TempSrc: Temporal     SpO2: 98%  96%   Weight:  122 kg (268 lb)    Height:  1.803 m (5' 11\")      Vitals: My interpretation: hypertensive, not tachycardic, afebrile, not hypoxic    Reinterpretation of vitals: Hypertensive otherwise unchanged    Cardiac Monitor Interpretation: The cardiac monitor revealed normal Sinus Rhythm as interpreted by me. The cardiac monitor was ordered secondary to the patient's history of hypertension and to monitor for dysrhythmia and/or tachycardia.    PE:   Constitutional: Well developed, Well nourished, No acute distress, Non-toxic appearance.   HENT: Normocephalic, Atraumatic, Bilateral external ears normal, Oropharynx is clear mucous membranes are moist. No oral exudates or nasal discharge.   Eyes: Pupils are equal round and reactive, EOMI, Conjunctiva normal, No discharge.   Neck: Normal range of motion, No tenderness, Supple, No stridor. No meningismus.  Lymphatic: No lymphadenopathy noted.   Cardiovascular: hypertensive. Regular rate and rhythm without murmur rub or gallop.  Thorax & Lungs: Clear breath sounds bilaterally without wheezes, rhonchi or rales. There is no chest wall tenderness.   Abdomen: Soft non-tender non-distended. There is no rebound or guarding. No organomegaly is appreciated. Bowel sounds are normal.  Skin: Normal without rash.   Back: No CVA or spinal tenderness.   Rectal: hemoccult negative.   Extremities: Intact distal pulses, No edema, No tenderness, No cyanosis, No clubbing. Capillary refill is less than 2 seconds.  Musculoskeletal: Good range of motion in all major joints. No tenderness to palpation or major deformities noted.   Neurologic: Alert & oriented x 3, Normal motor function, Normal sensory function, No focal deficits noted. Reflexes are normal.  Psychiatric: Affect normal, Judgment normal, Mood normal. There is no suicidal ideation or patient reported " hallucinations.     DIAGNOSTIC STUDIES / PROCEDURES    LABS  Results for orders placed or performed during the hospital encounter of 08/31/22   COD (ADULT)   Result Value Ref Range    ABO Grouping Only O     Rh Grouping Only NEG     Antibody Screen-Cod NEG    LIPASE   Result Value Ref Range    Lipase 40 11 - 82 U/L   PROTHROMBIN TIME   Result Value Ref Range    PT 13.0 12.0 - 14.6 sec    INR 0.99 0.87 - 1.13   APTT   Result Value Ref Range    APTT 26.5 24.7 - 36.0 sec   CBC WITH DIFFERENTIAL   Result Value Ref Range    WBC 5.3 4.8 - 10.8 K/uL    RBC 5.25 4.70 - 6.10 M/uL    Hemoglobin 16.3 14.0 - 18.0 g/dL    Hematocrit 45.5 42.0 - 52.0 %    MCV 86.7 81.4 - 97.8 fL    MCH 31.0 27.0 - 33.0 pg    MCHC 35.8 (H) 33.7 - 35.3 g/dL    RDW 44.0 35.9 - 50.0 fL    Platelet Count 87 (L) 164 - 446 K/uL    MPV 9.2 9.0 - 12.9 fL    Neutrophils-Polys 54.80 44.00 - 72.00 %    Lymphocytes 31.40 22.00 - 41.00 %    Monocytes 9.80 0.00 - 13.40 %    Eosinophils 3.40 0.00 - 6.90 %    Basophils 0.20 0.00 - 1.80 %    Immature Granulocytes 0.40 0.00 - 0.90 %    Nucleated RBC 0.00 /100 WBC    Neutrophils (Absolute) 2.92 1.82 - 7.42 K/uL    Lymphs (Absolute) 1.67 1.00 - 4.80 K/uL    Monos (Absolute) 0.52 0.00 - 0.85 K/uL    Eos (Absolute) 0.18 0.00 - 0.51 K/uL    Baso (Absolute) 0.01 0.00 - 0.12 K/uL    Immature Granulocytes (abs) 0.02 0.00 - 0.11 K/uL    NRBC (Absolute) 0.00 K/uL   COMP METABOLIC PANEL   Result Value Ref Range    Sodium 126 (L) 135 - 145 mmol/L    Potassium 4.4 3.6 - 5.5 mmol/L    Chloride 94 (L) 96 - 112 mmol/L    Co2 20 20 - 33 mmol/L    Anion Gap 12.0 7.0 - 16.0    Glucose 558 (HH) 65 - 99 mg/dL    Bun 14 8 - 22 mg/dL    Creatinine 1.12 0.50 - 1.40 mg/dL    Calcium 9.0 8.5 - 10.5 mg/dL    AST(SGOT) 20 12 - 45 U/L    ALT(SGPT) 22 2 - 50 U/L    Alkaline Phosphatase 189 (H) 30 - 99 U/L    Total Bilirubin 0.5 0.1 - 1.5 mg/dL    Albumin 3.9 3.2 - 4.9 g/dL    Total Protein 7.1 6.0 - 8.2 g/dL    Globulin 3.2 1.9 - 3.5 g/dL     A-G Ratio 1.2 g/dL   ESTIMATED GFR   Result Value Ref Range    GFR (CKD-EPI) 77 >60 mL/min/1.73 m 2   HEMOGLOBIN A1C   Result Value Ref Range    Glycohemoglobin 10.4 (H) 4.0 - 5.6 %    Est Avg Glucose 252 mg/dL   POCT glucose device results   Result Value Ref Range    POC Glucose, Blood 439 (HH) 65 - 99 mg/dL   POCT glucose device results   Result Value Ref Range    POC Glucose, Blood 333 (H) 65 - 99 mg/dL      All labs reviewed by me. Significant for lipase is normal, PT/INR normal, no leukocytosis, no anemia, mild hyperglycemia, otherwise normal electrolytes, hyperglycemic, no transaminitis, normal bilirubin    RADIOLOGY  DX-CHEST-PORTABLE (1 VIEW)   Final Result         1.  Left basilar atelectasis, no focal infiltrate        The radiologist's interpretation of all radiological studies have been reviewed by me.    COURSE & MEDICAL DECISION MAKING  Nursing notes, VS, PMSFHx, labs, imaging, EKG reviewed in chart.    MDM: 10:28 PM Benito Persaud is a 57 y.o. male who presented with evaluation for hypertension, hyperglycemia and question of possible GI bleeding.  Patient has multiple chronic illnesses.  He states his blood sugars been running high for a few days now.  He was seen in his PCPs office and told them he was also having GI bleeding and they recommended coming here for evaluation.  Upon arrival here he is hypertensive but not significantly and his vital signs are otherwise unremarkable.  He is unremarkable physical exam and his rectal exam shows no hemorrhoids or abnormality and his Hemoccult is negative.  His chest x-ray is fairly unremarkable.  His labs including CBC, CMP are within normal limits other than pseudohyponatremia and hyperglycemia.  He was started on IV fluids as well as a dose of insulin.  His blood sugar improved to 300.  He was given a Norco for pain.  His hemoglobin level is normal.  I do not think he is having any active GI bleeding.  His mild hypertension can be treated as an  outpatient.  His hyperglycemia can be managed as an outpatient now that it is improved and he has no signs of DKA with a bicarb that is normal.  He is ambulatory back to baseline and feels improved and is eager for discharge.  Discussed strict return precautions with him and he verbalized understanding outpatient plan.    12:59 AM- Patients blood sugar is 333 on repeat.     HYDRATION: Based on the patient's presentation of Hyperglycemia the patient was given IV fluids. IV Hydration was used because oral hydration was not adequate alone. Upon recheck following hydration, the patient was mildly improved.    Patient has had high blood pressure while in the emergency department, felt likely secondary to medical condition. Counseled patient to monitor blood pressure at home and follow up with primary care physician.      FINAL IMPRESSION  1. Hyperglycemia Acute   2. Hypertension, unspecified type Acute      IIngrid (Rajwinder), am scribing for, and in the presence of, Wolfgang Keen.    Electronically signed by: Ingrid Khan (Rajwinder), 8/31/2022    IWolfgang personally performed the services described in this documentation, as scribed by Ingrid Khan in my presence, and it is both accurate and complete.    The note accurately reflects work and decisions made by me.  Wolfgang Keen  9/1/2022  1:26 AM

## 2022-09-01 NOTE — ED TRIAGE NOTES
Benito Persaud  57 y.o. male  Chief Complaint   Patient presents with    Sent by MD     Pt states he was seen by his PCP today who recommended he come to the ER immediately for evaluation of htn, high blood sugar, weakness and blood in his stool.        Vitals:    08/31/22 2003   BP: (!) 149/105   Pulse: 98   Resp: 18   Temp: 36 °C (96.8 °F)   SpO2: 98%       Patient educated on triage process and encouraged to alert staff of any changes in condition.    Pt arrives here as a transfer. Ordered GI bleed and Hyperglycemia protocol per pt's symptom complaints.     Charge RN notified.

## 2022-09-01 NOTE — ED NOTES
Port accessed on LEFT chest. Aseptic technique used, pt tolerated procedure well. Line flushed and patent. Dressing CDI.

## 2022-09-01 NOTE — ED NOTES
Pt medicated per MAR for chronic back and knee pain. Resting in bed. No additional needs at this time

## 2022-09-01 NOTE — DISCHARGE INSTRUCTIONS
1.  I want you to measure your blood pressure once the morning once the evening over the next several days and follow-up with your PCP.  Record the information of presented to them so that they can better adjust your medications.  2.  Regarding your blood glucose, I think I would recommend talking to your PCP about consulting an endocrinologist as your blood glucose seems hard to control.  I want you to monitor closely at home, take your sliding scale and your regular insulin as directed.  If it is consistently reading high for more than a day, I want to return to the ED for repeat evaluation.  3.  You not have any signs of GI bleeding, there is no blood in your rectal exam and your hemoglobin which is your red blood cells show that you are not losing any significant mount of blood.  You can follow-up outpatient with your team and discuss a GI consult if you continue to have symptoms.  4.  If you have any worsening symptoms any kind and are concerned, please at any time feel more than welcome to return to ED for repeat evaluation.  Thank you for coming in today.

## 2022-09-02 DIAGNOSIS — E11.69 TYPE 2 DIABETES MELLITUS WITH OTHER SPECIFIED COMPLICATION, WITHOUT LONG-TERM CURRENT USE OF INSULIN (HCC): ICD-10-CM

## 2022-09-02 NOTE — TELEPHONE ENCOUNTER
Last seen: 8/31/22 by Dr. negron  Next appt:  with      Was the patient seen in the last year in this department? Yes   Does patient have an active prescription for medications requested? No   Received Request Via: Pharmacy

## 2022-09-06 ENCOUNTER — PHYSICAL THERAPY (OUTPATIENT)
Dept: PHYSICAL THERAPY | Facility: REHABILITATION | Age: 57
End: 2022-09-06
Attending: FAMILY MEDICINE
Payer: MEDICAID

## 2022-09-06 DIAGNOSIS — G89.29 CHRONIC PAIN OF BOTH KNEES: ICD-10-CM

## 2022-09-06 DIAGNOSIS — R26.2 DIFFICULTY WALKING: ICD-10-CM

## 2022-09-06 DIAGNOSIS — M25.561 CHRONIC PAIN OF BOTH KNEES: ICD-10-CM

## 2022-09-06 DIAGNOSIS — M25.562 CHRONIC PAIN OF BOTH KNEES: ICD-10-CM

## 2022-09-06 PROCEDURE — 97110 THERAPEUTIC EXERCISES: CPT

## 2022-09-06 RX ORDER — INSULIN GLARGINE 100 [IU]/ML
20 INJECTION, SOLUTION SUBCUTANEOUS EVERY EVENING
Qty: 6 EACH | Refills: 3 | Status: SHIPPED | OUTPATIENT
Start: 2022-09-06 | End: 2022-09-07 | Stop reason: SDUPTHER

## 2022-09-06 RX ORDER — INSULIN LISPRO 100 [IU]/ML
INJECTION, SOLUTION INTRAVENOUS; SUBCUTANEOUS
Qty: 3 ML | Refills: 0 | Status: SHIPPED | OUTPATIENT
Start: 2022-09-06 | End: 2022-09-07 | Stop reason: SDUPTHER

## 2022-09-06 NOTE — OP THERAPY PROGRESS SUMMARY
Outpatient Physical Therapy  PROGRESS SUMMARY NOTE      [facility] [place] [address]  [address]  [place] [address]  Phone:  [phone]  Fax:  [phone]    Date of Visit: 09/06/2022    Patient: Benito Persaud  YOB: 1965  MRN: [id number]     Referring Provider: Filiberto Camacho M.D.  [address]  [facility] [place] [address]   Referring Diagnosis Pain in right knee [M25.561];Pain in left knee [M25.562];Other chronic pain [G89.29]     Visit Diagnoses     ICD-10-CM   1. Chronic pain of both knees  M25.561    M25.562    G89.29   2. Difficulty walking  R26.2       Rehab Potential: good    Progress Report Period: 7/25/22 to 9/6/22    Functional Assessment Used          Objective Findings and Assessment:   Patient progression towards goals: Pt continues to demo limited functional mobility. Demo some increased standing tolerance, but is limited by comorbidities (dizziness/vertigo) and bilateral knee pain. Demo difficulty with full weightbearing on RLE, requiring mod/max bilat UE support in // bars. Demo independent squat-pivot transfers. Unable to perform sit<>stand without mod/max UE support. Able to perform with seat height elevated, but then limited by reported dizziness/vertigo, requiring CGA/partial steadying when using 2ww, supervision in // bars. Pt is high fall risk d/t weakness and comorbidities. Monitored BP throughout exercises, showing no great orthostatic changes. Impairments continue to include difficulty walking and performing work duties. The pt will benefit from continued skilled PT to address strength and balance deficits in order to decrease risk of falls and improve functional mobility.     Objective findings and assessment details: Sit<>stands: only able to complete w/ mod/max UE support  Squat-pivot from scooter chair<>mat table: independent  Standing tolerance: ~ 5 minutes before fatigue and increased dizziness/vertigo  symptoms    Standing balance: increased trunk flexion, unable to achieve full knee extension    Goals:   Short Term Goals:   1. Pt will complete most appropriate balance assessment - not met  2. Pt will complete most appropriate functional mobility assessment - not met  3. Pt will demo independent squat pivot transfer with proper mechanics and sequencing for max safety - met  Short term goal time span:  4-6 weeks      Long Term Goals:    1. Pt will demo LE strength 5/5 for improved stability - Not appropriate   -Adjust to: Pt will demo LE strength 4/5 for improved stability - not met  2. Pt will demo ability to walk 10 meters w/ LRAD w/ supervision in order to improve functional mobility - not appropriate (scooter chair at baseline), DC  -Adjust to: Pt will demo ability to walk 10' w/ LRAD and supervision in order to walk from bedroom to bathroom - not met  3. Pt will score low to medium fall risk per most appropriate balance assessment. - not met  4. Pt will report knee pain no greater than 3/10 after completing full work shift - not met  5. Pt will be able to tolerate sitting for 30 min with knee pain no greater than 3/10 in order to improve QoL - not met  Long term goal time span:  6-8 weeks    Plan:   Planned therapy interventions:  Therapeutic Exercise (CPT 03902) and Therapeutic Activities (CPT 93784)  Frequency:  2x week  Duration in visits:  16    Referring provider co-signature:  I have reviewed this plan of care and my co-signature certifies the need for services.     Certification Period: 09/06/2022 to 11/01/22    Physician Signature: ________________________________ Date: ______________

## 2022-09-06 NOTE — OP THERAPY DAILY TREATMENT
Outpatient Physical Therapy  DAILY TREATMENT     [facility] Physical Therapy [address].  [address]  [place] [address]  Phone:  [phone]  Fax:  [phone]    Date: 09/06/2022    Patient: Benito Persaud  YOB: 1965  MRN: [id number]     Time Calculation    Start time: 0900  Stop time: 0941 Time Calculation (min): 41 minutes         Chief Complaint: Difficulty Walking and Knee Problem    Visit #: 6    SUBJECTIVE:  Pt reports having another fall on Sunday. Got up from , was holding the bar, but still went over. Had a hospital stay on 8/31 d/t high BP (176/118 mmHg), and blood sugar (588).      OBJECTIVE:  Current objective measures:   118/92 mmHg  - start of session - seated  110/88 mmHg - standing  122/86 -after table exercises          Therapeutic Exercises (CPT 34883):     1. NuStep L1, Not today    2. Standing hip ext, x10 ea    3. Standing hip abduction, x10 ea    7. Bridges, 3x5    8. SLR, 2x5 ea    9. HS 9090 stretch, x10 ea    10. Qped ant/post weight shifts, x10      Therapeutic Exercise Summary: HEP: 1x/daily  LAQs, seated clams/reverse clams, ball squeeze, HS stretch, seated HS curl    Added 8/18: standing marching, standing hip abd/ext, knee flexion    Time-based treatments/modalities:    Physical Therapy Timed Treatment Charges  Therapeutic exercise minutes (CPT 11279): 41 minutes          ASSESSMENT:   Response to treatment: See progress note    PLAN/RECOMMENDATIONS:   Plan for treatment: therapy treatment to continue next visit.  Planned interventions for next visit: continue with current treatment.

## 2022-09-07 DIAGNOSIS — E11.69 TYPE 2 DIABETES MELLITUS WITH OTHER SPECIFIED COMPLICATION, WITHOUT LONG-TERM CURRENT USE OF INSULIN (HCC): ICD-10-CM

## 2022-09-07 RX ORDER — INSULIN GLARGINE 100 [IU]/ML
20 INJECTION, SOLUTION SUBCUTANEOUS EVERY EVENING
Qty: 18 ML | Refills: 3 | Status: SHIPPED | OUTPATIENT
Start: 2022-09-07 | End: 2022-09-09

## 2022-09-07 RX ORDER — INSULIN GLARGINE 100 [IU]/ML
20 INJECTION, SOLUTION SUBCUTANEOUS EVERY EVENING
Qty: 6 EACH | Refills: 3 | Status: CANCELLED | OUTPATIENT
Start: 2022-09-07

## 2022-09-07 RX ORDER — INSULIN LISPRO 100 [IU]/ML
3 INJECTION, SOLUTION INTRAVENOUS; SUBCUTANEOUS
Qty: 9 ML | Refills: 0 | Status: SHIPPED | OUTPATIENT
Start: 2022-09-07 | End: 2022-09-09 | Stop reason: SDUPTHER

## 2022-09-07 RX ORDER — AMITRIPTYLINE HYDROCHLORIDE 100 MG/1
150 TABLET ORAL NIGHTLY
Qty: 30 TABLET | Refills: 1 | Status: CANCELLED | OUTPATIENT
Start: 2022-09-07

## 2022-09-07 RX ORDER — INSULIN LISPRO 100 [IU]/ML
3 INJECTION, SOLUTION INTRAVENOUS; SUBCUTANEOUS
Qty: 3 EACH | Refills: 0 | Status: CANCELLED | OUTPATIENT
Start: 2022-09-07

## 2022-09-08 ENCOUNTER — PHYSICAL THERAPY (OUTPATIENT)
Dept: PHYSICAL THERAPY | Facility: REHABILITATION | Age: 57
End: 2022-09-08
Attending: FAMILY MEDICINE
Payer: MEDICAID

## 2022-09-08 DIAGNOSIS — M25.561 CHRONIC PAIN OF BOTH KNEES: ICD-10-CM

## 2022-09-08 DIAGNOSIS — M25.562 CHRONIC PAIN OF BOTH KNEES: ICD-10-CM

## 2022-09-08 DIAGNOSIS — G89.29 CHRONIC PAIN OF BOTH KNEES: ICD-10-CM

## 2022-09-08 DIAGNOSIS — R26.2 DIFFICULTY WALKING: ICD-10-CM

## 2022-09-08 PROCEDURE — 97110 THERAPEUTIC EXERCISES: CPT

## 2022-09-08 PROCEDURE — 97530 THERAPEUTIC ACTIVITIES: CPT

## 2022-09-08 NOTE — TELEPHONE ENCOUNTER
Send prescriptions to a different pharmacy. Will review all other meds at upcoming appointment this Friday.

## 2022-09-08 NOTE — OP THERAPY DAILY TREATMENT
Outpatient Physical Therapy  DAILY TREATMENT     [facility] Physical [address].  [address]  [place] [address]  Phone:  [phone]  Fax:  [phone]    Date: 09/08/2022    Patient: Benito Persaud  YOB: 1965  MRN: [id number]     Time Calculation    Start time: 0900  Stop time: 0944 Time Calculation (min): 44 minutes         Chief Complaint: Knee Problem and Difficulty Walking    Visit #: 7    SUBJECTIVE:  Pt reports having a fall yesterday while performing standing hip abd, L knee buckled and gave out. Fell on L knee, but had no serious injury. Has small cut on L knee.    OBJECTIVE:  Current objective measures:   L knee laceration, shallow. No redness or swelling noted  1.0 x 1.0 cm          Therapeutic Exercises (CPT 25123):     1. NuStep L3, x5', >75 SPM    2. LAQ 5#, 2x10 ea      Therapeutic Exercise Summary: HEP: 1x/daily  LAQs, seated clams/reverse clams, ball squeeze, HS stretch, seated HS curl  Standing marching, standing hip abd/ext, knee flexion    Therapeutic Treatments and Modalities:     1. Therapeutic Activities (CPT 78426)    Therapeutic Treatment and Modalities Summary: Ther Act:   -sit<>stand at // bars, 4x5 - intermittent UE support as needed, CGA  Time-based treatments/modalities:    Physical Therapy Timed Treatment Charges  Therapeutic activity minutes (CPT 28243): 21 minutes  Therapeutic exercise minutes (CPT 62537): 23 minutes        ASSESSMENT:   Response to treatment: Progressed functional strengthening exercises. Pt tolerated exercises well. Demo carryover with body mechanics and sequencing for max force production, only requiring UE support 40% of the time. (Sit<>stands added to HEP, with handout).    Recommend lumbar support pillow when sitting ling periods of time to decrease symptoms of NT in thighs. Pt gave verbal understanding.    PLAN/RECOMMENDATIONS:   Plan for treatment: therapy treatment to continue next visit.  Planned interventions  for next visit: continue with current treatment.

## 2022-09-09 ENCOUNTER — PHARMACY VISIT (OUTPATIENT)
Dept: PHARMACY | Facility: MEDICAL CENTER | Age: 57
End: 2022-09-09
Payer: COMMERCIAL

## 2022-09-09 ENCOUNTER — OFFICE VISIT (OUTPATIENT)
Dept: INTERNAL MEDICINE | Facility: OTHER | Age: 57
End: 2022-09-09
Payer: MEDICAID

## 2022-09-09 VITALS
DIASTOLIC BLOOD PRESSURE: 80 MMHG | BODY MASS INDEX: 37.52 KG/M2 | SYSTOLIC BLOOD PRESSURE: 122 MMHG | HEIGHT: 71 IN | OXYGEN SATURATION: 97 % | HEART RATE: 121 BPM | TEMPERATURE: 98.7 F | WEIGHT: 268 LBS

## 2022-09-09 DIAGNOSIS — F41.9 ANXIETY AND DEPRESSION: ICD-10-CM

## 2022-09-09 DIAGNOSIS — K22.719 BARRETT'S ESOPHAGUS WITH DYSPLASIA: ICD-10-CM

## 2022-09-09 DIAGNOSIS — Z86.73 HISTORY OF EMBOLIC STROKE: ICD-10-CM

## 2022-09-09 DIAGNOSIS — G89.29 CHRONIC PAIN OF LEFT KNEE: ICD-10-CM

## 2022-09-09 DIAGNOSIS — G47.00 INSOMNIA, UNSPECIFIED TYPE: ICD-10-CM

## 2022-09-09 DIAGNOSIS — M25.562 CHRONIC PAIN OF LEFT KNEE: ICD-10-CM

## 2022-09-09 DIAGNOSIS — K92.1 MELENA: ICD-10-CM

## 2022-09-09 DIAGNOSIS — J45.30 MILD PERSISTENT ASTHMA, UNSPECIFIED WHETHER COMPLICATED: ICD-10-CM

## 2022-09-09 DIAGNOSIS — F32.A ANXIETY AND DEPRESSION: ICD-10-CM

## 2022-09-09 DIAGNOSIS — E11.69 TYPE 2 DIABETES MELLITUS WITH OTHER SPECIFIED COMPLICATION, WITHOUT LONG-TERM CURRENT USE OF INSULIN (HCC): ICD-10-CM

## 2022-09-09 PROBLEM — L02.01 FACIAL ABSCESS: Status: RESOLVED | Noted: 2022-05-23 | Resolved: 2022-09-09

## 2022-09-09 LAB — GLUCOSE BLD-MCNC: 406 MG/DL (ref 70–100)

## 2022-09-09 PROCEDURE — 82962 GLUCOSE BLOOD TEST: CPT

## 2022-09-09 PROCEDURE — 99214 OFFICE O/P EST MOD 30 MIN: CPT | Mod: GC

## 2022-09-09 PROCEDURE — RXMED WILLOW AMBULATORY MEDICATION CHARGE

## 2022-09-09 RX ORDER — DIPHENHYDRAMINE HCL 25 MG
50 CAPSULE ORAL NIGHTLY PRN
Qty: 30 CAPSULE | Refills: 3 | Status: SHIPPED | OUTPATIENT
Start: 2022-09-09 | End: 2022-09-13

## 2022-09-09 RX ORDER — NICOTINE POLACRILEX 4 MG/1
20 GUM, CHEWING ORAL 2 TIMES DAILY
Qty: 56 TABLET | Refills: 11 | Status: SHIPPED | OUTPATIENT
Start: 2022-09-09

## 2022-09-09 RX ORDER — LISINOPRIL 5 MG/1
5 TABLET ORAL DAILY
Qty: 30 TABLET | Refills: 3 | Status: SHIPPED | OUTPATIENT
Start: 2022-09-09

## 2022-09-09 RX ORDER — ZOLPIDEM TARTRATE 10 MG/1
10 TABLET ORAL
Qty: 30 TABLET | Refills: 1 | Status: SHIPPED | OUTPATIENT
Start: 2022-09-22 | End: 2022-11-17

## 2022-09-09 RX ORDER — DULAGLUTIDE 0.75 MG/.5ML
0.5 INJECTION, SOLUTION SUBCUTANEOUS
Qty: 6 ML | Refills: 3 | Status: SHIPPED | OUTPATIENT
Start: 2022-09-09

## 2022-09-09 RX ORDER — FLUTICASONE PROPIONATE 110 UG/1
12 AEROSOL, METERED RESPIRATORY (INHALATION) 2 TIMES DAILY
Qty: 12 G | Refills: 2 | Status: SHIPPED | OUTPATIENT
Start: 2022-09-09

## 2022-09-09 RX ORDER — INSULIN LISPRO 100 [IU]/ML
3 INJECTION, SOLUTION INTRAVENOUS; SUBCUTANEOUS
Qty: 9 ML | Refills: 0 | Status: SHIPPED | OUTPATIENT
Start: 2022-09-09 | End: 2022-09-09

## 2022-09-09 RX ORDER — AMITRIPTYLINE HYDROCHLORIDE 100 MG/1
150 TABLET ORAL NIGHTLY
Qty: 30 TABLET | Refills: 1 | Status: SHIPPED | OUTPATIENT
Start: 2022-09-09 | End: 2022-11-14 | Stop reason: SDUPTHER

## 2022-09-09 RX ORDER — ZOLPIDEM TARTRATE 10 MG/1
10 TABLET ORAL
Qty: 30 TABLET | Refills: 0 | Status: SHIPPED | OUTPATIENT
Start: 2022-09-09 | End: 2022-09-09 | Stop reason: SDUPTHER

## 2022-09-09 RX ORDER — INSULIN LISPRO 100 [IU]/ML
12 INJECTION, SOLUTION INTRAVENOUS; SUBCUTANEOUS
Qty: 42 ML | Refills: 3 | Status: SHIPPED | OUTPATIENT
Start: 2022-09-09

## 2022-09-09 RX ORDER — GABAPENTIN 800 MG/1
800 TABLET ORAL 3 TIMES DAILY
Qty: 90 TABLET | Refills: 3 | Status: SHIPPED | OUTPATIENT
Start: 2022-09-09 | End: 2023-09-10 | Stop reason: SDUPTHER

## 2022-09-09 RX ORDER — ONDANSETRON 4 MG/1
4 TABLET, ORALLY DISINTEGRATING ORAL EVERY 8 HOURS PRN
Qty: 10 TABLET | Refills: 0 | Status: SHIPPED | OUTPATIENT
Start: 2022-09-09 | End: 2022-10-15 | Stop reason: SDUPTHER

## 2022-09-09 RX ORDER — SERTRALINE HYDROCHLORIDE 25 MG/1
25 TABLET, FILM COATED ORAL DAILY
Qty: 30 TABLET | Refills: 11 | Status: SHIPPED | OUTPATIENT
Start: 2022-09-09

## 2022-09-09 ASSESSMENT — FIBROSIS 4 INDEX: FIB4 SCORE: 2.74

## 2022-09-09 NOTE — PROGRESS NOTES
"    Established Patient    Benito Persaud is a 57 y.o. male who presents today with the following Chief Complaint(s): Follow up for Diagnoses of Type 2 diabetes mellitus with other specified complication, without long-term current use of insulin ([facility]), History of embolic stroke, Melena, Chronic pain of left knee, Insomnia, unspecified type, Anxiety and depression, Bates's esophagus with dysplasia, and Mild persistent asthma, unspecified whether complicated were pertinent to this visit.    HPI:  Type 2 Diabetes:   He reported difficulty getting prescriptions refilled and ran out of his insulin 2 days ago. He said that even before running out of his insulin his glucose has been over 400 with 318 at best.     History of two strokes:   He had two strokes in June 2020 and in January 2021. After his second stroke he had difficulty moving the left side of his body and has almost completely lost his vision in his left eye. He said he already saw neurology here but wanted to try seeing another neurologist. He reached out to a group of neurologists at [facility] and said he had already formulated a plan with them.     Melena:   He said he started taking Amodium and after three days his diarrhea had stopped. However, he reported still having tarry pieces in his stool.     Chronic Left Knee Pain:   Pt has chronic knee pain which has contributed to his recurrent falls. He used to take Tylenol but stopped due to upset stomach. He said he didn't want pain medication but after discussion of concern about pt's constant tachycardia he said he would like to see a pain management specialist.     Insomnia:   Patient recounted long history of needing amitriptyline, Ambien, and diphenhydramine. He said without them he could not sleep at all.     Anxiety and Depression:   At previous appointment said \"he wouldn't mind if he didn't wake up tomorrow\". Today he reported feeling constantly anxious and worried " "about everything going on.     Bates's Esophagus with Dysplasia:   He had an EGD and said that he was told \"it looked like a meat market\". He said his omeprazole helps and he takes it religiously.     Mild Persistent Asthma:   He said with his current medications he is able to keep it under control.     ROS: As per HPI. Additional pertinent systems as noted below.  Constitutional: Negative for chills and fever.   HENT: Negative for ear pain and sore throat.    Eyes: Negative for discharge and redness.   Respiratory: Negative for hemoptysis, wheezing and stridor.  Positive for cough with increased sputum production  Cardiovascular: Negative for chest pain, palpitations. Positive for leg swelling.   Gastrointestinal: Negative for abdominal pain, constipation, diarrhea, heartburn, and vomiting. Positive for tarry stool and nausea.   Genitourinary: Negative for dysuria, flank pain and hematuria.   Musculoskeletal: Negative for myalgias. Positive for falls   Skin: Positive for itching and rash. (Of right genitals)   Neurological: Negative for seizures, loss of consciousness and headaches. Positive for dizziness.   Endo/Heme/Allergies: Negative for polydipsia. Bleeds easily.    Psychiatric/Behavioral: Negative for substance abuse and suicidal ideas. Positive for anxiety.     Past Medical History:   Diagnosis Date    Asthma     Cancer ([facility]) 11/01/2016    MI (myocardial infarction) ([facility]) 2019     Social History     Tobacco Use    Smoking status: Never    Smokeless tobacco: Current     Types: Chew   Vaping Use    Vaping Use: Never used   Substance Use Topics    Alcohol use: Not Currently    Drug use: Not Currently     Current Outpatient Medications   Medication Sig Dispense Refill    amitriptyline (ELAVIL) 100 MG Tab Take 1.5 Tablets by mouth every evening. 30 Tablet 1    apixaban (ELIQUIS) 5mg Tab Take 1 Tablet by mouth 2 times a day. 60 Tablet 3    fluticasone (FLOVENT HFA) 110 MCG/ACT Aerosol Inhale 12 Puffs 2 times a " "day. 12 g 2    gabapentin (NEURONTIN) 800 MG tablet Take 1 Tablet by mouth 3 times a day. 90 Tablet 3    ipratropium-albuterol (COMBIVENT RESPIMAT)  MCG/ACT Aero Soln Inhale 1 Puff 2 times a day. 1 Each 2    ondansetron (ZOFRAN ODT) 4 MG TABLET DISPERSIBLE Take 1 Tablet by mouth every 8 hours as needed for Nausea. 10 Tablet 0    metformin (GLUCOPHAGE) 1000 MG tablet Take 1 Tablet by mouth 2 times a day with meals. 60 Tablet 3    lisinopril (PRINIVIL) 5 MG Tab Take 1 Tablet by mouth every day. 30 Tablet 3    sertraline (ZOLOFT) 25 MG tablet Take 1 Tablet by mouth every day. 30 Tablet 11    Dulaglutide (TRULICITY) 0.75 MG/0.5ML Solution Pen-injector Inject 0.75 mL under the skin every 7 days. 6 mL 3    insulin glargine (LANTUS) 100 UNIT/ML Solution Pen-injector injection Inject 20 Units under the skin every evening. 18 mL 3    [START ON 9/22/2022] zolpidem (AMBIEN) 10 MG Tab Take 1 Tablet by mouth at bedtime for 30 days. 30 Tablet 1    insulin lispro (HUMALOG,ADMELOG) 100 UNIT/ML Solution Pen-injector injection PEN Inject 12 Units under the skin 3 times a day before meals. As above 42 mL 3    omeprazole (PRILOSEC) 20 MG delayed-release capsule Take 1 Capsule by mouth 2 times a day. 60 Capsule 11    diphenhydrAMINE (BENADRYL) 25 MG capsule Take 2 Capsules by mouth at bedtime as needed for Sleep. 30 Capsule 3    mupirocin calcium (BACTROBAN) 2 % Cream Apply 1 Application topically 2 times a day.      dextrose 50% (D50W) 50 % Solution Infuse 50 mL into a venous catheter as needed (If FSBG is less than or equal to 70 mg/dL and If patient is NPO or unable to take oral). 50 mL 0     No current facility-administered medications for this visit.       Physical Exam  /80   Pulse (!) 121   Temp 37.1 °C (98.7 °F) (Temporal)   Ht 1.803 m (5' 11\")   Wt 122 kg (268 lb)   SpO2 97%   BMI 37.38 kg/m²   General:  Alert and oriented, No apparent distress.    HEENT: Head normocephalic, atraumatic. Healing lesions behind " L ear. Pupils equal and reactive. No scleral icterus. Throat clear but dry. No erythema or exudates noted.     Lungs: Crackles at all lung posts. Difficulty with taking deep breaths. Bilateral chest expansion.     Cardiovascular: Regular rhythm but tachycardic. No murmurs, rubs or gallops.     Abdomen:  Regular bowel sounds, nontender, no rebound or guarding noted.     Extremities: No clubbing or cyanosis. 1+ pitting edema bilateral lower extremities.     Skin: Healing lesion behind left ear. Two circular rashes present right groin. Stasis Dermatitis bilaterally.       Assessment and Plan:   1. Type 2 Diabetes:   -Glucose at home consistently over 400 despite not eating   -Glucose in clinic today over 300    Plan:   -Renewed Insulin Glargine 20 units at night  -Renewed Insulin Lispro ISS with meals   -Renewed Gabapentin 800mg for diabetic Neuropathy   -Started Trulicity 0.75mg weekly   -Started Lisinopril 5mg   -Started Metformin 1000MG twice daily   -Consulted Nutrition   -Consulted Endocrinology     2. History of embolic stroke:   -Hx of two strokes (2020 and 2021), second of which caused severe left sided weakness.   Plan:   -Calculated HASBLED score with patient. Score of 2. Discussed risks and benefits of continuing Eliquis. Patient agreed to continue taking Eliquis daily.   -Renewed Eliquis 5mg   -Renewed Ondansetron 4mg every 8 hours as needed for nausea (secondary to vertigo from left eye damage after second stroke)   -Presecribed DME Wheelchair preferably motorized with 4 wheels.   -Referred to Neurology (patient already reached out to [facility] Neurology Department and has a plan in place)      3. Melena:   -Previously referred to ED for hematochezia. Hgb WNL.   -Hematochezia resolved, now has tarry pieces in his stool.   Plan:   -Referred to GI, patient will likely need EGD    4. Chronic left knee pain:   -Could be associated with constant tachycardia   -Associated with frequent  "falls (>20 falls in past 3 months)   Plan:   -Renewed physical therapy. He has not met any of his physical therapy goals and needs continued PT.   -Referred to Pain Management     5. Insomnia:   -Reviewed pt history, the medications he reported were correct.   Plan:   -Refilled Amitriptyline 100mg   -Refilled Ambien 10mg   -Refilled Diphenhydramine 50mg   -Will discuss sleep hygiene and try to adjust medications at a future visit.     6. Anxiety and Depression:   -Passive SI at previous visit   -Could be associated with consistent tachycardia   Plan:  -Started Sertraline 25MG daily     7. Bates's Esophagus with dysplasia:   -Renewed Omeprazole 20mg twice daily     8. Mild Persistent Asthma:   -Renewed fluticasone   -Renewed Ipratropium Albuterol        Problems to address at future visit:   -Pt said he used to use 4L O2 at home about 50% of the time.     Follow up: 4 weeks     Jacobo Eddy D.O. PGY I  [facility]        9/13 Addendum   Pt called to request additional prescription for meter lancet and sticks. Also corrected prescription for Diphenhydramine to be daily instead of PRN. Corrected wheelchair order, says \"power wheelchair\".     "

## 2022-09-09 NOTE — PATIENT INSTRUCTIONS
Thank you for visiting today!  Please follow-up in 4 weeks   Please follow-up on referrals and schedule an appointment with a gastroenterologist, Pain management, Nutrition, and physical therapy   Please get lab work done at least 5 days before next visit.  Today we started you on Lisinopril, Metformin, Sertraline, and Trulicity   Please try and eat healthy, get at least 30 minutes of cardiovascular exercise a day to help keep your health as best as it can be.   If you have any questions or concerns please feel free to contact us at [phone].  If you feel like you are experiencing a medical emergency please seek immediate medical attention at an urgent care or in the emergency department.

## 2022-09-12 ENCOUNTER — PHYSICAL THERAPY (OUTPATIENT)
Dept: PHYSICAL THERAPY | Facility: REHABILITATION | Age: 57
End: 2022-09-12
Attending: FAMILY MEDICINE
Payer: MEDICAID

## 2022-09-12 DIAGNOSIS — Z86.73 HISTORY OF EMBOLIC STROKE: ICD-10-CM

## 2022-09-12 DIAGNOSIS — G89.29 CHRONIC PAIN OF BOTH KNEES: ICD-10-CM

## 2022-09-12 DIAGNOSIS — R26.2 DIFFICULTY WALKING: ICD-10-CM

## 2022-09-12 DIAGNOSIS — M25.562 CHRONIC PAIN OF BOTH KNEES: ICD-10-CM

## 2022-09-12 DIAGNOSIS — M25.561 CHRONIC PAIN OF BOTH KNEES: ICD-10-CM

## 2022-09-12 PROCEDURE — 97530 THERAPEUTIC ACTIVITIES: CPT

## 2022-09-12 NOTE — TELEPHONE ENCOUNTER
Pt called and is requesting a 30 days supply for banophen he takes 2 po at night, he is requesting anew meter lancet and sticks. Patient is also requesting for his wheel chair order to state electric not manual. He also wanted me to let you know that his sugars have been at 194 at bk and 170 at noon and 162 bed time. This am its 104.     Call back at [phone]

## 2022-09-12 NOTE — OP THERAPY DAILY TREATMENT
"  Outpatient Physical Therapy  DAILY TREATMENT     [facility] Physical [address].  [address]  [place] [address]  Phone:  [phone]  Fax:  [phone]    Date: 09/12/2022    Patient: Benito Persaud  YOB: 1965  MRN: [id number]     Time Calculation    Start time: 0840  Stop time: 0900 Time Calculation (min): 20 minutes         Chief Complaint: Difficulty Walking and Knee Problem    Visit #: 9    SUBJECTIVE:  Pt was 25 min late to appointment. Thought appointment was at 9:15a    Pt reports being very diligent with HEP, has been doing a lot of sit<>stands. Denies falls since previous visit. Had f/u with PCP last week and had medication changes, (insulin, BP meds).     OBJECTIVE:  Current objective measures:           Therapeutic Exercises (CPT 36815):     1. NuStep L3, Not today, >75 SPM      Therapeutic Exercise Summary: HEP: 1x/daily  LAQs, seated clams/reverse clams, ball squeeze, HS stretch, seated HS curl  Standing marching, standing hip abd/ext, knee flexion    Therapeutic Treatments and Modalities:     1. Therapeutic Activities (CPT 30107)    Therapeutic Treatment and Modalities Summary: Ther Act:   -sit<>stand w/ airex at // bars, 2x10, w/ UE support on knees, CGA  -Romberg stance, 2x30\", CGA - for balance/stability and increased standing tolerance  -Tandem stance, 2x30\" ea, CGA - for balance/stability and increased standing tolerance  -LAQ 6#, x10 ea  Time-based treatments/modalities:    Physical Therapy Timed Treatment Charges  Therapeutic activity minutes (CPT 95099): 20 minutes      ASSESSMENT:   Response to treatment: Pt demo improved mechanics and sequencing of sit<>stands, requiring decreased UE support. Facilitated Romberg/tandem balance exercises with focus on standing tolerance. Pt demo decreased endurance, requiring seated rest break between each set.     PLAN/RECOMMENDATIONS:   Plan for treatment: therapy treatment to continue next visit.  Planned " interventions for next visit: continue with current treatment.

## 2022-09-13 RX ORDER — LANCETS 30 GAUGE
EACH MISCELLANEOUS
Qty: 100 EACH | Refills: 3 | Status: SHIPPED | OUTPATIENT
Start: 2022-09-13

## 2022-09-13 RX ORDER — DIPHENHYDRAMINE HCL 25 MG
50 CAPSULE ORAL NIGHTLY
Qty: 60 CAPSULE | Refills: 3 | Status: SHIPPED | OUTPATIENT
Start: 2022-09-13

## 2022-09-14 ENCOUNTER — TELEPHONE (OUTPATIENT)
Dept: INTERNAL MEDICINE | Facility: OTHER | Age: 57
End: 2022-09-14
Payer: MEDICAID

## 2022-09-14 PROCEDURE — RXMED WILLOW AMBULATORY MEDICATION CHARGE

## 2022-09-14 NOTE — TELEPHONE ENCOUNTER
1. Caller Name: Pt                      Call Back Number: [phone] (home)     2. Message: Patient lvm stating an order for a wheelchair was placed for him at time of appt but it is a self propelling wheelchair. He states he already has one that is self propelling and he was asking for a power wheelchair. He states he also needs diabetic testing supplies    3. Patient approves office to leave a detailed voicemail/MyChart message: N\A      I see that you have placed orders for lancets and tests strips but patient will need the glucometer

## 2022-09-15 PROCEDURE — RXMED WILLOW AMBULATORY MEDICATION CHARGE

## 2022-09-15 RX ORDER — DIPHENHYDRAMINE HYDROCHLORIDE 25 MG/1
CAPSULE, LIQUID FILLED ORAL
Qty: 1 KIT | Refills: 0 | Status: SHIPPED | OUTPATIENT
Start: 2022-09-15

## 2022-09-15 RX ORDER — BLOOD PRESSURE TEST KIT
KIT MISCELLANEOUS
Qty: 100 EACH | Refills: 0 | Status: SHIPPED | OUTPATIENT
Start: 2022-09-15 | End: 2022-10-21 | Stop reason: SDUPTHER

## 2022-09-15 NOTE — TELEPHONE ENCOUNTER
Jacobo Eddy D.O.  Tracy Vera, Med Ass't  Caller: Unspecified (Yesterday, 12:39 PM)  I just placed the order. Thank you for letting me know!

## 2022-09-16 ENCOUNTER — PHARMACY VISIT (OUTPATIENT)
Dept: PHARMACY | Facility: MEDICAL CENTER | Age: 57
End: 2022-09-16
Payer: COMMERCIAL

## 2022-09-18 ENCOUNTER — PHARMACY VISIT (OUTPATIENT)
Dept: PHARMACY | Facility: MEDICAL CENTER | Age: 57
End: 2022-09-18
Payer: COMMERCIAL

## 2022-09-18 PROCEDURE — RXMED WILLOW AMBULATORY MEDICATION CHARGE

## 2022-09-18 PROCEDURE — RXMED WILLOW AMBULATORY MEDICATION CHARGE: Performed by: INTERNAL MEDICINE

## 2022-09-19 NOTE — TELEPHONE ENCOUNTER
Jacobo Eddy D.O.  Tracy Vera, Med Ass't  Caller: Unspecified (5 days ago, 12:39 PM)  The wheel chair prescription is for a power wheelchair, is this not correct? If there is a different way to prescribe an electric wheelchair I'll get it done as soon as possible.

## 2022-09-19 NOTE — TELEPHONE ENCOUNTER
I think there is another way to order the motorized wheelchair as on the order you placed it shows as a manual use wheelchair for patient to self propel, as that is on the comments on that order. MA's can't pend the order so maybe asking one of your senior colleagues to see if they can help with placing the correct order. I am sorry

## 2022-09-20 ENCOUNTER — PHYSICAL THERAPY (OUTPATIENT)
Dept: PHYSICAL THERAPY | Facility: REHABILITATION | Age: 57
End: 2022-09-20
Attending: FAMILY MEDICINE
Payer: MEDICAID

## 2022-09-20 DIAGNOSIS — G89.29 CHRONIC PAIN OF BOTH KNEES: ICD-10-CM

## 2022-09-20 DIAGNOSIS — R26.2 DIFFICULTY WALKING: ICD-10-CM

## 2022-09-20 DIAGNOSIS — M25.561 CHRONIC PAIN OF BOTH KNEES: ICD-10-CM

## 2022-09-20 DIAGNOSIS — M25.562 CHRONIC PAIN OF BOTH KNEES: ICD-10-CM

## 2022-09-20 PROCEDURE — 97530 THERAPEUTIC ACTIVITIES: CPT

## 2022-09-20 NOTE — OP THERAPY DAILY TREATMENT
"  Outpatient Physical Therapy  DAILY TREATMENT     [facility] Physical [address].  [address]  [place] [address]  Phone:  [phone]  Fax:  [phone]    Date: 09/20/2022    Patient: Benito Persaud  YOB: 1965  MRN: [id number]     Time Calculation    Start time: 0815  Stop time: 0854 Time Calculation (min): 39 minutes         Chief Complaint: Difficulty Walking and Knee Problem    Visit #: 10    SUBJECTIVE:  Pt reports no falls since previous visit. Knees feel better today.    OBJECTIVE:  Current objective measures:           Therapeutic Exercises (CPT 04167):     1. NuStep L3, Not today, >75 SPM      Therapeutic Exercise Summary: HEP: 1x/daily  LAQs, seated clams/reverse clams, ball squeeze, HS stretch, seated HS curl  Standing marching, standing hip abd/ext, knee flexion    Therapeutic Treatments and Modalities:     1. Therapeutic Activities (CPT 57497)    Therapeutic Treatment and Modalities Summary: Ther Act:   -sit<>stand w/ airex at // bars, x10, 2x5 w/ focus on eccentric control - w/ UE support on knees  -TKE w/ PTB, 2x5x5\" hold - rest break between sets    -Fwd/bwd walking in // bars, x3 laps  Lap 1 - fwd w/ LUE support 1st set, regressed to BUE after, w/ focus on equal step length bilat;   Lap 2 - Added sticky-note on wall for gaze stabilization (reported decreased dizziness)  Lap 3 - Increased gait speed, demo more normalized kinematics fwd/bwd    -Romberg stance, x30\", CGA - for balance/stability and increased standing tolerance  Time-based treatments/modalities:    Physical Therapy Timed Treatment Charges  Therapeutic activity minutes (CPT 95306): 39 minutes      ASSESSMENT:   Response to treatment: Pt demo improved gait kinematics w/ focus on gaze stabilization, reported decreased dizziness. Recommend using gaze stabilization techniques when standing completing ADLs or when working. Pt gave verbal understanding. Continued focus on functional strengthening. " "Pt demo improved standing tolerance, but continues to require BUE support in // bars. Trialed LUE support only, demo decreased R knee stability. Pt reported \" feeling like knee will buckle\".    PLAN/RECOMMENDATIONS:   Plan for treatment: therapy treatment to continue next visit.  Planned interventions for next visit: continue with current treatment.         "

## 2022-09-21 NOTE — TELEPHONE ENCOUNTER
Jacobo Eddy D.O.  Tracy Vera, Med Ass't  Caller: Unspecified (6 days ago, 12:39 PM)  No problem, thanks for the update! I'll poke around some more and try to figure it out.

## 2022-09-22 ENCOUNTER — PHYSICAL THERAPY (OUTPATIENT)
Dept: PHYSICAL THERAPY | Facility: REHABILITATION | Age: 57
End: 2022-09-22
Attending: FAMILY MEDICINE
Payer: MEDICAID

## 2022-09-22 DIAGNOSIS — R26.2 DIFFICULTY WALKING: ICD-10-CM

## 2022-09-22 DIAGNOSIS — M25.561 CHRONIC PAIN OF BOTH KNEES: ICD-10-CM

## 2022-09-22 DIAGNOSIS — M25.562 CHRONIC PAIN OF BOTH KNEES: ICD-10-CM

## 2022-09-22 DIAGNOSIS — G89.29 CHRONIC PAIN OF BOTH KNEES: ICD-10-CM

## 2022-09-22 PROCEDURE — 97530 THERAPEUTIC ACTIVITIES: CPT

## 2022-09-22 PROCEDURE — 97110 THERAPEUTIC EXERCISES: CPT

## 2022-09-22 NOTE — OP THERAPY DAILY TREATMENT
"  Outpatient Physical Therapy  DAILY TREATMENT     [facility] Physical [address].  [address]  [place] [address]  Phone:  [phone]  Fax:  [phone]    Date: 09/22/2022    Patient: Benito Persaud  YOB: 1965  MRN: [id number]     Time Calculation    Start time: 1444  Stop time: 1527 Time Calculation (min): 43 minutes         Chief Complaint: Difficulty Walking    Visit #: 11    SUBJECTIVE:  Pt reports no falls since previous visit, no near falls. Reports no longer taking acetaminophen (2 tablets, 2x/daily). Has had increased pain in knees and shoulders, but has been manageable.     OBJECTIVE:  Current objective measures:           Therapeutic Exercises (CPT 33702):     1. NuStep L3, x5', >80 SPM    2. SB bridges, x10    3. SB HS curl, 2x5    4. Supine SB iso curl, 10x5\" hold    5. LTR d/t muscle spasm, resolved after 3 reps      Therapeutic Exercise Summary: HEP: 1x/daily  LAQs, seated clams/reverse clams, ball squeeze, HS stretch, seated HS curl  Standing marching, standing hip abd/ext, knee flexion    Therapeutic Treatments and Modalities:     1. Therapeutic Activities (CPT 04650)    Therapeutic Treatment and Modalities Summary: Ther Act:   -sit<>stand w/ airex at // bars, not today  -TKE w/ PTB, 10-x5\" hold - rest required after RLE set  -Fwd/bwd walking in // bars, x2 laps - w/ focus on decreasing BUE support - w/ gaze stabilization    -Romberg stance, 2x30\", CGA mo UE support- for balance/stability and increased standing tolerance  Time-based treatments/modalities:    Physical Therapy Timed Treatment Charges  Therapeutic activity minutes (CPT 15585): 20 minutes  Therapeutic exercise minutes (CPT 84878): 23 minutes        ASSESSMENT:   Response to treatment: Continued focus on LE strengthening for carryover to functional mobility and knee stability. Pt continues to demo decreased standing tolerance, requiring frequent rest breaks. Decreased RLE SL tolerance. " Focusing today on decreasing UE support in // bars during gait for increase WB. Pt demo intermitted psuedo R knee buckling, worse during bwd gait. Demo improved balance and ankle strategies during Romberg stance activity.    PLAN/RECOMMENDATIONS:   Plan for treatment: therapy treatment to continue next visit.  Planned interventions for next visit: continue with current treatment.

## 2022-09-23 ENCOUNTER — PHARMACY VISIT (OUTPATIENT)
Dept: PHARMACY | Facility: MEDICAL CENTER | Age: 57
End: 2022-09-23
Payer: COMMERCIAL

## 2022-09-23 PROCEDURE — RXMED WILLOW AMBULATORY MEDICATION CHARGE

## 2022-09-29 ENCOUNTER — PHYSICAL THERAPY (OUTPATIENT)
Dept: PHYSICAL THERAPY | Facility: REHABILITATION | Age: 57
End: 2022-09-29
Attending: FAMILY MEDICINE
Payer: MEDICAID

## 2022-09-29 ENCOUNTER — PHARMACY VISIT (OUTPATIENT)
Dept: PHARMACY | Facility: MEDICAL CENTER | Age: 57
End: 2022-09-29
Payer: COMMERCIAL

## 2022-09-29 DIAGNOSIS — G89.29 CHRONIC PAIN OF BOTH KNEES: ICD-10-CM

## 2022-09-29 DIAGNOSIS — M25.562 CHRONIC PAIN OF BOTH KNEES: ICD-10-CM

## 2022-09-29 DIAGNOSIS — R26.2 DIFFICULTY WALKING: ICD-10-CM

## 2022-09-29 DIAGNOSIS — M25.561 CHRONIC PAIN OF BOTH KNEES: ICD-10-CM

## 2022-09-29 PROCEDURE — 97110 THERAPEUTIC EXERCISES: CPT

## 2022-09-29 PROCEDURE — 97530 THERAPEUTIC ACTIVITIES: CPT

## 2022-09-29 PROCEDURE — RXMED WILLOW AMBULATORY MEDICATION CHARGE

## 2022-09-29 NOTE — OP THERAPY DAILY TREATMENT
"  Outpatient Physical Therapy  DAILY TREATMENT     [facility] Physical [address].  [address]  [place] [address]  Phone:  [phone]  Fax:  [phone]    Date: 09/29/2022    Patient: Benito Persaud  YOB: 1965  MRN: [id number]     Time Calculation    Start time: 1115  Stop time: 1158 Time Calculation (min): 43 minutes         Chief Complaint: Difficulty Walking and Knee Problem    Visit #: 12    SUBJECTIVE:  Pt reports feeling ok today, but has some nausea. Most likely from new diabetes medication. Reports no new falls since previous visit. Had one near fall at home while getting out of the bathtub. Was feeling very dizzy that day.    OBJECTIVE:  Current objective measures:           Therapeutic Exercises (CPT 87006):     1. NuStep L3, x5', >90 SPM    2. Seated SB iso crunch, 10x5\" hold    3. TKE w/ OTB, 2x5x5\" hold    4. LAQ 8#, 10x5\" hold      Therapeutic Exercise Summary: HEP: 1x/daily  LAQs, seated clams/reverse clams, ball squeeze, HS stretch, seated HS curl  Standing marching, standing hip abd/ext, knee flexion    Therapeutic Treatments and Modalities:     1. Therapeutic Activities (CPT 64088)    Therapeutic Treatment and Modalities Summary: Ther Act:   -Shuttle L7->L8 w/ focus on eccentric control, x20 total  -SL shuttle L4, 2x10  -Wobble board shuttle L7, 2x10  -Seated on bosu w/ feet on ground, x2', fwd/bwd weight shifts x10, arms crossed x1', w/ EC and arms crossed, 4x10 seconds   Time-based treatments/modalities:    Physical Therapy Timed Treatment Charges  Therapeutic activity minutes (CPT 50484): 28 minutes  Therapeutic exercise minutes (CPT 24103): 15 minutes        ASSESSMENT:   Response to treatment: Progressed functional strengthening exercises, initiating use of shuttle leg press. Pt demo good concentric/eccentric control with solid surface, decreased stability and control with added wobble board. Demo decreased eccentric control, unable to maintain " neutral balance. Pt demo improved standing tolerance with TKEs    PLAN/RECOMMENDATIONS:   Plan for treatment: therapy treatment to continue next visit.  Planned interventions for next visit: continue with current treatment.

## 2022-10-03 ENCOUNTER — PHARMACY VISIT (OUTPATIENT)
Dept: PHARMACY | Facility: MEDICAL CENTER | Age: 57
End: 2022-10-03
Payer: COMMERCIAL

## 2022-10-03 PROCEDURE — RXMED WILLOW AMBULATORY MEDICATION CHARGE

## 2022-10-04 ENCOUNTER — PHYSICAL THERAPY (OUTPATIENT)
Dept: PHYSICAL THERAPY | Facility: REHABILITATION | Age: 57
End: 2022-10-04
Attending: NURSE PRACTITIONER
Payer: MEDICAID

## 2022-10-04 DIAGNOSIS — R26.2 DIFFICULTY WALKING: ICD-10-CM

## 2022-10-04 DIAGNOSIS — M25.561 CHRONIC PAIN OF BOTH KNEES: ICD-10-CM

## 2022-10-04 DIAGNOSIS — M25.562 CHRONIC PAIN OF BOTH KNEES: ICD-10-CM

## 2022-10-04 DIAGNOSIS — G89.29 CHRONIC PAIN OF BOTH KNEES: ICD-10-CM

## 2022-10-04 PROCEDURE — 97530 THERAPEUTIC ACTIVITIES: CPT

## 2022-10-04 NOTE — OP THERAPY DAILY TREATMENT
"  Outpatient Physical Therapy  DAILY TREATMENT     [facility] Physical [address].  [address]  [place] [address]  Phone:  [phone]  Fax:  [phone]    Date: 10/04/2022    Patient: Benito Persaud  YOB: 1965  MRN: [id number]     Time Calculation    Start time: 1050  Stop time: 1118 Time Calculation (min): 28 minutes         Chief Complaint: Difficulty Walking, Knee Problem, and Loss Of Balance    Visit #: 13    SUBJECTIVE:  Pt was 20 min late to appt.    Pt reports having a fall on Sunday while at work. Got very dizzy, went to grab the bar but missed it. Fell on L knee, denies injury. Was able to get back up immediately.    OBJECTIVE:  Current objective measures:   No redness noted on L knee. No apparent injury from fall          Therapeutic Exercises (CPT 05406):       Therapeutic Exercise Summary: HEP: 1x/daily  LAQs, seated clams/reverse clams, ball squeeze, HS stretch, seated HS curl  Standing marching, standing hip abd/ext, knee flexion    Therapeutic Treatments and Modalities:     1. Therapeutic Activities (CPT 29643)    Therapeutic Treatment and Modalities Summary: Ther Act:   -Shuttle L7->L8 w/ focus on eccentric control, x20 total  -SL shuttle L4, 2x10  -Wobble board shuttle L7, x10  -LAQ 8#, 20x5\" hold  Time-based treatments/modalities:    Physical Therapy Timed Treatment Charges  Therapeutic activity minutes (CPT 13544): 28 minutes          ASSESSMENT:   Response to treatment: Pt demo improved eccentric control during shuttle exercises, continued decreased stability w/ added wobble board. Continued focus on quad strengthening, pt demo improved endurance with LAQ exercises. Initiated VOR1 for HEP to reduce risk of falls, handout provided.    PLAN/RECOMMENDATIONS:   Plan for treatment: therapy treatment to continue next visit.  Planned interventions for next visit: continue with current treatment.         "

## 2022-10-06 ENCOUNTER — PHYSICAL THERAPY (OUTPATIENT)
Dept: PHYSICAL THERAPY | Facility: REHABILITATION | Age: 57
End: 2022-10-06
Attending: NURSE PRACTITIONER
Payer: MEDICAID

## 2022-10-06 DIAGNOSIS — M25.561 CHRONIC PAIN OF BOTH KNEES: ICD-10-CM

## 2022-10-06 DIAGNOSIS — R26.2 DIFFICULTY WALKING: ICD-10-CM

## 2022-10-06 DIAGNOSIS — G89.29 CHRONIC PAIN OF BOTH KNEES: ICD-10-CM

## 2022-10-06 DIAGNOSIS — M25.562 CHRONIC PAIN OF BOTH KNEES: ICD-10-CM

## 2022-10-06 PROCEDURE — 97530 THERAPEUTIC ACTIVITIES: CPT

## 2022-10-06 NOTE — OP THERAPY PROGRESS SUMMARY
Outpatient Physical Therapy  PROGRESS SUMMARY NOTE      [facility] Physical Therapy [address]  [address]  [facility] [address]  Phone:  [phone]  Fax:  [phone]    Date of Visit: 10/06/2022    Patient: Benito Persaud  YOB: 1965  MRN: [id number]     Referring Provider: GRADY Jones  [address] [place] [address]   Referring Diagnosis loss of balance     Visit Diagnoses     ICD-10-CM   1. Chronic pain of both knees  M25.561    M25.562    G89.29   2. Difficulty walking  R26.2       Rehab Potential: good    Progress Report Period: 9/6/22 to 10/6/22    Functional Assessment Used          Objective Findings and Assessment:   Patient progression towards goals: Pt demo overall functional improvement since initial evaluation, has met 3/8 functional goals. Continues to be heavily limited by vertigo symptoms and decreased standing tolerance. Pt reports multiple falls per month, usually during transitional movements and when at work. Demo high fall risk per Tinetti balance assessment and 5XSTS. Demo improved sequencing and mechanics of sit<>stands, able to complete w/o UE support, but demo increased trunk sway, requiring CGA. Impairments continue to include difficulty walking and performing work duties. States using heavy BUE support on counter with rope attached from one wall to the other for extra support if needed. The pt will benefit from continued skilled PT to address strength and balance deficits in order to decrease risk of falls and improve functional mobility. The pt has a good prognosis to reach PT goals d/t high motivation    Objective findings and assessment details: L great toe: no redness noted, nodule on PIP, PIP hypomobility  Squat-pivot from scooter chair<>mat table: independent  Seated balance: independent  Standing balance: increased trunk flexion, bilat knee flexion    5XSTS: 27 seconds  Tinetti Balance & Gait Assessment: 12/28 =  high fall risk    Goals:   Short Term Goals:   1. Pt will complete most appropriate balance assessment - met  2. Pt will complete most appropriate functional mobility assessment - met  3. Pt will demo independent squat pivot transfer with proper mechanics and sequencing for max safety - met  Short term goal time span:  4-6 weeks      Long Term Goals:    1. Pt will demo LE strength 4/5 for improved stability - not met  2. Pt will demo ability to walk 10' w/ LRAD and supervision in order to walk from bedroom to bathroom - not met  3. Pt will score low to medium fall risk per most appropriate balance assessment. - not met  4. Pt will report knee pain no greater than 3/10 after completing full work shift - not met  5. Pt will be able to tolerate sitting for 30 min with knee pain no greater than 3/10 in order to improve QoL - not met  Long term goal time span:  6-8 weeks    Plan:   Planned therapy interventions:  Therapeutic Exercise (CPT 88165) and Therapeutic Activities (CPT 98638)  Frequency:  2x week  Duration in visits:  16  Plan details:  3 units 32809, 1 unit 92662    Referring provider co-signature:  I have reviewed this plan of care and my co-signature certifies the need for services.     Certification Period: 10/06/2022 to 12/08/22    Physician Signature: ________________________________ Date: ______________

## 2022-10-06 NOTE — OP THERAPY DAILY TREATMENT
Outpatient Physical Therapy  DAILY TREATMENT     [facility] Physical [address].  [address]  [place] [address]  Phone:  [phone]  Fax:  [phone]    Date: 10/06/2022    Patient: Benito Persaud  YOB: 1965  MRN: [id number]     Time Calculation    Start time: 0900  Stop time: 0943 Time Calculation (min): 43 minutes         Chief Complaint: Difficulty Walking, Knee Problem, and Loss Of Balance    Visit #: 14    SUBJECTIVE:  Pt reports knees and back are sore today, but is very motivated to get working. Reports f/u with cardiologist, will mostly likely require 4 stents. Will also be received CT with contrast and PET scan. Goal is to get another job to cover expenses for vestibular surgery in [place]. Would like to lose 40#.    Reports having recent fall on Wednesday night at home. Was able to catch self on furniture, but hit toe on the furniture.     OBJECTIVE:  Current objective measures: See progress note          Therapeutic Exercises (CPT 86430):       Therapeutic Exercise Summary: HEP: 1x/daily  LAQs, seated clams/reverse clams, ball squeeze, HS stretch, seated HS curl  Standing marching, standing hip abd/ext, knee flexion    Therapeutic Treatments and Modalities:     1. Therapeutic Activities (CPT 46754)    Therapeutic Treatment and Modalities Summary: Ther Act:   -Nustep L5, x10' >100 SPM for improved endurance  -Wobble board shuttle L7, x15  -Shuttle L7->L8 w/ focus on eccentric control, x30 total  -SL shuttle L4, x10  -Sit <>stands, 3x5, No UE support  -fwd/bwd walking in // bars, x2 laps, w/ BUE support  -Lat walking in // bars, x2 laps, w/ BUE support  Time-based treatments/modalities:    Physical Therapy Timed Treatment Charges  Therapeutic activity minutes (CPT 74519): 43 minutes        ASSESSMENT:   Response to treatment: See progress note    PLAN/RECOMMENDATIONS:   Plan for treatment: therapy treatment to continue next visit.  Planned interventions  for next visit: continue with current treatment.

## 2022-10-10 ENCOUNTER — PHARMACY VISIT (OUTPATIENT)
Dept: PHARMACY | Facility: MEDICAL CENTER | Age: 57
End: 2022-10-10
Payer: COMMERCIAL

## 2022-10-10 PROCEDURE — RXMED WILLOW AMBULATORY MEDICATION CHARGE

## 2022-10-13 NOTE — PROGRESS NOTES
Established Patient    Benito Persaud is a 57 y.o. male who presents today with the following Chief Complaint(s): Follow up for Diagnoses of Type 2 diabetes mellitus with other specified complication, without long-term current use of insulin (HCC), Myelodysplastic syndrome (HCC), Melena, Chronic bilateral low back pain without sciatica, History of anaphylactic shock, Encounter for administration of vaccine, Ringworm, Left foot pain, and Housing instability were pertinent to this visit.    HPI:  Type 2 Diabetes Mellitus:   Patient brought log of his blood glucose over past month. He said his glucose has been antwhere from 60s to 300s and he cannot figure out why. He said that it does not matter what he eats that day and that some days he eats very little and still has an elevated blood glucose. He has been carefully watching his food and counting calories, he said he has never gone over 1200 callories in one day over the past month. Per patient he had tried to schedule with endocrinology from referral but had been unable to.     Myelodysplastic Syndrome:  He said he has a full body CT scheduled for December and brought associated paperwork that explained he will need new lab work within 60 days of his CT. He also said that he had been told his port was clogged and that he would need it fixed soon since he cannot have picc line because his veins are so sclerotic, no access. The only current access is through femoral vein. He was told he needed his port fixed asap so contrast can go thorugh it for the CT and in order to have future treatments.     Chronic bilateral lower back Pain:   Previously received notice that his referral was on hold until he had an MRI of his knee. However, he also has chronic back pain secondary to multiple fractures. He also said he had difficulty with his previous prescription for an electric motorized wheelchair.    Melena:   He had tried to schedule an appointment with GI and said  that he had been unable to. He wasn't sure who to call so had waited to see me before reaching out to other gastroenterologists.     Ringworm:   Patient has had a non-itchy ring-like rash on both sides of his groin. He said he keeps his house very very clean because of his leukemia and he could not figure out how he got a rash. He said he had tried multiple otc creams and powders and had not had any success yet.     Left Foot Pain:   Per patient, he has had 2 falls since our last appointment. During one of those falls he hit his foot. At that time he noticed that his left foot was cold compared to his right foot and he had left foot pain. He also noted that two of his toes are not straight any more.     History of Embolic Stroke:   At last appointment we discussed referral to [facility]. However, he hasn't been able to go yet because he is still waiting on his disability to come through. He said he had never been denied before and is working on getting his paperwork figured out again.     Has to move soon:   Per patient, he is being forced to move soon because the place he lives at is going to be turned into a parking lot. He said he needs [facility] to [facility] to help get him placement at a place that has clean facilities. He hopes to go to [facility]. He is aware that he will have to pay first and second months rent and moving costs but said he will do his best to get everything worked out.     Past Medical History:   Diagnosis Date    Asthma     Cancer ([facility]) 11/01/2016    MI (myocardial infarction) ([facility]) 2019     Social History     Tobacco Use    Smoking status: Never    Smokeless tobacco: Current     Types: Chew   Vaping Use    Vaping Use: Never used   Substance Use Topics    Alcohol use: Not Currently    Drug use: Not Currently     Current Outpatient Medications   Medication Sig Dispense Refill    Blood Glucose Monitoring Suppl (BLOOD GLUCOSE MONITOR SYSTEM) w/Device Kit Test blood sugar as  recommended by provider. 1 Kit 0    glucose blood strip Use one strip to test blood sugar twice daily. 100 Strip 3    Alcohol Swabs Pads Wipe site with prep pad prior to injection. 100 Each 0    Insulin Pen Needle 32 G x 4 mm use to inject insulin up to 4 times daily as directed 100 Each 0    diphenhydrAMINE (BENADRYL) 25 MG capsule Take 2 Capsules by mouth every evening. 60 Capsule 3    Lancets Use 1 lancet to test blood sugar every day and as needed for signs and symptoms for high or low sugar. 100 Each 3    glucose blood strip Use 1 test strip to test blood sugar daily as directed by provider 50 Strip 3    amitriptyline (ELAVIL) 100 MG Tab Take 1.5 Tablets by mouth every evening. 30 Tablet 1    apixaban (ELIQUIS) 5mg Tab Take 1 Tablet by mouth 2 times a day. 60 Tablet 3    fluticasone (FLOVENT HFA) 110 MCG/ACT Aerosol Inhale 12 Puffs 2 times a day. 12 g 2    gabapentin (NEURONTIN) 800 MG tablet Take 1 Tablet by mouth 3 times a day. 90 Tablet 3    ipratropium-albuterol (COMBIVENT RESPIMAT)  MCG/ACT Aero Soln Inhale 1 Puff 2 times a day. 1 Each 2    ondansetron (ZOFRAN ODT) 4 MG TABLET DISPERSIBLE Dissolve 1 Tablet by mouth every 8 hours as needed for Nausea. 10 Tablet 0    metformin (GLUCOPHAGE) 1000 MG tablet Take 1 Tablet by mouth 2 times a day with meals. 60 Tablet 3    lisinopril (PRINIVIL) 5 MG Tab Take 1 Tablet by mouth every day. 30 Tablet 3    sertraline (ZOLOFT) 25 MG tablet Take 1 Tablet by mouth every day. 30 Tablet 11    Dulaglutide (TRULICITY) 0.75 MG/0.5ML Solution Pen-injector Inject 0.75 mg (0.5 mL) under the skin every 7 days. 6 mL 3    insulin glargine (LANTUS) 100 UNIT/ML Solution Pen-injector injection Inject 20 Units under the skin every evening. 18 mL 3    zolpidem (AMBIEN) 10 MG Tab Take 1 Tablet by mouth at bedtime for 30 days. 30 Tablet 1    insulin lispro (HUMALOG,ADMELOG) 100 UNIT/ML Solution Pen-injector injection PEN Inject 12 Units under the skin 3 times a day before meals. use  "per sliding scale as directed 42 mL 3    Omeprazole 20 MG Tablet Delayed Release Dispersible take 1 tablet by mouth twice daily 56 Tablet 11    mupirocin calcium (BACTROBAN) 2 % Cream Apply 1 Application topically 2 times a day.      dextrose 50% (D50W) 50 % Solution Infuse 50 mL into a venous catheter as needed (If FSBG is less than or equal to 70 mg/dL and If patient is NPO or unable to take oral). 50 mL 0     No current facility-administered medications for this visit.       ROS: As per HPI. Additional pertinent systems as noted below.  Constitutional: Negative for chills and fever.   HENT: Negative for ear pain and sore throat.    Eyes: Negative for discharge and redness.   Respiratory: Negative for hemoptysis, and stridor.  Positive for cough and wheezing.   Cardiovascular: Negative for chest pain, palpitations. Positive for leg swelling.   Gastrointestinal: Negative for abdominal pain, constipation, diarrhea, heartburn, nausea and vomiting. Positive for melena.   Genitourinary: Negative for dysuria, flank pain and hematuria.   Musculoskeletal: Positive for falls and myalgias.   Skin: Negative for itching. Positive for rash.   Neurological: Negative seizures, loss of consciousness and headaches. Positive for dizziness and diplopia.   Endo/Heme/Allergies: Negative for polydipsia. Positive for bruise/bleed easily.   Psychiatric/Behavioral: Negative for substance abuse and suicidal ideas.    Physical Exam  BP (!) 159/96 (BP Location: Left arm, Patient Position: Sitting, BP Cuff Size: Adult)   Pulse 91   Temp 36.4 °C (97.5 °F) (Temporal)   Ht 1.803 m (5' 11\")   Wt 118 kg (261 lb)   SpO2 94%   BMI 36.40 kg/m²   General:  Alert and oriented, No apparent distress. Appears tired.     HEENT: normocephalic, atraumatic. Pupils equal and reactive. No scleral icterus.Throat clear and moist. No erythema or exudates noted.     Neck: Supple. No lymphadenopathy noted. Thyroid not enlarged.    Lungs: Crackles at all lung " posts. Difficulty with taking deep breaths. Bilateral chest expansion.     Cardiovascular: Regular rhythm but tachycardic. No murmurs, rubs or gallops.    Abdomen:  Regular bowel sounds, nontender, no rebound or guarding noted.    Extremities: No clubbing or cyanosis. 1+ pitting edema bilateral lower extremities. L foot cold to touch. 2+ posterior tibial and dorsal pedal pulses. 2-3 second cap refill. Decreased sensation.     Skin: multiple circular rashes bilateral groin.       Assessment and Plan:   1.Type 2 Diabetes Mellitus:   -Currently on Metformin 1000mg, Trulicity, and Lantus   -Last A1C 10.4 on 8/31/22   -Home glucose log very labile. Ranging from 60s to 300s.   plan:   -Provided patient with contact information for endocrinology referral   -Ordered urine microalbumin creatinine ratio   -Requested pt continue tracking BP     2. Myelodysplastic syndrome:   -Full body CT scheduled in December. Per oncology will need labs 60 days before imaging.   -Pt said his port is clogged. Will need fixed before CT in December in order to have contrast injected as poor access through peripheral veins.   plan:   -Ordered CBC and CMP   -Referral to IR to check port and replace if needed     3. Chronic bilateral lower back Pain:   -Previous referral to pain management rejected as no recent MRI   plan:   -placed new referral for pt's chronic back pain since pt has previous MRI spine.   -Printed prescription for motorized wheelchair     4. Melena:   -Previously referred pt to ED for hematochezia. At ED 8/31 Hgb WNL.   -Pt noted continued bloody stool every couple days.   plan:   -Provided pt with contact information to schedule appointment with GI.   -Requested he follow up with GI as soon as possible.     5. Left Foot Pain:   -pt reported his L foot feels cold and swollen.   -posterior tibial and dorsal pedal pulses both 2+. Cap refill 2-3 seconds.   Plan:   -DVT Ultrasound L lower extremity     6. Ringworm:   -Pt noted  non-itchy circular rash bilateral groin for past few months.   plan:   -Discussed risks such as moist skin, skin folds, etc   -Recommended gold bond powder for areas where skin rubs   -Prescribed nystatin cream to apply to rash.     7. Encounter for administration of vaccine:   -Flu vaccine   -Pneumonia vaccine     8. History of Anaphylactic Shock:   -Pt's previous EpiPen had    -prescribed new EpiPen.     9. Housing Instability:   -Provided patient with letter to give to [facility] explaining his circumstance and need for clean living environment. See attached file.       Follow up: 4 weeks     Jacobo Eddy D.O. PGY I  [facility]

## 2022-10-14 ENCOUNTER — OFFICE VISIT (OUTPATIENT)
Dept: INTERNAL MEDICINE | Facility: OTHER | Age: 57
End: 2022-10-14
Payer: MEDICAID

## 2022-10-14 VITALS
BODY MASS INDEX: 36.54 KG/M2 | SYSTOLIC BLOOD PRESSURE: 159 MMHG | OXYGEN SATURATION: 94 % | TEMPERATURE: 97.5 F | HEIGHT: 71 IN | DIASTOLIC BLOOD PRESSURE: 96 MMHG | HEART RATE: 91 BPM | WEIGHT: 261 LBS

## 2022-10-14 DIAGNOSIS — D46.9 MYELODYSPLASTIC SYNDROME (HCC): ICD-10-CM

## 2022-10-14 DIAGNOSIS — B35.9 RINGWORM: ICD-10-CM

## 2022-10-14 DIAGNOSIS — M79.672 LEFT FOOT PAIN: ICD-10-CM

## 2022-10-14 DIAGNOSIS — E11.69 TYPE 2 DIABETES MELLITUS WITH OTHER SPECIFIED COMPLICATION, WITHOUT LONG-TERM CURRENT USE OF INSULIN (HCC): ICD-10-CM

## 2022-10-14 DIAGNOSIS — M54.50 CHRONIC BILATERAL LOW BACK PAIN WITHOUT SCIATICA: ICD-10-CM

## 2022-10-14 DIAGNOSIS — Z59.819 HOUSING INSTABILITY: ICD-10-CM

## 2022-10-14 DIAGNOSIS — G89.29 CHRONIC BILATERAL LOW BACK PAIN WITHOUT SCIATICA: ICD-10-CM

## 2022-10-14 DIAGNOSIS — Z87.892 HISTORY OF ANAPHYLACTIC SHOCK: ICD-10-CM

## 2022-10-14 DIAGNOSIS — K92.1 MELENA: ICD-10-CM

## 2022-10-14 DIAGNOSIS — Z23 ENCOUNTER FOR ADMINISTRATION OF VACCINE: ICD-10-CM

## 2022-10-14 PROBLEM — L03.90 CELLULITIS: Status: RESOLVED | Noted: 2022-03-28 | Resolved: 2022-10-14

## 2022-10-14 PROBLEM — R07.9 CHEST PAIN: Status: RESOLVED | Noted: 2022-04-26 | Resolved: 2022-10-14

## 2022-10-14 PROCEDURE — RXMED WILLOW AMBULATORY MEDICATION CHARGE

## 2022-10-14 PROCEDURE — 90677 PCV20 VACCINE IM: CPT

## 2022-10-14 PROCEDURE — 99214 OFFICE O/P EST MOD 30 MIN: CPT | Mod: 25,GC

## 2022-10-14 PROCEDURE — 90472 IMMUNIZATION ADMIN EACH ADD: CPT

## 2022-10-14 PROCEDURE — 90471 IMMUNIZATION ADMIN: CPT

## 2022-10-14 PROCEDURE — 90686 IIV4 VACC NO PRSV 0.5 ML IM: CPT

## 2022-10-14 RX ORDER — TERBINAFINE HYDROCHLORIDE 250 MG/1
250 TABLET ORAL DAILY
Qty: 30 TABLET | Refills: 3 | Status: CANCELLED | OUTPATIENT
Start: 2022-10-14

## 2022-10-14 RX ORDER — EPINEPHRINE 0.3 MG/.3ML
INJECTION SUBCUTANEOUS
Qty: 2 EACH | Refills: 0 | Status: SHIPPED | OUTPATIENT
Start: 2022-10-14

## 2022-10-14 RX ORDER — NYSTATIN 100000 U/G
1 CREAM TOPICAL 2 TIMES DAILY
Qty: 30 G | Refills: 3 | Status: SHIPPED | OUTPATIENT
Start: 2022-10-14 | End: 2022-11-14 | Stop reason: SDUPTHER

## 2022-10-14 SDOH — ECONOMIC STABILITY - HOUSING INSECURITY: HOUSING INSTABILITY UNSPECIFIED: Z59.819

## 2022-10-14 ASSESSMENT — FIBROSIS 4 INDEX: FIB4 SCORE: 2.74

## 2022-10-14 NOTE — PATIENT INSTRUCTIONS
Thank you for visiting today!   -Please follow-up on referrals and schedule an appointment with gastroenterology and endocrinology.   -For your port I am referring you to an interventional radiologist, please follow up with them as soon as possible.   -For your rash I recommend over the counter gold-bond powder. Put powder in areas where skin overlap to absorb moisture.     Please get lab work done at least 5 days before next visit.     Please try and eat healthy, get at least 30 minutes of cardiovascular exercise a day to help keep your health as best as it can be.  If you have any questions or concerns please feel free to contact us at [phone].  If you feel like you are experiencing a medical emergency please seek immediate medical attention at an urgent care or in the emergency department.      For your Gastroenterology referral:   [facility]  [address] [place], [place] [address]. [address]  Phone: [phone]    For your Endocrinology referral:   [facility] [place] [address] [address] [facility] [place]. [address]  Phone: [phone]

## 2022-10-14 NOTE — LETTER
To Whom it May Concern,     Benito Persaud is currently facing health challenges that require a clean living environment. Having a clean living environment is essential for his quality of life. I was informed that he will need to move soon and I am concerned that moving to a less-clean environment will severely impact his health. I recommend he be moved to a place at least as clean as his current housing. If you have any questions or concerns about his need please feel free to reach out to me. I can be reached at [phone].    Sincerely,   Jacobo Eddy D.O.

## 2022-10-15 DIAGNOSIS — Z86.73 HISTORY OF EMBOLIC STROKE: ICD-10-CM

## 2022-10-15 PROCEDURE — RXMED WILLOW AMBULATORY MEDICATION CHARGE

## 2022-10-15 PROCEDURE — RXMED WILLOW AMBULATORY MEDICATION CHARGE: Performed by: INTERNAL MEDICINE

## 2022-10-16 ENCOUNTER — PHARMACY VISIT (OUTPATIENT)
Dept: PHARMACY | Facility: MEDICAL CENTER | Age: 57
End: 2022-10-16
Payer: COMMERCIAL

## 2022-10-17 ENCOUNTER — PHARMACY VISIT (OUTPATIENT)
Dept: PHARMACY | Facility: MEDICAL CENTER | Age: 57
End: 2022-10-17
Payer: COMMERCIAL

## 2022-10-17 ENCOUNTER — HOSPITAL ENCOUNTER (OUTPATIENT)
Dept: LAB | Facility: MEDICAL CENTER | Age: 57
End: 2022-10-17
Payer: MEDICAID

## 2022-10-17 DIAGNOSIS — E11.69 TYPE 2 DIABETES MELLITUS WITH OTHER SPECIFIED COMPLICATION, WITHOUT LONG-TERM CURRENT USE OF INSULIN (HCC): ICD-10-CM

## 2022-10-17 DIAGNOSIS — D46.9 MYELODYSPLASTIC SYNDROME (HCC): ICD-10-CM

## 2022-10-17 LAB
ALBUMIN SERPL BCP-MCNC: 4.4 G/DL (ref 3.2–4.9)
ALBUMIN/GLOB SERPL: 1.5 G/DL
ALP SERPL-CCNC: 203 U/L (ref 30–99)
ALT SERPL-CCNC: 26 U/L (ref 2–50)
ANION GAP SERPL CALC-SCNC: 12 MMOL/L (ref 7–16)
AST SERPL-CCNC: 19 U/L (ref 12–45)
BASOPHILS # BLD AUTO: 0.4 % (ref 0–1.8)
BASOPHILS # BLD: 0.02 K/UL (ref 0–0.12)
BILIRUB SERPL-MCNC: 0.3 MG/DL (ref 0.1–1.5)
BUN SERPL-MCNC: 16 MG/DL (ref 8–22)
CALCIUM SERPL-MCNC: 9.3 MG/DL (ref 8.5–10.5)
CHLORIDE SERPL-SCNC: 103 MMOL/L (ref 96–112)
CO2 SERPL-SCNC: 22 MMOL/L (ref 20–33)
CREAT SERPL-MCNC: 1.17 MG/DL (ref 0.5–1.4)
CREAT UR-MCNC: 100.11 MG/DL
EOSINOPHIL # BLD AUTO: 0.14 K/UL (ref 0–0.51)
EOSINOPHIL NFR BLD: 2.6 % (ref 0–6.9)
ERYTHROCYTE [DISTWIDTH] IN BLOOD BY AUTOMATED COUNT: 45 FL (ref 35.9–50)
GFR SERPLBLD CREATININE-BSD FMLA CKD-EPI: 73 ML/MIN/1.73 M 2
GLOBULIN SER CALC-MCNC: 3 G/DL (ref 1.9–3.5)
GLUCOSE SERPL-MCNC: 271 MG/DL (ref 65–99)
HCT VFR BLD AUTO: 47.9 % (ref 42–52)
HGB BLD-MCNC: 16.5 G/DL (ref 14–18)
IMM GRANULOCYTES # BLD AUTO: 0.03 K/UL (ref 0–0.11)
IMM GRANULOCYTES NFR BLD AUTO: 0.6 % (ref 0–0.9)
LYMPHOCYTES # BLD AUTO: 1.73 K/UL (ref 1–4.8)
LYMPHOCYTES NFR BLD: 32.1 % (ref 22–41)
MCH RBC QN AUTO: 31.4 PG (ref 27–33)
MCHC RBC AUTO-ENTMCNC: 34.4 G/DL (ref 33.7–35.3)
MCV RBC AUTO: 91.1 FL (ref 81.4–97.8)
MICROALBUMIN UR-MCNC: 2 MG/DL
MICROALBUMIN/CREAT UR: 20 MG/G (ref 0–30)
MONOCYTES # BLD AUTO: 0.51 K/UL (ref 0–0.85)
MONOCYTES NFR BLD AUTO: 9.5 % (ref 0–13.4)
NEUTROPHILS # BLD AUTO: 2.96 K/UL (ref 1.82–7.42)
NEUTROPHILS NFR BLD: 54.8 % (ref 44–72)
NRBC # BLD AUTO: 0 K/UL
NRBC BLD-RTO: 0 /100 WBC
PLATELET # BLD AUTO: 105 K/UL (ref 164–446)
PMV BLD AUTO: 9.5 FL (ref 9–12.9)
POTASSIUM SERPL-SCNC: 4.1 MMOL/L (ref 3.6–5.5)
PROT SERPL-MCNC: 7.4 G/DL (ref 6–8.2)
RBC # BLD AUTO: 5.26 M/UL (ref 4.7–6.1)
SODIUM SERPL-SCNC: 137 MMOL/L (ref 135–145)
WBC # BLD AUTO: 5.4 K/UL (ref 4.8–10.8)

## 2022-10-17 PROCEDURE — 80053 COMPREHEN METABOLIC PANEL: CPT

## 2022-10-17 PROCEDURE — 82570 ASSAY OF URINE CREATININE: CPT

## 2022-10-17 PROCEDURE — RXMED WILLOW AMBULATORY MEDICATION CHARGE

## 2022-10-17 PROCEDURE — RXMED WILLOW AMBULATORY MEDICATION CHARGE: Performed by: INTERNAL MEDICINE

## 2022-10-17 PROCEDURE — 36415 COLL VENOUS BLD VENIPUNCTURE: CPT

## 2022-10-17 PROCEDURE — 85025 COMPLETE CBC W/AUTO DIFF WBC: CPT

## 2022-10-17 PROCEDURE — 82043 UR ALBUMIN QUANTITATIVE: CPT

## 2022-10-17 RX ORDER — ONDANSETRON 4 MG/1
4 TABLET, ORALLY DISINTEGRATING ORAL EVERY 8 HOURS PRN
Qty: 10 TABLET | Refills: 0 | Status: SHIPPED | OUTPATIENT
Start: 2022-10-17 | End: 2022-11-03 | Stop reason: SDUPTHER

## 2022-10-17 NOTE — TELEPHONE ENCOUNTER
Last seen: 10.14.22 by Dr. Eddy  Next appt: 11.23.22 with Dr. Eddy    Was the patient seen in the last year in this department? Yes   Does patient have an active prescription for medications requested? No   Received Request Via: Pharmacy

## 2022-10-18 ENCOUNTER — PHYSICAL THERAPY (OUTPATIENT)
Dept: PHYSICAL THERAPY | Facility: REHABILITATION | Age: 57
End: 2022-10-18
Payer: MEDICAID

## 2022-10-18 DIAGNOSIS — R26.2 DIFFICULTY WALKING: ICD-10-CM

## 2022-10-18 DIAGNOSIS — M25.561 CHRONIC PAIN OF BOTH KNEES: ICD-10-CM

## 2022-10-18 DIAGNOSIS — M25.562 CHRONIC PAIN OF BOTH KNEES: ICD-10-CM

## 2022-10-18 DIAGNOSIS — G89.29 CHRONIC PAIN OF BOTH KNEES: ICD-10-CM

## 2022-10-18 PROCEDURE — 97530 THERAPEUTIC ACTIVITIES: CPT

## 2022-10-18 NOTE — OP THERAPY DAILY TREATMENT
"  Outpatient Physical Therapy  DAILY TREATMENT     [facility] Physical Therapy [address].  [address]  [place] [address]  Phone:  [phone]  Fax:  [phone]    Date: 10/18/2022    Patient: Benito Persaud  YOB: 1965  MRN: [id number]     Time Calculation    Start time: 1315  Stop time: 1355 Time Calculation (min): 40 minutes         Chief Complaint: Difficulty Walking and Knee Problem    Visit #: 15    SUBJECTIVE:  Pt reports having a fall on Sunday, while reaching into medicine cabinet. Scratched top of the head, but reports no injuries. Back started to increase since last Saturday. Has difficulty getting out of bed d/t pain. Reports having f/u w/ PCP, will be receiving MRI for knees.    OBJECTIVE:  Current objective measures:   2 light scratches on top of head, no redness noted. Appear to be healing.  Redness on volar surface of L hand          Therapeutic Exercises (CPT 82241):       Therapeutic Exercise Summary: HEP: 1x/daily  LAQs, seated clams/reverse clams, ball squeeze, HS stretch, seated HS curl  Standing marching, standing hip abd/ext, knee flexion    Therapeutic Treatments and Modalities:     1. Therapeutic Activities (CPT 05189)    Therapeutic Treatment and Modalities Summary: Ther Act:   -Nustep L5, x10' >100 SPM for improved endurance  -Sit <>stands w/ airex, 3x5, No UE support - standing extension at top, 1x5 w/o airex  --Lat walking in // bars, x1 lap w/ OTB, x1 lap w/o, w/ BUE support - reported increased dizziness/vertigo  -LAQ 8#, 6x5\" hold  -Fwd walking RUE support in // bars, x3 laps  Time-based treatments/modalities:    Physical Therapy Timed Treatment Charges  Therapeutic activity minutes (CPT 86490): 40 minutes      ASSESSMENT:   Response to treatment: Pt demo decreased activity tolerance, reporting increased symptoms of vertigo and dizziness when standing. When asked, pt reported not eating any food today, but was able to drink sufficient amounts of " water. Pt ed provided regarding diabetes and risk of low blood sugar, also dangerous when low. Instructed to eat snacks throughout the day if meal is skipped to maintain blood sugar at safe levels, snack before therapy. Pt gave verbal understanding    PLAN/RECOMMENDATIONS:   Plan for treatment: therapy treatment to continue next visit.  Planned interventions for next visit: continue with current treatment.

## 2022-10-19 ENCOUNTER — PHARMACY VISIT (OUTPATIENT)
Dept: PHARMACY | Facility: MEDICAL CENTER | Age: 57
End: 2022-10-19
Payer: COMMERCIAL

## 2022-10-19 ENCOUNTER — TELEPHONE (OUTPATIENT)
Dept: INTERNAL MEDICINE | Facility: OTHER | Age: 57
End: 2022-10-19
Payer: MEDICAID

## 2022-10-19 NOTE — TELEPHONE ENCOUNTER
Patient called today stating his pharmacy has been trying to contact the office to get something for his insulin pens. Patient also states Dr. Eddy needs to put in an MRI order.

## 2022-10-19 NOTE — TELEPHONE ENCOUNTER
Phone Number Called: [phone]    Call outcome:  unable to reach pt    Message: attempted to call the patient multiple times but get a busy signal, please call pt I believe its for the pending needles

## 2022-10-20 ENCOUNTER — PHYSICAL THERAPY (OUTPATIENT)
Dept: PHYSICAL THERAPY | Facility: REHABILITATION | Age: 57
End: 2022-10-20
Payer: MEDICAID

## 2022-10-20 DIAGNOSIS — R26.2 DIFFICULTY WALKING: ICD-10-CM

## 2022-10-20 DIAGNOSIS — M25.561 CHRONIC PAIN OF BOTH KNEES: ICD-10-CM

## 2022-10-20 DIAGNOSIS — G89.29 CHRONIC PAIN OF BOTH KNEES: ICD-10-CM

## 2022-10-20 DIAGNOSIS — M25.562 CHRONIC PAIN OF BOTH KNEES: ICD-10-CM

## 2022-10-20 PROCEDURE — 97530 THERAPEUTIC ACTIVITIES: CPT

## 2022-10-20 PROCEDURE — RXMED WILLOW AMBULATORY MEDICATION CHARGE

## 2022-10-20 PROCEDURE — 97110 THERAPEUTIC EXERCISES: CPT

## 2022-10-20 NOTE — OP THERAPY DAILY TREATMENT
"  Outpatient Physical Therapy  DAILY TREATMENT     [facility] Physical [address].  [address]  [place] [address]  Phone:  [phone]  Fax:  [phone]    Date: 10/20/2022    Patient: Benito Persaud  YOB: 1965  MRN: [id number]     Time Calculation    Start time: 1100  Stop time: 1159 Time Calculation (min): 59 minutes         Chief Complaint: Difficulty Walking and Knee Problem    Visit #: 16    SUBJECTIVE:  Pt reports feeling much better today. Has decreased dizziness, feels more energized. Continues to have chronic bilat knee pain, but is manageable today    OBJECTIVE:  Current objective measures:           Therapeutic Exercises (CPT 26258):     1. TKE w/ PTB, 2x10x5\" hold    2. Standing hip abd/ext, x10 ea    3. HS curl w/ BTB, x10 ea      Therapeutic Exercise Summary: HEP: 1x/daily  LAQs, seated clams/reverse clams, ball squeeze, HS stretch, seated HS curl  Standing marching, standing hip abd/ext, knee flexion    Therapeutic Treatments and Modalities:     1. Therapeutic Activities (CPT 08847)    Therapeutic Treatment and Modalities Summary: Ther Act:   -Nustep L6, x5' >75 SPM for improved endurance  -Fwd walking w/ attempt no UE support in // bars, x4 laps - mod intermittent UE support  -Step ups 4\", 2x5 w/ UE support  -Side step over short cone w/ BUE support, x8 laps // bars - one seated rest break  -Progress to side step over small hurdles w/ BUE support, x3 laps // bars  -Fwd walking over small hurdles, L foot first, step to pattern for improved activity tolerance/hip flexion, x3 laps // bars  -Standing marches w/ UE support, 2x45\" for increased SL stance tolerance  -Sit <>stands - not today  Time-based treatments/modalities:    Physical Therapy Timed Treatment Charges  Therapeutic activity minutes (CPT 02994): 38 minutes  Therapeutic exercise minutes (CPT 23927): 17 minutes        ASSESSMENT:   Response to treatment: Pt demo good standing tolerance today. " Improved fwd walking stability w/ decrease in UE support. Continues to demo lateral trunk sway and CGA. Progressed standing/walking activities, stepping over objects. Demo increased SL stance tolerance, able to step over small hurdles with UE support, unable to perform w/o, d/t balance and posterior bias.    PLAN/RECOMMENDATIONS:   Plan for treatment: therapy treatment to continue next visit.  Planned interventions for next visit: continue with current treatment.

## 2022-10-20 NOTE — TELEPHONE ENCOUNTER
Jacobo Eddy D.O.  You 40 minutes ago (4:33 PM)     AN  Talked with Cecilia Clark from [facility].  Since I placed the IR referral for the port no further action is needed on my end. Called Mr Persaud and left a mesage that he should receive a call from IR to schedule an appointment.        Jacobo Eddy D.O.  You 2 hours ago (2:49 PM)     AN  Called patient and discussed his concerns. Refilled his prescription for needles and discussed his knee MRI. We no longer need an MRI of his knee, pt expressed understanding. We initially needed it to place a referral to pain management but he has an MRI spine so I referred for back pain instead of knee pain.   His other concern is having his port replaced. At our previous appointment I placed a referral to interventional radiology, it appears that this referral is currently pending

## 2022-10-21 ENCOUNTER — PHARMACY VISIT (OUTPATIENT)
Dept: PHARMACY | Facility: MEDICAL CENTER | Age: 57
End: 2022-10-21
Payer: COMMERCIAL

## 2022-10-21 DIAGNOSIS — E11.69 TYPE 2 DIABETES MELLITUS WITH OTHER SPECIFIED COMPLICATION, WITHOUT LONG-TERM CURRENT USE OF INSULIN (HCC): ICD-10-CM

## 2022-10-21 PROCEDURE — RXMED WILLOW AMBULATORY MEDICATION CHARGE

## 2022-10-21 RX ORDER — BLOOD PRESSURE TEST KIT
KIT MISCELLANEOUS
Qty: 100 EACH | Refills: 0 | Status: SHIPPED | OUTPATIENT
Start: 2022-10-21 | End: 2022-11-13 | Stop reason: SDUPTHER

## 2022-10-21 NOTE — TELEPHONE ENCOUNTER
Last seen: 10.14.2022 by Dr. Eddy  Next appt: none    Was the patient seen in the last year in this department? Yes   Does patient have an active prescription for medications requested? No   Received Request Via: Pharmacy

## 2022-10-25 ENCOUNTER — PHYSICAL THERAPY (OUTPATIENT)
Dept: PHYSICAL THERAPY | Facility: REHABILITATION | Age: 57
End: 2022-10-25
Payer: MEDICAID

## 2022-10-25 DIAGNOSIS — M25.562 CHRONIC PAIN OF BOTH KNEES: ICD-10-CM

## 2022-10-25 DIAGNOSIS — M25.561 CHRONIC PAIN OF BOTH KNEES: ICD-10-CM

## 2022-10-25 DIAGNOSIS — G89.29 CHRONIC PAIN OF BOTH KNEES: ICD-10-CM

## 2022-10-25 DIAGNOSIS — R26.2 DIFFICULTY WALKING: ICD-10-CM

## 2022-10-25 PROCEDURE — 97530 THERAPEUTIC ACTIVITIES: CPT

## 2022-10-25 NOTE — OP THERAPY DAILY TREATMENT
"  Outpatient Physical Therapy  DAILY TREATMENT     [facility] Physical [address].  [address]  [place] [address]  Phone:  [phone]  Fax:  [phone]    Date: 10/25/2022    Patient: Benito Persaud  YOB: 1965  MRN: [id number]     Time Calculation    Start time: 1117  Stop time: 1200 Time Calculation (min): 43 minutes         Chief Complaint: Knee Problem and Difficulty Walking    Visit #: 17    SUBJECTIVE:  Pt reports increased vertigo symptoms today, but otherwise has no new changes since previous visit. No changes in bilat knee pain.    Reports having enough strength to get into van to go to doctor's appt. Hasn't been able to transfer into van in > 1 year.    OBJECTIVE:  Current objective measures:           Therapeutic Exercises (CPT 18046):       Therapeutic Exercise Summary: HEP: 1x/daily  LAQs, seated clams/reverse clams, ball squeeze, HS stretch, seated HS curl  Standing marching, standing hip abd/ext, knee flexion    Therapeutic Treatments and Modalities:     1. Therapeutic Activities (CPT 48016)    Therapeutic Treatment and Modalities Summary: Ther Act:   -fwd/bwd walking, x3 laps w/ focus on even step length bilat - BUE support  -Side stepping w/ PTB knee, x3 laps BUE support  -Side step over small hurdles w/ BUE support, x3 laps // bars  -Fwd walking over small hurdles w/ step to pattern for improved activity tolerance/hip flexion, x3 laps // bars - L foot first, R foot first  -Fwd walking over small hurdles w/ reciprocal pattern for improved activity tolerance/hip flexion, x2 laps // bars   -Fwd walking w/o UE support, x5 laps in // bars w/ CGA - last lap with L eye covered (reports no changes in vertigo, but balance felt better)  -Steps ups 4\", 2x5 ea, w/ BUE support  -Nustep L6, >75 spm, x10 min for improved functional endurance  Time-based treatments/modalities:    Physical Therapy Timed Treatment Charges  Therapeutic activity minutes (CPT 86994): 43 " minutes        ASSESSMENT:   Response to treatment: Pt demo improved standing tolerance overall. Used BUE support in // bars d/t increased vertigo symptoms. Demo increased UE need during lateral stepping. Decreased support during fwd walking. Demo consistent R side LOB during fwd walking w/o UE support. Trialed covering L eye, d/t loss off vision. Pt demo LOB to L side, but  self reported improved balance. Recommend f/u with ophthalmologist to see if eye patch is warranted. Pt gave verbal agreement and understanding.    PLAN/RECOMMENDATIONS:   Plan for treatment: therapy treatment to continue next visit.  Planned interventions for next visit: continue with current treatment.

## 2022-10-26 ENCOUNTER — PHARMACY VISIT (OUTPATIENT)
Dept: PHARMACY | Facility: MEDICAL CENTER | Age: 57
End: 2022-10-26
Payer: COMMERCIAL

## 2022-10-26 PROCEDURE — RXMED WILLOW AMBULATORY MEDICATION CHARGE

## 2022-10-26 PROCEDURE — RXMED WILLOW AMBULATORY MEDICATION CHARGE: Performed by: INTERNAL MEDICINE

## 2022-10-27 ENCOUNTER — PHYSICAL THERAPY (OUTPATIENT)
Dept: PHYSICAL THERAPY | Facility: REHABILITATION | Age: 57
End: 2022-10-27
Payer: MEDICAID

## 2022-10-27 ENCOUNTER — TELEPHONE (OUTPATIENT)
Dept: INTERNAL MEDICINE | Facility: OTHER | Age: 57
End: 2022-10-27

## 2022-10-27 DIAGNOSIS — M25.561 CHRONIC PAIN OF BOTH KNEES: ICD-10-CM

## 2022-10-27 DIAGNOSIS — R26.2 DIFFICULTY WALKING: ICD-10-CM

## 2022-10-27 DIAGNOSIS — G89.29 CHRONIC PAIN OF BOTH KNEES: ICD-10-CM

## 2022-10-27 DIAGNOSIS — M25.562 CHRONIC PAIN OF BOTH KNEES: ICD-10-CM

## 2022-10-27 PROCEDURE — 97530 THERAPEUTIC ACTIVITIES: CPT

## 2022-10-27 PROCEDURE — 97112 NEUROMUSCULAR REEDUCATION: CPT

## 2022-10-27 NOTE — TELEPHONE ENCOUNTER
Interventional Radiology- Needs to have port moved and placed in another spot due to all that is going on- due to the orders for the CT and all of his other orders. Please advise

## 2022-10-27 NOTE — OP THERAPY DAILY TREATMENT
"  Outpatient Physical Therapy  DAILY TREATMENT     [facility] Physical [address].  [address]  [place] [address]  Phone:  [phone]  Fax:  [phone]    Date: 10/27/2022    Patient: Benito Persaud  YOB: 1965  MRN: [id number]     Time Calculation    Start time: 1115  Stop time: 1156 Time Calculation (min): 41 minutes         Chief Complaint: Knee Problem    Visit #: 18    SUBJECTIVE:  Pt reports increased walking tolerance at work. Able to walk length of kitchen w/o use of UE support, feels more stable. Reports no new falls.     OBJECTIVE:  Current objective measures:           Therapeutic Exercises (CPT 87116):       Therapeutic Exercise Summary: HEP: 1x/daily  LAQs, seated clams/reverse clams, ball squeeze, HS stretch, seated HS curl  Standing marching, standing hip abd/ext, knee flexion    Therapeutic Treatments and Modalities:     1. Therapeutic Activities (CPT 72060)    2. Neuromuscular Re-education (CPT 04772)    Therapeutic Treatment and Modalities Summary: Ther Act:   -Sit<>stands, 2x10 no Ues, w/ focus on eccentric control  -HS curl w/ BTB, x10 ea  -Fwd walking w/o UE support, x5 laps in // bars w/ CGA   -Nustep L6, >90 spm, x5 min for improved functional endurance    NMR:  -Normal stance, x30\" progress to romber, x30\"  -Normal stance airex, 2x1', narrow stance x30\" (limited by R knee pain)  Time-based treatments/modalities:    Physical Therapy Timed Treatment Charges  Neuromusc re-ed, balance, coor, post minutes (CPT 96177): 15 minutes  Therapeutic activity minutes (CPT 57326): 26 minutes      ASSESSMENT:   Response to treatment: Continued progression of functional strengthening for carryover to gait. Pt demo increased R knee pain today, w/ reported catching/locking, ultimately decreasing standing tolerance. Progressed balance exercises w/ use of airex for increased difficulty. Pt demo improved ankle strategies, but demo increased postural " sway.    PLAN/RECOMMENDATIONS:   Plan for treatment: therapy treatment to continue next visit.  Planned interventions for next visit: continue with current treatment.

## 2022-10-28 ENCOUNTER — PHARMACY VISIT (OUTPATIENT)
Dept: PHARMACY | Facility: MEDICAL CENTER | Age: 57
End: 2022-10-28
Payer: COMMERCIAL

## 2022-10-28 PROCEDURE — RXMED WILLOW AMBULATORY MEDICATION CHARGE

## 2022-10-31 ENCOUNTER — TELEPHONE (OUTPATIENT)
Dept: PHYSICAL MEDICINE AND REHAB | Facility: MEDICAL CENTER | Age: 57
End: 2022-10-31
Payer: MEDICAID

## 2022-10-31 ENCOUNTER — TELEPHONE (OUTPATIENT)
Dept: INTERNAL MEDICINE | Facility: OTHER | Age: 57
End: 2022-10-31
Payer: MEDICAID

## 2022-10-31 NOTE — TELEPHONE ENCOUNTER
Patient called to schedule NP apt has referral and still pending review from Dr. Husain.         Thank you           Olive

## 2022-10-31 NOTE — TELEPHONE ENCOUNTER
Patient called and is requesting a prescription for Tylenol until he can see pain management, he is not able to pay for that OTC at this time and is in so much pain that he cant handle it. Please advise

## 2022-11-02 DIAGNOSIS — G47.00 INSOMNIA, UNSPECIFIED TYPE: ICD-10-CM

## 2022-11-02 RX ORDER — ZOLPIDEM TARTRATE 10 MG/1
10 TABLET ORAL
Qty: 30 TABLET | Refills: 1 | Status: CANCELLED | OUTPATIENT
Start: 2022-11-02 | End: 2022-12-02

## 2022-11-03 ENCOUNTER — PHARMACY VISIT (OUTPATIENT)
Dept: PHARMACY | Facility: MEDICAL CENTER | Age: 57
End: 2022-11-03
Payer: COMMERCIAL

## 2022-11-03 ENCOUNTER — HOSPITAL ENCOUNTER (OUTPATIENT)
Dept: RADIOLOGY | Facility: MEDICAL CENTER | Age: 57
End: 2022-11-03
Payer: MEDICAID

## 2022-11-03 DIAGNOSIS — Z86.73 HISTORY OF EMBOLIC STROKE: ICD-10-CM

## 2022-11-03 DIAGNOSIS — D46.9 MYELODYSPLASTIC SYNDROME (HCC): ICD-10-CM

## 2022-11-03 PROCEDURE — RXMED WILLOW AMBULATORY MEDICATION CHARGE: Performed by: NURSE PRACTITIONER

## 2022-11-03 RX ORDER — DIPHENHYDRAMINE HCL 25 MG
50 CAPSULE ORAL ONCE
Qty: 2 CAPSULE | Refills: 0 | Status: SHIPPED | OUTPATIENT
Start: 2022-11-03 | End: 2022-11-03

## 2022-11-03 RX ORDER — PREDNISONE 50 MG/1
50 TABLET ORAL EVERY 6 HOURS
Qty: 3 TABLET | Refills: 0 | Status: SHIPPED | OUTPATIENT
Start: 2022-11-03 | End: 2022-11-04

## 2022-11-03 NOTE — TELEPHONE ENCOUNTER
Ondansetron Refill     Last seen: 10/14/22 by Dr. Nunu Sherman appt: None    Was the patient seen in the last year in this department? Yes   Does patient have an active prescription for medications requested? No   Received Request Via: Pharmacy

## 2022-11-03 NOTE — PROGRESS NOTES
"Contacted by IR staff w/pt on speaker phone. Pt scheduled for port eval, upon arrival and clarification the allergy listed as \"iodine\" is actually IV contrast. Pt describes anaphylaxis in the past, however last time he was premedicated with oral prednisone and diphenhydramine for CT scan. Also states he got an IV dose of medication at the time of scan and did not have reaction. Was kept in the lobby for about 40 min per his report post scan and then sent home. He was unaware the scheduled port eval today involved contrast.     Rx for oral premedication sent in to [facility] per  direction. Pt to return for port check after prep. Recommend he bring the documented epi-pen with him to appt. If there is port dysfunction, the port will be addressed at a later date at the direction of his treating MD. IR Charge RN this process with the pt. Will update pt's allergy list to be specific to contrast allergy.  "

## 2022-11-04 ENCOUNTER — HOSPITAL ENCOUNTER (OUTPATIENT)
Dept: RADIOLOGY | Facility: MEDICAL CENTER | Age: 57
End: 2022-11-04
Payer: MEDICAID

## 2022-11-04 ENCOUNTER — APPOINTMENT (OUTPATIENT)
Dept: PHYSICAL THERAPY | Facility: REHABILITATION | Age: 57
End: 2022-11-04
Payer: MEDICAID

## 2022-11-04 PROCEDURE — 700111 HCHG RX REV CODE 636 W/ 250 OVERRIDE (IP)

## 2022-11-04 PROCEDURE — 700117 HCHG RX CONTRAST REV CODE 255: Performed by: RADIOLOGY

## 2022-11-04 PROCEDURE — 36598 INJ W/FLUOR EVAL CV DEVICE: CPT

## 2022-11-04 RX ORDER — IODIXANOL 270 MG/ML
10 INJECTION, SOLUTION INTRAVASCULAR ONCE
Status: COMPLETED | OUTPATIENT
Start: 2022-11-04 | End: 2022-11-04

## 2022-11-04 RX ORDER — HEPARIN SODIUM (PORCINE) LOCK FLUSH IV SOLN 100 UNIT/ML 100 UNIT/ML
SOLUTION INTRAVENOUS
Status: COMPLETED
Start: 2022-11-04 | End: 2022-11-04

## 2022-11-04 RX ADMIN — IODIXANOL 10 ML: 270 INJECTION, SOLUTION INTRAVASCULAR at 09:00

## 2022-11-04 RX ADMIN — HEPARIN 500 UNITS: 100 SYRINGE at 08:30

## 2022-11-04 NOTE — PROGRESS NOTES
Pt presents to IR 2. Pt was consented by MD at bedside, confirmed by this RN and consent at bedside. Pt transferred to IR table in supine position. Patient underwent a venogram by Dr. Sutherland. Procedure site was marked by MD and verified using imaging guidance. This was a non sedation case.  Patient discharged to home with no issues.

## 2022-11-07 PROCEDURE — RXMED WILLOW AMBULATORY MEDICATION CHARGE

## 2022-11-08 PROCEDURE — RXMED WILLOW AMBULATORY MEDICATION CHARGE

## 2022-11-08 RX ORDER — ACETAMINOPHEN 500 MG
500 TABLET ORAL EVERY 6 HOURS PRN
Qty: 120 TABLET | Refills: 0 | Status: SHIPPED | OUTPATIENT
Start: 2022-11-08 | End: 2022-12-09

## 2022-11-09 ENCOUNTER — PHYSICAL THERAPY (OUTPATIENT)
Dept: PHYSICAL THERAPY | Facility: REHABILITATION | Age: 57
End: 2022-11-09
Payer: MEDICAID

## 2022-11-09 ENCOUNTER — TELEPHONE (OUTPATIENT)
Dept: INTERNAL MEDICINE | Facility: OTHER | Age: 57
End: 2022-11-09

## 2022-11-09 DIAGNOSIS — G89.29 CHRONIC PAIN OF BOTH KNEES: ICD-10-CM

## 2022-11-09 DIAGNOSIS — B35.9 RINGWORM: ICD-10-CM

## 2022-11-09 DIAGNOSIS — M25.562 CHRONIC PAIN OF BOTH KNEES: ICD-10-CM

## 2022-11-09 DIAGNOSIS — M25.561 CHRONIC PAIN OF BOTH KNEES: ICD-10-CM

## 2022-11-09 DIAGNOSIS — R26.2 DIFFICULTY WALKING: ICD-10-CM

## 2022-11-09 PROCEDURE — RXMED WILLOW AMBULATORY MEDICATION CHARGE

## 2022-11-09 PROCEDURE — 97530 THERAPEUTIC ACTIVITIES: CPT

## 2022-11-09 NOTE — OP THERAPY DAILY TREATMENT
"  Outpatient Physical Therapy  DAILY TREATMENT     [facility] Physical [address].  [address]  [place] [address]  Phone:  [phone]  Fax:  [phone]    Date: 11/09/2022    Patient: Benito Persaud  YOB: 1965  MRN: [id number]     Time Calculation    Start time: 1116  Stop time: 1200 Time Calculation (min): 44 minutes         Chief Complaint: Difficulty Walking and Knee Problem    Visit #: 19    SUBJECTIVE:  Pt reports back pain is most limiting factor today. Reports trial use of 4 point walker at work, ambulating ~24 feet. Able to complete, but did have one fall to the side. Felt more comfortable after a few laps, but was unsteady.    OBJECTIVE:  Current objective measures:           Therapeutic Exercises (CPT 63692):       Therapeutic Exercise Summary: HEP: 1x/daily  LAQs, seated clams/reverse clams, ball squeeze, HS stretch, seated HS curl  Standing marching, standing hip abd/ext, knee flexion    Therapeutic Treatments and Modalities:     1. Therapeutic Activities (CPT 08248)    Therapeutic Treatment and Modalities Summary: Ther Act:   -Fwd walking w/o UE support, x 10 laps in // bars w/ CGA   -Rolling walker in // bars, x2 laps - demo lateral sway and instability, requiring CGA/partial steadying  -Step ups 4\", until fatigue ea side, w/ 3 rest breaks  -Sit<>stands, no UEs x5  -Shuttle DL/SL, x30 total progressing L7->L8, SL L4 x10 ea  -Nustep L6, >90 spm, not today  -Bed mobility, rolling L->R w/ PT assisted lumbar stretch    NMR:  -Normal stance, x30\" progress to romber, x30\"  -Normal stance airex, 2x1', narrow stance x30\" (limited by R knee pain)  Time-based treatments/modalities:    Physical Therapy Timed Treatment Charges  Therapeutic activity minutes (CPT 87497): 44 minutes      ASSESSMENT:   Response to treatment: Pt demo overall improved tolerance to upright activities. Improved knee strength, no occurrences of knee buckling/pseudo knee-buckling. Initiated " trial w/ rolling walker. Pt demo significant lateral instability, unable to control gait speed. Pt will benefit from trial w/ bariatric walker for lateral support.    PLAN/RECOMMENDATIONS:   Plan for treatment: therapy treatment to continue next visit.  Planned interventions for next visit: continue with current treatment.

## 2022-11-11 PROCEDURE — RXMED WILLOW AMBULATORY MEDICATION CHARGE

## 2022-11-12 DIAGNOSIS — G47.00 INSOMNIA, UNSPECIFIED TYPE: ICD-10-CM

## 2022-11-12 RX ORDER — AMITRIPTYLINE HYDROCHLORIDE 100 MG/1
150 TABLET ORAL NIGHTLY
Qty: 30 TABLET | Refills: 1 | Status: CANCELLED | OUTPATIENT
Start: 2022-11-12

## 2022-11-13 ENCOUNTER — PHARMACY VISIT (OUTPATIENT)
Dept: PHARMACY | Facility: MEDICAL CENTER | Age: 57
End: 2022-11-13
Payer: COMMERCIAL

## 2022-11-13 DIAGNOSIS — E11.69 TYPE 2 DIABETES MELLITUS WITH OTHER SPECIFIED COMPLICATION, WITHOUT LONG-TERM CURRENT USE OF INSULIN (HCC): ICD-10-CM

## 2022-11-13 DIAGNOSIS — G47.00 INSOMNIA, UNSPECIFIED TYPE: ICD-10-CM

## 2022-11-13 RX ORDER — AMITRIPTYLINE HYDROCHLORIDE 100 MG/1
150 TABLET ORAL NIGHTLY
Qty: 30 TABLET | Refills: 1 | Status: CANCELLED | OUTPATIENT
Start: 2022-11-12

## 2022-11-14 DIAGNOSIS — R53.81 PHYSICAL DEBILITY: ICD-10-CM

## 2022-11-14 DIAGNOSIS — B35.9 RINGWORM: ICD-10-CM

## 2022-11-14 DIAGNOSIS — G47.00 INSOMNIA, UNSPECIFIED TYPE: ICD-10-CM

## 2022-11-14 PROCEDURE — RXMED WILLOW AMBULATORY MEDICATION CHARGE

## 2022-11-14 RX ORDER — ZOLPIDEM TARTRATE 10 MG/1
10 TABLET ORAL NIGHTLY PRN
Qty: 30 TABLET | Refills: 2 | OUTPATIENT
Start: 2022-11-14 | End: 2022-12-14

## 2022-11-14 RX ORDER — ZOLPIDEM TARTRATE 10 MG/1
10 TABLET ORAL
Qty: 30 TABLET | Refills: 3 | Status: CANCELLED | OUTPATIENT
Start: 2022-11-14 | End: 2022-12-14

## 2022-11-14 RX ORDER — AMITRIPTYLINE HYDROCHLORIDE 100 MG/1
150 TABLET ORAL NIGHTLY
Qty: 30 TABLET | Refills: 1 | Status: SHIPPED | OUTPATIENT
Start: 2022-11-14 | End: 2023-01-03

## 2022-11-14 RX ORDER — NYSTATIN 100000 U/G
1 CREAM TOPICAL 2 TIMES DAILY
Qty: 30 G | Refills: 3 | Status: SHIPPED | OUTPATIENT
Start: 2022-11-14

## 2022-11-14 RX ORDER — NYSTATIN 100000 U/G
1 CREAM TOPICAL 2 TIMES DAILY
Qty: 30 G | Refills: 3 | Status: SHIPPED | OUTPATIENT
Start: 2022-11-14 | End: 2022-11-14 | Stop reason: SDUPTHER

## 2022-11-14 RX ORDER — BLOOD PRESSURE TEST KIT
KIT MISCELLANEOUS
Qty: 100 EACH | Refills: 0 | Status: SHIPPED | OUTPATIENT
Start: 2022-11-14

## 2022-11-14 RX ORDER — ONDANSETRON 4 MG/1
4 TABLET, ORALLY DISINTEGRATING ORAL EVERY 8 HOURS PRN
Qty: 10 TABLET | Refills: 0 | Status: SHIPPED | OUTPATIENT
Start: 2022-11-14

## 2022-11-14 NOTE — TELEPHONE ENCOUNTER
Reviewed the patient's chart and refilled meds of nystatin and amitryptyline.    Spoke with Dr. Jorge - patient needs to make an appt to be seen by the clinic in order to receive a new refill of zolpidem. Per Dr. Eddy's note from Sept 2022's appt, patient needs to be followed up to be re-evaluated for his sleep medication regimen. Please call the patient and ask him to make an appt in 1-2 weeks (as soon as possible) with any PCP in order to further address his insomnia. Thank you!    Yareli Sandhu MD, MPH  [employer] [facility], PGY-3

## 2022-11-14 NOTE — TELEPHONE ENCOUNTER
Received request via: Patient    Was the patient seen in the last year in this department? Yes    Does the patient have an active prescription (recently filled or refills available) for medication(s) requested? Yes.     Does the patient have MCFP Plus and need 100 day supply (blood pressure, diabetes and cholesterol meds only)? Patient does not have SCP    Patient has been unable to receive meds. PCP non responsive. Are you able to send in please

## 2022-11-14 NOTE — TELEPHONE ENCOUNTER
Received request via: Pharmacy    Was the patient seen in the last year in this department? Yes    Does the patient have an active prescription (recently filled or refills available) for medication(s) requested? YES    Does the patient have MCFP Plus and need 100 day supply (blood pressure, diabetes and cholesterol meds only)? Patient does not have SCP

## 2022-11-14 NOTE — TELEPHONE ENCOUNTER
Pt is requesting a refill for zolpidem 10 mg and nystatin.  
Refilled both prescriptions.   
VOMITING

## 2022-11-14 NOTE — TELEPHONE ENCOUNTER
Spoke to patient, he is very concerned as he is unable to sleep without this med and is asking if I could speak to you about it since you are familiar with his history. Are you able to send in med? Also, patient fell off wheel chair recently and broke it. He is asking if we can do anything for it? Are we able to send sissy repair order? Or get a new rx for another power wheel chair?

## 2022-11-15 ENCOUNTER — PHARMACY VISIT (OUTPATIENT)
Dept: PHARMACY | Facility: MEDICAL CENTER | Age: 57
End: 2022-11-15
Payer: COMMERCIAL

## 2022-11-15 DIAGNOSIS — G47.00 INSOMNIA, UNSPECIFIED TYPE: ICD-10-CM

## 2022-11-15 PROCEDURE — RXMED WILLOW AMBULATORY MEDICATION CHARGE

## 2022-11-15 PROCEDURE — RXMED WILLOW AMBULATORY MEDICATION CHARGE: Performed by: STUDENT IN AN ORGANIZED HEALTH CARE EDUCATION/TRAINING PROGRAM

## 2022-11-15 RX ORDER — ZOLPIDEM TARTRATE 10 MG/1
10 TABLET ORAL
Qty: 30 TABLET | Refills: 1 | Status: CANCELLED | OUTPATIENT
Start: 2022-11-15 | End: 2022-12-15

## 2022-11-15 NOTE — TELEPHONE ENCOUNTER
Reached out to Dr. Jorge regarding patient's Zolpidem and I placed a DME order for a wheelchair. I am already scheduled to see Mr Persaud next week on 11/18.

## 2022-11-16 DIAGNOSIS — E11.69 TYPE 2 DIABETES MELLITUS WITH OTHER SPECIFIED COMPLICATION, WITHOUT LONG-TERM CURRENT USE OF INSULIN (HCC): ICD-10-CM

## 2022-11-16 NOTE — TELEPHONE ENCOUNTER
Received request via: Pharmacy    Was the patient seen in the last year in this department? Yes    Does the patient have an active prescription (recently filled or refills available) for medication(s) requested? Yes.     Does the patient have custodial Plus and need 100 day supply (blood pressure, diabetes and cholesterol meds only)? Patient does not have SCP

## 2022-11-17 RX ORDER — ZOLPIDEM TARTRATE 10 MG/1
10 TABLET ORAL
Qty: 30 TABLET | Refills: 2 | Status: SHIPPED | OUTPATIENT
Start: 2022-11-17 | End: 2022-12-17

## 2022-11-17 RX ORDER — PEN NEEDLE, DIABETIC 32GX 5/32"
NEEDLE, DISPOSABLE MISCELLANEOUS
Qty: 100 EACH | Refills: 0 | Status: SHIPPED | OUTPATIENT
Start: 2022-11-17 | End: 2023-03-25

## 2022-11-17 NOTE — TELEPHONE ENCOUNTER
Talked with Dr. Jorge, okay to renew prescription for now. Called pt, no answer and unable to leave a message. Will see Mr Persaud in clinic soon.

## 2022-11-18 ENCOUNTER — APPOINTMENT (OUTPATIENT)
Dept: PHYSICAL THERAPY | Facility: REHABILITATION | Age: 57
End: 2022-11-18
Payer: MEDICAID

## 2022-11-21 ENCOUNTER — TELEPHONE (OUTPATIENT)
Dept: INTERNAL MEDICINE | Facility: OTHER | Age: 57
End: 2022-11-21
Payer: MEDICAID

## 2022-11-21 NOTE — TELEPHONE ENCOUNTER
Pt is requesting a refill for ambien 10 mg and is completely out of medication. Pt says that he will call back this afternoon but would really like to speak to Dr. Eddy.

## 2022-11-21 NOTE — TELEPHONE ENCOUNTER
Pt left a vm saying that he doesn't need refill anymore and was able to pick it up from the pharmacy.

## 2022-11-28 ENCOUNTER — TELEPHONE (OUTPATIENT)
Dept: INTERNAL MEDICINE | Facility: OTHER | Age: 57
End: 2022-11-28
Payer: MEDICAID

## 2022-11-28 DIAGNOSIS — G47.00 INSOMNIA, UNSPECIFIED TYPE: ICD-10-CM

## 2022-11-28 NOTE — TELEPHONE ENCOUNTER
Benito provided me with a new phone number to contact him. Please return his call as he has not been sleeping well for 12 days due to lack of medication at pharmacy

## 2022-11-28 NOTE — TELEPHONE ENCOUNTER
Patient left a VM on machine that stated he was still having problems with getting his medications especially his Ambien.  He would like his medical assistant to email him to let him know when he can call to speak to someone.

## 2022-11-29 RX ORDER — ZOLPIDEM TARTRATE 10 MG/1
10 TABLET ORAL NIGHTLY PRN
Qty: 30 TABLET | Refills: 0 | Status: SHIPPED | OUTPATIENT
Start: 2022-11-29 | End: 2022-12-29

## 2022-11-29 NOTE — TELEPHONE ENCOUNTER
Patient called, he is in [place] seeing a doctor for his eye and balance, pt is out and has been out of Ambien for a long time, he needs a refill of the Ambien, he has not been sleeping for about 15 days and is really struggling. He states this was sent to [facility] pharmacy but that cant be transferred to [place] where he currently is, he is only in [place] for the specialist appt. I have updated the pharmacy to [facility]. Please help this patient.

## 2022-11-29 NOTE — TELEPHONE ENCOUNTER
Spoke with pt letting him know, he was very thankful, he was upset though because he has an appt 12/12/22 with Dr. Pierre, he thought it was with Nunu because he states that Grace told him that was the only time she would be here and scheduled him then, let him know he still should come to his appointment but it will be with Dr. Pierre, he understood.

## 2022-12-07 ENCOUNTER — APPOINTMENT (OUTPATIENT)
Dept: RADIOLOGY | Facility: MEDICAL CENTER | Age: 57
End: 2022-12-07
Attending: STUDENT IN AN ORGANIZED HEALTH CARE EDUCATION/TRAINING PROGRAM
Payer: MEDICAID

## 2022-12-07 NOTE — TELEPHONE ENCOUNTER
Received request via: Pharmacy    Was the patient seen in the last year in this department? Yes    Does the patient have an active prescription (recently filled or refills available) for medication(s) requested? Yes    Does the patient have assisted Plus and need 100 day supply (blood pressure, diabetes and cholesterol meds only)? Patient does not have SCP

## 2022-12-09 RX ORDER — PSEUDOEPHED/ACETAMINOPH/DIPHEN 30MG-500MG
TABLET ORAL
Qty: 90 TABLET | Refills: 0 | Status: SHIPPED | OUTPATIENT
Start: 2022-12-09 | End: 2023-01-23 | Stop reason: SDUPTHER

## 2022-12-13 ENCOUNTER — TELEPHONE (OUTPATIENT)
Dept: HEMATOLOGY ONCOLOGY | Facility: MEDICAL CENTER | Age: 57
End: 2022-12-13
Payer: MEDICAID

## 2022-12-21 NOTE — TELEPHONE ENCOUNTER
Spoke to patient regarding missed appointment, he stated that he got COVID on 12/10 and has been extremely sick, he stated that he tried to cancel. He stated that after he tests negative he will call to reschedule

## 2022-12-27 ENCOUNTER — TELEPHONE (OUTPATIENT)
Dept: PHYSICAL THERAPY | Facility: REHABILITATION | Age: 57
End: 2022-12-27
Payer: MEDICAID

## 2022-12-27 NOTE — OP THERAPY DISCHARGE SUMMARY
Outpatient Physical Therapy  DISCHARGE SUMMARY NOTE      [place] [address]  [address]  [place] [address]  Phone:  [phone]  Fax:  [phone]    Date of Visit: 12/27/2022    Patient: Benito Persaud  YOB: 1965  MRN: [id number]     Referring Provider: Filiberto Camacho M.D.  [address] [facility]  [place] [address]   Referring Diagnosis Chronic pain of both knees [M25.561, M25.562, G89.29]         Functional Assessment Used        Your patient is being discharged from Physical Therapy with the following comments:   Patient cancelled or missed more than 2 scheduled appointments/non-compliant    Comments:  Pt has attended 19 visits from 7/25/22 to 11/9/22. Pt has cancelled/no-showed last 2 appointments, has not been seen in greater than 30 days. Appropriate for DC, unable to assess goals.      April Hathaway, PT, DPT    Date: 12/27/2022

## 2023-01-23 RX ORDER — ACETAMINOPHEN 500 MG
500 TABLET ORAL EVERY 6 HOURS PRN
Qty: 90 TABLET | Refills: 0 | Status: SHIPPED | OUTPATIENT
Start: 2023-01-23

## 2023-02-06 RX ORDER — PSEUDOEPHED/ACETAMINOPH/DIPHEN 30MG-500MG
TABLET ORAL
Qty: 90 TABLET | Refills: 0 | Status: SHIPPED | OUTPATIENT
Start: 2023-02-06 | End: 2023-02-22

## 2023-02-22 RX ORDER — PSEUDOEPHED/ACETAMINOPH/DIPHEN 30MG-500MG
TABLET ORAL
Qty: 90 TABLET | Refills: 0 | Status: SHIPPED | OUTPATIENT
Start: 2023-02-22 | End: 2023-03-20

## 2023-03-02 NOTE — CARE PLAN
Problem: Safety  Goal: Will remain free from falls    Intervention: Assess risk factors for falls  Fall measures in place. Call light within reach, personal belongings close-by, bed in lowest position, IV pole on same side of bathroom, upper bed rails up, hourly rounding in place. Will continue to monitor pt for safety.       Problem: Mobility  Goal: Risk for activity intolerance will decrease    Intervention: Assess and monitor signs of activity intolerance  Pt refusing to get OOB for activity after edu and medicating for pain.          H Plasty Text: Given the location of the defect, shape of the defect and the proximity to free margins a H-plasty was deemed most appropriate for repair.  Using a sterile surgical marker, the appropriate advancement arms of the H-plasty were drawn incorporating the defect and placing the expected incisions within the relaxed skin tension lines where possible. The area thus outlined was incised deep to adipose tissue with a #15 scalpel blade. The skin margins were undermined to an appropriate distance in all directions utilizing iris scissors.  The opposing advancement arms were then advanced into place in opposite direction and anchored with interrupted buried subcutaneous sutures.

## 2023-03-20 RX ORDER — PSEUDOEPHED/ACETAMINOPH/DIPHEN 30MG-500MG
TABLET ORAL
Qty: 90 TABLET | Refills: 0 | Status: SHIPPED | OUTPATIENT
Start: 2023-03-20

## 2023-03-23 DIAGNOSIS — E11.69 TYPE 2 DIABETES MELLITUS WITH OTHER SPECIFIED COMPLICATION, WITHOUT LONG-TERM CURRENT USE OF INSULIN (HCC): ICD-10-CM

## 2023-03-25 RX ORDER — PEN NEEDLE, DIABETIC 32GX 5/32"
NEEDLE, DISPOSABLE MISCELLANEOUS
Qty: 100 EACH | Refills: 11 | Status: SHIPPED | OUTPATIENT
Start: 2023-03-25

## 2023-04-20 NOTE — ED NOTES
Pt w/c in own electric w/c to RED 10. Pt changed into gown and placed on monitor.  Agree with triage note. Urine collected and sent to lab.  Chart up and ready for ERP now.     You can access the FollowMyHealth Patient Portal offered by [facility] by registering at the following website: [email or web address]. By joining [facility]’s FollowMyHealth portal, you will also be able to view your health information using other applications (apps) compatible with our system.

## 2023-08-11 ENCOUNTER — HOSPITAL ENCOUNTER (OUTPATIENT)
Age: 58
Setting detail: OBSERVATION
Discharge: HOME | End: 2023-08-13
Attending: EMERGENCY MEDICINE | Admitting: INTERNAL MEDICINE

## 2023-08-11 DIAGNOSIS — R10.9 ABDOMINAL PAIN OF UNKNOWN ETIOLOGY: ICD-10-CM

## 2023-08-11 DIAGNOSIS — Z13.9 ENCOUNTER FOR SCREENING INVOLVING SOCIAL DETERMINANTS OF HEALTH (SDOH): Primary | ICD-10-CM

## 2023-08-11 PROCEDURE — 99284 EMERGENCY DEPT VISIT MOD MDM: CPT | Performed by: STUDENT IN AN ORGANIZED HEALTH CARE EDUCATION/TRAINING PROGRAM

## 2023-08-11 PROCEDURE — 96376 TX/PRO/DX INJ SAME DRUG ADON: CPT

## 2023-08-11 PROCEDURE — 10002800 HB RX 250 W HCPCS: Performed by: STUDENT IN AN ORGANIZED HEALTH CARE EDUCATION/TRAINING PROGRAM

## 2023-08-11 PROCEDURE — 93005 ELECTROCARDIOGRAM TRACING: CPT | Performed by: STUDENT IN AN ORGANIZED HEALTH CARE EDUCATION/TRAINING PROGRAM

## 2023-08-11 PROCEDURE — 96361 HYDRATE IV INFUSION ADD-ON: CPT

## 2023-08-11 PROCEDURE — 83735 ASSAY OF MAGNESIUM: CPT | Performed by: STUDENT IN AN ORGANIZED HEALTH CARE EDUCATION/TRAINING PROGRAM

## 2023-08-11 PROCEDURE — 99285 EMERGENCY DEPT VISIT HI MDM: CPT

## 2023-08-11 PROCEDURE — 96375 TX/PRO/DX INJ NEW DRUG ADDON: CPT

## 2023-08-11 PROCEDURE — 84484 ASSAY OF TROPONIN QUANT: CPT | Performed by: STUDENT IN AN ORGANIZED HEALTH CARE EDUCATION/TRAINING PROGRAM

## 2023-08-11 PROCEDURE — 80053 COMPREHEN METABOLIC PANEL: CPT | Performed by: STUDENT IN AN ORGANIZED HEALTH CARE EDUCATION/TRAINING PROGRAM

## 2023-08-11 PROCEDURE — 96374 THER/PROPH/DIAG INJ IV PUSH: CPT

## 2023-08-11 PROCEDURE — 85025 COMPLETE CBC W/AUTO DIFF WBC: CPT | Performed by: STUDENT IN AN ORGANIZED HEALTH CARE EDUCATION/TRAINING PROGRAM

## 2023-08-11 PROCEDURE — 83690 ASSAY OF LIPASE: CPT | Performed by: STUDENT IN AN ORGANIZED HEALTH CARE EDUCATION/TRAINING PROGRAM

## 2023-08-11 RX ORDER — MECLIZINE HCL 12.5 MG/1
25 TABLET ORAL ONCE
Status: DISCONTINUED | OUTPATIENT
Start: 2023-08-11 | End: 2023-08-13 | Stop reason: HOSPADM

## 2023-08-11 RX ORDER — ONDANSETRON 2 MG/ML
4 INJECTION INTRAMUSCULAR; INTRAVENOUS ONCE
Status: COMPLETED | OUTPATIENT
Start: 2023-08-11 | End: 2023-08-12

## 2023-08-11 RX ADMIN — ONDANSETRON 4 MG: 2 INJECTION INTRAMUSCULAR; INTRAVENOUS at 23:59

## 2023-08-11 ASSESSMENT — PAIN SCALES - GENERAL: PAINLEVEL_OUTOF10: 0

## 2023-08-12 ENCOUNTER — APPOINTMENT (OUTPATIENT)
Dept: GENERAL RADIOLOGY | Age: 58
End: 2023-08-12
Attending: NURSE PRACTITIONER

## 2023-08-12 ENCOUNTER — APPOINTMENT (OUTPATIENT)
Dept: CT IMAGING | Age: 58
End: 2023-08-12
Attending: STUDENT IN AN ORGANIZED HEALTH CARE EDUCATION/TRAINING PROGRAM

## 2023-08-12 ENCOUNTER — APPOINTMENT (OUTPATIENT)
Dept: GENERAL RADIOLOGY | Age: 58
End: 2023-08-12
Attending: STUDENT IN AN ORGANIZED HEALTH CARE EDUCATION/TRAINING PROGRAM

## 2023-08-12 PROBLEM — Z13.9 ENCOUNTER FOR SCREENING INVOLVING SOCIAL DETERMINANTS OF HEALTH (SDOH): Status: ACTIVE | Noted: 2023-08-12

## 2023-08-12 LAB
ALBUMIN SERPL-MCNC: 3.3 G/DL (ref 3.6–5.1)
ALBUMIN/GLOB SERPL: 0.8 {RATIO} (ref 1–2.4)
ALP SERPL-CCNC: 158 UNITS/L (ref 45–117)
ALT SERPL-CCNC: 30 UNITS/L
ANION GAP SERPL CALC-SCNC: 12 MMOL/L (ref 7–19)
AST SERPL-CCNC: 21 UNITS/L
BASOPHILS # BLD: 0 K/MCL (ref 0–0.3)
BASOPHILS NFR BLD: 0 %
BILIRUB SERPL-MCNC: 0.5 MG/DL (ref 0.2–1)
BUN SERPL-MCNC: 17 MG/DL (ref 6–20)
BUN/CREAT SERPL: 14 (ref 7–25)
CALCIUM SERPL-MCNC: 8.3 MG/DL (ref 8.4–10.2)
CHLORIDE SERPL-SCNC: 104 MMOL/L (ref 97–110)
CO2 SERPL-SCNC: 24 MMOL/L (ref 21–32)
CREAT SERPL-MCNC: 1.23 MG/DL (ref 0.67–1.17)
DEPRECATED RDW RBC: 39.5 FL (ref 39–50)
EOSINOPHIL # BLD: 0.3 K/MCL (ref 0–0.5)
EOSINOPHIL NFR BLD: 4 %
ERYTHROCYTE [DISTWIDTH] IN BLOOD: 13.2 % (ref 11–15)
FASTING DURATION TIME PATIENT: ABNORMAL H
GFR SERPLBLD BASED ON 1.73 SQ M-ARVRAT: 68 ML/MIN
GLOBULIN SER-MCNC: 3.9 G/DL (ref 2–4)
GLUCOSE BLDC GLUCOMTR-MCNC: 339 MG/DL (ref 70–99)
GLUCOSE BLDC GLUCOMTR-MCNC: 379 MG/DL (ref 70–99)
GLUCOSE BLDC GLUCOMTR-MCNC: 404 MG/DL (ref 70–99)
GLUCOSE SERPL-MCNC: 272 MG/DL (ref 70–99)
HCT VFR BLD CALC: 39.5 % (ref 39–51)
HGB BLD-MCNC: 14.3 G/DL (ref 13–17)
IMM GRANULOCYTES # BLD AUTO: 0 K/MCL (ref 0–0.2)
IMM GRANULOCYTES # BLD: 1 %
LIPASE SERPL-CCNC: 57 UNITS/L (ref 15–77)
LYMPHOCYTES # BLD: 2.3 K/MCL (ref 1–4)
LYMPHOCYTES NFR BLD: 29 %
MAGNESIUM SERPL-MCNC: 1.8 MG/DL (ref 1.7–2.4)
MCH RBC QN AUTO: 29.7 PG (ref 26–34)
MCHC RBC AUTO-ENTMCNC: 36.2 G/DL (ref 32–36.5)
MCV RBC AUTO: 82 FL (ref 78–100)
MONOCYTES # BLD: 0.8 K/MCL (ref 0.3–0.9)
MONOCYTES NFR BLD: 10 %
MRSA DNA SPEC QL NAA+PROBE: DETECTED
NEUTROPHILS # BLD: 4.5 K/MCL (ref 1.8–7.7)
NEUTROPHILS NFR BLD: 56 %
NRBC BLD MANUAL-RTO: 0 /100 WBC
PLATELET # BLD AUTO: 103 K/MCL (ref 140–450)
POTASSIUM SERPL-SCNC: 3.5 MMOL/L (ref 3.4–5.1)
PROT SERPL-MCNC: 7.2 G/DL (ref 6.4–8.2)
RBC # BLD: 4.82 MIL/MCL (ref 4.5–5.9)
SODIUM SERPL-SCNC: 136 MMOL/L (ref 135–145)
TROPONIN I SERPL DL<=0.01 NG/ML-MCNC: 6 NG/L
WBC # BLD: 7.9 K/MCL (ref 4.2–11)

## 2023-08-12 PROCEDURE — 10002800 HB RX 250 W HCPCS: Performed by: STUDENT IN AN ORGANIZED HEALTH CARE EDUCATION/TRAINING PROGRAM

## 2023-08-12 PROCEDURE — G0378 HOSPITAL OBSERVATION PER HR: HCPCS

## 2023-08-12 PROCEDURE — G1004 CDSM NDSC: HCPCS

## 2023-08-12 PROCEDURE — 93010 ELECTROCARDIOGRAM REPORT: CPT | Performed by: INTERNAL MEDICINE

## 2023-08-12 PROCEDURE — 10002807 HB RX 258: Performed by: STUDENT IN AN ORGANIZED HEALTH CARE EDUCATION/TRAINING PROGRAM

## 2023-08-12 PROCEDURE — 73630 X-RAY EXAM OF FOOT: CPT

## 2023-08-12 PROCEDURE — 10002803 HB RX 637: Performed by: NURSE PRACTITIONER

## 2023-08-12 PROCEDURE — 96375 TX/PRO/DX INJ NEW DRUG ADDON: CPT

## 2023-08-12 PROCEDURE — 96372 THER/PROPH/DIAG INJ SC/IM: CPT | Performed by: NURSE PRACTITIONER

## 2023-08-12 PROCEDURE — 87641 MR-STAPH DNA AMP PROBE: CPT | Performed by: NURSE PRACTITIONER

## 2023-08-12 PROCEDURE — 10004651 HB RX, NO CHARGE ITEM: Performed by: NURSE PRACTITIONER

## 2023-08-12 PROCEDURE — 70450 CT HEAD/BRAIN W/O DYE: CPT

## 2023-08-12 PROCEDURE — 82962 GLUCOSE BLOOD TEST: CPT

## 2023-08-12 PROCEDURE — 71045 X-RAY EXAM CHEST 1 VIEW: CPT

## 2023-08-12 PROCEDURE — 10002805 HB CONTRAST AGENT: Performed by: EMERGENCY MEDICINE

## 2023-08-12 PROCEDURE — 10002800 HB RX 250 W HCPCS: Performed by: NURSE PRACTITIONER

## 2023-08-12 PROCEDURE — 96372 THER/PROPH/DIAG INJ SC/IM: CPT | Performed by: INTERNAL MEDICINE

## 2023-08-12 PROCEDURE — 74177 CT ABD & PELVIS W/CONTRAST: CPT

## 2023-08-12 PROCEDURE — 10002800 HB RX 250 W HCPCS: Performed by: INTERNAL MEDICINE

## 2023-08-12 RX ORDER — DIPHENHYDRAMINE HYDROCHLORIDE 50 MG/ML
50 INJECTION INTRAMUSCULAR; INTRAVENOUS ONCE
Status: COMPLETED | OUTPATIENT
Start: 2023-08-12 | End: 2023-08-12

## 2023-08-12 RX ORDER — ACETAMINOPHEN 500 MG
1000 TABLET ORAL 3 TIMES DAILY
Status: ON HOLD | COMMUNITY
End: 2023-09-09 | Stop reason: HOSPADM

## 2023-08-12 RX ORDER — ACETAMINOPHEN 325 MG/1
650 TABLET ORAL EVERY 4 HOURS PRN
Status: DISCONTINUED | OUTPATIENT
Start: 2023-08-12 | End: 2023-08-13 | Stop reason: HOSPADM

## 2023-08-12 RX ORDER — ACETAMINOPHEN 500 MG
1000 TABLET ORAL 3 TIMES DAILY
Status: DISCONTINUED | OUTPATIENT
Start: 2023-08-12 | End: 2023-08-13 | Stop reason: HOSPADM

## 2023-08-12 RX ORDER — ZOLPIDEM TARTRATE 5 MG/1
5 TABLET ORAL NIGHTLY PRN
Status: DISCONTINUED | OUTPATIENT
Start: 2023-08-12 | End: 2023-08-12

## 2023-08-12 RX ORDER — LEVOFLOXACIN 500 MG/1
500 TABLET, FILM COATED ORAL
Status: DISCONTINUED | OUTPATIENT
Start: 2023-08-12 | End: 2023-08-12

## 2023-08-12 RX ORDER — DIPHENHYDRAMINE HCL 25 MG
50 CAPSULE ORAL NIGHTLY
Status: DISCONTINUED | OUTPATIENT
Start: 2023-08-12 | End: 2023-08-13 | Stop reason: HOSPADM

## 2023-08-12 RX ORDER — OMEPRAZOLE 20 MG/1
20 CAPSULE, DELAYED RELEASE ORAL 2 TIMES DAILY
Status: ON HOLD | COMMUNITY
End: 2023-08-13 | Stop reason: SDUPTHER

## 2023-08-12 RX ORDER — DEXTROSE MONOHYDRATE 25 G/50ML
12.5 INJECTION, SOLUTION INTRAVENOUS PRN
Status: DISCONTINUED | OUTPATIENT
Start: 2023-08-12 | End: 2023-08-13 | Stop reason: HOSPADM

## 2023-08-12 RX ORDER — ONDANSETRON 2 MG/ML
4 INJECTION INTRAMUSCULAR; INTRAVENOUS ONCE
Status: COMPLETED | OUTPATIENT
Start: 2023-08-12 | End: 2023-08-12

## 2023-08-12 RX ORDER — AMITRIPTYLINE HYDROCHLORIDE 150 MG/1
150 TABLET ORAL NIGHTLY
Status: ON HOLD | COMMUNITY
End: 2023-08-13 | Stop reason: SDUPTHER

## 2023-08-12 RX ORDER — IPRATROPIUM BROMIDE AND ALBUTEROL SULFATE 2.5; .5 MG/3ML; MG/3ML
3 SOLUTION RESPIRATORY (INHALATION) EVERY 6 HOURS PRN
Status: DISCONTINUED | OUTPATIENT
Start: 2023-08-12 | End: 2023-08-13 | Stop reason: HOSPADM

## 2023-08-12 RX ORDER — DEXTROSE MONOHYDRATE 25 G/50ML
25 INJECTION, SOLUTION INTRAVENOUS PRN
Status: DISCONTINUED | OUTPATIENT
Start: 2023-08-12 | End: 2023-08-13 | Stop reason: HOSPADM

## 2023-08-12 RX ORDER — OXYCODONE AND ACETAMINOPHEN 10; 325 MG/1; MG/1
1 TABLET ORAL EVERY 6 HOURS PRN
Status: ON HOLD | COMMUNITY
End: 2023-08-13 | Stop reason: SDUPTHER

## 2023-08-12 RX ORDER — ZOLPIDEM TARTRATE 5 MG/1
10 TABLET ORAL NIGHTLY PRN
Status: DISCONTINUED | OUTPATIENT
Start: 2023-08-12 | End: 2023-08-13 | Stop reason: HOSPADM

## 2023-08-12 RX ORDER — HYDROCODONE BITARTRATE AND ACETAMINOPHEN 5; 325 MG/1; MG/1
1 TABLET ORAL EVERY 4 HOURS PRN
Status: DISCONTINUED | OUTPATIENT
Start: 2023-08-12 | End: 2023-08-12

## 2023-08-12 RX ORDER — ZOLPIDEM TARTRATE 10 MG/1
10 TABLET ORAL NIGHTLY
Status: ON HOLD | COMMUNITY
End: 2023-08-13 | Stop reason: SDUPTHER

## 2023-08-12 RX ORDER — DIPHENHYDRAMINE HCL 25 MG
50 CAPSULE ORAL NIGHTLY
COMMUNITY
End: 2023-09-13 | Stop reason: HOSPADM

## 2023-08-12 RX ORDER — METHYLPREDNISOLONE SODIUM SUCCINATE 125 MG/2ML
125 INJECTION, POWDER, LYOPHILIZED, FOR SOLUTION INTRAMUSCULAR; INTRAVENOUS ONCE
Status: COMPLETED | OUTPATIENT
Start: 2023-08-12 | End: 2023-08-12

## 2023-08-12 RX ORDER — OXYCODONE AND ACETAMINOPHEN 10; 325 MG/1; MG/1
1 TABLET ORAL EVERY 6 HOURS PRN
Status: DISCONTINUED | OUTPATIENT
Start: 2023-08-12 | End: 2023-08-13 | Stop reason: HOSPADM

## 2023-08-12 RX ORDER — NICOTINE POLACRILEX 4 MG
15 LOZENGE BUCCAL PRN
Status: DISCONTINUED | OUTPATIENT
Start: 2023-08-12 | End: 2023-08-13 | Stop reason: HOSPADM

## 2023-08-12 RX ORDER — AMITRIPTYLINE HYDROCHLORIDE 150 MG/1
150 TABLET ORAL NIGHTLY
Status: DISCONTINUED | OUTPATIENT
Start: 2023-08-12 | End: 2023-08-12

## 2023-08-12 RX ORDER — GABAPENTIN 800 MG/1
800 TABLET ORAL 3 TIMES DAILY
Status: ON HOLD | COMMUNITY
End: 2023-08-13 | Stop reason: SDUPTHER

## 2023-08-12 RX ORDER — NICOTINE POLACRILEX 4 MG
30 LOZENGE BUCCAL PRN
Status: DISCONTINUED | OUTPATIENT
Start: 2023-08-12 | End: 2023-08-13 | Stop reason: HOSPADM

## 2023-08-12 RX ORDER — PANTOPRAZOLE SODIUM 40 MG/1
40 TABLET, DELAYED RELEASE ORAL EVERY 12 HOURS SCHEDULED
Status: DISCONTINUED | OUTPATIENT
Start: 2023-08-12 | End: 2023-08-13 | Stop reason: HOSPADM

## 2023-08-12 RX ORDER — DOCUSATE SODIUM 100 MG/1
100 CAPSULE, LIQUID FILLED ORAL DAILY
Status: DISCONTINUED | OUTPATIENT
Start: 2023-08-12 | End: 2023-08-13 | Stop reason: HOSPADM

## 2023-08-12 RX ORDER — IPRATROPIUM BROMIDE AND ALBUTEROL SULFATE 2.5; .5 MG/3ML; MG/3ML
3 SOLUTION RESPIRATORY (INHALATION) EVERY 6 HOURS PRN
COMMUNITY
End: 2023-09-06

## 2023-08-12 RX ORDER — PANTOPRAZOLE SODIUM 40 MG/1
40 TABLET, DELAYED RELEASE ORAL
Status: DISCONTINUED | OUTPATIENT
Start: 2023-08-12 | End: 2023-08-12

## 2023-08-12 RX ORDER — CEFAZOLIN SODIUM/WATER 2 G/20 ML
2000 SYRINGE (ML) INTRAVENOUS DAILY
Status: DISCONTINUED | OUTPATIENT
Start: 2023-08-12 | End: 2023-08-13 | Stop reason: HOSPADM

## 2023-08-12 RX ORDER — AZITHROMYCIN 250 MG/1
500 TABLET, FILM COATED ORAL DAILY
Status: DISCONTINUED | OUTPATIENT
Start: 2023-08-12 | End: 2023-08-13 | Stop reason: HOSPADM

## 2023-08-12 RX ORDER — PROMETHAZINE HYDROCHLORIDE 25 MG/1
12.5 TABLET ORAL EVERY 6 HOURS PRN
Status: DISCONTINUED | OUTPATIENT
Start: 2023-08-12 | End: 2023-08-13 | Stop reason: HOSPADM

## 2023-08-12 RX ORDER — BISACODYL 5 MG/1
10 TABLET, DELAYED RELEASE ORAL
Status: DISCONTINUED | OUTPATIENT
Start: 2023-08-12 | End: 2023-08-13 | Stop reason: HOSPADM

## 2023-08-12 RX ORDER — ONDANSETRON 2 MG/ML
4 INJECTION INTRAMUSCULAR; INTRAVENOUS EVERY 6 HOURS PRN
Status: DISCONTINUED | OUTPATIENT
Start: 2023-08-12 | End: 2023-08-13 | Stop reason: HOSPADM

## 2023-08-12 RX ORDER — DIPHENHYDRAMINE HCL 25 MG
50 CAPSULE ORAL ONCE
Status: DISCONTINUED | OUTPATIENT
Start: 2023-08-12 | End: 2023-08-12

## 2023-08-12 RX ADMIN — PANTOPRAZOLE SODIUM 40 MG: 40 TABLET, DELAYED RELEASE ORAL at 22:00

## 2023-08-12 RX ADMIN — ZOLPIDEM TARTRATE 10 MG: 5 TABLET ORAL at 22:00

## 2023-08-12 RX ADMIN — GABAPENTIN 800 MG: 100 CAPSULE ORAL at 17:28

## 2023-08-12 RX ADMIN — INSULIN LISPRO 10 UNITS: 100 INJECTION, SUSPENSION SUBCUTANEOUS at 22:02

## 2023-08-12 RX ADMIN — AMITRIPTYLINE HYDROCHLORIDE 150 MG: 25 TABLET, FILM COATED ORAL at 22:00

## 2023-08-12 RX ADMIN — ACETAMINOPHEN 650 MG: 325 TABLET ORAL at 13:53

## 2023-08-12 RX ADMIN — SODIUM CHLORIDE 1000 ML: 9 INJECTION, SOLUTION INTRAVENOUS at 01:01

## 2023-08-12 RX ADMIN — DIPHENHYDRAMINE HYDROCHLORIDE 50 MG: 50 INJECTION INTRAMUSCULAR; INTRAVENOUS at 02:48

## 2023-08-12 RX ADMIN — CEFTRIAXONE SODIUM 2000 MG: 10 INJECTION, POWDER, FOR SOLUTION INTRAVENOUS at 13:58

## 2023-08-12 RX ADMIN — AZITHROMYCIN 500 MG: 250 TABLET, FILM COATED ORAL at 13:53

## 2023-08-12 RX ADMIN — DIPHENHYDRAMINE HYDROCHLORIDE 50 MG: 25 CAPSULE ORAL at 22:00

## 2023-08-12 RX ADMIN — MORPHINE SULFATE 2 MG: 2 INJECTION, SOLUTION INTRAMUSCULAR; INTRAVENOUS at 08:36

## 2023-08-12 RX ADMIN — GABAPENTIN 800 MG: 100 CAPSULE ORAL at 22:00

## 2023-08-12 RX ADMIN — INSULIN LISPRO 5 UNITS: 100 INJECTION, SOLUTION INTRAVENOUS; SUBCUTANEOUS at 13:29

## 2023-08-12 RX ADMIN — OXYCODONE HYDROCHLORIDE AND ACETAMINOPHEN 1 TABLET: 10; 325 TABLET ORAL at 18:43

## 2023-08-12 RX ADMIN — INSULIN LISPRO 5 UNITS: 100 INJECTION, SUSPENSION SUBCUTANEOUS at 18:43

## 2023-08-12 RX ADMIN — INSULIN LISPRO 10 UNITS: 100 INJECTION, SUSPENSION SUBCUTANEOUS at 18:43

## 2023-08-12 RX ADMIN — ACETAMINOPHEN 1000 MG: 500 TABLET ORAL at 22:00

## 2023-08-12 RX ADMIN — MORPHINE SULFATE 2 MG: 2 INJECTION, SOLUTION INTRAMUSCULAR; INTRAVENOUS at 03:14

## 2023-08-12 RX ADMIN — IOHEXOL 100 ML: 300 INJECTION, SOLUTION INTRAVENOUS at 04:53

## 2023-08-12 RX ADMIN — ONDANSETRON 4 MG: 2 INJECTION INTRAMUSCULAR; INTRAVENOUS at 08:36

## 2023-08-12 RX ADMIN — ONDANSETRON 4 MG: 2 INJECTION INTRAMUSCULAR; INTRAVENOUS at 03:06

## 2023-08-12 RX ADMIN — METHYLPREDNISOLONE SODIUM SUCCINATE 125 MG: 125 INJECTION, POWDER, FOR SOLUTION INTRAMUSCULAR; INTRAVENOUS at 02:51

## 2023-08-12 ASSESSMENT — ENCOUNTER SYMPTOMS
PSYCHIATRIC NEGATIVE: 1
ABDOMINAL PAIN: 1
RESPIRATORY NEGATIVE: 1
NAUSEA: 1
WEAKNESS: 1
EYES NEGATIVE: 1
CONSTITUTIONAL NEGATIVE: 1
VOMITING: 1
ENDOCRINE NEGATIVE: 1
HEMATOLOGIC/LYMPHATIC NEGATIVE: 1
ALLERGIC/IMMUNOLOGIC NEGATIVE: 1

## 2023-08-12 ASSESSMENT — PAIN SCALES - GENERAL
PAINLEVEL_OUTOF10: 6
PAINLEVEL_OUTOF10: 5
PAINLEVEL_OUTOF10: 8
PAINLEVEL_OUTOF10: 6
PAINLEVEL_OUTOF10: 5
PAINLEVEL_OUTOF10: 8
PAINLEVEL_OUTOF10: 5

## 2023-08-13 VITALS
WEIGHT: 255.07 LBS | BODY MASS INDEX: 34.55 KG/M2 | HEIGHT: 72 IN | RESPIRATION RATE: 16 BRPM | HEART RATE: 62 BPM | OXYGEN SATURATION: 100 % | DIASTOLIC BLOOD PRESSURE: 82 MMHG | SYSTOLIC BLOOD PRESSURE: 123 MMHG | TEMPERATURE: 97.9 F

## 2023-08-13 LAB
ALBUMIN SERPL-MCNC: 3.4 G/DL (ref 3.6–5.1)
ALBUMIN/GLOB SERPL: 0.8 {RATIO} (ref 1–2.4)
ALP SERPL-CCNC: 136 UNITS/L (ref 45–117)
ALT SERPL-CCNC: 24 UNITS/L
ANION GAP SERPL CALC-SCNC: 9 MMOL/L (ref 7–19)
AST SERPL-CCNC: 15 UNITS/L
ATRIAL RATE (BPM): 99
BASOPHILS # BLD: 0 K/MCL (ref 0–0.3)
BASOPHILS NFR BLD: 0 %
BILIRUB SERPL-MCNC: 0.5 MG/DL (ref 0.2–1)
BUN SERPL-MCNC: 22 MG/DL (ref 6–20)
BUN/CREAT SERPL: 19 (ref 7–25)
CALCIUM SERPL-MCNC: 9.1 MG/DL (ref 8.4–10.2)
CHLORIDE SERPL-SCNC: 108 MMOL/L (ref 97–110)
CO2 SERPL-SCNC: 21 MMOL/L (ref 21–32)
CREAT SERPL-MCNC: 1.17 MG/DL (ref 0.67–1.17)
DEPRECATED RDW RBC: 40.1 FL (ref 39–50)
EOSINOPHIL # BLD: 0.1 K/MCL (ref 0–0.5)
EOSINOPHIL NFR BLD: 1 %
ERYTHROCYTE [DISTWIDTH] IN BLOOD: 13.3 % (ref 11–15)
FASTING DURATION TIME PATIENT: ABNORMAL H
GFR SERPLBLD BASED ON 1.73 SQ M-ARVRAT: 73 ML/MIN
GLOBULIN SER-MCNC: 4.3 G/DL (ref 2–4)
GLUCOSE BLDC GLUCOMTR-MCNC: 179 MG/DL (ref 70–99)
GLUCOSE BLDC GLUCOMTR-MCNC: 219 MG/DL (ref 70–99)
GLUCOSE SERPL-MCNC: 240 MG/DL (ref 70–99)
HCT VFR BLD CALC: 46.5 % (ref 39–51)
HGB BLD-MCNC: 16.3 G/DL (ref 13–17)
IMM GRANULOCYTES # BLD AUTO: 0.1 K/MCL (ref 0–0.2)
IMM GRANULOCYTES # BLD: 1 %
LYMPHOCYTES # BLD: 2.4 K/MCL (ref 1–4)
LYMPHOCYTES NFR BLD: 32 %
MAGNESIUM SERPL-MCNC: 2.4 MG/DL (ref 1.7–2.4)
MCH RBC QN AUTO: 29.2 PG (ref 26–34)
MCHC RBC AUTO-ENTMCNC: 35.1 G/DL (ref 32–36.5)
MCV RBC AUTO: 83.2 FL (ref 78–100)
MONOCYTES # BLD: 0.6 K/MCL (ref 0.3–0.9)
MONOCYTES NFR BLD: 9 %
NEUTROPHILS # BLD: 4.3 K/MCL (ref 1.8–7.7)
NEUTROPHILS NFR BLD: 57 %
NRBC BLD MANUAL-RTO: 0 /100 WBC
P AXIS (DEGREES): 48
PLATELET # BLD AUTO: 101 K/MCL (ref 140–450)
POTASSIUM SERPL-SCNC: 4.3 MMOL/L (ref 3.4–5.1)
PR-INTERVAL (MSEC): 156
PROT SERPL-MCNC: 7.7 G/DL (ref 6.4–8.2)
QRS-INTERVAL (MSEC): 91
QT-INTERVAL (MSEC): 334
QTC: 429
R AXIS (DEGREES): 45
RBC # BLD: 5.59 MIL/MCL (ref 4.5–5.9)
REPORT TEXT: NORMAL
SODIUM SERPL-SCNC: 134 MMOL/L (ref 135–145)
T AXIS (DEGREES): 22
VENTRICULAR RATE EKG/MIN (BPM): 99
WBC # BLD: 7.4 K/MCL (ref 4.2–11)

## 2023-08-13 PROCEDURE — 96372 THER/PROPH/DIAG INJ SC/IM: CPT | Performed by: NURSE PRACTITIONER

## 2023-08-13 PROCEDURE — 96376 TX/PRO/DX INJ SAME DRUG ADON: CPT

## 2023-08-13 PROCEDURE — 10002800 HB RX 250 W HCPCS: Performed by: NURSE PRACTITIONER

## 2023-08-13 PROCEDURE — 80053 COMPREHEN METABOLIC PANEL: CPT | Performed by: NURSE PRACTITIONER

## 2023-08-13 PROCEDURE — 83735 ASSAY OF MAGNESIUM: CPT | Performed by: NURSE PRACTITIONER

## 2023-08-13 PROCEDURE — G0378 HOSPITAL OBSERVATION PER HR: HCPCS

## 2023-08-13 PROCEDURE — 10002803 HB RX 637: Performed by: NURSE PRACTITIONER

## 2023-08-13 PROCEDURE — 36415 COLL VENOUS BLD VENIPUNCTURE: CPT | Performed by: NURSE PRACTITIONER

## 2023-08-13 PROCEDURE — 85025 COMPLETE CBC W/AUTO DIFF WBC: CPT | Performed by: NURSE PRACTITIONER

## 2023-08-13 PROCEDURE — 10004651 HB RX, NO CHARGE ITEM: Performed by: NURSE PRACTITIONER

## 2023-08-13 PROCEDURE — 82962 GLUCOSE BLOOD TEST: CPT

## 2023-08-13 RX ORDER — PANTOPRAZOLE SODIUM 40 MG/1
40 TABLET, DELAYED RELEASE ORAL EVERY 12 HOURS SCHEDULED
Qty: 60 TABLET | Refills: 0 | Status: ON HOLD | OUTPATIENT
Start: 2023-08-13 | End: 2023-09-09 | Stop reason: HOSPADM

## 2023-08-13 RX ORDER — ZOLPIDEM TARTRATE 10 MG/1
10 TABLET ORAL NIGHTLY
Qty: 30 TABLET | Refills: 0 | Status: ON HOLD | OUTPATIENT
Start: 2023-08-13 | End: 2023-09-09 | Stop reason: HOSPADM

## 2023-08-13 RX ORDER — PROMETHAZINE HYDROCHLORIDE 12.5 MG/1
12.5 TABLET ORAL EVERY 6 HOURS PRN
Qty: 30 TABLET | Refills: 0 | Status: ON HOLD | OUTPATIENT
Start: 2023-08-13 | End: 2023-09-09 | Stop reason: HOSPADM

## 2023-08-13 RX ORDER — MECLIZINE HYDROCHLORIDE 25 MG/1
25 TABLET ORAL ONCE
Qty: 1 TABLET | Refills: 0 | Status: SHIPPED | OUTPATIENT
Start: 2023-08-13 | End: 2023-08-13

## 2023-08-13 RX ORDER — AMITRIPTYLINE HYDROCHLORIDE 150 MG/1
150 TABLET ORAL NIGHTLY
Qty: 30 TABLET | Refills: 0 | Status: ON HOLD | OUTPATIENT
Start: 2023-08-13 | End: 2023-09-09 | Stop reason: SDUPTHER

## 2023-08-13 RX ORDER — OMEPRAZOLE 20 MG/1
20 CAPSULE, DELAYED RELEASE ORAL DAILY
Qty: 30 CAPSULE | Refills: 0 | Status: ON HOLD | OUTPATIENT
Start: 2023-08-13 | End: 2023-09-09 | Stop reason: HOSPADM

## 2023-08-13 RX ORDER — OXYCODONE AND ACETAMINOPHEN 10; 325 MG/1; MG/1
1 TABLET ORAL EVERY 6 HOURS PRN
Qty: 7 TABLET | Refills: 0 | Status: ON HOLD | OUTPATIENT
Start: 2023-08-13 | End: 2023-09-09 | Stop reason: HOSPADM

## 2023-08-13 RX ORDER — ASPIRIN 81 MG/1
81 TABLET ORAL DAILY
Status: DISCONTINUED | OUTPATIENT
Start: 2023-08-13 | End: 2023-08-13 | Stop reason: HOSPADM

## 2023-08-13 RX ORDER — AZITHROMYCIN 500 MG/1
500 TABLET, FILM COATED ORAL DAILY
Qty: 3 TABLET | Refills: 0 | Status: SHIPPED | OUTPATIENT
Start: 2023-08-14 | End: 2023-08-17

## 2023-08-13 RX ORDER — ASPIRIN 81 MG/1
81 TABLET ORAL DAILY
Qty: 30 TABLET | Refills: 0 | Status: ON HOLD | OUTPATIENT
Start: 2023-08-13 | End: 2023-09-09 | Stop reason: SDUPTHER

## 2023-08-13 RX ORDER — GABAPENTIN 800 MG/1
800 TABLET ORAL 3 TIMES DAILY
Qty: 90 TABLET | Refills: 0 | Status: ON HOLD | OUTPATIENT
Start: 2023-08-13 | End: 2023-09-09 | Stop reason: SDUPTHER

## 2023-08-13 RX ADMIN — GABAPENTIN 800 MG: 100 CAPSULE ORAL at 06:15

## 2023-08-13 RX ADMIN — APIXABAN 5 MG: 5 TABLET, FILM COATED ORAL at 11:45

## 2023-08-13 RX ADMIN — ACETAMINOPHEN 1000 MG: 500 TABLET ORAL at 13:16

## 2023-08-13 RX ADMIN — DOCUSATE SODIUM 100 MG: 100 CAPSULE ORAL at 08:28

## 2023-08-13 RX ADMIN — ACETAMINOPHEN 1000 MG: 500 TABLET ORAL at 08:28

## 2023-08-13 RX ADMIN — PANTOPRAZOLE SODIUM 40 MG: 40 TABLET, DELAYED RELEASE ORAL at 08:28

## 2023-08-13 RX ADMIN — ASPIRIN 81 MG: 81 TABLET, COATED ORAL at 11:45

## 2023-08-13 RX ADMIN — AZITHROMYCIN 500 MG: 250 TABLET, FILM COATED ORAL at 08:28

## 2023-08-13 RX ADMIN — CEFTRIAXONE SODIUM 2000 MG: 10 INJECTION, POWDER, FOR SOLUTION INTRAVENOUS at 08:28

## 2023-08-13 RX ADMIN — GABAPENTIN 800 MG: 100 CAPSULE ORAL at 13:16

## 2023-08-13 RX ADMIN — INSULIN LISPRO 10 UNITS: 100 INJECTION, SUSPENSION SUBCUTANEOUS at 13:18

## 2023-08-13 ASSESSMENT — ENCOUNTER SYMPTOMS: BRUISES/BLEEDS EASILY: 1

## 2023-08-13 ASSESSMENT — PAIN SCALES - GENERAL
PAINLEVEL_OUTOF10: 0
PAINLEVEL_OUTOF10: 0

## 2023-08-28 ENCOUNTER — HOSPITAL ENCOUNTER (EMERGENCY)
Age: 58
Discharge: HOME | End: 2023-08-28
Attending: EMERGENCY MEDICINE

## 2023-08-28 ENCOUNTER — APPOINTMENT (OUTPATIENT)
Dept: CT IMAGING | Age: 58
End: 2023-08-28
Attending: EMERGENCY MEDICINE

## 2023-08-28 ENCOUNTER — APPOINTMENT (OUTPATIENT)
Dept: GENERAL RADIOLOGY | Age: 58
End: 2023-08-28
Attending: EMERGENCY MEDICINE

## 2023-08-28 VITALS
RESPIRATION RATE: 16 BRPM | WEIGHT: 255 LBS | BODY MASS INDEX: 34.54 KG/M2 | DIASTOLIC BLOOD PRESSURE: 91 MMHG | HEIGHT: 72 IN | HEART RATE: 104 BPM | SYSTOLIC BLOOD PRESSURE: 124 MMHG | TEMPERATURE: 97 F | OXYGEN SATURATION: 97 %
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
## 2023-08-28 LAB
ALBUMIN SERPL-MCNC: 4.2 G/DL (ref 3.6–5.1)
ALBUMIN/GLOB SERPL: 1 {RATIO} (ref 1–2.4)
ALP SERPL-CCNC: 152 UNITS/L (ref 45–117)
ALT SERPL-CCNC: 28 UNITS/L
ANION GAP SERPL CALC-SCNC: 10 MMOL/L (ref 7–19)
AST SERPL-CCNC: 26 UNITS/L
BASOPHILS # BLD: 0 K/MCL (ref 0–0.3)
BASOPHILS NFR BLD: 0 %
BILIRUB SERPL-MCNC: 1 MG/DL (ref 0.2–1)
BUN SERPL-MCNC: 14 MG/DL (ref 6–20)
BUN/CREAT SERPL: 11 (ref 7–25)
CALCIUM SERPL-MCNC: 10 MG/DL (ref 8.4–10.2)
CHLORIDE SERPL-SCNC: 103 MMOL/L (ref 97–110)
CO2 SERPL-SCNC: 25 MMOL/L (ref 21–32)
CREAT SERPL-MCNC: 1.29 MG/DL (ref 0.67–1.17)
DEPRECATED RDW RBC: 39.2 FL (ref 39–50)
EGFRCR SERPLBLD CKD-EPI 2021: 65 ML/MIN/{1.73_M2}
EOSINOPHIL # BLD: 0.1 K/MCL (ref 0–0.5)
EOSINOPHIL NFR BLD: 1 %
ERYTHROCYTE [DISTWIDTH] IN BLOOD: 13.2 % (ref 11–15)
FASTING DURATION TIME PATIENT: ABNORMAL H
GLOBULIN SER-MCNC: 4.4 G/DL (ref 2–4)
GLUCOSE BLDC GLUCOMTR-MCNC: 369 MG/DL (ref 70–99)
GLUCOSE SERPL-MCNC: 362 MG/DL (ref 70–99)
HCT VFR BLD CALC: 48.6 % (ref 39–51)
HGB BLD-MCNC: 17.1 G/DL (ref 13–17)
IMM GRANULOCYTES # BLD AUTO: 0 K/MCL (ref 0–0.2)
IMM GRANULOCYTES # BLD: 0 %
LYMPHOCYTES # BLD: 1.8 K/MCL (ref 1–4)
LYMPHOCYTES NFR BLD: 25 %
MCH RBC QN AUTO: 29.1 PG (ref 26–34)
MCHC RBC AUTO-ENTMCNC: 35.2 G/DL (ref 32–36.5)
MCV RBC AUTO: 82.8 FL (ref 78–100)
MONOCYTES # BLD: 0.7 K/MCL (ref 0.3–0.9)
MONOCYTES NFR BLD: 9 %
NEUTROPHILS # BLD: 4.6 K/MCL (ref 1.8–7.7)
NEUTROPHILS NFR BLD: 65 %
NRBC BLD MANUAL-RTO: 0 /100 WBC
PLATELET # BLD AUTO: 101 K/MCL (ref 140–450)
POTASSIUM SERPL-SCNC: 4.6 MMOL/L (ref 3.4–5.1)
PROT SERPL-MCNC: 8.6 G/DL (ref 6.4–8.2)
RBC # BLD: 5.87 MIL/MCL (ref 4.5–5.9)
SODIUM SERPL-SCNC: 133 MMOL/L (ref 135–145)
TROPONIN I SERPL DL<=0.01 NG/ML-MCNC: 5 NG/L
WBC # BLD: 7.2 K/MCL (ref 4.2–11)

## 2023-08-28 PROCEDURE — 71046 X-RAY EXAM CHEST 2 VIEWS: CPT

## 2023-08-28 PROCEDURE — 93005 ELECTROCARDIOGRAM TRACING: CPT | Performed by: EMERGENCY MEDICINE

## 2023-08-28 PROCEDURE — 82962 GLUCOSE BLOOD TEST: CPT

## 2023-08-28 PROCEDURE — 70450 CT HEAD/BRAIN W/O DYE: CPT

## 2023-08-28 PROCEDURE — 10002803 HB RX 637: Performed by: EMERGENCY MEDICINE

## 2023-08-28 PROCEDURE — G1004 CDSM NDSC: HCPCS

## 2023-08-28 PROCEDURE — 84484 ASSAY OF TROPONIN QUANT: CPT | Performed by: EMERGENCY MEDICINE

## 2023-08-28 PROCEDURE — 99284 EMERGENCY DEPT VISIT MOD MDM: CPT

## 2023-08-28 PROCEDURE — 85025 COMPLETE CBC W/AUTO DIFF WBC: CPT | Performed by: EMERGENCY MEDICINE

## 2023-08-28 PROCEDURE — 36415 COLL VENOUS BLD VENIPUNCTURE: CPT

## 2023-08-28 PROCEDURE — 99284 EMERGENCY DEPT VISIT MOD MDM: CPT | Performed by: EMERGENCY MEDICINE

## 2023-08-28 PROCEDURE — 10004651 HB RX, NO CHARGE ITEM: Performed by: EMERGENCY MEDICINE

## 2023-08-28 PROCEDURE — 80053 COMPREHEN METABOLIC PANEL: CPT | Performed by: EMERGENCY MEDICINE

## 2023-08-28 RX ORDER — ONDANSETRON 2 MG/ML
4 INJECTION INTRAMUSCULAR; INTRAVENOUS ONCE
Status: DISCONTINUED | OUTPATIENT
Start: 2023-08-28 | End: 2023-08-28

## 2023-08-28 RX ORDER — ACETAMINOPHEN 325 MG/1
650 TABLET ORAL ONCE
Status: COMPLETED | OUTPATIENT
Start: 2023-08-28 | End: 2023-08-28

## 2023-08-28 RX ORDER — ONDANSETRON 4 MG/1
4 TABLET, ORALLY DISINTEGRATING ORAL ONCE
Status: COMPLETED | OUTPATIENT
Start: 2023-08-28 | End: 2023-08-28

## 2023-08-28 RX ADMIN — ONDANSETRON 4 MG: 4 TABLET, ORALLY DISINTEGRATING ORAL at 19:25

## 2023-08-28 RX ADMIN — ACETAMINOPHEN 650 MG: 325 TABLET ORAL at 20:12

## 2023-08-28 ASSESSMENT — PAIN SCALES - GENERAL
PAINLEVEL_OUTOF10: 7
PAINLEVEL_OUTOF10: 9
PAINLEVEL_OUTOF10: 7

## 2023-08-29 LAB
ATRIAL RATE (BPM): 118
P AXIS (DEGREES): 60
PR-INTERVAL (MSEC): 174
QRS-INTERVAL (MSEC): 82
QT-INTERVAL (MSEC): 296
QTC: 413
R AXIS (DEGREES): 49
REPORT TEXT: NORMAL
T AXIS (DEGREES): 34
VENTRICULAR RATE EKG/MIN (BPM): 117

## 2023-09-06 ENCOUNTER — HOSPITAL ENCOUNTER (INPATIENT)
Age: 58
LOS: 3 days | Discharge: HOME HEALTH | DRG: 710 | End: 2023-09-09
Attending: EMERGENCY MEDICINE | Admitting: FAMILY MEDICINE

## 2023-09-06 ENCOUNTER — APPOINTMENT (OUTPATIENT)
Dept: GENERAL RADIOLOGY | Age: 58
DRG: 710 | End: 2023-09-06

## 2023-09-06 DIAGNOSIS — L03.221 CELLULITIS OF NECK: ICD-10-CM

## 2023-09-06 DIAGNOSIS — L02.11 ABSCESS, NECK: Primary | ICD-10-CM

## 2023-09-06 DIAGNOSIS — R73.9 HYPERGLYCEMIA: ICD-10-CM

## 2023-09-06 DIAGNOSIS — I63.9 RECURRENT CEREBROVASCULAR ACCIDENTS (CVAS) (CMD): ICD-10-CM

## 2023-09-06 PROBLEM — N18.2 STAGE 2 CHRONIC KIDNEY DISEASE: Status: ACTIVE | Noted: 2023-09-06

## 2023-09-06 PROBLEM — E11.65 TYPE 2 DIABETES MELLITUS WITH HYPERGLYCEMIA, WITH LONG-TERM CURRENT USE OF INSULIN (CMD): Status: ACTIVE | Noted: 2023-09-06

## 2023-09-06 PROBLEM — Z79.4 CONTROLLED TYPE 2 DIABETES MELLITUS WITH HYPERGLYCEMIA, WITH LONG-TERM CURRENT USE OF INSULIN (CMD): Status: ACTIVE | Noted: 2023-09-06

## 2023-09-06 PROBLEM — E11.65 CONTROLLED TYPE 2 DIABETES MELLITUS WITH HYPERGLYCEMIA, WITH LONG-TERM CURRENT USE OF INSULIN (CMD): Status: ACTIVE | Noted: 2023-09-06

## 2023-09-06 PROBLEM — I82.403 RECURRENT ACUTE DEEP VEIN THROMBOSIS (DVT) OF BOTH LOWER EXTREMITIES (CMD): Status: ACTIVE | Noted: 2023-09-06

## 2023-09-06 PROBLEM — R53.81 DEBILITY: Status: ACTIVE | Noted: 2023-09-06

## 2023-09-06 PROBLEM — F51.01 PRIMARY INSOMNIA: Status: ACTIVE | Noted: 2023-09-06

## 2023-09-06 PROBLEM — A41.9 SEPSIS (CMD): Status: ACTIVE | Noted: 2023-09-06

## 2023-09-06 PROBLEM — F01.50 VASCULAR DEMENTIA (CMD): Status: ACTIVE | Noted: 2023-09-06

## 2023-09-06 PROBLEM — Z79.4 TYPE 2 DIABETES MELLITUS WITH HYPERGLYCEMIA, WITH LONG-TERM CURRENT USE OF INSULIN (CMD): Status: ACTIVE | Noted: 2023-09-06

## 2023-09-06 PROBLEM — E87.20 LACTIC ACIDOSIS: Status: ACTIVE | Noted: 2023-09-06

## 2023-09-06 PROBLEM — K22.70 BARRETT ESOPHAGUS: Status: ACTIVE | Noted: 2023-09-06

## 2023-09-06 PROBLEM — M54.42 CHRONIC BILATERAL LOW BACK PAIN WITH BILATERAL SCIATICA: Status: ACTIVE | Noted: 2023-09-06

## 2023-09-06 PROBLEM — G89.29 CHRONIC BILATERAL LOW BACK PAIN WITH BILATERAL SCIATICA: Status: ACTIVE | Noted: 2023-09-06

## 2023-09-06 PROBLEM — M54.41 CHRONIC BILATERAL LOW BACK PAIN WITH BILATERAL SCIATICA: Status: ACTIVE | Noted: 2023-09-06

## 2023-09-06 LAB
ALBUMIN SERPL-MCNC: 3.3 G/DL (ref 3.6–5.1)
ALBUMIN/GLOB SERPL: 0.8 {RATIO} (ref 1–2.4)
ALP SERPL-CCNC: 175 UNITS/L (ref 45–117)
ALT SERPL-CCNC: 23 UNITS/L
ANION GAP SERPL CALC-SCNC: 11 MMOL/L (ref 7–19)
AST SERPL-CCNC: 16 UNITS/L
B-OH-BUTYR SERPL-SCNC: 0.3 MMOL/L (ref 0–0.3)
BASE EXCESS / DEFICIT, VENOUS - RESPIRATORY: 2 MMOL/L (ref -2–2)
BASOPHILS # BLD: 0 K/MCL (ref 0–0.3)
BASOPHILS NFR BLD: 1 %
BDY SITE: ABNORMAL
BILIRUB SERPL-MCNC: 0.5 MG/DL (ref 0.2–1)
BODY TEMPERATURE: 36.5 DEGREES
BUN SERPL-MCNC: 10 MG/DL (ref 6–20)
BUN/CREAT SERPL: 9 (ref 7–25)
CA-I BLD-SCNC: 1.24 MMOL/L (ref 1.15–1.29)
CALCIUM SERPL-MCNC: 9.2 MG/DL (ref 8.4–10.2)
CHLORIDE SERPL-SCNC: 99 MMOL/L (ref 97–110)
CO2 SERPL-SCNC: 27 MMOL/L (ref 21–32)
COHGB MFR BLDV: 2.3 %
CONDITION: ABNORMAL
CREAT SERPL-MCNC: 1.09 MG/DL (ref 0.67–1.17)
DEPRECATED RDW RBC: 39.9 FL (ref 39–50)
EGFRCR SERPLBLD CKD-EPI 2021: 79 ML/MIN/{1.73_M2}
EOSINOPHIL # BLD: 0.1 K/MCL (ref 0–0.5)
EOSINOPHIL NFR BLD: 2 %
ERYTHROCYTE [DISTWIDTH] IN BLOOD: 13.5 % (ref 11–15)
FASTING DURATION TIME PATIENT: ABNORMAL H
GLOBULIN SER-MCNC: 4 G/DL (ref 2–4)
GLUCOSE BLDC GLUCOMTR-MCNC: 416 MG/DL (ref 70–99)
GLUCOSE BLDC GLUCOMTR-MCNC: >600 MG/DL (ref 70–99)
GLUCOSE SERPL-MCNC: 676 MG/DL (ref 70–99)
HBA1C MFR BLD: 10.3 % (ref 4.5–5.6)
HCO3 BLDV-SCNC: 27.8 MMOL/L (ref 22–28)
HCT VFR BLD CALC: 41.1 % (ref 39–51)
HGB BLD-MCNC: 14.5 G/DL (ref 13–17)
HGB BLD-MCNC: 15.5 G/DL (ref 13–17)
IMM GRANULOCYTES # BLD AUTO: 0 K/MCL (ref 0–0.2)
IMM GRANULOCYTES # BLD: 1 %
LACTATE BLDV-SCNC: 1.8 MMOL/L (ref 0–2)
LACTATE BLDV-SCNC: 2.4 MMOL/L (ref 0–2)
LACTATE BLDV-SCNC: 2.5 MMOL/L (ref 0–2)
LACTATE BLDV-SCNC: 2.7 MMOL/L
LYMPHOCYTES # BLD: 1.2 K/MCL (ref 1–4)
LYMPHOCYTES NFR BLD: 26 %
MAGNESIUM SERPL-MCNC: 2 MG/DL (ref 1.7–2.4)
MCH RBC QN AUTO: 29.2 PG (ref 26–34)
MCHC RBC AUTO-ENTMCNC: 35.3 G/DL (ref 32–36.5)
MCV RBC AUTO: 82.7 FL (ref 78–100)
METHGB MFR BLDMV: 0.9 %
MONOCYTES # BLD: 0.4 K/MCL (ref 0.3–0.9)
MONOCYTES NFR BLD: 9 %
NEUTROPHILS # BLD: 2.8 K/MCL (ref 1.8–7.7)
NEUTROPHILS NFR BLD: 61 %
NRBC BLD MANUAL-RTO: 0 /100 WBC
OXYHGB MFR BLDV: 64.2 % (ref 60–80)
PCO2 BLDV: 46 MM HG (ref 41–54)
PH BLDV: 7.39 UNITS (ref 7.35–7.45)
PLATELET # BLD AUTO: 104 K/MCL (ref 140–450)
PO2 BLDV: 38 MM HG (ref 35–42)
POTASSIUM BLD-SCNC: 4.7 MMOL/L (ref 3.4–5.1)
POTASSIUM SERPL-SCNC: 4.6 MMOL/L (ref 3.4–5.1)
PROT SERPL-MCNC: 7.3 G/DL (ref 6.4–8.2)
RBC # BLD: 4.97 MIL/MCL (ref 4.5–5.9)
SAO2 DF BLDV: 66 % (ref 60–80)
SODIUM BLD-SCNC: 128 MMOL/L (ref 135–145)
SODIUM SERPL-SCNC: 132 MMOL/L (ref 135–145)
TROPONIN I SERPL DL<=0.01 NG/ML-MCNC: 7 NG/L
WBC # BLD: 4.5 K/MCL (ref 4.2–11)

## 2023-09-06 PROCEDURE — 93005 ELECTROCARDIOGRAM TRACING: CPT

## 2023-09-06 PROCEDURE — 10002807 HB RX 258

## 2023-09-06 PROCEDURE — 84295 ASSAY OF SERUM SODIUM: CPT

## 2023-09-06 PROCEDURE — 96374 THER/PROPH/DIAG INJ IV PUSH: CPT

## 2023-09-06 PROCEDURE — 10002807 HB RX 258: Performed by: FAMILY MEDICINE

## 2023-09-06 PROCEDURE — 82962 GLUCOSE BLOOD TEST: CPT

## 2023-09-06 PROCEDURE — 10004651 HB RX, NO CHARGE ITEM

## 2023-09-06 PROCEDURE — 71045 X-RAY EXAM CHEST 1 VIEW: CPT

## 2023-09-06 PROCEDURE — 83605 ASSAY OF LACTIC ACID: CPT

## 2023-09-06 PROCEDURE — 99285 EMERGENCY DEPT VISIT HI MDM: CPT

## 2023-09-06 PROCEDURE — 10002800 HB RX 250 W HCPCS: Performed by: EMERGENCY MEDICINE

## 2023-09-06 PROCEDURE — 96361 HYDRATE IV INFUSION ADD-ON: CPT

## 2023-09-06 PROCEDURE — 99285 EMERGENCY DEPT VISIT HI MDM: CPT | Performed by: EMERGENCY MEDICINE

## 2023-09-06 PROCEDURE — 82010 KETONE BODYS QUAN: CPT

## 2023-09-06 PROCEDURE — 85025 COMPLETE CBC W/AUTO DIFF WBC: CPT

## 2023-09-06 PROCEDURE — 87040 BLOOD CULTURE FOR BACTERIA: CPT

## 2023-09-06 PROCEDURE — 82330 ASSAY OF CALCIUM: CPT

## 2023-09-06 PROCEDURE — 10006031 HB ROOM CHARGE TELEMETRY

## 2023-09-06 PROCEDURE — 84484 ASSAY OF TROPONIN QUANT: CPT

## 2023-09-06 PROCEDURE — 10002807 HB RX 258: Performed by: EMERGENCY MEDICINE

## 2023-09-06 PROCEDURE — 96372 THER/PROPH/DIAG INJ SC/IM: CPT | Performed by: EMERGENCY MEDICINE

## 2023-09-06 PROCEDURE — 83735 ASSAY OF MAGNESIUM: CPT

## 2023-09-06 PROCEDURE — 84132 ASSAY OF SERUM POTASSIUM: CPT

## 2023-09-06 PROCEDURE — 80053 COMPREHEN METABOLIC PANEL: CPT

## 2023-09-06 PROCEDURE — 82375 ASSAY CARBOXYHB QUANT: CPT

## 2023-09-06 PROCEDURE — 99223 1ST HOSP IP/OBS HIGH 75: CPT | Performed by: FAMILY MEDICINE

## 2023-09-06 PROCEDURE — 83036 HEMOGLOBIN GLYCOSYLATED A1C: CPT

## 2023-09-06 PROCEDURE — 83036 HEMOGLOBIN GLYCOSYLATED A1C: CPT | Performed by: FAMILY MEDICINE

## 2023-09-06 PROCEDURE — 85018 HEMOGLOBIN: CPT

## 2023-09-06 PROCEDURE — 10002800 HB RX 250 W HCPCS

## 2023-09-06 PROCEDURE — 10002801 HB RX 250 W/O HCPCS

## 2023-09-06 RX ORDER — ACETAMINOPHEN 325 MG/1
650 TABLET ORAL EVERY 6 HOURS PRN
Status: DISCONTINUED | OUTPATIENT
Start: 2023-09-06 | End: 2023-09-09 | Stop reason: HOSPADM

## 2023-09-06 RX ORDER — NYSTATIN 100000 U/G
CREAM TOPICAL 3 TIMES DAILY
Status: DISCONTINUED | OUTPATIENT
Start: 2023-09-06 | End: 2023-09-07

## 2023-09-06 RX ORDER — NICOTINE POLACRILEX 4 MG
30 LOZENGE BUCCAL PRN
Status: DISCONTINUED | OUTPATIENT
Start: 2023-09-06 | End: 2023-09-09 | Stop reason: HOSPADM

## 2023-09-06 RX ORDER — INSULIN LISPRO 100 [IU]/ML
10 INJECTION, SOLUTION INTRAVENOUS; SUBCUTANEOUS
Status: DISCONTINUED | OUTPATIENT
Start: 2023-09-07 | End: 2023-09-07

## 2023-09-06 RX ORDER — CEFAZOLIN SODIUM/WATER 1 G/10 ML
1000 SYRINGE (ML) INTRAVENOUS DAILY
Status: DISCONTINUED | OUTPATIENT
Start: 2023-09-07 | End: 2023-09-07

## 2023-09-06 RX ORDER — NICOTINE POLACRILEX 4 MG
15 LOZENGE BUCCAL PRN
Status: DISCONTINUED | OUTPATIENT
Start: 2023-09-06 | End: 2023-09-09 | Stop reason: HOSPADM

## 2023-09-06 RX ORDER — DOXYCYCLINE HYCLATE 100 MG/1
100 CAPSULE ORAL 2 TIMES DAILY
Status: ON HOLD | COMMUNITY
Start: 2023-09-01 | End: 2023-09-09 | Stop reason: HOSPADM

## 2023-09-06 RX ORDER — 0.9 % SODIUM CHLORIDE 0.9 %
10 VIAL (ML) INJECTION PRN
Status: DISCONTINUED | OUTPATIENT
Start: 2023-09-06 | End: 2023-09-09 | Stop reason: HOSPADM

## 2023-09-06 RX ORDER — CEFAZOLIN SODIUM/WATER 2 G/20 ML
2000 SYRINGE (ML) INTRAVENOUS ONCE
Status: COMPLETED | OUTPATIENT
Start: 2023-09-06 | End: 2023-09-06

## 2023-09-06 RX ORDER — INSULIN GLARGINE 100 [IU]/ML
20 INJECTION, SOLUTION SUBCUTANEOUS NIGHTLY
Status: DISCONTINUED | OUTPATIENT
Start: 2023-09-06 | End: 2023-09-07

## 2023-09-06 RX ORDER — GABAPENTIN 400 MG/1
800 CAPSULE ORAL EVERY 8 HOURS SCHEDULED
Status: DISCONTINUED | OUTPATIENT
Start: 2023-09-06 | End: 2023-09-09 | Stop reason: HOSPADM

## 2023-09-06 RX ORDER — DEXTROSE MONOHYDRATE 25 G/50ML
12.5 INJECTION, SOLUTION INTRAVENOUS PRN
Status: DISCONTINUED | OUTPATIENT
Start: 2023-09-06 | End: 2023-09-09 | Stop reason: HOSPADM

## 2023-09-06 RX ORDER — DEXTROSE MONOHYDRATE 25 G/50ML
25 INJECTION, SOLUTION INTRAVENOUS PRN
Status: DISCONTINUED | OUTPATIENT
Start: 2023-09-06 | End: 2023-09-09 | Stop reason: HOSPADM

## 2023-09-06 RX ORDER — 0.9 % SODIUM CHLORIDE 0.9 %
2 VIAL (ML) INJECTION EVERY 12 HOURS SCHEDULED
Status: DISCONTINUED | OUTPATIENT
Start: 2023-09-06 | End: 2023-09-09 | Stop reason: HOSPADM

## 2023-09-06 RX ORDER — ZOLPIDEM TARTRATE 5 MG/1
10 TABLET ORAL NIGHTLY PRN
Status: DISCONTINUED | OUTPATIENT
Start: 2023-09-06 | End: 2023-09-08

## 2023-09-06 RX ORDER — 0.9 % SODIUM CHLORIDE 0.9 %
20 VIAL (ML) INJECTION PRN
Status: DISCONTINUED | OUTPATIENT
Start: 2023-09-06 | End: 2023-09-09 | Stop reason: HOSPADM

## 2023-09-06 RX ORDER — INSULIN GLARGINE 100 [IU]/ML
20 INJECTION, SOLUTION SUBCUTANEOUS EVERY EVENING
Status: ON HOLD | COMMUNITY
Start: 2023-06-06 | End: 2023-09-09 | Stop reason: SDUPTHER

## 2023-09-06 RX ORDER — OXYCODONE AND ACETAMINOPHEN 10; 325 MG/1; MG/1
1 TABLET ORAL EVERY 6 HOURS PRN
Status: DISCONTINUED | OUTPATIENT
Start: 2023-09-06 | End: 2023-09-07

## 2023-09-06 RX ORDER — ONDANSETRON 2 MG/ML
4 INJECTION INTRAMUSCULAR; INTRAVENOUS EVERY 6 HOURS PRN
Status: DISCONTINUED | OUTPATIENT
Start: 2023-09-06 | End: 2023-09-09 | Stop reason: HOSPADM

## 2023-09-06 RX ORDER — SODIUM CHLORIDE 9 MG/ML
INJECTION, SOLUTION INTRAVENOUS CONTINUOUS
Status: DISCONTINUED | OUTPATIENT
Start: 2023-09-06 | End: 2023-09-08

## 2023-09-06 RX ORDER — ONDANSETRON 2 MG/ML
4 INJECTION INTRAMUSCULAR; INTRAVENOUS ONCE
Status: COMPLETED | OUTPATIENT
Start: 2023-09-06 | End: 2023-09-06

## 2023-09-06 RX ORDER — INSULIN GLARGINE 100 [IU]/ML
30 INJECTION, SOLUTION SUBCUTANEOUS NIGHTLY
Status: DISCONTINUED | OUTPATIENT
Start: 2023-09-06 | End: 2023-09-06

## 2023-09-06 RX ORDER — 0.9 % SODIUM CHLORIDE 0.9 %
10 VIAL (ML) INJECTION EVERY 12 HOURS SCHEDULED
Status: DISCONTINUED | OUTPATIENT
Start: 2023-09-06 | End: 2023-09-09 | Stop reason: HOSPADM

## 2023-09-06 RX ORDER — ACETAMINOPHEN 500 MG
1000 TABLET ORAL ONCE
Status: DISCONTINUED | OUTPATIENT
Start: 2023-09-06 | End: 2023-09-09 | Stop reason: HOSPADM

## 2023-09-06 RX ORDER — INSULIN LISPRO 100 [IU]/ML
15 INJECTION, SOLUTION INTRAVENOUS; SUBCUTANEOUS
Status: DISCONTINUED | OUTPATIENT
Start: 2023-09-07 | End: 2023-09-06

## 2023-09-06 RX ORDER — PANTOPRAZOLE SODIUM 40 MG/1
40 TABLET, DELAYED RELEASE ORAL EVERY 12 HOURS SCHEDULED
Status: DISCONTINUED | OUTPATIENT
Start: 2023-09-06 | End: 2023-09-09 | Stop reason: HOSPADM

## 2023-09-06 RX ORDER — DIPHENHYDRAMINE HCL 25 MG
50 CAPSULE ORAL NIGHTLY
Status: DISCONTINUED | OUTPATIENT
Start: 2023-09-06 | End: 2023-09-09 | Stop reason: HOSPADM

## 2023-09-06 RX ORDER — AMITRIPTYLINE HYDROCHLORIDE 100 MG/1
150 TABLET ORAL NIGHTLY
Status: DISCONTINUED | OUTPATIENT
Start: 2023-09-06 | End: 2023-09-09 | Stop reason: HOSPADM

## 2023-09-06 RX ORDER — ASPIRIN 81 MG/1
81 TABLET ORAL DAILY
Status: DISCONTINUED | OUTPATIENT
Start: 2023-09-07 | End: 2023-09-09 | Stop reason: HOSPADM

## 2023-09-06 RX ADMIN — SODIUM CHLORIDE, POTASSIUM CHLORIDE, SODIUM LACTATE AND CALCIUM CHLORIDE 1000 ML: 600; 310; 30; 20 INJECTION, SOLUTION INTRAVENOUS at 19:28

## 2023-09-06 RX ADMIN — SODIUM CHLORIDE, PRESERVATIVE FREE 10 ML: 5 INJECTION INTRAVENOUS at 20:37

## 2023-09-06 RX ADMIN — CEFTRIAXONE SODIUM 2000 MG: 10 INJECTION, POWDER, FOR SOLUTION INTRAVENOUS at 18:36

## 2023-09-06 RX ADMIN — ONDANSETRON 4 MG: 2 INJECTION INTRAMUSCULAR; INTRAVENOUS at 20:27

## 2023-09-06 RX ADMIN — SODIUM CHLORIDE, PRESERVATIVE FREE 2 ML: 5 INJECTION INTRAVENOUS at 19:31

## 2023-09-06 RX ADMIN — VANCOMYCIN HYDROCHLORIDE 1750 MG: 10 INJECTION, POWDER, LYOPHILIZED, FOR SOLUTION INTRAVENOUS at 20:34

## 2023-09-06 RX ADMIN — INSULIN LISPRO 15 UNITS: 100 INJECTION, SOLUTION INTRAVENOUS; SUBCUTANEOUS at 19:13

## 2023-09-06 RX ADMIN — MORPHINE SULFATE 4 MG: 4 INJECTION INTRAVENOUS at 20:27

## 2023-09-06 RX ADMIN — SODIUM CHLORIDE: 9 INJECTION, SOLUTION INTRAVENOUS at 21:32

## 2023-09-06 RX ADMIN — SODIUM CHLORIDE, POTASSIUM CHLORIDE, SODIUM LACTATE AND CALCIUM CHLORIDE 1000 ML: 600; 310; 30; 20 INJECTION, SOLUTION INTRAVENOUS at 17:29

## 2023-09-06 SDOH — HEALTH STABILITY: PHYSICAL HEALTH: DO YOU HAVE SERIOUS DIFFICULTY WALKING OR CLIMBING STAIRS?: NO

## 2023-09-06 SDOH — ECONOMIC STABILITY: TRANSPORTATION INSECURITY
IN THE PAST 12 MONTHS, HAS THE LACK OF TRANSPORTATION KEPT YOU FROM MEDICAL APPOINTMENTS OR FROM GETTING MEDICATIONS?: NO

## 2023-09-06 SDOH — HEALTH STABILITY: PHYSICAL HEALTH: DO YOU HAVE DIFFICULTY DRESSING OR BATHING?: NO

## 2023-09-06 SDOH — ECONOMIC STABILITY: HOUSING INSECURITY: WHAT IS YOUR LIVING SITUATION TODAY?: ALONE

## 2023-09-06 SDOH — SOCIAL STABILITY: SOCIAL NETWORK

## 2023-09-06 SDOH — ECONOMIC STABILITY: HOUSING INSECURITY: ARE YOU WORRIED ABOUT LOSING YOUR HOUSING?: NO

## 2023-09-06 SDOH — ECONOMIC STABILITY: FOOD INSECURITY: HOW OFTEN IN THE PAST 12 MONTHS WERE YOU WORRIED OR STRESSED ABOUT HAVING ENOUGH MONEY TO BUY NUTRITIOUS MEALS?: NEVER

## 2023-09-06 SDOH — ECONOMIC STABILITY: GENERAL

## 2023-09-06 SDOH — ECONOMIC STABILITY: HOUSING INSECURITY: WHAT IS YOUR LIVING SITUATION TODAY?: ASSISTED LIVING

## 2023-09-06 SDOH — HEALTH STABILITY: GENERAL: BECAUSE OF A PHYSICAL, MENTAL, OR EMOTIONAL CONDITION, DO YOU HAVE DIFFICULTY DOING ERRANDS ALONE?: YES

## 2023-09-06 SDOH — SOCIAL STABILITY: SOCIAL NETWORK
HOW OFTEN DO YOU SEE OR TALK TO PEOPLE THAT YOU CARE ABOUT AND FEEL CLOSE TO? (FOR EXAMPLE: TALKING TO FRIENDS ON THE PHONE, VISITING FRIENDS OR FAMILY, GOING TO CHURCH OR CLUB MEETINGS): LESS THAN ONCE A WEEK

## 2023-09-06 SDOH — HEALTH STABILITY: GENERAL
BECAUSE OF A PHYSICAL, MENTAL, OR EMOTIONAL CONDITION, DO YOU HAVE SERIOUS DIFFICULTY CONCENTRATING, REMEMBERING OR MAKING DECISIONS?: NO

## 2023-09-06 ASSESSMENT — PATIENT HEALTH QUESTIONNAIRE - PHQ9
SUM OF ALL RESPONSES TO PHQ9 QUESTIONS 1 AND 2: 0
CLINICAL INTERPRETATION OF PHQ2 SCORE: NO FURTHER SCREENING NEEDED
IS PATIENT ABLE TO COMPLETE PHQ2 OR PHQ9: YES
SUM OF ALL RESPONSES TO PHQ9 QUESTIONS 1 AND 2: 0
IS PATIENT ABLE TO COMPLETE PHQ2 OR PHQ9: YES
2. FEELING DOWN, DEPRESSED OR HOPELESS: NOT AT ALL
1. LITTLE INTEREST OR PLEASURE IN DOING THINGS: NOT AT ALL

## 2023-09-06 ASSESSMENT — COLUMBIA-SUICIDE SEVERITY RATING SCALE - C-SSRS
6. HAVE YOU EVER DONE ANYTHING, STARTED TO DO ANYTHING, OR PREPARED TO DO ANYTHING TO END YOUR LIFE?: NO
1. WITHIN THE PAST MONTH, HAVE YOU WISHED YOU WERE DEAD OR WISHED YOU COULD GO TO SLEEP AND NOT WAKE UP?: NO
2. HAVE YOU ACTUALLY HAD ANY THOUGHTS OF KILLING YOURSELF?: NO
IS THE PATIENT ABLE TO COMPLETE C-SSRS: YES

## 2023-09-06 ASSESSMENT — ACTIVITIES OF DAILY LIVING (ADL)
TOILETING: INDEPENDENT
DRESSING: INDEPENDENT
BATHING: INDEPENDENT
ADL_SCORE: 12
FEEDING: INDEPENDENT
ADL_SHORT_OF_BREATH: NO
ADL_BEFORE_ADMISSION: NEEDS/REQUIRES ASSISTANCE
RECENT_DECLINE_ADL: YES, ACUTE ILLNESS WITHOUT THERAPY NEEDS

## 2023-09-06 ASSESSMENT — LIFESTYLE VARIABLES
AUDIT-C TOTAL SCORE: 0
HOW MANY STANDARD DRINKS CONTAINING ALCOHOL DO YOU HAVE ON A TYPICAL DAY: 0,1 OR 2
ALCOHOL_USE_STATUS: NO OR LOW RISK WITH VALIDATED TOOL
HOW OFTEN DO YOU HAVE 6 OR MORE DRINKS ON ONE OCCASION: NEVER
HOW OFTEN DO YOU HAVE A DRINK CONTAINING ALCOHOL: NEVER

## 2023-09-06 ASSESSMENT — PAIN SCALES - GENERAL
PAINLEVEL_OUTOF10: 9

## 2023-09-07 ENCOUNTER — ANESTHESIA EVENT (OUTPATIENT)
Dept: SURGERY | Age: 58
DRG: 710 | End: 2023-09-07

## 2023-09-07 ENCOUNTER — ANESTHESIA (OUTPATIENT)
Dept: SURGERY | Age: 58
DRG: 710 | End: 2023-09-07

## 2023-09-07 LAB
ANION GAP SERPL CALC-SCNC: 10 MMOL/L (ref 7–19)
ATRIAL RATE (BPM): 93
BUN SERPL-MCNC: 10 MG/DL (ref 6–20)
BUN/CREAT SERPL: 10 (ref 7–25)
CALCIUM SERPL-MCNC: 8.3 MG/DL (ref 8.4–10.2)
CHLORIDE SERPL-SCNC: 106 MMOL/L (ref 97–110)
CO2 SERPL-SCNC: 27 MMOL/L (ref 21–32)
CREAT SERPL-MCNC: 0.99 MG/DL (ref 0.67–1.17)
DEPRECATED RDW RBC: 41.6 FL (ref 39–50)
EGFRCR SERPLBLD CKD-EPI 2021: 89 ML/MIN/{1.73_M2}
ERYTHROCYTE [DISTWIDTH] IN BLOOD: 13.8 % (ref 11–15)
FASTING DURATION TIME PATIENT: ABNORMAL H
GLUCOSE BLDC GLUCOMTR-MCNC: 196 MG/DL (ref 70–99)
GLUCOSE BLDC GLUCOMTR-MCNC: 244 MG/DL (ref 70–99)
GLUCOSE BLDC GLUCOMTR-MCNC: 265 MG/DL (ref 70–99)
GLUCOSE BLDC GLUCOMTR-MCNC: 268 MG/DL (ref 70–99)
GLUCOSE SERPL-MCNC: 273 MG/DL (ref 70–99)
HBA1C MFR BLD: 10.2 % (ref 4.5–5.6)
HCT VFR BLD CALC: 38.7 % (ref 39–51)
HGB BLD-MCNC: 13.4 G/DL (ref 13–17)
MCH RBC QN AUTO: 29.1 PG (ref 26–34)
MCHC RBC AUTO-ENTMCNC: 34.6 G/DL (ref 32–36.5)
MCV RBC AUTO: 84.1 FL (ref 78–100)
NRBC BLD MANUAL-RTO: 0 /100 WBC
P AXIS (DEGREES): 48
PLATELET # BLD AUTO: 120 K/MCL (ref 140–450)
POTASSIUM SERPL-SCNC: 3.7 MMOL/L (ref 3.4–5.1)
PR-INTERVAL (MSEC): 194
QRS-INTERVAL (MSEC): 84
QT-INTERVAL (MSEC): 328
QTC: 408
R AXIS (DEGREES): 50
RAINBOW EXTRA TUBES HOLD SPECIMEN: NORMAL
RBC # BLD: 4.6 MIL/MCL (ref 4.5–5.9)
REPORT TEXT: NORMAL
SODIUM SERPL-SCNC: 139 MMOL/L (ref 135–145)
T AXIS (DEGREES): 48
VENTRICULAR RATE EKG/MIN (BPM): 93
WBC # BLD: 4.1 K/MCL (ref 4.2–11)

## 2023-09-07 PROCEDURE — 10002800 HB RX 250 W HCPCS: Performed by: FAMILY MEDICINE

## 2023-09-07 PROCEDURE — 10004651 HB RX, NO CHARGE ITEM: Performed by: FAMILY MEDICINE

## 2023-09-07 PROCEDURE — 97162 PT EVAL MOD COMPLEX 30 MIN: CPT

## 2023-09-07 PROCEDURE — 10002801 HB RX 250 W/O HCPCS: Performed by: FAMILY MEDICINE

## 2023-09-07 PROCEDURE — 96372 THER/PROPH/DIAG INJ SC/IM: CPT | Performed by: INTERNAL MEDICINE

## 2023-09-07 PROCEDURE — 97165 OT EVAL LOW COMPLEX 30 MIN: CPT

## 2023-09-07 PROCEDURE — 10002800 HB RX 250 W HCPCS: Performed by: INTERNAL MEDICINE

## 2023-09-07 PROCEDURE — 80048 BASIC METABOLIC PNL TOTAL CA: CPT | Performed by: FAMILY MEDICINE

## 2023-09-07 PROCEDURE — 10002807 HB RX 258: Performed by: FAMILY MEDICINE

## 2023-09-07 PROCEDURE — 99255 IP/OBS CONSLTJ NEW/EST HI 80: CPT | Performed by: STUDENT IN AN ORGANIZED HEALTH CARE EDUCATION/TRAINING PROGRAM

## 2023-09-07 PROCEDURE — 99233 SBSQ HOSP IP/OBS HIGH 50: CPT | Performed by: INTERNAL MEDICINE

## 2023-09-07 PROCEDURE — 97535 SELF CARE MNGMENT TRAINING: CPT

## 2023-09-07 PROCEDURE — 96372 THER/PROPH/DIAG INJ SC/IM: CPT | Performed by: FAMILY MEDICINE

## 2023-09-07 PROCEDURE — 10002803 HB RX 637: Performed by: FAMILY MEDICINE

## 2023-09-07 PROCEDURE — 10006031 HB ROOM CHARGE TELEMETRY

## 2023-09-07 PROCEDURE — 10004651 HB RX, NO CHARGE ITEM

## 2023-09-07 PROCEDURE — 85027 COMPLETE CBC AUTOMATED: CPT | Performed by: FAMILY MEDICINE

## 2023-09-07 PROCEDURE — 10002803 HB RX 637: Performed by: INTERNAL MEDICINE

## 2023-09-07 PROCEDURE — 97530 THERAPEUTIC ACTIVITIES: CPT

## 2023-09-07 RX ORDER — CLOTRIMAZOLE 1 %
CREAM (GRAM) TOPICAL EVERY 12 HOURS SCHEDULED
Status: DISCONTINUED | OUTPATIENT
Start: 2023-09-07 | End: 2023-09-09 | Stop reason: HOSPADM

## 2023-09-07 RX ORDER — INSULIN GLARGINE 100 [IU]/ML
22 INJECTION, SOLUTION SUBCUTANEOUS NIGHTLY
Status: DISCONTINUED | OUTPATIENT
Start: 2023-09-07 | End: 2023-09-09 | Stop reason: HOSPADM

## 2023-09-07 RX ORDER — INSULIN LISPRO 100 [IU]/ML
12 INJECTION, SOLUTION INTRAVENOUS; SUBCUTANEOUS
Status: DISCONTINUED | OUTPATIENT
Start: 2023-09-07 | End: 2023-09-09 | Stop reason: HOSPADM

## 2023-09-07 RX ADMIN — CLOTRIMAZOLE: 1 CREAM TOPICAL at 17:31

## 2023-09-07 RX ADMIN — DIPHENHYDRAMINE HYDROCHLORIDE 50 MG: 25 CAPSULE ORAL at 21:16

## 2023-09-07 RX ADMIN — Medication 1750 MG: at 09:53

## 2023-09-07 RX ADMIN — INSULIN LISPRO 3 UNITS: 100 INJECTION, SOLUTION INTRAVENOUS; SUBCUTANEOUS at 09:50

## 2023-09-07 RX ADMIN — ZOLPIDEM TARTRATE 10 MG: 5 TABLET ORAL at 01:26

## 2023-09-07 RX ADMIN — INSULIN GLARGINE 20 UNITS: 100 INJECTION, SOLUTION SUBCUTANEOUS at 01:14

## 2023-09-07 RX ADMIN — GABAPENTIN 800 MG: 400 CAPSULE ORAL at 01:15

## 2023-09-07 RX ADMIN — SODIUM CHLORIDE, PRESERVATIVE FREE 10 ML: 5 INJECTION INTRAVENOUS at 21:17

## 2023-09-07 RX ADMIN — ZOLPIDEM TARTRATE 10 MG: 5 TABLET ORAL at 21:16

## 2023-09-07 RX ADMIN — AMITRIPTYLINE HYDROCHLORIDE 150 MG: 100 TABLET, FILM COATED ORAL at 01:14

## 2023-09-07 RX ADMIN — INSULIN LISPRO 12 UNITS: 100 INJECTION, SOLUTION INTRAVENOUS; SUBCUTANEOUS at 17:28

## 2023-09-07 RX ADMIN — APIXABAN 5 MG: 5 TABLET, FILM COATED ORAL at 21:17

## 2023-09-07 RX ADMIN — INSULIN GLARGINE 22 UNITS: 100 INJECTION, SOLUTION SUBCUTANEOUS at 21:21

## 2023-09-07 RX ADMIN — MORPHINE SULFATE 2 MG: 2 INJECTION, SOLUTION INTRAMUSCULAR; INTRAVENOUS at 00:42

## 2023-09-07 RX ADMIN — APIXABAN 5 MG: 5 TABLET, FILM COATED ORAL at 01:15

## 2023-09-07 RX ADMIN — PANTOPRAZOLE SODIUM 40 MG: 40 TABLET, DELAYED RELEASE ORAL at 01:15

## 2023-09-07 RX ADMIN — INSULIN LISPRO 3 UNITS: 100 INJECTION, SOLUTION INTRAVENOUS; SUBCUTANEOUS at 12:20

## 2023-09-07 RX ADMIN — Medication 1750 MG: at 17:32

## 2023-09-07 RX ADMIN — PANTOPRAZOLE SODIUM 40 MG: 40 TABLET, DELAYED RELEASE ORAL at 21:17

## 2023-09-07 RX ADMIN — AMITRIPTYLINE HYDROCHLORIDE 150 MG: 100 TABLET, FILM COATED ORAL at 21:16

## 2023-09-07 RX ADMIN — ASPIRIN 81 MG: 81 TABLET, COATED ORAL at 09:49

## 2023-09-07 RX ADMIN — DIPHENHYDRAMINE HYDROCHLORIDE 50 MG: 25 CAPSULE ORAL at 00:42

## 2023-09-07 RX ADMIN — MORPHINE SULFATE 1 MG: 2 INJECTION, SOLUTION INTRAMUSCULAR; INTRAVENOUS at 21:15

## 2023-09-07 RX ADMIN — PANTOPRAZOLE SODIUM 40 MG: 40 TABLET, DELAYED RELEASE ORAL at 09:49

## 2023-09-07 RX ADMIN — GABAPENTIN 800 MG: 400 CAPSULE ORAL at 21:16

## 2023-09-07 RX ADMIN — APIXABAN 5 MG: 5 TABLET, FILM COATED ORAL at 09:49

## 2023-09-07 RX ADMIN — GABAPENTIN 800 MG: 400 CAPSULE ORAL at 05:29

## 2023-09-07 RX ADMIN — INSULIN LISPRO 10 UNITS: 100 INJECTION, SOLUTION INTRAVENOUS; SUBCUTANEOUS at 12:20

## 2023-09-07 RX ADMIN — GABAPENTIN 800 MG: 400 CAPSULE ORAL at 14:59

## 2023-09-07 RX ADMIN — SODIUM CHLORIDE, PRESERVATIVE FREE 10 ML: 5 INJECTION INTRAVENOUS at 01:18

## 2023-09-07 RX ADMIN — INSULIN LISPRO 2 UNITS: 100 INJECTION, SOLUTION INTRAVENOUS; SUBCUTANEOUS at 17:29

## 2023-09-07 RX ADMIN — SODIUM CHLORIDE, PRESERVATIVE FREE 2 ML: 5 INJECTION INTRAVENOUS at 21:18

## 2023-09-07 RX ADMIN — MORPHINE SULFATE 1 MG: 2 INJECTION, SOLUTION INTRAMUSCULAR; INTRAVENOUS at 15:36

## 2023-09-07 RX ADMIN — INSULIN LISPRO 10 UNITS: 100 INJECTION, SOLUTION INTRAVENOUS; SUBCUTANEOUS at 09:49

## 2023-09-07 ASSESSMENT — COGNITIVE AND FUNCTIONAL STATUS - GENERAL
BECAUSE OF A PHYSICAL, MENTAL, OR EMOTIONAL CONDITION, DO YOU HAVE DIFFICULTY DOING ERRANDS ALONE: YES
DAILY_ACTIVITY_RAW_SCORE: 19
HELP NEEDED DRESSING REGULAR LOWER BODY CLOTHING: A LITTLE
BECAUSE OF A PHYSICAL, MENTAL, OR EMOTIONAL CONDITION, DO YOU HAVE SERIOUS DIFFICULTY CONCENTRATING, REMEMBERING OR MAKING DECISIONS: NO
HELP NEEDED DRESSING REGULAR UPPER BODY CLOTHING: A LITTLE
BASIC_MOBILITY_RAW_SCORE: 10
DAILY_ACTIVITY_CONVERTED_SCORE: 40.22
DO YOU HAVE SERIOUS DIFFICULTY WALKING OR CLIMBING STAIRS: YES
BASIC_MOBILITY_CONVERTED_SCORE: 28.13
DO YOU HAVE DIFFICULTY DRESSING OR BATHING: NO
HELP NEEDED FOR PERSONAL GROOMING: A LITTLE
HELP NEEDED FOR TOILETING: A LITTLE
HELP NEEDED FOR BATHING: A LITTLE

## 2023-09-07 ASSESSMENT — ACTIVITIES OF DAILY LIVING (ADL)
HOME_MANAGEMENT_TIME_ENTRY: 10
EATING: MODIFIED INDEPENDENT
PRIOR_ADL_BATHING: SUPERVISION (SUPV)
GROOMING: MODIFIED INDEPENDENT
PRIOR_ADL: SUPERVISION (SUPV)
PRIOR_ADL_TOILETING: MODIFIED INDEPENDENT

## 2023-09-07 ASSESSMENT — PAIN SCALES - GENERAL
PAINLEVEL_OUTOF10: 2
PAINLEVEL_OUTOF10: 8
PAINLEVEL_OUTOF10: 0
PAINLEVEL_OUTOF10: 7
PAINLEVEL_OUTOF10: 9
PAINLEVEL_OUTOF10: 0
PAINLEVEL_OUTOF10: 0

## 2023-09-08 LAB
CREAT SERPL-MCNC: 0.93 MG/DL (ref 0.67–1.17)
EGFRCR SERPLBLD CKD-EPI 2021: >90 ML/MIN/{1.73_M2}
GLUCOSE BLDC GLUCOMTR-MCNC: 174 MG/DL (ref 70–99)
GLUCOSE BLDC GLUCOMTR-MCNC: 177 MG/DL (ref 70–99)
GLUCOSE BLDC GLUCOMTR-MCNC: 257 MG/DL (ref 70–99)
GLUCOSE BLDC GLUCOMTR-MCNC: 265 MG/DL (ref 70–99)
LACTATE BLDV-SCNC: 1.3 MMOL/L (ref 0–2)

## 2023-09-08 PROCEDURE — 82962 GLUCOSE BLOOD TEST: CPT

## 2023-09-08 PROCEDURE — 96372 THER/PROPH/DIAG INJ SC/IM: CPT | Performed by: STUDENT IN AN ORGANIZED HEALTH CARE EDUCATION/TRAINING PROGRAM

## 2023-09-08 PROCEDURE — 0JB40ZZ EXCISION OF RIGHT NECK SUBCUTANEOUS TISSUE AND FASCIA, OPEN APPROACH: ICD-10-PCS | Performed by: SURGERY

## 2023-09-08 PROCEDURE — 10002800 HB RX 250 W HCPCS: Performed by: SURGERY

## 2023-09-08 PROCEDURE — 96372 THER/PROPH/DIAG INJ SC/IM: CPT | Performed by: INTERNAL MEDICINE

## 2023-09-08 PROCEDURE — 10002800 HB RX 250 W HCPCS: Performed by: STUDENT IN AN ORGANIZED HEALTH CARE EDUCATION/TRAINING PROGRAM

## 2023-09-08 PROCEDURE — 13000002 HB ANESTHESIA  GENERAL  S/U + 1ST 15 MIN: Performed by: SURGERY

## 2023-09-08 PROCEDURE — 10006031 HB ROOM CHARGE TELEMETRY

## 2023-09-08 PROCEDURE — 10004651 HB RX, NO CHARGE ITEM: Performed by: STUDENT IN AN ORGANIZED HEALTH CARE EDUCATION/TRAINING PROGRAM

## 2023-09-08 PROCEDURE — 99233 SBSQ HOSP IP/OBS HIGH 50: CPT | Performed by: INTERNAL MEDICINE

## 2023-09-08 PROCEDURE — 10002807 HB RX 258: Performed by: ANESTHESIOLOGY

## 2023-09-08 PROCEDURE — 96372 THER/PROPH/DIAG INJ SC/IM: CPT | Performed by: ANESTHESIOLOGY

## 2023-09-08 PROCEDURE — 10004451 HB PACU RECOVERY 1ST 30 MINUTES: Performed by: SURGERY

## 2023-09-08 PROCEDURE — 0J940ZZ DRAINAGE OF RIGHT NECK SUBCUTANEOUS TISSUE AND FASCIA, OPEN APPROACH: ICD-10-PCS | Performed by: SURGERY

## 2023-09-08 PROCEDURE — 87186 SC STD MICRODIL/AGAR DIL: CPT | Performed by: SURGERY

## 2023-09-08 PROCEDURE — 10006023 HB SUPPLY 272: Performed by: SURGERY

## 2023-09-08 PROCEDURE — 10002803 HB RX 637: Performed by: STUDENT IN AN ORGANIZED HEALTH CARE EDUCATION/TRAINING PROGRAM

## 2023-09-08 PROCEDURE — 10002801 HB RX 250 W/O HCPCS: Performed by: SURGERY

## 2023-09-08 PROCEDURE — 10002803 HB RX 637: Performed by: FAMILY MEDICINE

## 2023-09-08 PROCEDURE — 10002800 HB RX 250 W HCPCS: Performed by: ANESTHESIOLOGY

## 2023-09-08 PROCEDURE — 13000035 HB BASIC CASE EA ADD MINUTE: Performed by: SURGERY

## 2023-09-08 PROCEDURE — 13000003 HB ANESTHESIA  GENERAL EA ADD MINUTE: Performed by: SURGERY

## 2023-09-08 PROCEDURE — 10002807 HB RX 258: Performed by: FAMILY MEDICINE

## 2023-09-08 PROCEDURE — 10002800 HB RX 250 W HCPCS: Performed by: INTERNAL MEDICINE

## 2023-09-08 PROCEDURE — 10002801 HB RX 250 W/O HCPCS: Performed by: FAMILY MEDICINE

## 2023-09-08 PROCEDURE — 10061 I&D ABSCESS COMP/MULTIPLE: CPT | Performed by: SURGERY

## 2023-09-08 PROCEDURE — 13000034 HB BASIC CASE  S/U +1ST 15 MIN: Performed by: SURGERY

## 2023-09-08 PROCEDURE — 82565 ASSAY OF CREATININE: CPT | Performed by: INTERNAL MEDICINE

## 2023-09-08 PROCEDURE — 83605 ASSAY OF LACTIC ACID: CPT | Performed by: INTERNAL MEDICINE

## 2023-09-08 PROCEDURE — 10002801 HB RX 250 W/O HCPCS: Performed by: ANESTHESIOLOGY

## 2023-09-08 PROCEDURE — 10002800 HB RX 250 W HCPCS: Performed by: FAMILY MEDICINE

## 2023-09-08 RX ORDER — LIDOCAINE HYDROCHLORIDE 10 MG/ML
INJECTION, SOLUTION INFILTRATION; PERINEURAL PRN
Status: DISCONTINUED | OUTPATIENT
Start: 2023-09-08 | End: 2023-09-08 | Stop reason: HOSPADM

## 2023-09-08 RX ORDER — SODIUM CHLORIDE, SODIUM LACTATE, POTASSIUM CHLORIDE, CALCIUM CHLORIDE 600; 310; 30; 20 MG/100ML; MG/100ML; MG/100ML; MG/100ML
INJECTION, SOLUTION INTRAVENOUS CONTINUOUS
Status: DISCONTINUED | OUTPATIENT
Start: 2023-09-08 | End: 2023-09-08

## 2023-09-08 RX ORDER — MIDAZOLAM HYDROCHLORIDE 1 MG/ML
INJECTION, SOLUTION INTRAMUSCULAR; INTRAVENOUS PRN
Status: DISCONTINUED | OUTPATIENT
Start: 2023-09-08 | End: 2023-09-08

## 2023-09-08 RX ORDER — LIDOCAINE HYDROCHLORIDE 20 MG/ML
INJECTION, SOLUTION INFILTRATION; PERINEURAL PRN
Status: DISCONTINUED | OUTPATIENT
Start: 2023-09-08 | End: 2023-09-08

## 2023-09-08 RX ORDER — ZOLPIDEM TARTRATE 5 MG/1
5 TABLET ORAL NIGHTLY PRN
Status: DISCONTINUED | OUTPATIENT
Start: 2023-09-08 | End: 2023-09-09 | Stop reason: HOSPADM

## 2023-09-08 RX ORDER — BUPIVACAINE HYDROCHLORIDE 2.5 MG/ML
INJECTION, SOLUTION EPIDURAL; INFILTRATION; INTRACAUDAL PRN
Status: DISCONTINUED | OUTPATIENT
Start: 2023-09-08 | End: 2023-09-08 | Stop reason: HOSPADM

## 2023-09-08 RX ORDER — HYDROCODONE BITARTRATE AND ACETAMINOPHEN 5; 325 MG/1; MG/1
1 TABLET ORAL ONCE
Status: DISCONTINUED | OUTPATIENT
Start: 2023-09-08 | End: 2023-09-09 | Stop reason: HOSPADM

## 2023-09-08 RX ORDER — SODIUM CHLORIDE 9 MG/ML
INJECTION, SOLUTION INTRAVENOUS CONTINUOUS PRN
Status: DISCONTINUED | OUTPATIENT
Start: 2023-09-08 | End: 2023-09-08

## 2023-09-08 RX ORDER — INSULIN LISPRO 100 [IU]/ML
5 INJECTION, SOLUTION INTRAVENOUS; SUBCUTANEOUS ONCE
Status: COMPLETED | OUTPATIENT
Start: 2023-09-08 | End: 2023-09-08

## 2023-09-08 RX ADMIN — AMITRIPTYLINE HYDROCHLORIDE 150 MG: 100 TABLET, FILM COATED ORAL at 21:08

## 2023-09-08 RX ADMIN — MORPHINE SULFATE 1 MG: 2 INJECTION, SOLUTION INTRAMUSCULAR; INTRAVENOUS at 11:17

## 2023-09-08 RX ADMIN — SODIUM CHLORIDE, PRESERVATIVE FREE 2 ML: 5 INJECTION INTRAVENOUS at 21:24

## 2023-09-08 RX ADMIN — PANTOPRAZOLE SODIUM 40 MG: 40 TABLET, DELAYED RELEASE ORAL at 21:08

## 2023-09-08 RX ADMIN — INSULIN GLARGINE 22 UNITS: 100 INJECTION, SOLUTION SUBCUTANEOUS at 21:09

## 2023-09-08 RX ADMIN — INSULIN LISPRO 5 UNITS: 100 INJECTION, SOLUTION INTRAVENOUS; SUBCUTANEOUS at 08:42

## 2023-09-08 RX ADMIN — INSULIN LISPRO 12 UNITS: 100 INJECTION, SOLUTION INTRAVENOUS; SUBCUTANEOUS at 14:19

## 2023-09-08 RX ADMIN — GABAPENTIN 800 MG: 400 CAPSULE ORAL at 21:08

## 2023-09-08 RX ADMIN — APIXABAN 5 MG: 5 TABLET, FILM COATED ORAL at 21:09

## 2023-09-08 RX ADMIN — Medication 1750 MG: at 18:30

## 2023-09-08 RX ADMIN — PROPOFOL 25 MCG/KG/MIN: 10 INJECTION, EMULSION INTRAVENOUS at 08:04

## 2023-09-08 RX ADMIN — LIDOCAINE HYDROCHLORIDE 5 ML: 20 INJECTION, SOLUTION INFILTRATION; PERINEURAL at 08:04

## 2023-09-08 RX ADMIN — FENTANYL CITRATE 100 MCG: 50 INJECTION, SOLUTION INTRAMUSCULAR; INTRAVENOUS at 07:52

## 2023-09-08 RX ADMIN — ONDANSETRON 4 MG: 2 INJECTION INTRAMUSCULAR; INTRAVENOUS at 21:09

## 2023-09-08 RX ADMIN — INSULIN LISPRO 3 UNITS: 100 INJECTION, SOLUTION INTRAVENOUS; SUBCUTANEOUS at 17:09

## 2023-09-08 RX ADMIN — Medication 1750 MG: at 05:21

## 2023-09-08 RX ADMIN — CLOTRIMAZOLE: 1 CREAM TOPICAL at 21:25

## 2023-09-08 RX ADMIN — MIDAZOLAM HYDROCHLORIDE 2 MG: 1 INJECTION, SOLUTION INTRAMUSCULAR; INTRAVENOUS at 07:49

## 2023-09-08 RX ADMIN — SODIUM CHLORIDE: 9 INJECTION, SOLUTION INTRAVENOUS at 07:42

## 2023-09-08 RX ADMIN — SODIUM CHLORIDE, PRESERVATIVE FREE 10 ML: 5 INJECTION INTRAVENOUS at 21:24

## 2023-09-08 RX ADMIN — INSULIN LISPRO 1 UNITS: 100 INJECTION, SOLUTION INTRAVENOUS; SUBCUTANEOUS at 14:19

## 2023-09-08 RX ADMIN — INSULIN LISPRO 12 UNITS: 100 INJECTION, SOLUTION INTRAVENOUS; SUBCUTANEOUS at 17:09

## 2023-09-08 RX ADMIN — ZOLPIDEM TARTRATE 5 MG: 5 TABLET ORAL at 21:22

## 2023-09-08 RX ADMIN — MORPHINE SULFATE 1 MG: 2 INJECTION, SOLUTION INTRAMUSCULAR; INTRAVENOUS at 17:12

## 2023-09-08 RX ADMIN — MORPHINE SULFATE 1 MG: 2 INJECTION, SOLUTION INTRAMUSCULAR; INTRAVENOUS at 23:12

## 2023-09-08 RX ADMIN — DIPHENHYDRAMINE HYDROCHLORIDE 50 MG: 25 CAPSULE ORAL at 21:08

## 2023-09-08 RX ADMIN — GABAPENTIN 800 MG: 400 CAPSULE ORAL at 14:17

## 2023-09-08 SDOH — SOCIAL STABILITY: SOCIAL INSECURITY: RISK FACTORS: KIDNEY DISEASE

## 2023-09-08 SDOH — SOCIAL STABILITY: SOCIAL INSECURITY: RISK FACTORS: PULMONARY DISEASE

## 2023-09-08 SDOH — SOCIAL STABILITY: SOCIAL INSECURITY: RISK FACTORS: HEMATOLOGICAL DISEASE

## 2023-09-08 SDOH — SOCIAL STABILITY: SOCIAL INSECURITY: RISK FACTORS: NEUROLOGICAL DISEASE

## 2023-09-08 SDOH — SOCIAL STABILITY: SOCIAL INSECURITY: RISK FACTORS: BMI> 30 (OBESITY)

## 2023-09-08 ASSESSMENT — PAIN SCALES - GENERAL
PAINLEVEL_OUTOF10: 0
PAINLEVEL_OUTOF10: 8
PAINLEVEL_OUTOF10: 0
PAINLEVEL_OUTOF10: 0
PAINLEVEL_OUTOF10: 8
PAINLEVEL_OUTOF10: 0

## 2023-09-09 VITALS
DIASTOLIC BLOOD PRESSURE: 97 MMHG | HEIGHT: 72 IN | TEMPERATURE: 98.4 F | RESPIRATION RATE: 18 BRPM | SYSTOLIC BLOOD PRESSURE: 136 MMHG | HEART RATE: 77 BPM | WEIGHT: 257.94 LBS | BODY MASS INDEX: 34.94 KG/M2 | OXYGEN SATURATION: 100 %

## 2023-09-09 LAB
CREAT SERPL-MCNC: 1.04 MG/DL (ref 0.67–1.17)
DEPRECATED RDW RBC: 41.5 FL (ref 39–50)
EGFRCR SERPLBLD CKD-EPI 2021: 84 ML/MIN/{1.73_M2}
ERYTHROCYTE [DISTWIDTH] IN BLOOD: 13.8 % (ref 11–15)
GLUCOSE BLDC GLUCOMTR-MCNC: 238 MG/DL (ref 70–99)
GLUCOSE BLDC GLUCOMTR-MCNC: 244 MG/DL (ref 70–99)
HCT VFR BLD CALC: 37.3 % (ref 39–51)
HGB BLD-MCNC: 13 G/DL (ref 13–17)
LACTATE BLDV-SCNC: 1.1 MMOL/L (ref 0–2)
MAGNESIUM SERPL-MCNC: 2.1 MG/DL (ref 1.7–2.4)
MCH RBC QN AUTO: 29.1 PG (ref 26–34)
MCHC RBC AUTO-ENTMCNC: 34.9 G/DL (ref 32–36.5)
MCV RBC AUTO: 83.4 FL (ref 78–100)
NRBC BLD MANUAL-RTO: 0 /100 WBC
PLATELET # BLD AUTO: 105 K/MCL (ref 140–450)
RBC # BLD: 4.47 MIL/MCL (ref 4.5–5.9)
VANCOMYCIN TROUGH SERPL-MCNC: 22.4 MCG/ML (ref 10–20)
WBC # BLD: 4.6 K/MCL (ref 4.2–11)

## 2023-09-09 PROCEDURE — 36415 COLL VENOUS BLD VENIPUNCTURE: CPT | Performed by: INTERNAL MEDICINE

## 2023-09-09 PROCEDURE — 10002801 HB RX 250 W/O HCPCS: Performed by: FAMILY MEDICINE

## 2023-09-09 PROCEDURE — 82565 ASSAY OF CREATININE: CPT | Performed by: INTERNAL MEDICINE

## 2023-09-09 PROCEDURE — 99024 POSTOP FOLLOW-UP VISIT: CPT | Performed by: SURGERY

## 2023-09-09 PROCEDURE — 10004651 HB RX, NO CHARGE ITEM: Performed by: STUDENT IN AN ORGANIZED HEALTH CARE EDUCATION/TRAINING PROGRAM

## 2023-09-09 PROCEDURE — 10002800 HB RX 250 W HCPCS: Performed by: FAMILY MEDICINE

## 2023-09-09 PROCEDURE — 83735 ASSAY OF MAGNESIUM: CPT | Performed by: INTERNAL MEDICINE

## 2023-09-09 PROCEDURE — 99239 HOSP IP/OBS DSCHRG MGMT >30: CPT | Performed by: INTERNAL MEDICINE

## 2023-09-09 PROCEDURE — 10002800 HB RX 250 W HCPCS: Performed by: STUDENT IN AN ORGANIZED HEALTH CARE EDUCATION/TRAINING PROGRAM

## 2023-09-09 PROCEDURE — 10002803 HB RX 637: Performed by: STUDENT IN AN ORGANIZED HEALTH CARE EDUCATION/TRAINING PROGRAM

## 2023-09-09 PROCEDURE — 10002807 HB RX 258: Performed by: FAMILY MEDICINE

## 2023-09-09 PROCEDURE — 80202 ASSAY OF VANCOMYCIN: CPT | Performed by: INTERNAL MEDICINE

## 2023-09-09 PROCEDURE — 83605 ASSAY OF LACTIC ACID: CPT | Performed by: INTERNAL MEDICINE

## 2023-09-09 PROCEDURE — 96372 THER/PROPH/DIAG INJ SC/IM: CPT | Performed by: INTERNAL MEDICINE

## 2023-09-09 PROCEDURE — 10004281 HB COUNTER-STAFF TIME PER 15 MIN

## 2023-09-09 PROCEDURE — 85027 COMPLETE CBC AUTOMATED: CPT | Performed by: INTERNAL MEDICINE

## 2023-09-09 PROCEDURE — 96372 THER/PROPH/DIAG INJ SC/IM: CPT | Performed by: STUDENT IN AN ORGANIZED HEALTH CARE EDUCATION/TRAINING PROGRAM

## 2023-09-09 PROCEDURE — 10002800 HB RX 250 W HCPCS: Performed by: INTERNAL MEDICINE

## 2023-09-09 RX ORDER — HYDROCODONE BITARTRATE AND ACETAMINOPHEN 5; 325 MG/1; MG/1
1 TABLET ORAL EVERY 8 HOURS PRN
Qty: 15 TABLET | Refills: 0 | Status: SHIPPED | OUTPATIENT
Start: 2023-09-09

## 2023-09-09 RX ORDER — PANTOPRAZOLE SODIUM 40 MG/1
40 TABLET, DELAYED RELEASE ORAL DAILY
Qty: 30 TABLET | Refills: 1 | Status: SHIPPED | OUTPATIENT
Start: 2023-09-09

## 2023-09-09 RX ORDER — CLOTRIMAZOLE 1 %
CREAM (GRAM) TOPICAL EVERY 12 HOURS SCHEDULED
Qty: 30 G | Refills: 1 | Status: ON HOLD | OUTPATIENT
Start: 2023-09-09 | End: 2023-09-20 | Stop reason: SDUPTHER

## 2023-09-09 RX ORDER — INSULIN GLARGINE 100 [IU]/ML
22 INJECTION, SOLUTION SUBCUTANEOUS EVERY EVENING
Qty: 15 ML | Refills: 12 | Status: SHIPPED | OUTPATIENT
Start: 2023-09-09 | End: 2023-09-13 | Stop reason: SDUPTHER

## 2023-09-09 RX ORDER — ASPIRIN 81 MG/1
81 TABLET ORAL DAILY
Qty: 30 TABLET | Refills: 0 | Status: ON HOLD | OUTPATIENT
Start: 2023-09-09 | End: 2023-09-20 | Stop reason: HOSPADM

## 2023-09-09 RX ORDER — NALOXONE HYDROCHLORIDE 4 MG/.1ML
SPRAY NASAL
Qty: 2 EACH | Refills: 1 | Status: SHIPPED | OUTPATIENT
Start: 2023-09-09

## 2023-09-09 RX ORDER — ACETAMINOPHEN 325 MG/1
325 TABLET ORAL EVERY 6 HOURS PRN
Status: SHIPPED | COMMUNITY
Start: 2023-09-09

## 2023-09-09 RX ORDER — GABAPENTIN 800 MG/1
800 TABLET ORAL 3 TIMES DAILY
Qty: 90 TABLET | Refills: 0 | Status: SHIPPED | OUTPATIENT
Start: 2023-09-09 | End: 2023-10-09

## 2023-09-09 RX ORDER — AMITRIPTYLINE HYDROCHLORIDE 150 MG/1
150 TABLET ORAL NIGHTLY
Qty: 30 TABLET | Refills: 0 | Status: SHIPPED | OUTPATIENT
Start: 2023-09-09 | End: 2023-09-13 | Stop reason: SDUPTHER

## 2023-09-09 RX ORDER — SULFAMETHOXAZOLE AND TRIMETHOPRIM 800; 160 MG/1; MG/1
1 TABLET ORAL 2 TIMES DAILY
Qty: 10 TABLET | Refills: 0 | Status: SHIPPED | OUTPATIENT
Start: 2023-09-09 | End: 2023-09-13 | Stop reason: SDUPTHER

## 2023-09-09 RX ADMIN — GABAPENTIN 800 MG: 400 CAPSULE ORAL at 05:50

## 2023-09-09 RX ADMIN — Medication 1750 MG: at 05:50

## 2023-09-09 RX ADMIN — SODIUM CHLORIDE, PRESERVATIVE FREE 10 ML: 5 INJECTION INTRAVENOUS at 10:12

## 2023-09-09 RX ADMIN — MORPHINE SULFATE 1 MG: 2 INJECTION, SOLUTION INTRAMUSCULAR; INTRAVENOUS at 12:02

## 2023-09-09 RX ADMIN — MORPHINE SULFATE 1 MG: 2 INJECTION, SOLUTION INTRAMUSCULAR; INTRAVENOUS at 05:56

## 2023-09-09 RX ADMIN — INSULIN LISPRO 12 UNITS: 100 INJECTION, SOLUTION INTRAVENOUS; SUBCUTANEOUS at 12:06

## 2023-09-09 RX ADMIN — INSULIN LISPRO 2 UNITS: 100 INJECTION, SOLUTION INTRAVENOUS; SUBCUTANEOUS at 12:08

## 2023-09-09 RX ADMIN — SODIUM CHLORIDE, PRESERVATIVE FREE 2 ML: 5 INJECTION INTRAVENOUS at 09:00

## 2023-09-09 RX ADMIN — PANTOPRAZOLE SODIUM 40 MG: 40 TABLET, DELAYED RELEASE ORAL at 10:12

## 2023-09-09 RX ADMIN — APIXABAN 5 MG: 5 TABLET, FILM COATED ORAL at 10:12

## 2023-09-09 RX ADMIN — CLOTRIMAZOLE: 1 CREAM TOPICAL at 09:00

## 2023-09-09 RX ADMIN — ASPIRIN 81 MG: 81 TABLET, COATED ORAL at 10:12

## 2023-09-09 ASSESSMENT — PAIN SCALES - GENERAL
PAINLEVEL_OUTOF10: 4
PAINLEVEL_OUTOF10: 8
PAINLEVEL_OUTOF10: 5
PAINLEVEL_OUTOF10: 3
PAINLEVEL_OUTOF10: 0
PAINLEVEL_OUTOF10: 10

## 2023-09-10 DIAGNOSIS — E11.69 TYPE 2 DIABETES MELLITUS WITH OTHER SPECIFIED COMPLICATION, WITHOUT LONG-TERM CURRENT USE OF INSULIN (HCC): ICD-10-CM

## 2023-09-11 LAB
BACTERIA BLD CULT: NORMAL
BACTERIA BLD CULT: NORMAL

## 2023-09-12 ENCOUNTER — APPOINTMENT (OUTPATIENT)
Dept: GENERAL RADIOLOGY | Age: 58
End: 2023-09-12
Attending: EMERGENCY MEDICINE

## 2023-09-12 ENCOUNTER — HOSPITAL ENCOUNTER (OUTPATIENT)
Age: 58
Setting detail: OBSERVATION
LOS: 2 days | Discharge: HOME | End: 2023-09-14
Attending: EMERGENCY MEDICINE | Admitting: INTERNAL MEDICINE

## 2023-09-12 ENCOUNTER — APPOINTMENT (OUTPATIENT)
Dept: CT IMAGING | Age: 58
End: 2023-09-12
Attending: EMERGENCY MEDICINE
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
## 2023-09-12 DIAGNOSIS — R11.2 NAUSEA AND VOMITING, UNSPECIFIED VOMITING TYPE: ICD-10-CM

## 2023-09-12 DIAGNOSIS — R73.9 HYPERGLYCEMIA: Primary | ICD-10-CM

## 2023-09-12 DIAGNOSIS — L03.90 WOUND CELLULITIS: ICD-10-CM

## 2023-09-12 PROBLEM — A41.9 SEPSIS (CMD): Status: RESOLVED | Noted: 2023-09-06 | Resolved: 2023-09-12

## 2023-09-12 PROBLEM — L03.221 CELLULITIS OF NECK: Status: RESOLVED | Noted: 2023-09-06 | Resolved: 2023-09-12

## 2023-09-12 PROBLEM — Z13.9 ENCOUNTER FOR SCREENING INVOLVING SOCIAL DETERMINANTS OF HEALTH (SDOH): Status: RESOLVED | Noted: 2023-08-12 | Resolved: 2023-09-12

## 2023-09-12 PROBLEM — E87.20 LACTIC ACIDOSIS: Status: RESOLVED | Noted: 2023-09-06 | Resolved: 2023-09-12

## 2023-09-12 LAB
ALBUMIN SERPL-MCNC: 3.5 G/DL (ref 3.6–5.1)
ALBUMIN/GLOB SERPL: 0.9 {RATIO} (ref 1–2.4)
ALP SERPL-CCNC: 187 UNITS/L (ref 45–117)
ALT SERPL-CCNC: 29 UNITS/L
ANION GAP SERPL CALC-SCNC: 13 MMOL/L (ref 7–19)
APPEARANCE UR: CLEAR
APTT PPP: 26 SEC (ref 22–30)
AST SERPL-CCNC: 15 UNITS/L
ATRIAL RATE (BPM): 97
BACTERIA SPEC ANAEROBE+AEROBE CULT: ABNORMAL
BASOPHILS # BLD: 0 K/MCL (ref 0–0.3)
BASOPHILS NFR BLD: 0 %
BILIRUB SERPL-MCNC: 0.7 MG/DL (ref 0.2–1)
BILIRUB UR QL STRIP: NEGATIVE
BUN SERPL-MCNC: 12 MG/DL (ref 6–20)
BUN/CREAT SERPL: 9 (ref 7–25)
CALCIUM SERPL-MCNC: 9.2 MG/DL (ref 8.4–10.2)
CHLORIDE SERPL-SCNC: 98 MMOL/L (ref 97–110)
CO2 SERPL-SCNC: 24 MMOL/L (ref 21–32)
COLOR UR: COLORLESS
CREAT SERPL-MCNC: 1.35 MG/DL (ref 0.67–1.17)
DEPRECATED RDW RBC: 41.9 FL (ref 39–50)
EGFRCR SERPLBLD CKD-EPI 2021: 61 ML/MIN/{1.73_M2}
EOSINOPHIL # BLD: 0.1 K/MCL (ref 0–0.5)
EOSINOPHIL NFR BLD: 1 %
ERYTHROCYTE [DISTWIDTH] IN BLOOD: 13.9 % (ref 11–15)
FASTING DURATION TIME PATIENT: ABNORMAL H
GLOBULIN SER-MCNC: 4 G/DL (ref 2–4)
GLUCOSE BLDC GLUCOMTR-MCNC: 227 MG/DL (ref 70–99)
GLUCOSE BLDC GLUCOMTR-MCNC: 233 MG/DL (ref 70–99)
GLUCOSE BLDC GLUCOMTR-MCNC: 233 MG/DL (ref 70–99)
GLUCOSE BLDC GLUCOMTR-MCNC: 249 MG/DL (ref 70–99)
GLUCOSE BLDC GLUCOMTR-MCNC: 266 MG/DL (ref 70–99)
GLUCOSE BLDC GLUCOMTR-MCNC: 266 MG/DL (ref 70–99)
GLUCOSE BLDC GLUCOMTR-MCNC: 356 MG/DL (ref 70–99)
GLUCOSE BLDC GLUCOMTR-MCNC: 368 MG/DL (ref 70–99)
GLUCOSE BLDC GLUCOMTR-MCNC: 460 MG/DL (ref 70–99)
GLUCOSE BLDC GLUCOMTR-MCNC: 540 MG/DL (ref 70–99)
GLUCOSE BLDC GLUCOMTR-MCNC: 576 MG/DL (ref 70–99)
GLUCOSE BLDC GLUCOMTR-MCNC: >600 MG/DL (ref 70–99)
GLUCOSE SERPL-MCNC: 724 MG/DL (ref 70–99)
GLUCOSE UR STRIP-MCNC: >1000 MG/DL
GRAM STN SPEC: ABNORMAL
HCT VFR BLD CALC: 42.2 % (ref 39–51)
HGB BLD-MCNC: 14.7 G/DL (ref 13–17)
HGB UR QL STRIP: NEGATIVE
IMM GRANULOCYTES # BLD AUTO: 0.1 K/MCL (ref 0–0.2)
IMM GRANULOCYTES # BLD: 1 %
INR PPP: 1
KETONES UR STRIP-MCNC: NEGATIVE MG/DL
LEUKOCYTE ESTERASE UR QL STRIP: NEGATIVE
LIPASE SERPL-CCNC: 33 UNITS/L (ref 15–77)
LYMPHOCYTES # BLD: 1.3 K/MCL (ref 1–4)
LYMPHOCYTES NFR BLD: 22 %
MCH RBC QN AUTO: 28.9 PG (ref 26–34)
MCHC RBC AUTO-ENTMCNC: 34.8 G/DL (ref 32–36.5)
MCV RBC AUTO: 83.1 FL (ref 78–100)
MONOCYTES # BLD: 0.6 K/MCL (ref 0.3–0.9)
MONOCYTES NFR BLD: 10 %
NEUTROPHILS # BLD: 3.8 K/MCL (ref 1.8–7.7)
NEUTROPHILS NFR BLD: 66 %
NITRITE UR QL STRIP: NEGATIVE
NRBC BLD MANUAL-RTO: 0 /100 WBC
P AXIS (DEGREES): 48
PH UR STRIP: 6.5 [PH] (ref 5–7)
PLATELET # BLD AUTO: 106 K/MCL (ref 140–450)
POTASSIUM SERPL-SCNC: 4.4 MMOL/L (ref 3.4–5.1)
PR-INTERVAL (MSEC): 200
PROT SERPL-MCNC: 7.5 G/DL (ref 6.4–8.2)
PROT UR STRIP-MCNC: NEGATIVE MG/DL
PROTHROMBIN TIME: 10.4 SEC (ref 9.7–11.8)
QRS-INTERVAL (MSEC): 84
QT-INTERVAL (MSEC): 332
QTC: 422
R AXIS (DEGREES): 40
RAINBOW EXTRA TUBES HOLD SPECIMEN: NORMAL
RBC # BLD: 5.08 MIL/MCL (ref 4.5–5.9)
REPORT TEXT: NORMAL
SODIUM SERPL-SCNC: 131 MMOL/L (ref 135–145)
SP GR UR STRIP: 1.02 (ref 1–1.03)
T AXIS (DEGREES): 34
TROPONIN I SERPL DL<=0.01 NG/ML-MCNC: 6 NG/L
UROBILINOGEN UR STRIP-MCNC: 0.2 MG/DL
VENTRICULAR RATE EKG/MIN (BPM): 97
WBC # BLD: 5.9 K/MCL (ref 4.2–11)

## 2023-09-12 PROCEDURE — 10002803 HB RX 637: Performed by: INTERNAL MEDICINE

## 2023-09-12 PROCEDURE — 85025 COMPLETE CBC W/AUTO DIFF WBC: CPT | Performed by: EMERGENCY MEDICINE

## 2023-09-12 PROCEDURE — 81003 URINALYSIS AUTO W/O SCOPE: CPT | Performed by: EMERGENCY MEDICINE

## 2023-09-12 PROCEDURE — 73562 X-RAY EXAM OF KNEE 3: CPT

## 2023-09-12 PROCEDURE — 74176 CT ABD & PELVIS W/O CONTRAST: CPT

## 2023-09-12 PROCEDURE — 96366 THER/PROPH/DIAG IV INF ADDON: CPT

## 2023-09-12 PROCEDURE — 10002805 HB CONTRAST AGENT: Performed by: EMERGENCY MEDICINE

## 2023-09-12 PROCEDURE — 96365 THER/PROPH/DIAG IV INF INIT: CPT

## 2023-09-12 PROCEDURE — 99223 1ST HOSP IP/OBS HIGH 75: CPT | Performed by: INTERNAL MEDICINE

## 2023-09-12 PROCEDURE — 10002807 HB RX 258

## 2023-09-12 PROCEDURE — 10006031 HB ROOM CHARGE TELEMETRY

## 2023-09-12 PROCEDURE — 96375 TX/PRO/DX INJ NEW DRUG ADDON: CPT

## 2023-09-12 PROCEDURE — 10002800 HB RX 250 W HCPCS: Performed by: EMERGENCY MEDICINE

## 2023-09-12 PROCEDURE — 99291 CRITICAL CARE FIRST HOUR: CPT

## 2023-09-12 PROCEDURE — 82962 GLUCOSE BLOOD TEST: CPT

## 2023-09-12 PROCEDURE — 10005281 CT LUMBAR SPINE 2D REFORMATTED

## 2023-09-12 PROCEDURE — 96374 THER/PROPH/DIAG INJ IV PUSH: CPT

## 2023-09-12 PROCEDURE — G1004 CDSM NDSC: HCPCS

## 2023-09-12 PROCEDURE — 10002807 HB RX 258: Performed by: EMERGENCY MEDICINE

## 2023-09-12 PROCEDURE — 80053 COMPREHEN METABOLIC PANEL: CPT | Performed by: EMERGENCY MEDICINE

## 2023-09-12 PROCEDURE — 10002807 HB RX 258: Performed by: INTERNAL MEDICINE

## 2023-09-12 PROCEDURE — 83690 ASSAY OF LIPASE: CPT | Performed by: EMERGENCY MEDICINE

## 2023-09-12 PROCEDURE — 93005 ELECTROCARDIOGRAM TRACING: CPT | Performed by: EMERGENCY MEDICINE

## 2023-09-12 PROCEDURE — 70450 CT HEAD/BRAIN W/O DYE: CPT

## 2023-09-12 PROCEDURE — 10002800 HB RX 250 W HCPCS: Performed by: INTERNAL MEDICINE

## 2023-09-12 PROCEDURE — 84484 ASSAY OF TROPONIN QUANT: CPT | Performed by: EMERGENCY MEDICINE

## 2023-09-12 PROCEDURE — 85610 PROTHROMBIN TIME: CPT | Performed by: EMERGENCY MEDICINE

## 2023-09-12 PROCEDURE — 96376 TX/PRO/DX INJ SAME DRUG ADON: CPT

## 2023-09-12 PROCEDURE — 85730 THROMBOPLASTIN TIME PARTIAL: CPT | Performed by: EMERGENCY MEDICINE

## 2023-09-12 PROCEDURE — 10002800 HB RX 250 W HCPCS: Performed by: STUDENT IN AN ORGANIZED HEALTH CARE EDUCATION/TRAINING PROGRAM

## 2023-09-12 PROCEDURE — 96361 HYDRATE IV INFUSION ADD-ON: CPT

## 2023-09-12 PROCEDURE — 96372 THER/PROPH/DIAG INJ SC/IM: CPT | Performed by: INTERNAL MEDICINE

## 2023-09-12 PROCEDURE — 10002803 HB RX 637

## 2023-09-12 RX ORDER — PROMETHAZINE HYDROCHLORIDE 12.5 MG/1
12.5 TABLET ORAL EVERY 6 HOURS PRN
COMMUNITY

## 2023-09-12 RX ORDER — INSULIN LISPRO 100 [IU]/ML
8 INJECTION, SOLUTION INTRAVENOUS; SUBCUTANEOUS ONCE
Status: DISCONTINUED | OUTPATIENT
Start: 2023-09-12 | End: 2023-09-14 | Stop reason: HOSPADM

## 2023-09-12 RX ORDER — HYDROCODONE BITARTRATE AND ACETAMINOPHEN 10; 325 MG/1; MG/1
1 TABLET ORAL ONCE
Status: COMPLETED | OUTPATIENT
Start: 2023-09-12 | End: 2023-09-12

## 2023-09-12 RX ORDER — NICOTINE POLACRILEX 4 MG
15 LOZENGE BUCCAL PRN
Status: DISCONTINUED | OUTPATIENT
Start: 2023-09-12 | End: 2023-09-14 | Stop reason: HOSPADM

## 2023-09-12 RX ORDER — INSULIN GLARGINE 100 [IU]/ML
11 INJECTION, SOLUTION SUBCUTANEOUS ONCE
Status: DISCONTINUED | OUTPATIENT
Start: 2023-09-12 | End: 2023-09-12

## 2023-09-12 RX ORDER — ZOLPIDEM TARTRATE 10 MG/1
10 TABLET ORAL AT BEDTIME
COMMUNITY
End: 2023-09-12

## 2023-09-12 RX ORDER — PROCHLORPERAZINE EDISYLATE 5 MG/ML
10 INJECTION INTRAMUSCULAR; INTRAVENOUS ONCE
Status: COMPLETED | OUTPATIENT
Start: 2023-09-12 | End: 2023-09-12

## 2023-09-12 RX ORDER — SODIUM CHLORIDE 9 MG/ML
INJECTION, SOLUTION INTRAVENOUS CONTINUOUS
Status: ACTIVE | OUTPATIENT
Start: 2023-09-12 | End: 2023-09-13

## 2023-09-12 RX ORDER — AMITRIPTYLINE HYDROCHLORIDE 100 MG/1
150 TABLET ORAL NIGHTLY
Status: DISCONTINUED | OUTPATIENT
Start: 2023-09-12 | End: 2023-09-14 | Stop reason: HOSPADM

## 2023-09-12 RX ORDER — INSULIN LISPRO 100 [IU]/ML
8 INJECTION, SOLUTION INTRAVENOUS; SUBCUTANEOUS
Status: DISCONTINUED | OUTPATIENT
Start: 2023-09-12 | End: 2023-09-12

## 2023-09-12 RX ORDER — PANTOPRAZOLE SODIUM 40 MG/1
40 TABLET, DELAYED RELEASE ORAL
Status: DISCONTINUED | OUTPATIENT
Start: 2023-09-13 | End: 2023-09-14 | Stop reason: HOSPADM

## 2023-09-12 RX ORDER — ONDANSETRON 2 MG/ML
4 INJECTION INTRAMUSCULAR; INTRAVENOUS ONCE
Status: COMPLETED | OUTPATIENT
Start: 2023-09-12 | End: 2023-09-12

## 2023-09-12 RX ORDER — CLOTRIMAZOLE 1 %
CREAM (GRAM) TOPICAL EVERY 12 HOURS SCHEDULED
Status: DISCONTINUED | OUTPATIENT
Start: 2023-09-12 | End: 2023-09-14 | Stop reason: HOSPADM

## 2023-09-12 RX ORDER — NICOTINE 21 MG/24HR
1 PATCH, TRANSDERMAL 24 HOURS TRANSDERMAL DAILY
Status: DISCONTINUED | OUTPATIENT
Start: 2023-09-12 | End: 2023-09-14 | Stop reason: HOSPADM

## 2023-09-12 RX ORDER — INSULIN GLARGINE 100 [IU]/ML
22 INJECTION, SOLUTION SUBCUTANEOUS NIGHTLY
Status: DISCONTINUED | OUTPATIENT
Start: 2023-09-12 | End: 2023-09-14 | Stop reason: HOSPADM

## 2023-09-12 RX ORDER — HYDROCODONE BITARTRATE AND ACETAMINOPHEN 5; 325 MG/1; MG/1
1 TABLET ORAL EVERY 8 HOURS PRN
Status: DISCONTINUED | OUTPATIENT
Start: 2023-09-12 | End: 2023-09-14 | Stop reason: HOSPADM

## 2023-09-12 RX ORDER — SULFAMETHOXAZOLE AND TRIMETHOPRIM 800; 160 MG/1; MG/1
1 TABLET ORAL EVERY 12 HOURS SCHEDULED
Status: DISCONTINUED | OUTPATIENT
Start: 2023-09-12 | End: 2023-09-14 | Stop reason: HOSPADM

## 2023-09-12 RX ORDER — DEXTROSE MONOHYDRATE 25 G/50ML
25 INJECTION, SOLUTION INTRAVENOUS PRN
Status: DISCONTINUED | OUTPATIENT
Start: 2023-09-12 | End: 2023-09-14 | Stop reason: HOSPADM

## 2023-09-12 RX ORDER — NICOTINE POLACRILEX 4 MG
30 LOZENGE BUCCAL PRN
Status: DISCONTINUED | OUTPATIENT
Start: 2023-09-12 | End: 2023-09-14 | Stop reason: HOSPADM

## 2023-09-12 RX ORDER — GABAPENTIN 400 MG/1
800 CAPSULE ORAL EVERY 12 HOURS SCHEDULED
Status: DISCONTINUED | OUTPATIENT
Start: 2023-09-12 | End: 2023-09-14 | Stop reason: HOSPADM

## 2023-09-12 RX ORDER — GABAPENTIN 800 MG/1
800 TABLET ORAL 3 TIMES DAILY
Qty: 90 TABLET | Refills: 3 | Status: SHIPPED | OUTPATIENT
Start: 2023-09-12

## 2023-09-12 RX ORDER — DIPHENHYDRAMINE HYDROCHLORIDE 50 MG/ML
25 INJECTION INTRAMUSCULAR; INTRAVENOUS ONCE
Status: COMPLETED | OUTPATIENT
Start: 2023-09-12 | End: 2023-09-12

## 2023-09-12 RX ORDER — ASPIRIN 81 MG/1
81 TABLET ORAL DAILY
Status: DISCONTINUED | OUTPATIENT
Start: 2023-09-13 | End: 2023-09-14 | Stop reason: HOSPADM

## 2023-09-12 RX ORDER — DEXTROSE MONOHYDRATE 25 G/50ML
12.5 INJECTION, SOLUTION INTRAVENOUS PRN
Status: DISCONTINUED | OUTPATIENT
Start: 2023-09-12 | End: 2023-09-14 | Stop reason: HOSPADM

## 2023-09-12 RX ADMIN — ONDANSETRON 4 MG: 2 INJECTION INTRAMUSCULAR; INTRAVENOUS at 03:21

## 2023-09-12 RX ADMIN — INSULIN GLARGINE 22 UNITS: 100 INJECTION, SOLUTION SUBCUTANEOUS at 23:02

## 2023-09-12 RX ADMIN — IOHEXOL 500 ML: 350 INJECTION, SOLUTION INTRAVENOUS at 19:55

## 2023-09-12 RX ADMIN — HYDROCODONE BITARTRATE AND ACETAMINOPHEN 1 TABLET: 10; 325 TABLET ORAL at 10:23

## 2023-09-12 RX ADMIN — AMITRIPTYLINE HYDROCHLORIDE 150 MG: 100 TABLET, FILM COATED ORAL at 23:00

## 2023-09-12 RX ADMIN — INSULIN HUMAN 5.5 UNITS/HR: 1 INJECTION, SOLUTION INTRAVENOUS at 05:24

## 2023-09-12 RX ADMIN — SODIUM CHLORIDE 1000 ML: 9 INJECTION, SOLUTION INTRAVENOUS at 07:00

## 2023-09-12 RX ADMIN — SODIUM CHLORIDE 1000 ML: 9 INJECTION, SOLUTION INTRAVENOUS at 15:19

## 2023-09-12 RX ADMIN — SODIUM CHLORIDE: 9 INJECTION, SOLUTION INTRAVENOUS at 15:21

## 2023-09-12 RX ADMIN — APIXABAN 5 MG: 5 TABLET, FILM COATED ORAL at 21:55

## 2023-09-12 RX ADMIN — SULFAMETHOXAZOLE AND TRIMETHOPRIM 1 TABLET: 800; 160 TABLET ORAL at 21:55

## 2023-09-12 RX ADMIN — GABAPENTIN 800 MG: 400 CAPSULE ORAL at 22:59

## 2023-09-12 RX ADMIN — PROCHLORPERAZINE EDISYLATE 10 MG: 5 INJECTION INTRAMUSCULAR; INTRAVENOUS at 03:18

## 2023-09-12 RX ADMIN — SODIUM CHLORIDE, POTASSIUM CHLORIDE, SODIUM LACTATE AND CALCIUM CHLORIDE 1000 ML: 600; 310; 30; 20 INJECTION, SOLUTION INTRAVENOUS at 04:23

## 2023-09-12 RX ADMIN — INSULIN LISPRO 3 UNITS: 100 INJECTION, SOLUTION INTRAVENOUS; SUBCUTANEOUS at 18:07

## 2023-09-12 RX ADMIN — DIPHENHYDRAMINE HYDROCHLORIDE 25 MG: 50 INJECTION, SOLUTION INTRAMUSCULAR; INTRAVENOUS at 03:22

## 2023-09-12 SDOH — HEALTH STABILITY: PHYSICAL HEALTH: DO YOU HAVE DIFFICULTY DRESSING OR BATHING?: NO

## 2023-09-12 SDOH — ECONOMIC STABILITY: HOUSING INSECURITY: WHAT IS YOUR LIVING SITUATION TODAY?: APARTMENT

## 2023-09-12 SDOH — ECONOMIC STABILITY: HOUSING INSECURITY: WHAT IS YOUR LIVING SITUATION TODAY?: ALONE

## 2023-09-12 SDOH — HEALTH STABILITY: GENERAL: BECAUSE OF A PHYSICAL, MENTAL, OR EMOTIONAL CONDITION, DO YOU HAVE DIFFICULTY DOING ERRANDS ALONE?: YES

## 2023-09-12 SDOH — HEALTH STABILITY: PHYSICAL HEALTH: DO YOU HAVE SERIOUS DIFFICULTY WALKING OR CLIMBING STAIRS?: YES

## 2023-09-12 SDOH — ECONOMIC STABILITY: TRANSPORTATION INSECURITY
IN THE PAST 12 MONTHS, HAS LACK OF TRANSPORTATION KEPT YOU FROM MEETINGS, WORK, OR FROM GETTING THINGS NEEDED FOR DAILY LIVING?: YES

## 2023-09-12 SDOH — SOCIAL STABILITY: SOCIAL NETWORK

## 2023-09-12 SDOH — ECONOMIC STABILITY: FOOD INSECURITY: HOW OFTEN IN THE PAST 12 MONTHS WERE YOU WORRIED OR STRESSED ABOUT HAVING ENOUGH MONEY TO BUY NUTRITIOUS MEALS?: ALWAYS

## 2023-09-12 SDOH — ECONOMIC STABILITY: GENERAL
IN THE PAST YEAR, HAVE YOU OR ANY FAMILY MEMBERS YOU LIVE WITH BEEN UNABLE TO GET ANY OF THE FOLLOWING WHEN IT WAS REALLY NEEDED? CHECK ALL THAT APPLY.: MEDICINE OR ANY HEALTH CARE (MEDICAL, DENTAL, MENTAL HEALTH, VISION);UTILITIES;CLOTHING

## 2023-09-12 SDOH — ECONOMIC STABILITY: HOUSING INSECURITY: ARE YOU WORRIED ABOUT LOSING YOUR HOUSING?: NO

## 2023-09-12 ASSESSMENT — ENCOUNTER SYMPTOMS
VOMITING: 1
FEVER: 0
SORE THROAT: 0
DIARRHEA: 0
SHORTNESS OF BREATH: 0
DIAPHORESIS: 0
ABDOMINAL PAIN: 1
NAUSEA: 1
HEADACHES: 1

## 2023-09-12 ASSESSMENT — LIFESTYLE VARIABLES
HOW OFTEN DO YOU HAVE A DRINK CONTAINING ALCOHOL: NEVER
AUDIT-C TOTAL SCORE: 0
ALCOHOL_USE_STATUS: NO OR LOW RISK WITH VALIDATED TOOL
HOW OFTEN DO YOU HAVE 6 OR MORE DRINKS ON ONE OCCASION: NEVER
HOW MANY STANDARD DRINKS CONTAINING ALCOHOL DO YOU HAVE ON A TYPICAL DAY: 0,1 OR 2

## 2023-09-12 ASSESSMENT — PATIENT HEALTH QUESTIONNAIRE - PHQ9
2. FEELING DOWN, DEPRESSED OR HOPELESS: NOT AT ALL
CLINICAL INTERPRETATION OF PHQ2 SCORE: NO FURTHER SCREENING NEEDED
1. LITTLE INTEREST OR PLEASURE IN DOING THINGS: NOT AT ALL
IS PATIENT ABLE TO COMPLETE PHQ2 OR PHQ9: YES
SUM OF ALL RESPONSES TO PHQ9 QUESTIONS 1 AND 2: 0
SUM OF ALL RESPONSES TO PHQ9 QUESTIONS 1 AND 2: 0

## 2023-09-12 ASSESSMENT — ACTIVITIES OF DAILY LIVING (ADL)
ADL_BEFORE_ADMISSION: INDEPENDENT
RECENT_DECLINE_ADL: NO
ADL_SHORT_OF_BREATH: NO
ADL_SCORE: 12

## 2023-09-12 ASSESSMENT — COLUMBIA-SUICIDE SEVERITY RATING SCALE - C-SSRS
1. WITHIN THE PAST MONTH, HAVE YOU WISHED YOU WERE DEAD OR WISHED YOU COULD GO TO SLEEP AND NOT WAKE UP?: NO
2. HAVE YOU ACTUALLY HAD ANY THOUGHTS OF KILLING YOURSELF?: NO
6. HAVE YOU EVER DONE ANYTHING, STARTED TO DO ANYTHING, OR PREPARED TO DO ANYTHING TO END YOUR LIFE?: NO
IS THE PATIENT ABLE TO COMPLETE C-SSRS: YES

## 2023-09-12 ASSESSMENT — PAIN DESCRIPTION - PAIN TYPE: TYPE: ACUTE PAIN

## 2023-09-12 ASSESSMENT — PAIN SCALES - GENERAL: PAINLEVEL_OUTOF10: 8

## 2023-09-13 LAB
GLUCOSE BLDC GLUCOMTR-MCNC: 208 MG/DL (ref 70–99)
GLUCOSE BLDC GLUCOMTR-MCNC: 266 MG/DL (ref 70–99)
GLUCOSE BLDC GLUCOMTR-MCNC: 342 MG/DL (ref 70–99)

## 2023-09-13 PROCEDURE — 96372 THER/PROPH/DIAG INJ SC/IM: CPT | Performed by: INTERNAL MEDICINE

## 2023-09-13 PROCEDURE — 10004651 HB RX, NO CHARGE ITEM: Performed by: INTERNAL MEDICINE

## 2023-09-13 PROCEDURE — 82962 GLUCOSE BLOOD TEST: CPT

## 2023-09-13 PROCEDURE — 10002803 HB RX 637: Performed by: INTERNAL MEDICINE

## 2023-09-13 PROCEDURE — 10002800 HB RX 250 W HCPCS: Performed by: INTERNAL MEDICINE

## 2023-09-13 PROCEDURE — 10000002 HB ROOM CHARGE MED SURG

## 2023-09-13 PROCEDURE — 99233 SBSQ HOSP IP/OBS HIGH 50: CPT | Performed by: INTERNAL MEDICINE

## 2023-09-13 PROCEDURE — 36591 DRAW BLOOD OFF VENOUS DEVICE: CPT

## 2023-09-13 RX ORDER — DIPHENHYDRAMINE HCL 50 MG
50 CAPSULE ORAL NIGHTLY
COMMUNITY

## 2023-09-13 RX ORDER — AMITRIPTYLINE HYDROCHLORIDE 150 MG/1
150 TABLET ORAL NIGHTLY
Qty: 30 TABLET | Refills: 0 | Status: CANCELLED | OUTPATIENT
Start: 2023-09-13 | End: 2023-10-13

## 2023-09-13 RX ORDER — SULFAMETHOXAZOLE AND TRIMETHOPRIM 800; 160 MG/1; MG/1
1 TABLET ORAL 2 TIMES DAILY
Qty: 10 TABLET | Refills: 0 | Status: ON HOLD | OUTPATIENT
Start: 2023-09-13 | End: 2023-09-20

## 2023-09-13 RX ORDER — HYDROCODONE BITARTRATE AND ACETAMINOPHEN 10; 325 MG/1; MG/1
1 TABLET ORAL EVERY 8 HOURS PRN
Status: DISCONTINUED | OUTPATIENT
Start: 2023-09-13 | End: 2023-09-14 | Stop reason: HOSPADM

## 2023-09-13 RX ORDER — AMITRIPTYLINE HYDROCHLORIDE 150 MG/1
150 TABLET ORAL NIGHTLY
Qty: 30 TABLET | Refills: 0 | Status: SHIPPED | OUTPATIENT
Start: 2023-09-13 | End: 2023-10-13

## 2023-09-13 RX ORDER — INSULIN GLARGINE 100 [IU]/ML
22 INJECTION, SOLUTION SUBCUTANEOUS EVERY EVENING
Qty: 15 ML | Refills: 12 | Status: SHIPPED | OUTPATIENT
Start: 2023-09-13 | End: 2023-09-14 | Stop reason: HOSPADM

## 2023-09-13 RX ORDER — ZOLPIDEM TARTRATE 10 MG/1
10 TABLET ORAL NIGHTLY
COMMUNITY
End: 2023-09-19 | Stop reason: CLARIF

## 2023-09-13 RX ORDER — INSULIN LISPRO 100 [IU]/ML
2 INJECTION, SOLUTION INTRAVENOUS; SUBCUTANEOUS
Status: DISCONTINUED | OUTPATIENT
Start: 2023-09-13 | End: 2023-09-13

## 2023-09-13 RX ORDER — INSULIN LISPRO 100 [IU]/ML
8 INJECTION, SOLUTION INTRAVENOUS; SUBCUTANEOUS
Status: DISCONTINUED | OUTPATIENT
Start: 2023-09-13 | End: 2023-09-14

## 2023-09-13 RX ADMIN — SULFAMETHOXAZOLE AND TRIMETHOPRIM 1 TABLET: 800; 160 TABLET ORAL at 23:48

## 2023-09-13 RX ADMIN — SULFAMETHOXAZOLE AND TRIMETHOPRIM 1 TABLET: 800; 160 TABLET ORAL at 09:29

## 2023-09-13 RX ADMIN — AMITRIPTYLINE HYDROCHLORIDE 150 MG: 100 TABLET, FILM COATED ORAL at 23:48

## 2023-09-13 RX ADMIN — HYDROCODONE BITARTRATE AND ACETAMINOPHEN 1 TABLET: 5; 325 TABLET ORAL at 20:17

## 2023-09-13 RX ADMIN — INSULIN LISPRO 12 UNITS: 100 INJECTION, SOLUTION INTRAVENOUS; SUBCUTANEOUS at 21:48

## 2023-09-13 RX ADMIN — APIXABAN 5 MG: 5 TABLET, FILM COATED ORAL at 22:40

## 2023-09-13 RX ADMIN — PANTOPRAZOLE SODIUM 40 MG: 40 TABLET, DELAYED RELEASE ORAL at 09:29

## 2023-09-13 RX ADMIN — INSULIN LISPRO 15 UNITS: 100 INJECTION, SOLUTION INTRAVENOUS; SUBCUTANEOUS at 10:22

## 2023-09-13 RX ADMIN — GABAPENTIN 800 MG: 400 CAPSULE ORAL at 23:47

## 2023-09-13 RX ADMIN — ASPIRIN 81 MG: 81 TABLET, COATED ORAL at 09:29

## 2023-09-13 RX ADMIN — APIXABAN 5 MG: 5 TABLET, FILM COATED ORAL at 09:29

## 2023-09-13 RX ADMIN — INSULIN GLARGINE 22 UNITS: 100 INJECTION, SOLUTION SUBCUTANEOUS at 23:48

## 2023-09-13 RX ADMIN — CLOTRIMAZOLE: 1 CREAM TOPICAL at 23:49

## 2023-09-13 ASSESSMENT — COGNITIVE AND FUNCTIONAL STATUS - GENERAL
DO YOU HAVE SERIOUS DIFFICULTY WALKING OR CLIMBING STAIRS: YES
BECAUSE OF A PHYSICAL, MENTAL, OR EMOTIONAL CONDITION, DO YOU HAVE SERIOUS DIFFICULTY CONCENTRATING, REMEMBERING OR MAKING DECISIONS: YES
DO YOU HAVE DIFFICULTY DRESSING OR BATHING: YES

## 2023-09-13 ASSESSMENT — PAIN SCALES - GENERAL
PAINLEVEL_OUTOF10: 9
PAINLEVEL_OUTOF10: 10
PAINLEVEL_OUTOF10: 10

## 2023-09-14 VITALS
SYSTOLIC BLOOD PRESSURE: 129 MMHG | OXYGEN SATURATION: 99 % | TEMPERATURE: 96.6 F | HEART RATE: 86 BPM | DIASTOLIC BLOOD PRESSURE: 83 MMHG | RESPIRATION RATE: 17 BRPM

## 2023-09-14 LAB
GLUCOSE BLDC GLUCOMTR-MCNC: 167 MG/DL (ref 70–99)
GLUCOSE BLDC GLUCOMTR-MCNC: 238 MG/DL (ref 70–99)

## 2023-09-14 PROCEDURE — 82962 GLUCOSE BLOOD TEST: CPT

## 2023-09-14 PROCEDURE — 36591 DRAW BLOOD OFF VENOUS DEVICE: CPT

## 2023-09-14 PROCEDURE — 96372 THER/PROPH/DIAG INJ SC/IM: CPT | Performed by: INTERNAL MEDICINE

## 2023-09-14 PROCEDURE — 10002803 HB RX 637: Performed by: NURSE PRACTITIONER

## 2023-09-14 PROCEDURE — 10004281 HB COUNTER-STAFF TIME PER 15 MIN

## 2023-09-14 PROCEDURE — 10002800 HB RX 250 W HCPCS: Performed by: INTERNAL MEDICINE

## 2023-09-14 PROCEDURE — 10002803 HB RX 637: Performed by: INTERNAL MEDICINE

## 2023-09-14 PROCEDURE — G0378 HOSPITAL OBSERVATION PER HR: HCPCS

## 2023-09-14 PROCEDURE — 10004651 HB RX, NO CHARGE ITEM: Performed by: INTERNAL MEDICINE

## 2023-09-14 PROCEDURE — 99239 HOSP IP/OBS DSCHRG MGMT >30: CPT | Performed by: INTERNAL MEDICINE

## 2023-09-14 RX ORDER — DIPHENHYDRAMINE HCL 25 MG
50 CAPSULE ORAL NIGHTLY
Status: DISCONTINUED | OUTPATIENT
Start: 2023-09-14 | End: 2023-09-14 | Stop reason: HOSPADM

## 2023-09-14 RX ORDER — ZOLPIDEM TARTRATE 5 MG/1
5 TABLET ORAL NIGHTLY PRN
Status: DISCONTINUED | OUTPATIENT
Start: 2023-09-14 | End: 2023-09-14 | Stop reason: HOSPADM

## 2023-09-14 RX ORDER — INSULIN LISPRO 100 [IU]/ML
15 INJECTION, SOLUTION INTRAVENOUS; SUBCUTANEOUS
Status: DISCONTINUED | OUTPATIENT
Start: 2023-09-14 | End: 2023-09-14 | Stop reason: HOSPADM

## 2023-09-14 RX ADMIN — DIPHENHYDRAMINE HYDROCHLORIDE 50 MG: 25 CAPSULE ORAL at 00:40

## 2023-09-14 RX ADMIN — INSULIN LISPRO 15 UNITS: 100 INJECTION, SOLUTION INTRAVENOUS; SUBCUTANEOUS at 12:46

## 2023-09-14 RX ADMIN — APIXABAN 5 MG: 5 TABLET, FILM COATED ORAL at 09:33

## 2023-09-14 RX ADMIN — INSULIN LISPRO 6 UNITS: 100 INJECTION, SOLUTION INTRAVENOUS; SUBCUTANEOUS at 12:47

## 2023-09-14 RX ADMIN — CLOTRIMAZOLE: 1 CREAM TOPICAL at 12:52

## 2023-09-14 RX ADMIN — ASPIRIN 81 MG: 81 TABLET, COATED ORAL at 09:33

## 2023-09-14 RX ADMIN — INSULIN LISPRO 9 UNITS: 100 INJECTION, SOLUTION INTRAVENOUS; SUBCUTANEOUS at 09:29

## 2023-09-14 RX ADMIN — HYDROCODONE BITARTRATE AND ACETAMINOPHEN 1 TABLET: 10; 325 TABLET ORAL at 00:35

## 2023-09-14 RX ADMIN — SULFAMETHOXAZOLE AND TRIMETHOPRIM 1 TABLET: 800; 160 TABLET ORAL at 09:32

## 2023-09-14 RX ADMIN — GABAPENTIN 800 MG: 400 CAPSULE ORAL at 09:32

## 2023-09-14 RX ADMIN — INSULIN LISPRO 15 UNITS: 100 INJECTION, SOLUTION INTRAVENOUS; SUBCUTANEOUS at 09:28

## 2023-09-14 RX ADMIN — ZOLPIDEM TARTRATE 5 MG: 5 TABLET ORAL at 00:40

## 2023-09-14 ASSESSMENT — PAIN SCALES - GENERAL
PAINLEVEL_OUTOF10: 10
PAINLEVEL_OUTOF10: 10
PAINLEVEL_OUTOF10: 3

## 2023-09-18 ENCOUNTER — APPOINTMENT (OUTPATIENT)
Dept: CT IMAGING | Age: 58
DRG: 721 | End: 2023-09-18
Attending: EMERGENCY MEDICINE

## 2023-09-18 ENCOUNTER — APPOINTMENT (OUTPATIENT)
Dept: GENERAL RADIOLOGY | Age: 58
DRG: 721 | End: 2023-09-18
Attending: EMERGENCY MEDICINE

## 2023-09-18 ENCOUNTER — HOSPITAL ENCOUNTER (INPATIENT)
Age: 58
LOS: 3 days | Discharge: NURSING, REHAB OR LONG-TERM CARE | DRG: 721 | End: 2023-09-22
Attending: EMERGENCY MEDICINE | Admitting: INTERNAL MEDICINE

## 2023-09-18 DIAGNOSIS — M54.42 CHRONIC BILATERAL LOW BACK PAIN WITH BILATERAL SCIATICA: Primary | ICD-10-CM

## 2023-09-18 DIAGNOSIS — R73.9 HYPERGLYCEMIA: ICD-10-CM

## 2023-09-18 DIAGNOSIS — L03.811 CELLULITIS OF HEAD EXCEPT FACE: ICD-10-CM

## 2023-09-18 DIAGNOSIS — R53.81 DEBILITY: ICD-10-CM

## 2023-09-18 DIAGNOSIS — G89.29 CHRONIC BILATERAL LOW BACK PAIN WITH BILATERAL SCIATICA: Primary | ICD-10-CM

## 2023-09-18 DIAGNOSIS — M54.41 CHRONIC BILATERAL LOW BACK PAIN WITH BILATERAL SCIATICA: Primary | ICD-10-CM

## 2023-09-18 LAB
ALBUMIN SERPL-MCNC: 4.3 G/DL (ref 3.6–5.1)
ALBUMIN/GLOB SERPL: 0.9 {RATIO} (ref 1–2.4)
ALP SERPL-CCNC: 235 UNITS/L (ref 45–117)
ALT SERPL-CCNC: 29 UNITS/L
ANION GAP SERPL CALC-SCNC: 15 MMOL/L (ref 7–19)
AST SERPL-CCNC: 22 UNITS/L
B-OH-BUTYR SERPL-SCNC: 0.1 MMOL/L (ref 0–0.3)
BASE EXCESS / DEFICIT, VENOUS - RESPIRATORY: 0 MMOL/L (ref -2–2)
BASE EXCESS / DEFICIT, VENOUS - RESPIRATORY: 3 MMOL/L (ref -2–2)
BASOPHILS # BLD: 0 K/MCL (ref 0–0.3)
BASOPHILS NFR BLD: 1 %
BDY SITE: ABNORMAL
BDY SITE: ABNORMAL
BILIRUB SERPL-MCNC: 1 MG/DL (ref 0.2–1)
BODY TEMPERATURE: 37 DEGREES
BODY TEMPERATURE: 37 DEGREES
BUN SERPL-MCNC: 15 MG/DL (ref 6–20)
BUN/CREAT SERPL: 13 (ref 7–25)
CA-I BLD-SCNC: 1.16 MMOL/L (ref 1.15–1.29)
CA-I BLD-SCNC: 1.26 MMOL/L (ref 1.15–1.29)
CALCIUM SERPL-MCNC: 10.5 MG/DL (ref 8.4–10.2)
CHLORIDE SERPL-SCNC: 95 MMOL/L (ref 97–110)
CO2 SERPL-SCNC: 23 MMOL/L (ref 21–32)
COHGB MFR BLDV: 0.8 %
COHGB MFR BLDV: 1.4 %
CONDITION: ABNORMAL
CONDITION: ABNORMAL
CREAT SERPL-MCNC: 1.14 MG/DL (ref 0.67–1.17)
DEPRECATED RDW RBC: 39.2 FL (ref 39–50)
EGFRCR SERPLBLD CKD-EPI 2021: 75 ML/MIN/{1.73_M2}
EOSINOPHIL # BLD: 0.1 K/MCL (ref 0–0.5)
EOSINOPHIL NFR BLD: 1 %
ERYTHROCYTE [DISTWIDTH] IN BLOOD: 13.4 % (ref 11–15)
FASTING DURATION TIME PATIENT: ABNORMAL H
GLOBULIN SER-MCNC: 4.6 G/DL (ref 2–4)
GLUCOSE BLDC GLUCOMTR-MCNC: 530 MG/DL (ref 70–99)
GLUCOSE BLDC GLUCOMTR-MCNC: >600 MG/DL (ref 70–99)
GLUCOSE SERPL-MCNC: 577 MG/DL (ref 70–99)
HCO3 BLDV-SCNC: 26 MMOL/L (ref 22–28)
HCO3 BLDV-SCNC: 26.2 MMOL/L (ref 22–28)
HCT VFR BLD CALC: 48.8 % (ref 39–51)
HGB BLD-MCNC: 17.3 G/DL (ref 13–17)
HGB BLD-MCNC: 18.1 G/DL (ref 13–17)
HGB BLD-MCNC: 18.5 G/DL (ref 13–17)
IMM GRANULOCYTES # BLD AUTO: 0.1 K/MCL (ref 0–0.2)
IMM GRANULOCYTES # BLD: 1 %
LACTATE BLDV-SCNC: 4.4 MMOL/L (ref 0–2)
LACTATE BLDV-SCNC: 4.9 MMOL/L
LACTATE BLDV-SCNC: 5 MMOL/L
LYMPHOCYTES # BLD: 2.3 K/MCL (ref 1–4)
LYMPHOCYTES NFR BLD: 35 %
MAGNESIUM SERPL-MCNC: 2.2 MG/DL (ref 1.7–2.4)
MCH RBC QN AUTO: 28.8 PG (ref 26–34)
MCHC RBC AUTO-ENTMCNC: 35.5 G/DL (ref 32–36.5)
MCV RBC AUTO: 81.3 FL (ref 78–100)
METHGB MFR BLDMV: 1 %
METHGB MFR BLDMV: 1 %
MONOCYTES # BLD: 0.5 K/MCL (ref 0.3–0.9)
MONOCYTES NFR BLD: 8 %
NEUTROPHILS # BLD: 3.6 K/MCL (ref 1.8–7.7)
NEUTROPHILS NFR BLD: 54 %
NRBC BLD MANUAL-RTO: 0 /100 WBC
NT-PROBNP SERPL-MCNC: 29 PG/ML
OXYHGB MFR BLDV: 62.7 % (ref 60–80)
OXYHGB MFR BLDV: 66.4 % (ref 60–80)
PCO2 BLDV: 36 MM HG (ref 41–54)
PCO2 BLDV: 44 MM HG (ref 41–54)
PH BLDV: 7.38 UNITS (ref 7.35–7.45)
PH BLDV: 7.47 UNITS (ref 7.35–7.45)
PHOSPHATE SERPL-MCNC: 3 MG/DL (ref 2.4–4.7)
PLATELET # BLD AUTO: 122 K/MCL (ref 140–450)
PO2 BLDV: 37 MM HG (ref 35–42)
PO2 BLDV: 44 MM HG (ref 35–42)
POTASSIUM BLD-SCNC: 3.8 MMOL/L (ref 3.4–5.1)
POTASSIUM BLD-SCNC: 4.3 MMOL/L (ref 3.4–5.1)
POTASSIUM SERPL-SCNC: 4.1 MMOL/L (ref 3.4–5.1)
PROT SERPL-MCNC: 8.9 G/DL (ref 6.4–8.2)
RBC # BLD: 6 MIL/MCL (ref 4.5–5.9)
SAO2 DF BLDV: 64 % (ref 60–80)
SAO2 DF BLDV: 68 % (ref 60–80)
SODIUM BLD-SCNC: 128 MMOL/L (ref 135–145)
SODIUM BLD-SCNC: 130 MMOL/L (ref 135–145)
SODIUM SERPL-SCNC: 129 MMOL/L (ref 135–145)
TROPONIN I SERPL DL<=0.01 NG/ML-MCNC: 8 NG/L
WBC # BLD: 6.7 K/MCL (ref 4.2–11)

## 2023-09-18 PROCEDURE — 87040 BLOOD CULTURE FOR BACTERIA: CPT

## 2023-09-18 PROCEDURE — 85025 COMPLETE CBC W/AUTO DIFF WBC: CPT

## 2023-09-18 PROCEDURE — 83880 ASSAY OF NATRIURETIC PEPTIDE: CPT | Performed by: EMERGENCY MEDICINE

## 2023-09-18 PROCEDURE — G1004 CDSM NDSC: HCPCS

## 2023-09-18 PROCEDURE — 82330 ASSAY OF CALCIUM: CPT

## 2023-09-18 PROCEDURE — 80053 COMPREHEN METABOLIC PANEL: CPT

## 2023-09-18 PROCEDURE — 94640 AIRWAY INHALATION TREATMENT: CPT

## 2023-09-18 PROCEDURE — 10004180 HB COUNTER-TRANSPORT

## 2023-09-18 PROCEDURE — 83735 ASSAY OF MAGNESIUM: CPT

## 2023-09-18 PROCEDURE — 96365 THER/PROPH/DIAG IV INF INIT: CPT

## 2023-09-18 PROCEDURE — 99291 CRITICAL CARE FIRST HOUR: CPT

## 2023-09-18 PROCEDURE — 10002807 HB RX 258

## 2023-09-18 PROCEDURE — 84100 ASSAY OF PHOSPHORUS: CPT

## 2023-09-18 PROCEDURE — 82010 KETONE BODYS QUAN: CPT

## 2023-09-18 PROCEDURE — 82375 ASSAY CARBOXYHB QUANT: CPT

## 2023-09-18 PROCEDURE — 70450 CT HEAD/BRAIN W/O DYE: CPT

## 2023-09-18 PROCEDURE — 10002800 HB RX 250 W HCPCS: Performed by: EMERGENCY MEDICINE

## 2023-09-18 PROCEDURE — 84295 ASSAY OF SERUM SODIUM: CPT

## 2023-09-18 PROCEDURE — 82607 VITAMIN B-12: CPT | Performed by: INTERNAL MEDICINE

## 2023-09-18 PROCEDURE — 96375 TX/PRO/DX INJ NEW DRUG ADDON: CPT

## 2023-09-18 PROCEDURE — 85018 HEMOGLOBIN: CPT

## 2023-09-18 PROCEDURE — 84132 ASSAY OF SERUM POTASSIUM: CPT

## 2023-09-18 PROCEDURE — 96361 HYDRATE IV INFUSION ADD-ON: CPT

## 2023-09-18 PROCEDURE — 82962 GLUCOSE BLOOD TEST: CPT

## 2023-09-18 PROCEDURE — 93005 ELECTROCARDIOGRAM TRACING: CPT | Performed by: EMERGENCY MEDICINE

## 2023-09-18 PROCEDURE — 99222 1ST HOSP IP/OBS MODERATE 55: CPT

## 2023-09-18 PROCEDURE — 10002807 HB RX 258: Performed by: EMERGENCY MEDICINE

## 2023-09-18 PROCEDURE — 10002800 HB RX 250 W HCPCS

## 2023-09-18 PROCEDURE — 10002801 HB RX 250 W/O HCPCS

## 2023-09-18 PROCEDURE — 72131 CT LUMBAR SPINE W/O DYE: CPT

## 2023-09-18 PROCEDURE — 71045 X-RAY EXAM CHEST 1 VIEW: CPT

## 2023-09-18 PROCEDURE — 83605 ASSAY OF LACTIC ACID: CPT

## 2023-09-18 PROCEDURE — 81003 URINALYSIS AUTO W/O SCOPE: CPT

## 2023-09-18 PROCEDURE — 96366 THER/PROPH/DIAG IV INF ADDON: CPT

## 2023-09-18 PROCEDURE — 10002801 HB RX 250 W/O HCPCS: Performed by: EMERGENCY MEDICINE

## 2023-09-18 PROCEDURE — 84484 ASSAY OF TROPONIN QUANT: CPT | Performed by: EMERGENCY MEDICINE

## 2023-09-18 RX ORDER — ONDANSETRON 2 MG/ML
4 INJECTION INTRAMUSCULAR; INTRAVENOUS ONCE
Status: COMPLETED | OUTPATIENT
Start: 2023-09-18 | End: 2023-09-18

## 2023-09-18 RX ORDER — INSULIN GLARGINE 100 [IU]/ML
10 INJECTION, SOLUTION SUBCUTANEOUS NIGHTLY
Status: DISCONTINUED | OUTPATIENT
Start: 2023-09-19 | End: 2023-09-19

## 2023-09-18 RX ORDER — IPRATROPIUM BROMIDE AND ALBUTEROL SULFATE 2.5; .5 MG/3ML; MG/3ML
3 SOLUTION RESPIRATORY (INHALATION) ONCE
Status: COMPLETED | OUTPATIENT
Start: 2023-09-18 | End: 2023-09-18

## 2023-09-18 RX ORDER — NICOTINE POLACRILEX 4 MG
15 LOZENGE BUCCAL PRN
Status: DISCONTINUED | OUTPATIENT
Start: 2023-09-18 | End: 2023-09-22 | Stop reason: HOSPADM

## 2023-09-18 RX ORDER — DEXTROSE MONOHYDRATE 25 G/50ML
12.5 INJECTION, SOLUTION INTRAVENOUS PRN
Status: DISCONTINUED | OUTPATIENT
Start: 2023-09-18 | End: 2023-09-22 | Stop reason: HOSPADM

## 2023-09-18 RX ORDER — DEXTROSE MONOHYDRATE 25 G/50ML
25 INJECTION, SOLUTION INTRAVENOUS PRN
Status: DISCONTINUED | OUTPATIENT
Start: 2023-09-18 | End: 2023-09-22 | Stop reason: HOSPADM

## 2023-09-18 RX ORDER — PROMETHAZINE HYDROCHLORIDE 25 MG/1
12.5 TABLET ORAL ONCE
Status: COMPLETED | OUTPATIENT
Start: 2023-09-18 | End: 2023-09-19

## 2023-09-18 RX ORDER — NICOTINE POLACRILEX 4 MG
30 LOZENGE BUCCAL PRN
Status: DISCONTINUED | OUTPATIENT
Start: 2023-09-18 | End: 2023-09-22 | Stop reason: HOSPADM

## 2023-09-18 RX ADMIN — IPRATROPIUM BROMIDE AND ALBUTEROL SULFATE 3 ML: .5; 2.5 SOLUTION RESPIRATORY (INHALATION) at 21:35

## 2023-09-18 RX ADMIN — SODIUM CHLORIDE 1000 ML: 9 INJECTION, SOLUTION INTRAVENOUS at 20:20

## 2023-09-18 RX ADMIN — VANCOMYCIN HYDROCHLORIDE 1750 MG: 10 INJECTION, POWDER, LYOPHILIZED, FOR SOLUTION INTRAVENOUS at 22:40

## 2023-09-18 RX ADMIN — FENTANYL CITRATE 25 MCG: 50 INJECTION, SOLUTION INTRAMUSCULAR; INTRAVENOUS at 23:23

## 2023-09-18 RX ADMIN — ONDANSETRON 4 MG: 2 INJECTION INTRAMUSCULAR; INTRAVENOUS at 20:20

## 2023-09-18 ASSESSMENT — ENCOUNTER SYMPTOMS
SHORTNESS OF BREATH: 1
COUGH: 0
VOMITING: 1
DIARRHEA: 0
FEVER: 0
WOUND: 1
RHINORRHEA: 0
CONFUSION: 0
SPEECH DIFFICULTY: 0
ABDOMINAL PAIN: 0
NAUSEA: 1
BACK PAIN: 0

## 2023-09-18 ASSESSMENT — PAIN SCALES - GENERAL: PAINLEVEL_OUTOF10: 8

## 2023-09-19 LAB
APPEARANCE UR: CLEAR
ATRIAL RATE (BPM): 95
BILIRUB UR QL STRIP: NEGATIVE
CA-I ADJ PH7.4 SERPL-SCNC: 1.27 MMOL/L (ref 1.15–1.29)
CA-I SERPL ISE-SCNC: 1.31 MMOL/L (ref 1.15–1.29)
CHOLEST SERPL-MCNC: 123 MG/DL
CHOLEST/HDLC SERPL: 3.8 {RATIO}
COLOR UR: ABNORMAL
FOLATE SERPL-MCNC: 10.8 NG/ML
FOLATE SERPL-MCNC: 11.1 NG/ML
GLUCOSE BLDC GLUCOMTR-MCNC: 173 MG/DL (ref 70–99)
GLUCOSE BLDC GLUCOMTR-MCNC: 201 MG/DL (ref 70–99)
GLUCOSE BLDC GLUCOMTR-MCNC: 268 MG/DL (ref 70–99)
GLUCOSE BLDC GLUCOMTR-MCNC: 278 MG/DL (ref 70–99)
GLUCOSE BLDC GLUCOMTR-MCNC: 280 MG/DL (ref 70–99)
GLUCOSE BLDC GLUCOMTR-MCNC: 348 MG/DL (ref 70–99)
GLUCOSE UR STRIP-MCNC: >1000 MG/DL
HDLC SERPL-MCNC: 32 MG/DL
HGB UR QL STRIP: NEGATIVE
KETONES UR STRIP-MCNC: NEGATIVE MG/DL
LACTATE BLDV-SCNC: 2.2 MMOL/L (ref 0–2)
LACTATE BLDV-SCNC: 2.4 MMOL/L (ref 0–2)
LDLC SERPL CALC-MCNC: 43 MG/DL
LEUKOCYTE ESTERASE UR QL STRIP: NEGATIVE
NITRITE UR QL STRIP: NEGATIVE
NONHDLC SERPL-MCNC: 91 MG/DL
P AXIS (DEGREES): 41
PH UR STRIP: 5.5 [PH] (ref 5–7)
PR-INTERVAL (MSEC): 190
PROT UR STRIP-MCNC: NEGATIVE MG/DL
QRS-INTERVAL (MSEC): 86
QT-INTERVAL (MSEC): 324
QTC: 407
R AXIS (DEGREES): 19
RAINBOW EXTRA TUBES HOLD SPECIMEN: NORMAL
REPORT TEXT: NORMAL
SP GR UR STRIP: <1.005 (ref 1–1.03)
T AXIS (DEGREES): 32
TRIGL SERPL-MCNC: 238 MG/DL
UROBILINOGEN UR STRIP-MCNC: 0.2 MG/DL
VENTRICULAR RATE EKG/MIN (BPM): 95
VIT B12 SERPL-MCNC: 420 PG/ML (ref 211–911)
VIT B12 SERPL-MCNC: 634 PG/ML (ref 211–911)

## 2023-09-19 PROCEDURE — 10004651 HB RX, NO CHARGE ITEM: Performed by: INTERNAL MEDICINE

## 2023-09-19 PROCEDURE — 10002800 HB RX 250 W HCPCS: Performed by: INTERNAL MEDICINE

## 2023-09-19 PROCEDURE — 96372 THER/PROPH/DIAG INJ SC/IM: CPT

## 2023-09-19 PROCEDURE — 10002800 HB RX 250 W HCPCS

## 2023-09-19 PROCEDURE — 10002803 HB RX 637: Performed by: INTERNAL MEDICINE

## 2023-09-19 PROCEDURE — 82962 GLUCOSE BLOOD TEST: CPT

## 2023-09-19 PROCEDURE — 10002807 HB RX 258

## 2023-09-19 PROCEDURE — 99223 1ST HOSP IP/OBS HIGH 75: CPT | Performed by: INTERNAL MEDICINE

## 2023-09-19 PROCEDURE — 99285 EMERGENCY DEPT VISIT HI MDM: CPT | Performed by: EMERGENCY MEDICINE

## 2023-09-19 PROCEDURE — 10002807 HB RX 258: Performed by: INTERNAL MEDICINE

## 2023-09-19 PROCEDURE — 96372 THER/PROPH/DIAG INJ SC/IM: CPT | Performed by: INTERNAL MEDICINE

## 2023-09-19 PROCEDURE — 80061 LIPID PANEL: CPT | Performed by: INTERNAL MEDICINE

## 2023-09-19 PROCEDURE — 83605 ASSAY OF LACTIC ACID: CPT

## 2023-09-19 PROCEDURE — 10002803 HB RX 637

## 2023-09-19 PROCEDURE — 82607 VITAMIN B-12: CPT | Performed by: INTERNAL MEDICINE

## 2023-09-19 PROCEDURE — 10000002 HB ROOM CHARGE MED SURG

## 2023-09-19 RX ORDER — CLOTRIMAZOLE 1 %
CREAM (GRAM) TOPICAL EVERY 12 HOURS SCHEDULED
Status: DISCONTINUED | OUTPATIENT
Start: 2023-09-19 | End: 2023-09-22 | Stop reason: HOSPADM

## 2023-09-19 RX ORDER — SODIUM CHLORIDE, SODIUM LACTATE, POTASSIUM CHLORIDE, CALCIUM CHLORIDE 600; 310; 30; 20 MG/100ML; MG/100ML; MG/100ML; MG/100ML
INJECTION, SOLUTION INTRAVENOUS CONTINUOUS
Status: DISCONTINUED | OUTPATIENT
Start: 2023-09-19 | End: 2023-09-19

## 2023-09-19 RX ORDER — OXYCODONE AND ACETAMINOPHEN 10; 325 MG/1; MG/1
1 TABLET ORAL EVERY 6 HOURS PRN
Status: DISCONTINUED | OUTPATIENT
Start: 2023-09-19 | End: 2023-09-21

## 2023-09-19 RX ORDER — TAMSULOSIN HYDROCHLORIDE 0.4 MG/1
0.4 CAPSULE ORAL
COMMUNITY
End: 2023-09-19 | Stop reason: CLARIF

## 2023-09-19 RX ORDER — SODIUM CHLORIDE, SODIUM LACTATE, POTASSIUM CHLORIDE, CALCIUM CHLORIDE 600; 310; 30; 20 MG/100ML; MG/100ML; MG/100ML; MG/100ML
INJECTION, SOLUTION INTRAVENOUS CONTINUOUS
Status: DISCONTINUED | OUTPATIENT
Start: 2023-09-19 | End: 2023-09-20

## 2023-09-19 RX ORDER — AMITRIPTYLINE HYDROCHLORIDE 25 MG/1
150 TABLET, FILM COATED ORAL NIGHTLY
Status: DISCONTINUED | OUTPATIENT
Start: 2023-09-20 | End: 2023-09-22 | Stop reason: HOSPADM

## 2023-09-19 RX ORDER — LEVETIRACETAM 750 MG/1
750 TABLET ORAL 2 TIMES DAILY
COMMUNITY
End: 2023-09-19 | Stop reason: CLARIF

## 2023-09-19 RX ORDER — LANOLIN ALCOHOL/MO/W.PET/CERES
3 CREAM (GRAM) TOPICAL NIGHTLY PRN
Status: DISCONTINUED | OUTPATIENT
Start: 2023-09-19 | End: 2023-09-22 | Stop reason: HOSPADM

## 2023-09-19 RX ORDER — BISACODYL 10 MG
10 SUPPOSITORY, RECTAL RECTAL DAILY PRN
Status: DISCONTINUED | OUTPATIENT
Start: 2023-09-19 | End: 2023-09-22 | Stop reason: HOSPADM

## 2023-09-19 RX ORDER — GUAIFENESIN 200 MG/10ML
100 LIQUID ORAL EVERY 6 HOURS PRN
COMMUNITY
End: 2023-09-19 | Stop reason: CLARIF

## 2023-09-19 RX ORDER — GABAPENTIN 400 MG/1
800 CAPSULE ORAL EVERY 12 HOURS SCHEDULED
Status: DISCONTINUED | OUTPATIENT
Start: 2023-09-19 | End: 2023-09-20

## 2023-09-19 RX ORDER — INSULIN LISPRO 100 [IU]/ML
8 INJECTION, SOLUTION INTRAVENOUS; SUBCUTANEOUS
Status: DISCONTINUED | OUTPATIENT
Start: 2023-09-19 | End: 2023-09-20

## 2023-09-19 RX ORDER — 0.9 % SODIUM CHLORIDE 0.9 %
2 VIAL (ML) INJECTION EVERY 12 HOURS SCHEDULED
Status: DISCONTINUED | OUTPATIENT
Start: 2023-09-19 | End: 2023-09-22 | Stop reason: HOSPADM

## 2023-09-19 RX ORDER — TRAMADOL HYDROCHLORIDE 50 MG/1
50 TABLET ORAL EVERY 6 HOURS PRN
Status: DISCONTINUED | OUTPATIENT
Start: 2023-09-19 | End: 2023-09-22 | Stop reason: HOSPADM

## 2023-09-19 RX ORDER — LACOSAMIDE 150 MG/1
1 TABLET ORAL 2 TIMES DAILY
COMMUNITY
End: 2023-09-19 | Stop reason: CLARIF

## 2023-09-19 RX ORDER — CALCIUM CARBONATE 500 MG/1
1 TABLET, CHEWABLE ORAL DAILY
COMMUNITY
End: 2023-09-19 | Stop reason: CLARIF

## 2023-09-19 RX ORDER — SENNOSIDES A AND B 8.6 MG/1
1 TABLET, FILM COATED ORAL DAILY
Status: DISCONTINUED | OUTPATIENT
Start: 2023-09-19 | End: 2023-09-22 | Stop reason: HOSPADM

## 2023-09-19 RX ORDER — PANTOPRAZOLE SODIUM 40 MG/1
40 TABLET, DELAYED RELEASE ORAL DAILY
Status: DISCONTINUED | OUTPATIENT
Start: 2023-09-19 | End: 2023-09-22 | Stop reason: HOSPADM

## 2023-09-19 RX ORDER — MIRTAZAPINE 15 MG/1
15 TABLET, FILM COATED ORAL NIGHTLY
COMMUNITY
End: 2023-09-19 | Stop reason: CLARIF

## 2023-09-19 RX ORDER — HEPARIN SODIUM 5000 [USP'U]/ML
5000 INJECTION, SOLUTION INTRAVENOUS; SUBCUTANEOUS EVERY 8 HOURS SCHEDULED
Status: DISCONTINUED | OUTPATIENT
Start: 2023-09-19 | End: 2023-09-19

## 2023-09-19 RX ORDER — ONDANSETRON 2 MG/ML
4 INJECTION INTRAMUSCULAR; INTRAVENOUS EVERY 8 HOURS PRN
Status: DISCONTINUED | OUTPATIENT
Start: 2023-09-19 | End: 2023-09-22 | Stop reason: HOSPADM

## 2023-09-19 RX ORDER — SENNOSIDES A AND B 8.6 MG/1
1 TABLET, FILM COATED ORAL DAILY
COMMUNITY

## 2023-09-19 RX ORDER — ACETAMINOPHEN 325 MG/1
650 TABLET ORAL EVERY 4 HOURS PRN
Status: DISCONTINUED | OUTPATIENT
Start: 2023-09-19 | End: 2023-09-22 | Stop reason: HOSPADM

## 2023-09-19 RX ORDER — AMOXICILLIN 250 MG
2 CAPSULE ORAL DAILY PRN
Status: DISCONTINUED | OUTPATIENT
Start: 2023-09-19 | End: 2023-09-22 | Stop reason: HOSPADM

## 2023-09-19 RX ORDER — OXYCODONE AND ACETAMINOPHEN 10; 325 MG/1; MG/1
1 TABLET ORAL EVERY 6 HOURS PRN
COMMUNITY

## 2023-09-19 RX ORDER — INSULIN GLARGINE 100 [IU]/ML
20 INJECTION, SOLUTION SUBCUTANEOUS DAILY
Status: DISCONTINUED | OUTPATIENT
Start: 2023-09-19 | End: 2023-09-20

## 2023-09-19 RX ORDER — ERGOCALCIFEROL 1.25 MG/1
1.25 CAPSULE ORAL
COMMUNITY
End: 2023-09-19 | Stop reason: CLARIF

## 2023-09-19 RX ORDER — ZOLPIDEM TARTRATE 5 MG/1
5 TABLET ORAL NIGHTLY PRN
Status: DISCONTINUED | OUTPATIENT
Start: 2023-09-19 | End: 2023-09-20

## 2023-09-19 RX ORDER — HEPARIN SODIUM 5000 [USP'U]/ML
5000 INJECTION, SOLUTION INTRAVENOUS; SUBCUTANEOUS EVERY 8 HOURS SCHEDULED
COMMUNITY
End: 2023-09-19 | Stop reason: CLARIF

## 2023-09-19 RX ORDER — DIPHENHYDRAMINE HCL 25 MG
50 CAPSULE ORAL NIGHTLY PRN
Status: DISCONTINUED | OUTPATIENT
Start: 2023-09-19 | End: 2023-09-22 | Stop reason: HOSPADM

## 2023-09-19 RX ORDER — INSULIN GLARGINE 100 [IU]/ML
10 INJECTION, SOLUTION SUBCUTANEOUS NIGHTLY
Status: DISCONTINUED | OUTPATIENT
Start: 2023-09-19 | End: 2023-09-19

## 2023-09-19 RX ADMIN — INSULIN LISPRO 8 UNITS: 100 INJECTION, SOLUTION INTRAVENOUS; SUBCUTANEOUS at 18:14

## 2023-09-19 RX ADMIN — SODIUM CHLORIDE, POTASSIUM CHLORIDE, SODIUM LACTATE AND CALCIUM CHLORIDE: 600; 310; 30; 20 INJECTION, SOLUTION INTRAVENOUS at 07:06

## 2023-09-19 RX ADMIN — PROMETHAZINE HYDROCHLORIDE 12.5 MG: 25 TABLET ORAL at 00:45

## 2023-09-19 RX ADMIN — CLOTRIMAZOLE: 1 CREAM TOPICAL at 21:13

## 2023-09-19 RX ADMIN — SODIUM CHLORIDE, PRESERVATIVE FREE 2 ML: 5 INJECTION INTRAVENOUS at 21:13

## 2023-09-19 RX ADMIN — SODIUM CHLORIDE, PRESERVATIVE FREE 2 ML: 5 INJECTION INTRAVENOUS at 09:19

## 2023-09-19 RX ADMIN — APIXABAN 5 MG: 5 TABLET, FILM COATED ORAL at 21:13

## 2023-09-19 RX ADMIN — INSULIN LISPRO 8 UNITS: 100 INJECTION, SOLUTION INTRAVENOUS; SUBCUTANEOUS at 01:55

## 2023-09-19 RX ADMIN — APIXABAN 5 MG: 5 TABLET, FILM COATED ORAL at 09:08

## 2023-09-19 RX ADMIN — GABAPENTIN 800 MG: 400 CAPSULE ORAL at 10:19

## 2023-09-19 RX ADMIN — VANCOMYCIN HYDROCHLORIDE 1000 MG: 1 INJECTION, POWDER, LYOPHILIZED, FOR SOLUTION INTRAVENOUS at 09:08

## 2023-09-19 RX ADMIN — GABAPENTIN 800 MG: 400 CAPSULE ORAL at 21:13

## 2023-09-19 RX ADMIN — INSULIN LISPRO 9 UNITS: 100 INJECTION, SOLUTION INTRAVENOUS; SUBCUTANEOUS at 18:14

## 2023-09-19 RX ADMIN — DIPHENHYDRAMINE HYDROCHLORIDE 50 MG: 25 CAPSULE ORAL at 21:13

## 2023-09-19 RX ADMIN — OXYCODONE HYDROCHLORIDE AND ACETAMINOPHEN 1 TABLET: 10; 325 TABLET ORAL at 18:15

## 2023-09-19 RX ADMIN — PANTOPRAZOLE SODIUM 40 MG: 40 TABLET, DELAYED RELEASE ORAL at 09:08

## 2023-09-19 RX ADMIN — ACETAMINOPHEN 650 MG: 325 TABLET ORAL at 06:46

## 2023-09-19 RX ADMIN — INSULIN GLARGINE 20 UNITS: 100 INJECTION, SOLUTION SUBCUTANEOUS at 10:18

## 2023-09-19 RX ADMIN — VANCOMYCIN HYDROCHLORIDE 1000 MG: 1 INJECTION, POWDER, LYOPHILIZED, FOR SOLUTION INTRAVENOUS at 21:33

## 2023-09-19 RX ADMIN — SODIUM CHLORIDE, POTASSIUM CHLORIDE, SODIUM LACTATE AND CALCIUM CHLORIDE 1000 ML: 600; 310; 30; 20 INJECTION, SOLUTION INTRAVENOUS at 00:53

## 2023-09-19 RX ADMIN — INSULIN LISPRO 9 UNITS: 100 INJECTION, SOLUTION INTRAVENOUS; SUBCUTANEOUS at 09:08

## 2023-09-19 RX ADMIN — INSULIN LISPRO 6 UNITS: 100 INJECTION, SOLUTION INTRAVENOUS; SUBCUTANEOUS at 15:31

## 2023-09-19 SDOH — HEALTH STABILITY: PHYSICAL HEALTH: DO YOU HAVE SERIOUS DIFFICULTY WALKING OR CLIMBING STAIRS?: YES

## 2023-09-19 SDOH — ECONOMIC STABILITY: FOOD INSECURITY: HOW OFTEN IN THE PAST 12 MONTHS WERE YOU WORRIED OR STRESSED ABOUT HAVING ENOUGH MONEY TO BUY NUTRITIOUS MEALS?: NEVER

## 2023-09-19 SDOH — HEALTH STABILITY: PHYSICAL HEALTH: DO YOU HAVE DIFFICULTY DRESSING OR BATHING?: YES

## 2023-09-19 SDOH — HEALTH STABILITY: GENERAL: BECAUSE OF A PHYSICAL, MENTAL, OR EMOTIONAL CONDITION, DO YOU HAVE DIFFICULTY DOING ERRANDS ALONE?: NO

## 2023-09-19 SDOH — ECONOMIC STABILITY: HOUSING INSECURITY: ARE YOU WORRIED ABOUT LOSING YOUR HOUSING?: NO

## 2023-09-19 SDOH — ECONOMIC STABILITY: HOUSING INSECURITY: WHAT IS YOUR LIVING SITUATION TODAY?: APARTMENT;ASSISTED LIVING

## 2023-09-19 SDOH — ECONOMIC STABILITY: HOUSING INSECURITY: WHAT IS YOUR LIVING SITUATION TODAY?: OTHER FACILITY RESIDENTS

## 2023-09-19 SDOH — ECONOMIC STABILITY: GENERAL

## 2023-09-19 SDOH — SOCIAL STABILITY: SOCIAL NETWORK
HOW OFTEN DO YOU SEE OR TALK TO PEOPLE THAT YOU CARE ABOUT AND FEEL CLOSE TO? (FOR EXAMPLE: TALKING TO FRIENDS ON THE PHONE, VISITING FRIENDS OR FAMILY, GOING TO CHURCH OR CLUB MEETINGS): 3 TO 5 TIMES A WEEK

## 2023-09-19 SDOH — HEALTH STABILITY: GENERAL
BECAUSE OF A PHYSICAL, MENTAL, OR EMOTIONAL CONDITION, DO YOU HAVE SERIOUS DIFFICULTY CONCENTRATING, REMEMBERING OR MAKING DECISIONS?: YES

## 2023-09-19 ASSESSMENT — PAIN SCALES - WONG BAKER: WONGBAKER_NUMERICALRESPONSE: 8

## 2023-09-19 ASSESSMENT — PATIENT HEALTH QUESTIONNAIRE - PHQ9
SUM OF ALL RESPONSES TO PHQ9 QUESTIONS 1 AND 2: 0
IS PATIENT ABLE TO COMPLETE PHQ2 OR PHQ9: YES
CLINICAL INTERPRETATION OF PHQ2 SCORE: NO FURTHER SCREENING NEEDED
1. LITTLE INTEREST OR PLEASURE IN DOING THINGS: NOT AT ALL
2. FEELING DOWN, DEPRESSED OR HOPELESS: NOT AT ALL
SUM OF ALL RESPONSES TO PHQ9 QUESTIONS 1 AND 2: 0

## 2023-09-19 ASSESSMENT — COLUMBIA-SUICIDE SEVERITY RATING SCALE - C-SSRS
IS THE PATIENT ABLE TO COMPLETE C-SSRS: YES
6. HAVE YOU EVER DONE ANYTHING, STARTED TO DO ANYTHING, OR PREPARED TO DO ANYTHING TO END YOUR LIFE?: NO
2. HAVE YOU ACTUALLY HAD ANY THOUGHTS OF KILLING YOURSELF?: NO
1. WITHIN THE PAST MONTH, HAVE YOU WISHED YOU WERE DEAD OR WISHED YOU COULD GO TO SLEEP AND NOT WAKE UP?: NO

## 2023-09-19 ASSESSMENT — LIFESTYLE VARIABLES
AUDIT-C TOTAL SCORE: 0
HOW OFTEN DO YOU HAVE 6 OR MORE DRINKS ON ONE OCCASION: NEVER
ALCOHOL_USE_STATUS: NO OR LOW RISK WITH VALIDATED TOOL
HOW MANY STANDARD DRINKS CONTAINING ALCOHOL DO YOU HAVE ON A TYPICAL DAY: 0,1 OR 2
HOW OFTEN DO YOU HAVE A DRINK CONTAINING ALCOHOL: NEVER

## 2023-09-19 ASSESSMENT — ACTIVITIES OF DAILY LIVING (ADL)
ADL_SCORE: 12
ADL_SHORT_OF_BREATH: NO
ADL_BEFORE_ADMISSION: INDEPENDENT
RECENT_DECLINE_ADL: YES, ACUTE ILLNESS WITHOUT THERAPY NEEDS

## 2023-09-19 ASSESSMENT — PAIN SCALES - GENERAL
PAINLEVEL_OUTOF10: 9
PAINLEVEL_OUTOF10: 4
PAINLEVEL_OUTOF10: 0
PAINLEVEL_OUTOF10: 8

## 2023-09-20 LAB
ANION GAP SERPL CALC-SCNC: 10 MMOL/L (ref 7–19)
BASOPHILS # BLD: 0 K/MCL (ref 0–0.3)
BASOPHILS NFR BLD: 1 %
BUN SERPL-MCNC: 16 MG/DL (ref 6–20)
BUN/CREAT SERPL: 15 (ref 7–25)
CALCIUM SERPL-MCNC: 8.5 MG/DL (ref 8.4–10.2)
CHLORIDE SERPL-SCNC: 105 MMOL/L (ref 97–110)
CO2 SERPL-SCNC: 25 MMOL/L (ref 21–32)
CREAT SERPL-MCNC: 1.08 MG/DL (ref 0.67–1.17)
DEPRECATED RDW RBC: 42.5 FL (ref 39–50)
EGFRCR SERPLBLD CKD-EPI 2021: 80 ML/MIN/{1.73_M2}
EOSINOPHIL # BLD: 0.1 K/MCL (ref 0–0.5)
EOSINOPHIL NFR BLD: 4 %
ERYTHROCYTE [DISTWIDTH] IN BLOOD: 13.7 % (ref 11–15)
FASTING DURATION TIME PATIENT: ABNORMAL H
GLUCOSE BLDC GLUCOMTR-MCNC: 199 MG/DL (ref 70–99)
GLUCOSE BLDC GLUCOMTR-MCNC: 323 MG/DL (ref 70–99)
GLUCOSE BLDC GLUCOMTR-MCNC: 339 MG/DL (ref 70–99)
GLUCOSE BLDC GLUCOMTR-MCNC: 80 MG/DL (ref 70–99)
GLUCOSE SERPL-MCNC: 378 MG/DL (ref 70–99)
HCT VFR BLD CALC: 37.2 % (ref 39–51)
HGB BLD-MCNC: 12.6 G/DL (ref 13–17)
IMM GRANULOCYTES # BLD AUTO: 0.1 K/MCL (ref 0–0.2)
IMM GRANULOCYTES # BLD: 2 %
LYMPHOCYTES # BLD: 1.5 K/MCL (ref 1–4)
LYMPHOCYTES NFR BLD: 37 %
MAGNESIUM SERPL-MCNC: 1.9 MG/DL (ref 1.7–2.4)
MCH RBC QN AUTO: 28.8 PG (ref 26–34)
MCHC RBC AUTO-ENTMCNC: 33.9 G/DL (ref 32–36.5)
MCV RBC AUTO: 85.1 FL (ref 78–100)
MONOCYTES # BLD: 0.3 K/MCL (ref 0.3–0.9)
MONOCYTES NFR BLD: 8 %
NEUTROPHILS # BLD: 1.9 K/MCL (ref 1.8–7.7)
NEUTROPHILS NFR BLD: 48 %
NRBC BLD MANUAL-RTO: 0 /100 WBC
PLATELET # BLD AUTO: 91 K/MCL (ref 140–450)
POTASSIUM SERPL-SCNC: 3.9 MMOL/L (ref 3.4–5.1)
RBC # BLD: 4.37 MIL/MCL (ref 4.5–5.9)
SODIUM SERPL-SCNC: 136 MMOL/L (ref 135–145)
VANCOMYCIN TROUGH SERPL-MCNC: 12.2 MCG/ML (ref 10–20)
WBC # BLD: 3.9 K/MCL (ref 4.2–11)

## 2023-09-20 PROCEDURE — 10000002 HB ROOM CHARGE MED SURG

## 2023-09-20 PROCEDURE — 10004651 HB RX, NO CHARGE ITEM: Performed by: INTERNAL MEDICINE

## 2023-09-20 PROCEDURE — 97161 PT EVAL LOW COMPLEX 20 MIN: CPT

## 2023-09-20 PROCEDURE — 10002800 HB RX 250 W HCPCS: Performed by: INTERNAL MEDICINE

## 2023-09-20 PROCEDURE — 97110 THERAPEUTIC EXERCISES: CPT

## 2023-09-20 PROCEDURE — 80202 ASSAY OF VANCOMYCIN: CPT | Performed by: INTERNAL MEDICINE

## 2023-09-20 PROCEDURE — 10002803 HB RX 637: Performed by: NURSE PRACTITIONER

## 2023-09-20 PROCEDURE — 36415 COLL VENOUS BLD VENIPUNCTURE: CPT | Performed by: INTERNAL MEDICINE

## 2023-09-20 PROCEDURE — 96372 THER/PROPH/DIAG INJ SC/IM: CPT | Performed by: INTERNAL MEDICINE

## 2023-09-20 PROCEDURE — 99233 SBSQ HOSP IP/OBS HIGH 50: CPT | Performed by: INTERNAL MEDICINE

## 2023-09-20 PROCEDURE — 85025 COMPLETE CBC W/AUTO DIFF WBC: CPT | Performed by: INTERNAL MEDICINE

## 2023-09-20 PROCEDURE — 10002803 HB RX 637: Performed by: INTERNAL MEDICINE

## 2023-09-20 PROCEDURE — 10002807 HB RX 258: Performed by: INTERNAL MEDICINE

## 2023-09-20 PROCEDURE — 83735 ASSAY OF MAGNESIUM: CPT | Performed by: INTERNAL MEDICINE

## 2023-09-20 PROCEDURE — 80048 BASIC METABOLIC PNL TOTAL CA: CPT | Performed by: INTERNAL MEDICINE

## 2023-09-20 RX ORDER — SULFAMETHOXAZOLE AND TRIMETHOPRIM 800; 160 MG/1; MG/1
1 TABLET ORAL 2 TIMES DAILY
Qty: 8 TABLET | Refills: 0 | Status: SHIPPED | OUTPATIENT
Start: 2023-09-21 | End: 2023-09-25

## 2023-09-20 RX ORDER — GABAPENTIN 400 MG/1
800 CAPSULE ORAL EVERY 8 HOURS SCHEDULED
Status: DISCONTINUED | OUTPATIENT
Start: 2023-09-20 | End: 2023-09-22 | Stop reason: HOSPADM

## 2023-09-20 RX ORDER — LANCETS
EACH MISCELLANEOUS
Qty: 300 EACH | Refills: 0 | Status: SHIPPED | OUTPATIENT
Start: 2023-09-20

## 2023-09-20 RX ORDER — CLOTRIMAZOLE 1 %
CREAM (GRAM) TOPICAL EVERY 12 HOURS SCHEDULED
Qty: 30 G | Refills: 0 | Status: SHIPPED | OUTPATIENT
Start: 2023-09-20

## 2023-09-20 RX ORDER — ZOLPIDEM TARTRATE 5 MG/1
10 TABLET ORAL NIGHTLY PRN
Status: DISCONTINUED | OUTPATIENT
Start: 2023-09-20 | End: 2023-09-22 | Stop reason: HOSPADM

## 2023-09-20 RX ORDER — INSULIN LISPRO 100 [IU]/ML
15 INJECTION, SOLUTION INTRAVENOUS; SUBCUTANEOUS
Status: DISCONTINUED | OUTPATIENT
Start: 2023-09-20 | End: 2023-09-22 | Stop reason: HOSPADM

## 2023-09-20 RX ORDER — INSULIN GLARGINE 100 [IU]/ML
25 INJECTION, SOLUTION SUBCUTANEOUS DAILY
Status: DISCONTINUED | OUTPATIENT
Start: 2023-09-21 | End: 2023-09-22 | Stop reason: HOSPADM

## 2023-09-20 RX ORDER — PROMETHAZINE HYDROCHLORIDE 25 MG/1
25 TABLET ORAL EVERY 4 HOURS PRN
Status: DISCONTINUED | OUTPATIENT
Start: 2023-09-20 | End: 2023-09-22 | Stop reason: HOSPADM

## 2023-09-20 RX ADMIN — AMITRIPTYLINE HYDROCHLORIDE 150 MG: 25 TABLET, FILM COATED ORAL at 00:47

## 2023-09-20 RX ADMIN — ZOLPIDEM TARTRATE 10 MG: 5 TABLET ORAL at 21:51

## 2023-09-20 RX ADMIN — AMITRIPTYLINE HYDROCHLORIDE 150 MG: 25 TABLET, FILM COATED ORAL at 21:52

## 2023-09-20 RX ADMIN — INSULIN LISPRO 12 UNITS: 100 INJECTION, SOLUTION INTRAVENOUS; SUBCUTANEOUS at 13:04

## 2023-09-20 RX ADMIN — INSULIN LISPRO 8 UNITS: 100 INJECTION, SOLUTION INTRAVENOUS; SUBCUTANEOUS at 09:22

## 2023-09-20 RX ADMIN — APIXABAN 5 MG: 5 TABLET, FILM COATED ORAL at 09:19

## 2023-09-20 RX ADMIN — GABAPENTIN 800 MG: 400 CAPSULE ORAL at 09:19

## 2023-09-20 RX ADMIN — SODIUM CHLORIDE, PRESERVATIVE FREE 2 ML: 5 INJECTION INTRAVENOUS at 09:22

## 2023-09-20 RX ADMIN — OXYCODONE HYDROCHLORIDE AND ACETAMINOPHEN 1 TABLET: 10; 325 TABLET ORAL at 10:07

## 2023-09-20 RX ADMIN — TRAMADOL HYDROCHLORIDE 50 MG: 50 TABLET, COATED ORAL at 21:52

## 2023-09-20 RX ADMIN — GABAPENTIN 800 MG: 400 CAPSULE ORAL at 21:51

## 2023-09-20 RX ADMIN — GABAPENTIN 800 MG: 400 CAPSULE ORAL at 13:03

## 2023-09-20 RX ADMIN — APIXABAN 5 MG: 5 TABLET, FILM COATED ORAL at 21:52

## 2023-09-20 RX ADMIN — INSULIN LISPRO 12 UNITS: 100 INJECTION, SOLUTION INTRAVENOUS; SUBCUTANEOUS at 09:23

## 2023-09-20 RX ADMIN — OXYCODONE HYDROCHLORIDE AND ACETAMINOPHEN 1 TABLET: 10; 325 TABLET ORAL at 00:46

## 2023-09-20 RX ADMIN — ZOLPIDEM TARTRATE 5 MG: 5 TABLET ORAL at 00:46

## 2023-09-20 RX ADMIN — OXYCODONE HYDROCHLORIDE AND ACETAMINOPHEN 1 TABLET: 10; 325 TABLET ORAL at 17:03

## 2023-09-20 RX ADMIN — SODIUM CHLORIDE, PRESERVATIVE FREE 2 ML: 5 INJECTION INTRAVENOUS at 21:52

## 2023-09-20 RX ADMIN — INSULIN GLARGINE 20 UNITS: 100 INJECTION, SOLUTION SUBCUTANEOUS at 09:24

## 2023-09-20 RX ADMIN — CLOTRIMAZOLE: 1 CREAM TOPICAL at 21:59

## 2023-09-20 RX ADMIN — INSULIN LISPRO 15 UNITS: 100 INJECTION, SOLUTION INTRAVENOUS; SUBCUTANEOUS at 13:04

## 2023-09-20 RX ADMIN — OXYCODONE HYDROCHLORIDE AND ACETAMINOPHEN 1 TABLET: 10; 325 TABLET ORAL at 23:33

## 2023-09-20 RX ADMIN — VANCOMYCIN HYDROCHLORIDE 1000 MG: 1 INJECTION, POWDER, LYOPHILIZED, FOR SOLUTION INTRAVENOUS at 21:56

## 2023-09-20 RX ADMIN — VANCOMYCIN HYDROCHLORIDE 1000 MG: 1 INJECTION, POWDER, LYOPHILIZED, FOR SOLUTION INTRAVENOUS at 09:45

## 2023-09-20 RX ADMIN — PANTOPRAZOLE SODIUM 40 MG: 40 TABLET, DELAYED RELEASE ORAL at 09:19

## 2023-09-20 RX ADMIN — CLOTRIMAZOLE: 1 CREAM TOPICAL at 09:24

## 2023-09-20 RX ADMIN — DIPHENHYDRAMINE HYDROCHLORIDE 50 MG: 25 CAPSULE ORAL at 21:51

## 2023-09-20 ASSESSMENT — ACTIVITIES OF DAILY LIVING (ADL): PRIOR_ADL: MODIFIED INDEPENDENT

## 2023-09-20 ASSESSMENT — COGNITIVE AND FUNCTIONAL STATUS - GENERAL
BASIC_MOBILITY_CONVERTED_SCORE: 35.55
BASIC_MOBILITY_RAW_SCORE: 14

## 2023-09-20 ASSESSMENT — PAIN SCALES - GENERAL
PAINLEVEL_OUTOF10: 9
PAINLEVEL_OUTOF10: 8
PAINLEVEL_OUTOF10: 7
PAINLEVEL_OUTOF10: 6

## 2023-09-20 ASSESSMENT — ENCOUNTER SYMPTOMS: PAIN SEVERITY NOW: 0

## 2023-09-21 LAB
ANION GAP SERPL CALC-SCNC: 11 MMOL/L (ref 7–19)
BUN SERPL-MCNC: 17 MG/DL (ref 6–20)
BUN/CREAT SERPL: 16 (ref 7–25)
CALCIUM SERPL-MCNC: 8.7 MG/DL (ref 8.4–10.2)
CHLORIDE SERPL-SCNC: 106 MMOL/L (ref 97–110)
CO2 SERPL-SCNC: 21 MMOL/L (ref 21–32)
CREAT SERPL-MCNC: 1.05 MG/DL (ref 0.67–1.17)
EGFRCR SERPLBLD CKD-EPI 2021: 83 ML/MIN/{1.73_M2}
FASTING DURATION TIME PATIENT: ABNORMAL H
GLUCOSE BLDC GLUCOMTR-MCNC: 156 MG/DL (ref 70–99)
GLUCOSE BLDC GLUCOMTR-MCNC: 172 MG/DL (ref 70–99)
GLUCOSE BLDC GLUCOMTR-MCNC: 176 MG/DL (ref 70–99)
GLUCOSE BLDC GLUCOMTR-MCNC: 220 MG/DL (ref 70–99)
GLUCOSE BLDC GLUCOMTR-MCNC: 296 MG/DL (ref 70–99)
GLUCOSE SERPL-MCNC: 406 MG/DL (ref 70–99)
MAGNESIUM SERPL-MCNC: 2 MG/DL (ref 1.7–2.4)
POTASSIUM SERPL-SCNC: 4.3 MMOL/L (ref 3.4–5.1)
SODIUM SERPL-SCNC: 134 MMOL/L (ref 135–145)

## 2023-09-21 PROCEDURE — 36415 COLL VENOUS BLD VENIPUNCTURE: CPT | Performed by: INTERNAL MEDICINE

## 2023-09-21 PROCEDURE — 10000002 HB ROOM CHARGE MED SURG

## 2023-09-21 PROCEDURE — 96372 THER/PROPH/DIAG INJ SC/IM: CPT | Performed by: INTERNAL MEDICINE

## 2023-09-21 PROCEDURE — 10002803 HB RX 637: Performed by: INTERNAL MEDICINE

## 2023-09-21 PROCEDURE — 10002800 HB RX 250 W HCPCS: Performed by: INTERNAL MEDICINE

## 2023-09-21 PROCEDURE — 80048 BASIC METABOLIC PNL TOTAL CA: CPT | Performed by: INTERNAL MEDICINE

## 2023-09-21 PROCEDURE — 10004651 HB RX, NO CHARGE ITEM: Performed by: INTERNAL MEDICINE

## 2023-09-21 PROCEDURE — 83735 ASSAY OF MAGNESIUM: CPT | Performed by: INTERNAL MEDICINE

## 2023-09-21 PROCEDURE — 10002807 HB RX 258: Performed by: INTERNAL MEDICINE

## 2023-09-21 PROCEDURE — 10002803 HB RX 637: Performed by: STUDENT IN AN ORGANIZED HEALTH CARE EDUCATION/TRAINING PROGRAM

## 2023-09-21 PROCEDURE — 99233 SBSQ HOSP IP/OBS HIGH 50: CPT | Performed by: STUDENT IN AN ORGANIZED HEALTH CARE EDUCATION/TRAINING PROGRAM

## 2023-09-21 RX ORDER — OXYCODONE HYDROCHLORIDE AND ACETAMINOPHEN 5; 325 MG/1; MG/1
1 TABLET ORAL EVERY 6 HOURS PRN
Status: DISCONTINUED | OUTPATIENT
Start: 2023-09-21 | End: 2023-09-22 | Stop reason: HOSPADM

## 2023-09-21 RX ADMIN — OXYCODONE HYDROCHLORIDE AND ACETAMINOPHEN 1 TABLET: 5; 325 TABLET ORAL at 22:20

## 2023-09-21 RX ADMIN — INSULIN LISPRO 3 UNITS: 100 INJECTION, SOLUTION INTRAVENOUS; SUBCUTANEOUS at 18:49

## 2023-09-21 RX ADMIN — ZOLPIDEM TARTRATE 10 MG: 5 TABLET ORAL at 22:21

## 2023-09-21 RX ADMIN — SODIUM CHLORIDE, PRESERVATIVE FREE 2 ML: 5 INJECTION INTRAVENOUS at 22:22

## 2023-09-21 RX ADMIN — GABAPENTIN 800 MG: 400 CAPSULE ORAL at 22:23

## 2023-09-21 RX ADMIN — INSULIN LISPRO 15 UNITS: 100 INJECTION, SOLUTION INTRAVENOUS; SUBCUTANEOUS at 18:49

## 2023-09-21 RX ADMIN — APIXABAN 5 MG: 5 TABLET, FILM COATED ORAL at 21:23

## 2023-09-21 RX ADMIN — VANCOMYCIN HYDROCHLORIDE 1000 MG: 1 INJECTION, POWDER, LYOPHILIZED, FOR SOLUTION INTRAVENOUS at 22:36

## 2023-09-21 RX ADMIN — Medication 3 MG: at 22:21

## 2023-09-21 RX ADMIN — INSULIN GLARGINE 25 UNITS: 100 INJECTION, SOLUTION SUBCUTANEOUS at 08:55

## 2023-09-21 RX ADMIN — INSULIN LISPRO 9 UNITS: 100 INJECTION, SOLUTION INTRAVENOUS; SUBCUTANEOUS at 08:54

## 2023-09-21 RX ADMIN — INSULIN LISPRO 3 UNITS: 100 INJECTION, SOLUTION INTRAVENOUS; SUBCUTANEOUS at 12:51

## 2023-09-21 RX ADMIN — CLOTRIMAZOLE: 1 CREAM TOPICAL at 22:22

## 2023-09-21 RX ADMIN — INSULIN LISPRO 15 UNITS: 100 INJECTION, SOLUTION INTRAVENOUS; SUBCUTANEOUS at 12:50

## 2023-09-21 RX ADMIN — OXYCODONE HYDROCHLORIDE AND ACETAMINOPHEN 1 TABLET: 10; 325 TABLET ORAL at 12:50

## 2023-09-21 RX ADMIN — PANTOPRAZOLE SODIUM 40 MG: 40 TABLET, DELAYED RELEASE ORAL at 08:53

## 2023-09-21 RX ADMIN — APIXABAN 5 MG: 5 TABLET, FILM COATED ORAL at 08:53

## 2023-09-21 RX ADMIN — GABAPENTIN 800 MG: 400 CAPSULE ORAL at 06:36

## 2023-09-21 RX ADMIN — SODIUM CHLORIDE, PRESERVATIVE FREE 2 ML: 5 INJECTION INTRAVENOUS at 08:55

## 2023-09-21 RX ADMIN — DIPHENHYDRAMINE HYDROCHLORIDE 50 MG: 25 CAPSULE ORAL at 22:21

## 2023-09-21 RX ADMIN — CLOTRIMAZOLE: 1 CREAM TOPICAL at 09:36

## 2023-09-21 RX ADMIN — VANCOMYCIN HYDROCHLORIDE 1000 MG: 1 INJECTION, POWDER, LYOPHILIZED, FOR SOLUTION INTRAVENOUS at 09:48

## 2023-09-21 RX ADMIN — INSULIN LISPRO 15 UNITS: 100 INJECTION, SOLUTION INTRAVENOUS; SUBCUTANEOUS at 08:54

## 2023-09-21 ASSESSMENT — PAIN SCALES - GENERAL
PAINLEVEL_OUTOF10: 6
PAINLEVEL_OUTOF10: 4

## 2023-09-22 VITALS
SYSTOLIC BLOOD PRESSURE: 131 MMHG | WEIGHT: 257.94 LBS | OXYGEN SATURATION: 98 % | RESPIRATION RATE: 18 BRPM | BODY MASS INDEX: 34.94 KG/M2 | HEART RATE: 74 BPM | DIASTOLIC BLOOD PRESSURE: 83 MMHG | HEIGHT: 72 IN | TEMPERATURE: 97.5 F

## 2023-09-22 LAB
GLUCOSE BLDC GLUCOMTR-MCNC: 110 MG/DL (ref 70–99)
GLUCOSE BLDC GLUCOMTR-MCNC: 194 MG/DL (ref 70–99)

## 2023-09-22 PROCEDURE — 10002800 HB RX 250 W HCPCS: Performed by: INTERNAL MEDICINE

## 2023-09-22 PROCEDURE — 10002803 HB RX 637: Performed by: INTERNAL MEDICINE

## 2023-09-22 PROCEDURE — 97110 THERAPEUTIC EXERCISES: CPT

## 2023-09-22 PROCEDURE — 10004651 HB RX, NO CHARGE ITEM: Performed by: INTERNAL MEDICINE

## 2023-09-22 PROCEDURE — 10002803 HB RX 637: Performed by: STUDENT IN AN ORGANIZED HEALTH CARE EDUCATION/TRAINING PROGRAM

## 2023-09-22 PROCEDURE — 10002807 HB RX 258: Performed by: INTERNAL MEDICINE

## 2023-09-22 PROCEDURE — 99239 HOSP IP/OBS DSCHRG MGMT >30: CPT | Performed by: STUDENT IN AN ORGANIZED HEALTH CARE EDUCATION/TRAINING PROGRAM

## 2023-09-22 PROCEDURE — 96372 THER/PROPH/DIAG INJ SC/IM: CPT | Performed by: INTERNAL MEDICINE

## 2023-09-22 RX ADMIN — INSULIN LISPRO 15 UNITS: 100 INJECTION, SOLUTION INTRAVENOUS; SUBCUTANEOUS at 12:45

## 2023-09-22 RX ADMIN — APIXABAN 5 MG: 5 TABLET, FILM COATED ORAL at 08:40

## 2023-09-22 RX ADMIN — GABAPENTIN 800 MG: 400 CAPSULE ORAL at 13:15

## 2023-09-22 RX ADMIN — CLOTRIMAZOLE: 1 CREAM TOPICAL at 08:42

## 2023-09-22 RX ADMIN — INSULIN GLARGINE 25 UNITS: 100 INJECTION, SOLUTION SUBCUTANEOUS at 08:25

## 2023-09-22 RX ADMIN — INSULIN LISPRO 3 UNITS: 100 INJECTION, SOLUTION INTRAVENOUS; SUBCUTANEOUS at 08:40

## 2023-09-22 RX ADMIN — GABAPENTIN 800 MG: 400 CAPSULE ORAL at 05:04

## 2023-09-22 RX ADMIN — OXYCODONE HYDROCHLORIDE AND ACETAMINOPHEN 1 TABLET: 5; 325 TABLET ORAL at 05:04

## 2023-09-22 RX ADMIN — PANTOPRAZOLE SODIUM 40 MG: 40 TABLET, DELAYED RELEASE ORAL at 08:40

## 2023-09-22 RX ADMIN — VANCOMYCIN HYDROCHLORIDE 1000 MG: 1 INJECTION, POWDER, LYOPHILIZED, FOR SOLUTION INTRAVENOUS at 08:50

## 2023-09-22 RX ADMIN — INSULIN LISPRO 15 UNITS: 100 INJECTION, SOLUTION INTRAVENOUS; SUBCUTANEOUS at 08:40

## 2023-09-22 RX ADMIN — TRAMADOL HYDROCHLORIDE 50 MG: 50 TABLET, COATED ORAL at 03:29

## 2023-09-22 RX ADMIN — SODIUM CHLORIDE, PRESERVATIVE FREE 2 ML: 5 INJECTION INTRAVENOUS at 08:41

## 2023-09-22 RX ADMIN — SENNOSIDES 8.6 MG: 8.6 TABLET, FILM COATED ORAL at 08:40

## 2023-09-22 ASSESSMENT — PAIN SCALES - GENERAL
PAINLEVEL_OUTOF10: 7
PAINLEVEL_OUTOF10: 7

## 2023-09-22 ASSESSMENT — COGNITIVE AND FUNCTIONAL STATUS - GENERAL
BASIC_MOBILITY_RAW_SCORE: 15
BASIC_MOBILITY_CONVERTED_SCORE: 36.97

## 2023-09-22 ASSESSMENT — ENCOUNTER SYMPTOMS: PAIN SEVERITY NOW: 0

## 2023-09-24 LAB — BACTERIA BLD CULT: NORMAL

## 2023-09-25 ENCOUNTER — TELEPHONE (OUTPATIENT)
Dept: SURGERY | Age: 58
End: 2023-09-25

## 2025-02-23 NOTE — TELEPHONE ENCOUNTER
Received request via: Pharmacy    Was the patient seen in the last year in this department? Yes    Does the patient have an active prescription (recently filled or refills available) for medication(s) requested? No   Left LE

## (undated) DEVICE — SLEEVE, VASO, THIGH, MED

## (undated) DEVICE — GOWN WARMING STANDARD FLEX - (30/CA)

## (undated) DEVICE — PROTECTOR ULNA NERVE - (36PR/CA)

## (undated) DEVICE — STRIP 5YDX.5IN IODOFORM CTN GAUZE WND CRD WOVEN STRL LF

## (undated) DEVICE — SPONGE GAUZESTER. 2X2 4-PL - (2/PK 50PK/BX 30BX/CS)

## (undated) DEVICE — KIT ANESTHESIA W/CIRCUIT & 3/LT BAG W/FILTER (20EA/CA)

## (undated) DEVICE — STAPLER SKIN DISP - (6/BX 10BX/CA) VISISTAT

## (undated) DEVICE — SET EXTENSION WITH 2 PORTS (48EA/CA) ***PART #2C8610 IS A SUBSTITUTE*****

## (undated) DEVICE — PACK ENT OR - (2EA/CA)

## (undated) DEVICE — TOWEL STOP TIMEOUT SAFETY FLAG (40EA/CA)

## (undated) DEVICE — ELECTRODE DUAL RETURN W/ CORD - (50/PK)

## (undated) DEVICE — STERI STRIP COMPOUND BENZOIN - TINCTURE 0.6ML WITH APPLICATOR (40EA/BX)

## (undated) DEVICE — SODIUM CHL IRRIGATION 0.9% 1000ML (12EA/CA)

## (undated) DEVICE — NEPTUNE 4 PORT MANIFOLD - (20/PK)

## (undated) DEVICE — SYRINGE 10ML GRAD N-PYRG DEHP-FR PVC FREE STRL MED LF DISP

## (undated) DEVICE — DRESSING TRANS 4.75X4IN ADH HPOAL WTPRF TEGADERM PU STD STRL

## (undated) DEVICE — DRESSING TRANSPARENT FILM TEGADERM 4 X 4.75" (50EA/BX)"

## (undated) DEVICE — CANISTER SUCTION 3000ML MECHANICAL FILTER AUTO SHUTOFF MEDI-VAC NONSTERILE LF DISP  (40EA/CA)

## (undated) DEVICE — NEEDLE HPO 25GA 1IN REG WALL REG BVL LL SHLD MECH DEHP-FR

## (undated) DEVICE — GLOVE SURG 7 PROTEXIS LF CRM PF BEAD CUFF STRL PLISPRN 12IN

## (undated) DEVICE — HEMOSTAT ABS 8X4IN FLXB SHEER WEAVE SURGICEL STRL DISP

## (undated) DEVICE — POSITIONER OR RSPBRY 8.5X8X4IN HEAD FOAM HI RSLNT SLOT LF

## (undated) DEVICE — SENSOR SPO2 NEO LNCS ADHESIVE (20/BX) SEE USER NOTES

## (undated) DEVICE — WATER STRL 1000ML PLASTIC POUR BTL LF

## (undated) DEVICE — MASK ANESTHESIA ADULT  - (100/CA)

## (undated) DEVICE — HEAD HOLDER JUNIOR/ADULT

## (undated) DEVICE — BLADE SURGICAL #15 - (50/BX 3BX/CA)

## (undated) DEVICE — GLOVE BIOGEL SZ 8 SURGICAL PF LTX - (50PR/BX 4BX/CA)

## (undated) DEVICE — KIT ROOM DECONTAMINATION

## (undated) DEVICE — SET LEADWIRE 5 LEAD BEDSIDE DISPOSABLE ECG (1SET OF 5/EA)

## (undated) DEVICE — CHLORAPREP 26 ML APPLICATOR - ORANGE TINT(25/CA)

## (undated) DEVICE — V-18 8/150 STRAIGHT (1/EA)

## (undated) DEVICE — ELECTRODE 850 FOAM ADHESIVE - HYDROGEL RADIOTRNSPRNT (50/PK)

## (undated) DEVICE — LACTATED RINGERS INJ 1000 ML - (14EA/CA 60CA/PF)

## (undated) DEVICE — SUTURE 3-0 MONOCRYL PLUS PS-1 - 27 INCH (36/BX)

## (undated) DEVICE — GLOVE SZ 8 BIOGEL PI MICRO - PF LF (50PR/BX)

## (undated) DEVICE — ELECTRODE ESURG BLADE PNCL 10FT TLSCP SMOKE EVAC RCKR SWH

## (undated) DEVICE — SYRINGE SAFETY 5 ML 18 GA X 1-1/2 BLUNT LL (100/BX 4BX/CA)

## (undated) DEVICE — PACK MINOR BASIN - (2EA/CA)

## (undated) DEVICE — SUTURE 4-0 30CM STRATAFIX SPIRAL PS-2 (12EA/BX)

## (undated) DEVICE — ELECTRODE PT RTN C30- LB 9FT CORD NONIRRITATE NONSENSITIZE

## (undated) DEVICE — SUTURE 4-0 MONOCRYL PLUS PS-1 - 27 INCH (36/BX)

## (undated) DEVICE — DRAPE C-ARM LARGE 41IN X 74 IN - (10/BX 2BX/CA)

## (undated) DEVICE — SOLUTION IRR 1000ML 0.9% NACL PLASTIC POUR BTL ISTNC N-PYRG

## (undated) DEVICE — GLOVE SURG 7.5 PROTEXIS LF BLUE PF SMTH BEAD CUFF INTLK STRL

## (undated) DEVICE — SHEET THYROID - (10EA/CA)

## (undated) DEVICE — CLOSURE SKIN STRIP 1/2 X 4 IN - (STERI STRIP) (50/BX 4BX/CA)

## (undated) DEVICE — GOWN SURGEONS LARGE - (32/CA)

## (undated) DEVICE — KIT RM TURNOVER CSTM IC DISP NS LF

## (undated) DEVICE — KIT COLLECTION 1ML REG FLOCK COPAN ESWAB PLASTIC WHT

## (undated) DEVICE — TUBING SCT CLR 12FT 316IN MDVC MAXI-GRIP NCDTV MALE TO MALE

## (undated) DEVICE — GLOVE SZ 6 BIOGEL PI MICRO - PF LF (50PR/BX 4BX/CA)

## (undated) DEVICE — GLOVE BIOGEL PI ORTHO SZ 7 PF LF (40PR/BX)

## (undated) DEVICE — SUCTION INSTRUMENT YANKAUER BULBOUS TIP W/O VENT (50EA/CA)

## (undated) DEVICE — SLEEVE VASO CALF MED - (10PR/CA)

## (undated) DEVICE — Device

## (undated) DEVICE — SUTURE 3-0 VICRYL PLUS SH - 8X 18 INCH (12/BX)

## (undated) DEVICE — TUBING CLEARLINK DUO-VENT - C-FLO (48EA/CA)

## (undated) DEVICE — SUTURE GENERAL

## (undated) DEVICE — GLOVE BIOGEL PI ORTHO SZ 6 SURGICAL PF LF (40PR/BX)

## (undated) DEVICE — SYRINGE 10 ML CONTROL LL (25EA/BX 4BX/CA)